# Patient Record
Sex: MALE | Race: BLACK OR AFRICAN AMERICAN | NOT HISPANIC OR LATINO | Employment: OTHER | URBAN - METROPOLITAN AREA
[De-identification: names, ages, dates, MRNs, and addresses within clinical notes are randomized per-mention and may not be internally consistent; named-entity substitution may affect disease eponyms.]

---

## 2017-07-27 ENCOUNTER — HOSPITAL ENCOUNTER (INPATIENT)
Facility: HOSPITAL | Age: 62
LOS: 1 days | Discharge: HOME | DRG: 292 | End: 2017-07-29
Attending: EMERGENCY MEDICINE | Admitting: HOSPITALIST
Payer: MEDICARE

## 2017-07-27 DIAGNOSIS — I50.43 ACUTE ON CHRONIC COMBINED SYSTOLIC AND DIASTOLIC CONGESTIVE HEART FAILURE: ICD-10-CM

## 2017-07-27 DIAGNOSIS — I50.42 CHRONIC COMBINED SYSTOLIC AND DIASTOLIC HEART FAILURE: ICD-10-CM

## 2017-07-27 DIAGNOSIS — J44.9 CHRONIC OBSTRUCTIVE PULMONARY DISEASE, UNSPECIFIED COPD TYPE: Primary | ICD-10-CM

## 2017-07-27 DIAGNOSIS — I50.9 CONGESTIVE HEART FAILURE, UNSPECIFIED CONGESTIVE HEART FAILURE CHRONICITY, UNSPECIFIED CONGESTIVE HEART FAILURE TYPE: ICD-10-CM

## 2017-07-27 DIAGNOSIS — I25.10 CAD (CORONARY ARTERY DISEASE): Chronic | ICD-10-CM

## 2017-07-27 DIAGNOSIS — R73.9 HYPERGLYCEMIA: ICD-10-CM

## 2017-07-27 DIAGNOSIS — I10 ESSENTIAL HYPERTENSION: ICD-10-CM

## 2017-07-27 DIAGNOSIS — I50.9 CONGESTIVE HEART FAILURE (CHF): ICD-10-CM

## 2017-07-27 LAB
BASOPHILS # BLD AUTO: 0.05 K/UL
BASOPHILS NFR BLD: 0.4 %
BNP SERPL-MCNC: 322 PG/ML
DIFFERENTIAL METHOD: ABNORMAL
EOSINOPHIL # BLD AUTO: 0.2 K/UL
EOSINOPHIL NFR BLD: 1.6 %
ERYTHROCYTE [DISTWIDTH] IN BLOOD BY AUTOMATED COUNT: 13.8 %
HCT VFR BLD AUTO: 41.3 %
HGB BLD-MCNC: 13.3 G/DL
INR PPP: 1
LYMPHOCYTES # BLD AUTO: 3.5 K/UL
LYMPHOCYTES NFR BLD: 31.2 %
MCH RBC QN AUTO: 28.4 PG
MCHC RBC AUTO-ENTMCNC: 32.2 G/DL
MCV RBC AUTO: 88 FL
MONOCYTES # BLD AUTO: 0.8 K/UL
MONOCYTES NFR BLD: 6.9 %
NEUTROPHILS # BLD AUTO: 6.7 K/UL
NEUTROPHILS NFR BLD: 59.9 %
PLATELET # BLD AUTO: 235 K/UL
PMV BLD AUTO: 10.8 FL
PROTHROMBIN TIME: 10.6 SEC
RBC # BLD AUTO: 4.68 M/UL
TROPONIN I SERPL DL<=0.01 NG/ML-MCNC: 0.02 NG/ML
WBC # BLD AUTO: 11.16 K/UL

## 2017-07-27 PROCEDURE — 84484 ASSAY OF TROPONIN QUANT: CPT

## 2017-07-27 PROCEDURE — 85610 PROTHROMBIN TIME: CPT

## 2017-07-27 PROCEDURE — 83880 ASSAY OF NATRIURETIC PEPTIDE: CPT

## 2017-07-27 PROCEDURE — 99285 EMERGENCY DEPT VISIT HI MDM: CPT | Mod: 25

## 2017-07-27 PROCEDURE — 85025 COMPLETE CBC W/AUTO DIFF WBC: CPT

## 2017-07-27 PROCEDURE — 80053 COMPREHEN METABOLIC PANEL: CPT

## 2017-07-28 PROBLEM — E78.5 DYSLIPIDEMIA: Status: ACTIVE | Noted: 2017-07-28

## 2017-07-28 PROBLEM — I50.9 CONGESTIVE HEART FAILURE (CHF): Status: ACTIVE | Noted: 2017-07-28

## 2017-07-28 PROBLEM — I50.43 ACUTE ON CHRONIC COMBINED SYSTOLIC AND DIASTOLIC CONGESTIVE HEART FAILURE: Status: ACTIVE | Noted: 2017-07-28

## 2017-07-28 PROBLEM — J44.9 CHRONIC OBSTRUCTIVE PULMONARY DISEASE: Status: ACTIVE | Noted: 2017-07-28

## 2017-07-28 PROBLEM — I50.42 CHRONIC COMBINED SYSTOLIC AND DIASTOLIC HEART FAILURE: Chronic | Status: ACTIVE | Noted: 2017-07-28

## 2017-07-28 PROBLEM — J96.11 CHRONIC RESPIRATORY FAILURE WITH HYPOXIA: Chronic | Status: ACTIVE | Noted: 2017-07-28

## 2017-07-28 LAB
ALBUMIN SERPL BCP-MCNC: 3.5 G/DL
ALBUMIN SERPL BCP-MCNC: 3.8 G/DL
ALP SERPL-CCNC: 100 U/L
ALP SERPL-CCNC: 91 U/L
ALT SERPL W/O P-5'-P-CCNC: 13 U/L
ALT SERPL W/O P-5'-P-CCNC: 14 U/L
ANION GAP SERPL CALC-SCNC: 10 MMOL/L
ANION GAP SERPL CALC-SCNC: 9 MMOL/L
AST SERPL-CCNC: 14 U/L
AST SERPL-CCNC: 15 U/L
BASOPHILS # BLD AUTO: 0.02 K/UL
BASOPHILS NFR BLD: 0.2 %
BILIRUB SERPL-MCNC: 0.3 MG/DL
BILIRUB SERPL-MCNC: 0.4 MG/DL
BUN SERPL-MCNC: 5 MG/DL
BUN SERPL-MCNC: 5 MG/DL
CALCIUM SERPL-MCNC: 9.1 MG/DL
CALCIUM SERPL-MCNC: 9.4 MG/DL
CHLORIDE SERPL-SCNC: 98 MMOL/L
CHLORIDE SERPL-SCNC: 98 MMOL/L
CO2 SERPL-SCNC: 32 MMOL/L
CO2 SERPL-SCNC: 36 MMOL/L
CREAT SERPL-MCNC: 0.9 MG/DL
CREAT SERPL-MCNC: 0.9 MG/DL
DIASTOLIC DYSFUNCTION: NO
DIASTOLIC DYSFUNCTION: YES
DIFFERENTIAL METHOD: ABNORMAL
EOSINOPHIL # BLD AUTO: 0 K/UL
EOSINOPHIL NFR BLD: 0.1 %
ERYTHROCYTE [DISTWIDTH] IN BLOOD BY AUTOMATED COUNT: 13.6 %
EST. GFR  (AFRICAN AMERICAN): >60 ML/MIN/1.73 M^2
EST. GFR  (AFRICAN AMERICAN): >60 ML/MIN/1.73 M^2
EST. GFR  (NON AFRICAN AMERICAN): >60 ML/MIN/1.73 M^2
EST. GFR  (NON AFRICAN AMERICAN): >60 ML/MIN/1.73 M^2
ESTIMATED AVG GLUCOSE: 186 MG/DL
ESTIMATED PA SYSTOLIC PRESSURE: 16.25
GLOBAL PERICARDIAL EFFUSION: ABNORMAL
GLUCOSE SERPL-MCNC: 229 MG/DL
GLUCOSE SERPL-MCNC: 229 MG/DL
HBA1C MFR BLD HPLC: 8.1 %
HCT VFR BLD AUTO: 44.7 %
HGB BLD-MCNC: 14.6 G/DL
LYMPHOCYTES # BLD AUTO: 1.1 K/UL
LYMPHOCYTES NFR BLD: 8.7 %
MAGNESIUM SERPL-MCNC: 1.9 MG/DL
MCH RBC QN AUTO: 28.6 PG
MCHC RBC AUTO-ENTMCNC: 32.7 G/DL
MCV RBC AUTO: 88 FL
MITRAL VALVE REGURGITATION: ABNORMAL
MONOCYTES # BLD AUTO: 0.1 K/UL
MONOCYTES NFR BLD: 1.2 %
NEUTROPHILS # BLD AUTO: 10.8 K/UL
NEUTROPHILS NFR BLD: 89.8 %
PHOSPHATE SERPL-MCNC: 3.5 MG/DL
PLATELET # BLD AUTO: 248 K/UL
PMV BLD AUTO: 10.5 FL
POCT GLUCOSE: 241 MG/DL (ref 70–110)
POCT GLUCOSE: 336 MG/DL (ref 70–110)
POTASSIUM SERPL-SCNC: 3.6 MMOL/L
POTASSIUM SERPL-SCNC: 4.1 MMOL/L
PROT SERPL-MCNC: 7.5 G/DL
PROT SERPL-MCNC: 8.1 G/DL
RBC # BLD AUTO: 5.1 M/UL
RETIRED EF AND QEF - SEE NOTES: 30 (ref 55–65)
SODIUM SERPL-SCNC: 140 MMOL/L
SODIUM SERPL-SCNC: 143 MMOL/L
TRICUSPID VALVE REGURGITATION: ABNORMAL
WBC # BLD AUTO: 12.01 K/UL

## 2017-07-28 PROCEDURE — 94640 AIRWAY INHALATION TREATMENT: CPT

## 2017-07-28 PROCEDURE — 25000003 PHARM REV CODE 250: Performed by: EMERGENCY MEDICINE

## 2017-07-28 PROCEDURE — 25000242 PHARM REV CODE 250 ALT 637 W/ HCPCS: Performed by: EMERGENCY MEDICINE

## 2017-07-28 PROCEDURE — 96361 HYDRATE IV INFUSION ADD-ON: CPT

## 2017-07-28 PROCEDURE — 25000003 PHARM REV CODE 250: Performed by: INTERNAL MEDICINE

## 2017-07-28 PROCEDURE — 36415 COLL VENOUS BLD VENIPUNCTURE: CPT

## 2017-07-28 PROCEDURE — 94761 N-INVAS EAR/PLS OXIMETRY MLT: CPT

## 2017-07-28 PROCEDURE — 99223 1ST HOSP IP/OBS HIGH 75: CPT | Mod: 25,,, | Performed by: INTERNAL MEDICINE

## 2017-07-28 PROCEDURE — 21400001 HC TELEMETRY ROOM

## 2017-07-28 PROCEDURE — 63600175 PHARM REV CODE 636 W HCPCS: Performed by: INTERNAL MEDICINE

## 2017-07-28 PROCEDURE — 78452 HT MUSCLE IMAGE SPECT MULT: CPT | Mod: 26,,, | Performed by: INTERNAL MEDICINE

## 2017-07-28 PROCEDURE — 63600175 PHARM REV CODE 636 W HCPCS: Performed by: EMERGENCY MEDICINE

## 2017-07-28 PROCEDURE — 84100 ASSAY OF PHOSPHORUS: CPT

## 2017-07-28 PROCEDURE — 80053 COMPREHEN METABOLIC PANEL: CPT

## 2017-07-28 PROCEDURE — 93018 CV STRESS TEST I&R ONLY: CPT | Mod: ,,, | Performed by: INTERNAL MEDICINE

## 2017-07-28 PROCEDURE — 27000221 HC OXYGEN, UP TO 24 HOURS

## 2017-07-28 PROCEDURE — 93017 CV STRESS TEST TRACING ONLY: CPT

## 2017-07-28 PROCEDURE — 96374 THER/PROPH/DIAG INJ IV PUSH: CPT

## 2017-07-28 PROCEDURE — 96375 TX/PRO/DX INJ NEW DRUG ADDON: CPT

## 2017-07-28 PROCEDURE — 93306 TTE W/DOPPLER COMPLETE: CPT | Mod: 26,,, | Performed by: INTERNAL MEDICINE

## 2017-07-28 PROCEDURE — 99900035 HC TECH TIME PER 15 MIN (STAT)

## 2017-07-28 PROCEDURE — 93306 TTE W/DOPPLER COMPLETE: CPT

## 2017-07-28 PROCEDURE — 93016 CV STRESS TEST SUPVJ ONLY: CPT | Mod: ,,, | Performed by: INTERNAL MEDICINE

## 2017-07-28 PROCEDURE — 63600175 PHARM REV CODE 636 W HCPCS: Performed by: NURSE PRACTITIONER

## 2017-07-28 PROCEDURE — 83036 HEMOGLOBIN GLYCOSYLATED A1C: CPT

## 2017-07-28 PROCEDURE — 85025 COMPLETE CBC W/AUTO DIFF WBC: CPT

## 2017-07-28 PROCEDURE — S4991 NICOTINE PATCH NONLEGEND: HCPCS | Performed by: INTERNAL MEDICINE

## 2017-07-28 PROCEDURE — 83735 ASSAY OF MAGNESIUM: CPT

## 2017-07-28 RX ORDER — FUROSEMIDE 10 MG/ML
20 INJECTION INTRAMUSCULAR; INTRAVENOUS DAILY
Status: DISCONTINUED | OUTPATIENT
Start: 2017-07-28 | End: 2017-07-28

## 2017-07-28 RX ORDER — PREDNISONE 20 MG/1
60 TABLET ORAL
Status: COMPLETED | OUTPATIENT
Start: 2017-07-28 | End: 2017-07-28

## 2017-07-28 RX ORDER — IBUPROFEN 200 MG
24 TABLET ORAL
Status: DISCONTINUED | OUTPATIENT
Start: 2017-07-28 | End: 2017-07-30 | Stop reason: HOSPADM

## 2017-07-28 RX ORDER — METHYLPREDNISOLONE SOD SUCC 125 MG
80 VIAL (EA) INJECTION EVERY 8 HOURS
Status: DISCONTINUED | OUTPATIENT
Start: 2017-07-28 | End: 2017-07-28

## 2017-07-28 RX ORDER — LISINOPRIL 20 MG/1
20 TABLET ORAL DAILY
Status: DISCONTINUED | OUTPATIENT
Start: 2017-07-28 | End: 2017-07-30 | Stop reason: HOSPADM

## 2017-07-28 RX ORDER — CLONIDINE HYDROCHLORIDE 0.1 MG/1
0.1 TABLET ORAL 3 TIMES DAILY PRN
Status: DISCONTINUED | OUTPATIENT
Start: 2017-07-28 | End: 2017-07-30 | Stop reason: HOSPADM

## 2017-07-28 RX ORDER — IBUPROFEN 200 MG
16 TABLET ORAL
Status: DISCONTINUED | OUTPATIENT
Start: 2017-07-28 | End: 2017-07-28

## 2017-07-28 RX ORDER — ACETAMINOPHEN 500 MG
500 TABLET ORAL EVERY 6 HOURS PRN
Status: DISCONTINUED | OUTPATIENT
Start: 2017-07-28 | End: 2017-07-30 | Stop reason: HOSPADM

## 2017-07-28 RX ORDER — FLUTICASONE FUROATE AND VILANTEROL 100; 25 UG/1; UG/1
1 POWDER RESPIRATORY (INHALATION) DAILY
Status: DISCONTINUED | OUTPATIENT
Start: 2017-07-28 | End: 2017-07-30 | Stop reason: HOSPADM

## 2017-07-28 RX ORDER — ACETAMINOPHEN 325 MG/1
650 TABLET ORAL EVERY 8 HOURS PRN
Status: DISCONTINUED | OUTPATIENT
Start: 2017-07-28 | End: 2017-07-28

## 2017-07-28 RX ORDER — AMLODIPINE BESYLATE 5 MG/1
5 TABLET ORAL DAILY
Qty: 30 TABLET | Refills: 3 | Status: ON HOLD | OUTPATIENT
Start: 2017-07-28 | End: 2021-02-14 | Stop reason: SDUPTHER

## 2017-07-28 RX ORDER — METOPROLOL SUCCINATE 25 MG/1
25 TABLET, EXTENDED RELEASE ORAL DAILY
Status: DISCONTINUED | OUTPATIENT
Start: 2017-07-28 | End: 2017-07-28

## 2017-07-28 RX ORDER — DOXYCYCLINE HYCLATE 100 MG
100 TABLET ORAL EVERY 12 HOURS
Status: DISCONTINUED | OUTPATIENT
Start: 2017-07-28 | End: 2017-07-30 | Stop reason: HOSPADM

## 2017-07-28 RX ORDER — ONDANSETRON 2 MG/ML
8 INJECTION INTRAMUSCULAR; INTRAVENOUS EVERY 8 HOURS PRN
Status: DISCONTINUED | OUTPATIENT
Start: 2017-07-28 | End: 2017-07-30 | Stop reason: HOSPADM

## 2017-07-28 RX ORDER — ASPIRIN 81 MG/1
81 TABLET ORAL DAILY
Status: DISCONTINUED | OUTPATIENT
Start: 2017-07-28 | End: 2017-07-30 | Stop reason: HOSPADM

## 2017-07-28 RX ORDER — GLUCAGON 1 MG
1 KIT INJECTION
Status: DISCONTINUED | OUTPATIENT
Start: 2017-07-28 | End: 2017-07-28

## 2017-07-28 RX ORDER — IPRATROPIUM BROMIDE AND ALBUTEROL SULFATE 2.5; .5 MG/3ML; MG/3ML
3 SOLUTION RESPIRATORY (INHALATION) EVERY 4 HOURS PRN
Status: DISCONTINUED | OUTPATIENT
Start: 2017-07-28 | End: 2017-07-30 | Stop reason: HOSPADM

## 2017-07-28 RX ORDER — FLUTICASONE PROPIONATE AND SALMETEROL 250; 50 UG/1; UG/1
1 POWDER RESPIRATORY (INHALATION) 2 TIMES DAILY
Qty: 60 EACH | Refills: 2 | Status: SHIPPED | OUTPATIENT
Start: 2017-07-28 | End: 2017-12-20 | Stop reason: SDUPTHER

## 2017-07-28 RX ORDER — AMLODIPINE BESYLATE 5 MG/1
5 TABLET ORAL DAILY
Status: DISCONTINUED | OUTPATIENT
Start: 2017-07-28 | End: 2017-07-28

## 2017-07-28 RX ORDER — ONDANSETRON 2 MG/ML
4 INJECTION INTRAMUSCULAR; INTRAVENOUS EVERY 12 HOURS PRN
Status: DISCONTINUED | OUTPATIENT
Start: 2017-07-28 | End: 2017-07-28

## 2017-07-28 RX ORDER — LISINOPRIL 20 MG/1
20 TABLET ORAL DAILY
Qty: 30 TABLET | Refills: 3 | Status: ON HOLD | OUTPATIENT
Start: 2017-07-28 | End: 2021-02-14

## 2017-07-28 RX ORDER — ALBUTEROL SULFATE 90 UG/1
1-2 AEROSOL, METERED RESPIRATORY (INHALATION) EVERY 6 HOURS PRN
Qty: 1 INHALER | Refills: 0 | Status: ON HOLD | OUTPATIENT
Start: 2017-07-28 | End: 2021-02-14

## 2017-07-28 RX ORDER — FUROSEMIDE 10 MG/ML
40 INJECTION INTRAMUSCULAR; INTRAVENOUS 2 TIMES DAILY
Status: DISCONTINUED | OUTPATIENT
Start: 2017-07-28 | End: 2017-07-30 | Stop reason: HOSPADM

## 2017-07-28 RX ORDER — IBUPROFEN 200 MG
16 TABLET ORAL
Status: DISCONTINUED | OUTPATIENT
Start: 2017-07-28 | End: 2017-07-30 | Stop reason: HOSPADM

## 2017-07-28 RX ORDER — ALBUTEROL SULFATE 2.5 MG/.5ML
2.5 SOLUTION RESPIRATORY (INHALATION)
Status: DISCONTINUED | OUTPATIENT
Start: 2017-07-28 | End: 2017-07-28

## 2017-07-28 RX ORDER — RAMELTEON 8 MG/1
8 TABLET ORAL NIGHTLY PRN
Status: DISCONTINUED | OUTPATIENT
Start: 2017-07-28 | End: 2017-07-30 | Stop reason: HOSPADM

## 2017-07-28 RX ORDER — FUROSEMIDE 10 MG/ML
40 INJECTION INTRAMUSCULAR; INTRAVENOUS
Status: COMPLETED | OUTPATIENT
Start: 2017-07-28 | End: 2017-07-28

## 2017-07-28 RX ORDER — GLUCAGON 1 MG
1 KIT INJECTION
Status: DISCONTINUED | OUTPATIENT
Start: 2017-07-28 | End: 2017-07-30 | Stop reason: HOSPADM

## 2017-07-28 RX ORDER — INSULIN ASPART 100 [IU]/ML
0-5 INJECTION, SOLUTION INTRAVENOUS; SUBCUTANEOUS
Status: DISCONTINUED | OUTPATIENT
Start: 2017-07-28 | End: 2017-07-28

## 2017-07-28 RX ORDER — ENOXAPARIN SODIUM 100 MG/ML
40 INJECTION SUBCUTANEOUS EVERY 24 HOURS
Status: DISCONTINUED | OUTPATIENT
Start: 2017-07-28 | End: 2017-07-30 | Stop reason: HOSPADM

## 2017-07-28 RX ORDER — IBUPROFEN 200 MG
1 TABLET ORAL DAILY
Status: DISCONTINUED | OUTPATIENT
Start: 2017-07-28 | End: 2017-07-30 | Stop reason: HOSPADM

## 2017-07-28 RX ORDER — IBUPROFEN 200 MG
24 TABLET ORAL
Status: DISCONTINUED | OUTPATIENT
Start: 2017-07-28 | End: 2017-07-28

## 2017-07-28 RX ORDER — METOPROLOL SUCCINATE 25 MG/1
25 TABLET, EXTENDED RELEASE ORAL DAILY
Qty: 30 TABLET | Refills: 3 | Status: SHIPPED | OUTPATIENT
Start: 2017-07-28 | End: 2021-02-13 | Stop reason: ALTCHOICE

## 2017-07-28 RX ORDER — INSULIN ASPART 100 [IU]/ML
0-5 INJECTION, SOLUTION INTRAVENOUS; SUBCUTANEOUS
Status: DISCONTINUED | OUTPATIENT
Start: 2017-07-28 | End: 2017-07-30 | Stop reason: HOSPADM

## 2017-07-28 RX ORDER — ENALAPRILAT 1.25 MG/ML
1.25 INJECTION INTRAVENOUS
Status: COMPLETED | OUTPATIENT
Start: 2017-07-28 | End: 2017-07-28

## 2017-07-28 RX ORDER — IPRATROPIUM BROMIDE AND ALBUTEROL SULFATE 2.5; .5 MG/3ML; MG/3ML
3 SOLUTION RESPIRATORY (INHALATION)
Status: COMPLETED | OUTPATIENT
Start: 2017-07-28 | End: 2017-07-28

## 2017-07-28 RX ADMIN — DOXYCYCLINE HYCLATE 100 MG: 100 TABLET, COATED ORAL at 08:07

## 2017-07-28 RX ADMIN — ENOXAPARIN SODIUM 40 MG: 100 INJECTION SUBCUTANEOUS at 05:07

## 2017-07-28 RX ADMIN — INSULIN ASPART 1 UNITS: 100 INJECTION, SOLUTION INTRAVENOUS; SUBCUTANEOUS at 08:07

## 2017-07-28 RX ADMIN — NICOTINE 1 PATCH: 21 PATCH, EXTENDED RELEASE TRANSDERMAL at 09:07

## 2017-07-28 RX ADMIN — ASPIRIN 81 MG: 81 TABLET, COATED ORAL at 09:07

## 2017-07-28 RX ADMIN — SODIUM CHLORIDE 500 ML: 0.9 INJECTION, SOLUTION INTRAVENOUS at 12:07

## 2017-07-28 RX ADMIN — DOXYCYCLINE HYCLATE 100 MG: 100 TABLET, COATED ORAL at 09:07

## 2017-07-28 RX ADMIN — LISINOPRIL 20 MG: 20 TABLET ORAL at 09:07

## 2017-07-28 RX ADMIN — FLUTICASONE FUROATE AND VILANTEROL TRIFENATATE 1 PUFF: 100; 25 POWDER RESPIRATORY (INHALATION) at 07:07

## 2017-07-28 RX ADMIN — ACETAMINOPHEN 500 MG: 500 TABLET ORAL at 07:07

## 2017-07-28 RX ADMIN — CLONIDINE HYDROCHLORIDE 0.1 MG: 0.1 TABLET ORAL at 08:07

## 2017-07-28 RX ADMIN — ENALAPRILAT 1.25 MG: 1.25 INJECTION, SOLUTION INTRAVENOUS at 12:07

## 2017-07-28 RX ADMIN — IPRATROPIUM BROMIDE AND ALBUTEROL SULFATE 3 ML: .5; 3 SOLUTION RESPIRATORY (INHALATION) at 12:07

## 2017-07-28 RX ADMIN — FUROSEMIDE 40 MG: 10 INJECTION, SOLUTION INTRAVENOUS at 05:07

## 2017-07-28 RX ADMIN — METHYLPREDNISOLONE SODIUM SUCCINATE 80 MG: 125 INJECTION, POWDER, FOR SOLUTION INTRAMUSCULAR; INTRAVENOUS at 06:07

## 2017-07-28 RX ADMIN — INSULIN ASPART 4 UNITS: 100 INJECTION, SOLUTION INTRAVENOUS; SUBCUTANEOUS at 05:07

## 2017-07-28 RX ADMIN — FUROSEMIDE 40 MG: 10 INJECTION, SOLUTION INTRAVENOUS at 09:07

## 2017-07-28 RX ADMIN — PREDNISONE 60 MG: 20 TABLET ORAL at 12:07

## 2017-07-28 RX ADMIN — FUROSEMIDE 40 MG: 10 INJECTION, SOLUTION INTRAMUSCULAR; INTRAVENOUS at 12:07

## 2017-07-28 NOTE — ASSESSMENT & PLAN NOTE
Patient's presentation has features of both COPD and CHF exacerbation.  I have reviewed the chest X-ray and it reveals no focal infiltrates but there is some pulmonary edema noted.  I have reviewed the EKG and it reveals sinus tachycardia without evidence of acute ischemia.  I will treat him for both COPD and CHF exacerbation with intravenous diuresis and close urine output monitoring; repeating the 2D-echo in the morning; providing frequent nebulized STEFANIE/LAMA and intravenous corticosteroids; and empiric antibiotics.

## 2017-07-28 NOTE — H&P
[facility] Medicine  History & Physical    Patient Name: Abelardo Irene Jr.  MRN: [id number]  Admission Date: 7/27/2017  Attending Physician: Ayana Strauss MD   Primary Care Provider: Adelso Reyna MD         Patient information was obtained from patient.     Subjective:     Principal Problem:Acute on chronic combined systolic and diastolic congestive heart failure    Chief Complaint: Shortness of breath 45.    HPI: Mr. Abelardo Irene Jr. is a 61 y.o. male with essential hypertension, CAD s/ PCI, chronic combined systolic and diastolic heart failure (LVEF 35-40% Apr 2016), COPD, chronic respiratory failure with hypoxia, and tobacco abuse who presents to [facility] ED with complaints of dyspnea for the past two days.  He reports that the dyspnea is mainly on exertion [place] and [place].  He also reports some lower extremity edema, coughing that is occasionally productive of white sputum and blood-tinging, and some wheezing.  He has not had any fevers, chills, nausea, vomiting, sick contacts, nor any recent travel.  He does complain of 2-pillow orthopnea that has worsened in the last week as well as some PND.  He does not complain of chest pain per se but has had some chest tightness.  He admits to being out of his medications for the past two months.    Chart Review:  Previous Hospitalizations  Date Hospital Diagnosis   Apr 2016 [facility] Chest pain - negative work-up   Mar 2013 [facility] COPD exacerbation      Past Medical History:   Diagnosis Date    CHF (congestive heart failure)     COPD (chronic obstructive pulmonary disease)     Coronary artery disease     Hypertension        Past Surgical History:   Procedure Laterality Date    CARDIAC SURGERY      CORONARY STENT PLACEMENT         Review of patient's allergies indicates:   Allergen Reactions    Contrast media Shortness Of Breath, Itching and Anxiety     Patient states that he had a bad  reaction, anxiety , shortness of breath, itching .        No current facility-administered medications on file prior to encounter.      Current Outpatient Prescriptions on File Prior to Encounter   Medication Sig    albuterol (PROVENTIL/VENTOLIN) 90 mcg/actuation inhaler Inhale 2 puffs into the lungs every 6 (six) hours as needed for Wheezing.    aspirin (ECOTRIN) 81 MG EC tablet Take 81 mg by mouth once daily.    [DISCONTINUED] amlodipine (NORVASC) 5 MG tablet Take 1 tablet (5 mg total) by mouth once daily.    [DISCONTINUED] amlodipine (NORVASC) 5 MG tablet Take 1 tablet (5 mg total) by mouth once daily.    [DISCONTINUED] fluticasone-salmeterol 250-50 mcg/dose (ADVAIR) 250-50 mcg/dose diskus inhaler Inhale 1 puff into the lungs 2 (two) times daily.    [DISCONTINUED] lisinopril (PRINIVIL,ZESTRIL) 20 MG tablet Take 1 tablet (20 mg total) by mouth once daily.    [DISCONTINUED] metoprolol succinate (TOPROL-XL) 25 MG 24 hr tablet Take 1 tablet (25 mg total) by mouth once daily.     Family History     Problem Relation (Age of Onset)    Cancer Mother    No Known Problems Father        Social History Main Topics    Smoking status: Current Every Day Smoker     Packs/day: 2.00     Years: 45.00     Types: Cigarettes    Smokeless tobacco: Never Used    Alcohol use No      Comment: socially    Drug use: No    Sexual activity: Yes     Partners: Female     Birth control/ protection: None     Review of Systems   Constitutional: Positive for activity change and fatigue. Negative for appetite change, chills, diaphoresis, fever and unexpected weight change.   HENT: Negative.    Eyes: Negative.    Respiratory: Positive for cough, chest tightness, shortness of breath and wheezing.    Cardiovascular: Positive for leg swelling. Negative for chest pain and palpitations.   Gastrointestinal: Negative for abdominal distention, abdominal pain, blood in stool, constipation, diarrhea, nausea and vomiting.   Genitourinary: Negative  for dysuria and hematuria.   Musculoskeletal: Negative.    Skin: Negative.    Neurological: Negative for dizziness, seizures, syncope, weakness and light-headedness.   Psychiatric/Behavioral: Negative.      Objective:     Vital Signs (Most Recent):  Temp: 98.7 °F (37.1 °C) (07/28/17 0415)  Pulse: 105 (07/28/17 0415)  Resp: 20 (07/28/17 0415)  BP: (!) 140/99 (07/28/17 0415)  SpO2: 95 % (07/28/17 0415) Vital Signs (24h Range):  Temp:  [98.4 °F (36.9 °C)-98.7 °F (37.1 °C)] 98.7 °F (37.1 °C)  Pulse:  [] 105  Resp:  [16-20] 20  SpO2:  [92 %-98 %] 95 %  BP: (130-184)/() 140/99     Weight: 91.2 kg (201 lb)  Body mass index is 32.44 kg/m².    Physical Exam   Constitutional: He is oriented to person, place, and time. He appears well-developed and well-nourished. No distress.   HENT:   Head: Normocephalic and atraumatic.   Right Ear: External ear normal.   Left Ear: External ear normal.   Nose: Nose normal.   Eyes: Right eye exhibits no discharge. Left eye exhibits no discharge.   Neck: Normal range of motion.   Cardiovascular: Normal rate, regular rhythm, normal heart sounds and intact distal pulses.  Exam reveals no gallop and no friction rub.    No murmur heard.  Trace pitting edema bilaterally   Pulmonary/Chest:   Bilateral end-expiratory wheezing and bibasilar crackles; mildly increased work of breathing.   Abdominal: Soft. Bowel sounds are normal. He exhibits no distension. There is no tenderness. There is no rebound and no guarding.   Musculoskeletal: Normal range of motion. He exhibits no edema.   Neurological: He is alert and oriented to person, place, and time.   Skin: Skin is warm and dry. He is not diaphoretic. No erythema.   Psychiatric: He has a normal mood and affect. His behavior is normal. Judgment and thought content normal.   Nursing note and vitals reviewed.       Significant Labs: All pertinent labs within the past 24 hours have been reviewed.    Significant Imaging: I have reviewed and  interpreted all pertinent imaging results/findings within the past 24 hours.    Assessment/Plan:     * Acute on chronic combined systolic and diastolic congestive heart failure    Patient's presentation has features of both COPD and CHF exacerbation.  I have reviewed the chest X-ray and it reveals no focal infiltrates but there is some pulmonary edema noted.  I have reviewed the EKG and it reveals sinus tachycardia without evidence of acute ischemia.  I will treat him for both COPD and CHF exacerbation with intravenous diuresis and close urine output monitoring; repeating the 2D-echo in the morning; providing frequent nebulized STEFANIE/LAMA and intravenous corticosteroids; and empiric antibiotics.          COPD with acute exacerbation    As addressed above.        Benign essential hypertension    Patient's blood pressure is better-controlled; will continue home regimen of lisinopril, and provide as-needed clonidine.  Will hold his home regimen of amlodipine and metoprolol due to acute heart failure.        CAD s/p PCI    Stable; will continue his home regimen of aspirin and lisinopril.        Chronic combined systolic and diastolic heart failure    As addressed above.        COPD (chronic obstructive pulmonary disease)    As addressed above.        Chronic respiratory failure with hypoxia    As addressed above.        Tobacco abuse    Patient was counseled on smoking cessation and he will be provided a nicotine transdermal patch applied while inpatient.  Will provide additional smoking cessation counseling prior to discharge.          VTE Risk Mitigation         Ordered     enoxaparin injection 40 mg  Daily     Route:  Subcutaneous        07/28/17 0428     Medium Risk of VTE  Once      07/28/17 0428            Total time spent on case: 45 minutes.        Oscar Emerson M.D.  Staff Nocturnist  Department of Hospital Medicine  [facility]  Pager: [phone]

## 2017-07-28 NOTE — HOSPITAL COURSE
Patient admitted for increasing dyspnea over 2 days 2/2 A/C combined heart failure +/- COPD exacerbations. Started IV diuretics + STEFANIE/LABA, IV steroids and IV abx with symptoms improved. Cardiology consulted and evaluated with recommendations for repeat cardiac work and agrees with diuresing.  Patient underwent stress test.  Mild ischemia noted on stress test.  Cardiology discussed options with patient.  Agreed on medical management and outpatient follow up for possible LHC.  Patient is currently doing much better.  Afebrile and hemodynamically stable.  He will be discharged home.

## 2017-07-28 NOTE — SUBJECTIVE & OBJECTIVE
Past Medical History:   Diagnosis Date    CHF (congestive heart failure)     COPD (chronic obstructive pulmonary disease)     Coronary artery disease     Hypertension        Past Surgical History:   Procedure Laterality Date    CARDIAC SURGERY      CORONARY STENT PLACEMENT         Review of patient's allergies indicates:   Allergen Reactions    Contrast media Shortness Of Breath, Itching and Anxiety     Patient states that he had a bad reaction, anxiety , shortness of breath, itching .        No current facility-administered medications on file prior to encounter.      Current Outpatient Prescriptions on File Prior to Encounter   Medication Sig    albuterol (PROVENTIL/VENTOLIN) 90 mcg/actuation inhaler Inhale 2 puffs into the lungs every 6 (six) hours as needed for Wheezing.    aspirin (ECOTRIN) 81 MG EC tablet Take 81 mg by mouth once daily.    [DISCONTINUED] amlodipine (NORVASC) 5 MG tablet Take 1 tablet (5 mg total) by mouth once daily.    [DISCONTINUED] amlodipine (NORVASC) 5 MG tablet Take 1 tablet (5 mg total) by mouth once daily.    [DISCONTINUED] fluticasone-salmeterol 250-50 mcg/dose (ADVAIR) 250-50 mcg/dose diskus inhaler Inhale 1 puff into the lungs 2 (two) times daily.    [DISCONTINUED] lisinopril (PRINIVIL,ZESTRIL) 20 MG tablet Take 1 tablet (20 mg total) by mouth once daily.    [DISCONTINUED] metoprolol succinate (TOPROL-XL) 25 MG 24 hr tablet Take 1 tablet (25 mg total) by mouth once daily.     Family History     Problem Relation (Age of Onset)    Cancer Mother    No Known Problems Father        Social History Main Topics    Smoking status: Current Every Day Smoker     Packs/day: 2.00     Years: 45.00     Types: Cigarettes    Smokeless tobacco: Never Used    Alcohol use No      Comment: socially    Drug use: No    Sexual activity: Yes     Partners: Female     Birth control/ protection: None     Review of Systems   Constitutional: Positive for activity change and fatigue. Negative  for appetite change, chills, diaphoresis, fever and unexpected weight change.   HENT: Negative.    Eyes: Negative.    Respiratory: Positive for cough, chest tightness, shortness of breath and wheezing.    Cardiovascular: Positive for leg swelling. Negative for chest pain and palpitations.   Gastrointestinal: Negative for abdominal distention, abdominal pain, blood in stool, constipation, diarrhea, nausea and vomiting.   Genitourinary: Negative for dysuria and hematuria.   Musculoskeletal: Negative.    Skin: Negative.    Neurological: Negative for dizziness, seizures, syncope, weakness and light-headedness.   Psychiatric/Behavioral: Negative.      Objective:     Vital Signs (Most Recent):  Temp: 98.7 °F (37.1 °C) (07/28/17 0415)  Pulse: 105 (07/28/17 0415)  Resp: 20 (07/28/17 0415)  BP: (!) 140/99 (07/28/17 0415)  SpO2: 95 % (07/28/17 0415) Vital Signs (24h Range):  Temp:  [98.4 °F (36.9 °C)-98.7 °F (37.1 °C)] 98.7 °F (37.1 °C)  Pulse:  [] 105  Resp:  [16-20] 20  SpO2:  [92 %-98 %] 95 %  BP: (130-184)/() 140/99     Weight: 91.2 kg (201 lb)  Body mass index is 32.44 kg/m².    Physical Exam   Constitutional: He is oriented to person, place, and time. He appears well-developed and well-nourished. No distress.   HENT:   Head: Normocephalic and atraumatic.   Right Ear: External ear normal.   Left Ear: External ear normal.   Nose: Nose normal.   Eyes: Right eye exhibits no discharge. Left eye exhibits no discharge.   Neck: Normal range of motion.   Cardiovascular: Normal rate, regular rhythm, normal heart sounds and intact distal pulses.  Exam reveals no gallop and no friction rub.    No murmur heard.  Trace pitting edema bilaterally   Pulmonary/Chest:   Bilateral end-expiratory wheezing and bibasilar crackles, mildly increased work of breathing.   Abdominal: Soft. Bowel sounds are normal. He exhibits no distension. There is no tenderness. There is no rebound and no guarding.   Musculoskeletal: Normal range of  motion. He exhibits edema.   Neurological: He is alert and oriented to person, place, and time.   Skin: Skin is warm and dry. He is not diaphoretic. No erythema.   Psychiatric: He has a normal mood and affect. His behavior is normal. Judgment and thought content normal.   Nursing note and vitals reviewed.       Significant Labs: All pertinent labs within the past 24 hours have been reviewed.    Significant Imaging: I have reviewed and interpreted all pertinent imaging results/findings within the past 24 hours.

## 2017-07-28 NOTE — PROGRESS NOTES
Report given to CHARLI Pascual.  Pt to be transferred to  308 post testing.  Pt was notified prior to leaving for testing of inpatient status and room transfer.

## 2017-07-28 NOTE — SUBJECTIVE & OBJECTIVE
Past Medical History:   Diagnosis Date    CHF (congestive heart failure)     COPD (chronic obstructive pulmonary disease)     Coronary artery disease     Hypertension        Past Surgical History:   Procedure Laterality Date    CARDIAC SURGERY      CORONARY STENT PLACEMENT         Review of patient's allergies indicates:   Allergen Reactions    Contrast media Shortness Of Breath, Itching and Anxiety     Patient states that he had a bad reaction, anxiety , shortness of breath, itching .        No current facility-administered medications on file prior to encounter.      Current Outpatient Prescriptions on File Prior to Encounter   Medication Sig    albuterol (PROVENTIL/VENTOLIN) 90 mcg/actuation inhaler Inhale 2 puffs into the lungs every 6 (six) hours as needed for Wheezing.    aspirin (ECOTRIN) 81 MG EC tablet Take 81 mg by mouth once daily.    [DISCONTINUED] amlodipine (NORVASC) 5 MG tablet Take 1 tablet (5 mg total) by mouth once daily.    [DISCONTINUED] amlodipine (NORVASC) 5 MG tablet Take 1 tablet (5 mg total) by mouth once daily.    [DISCONTINUED] fluticasone-salmeterol 250-50 mcg/dose (ADVAIR) 250-50 mcg/dose diskus inhaler Inhale 1 puff into the lungs 2 (two) times daily.    [DISCONTINUED] lisinopril (PRINIVIL,ZESTRIL) 20 MG tablet Take 1 tablet (20 mg total) by mouth once daily.    [DISCONTINUED] metoprolol succinate (TOPROL-XL) 25 MG 24 hr tablet Take 1 tablet (25 mg total) by mouth once daily.     Family History     Problem Relation (Age of Onset)    Cancer Mother    No Known Problems Father        Social History Main Topics    Smoking status: Current Every Day Smoker     Packs/day: 2.00     Years: 45.00     Types: Cigarettes    Smokeless tobacco: Never Used    Alcohol use No      Comment: socially    Drug use: No    Sexual activity: Yes     Partners: Female     Birth control/ protection: None     Review of Systems   Constitution: Negative.   HENT: Negative.    Eyes: Negative.     Cardiovascular: Positive for chest pain and dyspnea on exertion. Negative for irregular heartbeat, leg swelling, near-syncope, orthopnea, palpitations, paroxysmal nocturnal dyspnea and syncope.   Respiratory: Positive for shortness of breath.    Skin: Negative.    Musculoskeletal: Negative.    Gastrointestinal: Negative for abdominal pain, constipation and diarrhea.   Genitourinary: Negative for dysuria.   Neurological: Negative for dizziness.   Psychiatric/Behavioral: Negative.      Objective:     Vital Signs (Most Recent):  Temp: 97.6 °F (36.4 °C) (07/28/17 0808)  Pulse: 87 (07/28/17 0808)  Resp: 18 (07/28/17 0808)  BP: 135/82 (07/28/17 0808)  SpO2: 96 % (07/28/17 0808) Vital Signs (24h Range):  Temp:  [97.6 °F (36.4 °C)-98.7 °F (37.1 °C)] 97.6 °F (36.4 °C)  Pulse:  [] 87  Resp:  [16-20] 18  SpO2:  [92 %-98 %] 96 %  BP: (130-184)/() 135/82     Weight: 91.2 kg (201 lb)  Body mass index is 32.44 kg/m².    SpO2: 96 %  O2 Device (Oxygen Therapy): room air      Intake/Output Summary (Last 24 hours) at 07/28/17 1017  Last data filed at 07/28/17 0056   Gross per 24 hour   Intake                0 ml   Output              625 ml   Net             -625 ml       Lines/Drains/Airways     Peripheral Intravenous Line                 Peripheral IV - Single Lumen 07/27/17 2320 Right Antecubital less than 1 day                Physical Exam   Constitutional: He is oriented to person, place, and time. He appears well-developed and well-nourished. No distress.   HENT:   Head: Normocephalic and atraumatic.   Eyes: Conjunctivae and EOM are normal. Pupils are equal, round, and reactive to light.   Neck: Normal range of motion. Neck supple. No thyromegaly present.   Cardiovascular: Normal rate, regular rhythm and normal heart sounds.    No murmur heard.  Pulmonary/Chest: Effort normal and breath sounds normal. No respiratory distress. He has no wheezes. He has no rales. He exhibits no tenderness.   Abdominal: Soft. Bowel  sounds are normal.   Musculoskeletal: He exhibits no edema.   Neurological: He is alert and oriented to person, place, and time.   Skin: Skin is warm and dry.   Psychiatric: He has a normal mood and affect. His behavior is normal.       Significant Labs:   CMP   Recent Labs  Lab 07/27/17 2320 07/28/17  0546    143   K 3.6 4.1   CL 98 98   CO2 32* 36*   * 229*   BUN 5* 5*   CREATININE 0.9 0.9   CALCIUM 9.1 9.4   PROT 7.5 8.1   ALBUMIN 3.5 3.8   BILITOT 0.3 0.4   ALKPHOS 91 100   AST 14 15   ALT 14 13   ANIONGAP 10 9   ESTGFRAFRICA >60 >60   EGFRNONAA >60 >60   , CBC   Recent Labs  Lab 07/27/17 2320 07/28/17  0546   WBC 11.16 12.01   HGB 13.3* 14.6   HCT 41.3 44.7    248   , INR   Recent Labs  Lab 07/27/17 2320   INR 1.0   , Lipid Panel No results for input(s): CHOL, HDL, LDLCALC, TRIG, CHOLHDL in the last 48 hours. and Troponin   Recent Labs  Lab 07/27/17 2320   TROPONINI 0.021       Significant Imaging: Echocardiogram:   2D echo with color flow doppler:   Results for orders placed or performed during the hospital encounter of 04/28/16   2D echo with color flow doppler   Result Value Ref Range    EF 35 (A) 55 - 65    Mitral Valve Regurgitation MILD     Diastolic Dysfunction Yes (A)     Est. PA Systolic Pressure 20.98     Pericardial Effusion NONE     Tricuspid Valve Regurgitation MILD

## 2017-07-28 NOTE — NURSING
Patient arrived to floor from cardiology procedure in stable condition.  Visualized patient and assessed patient's overall condition and appearance. Patient sitting in chair eating meal. No complaints. NAD noted. Will continue to monitor.

## 2017-07-28 NOTE — HPI
"61 y.o. male smoker with HTN, CHF, COPD, CAD s/p CABG, and hx of MI presents to the ED c/o acute-onset SOB with exertion with associated productive cough (white sputum), urinary frequency, and increased swelling to his bilateral feet for the past few days. Pt states he is currently able to walk 1.5 blocks before getting "short winded," which is unusual for him. SOB is exacerbated with activity and alleviated with rest. Pt states he intermittently gets short of breath when sleeping on 2 pillows, which requires him to sit up. When lying supine, he feels uncomfortable. Of note, pt states he ran out of his HTN medication 1 month ago. Pt is compliant with his daily aspirin. He denies a hx of DVT and PE. Pt otherwise denies fever, chest pain, abdominal pain, N/V/D, and dysuria.     Patient does not follow-up with cardiology in his history of noncompliance with medicine therapy.  He's continued tobacco user.  Apparently he was recently at [facility] was told to follow-up with cardiologist who he is unable to name currently.  He says that it locally it is easier for him to follow and wishes to establish care with us.  He denies any current chest pain.  He is agreeable for stress test today.  He somewhat of a poor historian and unable to elaborate much on his cardiac history.  Most of this was obtained as well per the medical record.  He currently is chest pain-free.  "

## 2017-07-28 NOTE — ASSESSMENT & PLAN NOTE
Patient's blood pressure is better-controlled; will continue home regimen of lisinopril, and provide as-needed clonidine.  Will hold his home regimen of amlodipine and metoprolol due to acute heart failure.

## 2017-07-28 NOTE — CONSULTS
"  [facility]  Cardiology  Consult Note    Patient Name: Abelardo Irene Jr.  MRN: [id number]  Admission Date: 7/27/2017  Hospital Length of Stay: 0 days  Code Status: Full Code   Attending Provider: Ayana Strauss MD   Consulting Provider: Epi Cuellar MD  Primary Care Physician: Adelso Reyna MD  Principal Problem:Acute on chronic combined systolic and diastolic congestive heart failure    Patient information was obtained from patient, past medical records and ER records.     Inpatient consult to Cardiology  Consult performed by: EPI CUELLAR  Consult ordered by: BERNADETTE CASH  Reason for consult: CHF        Subjective:     Chief Complaint:  Shortness of breath     HPI:   61 y.o. male smoker with HTN, CHF, COPD, CAD s/p CABG, and hx of MI presents to the ED c/o acute-onset SOB with exertion with associated productive cough (white sputum), urinary frequency, and increased swelling to his bilateral feet for the past few days. Pt states he is currently able to walk 1.5 blocks before getting "short winded," which is unusual for him. SOB is exacerbated with activity and alleviated with rest. Pt states he intermittently gets short of breath when sleeping on 2 pillows, which requires him to sit up. When lying supine, he feels uncomfortable. Of note, pt states he ran out of his HTN medication 1 month ago. Pt is compliant with his daily aspirin. He denies a hx of DVT and PE. Pt otherwise denies fever, chest pain, abdominal pain, N/V/D, and dysuria.     Patient does not follow-up with cardiology in his history of noncompliance with medicine therapy.  He's continued tobacco user.  Apparently he was recently at [facility] was told to follow-up with cardiologist who he is unable to name currently.  He says that it locally it is easier for him to follow and wishes to establish care with us.  He denies any current chest pain.  He is agreeable for stress test today.  He somewhat of a poor historian and " unable to elaborate much on his cardiac history.  Most of this was obtained as well per the medical record.  He currently is chest pain-free.    Past Medical History:   Diagnosis Date    CHF (congestive heart failure)     COPD (chronic obstructive pulmonary disease)     Coronary artery disease     Hypertension        Past Surgical History:   Procedure Laterality Date    CARDIAC SURGERY      CORONARY STENT PLACEMENT         Review of patient's allergies indicates:   Allergen Reactions    Contrast media Shortness Of Breath, Itching and Anxiety     Patient states that he had a bad reaction, anxiety , shortness of breath, itching .        No current facility-administered medications on file prior to encounter.      Current Outpatient Prescriptions on File Prior to Encounter   Medication Sig    albuterol (PROVENTIL/VENTOLIN) 90 mcg/actuation inhaler Inhale 2 puffs into the lungs every 6 (six) hours as needed for Wheezing.    aspirin (ECOTRIN) 81 MG EC tablet Take 81 mg by mouth once daily.    [DISCONTINUED] amlodipine (NORVASC) 5 MG tablet Take 1 tablet (5 mg total) by mouth once daily.    [DISCONTINUED] amlodipine (NORVASC) 5 MG tablet Take 1 tablet (5 mg total) by mouth once daily.    [DISCONTINUED] fluticasone-salmeterol 250-50 mcg/dose (ADVAIR) 250-50 mcg/dose diskus inhaler Inhale 1 puff into the lungs 2 (two) times daily.    [DISCONTINUED] lisinopril (PRINIVIL,ZESTRIL) 20 MG tablet Take 1 tablet (20 mg total) by mouth once daily.    [DISCONTINUED] metoprolol succinate (TOPROL-XL) 25 MG 24 hr tablet Take 1 tablet (25 mg total) by mouth once daily.     Family History     Problem Relation (Age of Onset)    Cancer Mother    No Known Problems Father        Social History Main Topics    Smoking status: Current Every Day Smoker     Packs/day: 2.00     Years: 45.00     Types: Cigarettes    Smokeless tobacco: Never Used    Alcohol use No      Comment: socially    Drug use: No    Sexual activity: Yes      Partners: Female     Birth control/ protection: None     Review of Systems   Constitution: Negative.   HENT: Negative.    Eyes: Negative.    Cardiovascular: Positive for chest pain and dyspnea on exertion. Negative for irregular heartbeat, leg swelling, near-syncope, orthopnea, palpitations, paroxysmal nocturnal dyspnea and syncope.   Respiratory: Positive for shortness of breath.    Skin: Negative.    Musculoskeletal: Negative.    Gastrointestinal: Negative for abdominal pain, constipation and diarrhea.   Genitourinary: Negative for dysuria.   Neurological: Negative for dizziness.   Psychiatric/Behavioral: Negative.      Objective:     Vital Signs (Most Recent):  Temp: 97.6 °F (36.4 °C) (07/28/17 0808)  Pulse: 87 (07/28/17 0808)  Resp: 18 (07/28/17 0808)  BP: 135/82 (07/28/17 0808)  SpO2: 96 % (07/28/17 0808) Vital Signs (24h Range):  Temp:  [97.6 °F (36.4 °C)-98.7 °F (37.1 °C)] 97.6 °F (36.4 °C)  Pulse:  [] 87  Resp:  [16-20] 18  SpO2:  [92 %-98 %] 96 %  BP: (130-184)/() 135/82     Weight: 91.2 kg (201 lb)  Body mass index is 32.44 kg/m².    SpO2: 96 %  O2 Device (Oxygen Therapy): room air      Intake/Output Summary (Last 24 hours) at 07/28/17 1017  Last data filed at 07/28/17 0056   Gross per 24 hour   Intake                0 ml   Output              625 ml   Net             -625 ml       Lines/Drains/Airways     Peripheral Intravenous Line                 Peripheral IV - Single Lumen 07/27/17 2320 Right Antecubital less than 1 day                Physical Exam   Constitutional: He is oriented to person, place, and time. He appears well-developed and well-nourished. No distress.   HENT:   Head: Normocephalic and atraumatic.   Eyes: Conjunctivae and EOM are normal. Pupils are equal, round, and reactive to light.   Neck: Normal range of motion. Neck supple. No thyromegaly present.   Cardiovascular: Normal rate, regular rhythm and normal heart sounds.    No murmur heard.  Pulmonary/Chest: Effort normal  and breath sounds normal. No respiratory distress. He has no wheezes. He has no rales. He exhibits no tenderness.   Abdominal: Soft. Bowel sounds are normal.   Musculoskeletal: He exhibits no edema.   Neurological: He is alert and oriented to person, place, and time.   Skin: Skin is warm and dry.   Psychiatric: He has a normal mood and affect. His behavior is normal.       Significant Labs:   CMP   Recent Labs  Lab 07/27/17 2320 07/28/17  0546    143   K 3.6 4.1   CL 98 98   CO2 32* 36*   * 229*   BUN 5* 5*   CREATININE 0.9 0.9   CALCIUM 9.1 9.4   PROT 7.5 8.1   ALBUMIN 3.5 3.8   BILITOT 0.3 0.4   ALKPHOS 91 100   AST 14 15   ALT 14 13   ANIONGAP 10 9   ESTGFRAFRICA >60 >60   EGFRNONAA >60 >60   , CBC   Recent Labs  Lab 07/27/17 2320 07/28/17  0546   WBC 11.16 12.01   HGB 13.3* 14.6   HCT 41.3 44.7    248   , INR   Recent Labs  Lab 07/27/17 2320   INR 1.0   , Lipid Panel No results for input(s): CHOL, HDL, LDLCALC, TRIG, CHOLHDL in the last 48 hours. and Troponin   Recent Labs  Lab 07/27/17 2320   TROPONINI 0.021       Significant Imaging: Echocardiogram:   2D echo with color flow doppler:   Results for orders placed or performed during the hospital encounter of 04/28/16   2D echo with color flow doppler   Result Value Ref Range    EF 35 (A) 55 - 65    Mitral Valve Regurgitation MILD     Diastolic Dysfunction Yes (A)     Est. PA Systolic Pressure 20.98     Pericardial Effusion NONE     Tricuspid Valve Regurgitation MILD      Assessment and Plan:     * Acute on chronic combined systolic and diastolic congestive heart failure    Improved post diuresis  Optimize medicines as tolerated  Recheck echocardiogram        COPD (chronic obstructive pulmonary disease)    Again multifactorial presentation  Treat underlying pulmonary condition per primary        CAD s/p PCI    No new signs of ACS  Continue secondary prevention as tolerated  Plan for ischemic workup today        Dyslipidemia    Add statin         Tobacco abuse    Counseled on cessation        Benign essential hypertension    Stable            VTE Risk Mitigation         Ordered     enoxaparin injection 40 mg  Daily     Route:  Subcutaneous        07/28/17 0428     Medium Risk of VTE  Once      07/28/17 0428          Thank you for your consult. I will follow-up with patient. Please contact us if you have any additional questions.    Epi Umana MD  Cardiology   [facility]

## 2017-07-28 NOTE — SUBJECTIVE & OBJECTIVE
Past Medical History:   Diagnosis Date    CHF (congestive heart failure)     COPD (chronic obstructive pulmonary disease)     Coronary artery disease     Hypertension        Past Surgical History:   Procedure Laterality Date    CARDIAC SURGERY      CORONARY STENT PLACEMENT         Review of patient's allergies indicates:   Allergen Reactions    Contrast media Shortness Of Breath, Itching and Anxiety     Patient states that he had a bad reaction, anxiety , shortness of breath, itching .        No current facility-administered medications on file prior to encounter.      Current Outpatient Prescriptions on File Prior to Encounter   Medication Sig    albuterol (PROVENTIL/VENTOLIN) 90 mcg/actuation inhaler Inhale 2 puffs into the lungs every 6 (six) hours as needed for Wheezing.    aspirin (ECOTRIN) 81 MG EC tablet Take 81 mg by mouth once daily.    [DISCONTINUED] amlodipine (NORVASC) 5 MG tablet Take 1 tablet (5 mg total) by mouth once daily.    [DISCONTINUED] amlodipine (NORVASC) 5 MG tablet Take 1 tablet (5 mg total) by mouth once daily.    [DISCONTINUED] fluticasone-salmeterol 250-50 mcg/dose (ADVAIR) 250-50 mcg/dose diskus inhaler Inhale 1 puff into the lungs 2 (two) times daily.    [DISCONTINUED] lisinopril (PRINIVIL,ZESTRIL) 20 MG tablet Take 1 tablet (20 mg total) by mouth once daily.    [DISCONTINUED] metoprolol succinate (TOPROL-XL) 25 MG 24 hr tablet Take 1 tablet (25 mg total) by mouth once daily.     Family History     Problem Relation (Age of Onset)    Cancer Mother    No Known Problems Father        Social History Main Topics    Smoking status: Current Every Day Smoker     Packs/day: 2.00     Years: 45.00     Types: Cigarettes    Smokeless tobacco: Never Used    Alcohol use No      Comment: socially    Drug use: No    Sexual activity: Yes     Partners: Female     Birth control/ protection: None     Review of Systems   Constitutional: Positive for activity change and fatigue. Negative  for appetite change, chills, diaphoresis, fever and unexpected weight change.   HENT: Negative.    Eyes: Negative.    Respiratory: Positive for cough, chest tightness, shortness of breath and wheezing.    Cardiovascular: Positive for leg swelling. Negative for chest pain and palpitations.   Gastrointestinal: Negative for abdominal distention, abdominal pain, blood in stool, constipation, diarrhea, nausea and vomiting.   Genitourinary: Negative for dysuria and hematuria.   Musculoskeletal: Negative.    Skin: Negative.    Neurological: Negative for dizziness, seizures, syncope, weakness and light-headedness.   Psychiatric/Behavioral: Negative.      Objective:     Vital Signs (Most Recent):  Temp: 98.7 °F (37.1 °C) (07/28/17 0415)  Pulse: 105 (07/28/17 0415)  Resp: 20 (07/28/17 0415)  BP: (!) 140/99 (07/28/17 0415)  SpO2: 95 % (07/28/17 0415) Vital Signs (24h Range):  Temp:  [98.4 °F (36.9 °C)-98.7 °F (37.1 °C)] 98.7 °F (37.1 °C)  Pulse:  [] 105  Resp:  [16-20] 20  SpO2:  [92 %-98 %] 95 %  BP: (130-184)/() 140/99     Weight: 91.2 kg (201 lb)  Body mass index is 32.44 kg/m².    Physical Exam   Constitutional: He is oriented to person, place, and time. He appears well-developed and well-nourished. No distress.   HENT:   Head: Normocephalic and atraumatic.   Right Ear: External ear normal.   Left Ear: External ear normal.   Nose: Nose normal.   Eyes: Right eye exhibits no discharge. Left eye exhibits no discharge.   Neck: Normal range of motion.   Cardiovascular: Normal rate, regular rhythm, normal heart sounds and intact distal pulses.  Exam reveals no gallop and no friction rub.    No murmur heard.  Trace pitting edema bilaterally   Pulmonary/Chest:   Bilateral end-expiratory wheezing and bibasilar crackles; mildly increased work of breathing.   Abdominal: Soft. Bowel sounds are normal. He exhibits no distension. There is no tenderness. There is no rebound and no guarding.   Musculoskeletal: Normal range of  motion. He exhibits no edema.   Neurological: He is alert and oriented to person, place, and time.   Skin: Skin is warm and dry. He is not diaphoretic. No erythema.   Psychiatric: He has a normal mood and affect. His behavior is normal. Judgment and thought content normal.   Nursing note and vitals reviewed.       Significant Labs: All pertinent labs within the past 24 hours have been reviewed.    Significant Imaging: I have reviewed and interpreted all pertinent imaging results/findings within the past 24 hours.

## 2017-07-28 NOTE — PROGRESS NOTES
Patient to scheduled procedure as ordered. Tele monitoring in progress. Patient awake, alert, oriented. No apparent distress noted.

## 2017-07-28 NOTE — PLAN OF CARE
07/28/17 1547   Discharge Assessment   Assessment Type Discharge Planning Assessment   Confirmed/corrected address and phone number on facesheet? Yes   Assessment information obtained from? Patient   Prior to hospitilization cognitive status: Alert/Oriented   Prior to hospitalization functional status: Independent;Assistive Equipment   Current cognitive status: Alert/Oriented   Current Functional Status: Independent;Assistive Equipment   Arrived From home or self-care   Lives With spouse;child(patricia), adult   Able to Return to Prior Arrangements yes   Is patient able to care for self after discharge? Yes   How many people do you have in your home that can help with your care after discharge? 1   Who are your caregiver(s) and their phone number(s)? spouse- Nadia- [phone]   Patient's perception of discharge disposition home or selfcare   Readmission Within The Last 30 Days no previous admission in last 30 days   Patient currently being followed by outpatient case management? No   Patient currently receives home health services? No   Does the patient currently use HME? Yes   Patient currently receives private duty nursing? N/A   Patient currently receives any other outside agency services? No   Do you have any problems affording any of your prescribed medications? No   Is the patient taking medications as prescribed? yes   Do you have any financial concerns preventing you from receiving the healthcare you need? No   Does the patient have transportation to healthcare appointments? Yes   Transportation Available family or friend will provide   On Dialysis? No   Does the patient receive services at the Coumadin Clinic? No   Are there any open cases? No   Discharge Plan A Home with family   Discharge Plan B Home with family   Patient/Family In Agreement With Plan yes     [id number]  [place] [address]  [address]  [place] [address]  Phone: [phone] Fax: [phone]

## 2017-07-28 NOTE — PROGRESS NOTES
Patient returned to unit from scheduled testing. Patient awake, alert, oriented. Patient without c/o discomfort.  No apparent distress noted.

## 2017-07-28 NOTE — NURSING TRANSFER
Nursing Transfer Note      7/28/2017     Transfer From: Observation unit      Transfer via wheelchair    Transfer with cardiac monitoring    Transported by transport    Medicines sent: none    Chart send with patient: Yes    Notified: daughter    Patient reassessed at: 7/28/17 at 1300 (date, time)    Upon arrival to floor: cardiac monitor applied, patient oriented to room, call bell in reach and bed in lowest position

## 2017-07-28 NOTE — NURSING
Report received from [facility] Observation. Patient is in scheduled cardiology procedure. Awaiting patients return to unit.

## 2017-07-28 NOTE — ED PROVIDER NOTES
"Encounter Date: 7/27/2017    SCRIBE #1 NOTE: I, Jan Farmer , am scribing for, and in the presence of,  Susie Ngo MD . I have scribed the following portions of the note - the EKG reading. Other sections scribed: HPI/ROS .       History     Chief Complaint   Patient presents with    Shortness of Breath     on exertion, denies current CP states there was some earlier,, also c/o swelling in his feet with a hx of heart problems     CC: Shortness of Breath     HPI: This 61 y.o. male smoker with HTN, CHF, COPD, CAD s/p CABG, and hx of MI presents to the ED c/o acute-onset SOB with exertion with associated productive cough (white sputum), urinary frequency, and increased swelling to his bilateral feet for the past few days. Pt states he is currently able to walk 1.5 blocks before getting "short winded," which is unusual for him. SOB is exacerbated with activity and alleviated with rest. Pt states he intermittently gets short of breath when sleeping on 2 pillows, which requires him to sit up. When lying supine, he feels uncomfortable. Of note, pt states he ran out of his HTN medication 1 month ago. Pt is compliant with his daily aspirin. He denies a hx of DVT and PE. Pt otherwise denies fever, chest pain, abdominal pain, N/V/D, and dysuria.         The history is provided by the patient. No  was used.     Review of patient's allergies indicates:   Allergen Reactions    Contrast media Shortness Of Breath, Itching and Anxiety     Patient states that he had a bad reaction, anxiety , shortness of breath, itching .      Past Medical History:   Diagnosis Date    CHF (congestive heart failure)     COPD (chronic obstructive pulmonary disease)     Coronary artery disease     Hypertension      Past Surgical History:   Procedure Laterality Date    CARDIAC SURGERY      CORONARY STENT PLACEMENT       Family History   Problem Relation Age of Onset    Cancer Mother     No Known Problems Father  "     Social History   Substance Use Topics    Smoking status: Current Every Day Smoker     Packs/day: 2.00     Years: 45.00     Types: Cigarettes    Smokeless tobacco: Never Used    Alcohol use No      Comment: socially     Review of Systems   Constitutional: Negative for chills and fever.   HENT: Negative for ear pain and sore throat.    Eyes: Negative for pain and visual disturbance.   Respiratory: Positive for cough (productive with white sputum ) and shortness of breath (with exertion ).    Cardiovascular: Positive for leg swelling (bilateral feet swelling ). Negative for chest pain.   Gastrointestinal: Negative for abdominal pain, diarrhea, nausea and vomiting.   Genitourinary: Positive for frequency. Negative for difficulty urinating, dysuria, flank pain, hematuria, penile pain and testicular pain.   Musculoskeletal: Negative for arthralgias, back pain, myalgias and neck pain.   Skin: Negative for rash.   Neurological: Negative for weakness, numbness and headaches.       Physical Exam     Initial Vitals [07/27/17 2213]   BP Pulse Resp Temp SpO2   (!) 179/107 (!) 113 20 98.4 °F (36.9 °C) (!) 92 %      MAP       131         Physical Exam    Nursing note and vitals reviewed.  Constitutional: He appears well-developed and well-nourished. No distress.   HENT:   Head: Normocephalic and atraumatic.   Nasal flaring is present.    Eyes: Conjunctivae and EOM are normal. Pupils are equal, round, and reactive to light.   Neck: Normal range of motion. Neck supple.   Cardiovascular: Normal rate and normal heart sounds.   Pulmonary/Chest: Breath sounds normal. Accessory muscle usage present.   Pt has prolonged expirations.    Abdominal: Soft. Normal appearance and bowel sounds are normal. There is no tenderness.   Musculoskeletal: Normal range of motion. He exhibits edema (1+ pitting edema to the bilateral knees).   Neurological: He is alert and oriented to person, place, and time. He has normal strength. No cranial nerve  "deficit or sensory deficit.   Skin: Skin is warm and dry.   Psychiatric: He has a normal mood and affect. His behavior is normal. Thought content normal.         ED Course   Procedures  Labs Reviewed   B-TYPE NATRIURETIC PEPTIDE - Abnormal; Notable for the following:        Result Value     (*)     All other components within normal limits   CBC W/ AUTO DIFFERENTIAL - Abnormal; Notable for the following:     Hemoglobin 13.3 (*)     All other components within normal limits   COMPREHENSIVE METABOLIC PANEL - Abnormal; Notable for the following:     CO2 32 (*)     Glucose 229 (*)     BUN, Bld 5 (*)     All other components within normal limits   TROPONIN I   PROTIME-INR     EKG Readings: (Independently Interpreted)   Initial Reading: No STEMI. Previous EKG: Compared with most recent EKG Previous EKG Date: April 2016 . Rhythm: Sinus Tachycardia. Heart Rate: 100. Axis: Left Axis Deviation.   EKG compared with the most recent EKG reading in April 2016 and remains unchanged.        X-Rays:   Independently Interpreted Readings:   Chest X-Ray: Cardiomegaly present.  Increased vascular markings consistent with CHF are present.     Medical Decision Making:   Initial Assessment:   Urgent evaluation of 61-year-old gentleman with history of CAD and poorly controlled hypertension presenting with shortness of breath. EF 35% in 2016 w diastolic dysfxn noted at that time. Pt has been off his home meds x "awhile" and endorses worsening exercise tolerance with ability to only ambulate one block with dyspnea.  Patient denies chest pain, white sputum production only and currently smoker.  On exam patient has mild increased work of breathing, prolonged expirations present.  Differential includes CHF versus COPD exacerbation, poorly controlled htn w need for afterload reduction and diuresis. Will admin lasix, obtain cxr, ro acute mi and reasses.   Clinical Tests:   Lab Tests: Ordered and Reviewed  Radiological Study: Ordered and " Reviewed  Medical Tests: Ordered and Reviewed  ED Management:  Pt now feeling improved. /98 and no longer tachypneic. I suspect pt's condition is secondary to medication non compliance w both copd/chf components. However, on attempts to ambulate, pt became hypoxic 88% on RA and tachypneic. Will opt to place pt on OBS for further diuresis and medical management of comorbid conditions.             Scribe Attestation:   Scribe #1: I performed the above scribed service and the documentation accurately describes the services I performed. I attest to the accuracy of the note.    Attending Attestation:           Physician Attestation for Scribe:  Physician Attestation Statement for Scribe #1: I, Susie Ngo MD , reviewed documentation, as scribed by Jan Farmer  in my presence, and it is both accurate and complete.                 ED Course     Clinical Impression:     1. Chronic obstructive pulmonary disease, unspecified COPD type    2. Essential hypertension    3. Congestive heart failure, unspecified congestive heart failure chronicity, unspecified congestive heart failure type        Disposition:   Disposition: Placed in Observation  Condition: Fair                        Susie Ngo MD  07/28/17 0219       Susie Ngo MD  07/28/17 0232

## 2017-07-28 NOTE — HPI
Mr. Abelardo Irene Jr. is a 61 y.o. male with essential hypertension, CAD s/ PCI, chronic combined systolic and diastolic heart failure (LVEF 35-40% Apr 2016), COPD, chronic respiratory failure with hypoxia, and tobacco abuse who presents to [facility] ED with complaints of dyspnea for the past two days.  He reports that the dyspnea is mainly on exertion [place] and while [place] stairs.  He also reports some lower extremity edema, coughing that is occasionally productive of white sputum and blood-tinging, and some wheezing.  He has not had any fevers, chills, nausea, vomiting, sick contacts, nor any recent travel.  He does complain of 2-pillow orthopnea that has worsened in the last week as well as some PND.  He does not complain of chest pain per se but has had some chest tightness.  He admits to being out of his medications for the past two months.

## 2017-07-28 NOTE — PLAN OF CARE
Problem: Fall Risk (Adult)  Intervention: Monitor/Assist with Self Care   07/28/17 0420 07/28/17 1700   Functional Level Current   Ambulation 0 - independent --    Communication 0 - understands/communicates without difficulty --    Activity   Activity Assistance Provided --  independent     Intervention: Reduce Risk/Promote Restraint Free Environment   07/28/17 1839   Safety Interventions   Environmental Safety Modification assistive device/personal items within reach;clutter free environment maintained;lighting adjusted;room organization consistent     Intervention: Review Medications/Identify Contributors to Fall Risk   07/28/17 1839   Safety Interventions   Medication Review/Management medications reviewed     Intervention: Patient Rounds   07/28/17 1700   Safety Interventions   Patient Rounds bed in low position;bed wheels locked;clutter free environment maintained;call light in reach;ID band on;placement of personal items at bedside;toileting offered;visualized patient     Intervention: Safety Promotion/Fall Prevention   07/28/17 1700   Safety Interventions   Safety Promotion/Fall Prevention commode/urinal/bedpan at bedside;lighting adjusted;medications reviewed;nonskid shoes/socks when out of bed;room near unit station;side rails raised x 2       Goal: Identify Related Risk Factors and Signs and Symptoms  Related risk factors and signs and symptoms are identified upon initiation of Human Response Clinical Practice Guideline (CPG)   Outcome: Ongoing (interventions implemented as appropriate)   07/28/17 1839   Fall Risk   Related Risk Factors (Fall Risk) environment unfamiliar   Signs and Symptoms (Fall Risk) presence of risk factors     Goal: Absence of Falls  Patient will demonstrate the desired outcomes by discharge/transition of care.   Outcome: Ongoing (interventions implemented as appropriate)   07/28/17 1839   Fall Risk (Adult)   Absence of Falls making progress toward outcome

## 2017-07-29 PROBLEM — I50.43 ACUTE ON CHRONIC COMBINED SYSTOLIC AND DIASTOLIC CONGESTIVE HEART FAILURE: Status: RESOLVED | Noted: 2017-07-28 | Resolved: 2017-07-29

## 2017-07-29 LAB
POCT GLUCOSE: 110 MG/DL (ref 70–110)
POCT GLUCOSE: 162 MG/DL (ref 70–110)
POCT GLUCOSE: 177 MG/DL (ref 70–110)

## 2017-07-29 PROCEDURE — 21400001 HC TELEMETRY ROOM

## 2017-07-29 PROCEDURE — 99900035 HC TECH TIME PER 15 MIN (STAT)

## 2017-07-29 PROCEDURE — 96374 THER/PROPH/DIAG INJ IV PUSH: CPT

## 2017-07-29 PROCEDURE — 25000003 PHARM REV CODE 250: Performed by: EMERGENCY MEDICINE

## 2017-07-29 PROCEDURE — S4991 NICOTINE PATCH NONLEGEND: HCPCS | Performed by: INTERNAL MEDICINE

## 2017-07-29 PROCEDURE — 27000221 HC OXYGEN, UP TO 24 HOURS

## 2017-07-29 PROCEDURE — 94761 N-INVAS EAR/PLS OXIMETRY MLT: CPT

## 2017-07-29 PROCEDURE — 63600175 PHARM REV CODE 636 W HCPCS: Performed by: INTERNAL MEDICINE

## 2017-07-29 PROCEDURE — 94640 AIRWAY INHALATION TREATMENT: CPT

## 2017-07-29 PROCEDURE — 99232 SBSQ HOSP IP/OBS MODERATE 35: CPT | Mod: ,,, | Performed by: INTERNAL MEDICINE

## 2017-07-29 PROCEDURE — 96375 TX/PRO/DX INJ NEW DRUG ADDON: CPT

## 2017-07-29 PROCEDURE — 25000003 PHARM REV CODE 250: Performed by: INTERNAL MEDICINE

## 2017-07-29 RX ORDER — PREDNISONE 20 MG/1
TABLET ORAL
Qty: 12 TABLET | Refills: 0 | Status: SHIPPED | OUTPATIENT
Start: 2017-07-29 | End: 2017-08-08 | Stop reason: ALTCHOICE

## 2017-07-29 RX ADMIN — DOXYCYCLINE HYCLATE 100 MG: 100 TABLET, COATED ORAL at 08:07

## 2017-07-29 RX ADMIN — FLUTICASONE FUROATE AND VILANTEROL TRIFENATATE 1 PUFF: 100; 25 POWDER RESPIRATORY (INHALATION) at 07:07

## 2017-07-29 RX ADMIN — NICOTINE 1 PATCH: 21 PATCH, EXTENDED RELEASE TRANSDERMAL at 08:07

## 2017-07-29 RX ADMIN — LISINOPRIL 20 MG: 20 TABLET ORAL at 08:07

## 2017-07-29 RX ADMIN — FUROSEMIDE 40 MG: 10 INJECTION, SOLUTION INTRAVENOUS at 08:07

## 2017-07-29 RX ADMIN — ASPIRIN 81 MG: 81 TABLET, COATED ORAL at 08:07

## 2017-07-29 RX ADMIN — ENOXAPARIN SODIUM 40 MG: 100 INJECTION SUBCUTANEOUS at 05:07

## 2017-07-29 NOTE — PLAN OF CARE
Problem: Fall Risk (Adult)  Goal: Absence of Falls  Patient will demonstrate the desired outcomes by discharge/transition of care.   Outcome: Ongoing (interventions implemented as appropriate)   07/29/17 0539   Fall Risk (Adult)   Absence of Falls achieves outcome       Problem: Patient Care Overview  Goal: Plan of Care Review  Outcome: Ongoing (interventions implemented as appropriate)   07/29/17 0539   Coping/Psychosocial   Plan Of Care Reviewed With patient       Problem: Breathing Pattern Ineffective (Adult)  Goal: Effective Oxygenation/Ventilation  Patient will demonstrate the desired outcomes by discharge/transition of care.  Outcome: Ongoing (interventions implemented as appropriate)   07/29/17 0539   Breathing Pattern Ineffective (Adult)   Effective Oxygenation/Ventilation making progress toward outcome     Goal: Anxiety/Fear Reduction  Patient will demonstrate the desired outcomes by discharge/transition of care.  Outcome: Ongoing (interventions implemented as appropriate)   07/29/17 0539   Breathing Pattern Ineffective (Adult)   Anxiety/Fear Reduction making progress toward outcome

## 2017-07-29 NOTE — NURSING
Report given to CHARLI Flores. Patient is resting comfortably, no complaints, no acute distress noted. 12 hour chart check completed

## 2017-07-29 NOTE — PROGRESS NOTES
[facility]  Cardiology  Progress Note    Patient Name: Abelardo Irene Jr.  MRN: [id number]  Admission Date: 7/27/2017  Hospital Length of Stay: 1 days  Code Status: Full Code   Attending Physician: Iron Bledsoe MD   Primary Care Physician: Adelso Reyna MD  Expected Discharge Date:   Principal Problem:Acute on chronic combined systolic and diastolic congestive heart failure    Subjective:     Hospital Course:   7/28: NST mild ischemia, sx stable post diuresis    Interval History: No cp or sob    Review of Systems   All other systems reviewed and are negative.    Objective:     Vital Signs (Most Recent):  Temp: 97.7 °F (36.5 °C) (07/29/17 0741)  Pulse: 90 (07/29/17 0741)  Resp: 16 (07/29/17 0741)  BP: 123/86 (07/29/17 0741)  SpO2: 95 % (07/29/17 1050) Vital Signs (24h Range):  Temp:  [97.5 °F (36.4 °C)-98.3 °F (36.8 °C)] 97.7 °F (36.5 °C)  Pulse:  [] 90  Resp:  [16-19] 16  SpO2:  [86 %-99 %] 95 %  BP: (117-170)/(72-98) 123/86     Weight: 91.3 kg (201 lb 4.5 oz)  Body mass index is 32.49 kg/m².     SpO2: 95 %  O2 Device (Oxygen Therapy): nasal cannula      Intake/Output Summary (Last 24 hours) at 07/29/17 1131  Last data filed at 07/29/17 0601   Gross per 24 hour   Intake             1380 ml   Output             2500 ml   Net            -1120 ml       Lines/Drains/Airways     Peripheral Intravenous Line                 Peripheral IV - Single Lumen 07/27/17 2320 Right Antecubital 1 day                Physical Exam   Constitutional: He is oriented to person, place, and time. He appears well-developed and well-nourished.   Cardiovascular: Normal rate and regular rhythm.    Pulmonary/Chest: Effort normal and breath sounds normal.   Musculoskeletal: He exhibits no edema.   Neurological: He is alert and oriented to person, place, and time.       Significant Labs:   BMP:   Recent Labs  Lab 07/27/17 2320 07/28/17  0546   * 229*    143   K 3.6 4.1   CL 98 98   CO2 32* 36*   BUN 5* 5*    CREATININE 0.9 0.9   CALCIUM 9.1 9.4   MG  --  1.9    and CBC   Recent Labs  Lab 07/27/17  2320 07/28/17  0546   WBC 11.16 12.01   HGB 13.3* 14.6   HCT 41.3 44.7    248       Significant Imaging: Echocardiogram:   2D echo with color flow doppler:   Results for orders placed or performed during the hospital encounter of 07/27/17   2D echo with color flow doppler   Result Value Ref Range    EF 30 (A) 55 - 65    Mitral Valve Regurgitation TRIVIAL TO MILD     Diastolic Dysfunction Yes (A)     Est. PA Systolic Pressure 16.25     Pericardial Effusion NONE     Tricuspid Valve Regurgitation MILD      NST:  Impression: ABNORMAL MYOCARDIAL PERFUSION  1. There is evidence for mild myocardial ischemia in the anterior and lateral walls of the left ventricle.   2. The perfusion scan is free of evidence for myocardial injury.   3. Resting wall motion is physiologic.   4. There is resting LV dysfunction with a reduced ejection fraction of 33 %.   5. The ventricular volumes are normal at rest and stress.   6. The extracardiac distribution of radioactivity is normal.     Assessment and Plan:     Brief HPI:     * Acute on chronic combined systolic and diastolic congestive heart failure    Improved post diuresis          COPD (chronic obstructive pulmonary disease)    Again multifactorial presentation  Treat underlying pulmonary condition per primary        CAD s/p PCI    No new signs of ACS  Continue secondary prevention as tolerated  Mild ischemia noted --discussed options and will treat medically for now  Outpt [facility] if continued sx          Dyslipidemia    Add statin        Tobacco abuse    Counseled on cessation        Benign essential hypertension    Stable            VTE Risk Mitigation         Ordered     enoxaparin injection 40 mg  Daily     Route:  Subcutaneous        07/28/17 0428     Medium Risk of VTE  Once      07/28/17 0428        Ok for dc from cv standpoint.  Return Ed for worsening sx but wants to f/u as outpt.   Aware of risks but will treat conservatively now.  Outpt f/u me this week on dc.      Epi Umana MD  Cardiology  [facility]

## 2017-07-29 NOTE — PROGRESS NOTES
Consult entered for broken home O2; met with patient who indicated that [employer] was supplier for Home O2.    [facility] reported that patient purchased concentrator 2011 and is no longer under warranty. Service technicians did go out perform annual service and all was working properly in 2016.     [facility] indicated that they would not be able service him currently--unable to provided O2 in this current area under new Medicare regulations.    Contacted [facility]; [facility] that he will review paperwork and process order; faxed orders to [facility] [phone]

## 2017-07-29 NOTE — PLAN OF CARE
Problem: Patient Care Overview  Goal: Plan of Care Review  Aerosol Q 4 prn no tx needed at this time MDI Q day NC 2 lpm

## 2017-07-29 NOTE — ASSESSMENT & PLAN NOTE
No new signs of ACS  Continue secondary prevention as tolerated  Mild ischemia noted --discussed options and will treat medically for now  Outpt [facility] if continued sx

## 2017-07-29 NOTE — PROGRESS NOTES
Contacted [employer] [facility] to determine status of Home O2 delivery; reported that delivery is in process; she will contact Roberto to determine ETA

## 2017-07-29 NOTE — CONSULTS
Patient reported that he owns concentrator purchased from [employer] 2011, which is broken now and out of warranty. [employer] is unable to upgrade and renew with patient due to Medicare changes for coverage; contacted [employer] for patient to set up new Home O2 equipment; Ray will process order for patient.

## 2017-07-29 NOTE — DISCHARGE SUMMARY
[facility] Hospital Medicine  Discharge Summary      Patient Name: Abelardo Irene Jr.  MRN: [id number]  Admission Date: 7/27/2017  Hospital Length of Stay: 1 days  Discharge Date and Time:  07/29/2017 11:59 AM  Attending Physician: Iron Bledsoe MD   Discharging Provider: Iron Bledsoe MD  Primary Care Provider: Adelso Reyna MD      HPI:   Mr. Abelardo Irene Jr. is a 61 y.o. male with essential hypertension, CAD s/ PCI, chronic combined systolic and diastolic heart failure (LVEF 35-40% Apr 2016), COPD, chronic respiratory failure with hypoxia, and tobacco abuse who presents to [facility] ED with complaints of dyspnea for the past two days.  He reports that the dyspnea is mainly on exert[place] and [place].  He also reports some lower extremity edema, coughing that is occasionally productive of white sputum and blood-tinging, and some wheezing.  He has not had any fevers, chills, nausea, vomiting, sick contacts, nor any recent travel.  He does complain of 2-pillow orthopnea that has worsened in the last week as well as some PND.  He does not complain of chest pain per se but has had some chest tightness.  He admits to being out of his medications for the past two months.    * No surgery found *      Indwelling Lines/Drains at time of discharge:   Lines/Drains/Airways          No matching active lines, drains, or airways        Hospital Course:   Patient admitted for increasing dyspnea over 2 days 2/2 A/C combined heart failure +/- COPD exacerbations. Started IV diuretics + STEFANIE/LABA, IV steroids and IV abx with symptoms improved. Cardiology consulted and evaluated with recommendations for repeat cardiac work and agrees with diuresing.  Patient underwent stress test.  Mild ischemia noted on stress test.  Cardiology discussed options with patient.  Agreed on medical management and outpatient follow up for possible LHC.  Patient is currently doing much  better.  Afebrile and hemodynamically stable.  He will be discharged home.     Consults:   Consults         Status Ordering Provider     Inpatient consult to Cardiology  Once     Provider:  Jerardo Quarles MD    Completed WESLEY VIRAMONTES     Inpatient consult to Social Work  Once     Provider:  (Not yet assigned)    Ordered ROSA HOLDER            Pending Diagnostic Studies:     Procedure Component Value Units Date/Time    NM Myocardial Perfusion Spect Multi Exer [[id number]] Resulted:  07/28/17 0952    Order Status:  Sent Lab Status:  In process Updated:  07/28/17 1226        Final Active Diagnoses:    Diagnosis Date Noted POA    Chronic combined systolic and diastolic heart failure [I50.42] 07/28/2017 Yes     Chronic    Chronic respiratory failure with hypoxia [J96.11] 07/28/2017 Yes     Chronic    Chronic obstructive pulmonary disease [J44.9] 07/28/2017 Yes    Dyslipidemia [E78.5] 07/28/2017 Yes    COPD (chronic obstructive pulmonary disease) [J44.9] 04/28/2016 Yes     Chronic    Tobacco abuse [Z72.0] 04/28/2016 Yes     Chronic    CAD s/p PCI [I25.10] 03/03/2013 Yes     Chronic    Benign essential hypertension [I10] 03/02/2013 Yes     Chronic      Problems Resolved During this Admission:    Diagnosis Date Noted Date Resolved POA    PRINCIPAL PROBLEM:  Acute on chronic combined systolic and diastolic congestive heart failure [I50.43] 07/28/2017 07/29/2017 Yes    COPD with acute exacerbation [J44.1] 03/02/2013 07/29/2017 Yes      * Acute on chronic combined systolic and diastolic congestive heart failure-resolved as of 7/29/2017    Patient's presentation has features of both COPD and CHF exacerbation.  I have reviewed the chest X-ray and it reveals no focal infiltrates but there is some pulmonary edema noted.  I have reviewed the EKG and it reveals sinus tachycardia without evidence of acute ischemia.  I will treat him for both COPD and CHF exacerbation with intravenous diuresis and close urine  output monitoring; repeating the 2D-echo in the morning; providing frequent nebulized STEFANIE/LAMA and intravenous corticosteroids; and empiric antibiotics.              Discharged Condition: stable    Disposition: Home or Self Care    Follow Up:  Follow-up Information     Adelso Reyna MD. Schedule an appointment as soon as possible for a visit today.    Specialty:  Internal Medicine  Contact information:  [address]  [place] [address]  [phone]             Epi Umana MD On 8/4/2017.    Specialties:  INTERVENTIONAL CARDIOLOGY, Cardiology  Why:  Appointment scheduled for Friday August 4th at 1:20pm  Contact information:  [address]  [address]  [place] [address]  [phone]                 Patient Instructions:     Diet Cardiac     Activity as tolerated     Call MD for:  temperature >100.4     Call MD for:  persistent nausea and vomiting or diarrhea     Call MD for:  difficulty breathing or increased cough     Call MD for:  persistent dizziness, light-headedness, or visual disturbances     Call MD for:  increased confusion or weakness       Medications:  Reconciled Home Medications:   Current Discharge Medication List      START taking these medications    Details   albuterol 90 mcg/actuation inhaler Inhale 1-2 puffs into the lungs every 6 (six) hours as needed for Wheezing. Rescue  Qty: 1 Inhaler, Refills: 0      predniSONE (DELTASONE) 20 MG tablet Take 3 tabs for 2 days, then 2 tabs for 2 days, then 1 tab for 2 days, then stop.  Qty: 12 tablet, Refills: 0         CONTINUE these medications which have CHANGED    Details   amlodipine (NORVASC) 5 MG tablet Take 1 tablet (5 mg total) by mouth once daily.  Qty: 30 tablet, Refills: 3      fluticasone-salmeterol 250-50 mcg/dose (ADVAIR) 250-50 mcg/dose diskus inhaler Inhale 1 puff into the lungs 2 (two) times daily.  Qty: 60 each, Refills: 2      lisinopril (PRINIVIL,ZESTRIL) 20 MG tablet Take 1 tablet (20 mg total) by mouth  once daily.  Qty: 30 tablet, Refills: 3      metoprolol succinate (TOPROL-XL) 25 MG 24 hr tablet Take 1 tablet (25 mg total) by mouth once daily.  Qty: 30 tablet, Refills: 3         CONTINUE these medications which have NOT CHANGED    Details   albuterol (PROVENTIL/VENTOLIN) 90 mcg/actuation inhaler Inhale 2 puffs into the lungs every 6 (six) hours as needed for Wheezing.  Qty: 1 each, Refills: 3      aspirin (ECOTRIN) 81 MG EC tablet Take 81 mg by mouth once daily.           Time spent on the discharge of patient: >30 minutes    Iron Bledsoe MD  Department of Hospital Medicine  [facility]

## 2017-07-29 NOTE — SUBJECTIVE & OBJECTIVE
Interval History: No cp or sob    Review of Systems   All other systems reviewed and are negative.    Objective:     Vital Signs (Most Recent):  Temp: 97.7 °F (36.5 °C) (07/29/17 0741)  Pulse: 90 (07/29/17 0741)  Resp: 16 (07/29/17 0741)  BP: 123/86 (07/29/17 0741)  SpO2: 95 % (07/29/17 1050) Vital Signs (24h Range):  Temp:  [97.5 °F (36.4 °C)-98.3 °F (36.8 °C)] 97.7 °F (36.5 °C)  Pulse:  [] 90  Resp:  [16-19] 16  SpO2:  [86 %-99 %] 95 %  BP: (117-170)/(72-98) 123/86     Weight: 91.3 kg (201 lb 4.5 oz)  Body mass index is 32.49 kg/m².     SpO2: 95 %  O2 Device (Oxygen Therapy): nasal cannula      Intake/Output Summary (Last 24 hours) at 07/29/17 1131  Last data filed at 07/29/17 0601   Gross per 24 hour   Intake             1380 ml   Output             2500 ml   Net            -1120 ml       Lines/Drains/Airways     Peripheral Intravenous Line                 Peripheral IV - Single Lumen 07/27/17 2320 Right Antecubital 1 day                Physical Exam   Constitutional: He is oriented to person, place, and time. He appears well-developed and well-nourished.   Cardiovascular: Normal rate and regular rhythm.    Pulmonary/Chest: Effort normal and breath sounds normal.   Musculoskeletal: He exhibits no edema.   Neurological: He is alert and oriented to person, place, and time.       Significant Labs:   BMP:   Recent Labs  Lab 07/27/17 2320 07/28/17  0546   * 229*    143   K 3.6 4.1   CL 98 98   CO2 32* 36*   BUN 5* 5*   CREATININE 0.9 0.9   CALCIUM 9.1 9.4   MG  --  1.9    and CBC   Recent Labs  Lab 07/27/17 2320 07/28/17  0546   WBC 11.16 12.01   HGB 13.3* 14.6   HCT 41.3 44.7    248       Significant Imaging: Echocardiogram:   2D echo with color flow doppler:   Results for orders placed or performed during the hospital encounter of 07/27/17   2D echo with color flow doppler   Result Value Ref Range    EF 30 (A) 55 - 65    Mitral Valve Regurgitation TRIVIAL TO MILD     Diastolic Dysfunction  Yes (A)     Est. PA Systolic Pressure 16.25     Pericardial Effusion NONE     Tricuspid Valve Regurgitation MILD      NST:  Impression: ABNORMAL MYOCARDIAL PERFUSION  1. There is evidence for mild myocardial ischemia in the anterior and lateral walls of the left ventricle.   2. The perfusion scan is free of evidence for myocardial injury.   3. Resting wall motion is physiologic.   4. There is resting LV dysfunction with a reduced ejection fraction of 33 %.   5. The ventricular volumes are normal at rest and stress.   6. The extracardiac distribution of radioactivity is normal.

## 2017-07-29 NOTE — CARE UPDATE
Patient doing much better.  Responded to treatment faster than expected and will be discharged home.

## 2017-07-29 NOTE — PLAN OF CARE
07/29/17 1353   Final Note   Assessment Type Final Discharge Note   Discharge Disposition Home   Discharge planning education complete? Yes   What phone number can be called within the next 1-3 days to see how you are doing after discharge? ([phone] )   Hospital Follow Up  Appt(s) scheduled? Yes   Discharge plans and expectations educations in teach back method with documentation complete? Yes   [facility]fered [facility] -- Bedside Delivery? n/a   Discharge/Hospital Encounter Summary to ([facility]) PCP n/a   Referral to Outpatient Case Management complete? n/a   Referral to / orders for Home Health Complete? n/a   Any social issues identified prior to discharge? No   Did you assess the readiness or willingness of the family or caregiver to support self management of care? Yes   Right Care Referral Info   Post Acute Recommendation Other   Referral Type DME: Home O2   Facility Name [place], [place]

## 2017-07-30 VITALS
OXYGEN SATURATION: 92 % | DIASTOLIC BLOOD PRESSURE: 81 MMHG | HEIGHT: 66 IN | SYSTOLIC BLOOD PRESSURE: 116 MMHG | BODY MASS INDEX: 32.34 KG/M2 | HEART RATE: 94 BPM | RESPIRATION RATE: 18 BRPM | WEIGHT: 201.25 LBS | TEMPERATURE: 98 F

## 2017-07-30 NOTE — NURSING
Spoke with Jazlyn with [employer].  Informed that pt is still waitng for O2 delivery.  Stated she will have them to give me a call.  Awaiting return call.  Pt notified.

## 2017-07-30 NOTE — NURSING
Testing for Home O2    O2 Sats on RA at rest =91    O2 sats on RA with exercise  =87    O2 sats on _2_L O2  =98

## 2017-07-30 NOTE — NURSING
Pt left floor via wheel chair with all belongings, accompanied by staff nurses and Ray with [employer].  CECILIA

## 2017-07-30 NOTE — NURSING
Spoke with Roberto from [facility].  Stated he was waiting for [employer], , to send over testing in a chart note, and because he never heard from [employer], he assumed they used another company.  Stated if the paper work is not correct he is unable to set pt up with O2.

## 2017-08-01 ENCOUNTER — PATIENT OUTREACH (OUTPATIENT)
Dept: ADMINISTRATIVE | Facility: CLINIC | Age: 62
End: 2017-08-01

## 2017-08-01 NOTE — PROGRESS NOTES
C3 nurse attempted to contact patient. No answer. The following message was left for the patient to return the call:  Good morning I am a nurse calling on behalf of [facility] from the [facility].  This is a Transitional Care Call for Abelardo Irene Jr. . When you have a moment please contact us at [phone] or [phone] Monday through Friday, between the hours of 8 am to 4 pm. We look forward to speaking with you. On behalf of [facility] have a nice day.    The patient does not have a scheduled HOSFU appointment within 7-14 days post hospital discharge date 7/29. Message sent to Physician staff to assist with HOSFU appointment scheduling. Non [facility].

## 2017-08-01 NOTE — Clinical Note
Per patient's wife, they were not discharged with any printed prescriptions, therefore I called in advair, lisinopril, metoprolol and amlodipine as prescribed to the neighborhood [facility] @ [phone].  Prednisone had been e-scripted and pt had picked up the day of d/c. Pt called floor nurse after being discharged to report that ventolin had not been called in and that was taken care of. I clarified with both [facility] and the [facility] to be certain nothing was pending. Mrs Irene may be reached at [phone], if necessary.  Thanking you in advance.  Respectfully, Mckenzie Sommer RN  [facility]

## 2017-08-01 NOTE — PROGRESS NOTES
C3 nurse contacted Dr. Reyna's ofc to sched pcp appt, which took a while; Earliest avail was 8/16.    C3 nurse contacted pt's wife to inform of above and instructions per ofc, as well. Verbalized understanding.  C3 nurse asked Mrs Irene (at dialysis) if prescriptions were in folder. Stated, I will call my  and check with him. Mr Irene reported to Mrs Irene that the printed prescriptions are there. Mrs Irene will get filled today. See following note.

## 2017-08-01 NOTE — PROGRESS NOTES
C3 nurse received call from Nadia Irene(wife of pt) and reporting she just arrived home and there are no printed prescriptions nor do they have a d/c folder. Earlier  had said there were prescriptions.   C3 nurse called [facility] @ [phone] to see if any meds were pending; Ventolin had been called in and ready for pick-up.  C3 nurse contacted [facility] @ [phone] and there were no prescriptions pending nor ready for pick-up.  C3 nurse called prescriptions in for Advair, Amlodipine, Lisinopril and Metoprolol prescribed by Dr. Ngo. I will route message to Dr Ngo that I called in above medication. I was on the phone waiting for quite some time.    C3 nurse called Mrs Irene [phone]) back to inform that Alka is ready for p/up at [facility] as stated above and remainder of above meds would be ready for pick-up in 40 minutes at the [facility]. Told Mrs Irene to review medication at [facility] this Friday (8/4)  with Dr Umana. Verbalized understanding.

## 2017-08-01 NOTE — PATIENT INSTRUCTIONS
"Discharge Instructions for Heart Failure  You have been diagnosed with heart failure. The term "heart failure" sounds scary as it suggests the heart has stopped working. But it actually means the heart is not doing its job as well as it should. Heart failure happens when your heart muscle cannot keep up with your body's need for blood flow. Symptoms of heart failure can be controlled by changes in your lifestyle and by following your doctor's advice.   Home Care  Activity   Ask your doctor about an exercise program. You can benefit from simple activities such as walking or gardening. Exercising most days of the week can make you feel better. Don't be discouraged if your progress is slow at first. Rest as needed and stop activity if you develop symptoms such as chest pain, lightheadedness, or significant shortness of breath. Your doctor may prescribe a cardiac rehabilitation program. This is a program to help recover from heart disease through professional lifestyle counseling and education and medically supervised physical activity.   Diet   Follow a heart healthy diet and work hard to remove salt from your diet. Try to limit total salt intake to 2000 mg a day or less. Salt causes your body to retain water, which can make it harder for your heart to pump. You can limit salt by doing the following:  Limit canned, dried, packaged, and fast foods.  Don't add salt to your food at the table.  Season foods with herbs instead of salt when you cook.  Monitor your fluid intake. Drinking too much fluid can make heart failure worse. It is commonly advised to limit total fluid intake to less than 66 ounces (2 liters) a day.  Limit alcohol. Too much alcohol can be harmful to the heart. Alcohol should be limited to no more than one serving a day for women and two servings a day for men.  Tobacco   Break the smoking habit. Smoking increases your chance of having a heart attack, which will worsen heart failure. Quitting smoking is " the number one thing you can do to improve your health. Enroll in a stop smoking program and ask your doctor about medications or nicotine replacement therapy. These methods improve your chances of success.  Medications   Take your medications exactly as prescribed. Learn the names and purpose of each of your medications. Keep an accurate medication list with you at all times including current dosages. Don't skip doses. If you miss a dose of your medication, take it as soon as you remember, unless it's almost time for your next dose. In that case, just wait and take your next dose at the normal time. Don't take a double dose. If you are unsure, contact your doctor or pharmacist.  Weight monitoring   Weigh yourself every day. Do this at the same time of day and in the same kind of clothes. Ideally, weigh yourself first thing every morning after you empty your bladder, but before you eat breakfast. Record your weight and take a record of it to each of your doctor's visit. If your weight increases by 3 pounds in one day or 5 pounds in one week, you should contact your doctor. This is a sign that you are retaining more fluid than you should be, which can worsen heart failure.  Symptoms   Heart failure can cause a variety of symptoms including the following:  Shortness of breath  Difficulty breathing at night  Swelling in the legs and feet or in the abdomen  Becoming easily fatigued  Irregular or rapid heartbeat  Weakness or lightheadedness  It is important to know what to do if you develop signs of worsening heart failure.  Follow-Up  Make a follow-up appointment as directed by our staff. They will provide specific instruction for timing of appointments. Depending on the type and severity of heart failure you have, you may require follow up as early as 7 days from hospital discharge. Keep appointments for checkups and lab tests that are needed to check your medications and condition.  .    When to Call Your Doctor  Call  your doctor right away if you have any of the following signs of worsening heart failure:  Sudden weight gain (3 or more pounds in one day or 5 or more pounds in one week)  Trouble breathing not related to being active  New or increased swelling of your legs or ankles  Swelling or pain in your abdomen  Breathing trouble at night (waking up short of breath, needing more pillows to breathe)  Frequent coughing that doesnt go away  Feeling much more tired than usual  When to Seek Emergency Medical Attention   Call 911 right away if you develop:  Severe shortness of breath, such that you cannot catch your breath, even resting  Severe chest pain that does not resolve with rest or nitroglycerin  Pink, foamy mucus with cough and shortness of breath  A continuous rapid or irregular heartbeat  Passing out or fainting  Acute stroke symptoms such as sudden numbness or weakness on one side of your face, arm, or leg, or sudden confusion, trouble speaking or vision changes.   © 2133-6083 [employer], [address] [place] [address], [place] [address]. All rights reserved. This information is not intended as a substitute for professional medical care. Always follow your healthcare professional's instructions.

## 2017-08-04 ENCOUNTER — OFFICE VISIT (OUTPATIENT)
Dept: CARDIOLOGY | Facility: CLINIC | Age: 62
End: 2017-08-04
Payer: MEDICARE

## 2017-08-04 VITALS
HEIGHT: 66 IN | OXYGEN SATURATION: 96 % | DIASTOLIC BLOOD PRESSURE: 74 MMHG | SYSTOLIC BLOOD PRESSURE: 134 MMHG | WEIGHT: 214.31 LBS | HEART RATE: 101 BPM | BODY MASS INDEX: 34.44 KG/M2

## 2017-08-04 DIAGNOSIS — I25.10 CORONARY ARTERY DISEASE INVOLVING NATIVE CORONARY ARTERY OF NATIVE HEART WITHOUT ANGINA PECTORIS: ICD-10-CM

## 2017-08-04 DIAGNOSIS — Z72.0 TOBACCO ABUSE: ICD-10-CM

## 2017-08-04 DIAGNOSIS — E78.5 DYSLIPIDEMIA: ICD-10-CM

## 2017-08-04 DIAGNOSIS — J44.9 CHRONIC OBSTRUCTIVE PULMONARY DISEASE, UNSPECIFIED COPD TYPE: ICD-10-CM

## 2017-08-04 DIAGNOSIS — I10 ESSENTIAL HYPERTENSION: ICD-10-CM

## 2017-08-04 DIAGNOSIS — I50.22 CHRONIC SYSTOLIC CONGESTIVE HEART FAILURE: Primary | ICD-10-CM

## 2017-08-04 PROCEDURE — 3008F BODY MASS INDEX DOCD: CPT | Mod: ,,, | Performed by: INTERNAL MEDICINE

## 2017-08-04 PROCEDURE — 99213 OFFICE O/P EST LOW 20 MIN: CPT | Mod: PBBFAC | Performed by: INTERNAL MEDICINE

## 2017-08-04 PROCEDURE — 99214 OFFICE O/P EST MOD 30 MIN: CPT | Mod: S$PBB,,, | Performed by: INTERNAL MEDICINE

## 2017-08-04 PROCEDURE — 99999 PR PBB SHADOW E&M-EST. PATIENT-LVL III: CPT | Mod: PBBFAC,,, | Performed by: INTERNAL MEDICINE

## 2017-08-04 NOTE — PROGRESS NOTES
Subjective:    Patient ID:  Abelardo Irene Jr. is a 61 y.o. male who presents for follow-up of Hospital Follow Up      HPI  Patient is here for follow-up of newly diagnosed systolic heart failure.  He had recent admission for volume overload was found to have reduced EF by echo.  He underwent nuclear stress test showing mild defects in the LAD and RCA territory.  He still is having some worsening lower extremity edema and shortness of breath.  He's on oxygen currently.  He denies any chest pain or palpitations.  He's express no PND, orthopnea or lower edema.  He denies any dizziness, presyncope or syncope.  He had some difficulty obtaining his medicines post discharge.  They currently are working on out.  We discussed his abnormal stress test and need for possible catheterization.  He's agreeable to this today.     Review of Systems   Constitution: Negative.   HENT: Negative.    Eyes: Negative.    Cardiovascular: Positive for dyspnea on exertion and leg swelling. Negative for chest pain, irregular heartbeat, near-syncope, orthopnea, palpitations, paroxysmal nocturnal dyspnea and syncope.   Respiratory: Positive for shortness of breath.    Skin: Negative.    Musculoskeletal: Negative.    Gastrointestinal: Negative for abdominal pain, constipation and diarrhea.   Genitourinary: Negative for dysuria.   Neurological: Negative.    Psychiatric/Behavioral: Negative.         Objective:    Physical Exam   Constitutional: He is oriented to person, place, and time. He appears well-developed and well-nourished. No distress.   HENT:   Head: Normocephalic and atraumatic.   Eyes: Conjunctivae and EOM are normal. Pupils are equal, round, and reactive to light.   Neck: Normal range of motion. Neck supple. No thyromegaly present.   Cardiovascular: Normal rate, regular rhythm and normal heart sounds.    No murmur heard.  Pulmonary/Chest: No respiratory distress. He has no wheezes. He has no rales. He exhibits no tenderness.   Decreased  breath sounds bilaterally   Abdominal: Soft. Bowel sounds are normal.   Musculoskeletal: He exhibits edema.   Neurological: He is alert and oriented to person, place, and time.   Skin: Skin is warm and dry.   Psychiatric: He has a normal mood and affect. His behavior is normal.         Echo:  CONCLUSIONS     1 - Moderately depressed left ventricular systolic function (EF 30-35%).     2 - Eccentric hypertrophy.     3 - Impaired LV relaxation, normal LAP (grade 1 diastolic dysfunction).     4 - Mildly depressed right ventricular systolic function .     NST:  Impression: ABNORMAL MYOCARDIAL PERFUSION  1. There is evidence for mild myocardial ischemia in the anterior and lateral walls of the left ventricle.   2. The perfusion scan is free of evidence for myocardial injury.   3. Resting wall motion is physiologic.   4. There is resting LV dysfunction with a reduced ejection fraction of 33 %.   5. The ventricular volumes are normal at rest and stress.   6. The extracardiac distribution of radioactivity is normal.   Assessment:       1. Chronic systolic congestive heart failure    2. Essential hypertension    3. Coronary artery disease involving native coronary artery of native heart without angina pectoris    4. Dyslipidemia    5. Chronic obstructive pulmonary disease, unspecified COPD type    6. Tobacco abuse         Plan:       -Continued symptoms with abnormal stress test and known cardiomyopathy with prior PCI and continued tobacco use disorder  -Plan for right and left heart catheterization next week   -Encouraged tobacco cessation    Return to clinic after the procedure     **Risks/benefits of the procedure were d/w the patient including bleeding, infection, death, mi, arrhythmia, kidney failure, stroke, etc.  Patient understands and consent was placed on the chart.

## 2017-08-08 ENCOUNTER — HOSPITAL ENCOUNTER (OUTPATIENT)
Dept: PREADMISSION TESTING | Facility: HOSPITAL | Age: 62
Discharge: HOME | End: 2017-08-08
Attending: INTERNAL MEDICINE
Payer: MEDICARE

## 2017-08-08 VITALS
TEMPERATURE: 100 F | DIASTOLIC BLOOD PRESSURE: 88 MMHG | HEIGHT: 66 IN | RESPIRATION RATE: 20 BRPM | OXYGEN SATURATION: 90 % | SYSTOLIC BLOOD PRESSURE: 147 MMHG | BODY MASS INDEX: 33.52 KG/M2 | HEART RATE: 100 BPM | WEIGHT: 208.56 LBS

## 2017-08-08 LAB
ANION GAP SERPL CALC-SCNC: 5 MMOL/L
BASOPHILS # BLD AUTO: 0.03 K/UL
BASOPHILS NFR BLD: 0.2 %
BUN SERPL-MCNC: 4 MG/DL
CALCIUM SERPL-MCNC: 9.3 MG/DL
CHLORIDE SERPL-SCNC: 96 MMOL/L
CO2 SERPL-SCNC: 38 MMOL/L
CREAT SERPL-MCNC: 0.8 MG/DL
DIFFERENTIAL METHOD: ABNORMAL
EOSINOPHIL # BLD AUTO: 0.1 K/UL
EOSINOPHIL NFR BLD: 1.1 %
ERYTHROCYTE [DISTWIDTH] IN BLOOD BY AUTOMATED COUNT: 13.8 %
EST. GFR  (AFRICAN AMERICAN): >60 ML/MIN/1.73 M^2
EST. GFR  (NON AFRICAN AMERICAN): >60 ML/MIN/1.73 M^2
GLUCOSE SERPL-MCNC: 255 MG/DL
HCT VFR BLD AUTO: 40.7 %
HGB BLD-MCNC: 13 G/DL
LYMPHOCYTES # BLD AUTO: 3.1 K/UL
LYMPHOCYTES NFR BLD: 23.7 %
MCH RBC QN AUTO: 28.7 PG
MCHC RBC AUTO-ENTMCNC: 31.9 G/DL
MCV RBC AUTO: 90 FL
MONOCYTES # BLD AUTO: 0.9 K/UL
MONOCYTES NFR BLD: 6.6 %
NEUTROPHILS # BLD AUTO: 8.9 K/UL
NEUTROPHILS NFR BLD: 68.2 %
PLATELET # BLD AUTO: 258 K/UL
PMV BLD AUTO: 10.4 FL
POTASSIUM SERPL-SCNC: 3.6 MMOL/L
RBC # BLD AUTO: 4.53 M/UL
SODIUM SERPL-SCNC: 139 MMOL/L
WBC # BLD AUTO: 13.06 K/UL

## 2017-08-08 PROCEDURE — 80048 BASIC METABOLIC PNL TOTAL CA: CPT

## 2017-08-08 PROCEDURE — 36415 COLL VENOUS BLD VENIPUNCTURE: CPT

## 2017-08-08 PROCEDURE — 85025 COMPLETE CBC W/AUTO DIFF WBC: CPT

## 2017-08-08 NOTE — PLAN OF CARE
Pre-operative instructions, medication directives and pain scales reviewed with Mr Irene and his wife. All questions the patient had  were answered. Re-assurance about surgical procedure and day of surgery routine given as needed. Mr Irene and his wife verbalized understanding of the pre-op instructions.

## 2017-08-08 NOTE — DISCHARGE INSTRUCTIONS
Your angiogram is scheduled for_WED 8/9______________      You will go directly to Same Day Surgery on the 2nd floor of the hospital on the day of your procedure at __6 -630 AM________    If you need wheelchair assistance, call [phone] from the lobby using your cell phone.  Or you may use the courtesy phone in the lobby.  The  will direct your call to the Same Day Surgery unit.    IMPORTANT INSTRUCTIONS:    Do not eat or drink anything after midnight.  This includes water and coffee.  It is okay to brush your teeth.  Do not chew gum or have hard candy or mints.    Take your morning medications with small sips of water--ALBUTEROL, ADVAIR, ASPIRIN, LISINOPRIL, METOPROLOL, AMLODIPINE.        Wash both groins with Hibiclens on the night before your angiogram, and on the morning of your angiogram.  If your doctor has told you that the wrist area will be used for the procedure, wash both wrists.  Be sure to rinse it off.  Do not put Hibiclens directly on your genitals or face.          You may wear deodorant, but do not wear body lotion, powder or cologne       Do not wear jewelry.     You do not need money or valuables.  You may bring your cell phone.     If you are going home the same day, you must arrange for someone to drive you home.     Wear comfortable, loose fitting clothes.     .     Call your doctor if you have sickness or fever before your angiogram.     Our number in [facility] is [phone] if you need to contact us.

## 2017-08-09 ENCOUNTER — SURGERY (OUTPATIENT)
Age: 62
End: 2017-08-09

## 2017-08-09 ENCOUNTER — HOSPITAL ENCOUNTER (OUTPATIENT)
Facility: HOSPITAL | Age: 62
Discharge: HOME | End: 2017-08-09
Attending: INTERNAL MEDICINE | Admitting: INTERNAL MEDICINE
Payer: MEDICARE

## 2017-08-09 VITALS
BODY MASS INDEX: 33.52 KG/M2 | RESPIRATION RATE: 18 BRPM | DIASTOLIC BLOOD PRESSURE: 91 MMHG | TEMPERATURE: 98 F | HEART RATE: 91 BPM | OXYGEN SATURATION: 95 % | WEIGHT: 208.56 LBS | SYSTOLIC BLOOD PRESSURE: 135 MMHG | HEIGHT: 66 IN

## 2017-08-09 DIAGNOSIS — I50.22 CHRONIC SYSTOLIC CONGESTIVE HEART FAILURE: ICD-10-CM

## 2017-08-09 DIAGNOSIS — I10 ESSENTIAL (PRIMARY) HYPERTENSION: ICD-10-CM

## 2017-08-09 PROCEDURE — 93460 R&L HRT ART/VENTRICLE ANGIO: CPT | Mod: 26,,, | Performed by: INTERNAL MEDICINE

## 2017-08-09 PROCEDURE — 27000221 HC OXYGEN, UP TO 24 HOURS

## 2017-08-09 PROCEDURE — 25000242 PHARM REV CODE 250 ALT 637 W/ HCPCS: Performed by: INTERNAL MEDICINE

## 2017-08-09 PROCEDURE — 25000003 PHARM REV CODE 250: Performed by: INTERNAL MEDICINE

## 2017-08-09 PROCEDURE — 99152 MOD SED SAME PHYS/QHP 5/>YRS: CPT

## 2017-08-09 PROCEDURE — S0028 INJECTION, FAMOTIDINE, 20 MG: HCPCS | Performed by: INTERNAL MEDICINE

## 2017-08-09 PROCEDURE — 93460 R&L HRT ART/VENTRICLE ANGIO: CPT

## 2017-08-09 PROCEDURE — 63600175 PHARM REV CODE 636 W HCPCS: Performed by: INTERNAL MEDICINE

## 2017-08-09 PROCEDURE — 94640 AIRWAY INHALATION TREATMENT: CPT

## 2017-08-09 PROCEDURE — 99900035 HC TECH TIME PER 15 MIN (STAT)

## 2017-08-09 PROCEDURE — 99152 MOD SED SAME PHYS/QHP 5/>YRS: CPT | Mod: ,,, | Performed by: INTERNAL MEDICINE

## 2017-08-09 PROCEDURE — 63600175 PHARM REV CODE 636 W HCPCS

## 2017-08-09 PROCEDURE — 25000003 PHARM REV CODE 250

## 2017-08-09 PROCEDURE — A4216 STERILE WATER/SALINE, 10 ML: HCPCS

## 2017-08-09 RX ORDER — ALBUTEROL SULFATE 2.5 MG/.5ML
2.5 SOLUTION RESPIRATORY (INHALATION)
Status: DISCONTINUED | OUTPATIENT
Start: 2017-08-09 | End: 2017-08-09

## 2017-08-09 RX ORDER — SODIUM CHLORIDE 9 MG/ML
INJECTION, SOLUTION INTRAVENOUS CONTINUOUS
Status: DISCONTINUED | OUTPATIENT
Start: 2017-08-09 | End: 2017-08-09 | Stop reason: HOSPADM

## 2017-08-09 RX ORDER — FAMOTIDINE 10 MG/ML
20 INJECTION INTRAVENOUS ONCE
Status: COMPLETED | OUTPATIENT
Start: 2017-08-09 | End: 2017-08-09

## 2017-08-09 RX ORDER — HYDROCODONE BITARTRATE AND ACETAMINOPHEN 5; 325 MG/1; MG/1
1 TABLET ORAL EVERY 4 HOURS PRN
Status: DISCONTINUED | OUTPATIENT
Start: 2017-08-09 | End: 2017-08-09 | Stop reason: HOSPADM

## 2017-08-09 RX ORDER — METHYLPREDNISOLONE SOD SUCC 125 MG
125 VIAL (EA) INJECTION ONCE
Status: COMPLETED | OUTPATIENT
Start: 2017-08-09 | End: 2017-08-09

## 2017-08-09 RX ORDER — ALBUTEROL SULFATE 2.5 MG/.5ML
2.5 SOLUTION RESPIRATORY (INHALATION)
Status: COMPLETED | OUTPATIENT
Start: 2017-08-09 | End: 2017-08-09

## 2017-08-09 RX ORDER — DIPHENHYDRAMINE HYDROCHLORIDE 50 MG/ML
50 INJECTION INTRAMUSCULAR; INTRAVENOUS ONCE
Status: COMPLETED | OUTPATIENT
Start: 2017-08-09 | End: 2017-08-09

## 2017-08-09 RX ADMIN — FAMOTIDINE 20 MG: 10 INJECTION, SOLUTION INTRAVENOUS at 07:08

## 2017-08-09 RX ADMIN — ALBUTEROL SULFATE 2.5 MG: 2.5 SOLUTION RESPIRATORY (INHALATION) at 07:08

## 2017-08-09 RX ADMIN — METHYLPREDNISOLONE SODIUM SUCCINATE 125 MG: 125 INJECTION, POWDER, FOR SOLUTION INTRAMUSCULAR; INTRAVENOUS at 07:08

## 2017-08-09 RX ADMIN — DIPHENHYDRAMINE HYDROCHLORIDE 50 MG: 50 INJECTION INTRAMUSCULAR; INTRAVENOUS at 07:08

## 2017-08-09 NOTE — H&P
HPI  Patient is here for follow-up of newly diagnosed systolic heart failure.  He had recent admission for volume overload was found to have reduced EF by echo.  He underwent nuclear stress test showing mild defects in the LAD and RCA territory.  He still is having some worsening lower extremity edema and shortness of breath.  He's on oxygen currently.  He denies any chest pain or palpitations.  He's express no PND, orthopnea or lower edema.  He denies any dizziness, presyncope or syncope.  He had some difficulty obtaining his medicines post discharge.  They currently are working on out.  We discussed his abnormal stress test and need for possible catheterization.  He's agreeable to this today.      Review of Systems   Constitution: Negative.   HENT: Negative.    Eyes: Negative.    Cardiovascular: Positive for dyspnea on exertion and leg swelling. Negative for chest pain, irregular heartbeat, near-syncope, orthopnea, palpitations, paroxysmal nocturnal dyspnea and syncope.   Respiratory: Positive for shortness of breath.    Skin: Negative.    Musculoskeletal: Negative.    Gastrointestinal: Negative for abdominal pain, constipation and diarrhea.   Genitourinary: Negative for dysuria.   Neurological: Negative.    Psychiatric/Behavioral: Negative.          Objective:    Physical Exam   Constitutional: He is oriented to person, place, and time. He appears well-developed and well-nourished. No distress.   HENT:   Head: Normocephalic and atraumatic.   Eyes: Conjunctivae and EOM are normal. Pupils are equal, round, and reactive to light.   Neck: Normal range of motion. Neck supple. No thyromegaly present.   Cardiovascular: Normal rate, regular rhythm and normal heart sounds.    No murmur heard.  Pulmonary/Chest: No respiratory distress. He has no wheezes. He has no rales. He exhibits no tenderness.   Decreased breath sounds bilaterally   Abdominal: Soft. Bowel sounds are normal.   Musculoskeletal: He exhibits edema.    Neurological: He is alert and oriented to person, place, and time.   Skin: Skin is warm and dry.   Psychiatric: He has a normal mood and affect. His behavior is normal.          Echo:  CONCLUSIONS     1 - Moderately depressed left ventricular systolic function (EF 30-35%).     2 - Eccentric hypertrophy.     3 - Impaired LV relaxation, normal LAP (grade 1 diastolic dysfunction).     4 - Mildly depressed right ventricular systolic function .      NST:  Impression: ABNORMAL MYOCARDIAL PERFUSION  1. There is evidence for mild myocardial ischemia in the anterior and lateral walls of the left ventricle.   2. The perfusion scan is free of evidence for myocardial injury.   3. Resting wall motion is physiologic.   4. There is resting LV dysfunction with a reduced ejection fraction of 33 %.   5. The ventricular volumes are normal at rest and stress.   6. The extracardiac distribution of radioactivity is normal.     Assessment:       1. Chronic systolic congestive heart failure    2. Essential hypertension    3. Coronary artery disease involving native coronary artery of native heart without angina pectoris    4. Dyslipidemia    5. Chronic obstructive pulmonary disease, unspecified COPD type    6. Tobacco abuse          Plan:       -Continued symptoms with abnormal stress test and known cardiomyopathy with prior PCI and continued tobacco use disorder  -Plan for right and left heart catheterization next week   -Encouraged tobacco cessation     Return to clinic after the procedure      **Risks/benefits of the procedure were d/w the patient including bleeding, infection, death, mi, arrhythmia, kidney failure, stroke, etc.  Patient understands and consent was placed on the chart.

## 2017-08-09 NOTE — DISCHARGE INSTRUCTIONS
[employer] Vascular Closure Device     -Re apply a clean, dry band aid and every day for five days or until a scab has formed at the site.  Change the band aid as needed  -Keep the site dry and clean.  -You may shower 24 hours after the procedure, but do not bathe or use a pool until the wound had completely closed.  -Gently clean your puncture site with soap and warm water.  -After showering , gently pat-dry the site with a clean towel; then let the site air dry before covering with a band aid  -Limit tight fitting clothes or underwear that my irritate the puncture site until the site has healed.    Daily Activities    Do not drive, drink alcohol, or sign legal documents for 24 hours, or if taking narcotic pain medication.    Day of discharge  -No driving  Modify activity for 3-5 days  -No heavy lifting of anything over 5 pounds  (equivalent to a 1/2 gallon of milk)  -No pushing or pulling.  -No vigorous activity or straining  -Avoid stairs unless necessary : if necessary, take them slowly.  -Coughing, sneezing, or straining for a bowel movement:  Support your groin by pressing with your palm on top of the dressing/bandage.  -Sexual activity : check with your doctor  -No strenuous exercise.  -Avoid driving unless necessary.  Talk to your doctor about returning back to work, which depends on your type of work., your procedure and any medication you might be taking.    Fall Prevention  Millions of people fall every year and injure themselves. You may have had anesthesia or sedation which may increase your risk of falling. You may have health issues that put you at an increased risk of falling.     Here are ways to reduce your risk of falling.  ·   · Make your home safe by keeping walkways clear of objects you may trip over.  · Use non-slip pads under rugs. Do not use area rugs or small throw rugs.  · Use non-slip mats in bathtubs and showers.  · Install handrails and lights on staircases.  · Do not walk in poorly lit  areas.  · Do not stand on chairs or wobbly ladders.  · Use caution when reaching overhead or looking upward. This position can cause a loss of balance.  · Be sure your shoes fit properly, have non-slip bottoms and are in good condition.   · Wear shoes both inside and out. Avoid going barefoot or wearing slippers.  · Be cautious when going up and down stairs, curbs, and when walking on uneven sidewalks.  · If your balance is poor, consider using a cane or walker.  · If your fall was related to alcohol use, stop or limit alcohol intake.   · If your fall was related to use of sleeping medicines, talk to your doctor about this. You may need to reduce your dosage at bedtime if you awaken during the night to go to the bathroom.    · To reduce the need for nighttime bathroom trips:  ¨ Avoid drinking fluids for several hours before going to bed  ¨ Empty your bladder before going to bed  ¨ Men can keep a urinal at the bedside  · Stay as active as you can. Balance, flexibility, strength, and endurance all come from exercise. They all play a role in preventing falls. Ask your healthcare provider which types of activity are right for you.  · Get your vision checked on a regular basis.  · If you have pets, know where they are before you stand up or walk so you don't trip over them.  · Use night lights.

## 2017-08-09 NOTE — BRIEF OP NOTE
[facility]  Brief Operative Note     SUMMARY     Surgery Date: 8/9/2017     Surgeon(s) and Role:     * Epi Umana MD - Primary    Assisting Surgeon: None    Pre-op Diagnosis:  Chronic systolic congestive heart failure [I50.22]    Post-op Diagnosis:  Post-Op Diagnosis Codes:     * Chronic systolic congestive heart failure [I50.22], coronary artery disease    Procedure(s) (LRB):  HEART CATH-BILATERAL (Bilateral)    Anesthesia: Choice    Description of the findings of the procedure: Right and left heart catheterization    Findings/Key Components: Found to have proximal LAD stent with significant restenosis, as OM disease and diffuse mid RCA disease.  Patient's EF is depressed approximately 30% with severe inferior wall hypokinesis.  Recommendation with multivessel disease and depressed EF is initially CT surgery consultation.    Estimated Blood Loss: Less than 50 cc         Specimens:   Specimen (12h ago through future)    None          Discharge Note    SUMMARY     Admit Date: 8/9/2017    Discharge Date and Time:  08/09/2017 8:26 AM    Hospital Course (synopsis of major diagnoses, care, treatment, and services provided during the course of the hospital stay): Elective admission for right R catheterization with depressed EF and abnormal stress test with CCS 2 on max medical therapy.  Found to have multivessel coronary artery disease involving in-stent restenosis but LAD stent as well as diffuse disease through OM branch and RCA as well.  He does have depressed LV systolic function with wall motion abnormalities as above.  Recommendation is to optimize medical therapy and refer to CT surgery for evaluation.  If poor candidate consists consider multivessel PCI.     Final Diagnosis: Post-Op Diagnosis Codes:     * Chronic systolic congestive heart failure [I50.22]    Disposition: Home or Self Care    Follow Up/Patient Instructions:     Medications:  Reconciled Home Medications:   Current Discharge  Medication List      CONTINUE these medications which have NOT CHANGED    Details   amlodipine (NORVASC) 5 MG tablet Take 1 tablet (5 mg total) by mouth once daily.  Qty: 30 tablet, Refills: 3      aspirin (ECOTRIN) 81 MG EC tablet Take 81 mg by mouth once daily.      lisinopril (PRINIVIL,ZESTRIL) 20 MG tablet Take 1 tablet (20 mg total) by mouth once daily.  Qty: 30 tablet, Refills: 3      metoprolol succinate (TOPROL-XL) 25 MG 24 hr tablet Take 1 tablet (25 mg total) by mouth once daily.  Qty: 30 tablet, Refills: 3      albuterol (PROVENTIL/VENTOLIN) 90 mcg/actuation inhaler Inhale 2 puffs into the lungs every 6 (six) hours as needed for Wheezing.  Qty: 1 each, Refills: 3      albuterol 90 mcg/actuation inhaler Inhale 1-2 puffs into the lungs every 6 (six) hours as needed for Wheezing. Rescue  Qty: 1 Inhaler, Refills: 0      fluticasone-salmeterol 250-50 mcg/dose (ADVAIR) 250-50 mcg/dose diskus inhaler Inhale 1 puff into the lungs 2 (two) times daily.  Qty: 60 each, Refills: 2           No discharge procedures on file.  Follow-up Information     Epi Umana MD In 1 week.    Specialties:  INTERVENTIONAL CARDIOLOGY, Cardiology  Contact information:  [address] [place] [id number] [phone]

## 2017-08-10 LAB — CORONARY STENOSIS: ABNORMAL

## 2017-08-17 ENCOUNTER — OFFICE VISIT (OUTPATIENT)
Dept: CARDIOLOGY | Facility: CLINIC | Age: 62
End: 2017-08-17
Payer: MEDICARE

## 2017-08-17 VITALS
OXYGEN SATURATION: 96 % | WEIGHT: 207.25 LBS | BODY MASS INDEX: 33.45 KG/M2 | DIASTOLIC BLOOD PRESSURE: 83 MMHG | SYSTOLIC BLOOD PRESSURE: 138 MMHG | HEART RATE: 93 BPM

## 2017-08-17 DIAGNOSIS — I25.10 CORONARY ARTERY DISEASE INVOLVING NATIVE CORONARY ARTERY OF NATIVE HEART WITHOUT ANGINA PECTORIS: ICD-10-CM

## 2017-08-17 DIAGNOSIS — J44.9 CHRONIC OBSTRUCTIVE PULMONARY DISEASE, UNSPECIFIED COPD TYPE: ICD-10-CM

## 2017-08-17 DIAGNOSIS — I10 ESSENTIAL HYPERTENSION: ICD-10-CM

## 2017-08-17 DIAGNOSIS — Z72.0 TOBACCO ABUSE: ICD-10-CM

## 2017-08-17 DIAGNOSIS — I50.22 CHRONIC SYSTOLIC CONGESTIVE HEART FAILURE: Primary | ICD-10-CM

## 2017-08-17 DIAGNOSIS — E78.5 DYSLIPIDEMIA: ICD-10-CM

## 2017-08-17 PROCEDURE — 3079F DIAST BP 80-89 MM HG: CPT | Mod: ,,, | Performed by: INTERNAL MEDICINE

## 2017-08-17 PROCEDURE — 99999 PR PBB SHADOW E&M-EST. PATIENT-LVL III: CPT | Mod: PBBFAC,,, | Performed by: INTERNAL MEDICINE

## 2017-08-17 PROCEDURE — 99213 OFFICE O/P EST LOW 20 MIN: CPT | Mod: PBBFAC | Performed by: INTERNAL MEDICINE

## 2017-08-17 PROCEDURE — 3075F SYST BP GE 130 - 139MM HG: CPT | Mod: ,,, | Performed by: INTERNAL MEDICINE

## 2017-08-17 PROCEDURE — 99214 OFFICE O/P EST MOD 30 MIN: CPT | Mod: S$PBB,,, | Performed by: INTERNAL MEDICINE

## 2017-08-17 RX ORDER — ATORVASTATIN CALCIUM 40 MG/1
40 TABLET, FILM COATED ORAL DAILY
Qty: 90 TABLET | Refills: 3 | Status: ON HOLD | OUTPATIENT
Start: 2017-08-17 | End: 2021-02-14

## 2017-08-17 NOTE — PROGRESS NOTES
Subjective:    Patient ID:  Abelardo Irene Jr. is a 61 y.o. male who presents for follow-up of Post-op Evaluation      HPI   previous history:  Patient is here for follow-up of newly diagnosed systolic heart failure.  He had recent admission for volume overload was found to have reduced EF by echo.  He underwent nuclear stress test showing mild defects in the LAD and RCA territory.  He still is having some worsening lower extremity edema and shortness of breath.  He's on oxygen currently.  He denies any chest pain or palpitations.  He's express no PND, orthopnea or lower edema.  He denies any dizziness, presyncope or syncope.  He had some difficulty obtaining his medicines post discharge.  They currently are working on out.  We discussed his abnormal stress test and need for possible catheterization.  He's agreeable to this today.     Today:  Here for follow-up of systolic heart failure and coronary artery disease.  He denies any problems post catheterization.  Again he was found to have multivessel disease and recommendation was for CT surgery consult.  He's in agreement with this and understands.  We again emphasized tobacco cessation she's agreeable to.  He's not expressing current chest pain or palpitations.  Shortness of breath is stable.  He denies any PND, orthopnea or lower edema.  He's not expressing dizziness, presyncope or syncope.      Review of Systems   Constitution: Negative.   HENT: Negative.    Eyes: Negative.    Cardiovascular: Positive for dyspnea on exertion and leg swelling. Negative for chest pain, irregular heartbeat, near-syncope, orthopnea, palpitations, paroxysmal nocturnal dyspnea and syncope.   Respiratory: Positive for shortness of breath.    Skin: Negative.    Musculoskeletal: Negative.    Gastrointestinal: Negative for abdominal pain, constipation and diarrhea.   Genitourinary: Negative for dysuria.   Neurological: Negative.    Psychiatric/Behavioral: Negative.         Objective:     Physical Exam   Constitutional: He is oriented to person, place, and time. He appears well-developed and well-nourished. No distress.   HENT:   Head: Normocephalic and atraumatic.   Eyes: Conjunctivae and EOM are normal. Pupils are equal, round, and reactive to light.   Neck: Normal range of motion. Neck supple. No thyromegaly present.   Cardiovascular: Normal rate, regular rhythm and normal heart sounds.    No murmur heard.  Pulmonary/Chest: No respiratory distress. He has no wheezes. He has no rales. He exhibits no tenderness.   Decreased breath sounds bilaterally   Abdominal: Soft. Bowel sounds are normal.   Musculoskeletal: He exhibits edema.   Neurological: He is alert and oriented to person, place, and time.   Skin: Skin is warm and dry.   Psychiatric: He has a normal mood and affect. His behavior is normal.         Echo:  CONCLUSIONS     1 - Moderately depressed left ventricular systolic function (EF 30-35%).     2 - Eccentric hypertrophy.     3 - Impaired LV relaxation, normal LAP (grade 1 diastolic dysfunction).     4 - Mildly depressed right ventricular systolic function .     NST:  Impression: ABNORMAL MYOCARDIAL PERFUSION  1. There is evidence for mild myocardial ischemia in the anterior and lateral walls of the left ventricle.   2. The perfusion scan is free of evidence for myocardial injury.   3. Resting wall motion is physiologic.   4. There is resting LV dysfunction with a reduced ejection fraction of 33 %.   5. The ventricular volumes are normal at rest and stress.   6. The extracardiac distribution of radioactivity is normal.     LHC:  B. Summary/Post-Operative Diagnosis       Three vessel coronary artery disease.    Systolic dysfunction.    Mildly elevated left Filling Pressures.    Mild Pulmonary Hypertension.    Critical in-stent restenosis of proximal LAD.    D. Hemodynamic Results     Ejection Fraction: 30%  Wall Motion: hypokinetic in the inferior wall    Air rest:  PW:  (14)  PA:  48  CO_THERM: 7.31  RV: 44  RA:  (13)  LVEDP: 21       E. Angiographic Results     Diagnostic:          Patient has a right dominant coronary artery.        - Left Main Coronary Artery:             The LM is normal. There is BRENDA 3 flow.     - Left Anterior Descending Artery:             The proximal LAD has a 80% stenosis within the stent. There is BRENDA 3 flow.     - Left Circumflex Artery:             The LCX has luminal irregularities. There is BRENDA 3 flow.     - OM2:             The OM2 is diffusely diseased. There is BRENDA 3 flow.     - Right Coronary Artery:             The mid RCA has a 80% stenosis. There is BRENDA 3 flow. Serial lesions through the mid section with diffuse disease of small vessel through the posterior lateral and PDA branches     - Common Femoral Artery:             The right CFA is normal.    G. Recommendations     1. Routine post-cath care.  2. Maximize medical management.  3. Follow up with Dr. Epi Umana.  4. Consult CV Surgery.    Assessment:       1. Chronic systolic congestive heart failure    2. Coronary artery disease involving native coronary artery of native heart without angina pectoris    3. Essential hypertension    4. Dyslipidemia    5. Chronic obstructive pulmonary disease, unspecified COPD type    6. Tobacco abuse         Plan:       -Optimize medical therapy for secondary prevention  -Refer to CT surgery  -Encouraged tobacco cessation    Return to clinic in 3 month

## 2017-09-18 ENCOUNTER — HOSPITAL ENCOUNTER (OUTPATIENT)
Dept: PULMONOLOGY | Facility: CLINIC | Age: 62
Discharge: HOME | End: 2017-09-18
Payer: MEDICARE

## 2017-09-18 ENCOUNTER — OFFICE VISIT (OUTPATIENT)
Dept: CARDIOTHORACIC SURGERY | Facility: CLINIC | Age: 62
End: 2017-09-18
Payer: MEDICARE

## 2017-09-18 VITALS
HEIGHT: 66 IN | BODY MASS INDEX: 33.41 KG/M2 | HEART RATE: 87 BPM | OXYGEN SATURATION: 93 % | WEIGHT: 207.88 LBS | TEMPERATURE: 98 F | DIASTOLIC BLOOD PRESSURE: 92 MMHG | SYSTOLIC BLOOD PRESSURE: 145 MMHG

## 2017-09-18 DIAGNOSIS — J44.9 CHRONIC OBSTRUCTIVE PULMONARY DISEASE, UNSPECIFIED COPD TYPE: ICD-10-CM

## 2017-09-18 DIAGNOSIS — I25.10 CORONARY ARTERY DISEASE, ANGINA PRESENCE UNSPECIFIED, UNSPECIFIED VESSEL OR LESION TYPE, UNSPECIFIED WHETHER NATIVE OR TRANSPLANTED HEART: Primary | ICD-10-CM

## 2017-09-18 DIAGNOSIS — I25.10 CORONARY ARTERY DISEASE INVOLVING NATIVE CORONARY ARTERY OF NATIVE HEART WITHOUT ANGINA PECTORIS: Primary | ICD-10-CM

## 2017-09-18 LAB
PRE FEV1 FVC: 67
PRE FEV1: 1.19
PRE FVC: 1.78
PREDICTED FEV1 FVC: 81
PREDICTED FEV1: 2.89
PREDICTED FVC: 3.57

## 2017-09-18 PROCEDURE — 94200 LUNG FUNCTION TEST (MBC/MVV): CPT | Mod: PBBFAC,59 | Performed by: INTERNAL MEDICINE

## 2017-09-18 PROCEDURE — 3077F SYST BP >= 140 MM HG: CPT | Mod: ,,, | Performed by: THORACIC SURGERY (CARDIOTHORACIC VASCULAR SURGERY)

## 2017-09-18 PROCEDURE — 99999 PR PBB SHADOW E&M-EST. PATIENT-LVL III: CPT | Mod: PBBFAC,,, | Performed by: THORACIC SURGERY (CARDIOTHORACIC VASCULAR SURGERY)

## 2017-09-18 PROCEDURE — 99205 OFFICE O/P NEW HI 60 MIN: CPT | Mod: S$PBB,,, | Performed by: THORACIC SURGERY (CARDIOTHORACIC VASCULAR SURGERY)

## 2017-09-18 PROCEDURE — 94010 BREATHING CAPACITY TEST: CPT | Mod: PBBFAC | Performed by: INTERNAL MEDICINE

## 2017-09-18 PROCEDURE — 99213 OFFICE O/P EST LOW 20 MIN: CPT | Mod: PBBFAC | Performed by: THORACIC SURGERY (CARDIOTHORACIC VASCULAR SURGERY)

## 2017-09-18 PROCEDURE — 94010 BREATHING CAPACITY TEST: CPT | Mod: 26,S$PBB,, | Performed by: INTERNAL MEDICINE

## 2017-09-18 PROCEDURE — 3080F DIAST BP >= 90 MM HG: CPT | Mod: ,,, | Performed by: THORACIC SURGERY (CARDIOTHORACIC VASCULAR SURGERY)

## 2017-09-18 PROCEDURE — 94760 N-INVAS EAR/PLS OXIMETRY 1: CPT | Mod: PBBFAC | Performed by: INTERNAL MEDICINE

## 2017-09-18 NOTE — LETTER
September 18, 2017      Epi Umana MD  [address]  [address]  [place] [address]           [facility] - Cardiovascular [facility]  [address]  [place] [address]  Phone: [phone]          Patient: Abelardo Irene Jr.   MR Number: [id number]   YOB: 1955   Date of Visit: 9/18/2017       Dear Dr. Epi Umana:    Thank you for referring Abelardo Irene to me for evaluation. Attached you will find relevant portions of my assessment and plan of care.    If you have questions, please do not hesitate to call me. I look forward to following Abelardo Irene along with you.    Sincerely,    Sumit Rivera MD    Enclosure  CC:  No Recipients    If you would like to receive this communication electronically, please contact [email or web address] or [phone] to request more information on [employer] Link access.    For providers and/or their staff who would like to refer a patient to [facility], please contact us through our one-stop-shop provider referral line, [facility] [place], at [phone].    If you feel you have received this communication in error or would no longer like to receive these types of communications, please e-mail [email or web address]

## 2017-09-18 NOTE — PROGRESS NOTES
Subjective:      Patient ID: Abelardo Irene Jr. is a 61 y.o. male.    Chief Complaint: Consult    HPI:  Abelardo Irene Jr. is a 61 y.o. male who present with CAD and newly diagnosed systolic heart failure.  He had recent admission for volume overload was found to have reduced EF of 30% by echo.  He underwent nuclear stress test showing mild defects in the LAD and RCA territory. He is a current smoker of 5 cigarettes/ day  He still is having some worsening lower extremity edema and shortness of breath.  He's on oxygen currently.  He denies any chest pain or palpitations.  He's express no PND, orthopnea or lower edema.  He denies any dizziness, presyncope or syncope.  He had some difficulty obtaining his medicines post discharge. He present to clinic for evaluation for surgical revascularization.     Review of patient's allergies indicates:   Allergen Reactions    Contrast media Shortness Of Breath, Itching and Anxiety     Patient states that he had a bad reaction, anxiety , shortness of breath, itching .      Past Medical History:   Diagnosis Date    CHF (congestive heart failure)     COPD (chronic obstructive pulmonary disease)     Coronary artery disease     Hypertension      Past Surgical History:   Procedure Laterality Date    CARDIAC SURGERY      CORONARY STENT PLACEMENT       Family History     Problem Relation (Age of Onset)    Cancer Mother    No Known Problems Father        Social History     Social History    Marital status: Legally      Spouse name: N/A    Number of children: N/A    Years of education: N/A     Occupational History    Not on file.     Social History Main Topics    Smoking status: Current Every Day Smoker     Packs/day: 0.25     Years: 45.00     Types: Cigarettes    Smokeless tobacco: Never Used    Alcohol use No      Comment: socially    Drug use: No    Sexual activity: Yes     Partners: Female     Birth control/ protection: None     Other Topics Concern    Not on file      Social History Narrative    No narrative on file       Current medications Reviewed    Review of Systems   Constitutional: Positive for activity change. Negative for appetite change, fatigue and fever.   HENT: Negative for congestion, dental problem, sneezing and sore throat.    Eyes: Negative for pain, discharge and visual disturbance.   Respiratory: Positive for shortness of breath. Negative for cough and wheezing.    Cardiovascular: Positive for chest pain. Negative for palpitations and leg swelling.   Gastrointestinal: Negative for abdominal distention, abdominal pain, constipation, diarrhea, nausea and vomiting.   Endocrine: Negative for polydipsia, polyphagia and polyuria.   Genitourinary: Negative for dysuria, frequency and urgency.   Musculoskeletal: Negative for back pain and gait problem.   Skin: Negative for rash and wound.   Neurological: Positive for dizziness. Negative for seizures, syncope and headaches.   Hematological: Does not bruise/bleed easily.   Psychiatric/Behavioral: Negative for agitation and confusion. The patient is not nervous/anxious.      Objective:   Physical Exam   Constitutional: He is oriented to person, place, and time. He appears well-developed and well-nourished.   HENT:   Head: Normocephalic and atraumatic.   Eyes: Pupils are equal, round, and reactive to light.   Neck: Normal range of motion. Neck supple.   Cardiovascular: Normal rate, regular rhythm and normal heart sounds.    Pulmonary/Chest: Effort normal.   Decreased breath sounds bilaterally    Abdominal: Soft. Bowel sounds are normal.   Musculoskeletal: Normal range of motion.   Neurological: He is alert and oriented to person, place, and time.   Skin: Skin is warm and dry.   Psychiatric: He has a normal mood and affect. His behavior is normal.     Diagnostics:     [facility] 8/09  - Left Main Coronary Artery:             The LM is normal. There is BRENDA 3 flow.     - Left Anterior Descending Artery:             The proximal  LAD has a 80% stenosis within the stent. There is BRENDA 3 flow.     - Left Circumflex Artery:             The LCX has luminal irregularities. There is BRENDA 3 flow.     - OM2:             The OM2 is diffusely diseased. There is BRENDA 3 flow.     - Right Coronary Artery:             The mid RCA has a 80% stenosis. There is BRENDA 3 flow. Serial lesions through the mid section with diffuse disease of small vessel through the posterior lateral and PDA branches     - Common Femoral Artery:             The right CFA is normal.    2D ECHO 7/28  CONCLUSIONS     1 - Moderately depressed left ventricular systolic function (EF 30-35%).     2 - Eccentric hypertrophy.     3 - Impaired LV relaxation, normal LAP (grade 1 diastolic dysfunction).     4 - Mildly depressed right ventricular systolic function .      NST:  Impression: ABNORMAL MYOCARDIAL PERFUSION  1. There is evidence for mild myocardial ischemia in the anterior and lateral walls of the left ventricle.   2. The perfusion scan is free of evidence for myocardial injury.   3. Resting wall motion is physiologic.   4. There is resting LV dysfunction with a reduced ejection fraction of 33 %.   5. The ventricular volumes are normal at rest and stress.   6. The extracardiac distribution of radioactivity is normal.     STS 1.3%  Assessment:     1. Chronic systolic congestive heart failure    2. Essential hypertension    3. Coronary artery disease involving native coronary artery of native heart without angina pectoris    4. Dyslipidemia    5. Chronic obstructive pulmonary disease, unspecified COPD type    6. Tobacco abuse      Plan:   Discussed plan with patient and Dr. Umana. Will obtain PFTs and ABGs to assess pulmonary function for concerns of ventilatory dependency due to severe COPD with home O2 use. Discussed smoking cessation with patent. If LFT's show poor lung function would recommend high risk  PCI and medical therapy. Pt still smoking with home oxygen.

## 2017-10-27 ENCOUNTER — OFFICE VISIT (OUTPATIENT)
Dept: CARDIOLOGY | Facility: CLINIC | Age: 62
End: 2017-10-27
Payer: MEDICARE

## 2017-10-27 VITALS
HEART RATE: 69 BPM | BODY MASS INDEX: 33.09 KG/M2 | DIASTOLIC BLOOD PRESSURE: 92 MMHG | WEIGHT: 205 LBS | OXYGEN SATURATION: 95 % | SYSTOLIC BLOOD PRESSURE: 142 MMHG

## 2017-10-27 DIAGNOSIS — J44.9 CHRONIC OBSTRUCTIVE PULMONARY DISEASE, UNSPECIFIED COPD TYPE: ICD-10-CM

## 2017-10-27 DIAGNOSIS — Z72.0 TOBACCO ABUSE: ICD-10-CM

## 2017-10-27 DIAGNOSIS — I10 ESSENTIAL HYPERTENSION: ICD-10-CM

## 2017-10-27 DIAGNOSIS — I25.10 CORONARY ARTERY DISEASE INVOLVING NATIVE CORONARY ARTERY OF NATIVE HEART WITHOUT ANGINA PECTORIS: ICD-10-CM

## 2017-10-27 DIAGNOSIS — E78.5 DYSLIPIDEMIA: ICD-10-CM

## 2017-10-27 DIAGNOSIS — I50.22 CHRONIC SYSTOLIC CONGESTIVE HEART FAILURE: Primary | ICD-10-CM

## 2017-10-27 PROCEDURE — 99214 OFFICE O/P EST MOD 30 MIN: CPT | Mod: S$GLB,,, | Performed by: INTERNAL MEDICINE

## 2017-10-27 PROCEDURE — 99999 PR PBB SHADOW E&M-EST. PATIENT-LVL III: CPT | Mod: PBBFAC,,, | Performed by: INTERNAL MEDICINE

## 2017-10-27 RX ORDER — CLOPIDOGREL BISULFATE 75 MG/1
75 TABLET ORAL DAILY
Qty: 30 TABLET | Refills: 11 | Status: ON HOLD | OUTPATIENT
Start: 2017-10-27 | End: 2021-02-14 | Stop reason: SDUPTHER

## 2017-10-27 NOTE — PROGRESS NOTES
Subjective:    Patient ID:  Abelardo Irene Jr. is a 61 y.o. male who presents for follow-up of No chief complaint on file.      HPI   previous history:  Here for follow-up of systolic heart failure and coronary artery disease.  He denies any problems post catheterization.  Again he was found to have multivessel disease and recommendation was for CT surgery consult.  He's in agreement with this and understands.  We again emphasized tobacco cessation she's agreeable to.  He's not expressing current chest pain or palpitations.  Shortness of breath is stable.  He denies any PND, orthopnea or lower edema.  He's not expressing dizziness, presyncope or syncope.    Today:  For follow-up of systolic heart failure and coronary artery disease.  He's found to have multivessel coronary artery disease on previous angiography.  This included a critical in-stent restenosis of the proximal LAD stent previously placed.  We recommended consideration for bypass with the multivessel disease.  Spoke with CT surgery Dr. Rivera and the plan is for reverse type procedure.  He's agreeable to do PCI.  He says weaned his smoking considerably.  He's mainly just a little scared of the open-heart procedure part.  He mainly has shortness of breath but no chest pain currently denies any PND, orthopnea or lower edema.  He's not expressing dizziness, presyncope or syncope.      Review of Systems   Constitution: Negative.   HENT: Negative.    Eyes: Negative.    Cardiovascular: Positive for dyspnea on exertion and leg swelling. Negative for chest pain, irregular heartbeat, near-syncope, orthopnea, palpitations, paroxysmal nocturnal dyspnea and syncope.   Respiratory: Positive for shortness of breath.    Skin: Negative.    Musculoskeletal: Negative.    Gastrointestinal: Negative for abdominal pain, constipation and diarrhea.   Genitourinary: Negative for dysuria.   Neurological: Negative.    Psychiatric/Behavioral: Negative.         Objective:    Physical  Exam   Constitutional: He is oriented to person, place, and time. He appears well-developed and well-nourished. No distress.   HENT:   Head: Normocephalic and atraumatic.   Eyes: Conjunctivae and EOM are normal. Pupils are equal, round, and reactive to light.   Neck: Normal range of motion. Neck supple. No thyromegaly present.   Cardiovascular: Normal rate, regular rhythm and normal heart sounds.    No murmur heard.  Pulmonary/Chest: No respiratory distress. He has no wheezes. He has no rales. He exhibits no tenderness.   Decreased breath sounds bilaterally   Abdominal: Soft. Bowel sounds are normal.   Musculoskeletal: He exhibits edema.   Neurological: He is alert and oriented to person, place, and time.   Skin: Skin is warm and dry.   Psychiatric: He has a normal mood and affect. His behavior is normal.         Echo:  CONCLUSIONS     1 - Moderately depressed left ventricular systolic function (EF 30-35%).     2 - Eccentric hypertrophy.     3 - Impaired LV relaxation, normal LAP (grade 1 diastolic dysfunction).     4 - Mildly depressed right ventricular systolic function .     NST:  Impression: ABNORMAL MYOCARDIAL PERFUSION  1. There is evidence for mild myocardial ischemia in the anterior and lateral walls of the left ventricle.   2. The perfusion scan is free of evidence for myocardial injury.   3. Resting wall motion is physiologic.   4. There is resting LV dysfunction with a reduced ejection fraction of 33 %.   5. The ventricular volumes are normal at rest and stress.   6. The extracardiac distribution of radioactivity is normal.     C:  B. Summary/Post-Operative Diagnosis       Three vessel coronary artery disease.    Systolic dysfunction.    Mildly elevated left Filling Pressures.    Mild Pulmonary Hypertension.    Critical in-stent restenosis of proximal LAD.    D. Hemodynamic Results     Ejection Fraction: 30%  Wall Motion: hypokinetic in the inferior wall    Air rest:  PW:  (14)  PA: 48  CO_THERM:  7.31  RV: 44  RA:  (13)  LVEDP: 21       E. Angiographic Results     Diagnostic:          Patient has a right dominant coronary artery.        - Left Main Coronary Artery:             The LM is normal. There is BRENDA 3 flow.     - Left Anterior Descending Artery:             The proximal LAD has a 80% stenosis within the stent. There is BRENDA 3 flow.     - Left Circumflex Artery:             The LCX has luminal irregularities. There is BRENDA 3 flow.     - OM2:             The OM2 is diffusely diseased. There is BRENDA 3 flow.     - Right Coronary Artery:             The mid RCA has a 80% stenosis. There is BRENDA 3 flow. Serial lesions through the mid section with diffuse disease of small vessel through the posterior lateral and PDA branches     - Common Femoral Artery:             The right CFA is normal.    G. Recommendations     1. Routine post-cath care.  2. Maximize medical management.  3. Follow up with Dr. Epi Umana.  4. Consult CV Surgery.    Assessment:       1. Chronic systolic congestive heart failure    2. Coronary artery disease involving native coronary artery of native heart without angina pectoris    3. Essential hypertension    4. Dyslipidemia    5. Chronic obstructive pulmonary disease, unspecified COPD type    6. Tobacco abuse         Plan:       -Optimize medical therapy for secondary prevention  -Discussed with CT surgery Dr. Rivera, plan for reverse hybrid procedure with PCI of RCA and then subsequently mid LAD  -No indication is abnormal stress test with known stage revascularization on max med therapy with 2 antianginals  -Encouraged tobacco cessation  -Add Plavix    Return to clinic after the procedure    **Risks/benefits of the procedure were d/w the patient including bleeding, infection, death, mi, arrhythmia, kidney failure, stroke, etc.  Patient understands and consent was placed on the chart.

## 2017-11-09 DIAGNOSIS — I25.10 CORONARY ARTERY DISEASE INVOLVING NATIVE CORONARY ARTERY OF NATIVE HEART WITHOUT ANGINA PECTORIS: Primary | ICD-10-CM

## 2017-11-13 ENCOUNTER — HOSPITAL ENCOUNTER (OUTPATIENT)
Dept: PREADMISSION TESTING | Facility: HOSPITAL | Age: 62
Discharge: HOME | End: 2017-11-13
Attending: INTERNAL MEDICINE
Payer: MEDICARE

## 2017-11-13 VITALS
BODY MASS INDEX: 32.95 KG/M2 | OXYGEN SATURATION: 97 % | RESPIRATION RATE: 18 BRPM | HEART RATE: 63 BPM | DIASTOLIC BLOOD PRESSURE: 89 MMHG | HEIGHT: 66 IN | WEIGHT: 205 LBS | SYSTOLIC BLOOD PRESSURE: 142 MMHG | TEMPERATURE: 97 F

## 2017-11-13 DIAGNOSIS — I25.10 CORONARY ARTERY DISEASE INVOLVING NATIVE CORONARY ARTERY OF NATIVE HEART WITHOUT ANGINA PECTORIS: ICD-10-CM

## 2017-11-13 LAB
ANION GAP SERPL CALC-SCNC: 7 MMOL/L
BASOPHILS # BLD AUTO: 0.06 K/UL
BASOPHILS NFR BLD: 0.6 %
BUN SERPL-MCNC: 4 MG/DL
CALCIUM SERPL-MCNC: 9.5 MG/DL
CHLORIDE SERPL-SCNC: 99 MMOL/L
CO2 SERPL-SCNC: 29 MMOL/L
CREAT SERPL-MCNC: 0.9 MG/DL
DIFFERENTIAL METHOD: NORMAL
EOSINOPHIL # BLD AUTO: 0.4 K/UL
EOSINOPHIL NFR BLD: 3.5 %
ERYTHROCYTE [DISTWIDTH] IN BLOOD BY AUTOMATED COUNT: 13.6 %
EST. GFR  (AFRICAN AMERICAN): >60 ML/MIN/1.73 M^2
EST. GFR  (NON AFRICAN AMERICAN): >60 ML/MIN/1.73 M^2
GLUCOSE SERPL-MCNC: 281 MG/DL
HCT VFR BLD AUTO: 43.9 %
HGB BLD-MCNC: 14.9 G/DL
INR PPP: 0.9
LYMPHOCYTES # BLD AUTO: 3.3 K/UL
LYMPHOCYTES NFR BLD: 33.5 %
MCH RBC QN AUTO: 28.8 PG
MCHC RBC AUTO-ENTMCNC: 33.9 G/DL
MCV RBC AUTO: 85 FL
MONOCYTES # BLD AUTO: 0.7 K/UL
MONOCYTES NFR BLD: 7.4 %
NEUTROPHILS # BLD AUTO: 5.5 K/UL
NEUTROPHILS NFR BLD: 55 %
PLATELET # BLD AUTO: 297 K/UL
PMV BLD AUTO: 9.8 FL
POTASSIUM SERPL-SCNC: 4.5 MMOL/L
PROTHROMBIN TIME: 10 SEC
RBC # BLD AUTO: 5.17 M/UL
SODIUM SERPL-SCNC: 135 MMOL/L
WBC # BLD AUTO: 9.96 K/UL

## 2017-11-13 PROCEDURE — 36415 COLL VENOUS BLD VENIPUNCTURE: CPT

## 2017-11-13 PROCEDURE — 80048 BASIC METABOLIC PNL TOTAL CA: CPT

## 2017-11-13 PROCEDURE — 85025 COMPLETE CBC W/AUTO DIFF WBC: CPT

## 2017-11-13 PROCEDURE — 85610 PROTHROMBIN TIME: CPT

## 2017-11-13 NOTE — DISCHARGE INSTRUCTIONS
Your surgery is scheduled for __Wednesday Nov, 15, 2017__________________.          Please report to SAME DAY SURGERY UNIT on the 2nd FLOOR at _7:30 AM__.  Use front door entrance. The doors open at 0530 am.          INSTRUCTIONS IMPORTANT!!!  ¨ Do not eat or drink after 12 midnight-including water. OK to brush teeth, no   gum, candy or mints!    ¨ Take only these medicines with a small swallow of water-morning of surgery.  Take Metoprolol, Norvasc, Aspirin, and Plavix morning of procedure.      _x___  Prep instructions:   SHOWER     _x___  Please shower using Hibiclens soap the night before AND  the morning of  your surgery/procedure. Do not use Hibiclens on your face or genitals              . Rinse hibiclens off completely.  _x___  No shaving of procedural area at least 4-5 days before surgery due to  increased risk of skin irritation and/or possible infection.    _x___  No powder, lotions or creams to your body.  _x___  You may wear only deodorant on the day of surgery.  _x___  Please remove all jewelry, including piercings and leave at home.  _x___  No money or valuables needed. Please leave at home.  You may bring your  cell phone.      _x___  Wear loose fitting clothing. Allow for dressings, bandages.  _x___  Stop Aspirin, Ibuprofen, Motrin and Aleve at least 3-5 days before  surgery, unless otherwise instructed by your doctor, or the nurse.              You MAY use Tylenol/acetaminophen until day of surgery.  _x___  Call MD for temperature above 101 degrees.        ____ Stop taking any Fish Oil supplement or any Vitamins that contain Vitamin  E at least 5 days prior to surgery.          I have read or had read and explained to me, and understand the above information.  Additional comments or instructions:Please call   [phone] if you have any questions regarding the instructions above.

## 2017-11-13 NOTE — PRE-PROCEDURE INSTRUCTIONS
Pre-operative instructions, medication directives and pain scales reviewed with patient. Instructed to wash with Hibiclens prior to procedure as directed. All questions the patient had  were answered. Re-assurance about surgical procedure and day of surgery routine given as needed. The patient verbalized understanding of the pre-op instructions.

## 2017-11-15 ENCOUNTER — SURGERY (OUTPATIENT)
Age: 62
End: 2017-11-15

## 2017-11-15 ENCOUNTER — HOSPITAL ENCOUNTER (OUTPATIENT)
Facility: HOSPITAL | Age: 62
Discharge: HOME | End: 2017-11-16
Attending: INTERNAL MEDICINE | Admitting: INTERNAL MEDICINE
Payer: MEDICARE

## 2017-11-15 DIAGNOSIS — I25.10 CORONARY ARTERY DISEASE INVOLVING NATIVE CORONARY ARTERY OF NATIVE HEART WITHOUT ANGINA PECTORIS: ICD-10-CM

## 2017-11-15 LAB — POCT GLUCOSE: 219 MG/DL (ref 70–110)

## 2017-11-15 PROCEDURE — 25000003 PHARM REV CODE 250

## 2017-11-15 PROCEDURE — 27000221 HC OXYGEN, UP TO 24 HOURS

## 2017-11-15 PROCEDURE — 92928 PRQ TCAT PLMT NTRAC ST 1 LES: CPT | Mod: RC,,, | Performed by: INTERNAL MEDICINE

## 2017-11-15 PROCEDURE — S0028 INJECTION, FAMOTIDINE, 20 MG: HCPCS | Performed by: INTERNAL MEDICINE

## 2017-11-15 PROCEDURE — 63600175 PHARM REV CODE 636 W HCPCS

## 2017-11-15 PROCEDURE — 63600175 PHARM REV CODE 636 W HCPCS: Performed by: INTERNAL MEDICINE

## 2017-11-15 PROCEDURE — 99152 MOD SED SAME PHYS/QHP 5/>YRS: CPT

## 2017-11-15 PROCEDURE — A4216 STERILE WATER/SALINE, 10 ML: HCPCS

## 2017-11-15 PROCEDURE — C1894 INTRO/SHEATH, NON-LASER: HCPCS

## 2017-11-15 PROCEDURE — 82962 GLUCOSE BLOOD TEST: CPT | Performed by: INTERNAL MEDICINE

## 2017-11-15 PROCEDURE — 25000003 PHARM REV CODE 250: Performed by: INTERNAL MEDICINE

## 2017-11-15 PROCEDURE — C1769 GUIDE WIRE: HCPCS

## 2017-11-15 PROCEDURE — 99152 MOD SED SAME PHYS/QHP 5/>YRS: CPT | Mod: ,,, | Performed by: INTERNAL MEDICINE

## 2017-11-15 RX ORDER — LISINOPRIL 20 MG/1
20 TABLET ORAL DAILY
Status: DISCONTINUED | OUTPATIENT
Start: 2017-11-15 | End: 2017-11-16 | Stop reason: HOSPADM

## 2017-11-15 RX ORDER — FLUTICASONE FUROATE AND VILANTEROL 100; 25 UG/1; UG/1
1 POWDER RESPIRATORY (INHALATION) DAILY
Status: DISCONTINUED | OUTPATIENT
Start: 2017-11-15 | End: 2017-11-16 | Stop reason: HOSPADM

## 2017-11-15 RX ORDER — CLOPIDOGREL BISULFATE 75 MG/1
75 TABLET ORAL DAILY
Status: DISCONTINUED | OUTPATIENT
Start: 2017-11-15 | End: 2017-11-16 | Stop reason: HOSPADM

## 2017-11-15 RX ORDER — METOPROLOL SUCCINATE 25 MG/1
25 TABLET, EXTENDED RELEASE ORAL DAILY
Status: DISCONTINUED | OUTPATIENT
Start: 2017-11-15 | End: 2017-11-16 | Stop reason: HOSPADM

## 2017-11-15 RX ORDER — AMLODIPINE BESYLATE 5 MG/1
5 TABLET ORAL DAILY
Status: DISCONTINUED | OUTPATIENT
Start: 2017-11-15 | End: 2017-11-16 | Stop reason: HOSPADM

## 2017-11-15 RX ORDER — SODIUM CHLORIDE 9 MG/ML
INJECTION, SOLUTION INTRAVENOUS CONTINUOUS
Status: DISCONTINUED | OUTPATIENT
Start: 2017-11-15 | End: 2017-11-15

## 2017-11-15 RX ORDER — ASPIRIN 81 MG/1
81 TABLET ORAL DAILY
Status: DISCONTINUED | OUTPATIENT
Start: 2017-11-15 | End: 2017-11-16 | Stop reason: HOSPADM

## 2017-11-15 RX ORDER — DIPHENHYDRAMINE HYDROCHLORIDE 50 MG/ML
50 INJECTION INTRAMUSCULAR; INTRAVENOUS ONCE
Status: COMPLETED | OUTPATIENT
Start: 2017-11-15 | End: 2017-11-15

## 2017-11-15 RX ORDER — ATORVASTATIN CALCIUM 40 MG/1
40 TABLET, FILM COATED ORAL DAILY
Status: DISCONTINUED | OUTPATIENT
Start: 2017-11-15 | End: 2017-11-16 | Stop reason: HOSPADM

## 2017-11-15 RX ORDER — FLUTICASONE PROPIONATE AND SALMETEROL 250; 50 UG/1; UG/1
1 POWDER RESPIRATORY (INHALATION) 2 TIMES DAILY
Status: DISCONTINUED | OUTPATIENT
Start: 2017-11-15 | End: 2017-11-15 | Stop reason: CLARIF

## 2017-11-15 RX ORDER — METHYLPREDNISOLONE SOD SUCC 125 MG
125 VIAL (EA) INJECTION ONCE
Status: COMPLETED | OUTPATIENT
Start: 2017-11-15 | End: 2017-11-15

## 2017-11-15 RX ORDER — FAMOTIDINE 10 MG/ML
20 INJECTION INTRAVENOUS ONCE
Status: COMPLETED | OUTPATIENT
Start: 2017-11-15 | End: 2017-11-15

## 2017-11-15 RX ADMIN — SODIUM CHLORIDE: 0.9 INJECTION, SOLUTION INTRAVENOUS at 07:11

## 2017-11-15 RX ADMIN — FAMOTIDINE 20 MG: 10 INJECTION, SOLUTION INTRAVENOUS at 09:11

## 2017-11-15 RX ADMIN — DIPHENHYDRAMINE HYDROCHLORIDE 50 MG: 50 INJECTION, SOLUTION INTRAMUSCULAR; INTRAVENOUS at 09:11

## 2017-11-15 RX ADMIN — METHYLPREDNISOLONE SODIUM SUCCINATE 125 MG: 125 INJECTION, POWDER, FOR SOLUTION INTRAMUSCULAR; INTRAVENOUS at 09:11

## 2017-11-15 NOTE — NURSING
Vasc band (air) releases initiated. 2cc air released. No hematoma noted. No signs of bleeding. No numbness or tingling noted. No CP.  Pt with lunch, using left hand. Family at bedside.

## 2017-11-15 NOTE — BRIEF OP NOTE
[facility]  Brief Operative Note    SUMMARY     Surgery Date: 11/15/2017     Surgeon(s) and Role:     * Epi Umana MD - Primary    Assisting Surgeon: None    Pre-op Diagnosis:  Coronary artery disease involving native coronary artery of native heart without angina pectoris [I25.10]    Post-op Diagnosis:  Post-Op Diagnosis Codes:     * Coronary artery disease involving native coronary artery of native heart without angina pectoris [I25.10]    Procedure(s) (LRB):  Stent ROSITA coronary (N/A)    Anesthesia: RN IV Sedation    Description of Procedure: PCI of the RCA with drug-eluting stent via the right radial artery    Description of the findings of the procedure: Diffuse disease throughout the vessel but there are serial 80-90% lesions in the mid which was reduced to 0% by placement of a 3.0 x 16 mm resolute drug-eluting stent.      Recommendation:  -Continue medical management  -Observed overnight for any complications  -We'll refer back to CT surgery for LIMA to LAD as an outpatient    Estimated Blood Loss: Less than 50 cc         Specimens:   Specimen (12h ago through future)    None

## 2017-11-15 NOTE — H&P
HPI:  For follow-up of systolic heart failure and coronary artery disease.  He's found to have multivessel coronary artery disease on previous angiography.  This included a critical in-stent restenosis of the proximal LAD stent previously placed.  We recommended consideration for bypass with the multivessel disease.  Spoke with CT surgery Dr. Rivera and the plan is for reverse type procedure.  He's agreeable to do PCI.  He says weaned his smoking considerably.  He's mainly just a little scared of the open-heart procedure part.  He mainly has shortness of breath but no chest pain currently denies any PND, orthopnea or lower edema.  He's not expressing dizziness, presyncope or syncope.        Review of Systems   Constitution: Negative.   HENT: Negative.    Eyes: Negative.    Cardiovascular: Positive for dyspnea on exertion and leg swelling. Negative for chest pain, irregular heartbeat, near-syncope, orthopnea, palpitations, paroxysmal nocturnal dyspnea and syncope.   Respiratory: Positive for shortness of breath.    Skin: Negative.    Musculoskeletal: Negative.    Gastrointestinal: Negative for abdominal pain, constipation and diarrhea.   Genitourinary: Negative for dysuria.   Neurological: Negative.    Psychiatric/Behavioral: Negative.          Objective:    Physical Exam   Constitutional: He is oriented to person, place, and time. He appears well-developed and well-nourished. No distress.   HENT:   Head: Normocephalic and atraumatic.   Eyes: Conjunctivae and EOM are normal. Pupils are equal, round, and reactive to light.   Neck: Normal range of motion. Neck supple. No thyromegaly present.   Cardiovascular: Normal rate, regular rhythm and normal heart sounds.    No murmur heard.  Pulmonary/Chest: No respiratory distress. He has no wheezes. He has no rales. He exhibits no tenderness.   Decreased breath sounds bilaterally   Abdominal: Soft. Bowel sounds are normal.   Musculoskeletal: He exhibits edema.   Neurological:  He is alert and oriented to person, place, and time.   Skin: Skin is warm and dry.   Psychiatric: He has a normal mood and affect. His behavior is normal.          Echo:  CONCLUSIONS     1 - Moderately depressed left ventricular systolic function (EF 30-35%).     2 - Eccentric hypertrophy.     3 - Impaired LV relaxation, normal LAP (grade 1 diastolic dysfunction).     4 - Mildly depressed right ventricular systolic function .      NST:  Impression: ABNORMAL MYOCARDIAL PERFUSION  1. There is evidence for mild myocardial ischemia in the anterior and lateral walls of the left ventricle.   2. The perfusion scan is free of evidence for myocardial injury.   3. Resting wall motion is physiologic.   4. There is resting LV dysfunction with a reduced ejection fraction of 33 %.   5. The ventricular volumes are normal at rest and stress.   6. The extracardiac distribution of radioactivity is normal.      C:  B. Summary/Post-Operative Diagnosis       Three vessel coronary artery disease.    Systolic dysfunction.    Mildly elevated left Filling Pressures.    Mild Pulmonary Hypertension.    Critical in-stent restenosis of proximal LAD.     D. Hemodynamic Results     Ejection Fraction: 30%  Wall Motion: hypokinetic in the inferior wall    Air rest:  PW:  (14)  PA: 48  CO_THERM: 7.31  RV: 44  RA:  (13)  LVEDP: 21       E. Angiographic Results     Diagnostic:          Patient has a right dominant coronary artery.        - Left Main Coronary Artery:             The LM is normal. There is BRENDA 3 flow.     - Left Anterior Descending Artery:             The proximal LAD has a 80% stenosis within the stent. There is BRENDA 3 flow.     - Left Circumflex Artery:             The LCX has luminal irregularities. There is BRENDA 3 flow.     - OM2:             The OM2 is diffusely diseased. There is BRENDA 3 flow.     - Right Coronary Artery:             The mid RCA has a 80% stenosis. There is BRENDA 3 flow. Serial lesions through the mid section  with diffuse disease of small vessel through the posterior lateral and PDA branches     - Common Femoral Artery:             The right CFA is normal.     G. Recommendations     1. Routine post-cath care.  2. Maximize medical management.  3. Follow up with Dr. Epi Umana.  4. Consult CV Surgery.     Assessment:       1. Chronic systolic congestive heart failure    2. Coronary artery disease involving native coronary artery of native heart without angina pectoris    3. Essential hypertension    4. Dyslipidemia    5. Chronic obstructive pulmonary disease, unspecified COPD type    6. Tobacco abuse          Plan:       -Optimize medical therapy for secondary prevention  -Discussed with CT surgery Dr. Rivera, plan for reverse hybrid procedure with PCI of RCA and then subsequently mid LAD  -The indication is abnormal stress test with known stage revascularization on max med therapy with 2 antianginals  -Encouraged tobacco cessation  -Add Plavix     Return to clinic after the procedure     **Risks/benefits of the procedure were d/w the patient including bleeding, infection, death, mi, arrhythmia, kidney failure, stroke, etc.  Patient understands and consent was placed on the chart.

## 2017-11-15 NOTE — NURSING
Vasc band release completed, removed. Site covered with 2x2 gauze and tegaderm. No bleeding noted. No hematoma. No redness, no swelling.

## 2017-11-15 NOTE — NURSING
Patient arrived to unit via hospital bed accompanied by cath lab RNLos.  Rt. Radial site clean, dry, intact without s/s infection or hematoma. Site covered with vasc band. Telemetry monitoring in progress. ivf continued at 50 mL/hr. Bilateral pedal and radial pulses present and assessed via palpation. Patient easily aroused via verbal stimuli. Family at bedside. Patient and family instructed on post procedure mobility limitations as stated per protocol as well as signs/symptoms of hematoma formation and/or bleeding. Patient and family verbalized understanding of instructions and states willingness to comply.

## 2017-11-16 VITALS
BODY MASS INDEX: 33.31 KG/M2 | TEMPERATURE: 98 F | RESPIRATION RATE: 19 BRPM | HEIGHT: 66 IN | DIASTOLIC BLOOD PRESSURE: 98 MMHG | SYSTOLIC BLOOD PRESSURE: 175 MMHG | WEIGHT: 207.25 LBS | HEART RATE: 71 BPM | OXYGEN SATURATION: 98 %

## 2017-11-16 LAB
ANION GAP SERPL CALC-SCNC: 9 MMOL/L
BASOPHILS # BLD AUTO: 0.01 K/UL
BASOPHILS NFR BLD: 0.1 %
BUN SERPL-MCNC: 12 MG/DL
CALCIUM SERPL-MCNC: 9.5 MG/DL
CHLORIDE SERPL-SCNC: 98 MMOL/L
CO2 SERPL-SCNC: 28 MMOL/L
CREAT SERPL-MCNC: 0.9 MG/DL
DIFFERENTIAL METHOD: ABNORMAL
EOSINOPHIL # BLD AUTO: 0 K/UL
EOSINOPHIL NFR BLD: 0 %
ERYTHROCYTE [DISTWIDTH] IN BLOOD BY AUTOMATED COUNT: 13.3 %
EST. GFR  (AFRICAN AMERICAN): >60 ML/MIN/1.73 M^2
EST. GFR  (NON AFRICAN AMERICAN): >60 ML/MIN/1.73 M^2
GLUCOSE SERPL-MCNC: 318 MG/DL
HCT VFR BLD AUTO: 40.3 %
HGB BLD-MCNC: 13.8 G/DL
LYMPHOCYTES # BLD AUTO: 1.7 K/UL
LYMPHOCYTES NFR BLD: 11.3 %
MCH RBC QN AUTO: 29.2 PG
MCHC RBC AUTO-ENTMCNC: 34.2 G/DL
MCV RBC AUTO: 85 FL
MONOCYTES # BLD AUTO: 0.8 K/UL
MONOCYTES NFR BLD: 5.7 %
NEUTROPHILS # BLD AUTO: 12.1 K/UL
NEUTROPHILS NFR BLD: 82.9 %
PLATELET # BLD AUTO: 271 K/UL
PMV BLD AUTO: 10 FL
POTASSIUM SERPL-SCNC: 4.2 MMOL/L
RBC # BLD AUTO: 4.72 M/UL
SODIUM SERPL-SCNC: 135 MMOL/L
WBC # BLD AUTO: 14.65 K/UL

## 2017-11-16 PROCEDURE — 36415 COLL VENOUS BLD VENIPUNCTURE: CPT

## 2017-11-16 PROCEDURE — 80048 BASIC METABOLIC PNL TOTAL CA: CPT

## 2017-11-16 PROCEDURE — 85025 COMPLETE CBC W/AUTO DIFF WBC: CPT

## 2017-11-16 PROCEDURE — 93010 ELECTROCARDIOGRAM REPORT: CPT | Mod: ,,, | Performed by: INTERNAL MEDICINE

## 2017-11-16 PROCEDURE — 93005 ELECTROCARDIOGRAM TRACING: CPT

## 2017-11-16 PROCEDURE — 25000003 PHARM REV CODE 250: Performed by: INTERNAL MEDICINE

## 2017-11-16 RX ORDER — ACETAMINOPHEN 325 MG/1
325 TABLET ORAL ONCE
Status: COMPLETED | OUTPATIENT
Start: 2017-11-16 | End: 2017-11-16

## 2017-11-16 RX ADMIN — METOPROLOL SUCCINATE 25 MG: 25 TABLET, EXTENDED RELEASE ORAL at 09:11

## 2017-11-16 RX ADMIN — ACETAMINOPHEN 325 MG: 325 TABLET ORAL at 11:11

## 2017-11-16 RX ADMIN — AMLODIPINE BESYLATE 5 MG: 5 TABLET ORAL at 09:11

## 2017-11-16 RX ADMIN — ATORVASTATIN CALCIUM 40 MG: 40 TABLET, FILM COATED ORAL at 09:11

## 2017-11-16 RX ADMIN — LISINOPRIL 20 MG: 20 TABLET ORAL at 09:11

## 2017-11-16 RX ADMIN — ASPIRIN 81 MG: 81 TABLET, COATED ORAL at 09:11

## 2017-11-16 RX ADMIN — CLOPIDOGREL BISULFATE 75 MG: 75 TABLET ORAL at 09:11

## 2017-11-16 NOTE — NURSING
Report received from CHARLI Childers. Patient resting comfortably, no complaints, no acute distress noted. Plan of care reviewed with patient. Instructed patient to call for assistance before ambulating, side rails up x3, bed alarm set, call light in reach, non skid socks in use. Patient verbalized understanding of instructions. Will continue to monitor.

## 2017-11-16 NOTE — DISCHARGE SUMMARY
[facility] Hospital Medicine  Discharge Summary      Patient Name: Abelardo Irene Jr.  MRN: [id number]  Admission Date: 11/15/2017  Hospital Length of Stay: 0 days  Discharge Date and Time:  11/16/2017 7:58 AM  Attending Physician: Epi Umana MD   Discharging Provider: Epi Umana MD  Primary Care Provider: Adelso Reyna MD        HPI: Patient was electively admitted for staged PCI of the RCA.  Patient has known multivessel coronary artery disease and was previously referred to CT surgery.  In discussion with them reverse hybrid procedure was recommended.  Initially PCI of the RCA was to be performed with subsequent LIMA to LAD.  He presented for staged intervention.  See admission H&P for full details.    Procedure(s) (LRB):  Stent ROSITA coronary (N/A)      Hospital Course: Patient was electively minute underwent staged PCI of RCA.  He tolerated the procedure well.  Please see procedure report for full details.  He was observed overnight without any complications.  He was discharged home in stable condition to continue all current meds in particular dual antiplatelet therapy.  He'll be referred back to CT surgery for scheduling of his off-pump LIMA to LAD.  Routine post-TCI instructions were given.  He will follow-up with us in one week.    Consults:  none    Final Active Diagnoses:    Diagnosis Date Noted POA    PRINCIPAL PROBLEM:  Coronary artery disease involving native coronary artery of native heart without angina pectoris [I25.10] 11/15/2017 Yes      Problems Resolved During this Admission:    Diagnosis Date Noted Date Resolved POA      Discharged Condition: good    Disposition:  home    Follow Up:  Follow-up Information     Epi Umana MD In 1 week.    Specialties:  INTERVENTIONAL CARDIOLOGY, Cardiology  Contact information:  [address] [place], [place] [id number] [phone]                 Patient Instructions:   Diet cardiac  Activity as tolerated  Routine  post-PCI instructions given    Medications:  Reconciled Home Medications:   Current Discharge Medication List      CONTINUE these medications which have NOT CHANGED    Details   albuterol (PROVENTIL/VENTOLIN) 90 mcg/actuation inhaler Inhale 2 puffs into the lungs every 6 (six) hours as needed for Wheezing.  Qty: 1 each, Refills: 3      amlodipine (NORVASC) 5 MG tablet Take 1 tablet (5 mg total) by mouth once daily.  Qty: 30 tablet, Refills: 3      aspirin (ECOTRIN) 81 MG EC tablet Take 81 mg by mouth once daily.      atorvastatin (LIPITOR) 40 MG tablet Take 1 tablet (40 mg total) by mouth once daily.  Qty: 90 tablet, Refills: 3      clopidogrel (PLAVIX) 75 mg tablet Take 1 tablet (75 mg total) by mouth once daily.  Qty: 30 tablet, Refills: 11      fluticasone-salmeterol 250-50 mcg/dose (ADVAIR) 250-50 mcg/dose diskus inhaler Inhale 1 puff into the lungs 2 (two) times daily.  Qty: 60 each, Refills: 2      lisinopril (PRINIVIL,ZESTRIL) 20 MG tablet Take 1 tablet (20 mg total) by mouth once daily.  Qty: 30 tablet, Refills: 3      metoprolol succinate (TOPROL-XL) 25 MG 24 hr tablet Take 1 tablet (25 mg total) by mouth once daily.  Qty: 30 tablet, Refills: 3      albuterol 90 mcg/actuation inhaler Inhale 1-2 puffs into the lungs every 6 (six) hours as needed for Wheezing. Rescue  Qty: 1 Inhaler, Refills: 0             Significant Diagnostic Studies: Labs:   BMP:   Recent Labs  Lab 11/16/17  0529   *   *   K 4.2   CL 98   CO2 28   BUN 12   CREATININE 0.9   CALCIUM 9.5       Pending Diagnostic Studies:     None        Indwelling Lines/Drains at time of discharge:   Lines/Drains/Airways          No matching active lines, drains, or airways          Time spent on the discharge of patient: 30 minutes  Patient was seen and examined on the date of discharge and determined to be suitable for discharge.         Epi Umana MD  Department of Hospital Medicine  [facility]

## 2017-11-16 NOTE — PLAN OF CARE
Problem: Cardiac: ACS (Acute Coronary Syndrome) (Adult)  Intervention: Monitor ECG for Changes/Signs of Ischemia   11/15/17 1945 11/16/17 0429   ECG   Rhythm --  normal sinus rhythm   DC Interval (Sec) 0.14 --    QRS Interval (Sec) 0.11 --    T-Wave --  normal   Vitals   Pulse --  70     Intervention: Prevent/Manage Thrombi/Emboli   11/16/17 0429   Minimize Embolism Risk   VTE Prevention/Management bleeding risk assessed;remove, assess skin and reapply sequential compression device   Safety Interventions   Bleeding Precautions blood pressure closely monitored;coagulation study results reviewed         Comments: Pt is s/p LHC. RT RADIAL wrist  dsg is intact and dry.no s/s of impaired circulation.pt has been free of chest pains this shift.

## 2017-11-16 NOTE — PROGRESS NOTES
Pt care care assumed, pt sitting at bedside talking with sister at bedside. Pt  Has no c/o of pain or any discomfort . Telemetry monitor in use with alarm. dsg to rt radial wrist area is intact and dry with strong radial pulse. No s/s of impaired circulation.pt personal items at bedside  And safety maintained with bed low and side rails up x2. Nurse call bell is within reach.pt and sister informed of md orders.

## 2017-11-16 NOTE — PLAN OF CARE
[facility] - [place] - [address]  [address]  [place] [address]  Phone: [phone] Fax: [phone]    [facility] - [place] - [address] [facility] [address] [facility] [address]  [address]  [place] [address]  Phone: [phone] Fax: [phone]       11/16/17 1006   Discharge Assessment   Assessment Type Discharge Planning Assessment   Confirmed/corrected address and phone number on facesheet? Yes   Assessment information obtained from? Patient   Prior to hospitilization cognitive status: Alert/Oriented   Prior to hospitalization functional status: Independent   Current cognitive status: Alert/Oriented   Current Functional Status: Independent   Lives With spouse;child(patricia), adult   Able to Return to Prior Arrangements yes   Is patient able to care for self after discharge? Yes   Who are your caregiver(s) and their phone number(s)? Spouse   Patient's perception of discharge disposition home or selfcare   Readmission Within The Last 30 Days no previous admission in last 30 days   Equipment Currently Used at Home none   Do you have any problems affording any of your prescribed medications? No   Is the patient taking medications as prescribed? yes   Does the patient have transportation home? Yes   Transportation Available family or friend will provide   Discharge Plan A Home   Discharge Plan B Home   Patient/Family In Agreement With Plan yes   Does the patient have transportation to healthcare appointments? Yes

## 2017-11-16 NOTE — NURSING
Patient complains of headache 7/10, BP elevated 175/98. MD Umana notified, new orders given and acknowledged.

## 2017-11-16 NOTE — PLAN OF CARE
Problem: Fall Risk (Adult)  Intervention: Monitor/Assist with Self Care   17   Daily Care Interventions   Self-Care Promotion independence encouraged --    Activity   Activity Assistance Provided --  assistance, stand-by     Intervention: Reduce Risk/Promote Restraint Free Environment   17   Safety Interventions   Safety Precautions emergency equipment at bedside   Safety Interventions   Environmental Safety Modification assistive device/personal items within reach;clutter free environment maintained;lighting adjusted;room near unit station;room organization consistent   Prevent Paxton Drop/Fall   Safety/Security Measures bed alarm set     Intervention: Review Medications/Identify Contributors to Fall Risk   17   Safety Interventions   Medication Review/Management medications reviewed     Intervention: Patient Rounds   17   Safety Interventions   Patient Rounds bed in low position;bed wheels locked;call light in reach;clutter free environment maintained;ID band on;placement of personal items at bedside;toileting offered;visualized patient     Intervention: Safety Promotion/Fall Prevention   17   Safety Interventions   Safety Promotion/Fall Prevention assistive device/personal item within reach;bed alarm set;commode/urinal/bedpan at bedside;Fall Risk reviewed with patient/family;lighting adjusted;medications reviewed;nonskid shoes/socks when out of bed;upper side rails raised x 2, lower siderails raised x 1 (Peds only);instructed to call staff for mobility       Goal: Identify Related Risk Factors and Signs and Symptoms  Related risk factors and signs and symptoms are identified upon initiation of Human Response Clinical Practice Guideline (CPG)   Outcome: Ongoing (interventions implemented as appropriate)   17   Fall Risk   Related Risk Factors (Fall Risk) polypharmacy;environment unfamiliar   Signs and Symptoms (Fall Risk) presence of  risk factors     Goal: Absence of Falls  Patient will demonstrate the desired outcomes by discharge/transition of care.   Outcome: Ongoing (interventions implemented as appropriate)   11/16/17 0911   Fall Risk (Adult)   Absence of Falls making progress toward outcome       Problem: Cardiac Catheterization (Diagnostic/Interventional) (Adult)  Intervention: Monitor ECG and Peripheral Pulses   11/16/17 0900   ECG   Lead Monitored Lead II   Rhythm sinus bradycardia     Intervention: Prevent/Manage Pain   11/16/17 0911   Pain/Comfort/Sleep Interventions   Pain Management Interventions medication offered but refused;pain management plan reviewed with patient/caregiver     Intervention: Prevent/Manage Vascular Access Site Complications   11/16/17 0429 11/16/17 0900   Safety Interventions   Bleeding Precautions blood pressure closely monitored;coagulation study results reviewed --    Activity   Activity Type --  activity adjusted per tolerance;activity clustered for rest period   Positioning   Head of Bed (HOB) --  HOB at 30-45 degrees     Intervention: Maintain Extremity in Straight Position Post Procedure   11/16/17 0900   Positioning   Body Position positioned/repositioned independently       Goal: Signs and Symptoms of Listed Potential Problems Will be Absent, Minimized or Managed (Cardiac Catheterization)  Signs and symptoms of listed potential problems will be absent, minimized or managed by discharge/transition of care (reference Cardiac Catheterization (Diagnostic/Interventional) (Adult) CPG).   Outcome: Ongoing (interventions implemented as appropriate)   11/16/17 0911   Cardiac Catheterization (Diagnostic/Interventional)   Problems Assessed (Cardiac Catheterization) all   Problems Present (Cardiac Catheterization) none     Goal: Anesthesia/Sedation Recovery  Outcome: Outcome(s) achieved Date Met: 11/16/17 11/16/17 0911   Goal/Outcome Evaluation   Anesthesia/Sedation Recovery criteria met for discharge        Comments: Patient no no complaints, no acute distress. Right radial dressing to angiogram site remains clean, dry, intact. Patient denies pain. Patients blood glucose level elevated, DR notified. Patient educated on importance of tobacco cessation, provided with written materials on tobacco cessation. Patient verbalized understanding.

## 2017-11-16 NOTE — PROGRESS NOTES
WRITTEN HEALTHCARE DISCHARGE INFORMATION     Things that YOU are responsible for to Manage Your Care At Home:  1. Getting your prescriptions filled.  2. Taking you medications as directed. DO NOT MISS ANY DOSES!  3. Going to your follow-up doctor appointments. This is important because it allows the doctor to monitor your progress and to determine if any changes need to be made to your treatment plan.    If you are unable to make your follow up appointments, please call the number listed and reschedule this appointment.     After discharge, if you need assistance, you can call [facility] for 24/7 assistance at [phone]    Thank you for choosing [facility] and allowing us to care for you.   From your care management team: Sil SANCHES,RSW & Malu MAGUIRE RN,TN [phone] or [phone]     You should receive a call from [facility] within 48-72 hours to help manage your care after discharge. Please try to make sure that you answer your phone for this important phone call.     Follow-up Information     Epi Umana MD. Go on 11/21/2017.    Specialties:  INTERVENTIONAL CARDIOLOGY, Cardiology  Why:  For Appointment on Tuesday 11/21/2017 @ 3:40PM  Contact information:  [address] [place] [address] [id number] [phone]

## 2017-11-16 NOTE — NURSING
Discharge instructions given to patient and spouse at bedside. Patient verbalized understanding of instructions. Patient states willingness to comply. Saline lock removed. Tele monitoring removed.

## 2017-11-16 NOTE — PLAN OF CARE
"SW met with pt at bedside. SW reviewed discharge education sheet "Stenting" with pt. Pt voiced understanding. Teachback method applied with pt. SW also reviewed with pt what she is responsible for in order to manage her health at home.     1) Getting medications taking from pharmacy.  2) Taking medications as prescribed.  3) Making Appointments that are scheduled.    Pt verbalized understanding. Pt cleared to d/c from CM standpoint. CHARLI Langston notified.       11/16/17 1007   Final Note   Assessment Type Final Discharge Note   Discharge Disposition Home   What phone number can be called within the next 1-3 days to see how you are doing after discharge? [phone]   Hospital Follow Up  Appt(s) scheduled? Yes   Discharge plans and expectations educations in teach back method with documentation complete? Yes   Right Care Referral Info   Post Acute Recommendation No Care       "

## 2017-11-16 NOTE — PLAN OF CARE
Problem: Cardiac Catheterization (Diagnostic/Interventional) (Adult)  Goal: Signs and Symptoms of Listed Potential Problems Will be Absent, Minimized or Managed (Cardiac Catheterization)  Signs and symptoms of listed potential problems will be absent, minimized or managed by discharge/transition of care (reference Cardiac Catheterization (Diagnostic/Interventional) (Adult) CPG).   Outcome: Ongoing (interventions implemented as appropriate)   11/15/17 1942   Cardiac Catheterization (Diagnostic/Interventional)   Problems Assessed (Cardiac Catheterization) all       Problem: Cardiac: ACS (Acute Coronary Syndrome) (Adult)  Goal: Signs and Symptoms of Listed Potential Problems Will be Absent, Minimized or Managed (Cardiac: ACS)  Signs and symptoms of listed potential problems will be absent, minimized or managed by discharge/transition of care (reference Cardiac: ACS (Acute Coronary Syndrome) (Adult) CPG).  Outcome: Ongoing (interventions implemented as appropriate)   11/15/17 1942   Cardiac: ACS (Acute Coronary Syndrome)   Problems Assessed (Acute Coronary Syndrome) all       Comments: Pt post cath with right radial site, vasc band air releases completed. Pt w/o c/o pain. No hematoma, no bleeding noted. Pt up and ambulating without difficulty. PRN oxygen (oxygen home use). BM today.

## 2017-11-21 ENCOUNTER — OFFICE VISIT (OUTPATIENT)
Dept: CARDIOLOGY | Facility: CLINIC | Age: 62
End: 2017-11-21
Payer: MEDICARE

## 2017-11-21 VITALS
BODY MASS INDEX: 33.45 KG/M2 | SYSTOLIC BLOOD PRESSURE: 145 MMHG | HEART RATE: 76 BPM | DIASTOLIC BLOOD PRESSURE: 93 MMHG | WEIGHT: 207.25 LBS | OXYGEN SATURATION: 99 %

## 2017-11-21 DIAGNOSIS — Z72.0 TOBACCO ABUSE: ICD-10-CM

## 2017-11-21 DIAGNOSIS — I25.10 CORONARY ARTERY DISEASE INVOLVING NATIVE CORONARY ARTERY OF NATIVE HEART WITHOUT ANGINA PECTORIS: Primary | ICD-10-CM

## 2017-11-21 DIAGNOSIS — J44.9 CHRONIC OBSTRUCTIVE PULMONARY DISEASE, UNSPECIFIED COPD TYPE: ICD-10-CM

## 2017-11-21 DIAGNOSIS — E78.5 DYSLIPIDEMIA: ICD-10-CM

## 2017-11-21 DIAGNOSIS — I50.22 CHRONIC SYSTOLIC CONGESTIVE HEART FAILURE: ICD-10-CM

## 2017-11-21 DIAGNOSIS — I10 ESSENTIAL HYPERTENSION: ICD-10-CM

## 2017-11-21 PROCEDURE — 99999 PR PBB SHADOW E&M-EST. PATIENT-LVL III: CPT | Mod: PBBFAC,,, | Performed by: INTERNAL MEDICINE

## 2017-11-21 PROCEDURE — 99214 OFFICE O/P EST MOD 30 MIN: CPT | Mod: S$GLB,,, | Performed by: INTERNAL MEDICINE

## 2017-11-21 NOTE — PROGRESS NOTES
Subjective:    Patient ID:  Abelardo Irene Jr. is a 61 y.o. male who presents for follow-up of Post-op Evaluation      HPI   previous history:  For follow-up of systolic heart failure and coronary artery disease.  He's found to have multivessel coronary artery disease on previous angiography.  This included a critical in-stent restenosis of the proximal LAD stent previously placed.  We recommended consideration for bypass with the multivessel disease.  Spoke with CT surgery Dr. Rivera and the plan is for reverse type procedure.  He's agreeable to do PCI.  He says weaned his smoking considerably.  He's mainly just a little scared of the open-heart procedure part.  He mainly has shortness of breath but no chest pain currently denies any PND, orthopnea or lower edema.  He's not expressing dizziness, presyncope or syncope.    Today:  Here for follow-up of systolic heart failure and coronary artery disease.  He underwent staged PCI of the RCA prior to reverse hybrid LIMA to LAD.  He denies any problems post-stenting.  He is agreeable to go back and see CT surgery for the remainder of his procedure.  He denies any current chest pain, shortness of breath or palpitations.  He's had no issues at the right radial access site.  He's not expressing PND, orthopnea or lower edema.  He denies any dizziness, presyncope or syncope.  He's not been smoking.  Otherwise he's been doing okay.      Review of Systems   Constitution: Negative.   HENT: Negative.    Eyes: Negative.    Cardiovascular: Positive for dyspnea on exertion. Negative for chest pain, irregular heartbeat, leg swelling, near-syncope, orthopnea, palpitations, paroxysmal nocturnal dyspnea and syncope.   Respiratory: Positive for shortness of breath.    Skin: Negative.    Musculoskeletal: Negative.    Gastrointestinal: Negative for abdominal pain, constipation and diarrhea.   Genitourinary: Negative for dysuria.   Neurological: Negative.    Psychiatric/Behavioral: Negative.          Objective:    Physical Exam   Constitutional: He is oriented to person, place, and time. He appears well-developed and well-nourished. No distress.   HENT:   Head: Normocephalic and atraumatic.   Eyes: Conjunctivae and EOM are normal. Pupils are equal, round, and reactive to light.   Neck: Normal range of motion. Neck supple. No thyromegaly present.   Cardiovascular: Normal rate, regular rhythm and normal heart sounds.    No murmur heard.  Pulmonary/Chest: No respiratory distress. He has no wheezes. He has no rales. He exhibits no tenderness.   Decreased breath sounds bilaterally   Abdominal: Soft. Bowel sounds are normal.   Musculoskeletal: He exhibits no edema.   Neurological: He is alert and oriented to person, place, and time.   Skin: Skin is warm and dry.   Psychiatric: He has a normal mood and affect. His behavior is normal.         Echo:  CONCLUSIONS     1 - Moderately depressed left ventricular systolic function (EF 30-35%).     2 - Eccentric hypertrophy.     3 - Impaired LV relaxation, normal LAP (grade 1 diastolic dysfunction).     4 - Mildly depressed right ventricular systolic function .     NST:  Impression: ABNORMAL MYOCARDIAL PERFUSION  1. There is evidence for mild myocardial ischemia in the anterior and lateral walls of the left ventricle.   2. The perfusion scan is free of evidence for myocardial injury.   3. Resting wall motion is physiologic.   4. There is resting LV dysfunction with a reduced ejection fraction of 33 %.   5. The ventricular volumes are normal at rest and stress.   6. The extracardiac distribution of radioactivity is normal.     LHC:  B. Summary/Post-Operative Diagnosis       Three vessel coronary artery disease.    Systolic dysfunction.    Mildly elevated left Filling Pressures.    Mild Pulmonary Hypertension.    Critical in-stent restenosis of proximal LAD.    D. Hemodynamic Results     Ejection Fraction: 30%  Wall Motion: hypokinetic in the inferior wall    Air  rest:  PW:  (14)  PA: 48  CO_THERM: 7.31  RV: 44  RA:  (13)  LVEDP: 21       E. Angiographic Results     Diagnostic:          Patient has a right dominant coronary artery.        - Left Main Coronary Artery:             The LM is normal. There is BRENDA 3 flow.     - Left Anterior Descending Artery:             The proximal LAD has a 80% stenosis within the stent. There is BRENDA 3 flow.     - Left Circumflex Artery:             The LCX has luminal irregularities. There is BRENDA 3 flow.     - OM2:             The OM2 is diffusely diseased. There is BRENDA 3 flow.     - Right Coronary Artery:             The mid RCA has a 80% stenosis. There is BRENDA 3 flow. Serial lesions through the mid section with diffuse disease of small vessel through the posterior lateral and PDA branches     - Common Femoral Artery:             The right CFA is normal.    G. Recommendations     1. Routine post-cath care.  2. Maximize medical management.  3. Follow up with Dr. Epi Umana.  4. Consult CV Surgery.    [facility]: 11-17  Diffuse disease throughout the vessel but there are serial 80-90% lesions in the mid which was reduced to 0% by placement of a 3.0 x 16 mm resolute drug-eluting stent.       Recommendation:  -Continue medical management  -Observed overnight for any complications  -We'll refer back to CT surgery for LIMA to LAD as an outpatient  (*Dictated report on CVIS not crossed over to Epic chart)    Assessment:       1. Coronary artery disease involving native coronary artery of native heart without angina pectoris    2. Chronic systolic congestive heart failure    3. Essential hypertension    4. Dyslipidemia    5. Chronic obstructive pulmonary disease, unspecified COPD type    6. Tobacco abuse         Plan:       -Optimize medical therapy for secondary prevention  -Discussed with CT surgery Dr. Rivera, plan for reverse hybrid procedure with PCI of RCA and then subsequently LIMA to LAD  -Encouraged tobacco cessation  -Add  Plavix    Return to clinic after surgery

## 2017-11-27 ENCOUNTER — OFFICE VISIT (OUTPATIENT)
Dept: CARDIOTHORACIC SURGERY | Facility: CLINIC | Age: 62
End: 2017-11-27
Payer: MEDICARE

## 2017-11-27 VITALS
TEMPERATURE: 98 F | BODY MASS INDEX: 33.17 KG/M2 | HEART RATE: 71 BPM | DIASTOLIC BLOOD PRESSURE: 99 MMHG | SYSTOLIC BLOOD PRESSURE: 157 MMHG | HEIGHT: 66 IN | OXYGEN SATURATION: 97 % | WEIGHT: 206.38 LBS

## 2017-11-27 DIAGNOSIS — I25.10 CORONARY ARTERY DISEASE, ANGINA PRESENCE UNSPECIFIED, UNSPECIFIED VESSEL OR LESION TYPE, UNSPECIFIED WHETHER NATIVE OR TRANSPLANTED HEART: Primary | ICD-10-CM

## 2017-11-27 DIAGNOSIS — I25.10 CORONARY ARTERY DISEASE INVOLVING NATIVE CORONARY ARTERY OF NATIVE HEART WITHOUT ANGINA PECTORIS: Primary | ICD-10-CM

## 2017-11-27 PROCEDURE — 99999 PR PBB SHADOW E&M-EST. PATIENT-LVL III: CPT | Mod: PBBFAC,,, | Performed by: THORACIC SURGERY (CARDIOTHORACIC VASCULAR SURGERY)

## 2017-11-27 PROCEDURE — 99214 OFFICE O/P EST MOD 30 MIN: CPT | Mod: S$GLB,,, | Performed by: THORACIC SURGERY (CARDIOTHORACIC VASCULAR SURGERY)

## 2017-11-27 NOTE — PROGRESS NOTES
61 year old male with multivessel coronary artery disease on previous angiography.  This included a critical in-stent restenosis of the proximal LAD stent previously placed.      Here for follow-up of systolic heart failure and coronary artery disease.  He underwent staged PCI of the RCA prior to reverse hybrid LIMA to LAD.  He denies any problems post-stenting. He denies any current chest pain, shortness of breath or palpitations. He denies PND, orthopnea or lower extremity edema.  He denies any dizziness, presyncope or syncope.  He says he quit smoking 2 weeks ago.    He to discuss PETTIT to LAD.    ROS:  Review of Systems   Constitutional: Negative for fever and malaise/fatigue.   HENT: Negative for congestion and sore throat.    Eyes: Negative for blurred vision, double vision and discharge.   Respiratory: Negative for cough, shortness of breath and wheezing.    Cardiovascular: Negative for chest pain, palpitations, claudication, leg swelling and PND.   Gastrointestinal: Negative for abdominal pain, constipation, diarrhea, nausea and vomiting.   Genitourinary: Negative for dysuria, frequency and urgency.   Musculoskeletal: Negative for back pain and myalgias.   Skin: Negative for itching and rash.   Neurological: Negative for dizziness, speech change, seizures, weakness and headaches.   Endo/Heme/Allergies: Does not bruise/bleed easily.   Psychiatric/Behavioral: Negative for depression. The patient is not nervous/anxious.        PE:  Physical Exam   Constitutional: Patient appears well-developed and well-nourished.   HENT:   Head: Normocephalic and atraumatic.   Eyes: Pupils are equal, round, and reactive to light.   Neck: Normal range of motion. Neck supple.   Cardiovascular: Normal rate, regular rhythm and normal heart sounds.    Pulmonary/Chest: Effort normal and breath sounds normal.   Abdominal: Soft. Bowel sounds are normal.   Musculoskeletal: Normal range of motion.   Neurological: Alert and oriented to  person, place, and time.   Skin: Skin is warm and dry.   Psychiatric: Normal mood and affect. Behavior is normal.       Data:  Last echo was 7/2017 and his EF was 30% with an LVEDD of 6.4    Underwent  PCI to RCA    PFt'S  fev1 1.19/41%    Assessment:  CAD  COPD    Plan:  CT of the chest, noncontrast  Needs smoking cessation  F/u once completed

## 2017-11-28 ENCOUNTER — OFFICE VISIT (OUTPATIENT)
Dept: PULMONOLOGY | Facility: CLINIC | Age: 62
End: 2017-11-28
Payer: MEDICARE

## 2017-11-28 VITALS
WEIGHT: 208.75 LBS | HEIGHT: 66 IN | BODY MASS INDEX: 33.55 KG/M2 | SYSTOLIC BLOOD PRESSURE: 128 MMHG | OXYGEN SATURATION: 94 % | DIASTOLIC BLOOD PRESSURE: 76 MMHG | HEART RATE: 71 BPM

## 2017-11-28 DIAGNOSIS — J44.9 CHRONIC OBSTRUCTIVE PULMONARY DISEASE, UNSPECIFIED COPD TYPE: Primary | ICD-10-CM

## 2017-11-28 DIAGNOSIS — I25.10 CORONARY ARTERY DISEASE, ANGINA PRESENCE UNSPECIFIED, UNSPECIFIED VESSEL OR LESION TYPE, UNSPECIFIED WHETHER NATIVE OR TRANSPLANTED HEART: Primary | ICD-10-CM

## 2017-11-28 PROCEDURE — 99204 OFFICE O/P NEW MOD 45 MIN: CPT | Mod: S$GLB,,, | Performed by: INTERNAL MEDICINE

## 2017-11-28 PROCEDURE — 99999 PR PBB SHADOW E&M-EST. PATIENT-LVL III: CPT | Mod: PBBFAC,,, | Performed by: INTERNAL MEDICINE

## 2017-11-28 NOTE — PROGRESS NOTES
Subjective:       Patient ID: Abelardo Irene Jr. is a 61 y.o. male.    Chief Complaint: No chief complaint on file.    HPI   Abelardo Irene Jr. 61 y.o. male    has a past medical history of CHF (congestive heart failure); COPD (chronic obstructive pulmonary disease); Coronary artery disease; Elevated cholesterol; and Hypertension.    has a past surgical history that includes Cardiac surgery and Coronary stent placement.   reports that he quit smoking about 3 weeks ago. His smoking use included Cigarettes. He has a 45.00 pack-year smoking history. He has never used smokeless tobacco. He reports that he drinks alcohol. He reports that he does not use drugs.  Referred by: Dr. Sumit Rivera  Who had no chief complaint listed for this encounter.  The patient's last visit with me was on Visit date not found.    Used to do tire work, now cannot  the rims  Can walk about a mile and a half prior to sob  Can do it in about 20 minutes  Cannot walk up a flight of stairs  Quit smoking recently   No fever chills, +ns, wt changes, nausea, vomiting, diarrhea, constipation, chest pain, tightness, pressure   No prior lung problems  Has dx of emphysema prior  Using advair once daily  Using proair prn  Notes wheezing occ  No LUIZ  Not walking much right now  Plan for surgery around January 18/2018  Review of Systems   All other systems reviewed and are negative.      Objective:      Physical Exam   Constitutional: He is oriented to person, place, and time. He appears well-developed and well-nourished. He appears not cachectic. No distress.   HENT:   Head: Normocephalic.   Nose: Nose normal. No mucosal edema.   Mouth/Throat: Oropharynx is clear and moist. Normal dentition. No oropharyngeal exudate.   Neck: Normal range of motion. Neck supple.   Cardiovascular: Normal rate, regular rhythm, normal heart sounds and intact distal pulses.  Exam reveals no gallop and no friction rub.    No murmur heard.  Pulmonary/Chest: Effort normal and  breath sounds normal. No stridor.   Abdominal: Soft. Bowel sounds are normal. He exhibits no distension.   Musculoskeletal: Normal range of motion. He exhibits no edema or tenderness.   Lymphadenopathy: No supraclavicular adenopathy is present.     He has no cervical adenopathy.   Neurological: He is alert and oriented to person, place, and time. Gait normal.   Skin: Skin is warm and dry. No rash noted. He is not diaphoretic. No cyanosis or erythema. No pallor. Nails show no clubbing.   Psychiatric: He has a normal mood and affect. His behavior is normal. Judgment and thought content normal.   Nursing note and vitals reviewed.    Personal Diagnostic Review    No flowsheet data found.      Assessment:       No diagnosis found.    Outpatient Encounter Prescriptions as of 11/28/2017   Medication Sig Dispense Refill    albuterol 90 mcg/actuation inhaler Inhale 1-2 puffs into the lungs every 6 (six) hours as needed for Wheezing. Rescue 1 Inhaler 0    amlodipine (NORVASC) 5 MG tablet Take 1 tablet (5 mg total) by mouth once daily. 30 tablet 3    aspirin (ECOTRIN) 81 MG EC tablet Take 81 mg by mouth once daily.      atorvastatin (LIPITOR) 40 MG tablet Take 1 tablet (40 mg total) by mouth once daily. 90 tablet 3    clopidogrel (PLAVIX) 75 mg tablet Take 1 tablet (75 mg total) by mouth once daily. 30 tablet 11    fluticasone-salmeterol 250-50 mcg/dose (ADVAIR) 250-50 mcg/dose diskus inhaler Inhale 1 puff into the lungs 2 (two) times daily. 60 each 2    lisinopril (PRINIVIL,ZESTRIL) 20 MG tablet Take 1 tablet (20 mg total) by mouth once daily. 30 tablet 3    metoprolol succinate (TOPROL-XL) 25 MG 24 hr tablet Take 1 tablet (25 mg total) by mouth once daily. 30 tablet 3    albuterol (PROVENTIL/VENTOLIN) 90 mcg/actuation inhaler Inhale 2 puffs into the lungs every 6 (six) hours as needed for Wheezing. 1 each 3     No facility-administered encounter medications on file as of 11/28/2017.      No orders of the defined  types were placed in this encounter.    Plan:           I personally reviewed the      1. Echo report   2. PFT moderate obstruction  3. CXR   4. CXR report   5. CT chest   6. CT chest report     Assessment:  Diagnoses and all orders for this visit:    Chronic obstructive pulmonary disease, unspecified COPD type  -     LUNG VOLUMES; Future  -     DLCO-Carbon Monoxide Diffusing Capacity; Future  -     Stress test, pulmonary; Future  -     Spirometry without Bronchodilator; Future    Other orders  -     umeclidinium (INCRUSE ELLIPTA) 62.5 mcg/actuation DsDv; Inhale 62.5 mcg into the lungs once daily. Controller        Plan:  As below- increase advair  Start incruse  Prn albuterol  Daily exercise  Repeat studies in 2 weeks    Return in about 2 weeks (around 12/12/2017).    Patient Instructions   Take advair 1 puff twice a day- rinse mouth after use    Start incruse- 1 puff daily    Use albuterol as needed    Walk 30minutes every day    Come back for other breathing studies and walk test in about 2 weeks      There is no immunization history for the selected administration types on file for this patient.

## 2017-11-28 NOTE — LETTER
November 28, 2017      Sumit Rivera MD  [address] [facility] [address]           [facility]  [address]  [place] [address]  Phone: [phone]          Patient: Abelardo Irene Jr.   MR Number: [id number]   YOB: 1955   Date of Visit: 11/28/2017       Dear Dr. Sumit Rivera:    Thank you for referring Abelardo Irene to me for evaluation. Attached you will find relevant portions of my assessment and plan of care.    If you have questions, please do not hesitate to call me. I look forward to following Abelardo Irene along with you.    Sincerely,    Garrett Boyer MD    Enclosure  CC:  No Recipients    If you would like to receive this communication electronically, please contact [email or web address] or [phone] to request more information on [employer] Link access.    For providers and/or their staff who would like to refer a patient to [facility], please contact us through our one-stop-shop provider referral line, [facility], at [phone].    If you feel you have received this communication in error or would no longer like to receive these types of communications, please e-mail [email or web address]

## 2017-11-28 NOTE — PATIENT INSTRUCTIONS
Take advair 1 puff twice a day- rinse mouth after use    Start incruse- 1 puff daily    Use albuterol as needed    Walk 30minutes every day    Come back for other breathing studies and walk test in about 2 weeks

## 2017-12-20 ENCOUNTER — HOSPITAL ENCOUNTER (OUTPATIENT)
Dept: PULMONOLOGY | Facility: CLINIC | Age: 62
Discharge: HOME | End: 2017-12-20
Payer: MEDICARE

## 2017-12-20 ENCOUNTER — OFFICE VISIT (OUTPATIENT)
Dept: PULMONOLOGY | Facility: CLINIC | Age: 62
End: 2017-12-20
Payer: MEDICARE

## 2017-12-20 VITALS
DIASTOLIC BLOOD PRESSURE: 88 MMHG | BODY MASS INDEX: 33.11 KG/M2 | WEIGHT: 206 LBS | SYSTOLIC BLOOD PRESSURE: 144 MMHG | HEIGHT: 66 IN | OXYGEN SATURATION: 95 % | HEART RATE: 78 BPM

## 2017-12-20 VITALS — HEIGHT: 66 IN | BODY MASS INDEX: 33.11 KG/M2 | WEIGHT: 206 LBS

## 2017-12-20 DIAGNOSIS — J44.9 CHRONIC OBSTRUCTIVE PULMONARY DISEASE, UNSPECIFIED COPD TYPE: ICD-10-CM

## 2017-12-20 DIAGNOSIS — Z72.0 TOBACCO ABUSE: Primary | ICD-10-CM

## 2017-12-20 LAB
PRE FEV1 FVC: 72
PRE FEV1: 1.57
PRE FVC: 2.19
PREDICTED FEV1 FVC: 81
PREDICTED FEV1: 2.7
PREDICTED FVC: 3.32

## 2017-12-20 PROCEDURE — 94010 BREATHING CAPACITY TEST: CPT | Mod: S$GLB,,, | Performed by: INTERNAL MEDICINE

## 2017-12-20 PROCEDURE — 99999 PR PBB SHADOW E&M-EST. PATIENT-LVL III: CPT | Mod: PBBFAC,,, | Performed by: INTERNAL MEDICINE

## 2017-12-20 PROCEDURE — 94729 DIFFUSING CAPACITY: CPT | Mod: S$GLB,,, | Performed by: INTERNAL MEDICINE

## 2017-12-20 PROCEDURE — 94620 PR PULMONARY STRESS TESTING,SIMPLE: CPT | Mod: S$GLB,,, | Performed by: INTERNAL MEDICINE

## 2017-12-20 PROCEDURE — 94727 GAS DIL/WSHOT DETER LNG VOL: CPT | Mod: S$GLB,,, | Performed by: INTERNAL MEDICINE

## 2017-12-20 PROCEDURE — 99214 OFFICE O/P EST MOD 30 MIN: CPT | Mod: 25,S$GLB,, | Performed by: INTERNAL MEDICINE

## 2017-12-20 RX ORDER — FLUTICASONE PROPIONATE AND SALMETEROL 250; 50 UG/1; UG/1
1 POWDER RESPIRATORY (INHALATION) 2 TIMES DAILY
Qty: 60 EACH | Refills: 2 | Status: SHIPPED | OUTPATIENT
Start: 2017-12-20 | End: 2017-12-20 | Stop reason: SDUPTHER

## 2017-12-20 RX ORDER — FLUTICASONE PROPIONATE AND SALMETEROL 250; 50 UG/1; UG/1
1 POWDER RESPIRATORY (INHALATION) 2 TIMES DAILY
Qty: 60 EACH | Refills: 2 | Status: ON HOLD | OUTPATIENT
Start: 2017-12-20 | End: 2021-02-14

## 2017-12-20 NOTE — PROGRESS NOTES
Subjective:       Patient ID: Abelardo Irene Jr. is a 62 y.o. male.    Chief Complaint: COPD and Pre-op Exam    HPI   Abelardo Irene Jr. 62 y.o. male    has a past medical history of CHF (congestive heart failure); COPD (chronic obstructive pulmonary disease); Coronary artery disease; Elevated cholesterol; and Hypertension.    has a past surgical history that includes Cardiac surgery and Coronary stent placement.   reports that he quit smoking about 6 weeks ago. His smoking use included Cigarettes. He has a 90.00 pack-year smoking history. He has never used smokeless tobacco. He reports that he drinks alcohol. He reports that he does not use drugs.  Referred by: No ref. provider found  Who had concerns including COPD and Pre-op Exam.  The patient's last visit with me was on 11/28/2017.    Doing well, has quit smoking, but not exercising much  Notes some chest pain occ, but improved sob  No fever chills, ns, wt changes, nausea, vomiting, diarrhea, constipation, chest pain, tightness, pressure    Review of Systems    Objective:      Physical Exam  Personal Diagnostic Review    No flowsheet data found.      Assessment:       1. Tobacco abuse    2. Chronic obstructive pulmonary disease, unspecified COPD type        Outpatient Encounter Prescriptions as of 12/20/2017   Medication Sig Dispense Refill    albuterol 90 mcg/actuation inhaler Inhale 1-2 puffs into the lungs every 6 (six) hours as needed for Wheezing. Rescue 1 Inhaler 0    amlodipine (NORVASC) 5 MG tablet Take 1 tablet (5 mg total) by mouth once daily. 30 tablet 3    aspirin (ECOTRIN) 81 MG EC tablet Take 81 mg by mouth once daily.      atorvastatin (LIPITOR) 40 MG tablet Take 1 tablet (40 mg total) by mouth once daily. 90 tablet 3    clopidogrel (PLAVIX) 75 mg tablet Take 1 tablet (75 mg total) by mouth once daily. 30 tablet 11    fluticasone-salmeterol 250-50 mcg/dose (ADVAIR) 250-50 mcg/dose diskus inhaler Inhale 1 puff into the lungs 2 (two) times daily.  60 each 2    lisinopril (PRINIVIL,ZESTRIL) 20 MG tablet Take 1 tablet (20 mg total) by mouth once daily. 30 tablet 3    metoprolol succinate (TOPROL-XL) 25 MG 24 hr tablet Take 1 tablet (25 mg total) by mouth once daily. 30 tablet 3    umeclidinium (INCRUSE ELLIPTA) 62.5 mcg/actuation DsDv Inhale 62.5 mcg into the lungs once daily. Controller 1 each 5    [DISCONTINUED] fluticasone-salmeterol 250-50 mcg/dose (ADVAIR) 250-50 mcg/dose diskus inhaler Inhale 1 puff into the lungs 2 (two) times daily. 60 each 2    [DISCONTINUED] fluticasone-salmeterol 250-50 mcg/dose (ADVAIR) 250-50 mcg/dose diskus inhaler Inhale 1 puff into the lungs 2 (two) times daily. 60 each 2    albuterol (PROVENTIL/VENTOLIN) 90 mcg/actuation inhaler Inhale 2 puffs into the lungs every 6 (six) hours as needed for Wheezing. 1 each 3     No facility-administered encounter medications on file as of 12/20/2017.      No orders of the defined types were placed in this encounter.    Plan:          I personally reviewed the      1. PFT signfiicant improvement in pfts from last set- 20% increase in fev1  2. 6MWD 400+ m, no desaturation  3. CXR   4. CXR report     Assessment:  Abelardo was seen today for copd and pre-op exam.    Diagnoses and all orders for this visit:    Tobacco abuse    Chronic obstructive pulmonary disease, unspecified COPD type    Other orders  -     Discontinue: fluticasone-salmeterol 250-50 mcg/dose (ADVAIR) 250-50 mcg/dose diskus inhaler; Inhale 1 puff into the lungs 2 (two) times daily.  -     fluticasone-salmeterol 250-50 mcg/dose (ADVAIR) 250-50 mcg/dose diskus inhaler; Inhale 1 puff into the lungs 2 (two) times daily.        Plan:  As below  advair and incruse  Prn albuterol  Continue smoking cessation    Return in about 3 months (around 3/20/2018).    Patient Instructions   Start advair 1 puff twice a day, rinse mouth after use    Continue incruse- 1 puff daily    Use albuterol as needed     Walk 30minutes every  day      There is no immunization history for the selected administration types on file for this patient.

## 2017-12-20 NOTE — PATIENT INSTRUCTIONS
Start advair 1 puff twice a day, rinse mouth after use    Continue incruse- 1 puff daily    Use albuterol as needed     Walk 30minutes every day

## 2017-12-20 NOTE — PROCEDURES
Abelardo Irene Jr. is a 62 y.o.  male patient, who presents for a 6 minute walk test ordered by Radha Boyer MD.  The diagnosis is COPD/Emphysema.  The patient's BMI is 33.3 kg/m2.  Predicted distance (lower limit of normal) is 376.85 meters.      Test Results:    The test was completed without stopping.  The total time walked was 360 seconds.  During walking, the patient reported:  Calf pain, Dyspnea, Leg pain.  The patient used no assistive devices during testing.     12/20/2017---------Distance: 426.72 meters (1400 feet)     O2 Sat % Supplemental Oxygen Heart Rate Blood Pressure Unruly Scale   Pre-exercise  (Resting) 95 % Room Air 78 bpm 144/88 mmHg 0   During Exercise 92 % Room Air 91 bpm 157/95 mmHg 1   Post-exercise  (Recovery) 97 % Room Air  79 bpm       Recovery Time:  129 seconds    Performing nurse/tech:  JOSH Rome.      PREVIOUS STUDY:   The patient has not had a previous study.      CLINICAL INTERPRETATION:  Six minute walk distance is 426.72 meters (1400 feet) with very light dyspnea.  During exercise, there was significant desaturation while breathing room air.  Blood pressure increased significantly and Heart rate remained stable with walking.  The patient reported non-pulmonary symptoms during exercise.  No previous study performed.  Based upon age and body mass index, exercise capacity is normal.

## 2017-12-22 ENCOUNTER — TELEPHONE (OUTPATIENT)
Dept: CARDIOTHORACIC SURGERY | Facility: CLINIC | Age: 62
End: 2017-12-22

## 2017-12-22 DIAGNOSIS — I25.10 CORONARY ARTERY DISEASE, ANGINA PRESENCE UNSPECIFIED, UNSPECIFIED VESSEL OR LESION TYPE, UNSPECIFIED WHETHER NATIVE OR TRANSPLANTED HEART: Primary | ICD-10-CM

## 2017-12-22 NOTE — TELEPHONE ENCOUNTER
Attempted to contact patient x2 concerning missed pre-op appointments on 12/18. Left voicemail message. Appointments have been rescheduled for January 8th, appointment slip mailed to patient.  Will attempt to contact patient again later today.

## 2018-01-08 ENCOUNTER — TELEPHONE (OUTPATIENT)
Dept: CARDIOTHORACIC SURGERY | Facility: CLINIC | Age: 63
End: 2018-01-08

## 2018-01-08 NOTE — TELEPHONE ENCOUNTER
----- Message from Bahman Sandhu sent at 1/8/2018  8:29 AM CST -----  Patient states that (s)he needs to speak with nurse in ref to re-scheduling an appt//please call back at [phone]//thank you

## 2018-01-22 ENCOUNTER — OFFICE VISIT (OUTPATIENT)
Dept: CARDIOTHORACIC SURGERY | Facility: CLINIC | Age: 63
End: 2018-01-22
Attending: THORACIC SURGERY (CARDIOTHORACIC VASCULAR SURGERY)
Payer: MEDICARE

## 2018-01-22 ENCOUNTER — HOSPITAL ENCOUNTER (OUTPATIENT)
Dept: RADIOLOGY | Facility: HOSPITAL | Age: 63
Discharge: HOME | End: 2018-01-22
Attending: THORACIC SURGERY (CARDIOTHORACIC VASCULAR SURGERY)
Payer: MEDICARE

## 2018-01-22 ENCOUNTER — HOSPITAL ENCOUNTER (OUTPATIENT)
Dept: CARDIOLOGY | Facility: CLINIC | Age: 63
Discharge: HOME | End: 2018-01-22
Attending: THORACIC SURGERY (CARDIOTHORACIC VASCULAR SURGERY)
Payer: MEDICARE

## 2018-01-22 ENCOUNTER — HOSPITAL ENCOUNTER (OUTPATIENT)
Dept: VASCULAR SURGERY | Facility: CLINIC | Age: 63
Discharge: HOME | End: 2018-01-22
Attending: THORACIC SURGERY (CARDIOTHORACIC VASCULAR SURGERY)
Payer: MEDICARE

## 2018-01-22 VITALS
SYSTOLIC BLOOD PRESSURE: 134 MMHG | HEART RATE: 87 BPM | OXYGEN SATURATION: 95 % | WEIGHT: 206.44 LBS | DIASTOLIC BLOOD PRESSURE: 87 MMHG | BODY MASS INDEX: 33.18 KG/M2 | HEIGHT: 66 IN | TEMPERATURE: 98 F

## 2018-01-22 DIAGNOSIS — Z01.818 PRE-OP EXAM: ICD-10-CM

## 2018-01-22 DIAGNOSIS — I25.10 CORONARY ARTERY DISEASE, ANGINA PRESENCE UNSPECIFIED, UNSPECIFIED VESSEL OR LESION TYPE, UNSPECIFIED WHETHER NATIVE OR TRANSPLANTED HEART: ICD-10-CM

## 2018-01-22 DIAGNOSIS — I25.10 CORONARY ARTERY DISEASE INVOLVING NATIVE CORONARY ARTERY OF NATIVE HEART WITHOUT ANGINA PECTORIS: Primary | Chronic | ICD-10-CM

## 2018-01-22 LAB
DIASTOLIC DYSFUNCTION: YES
MITRAL VALVE MOBILITY: NORMAL
MITRAL VALVE REGURGITATION: ABNORMAL
RETIRED EF AND QEF - SEE NOTES: 35 (ref 55–65)

## 2018-01-22 PROCEDURE — 71046 X-RAY EXAM CHEST 2 VIEWS: CPT | Mod: 26,,, | Performed by: RADIOLOGY

## 2018-01-22 PROCEDURE — 99215 OFFICE O/P EST HI 40 MIN: CPT | Mod: S$GLB,,, | Performed by: THORACIC SURGERY (CARDIOTHORACIC VASCULAR SURGERY)

## 2018-01-22 PROCEDURE — 93880 EXTRACRANIAL BILAT STUDY: CPT | Mod: S$GLB,,, | Performed by: SURGERY

## 2018-01-22 PROCEDURE — 93306 TTE W/DOPPLER COMPLETE: CPT | Mod: S$GLB,,, | Performed by: INTERNAL MEDICINE

## 2018-01-22 PROCEDURE — 93000 ELECTROCARDIOGRAM COMPLETE: CPT | Mod: S$GLB,,, | Performed by: INTERNAL MEDICINE

## 2018-01-22 PROCEDURE — 71046 X-RAY EXAM CHEST 2 VIEWS: CPT | Mod: TC,FY

## 2018-01-22 PROCEDURE — 71250 CT THORAX DX C-: CPT | Mod: 26,,, | Performed by: RADIOLOGY

## 2018-01-22 PROCEDURE — 99999 PR PBB SHADOW E&M-EST. PATIENT-LVL III: CPT | Mod: PBBFAC,,, | Performed by: THORACIC SURGERY (CARDIOTHORACIC VASCULAR SURGERY)

## 2018-01-22 PROCEDURE — 71250 CT THORAX DX C-: CPT | Mod: TC

## 2018-01-22 RX ORDER — FUROSEMIDE 20 MG/1
20 TABLET ORAL 2 TIMES DAILY
Status: ON HOLD | COMMUNITY
End: 2021-02-14 | Stop reason: HOSPADM

## 2018-01-22 NOTE — PROGRESS NOTES
Subjective:       Patient ID: Abelardo Irene Jr. is a 62 y.o. male.    Chief Complaint: Follow-up    61 year old male with multivessel coronary artery disease on previous angiography.  This included a critical in-stent restenosis of the proximal LAD stent previously placed.       Here for follow-up of systolic heart failure and coronary artery disease.  He underwent staged PCI of the RCA prior to reverse hybrid LIMA to LAD.  He denies any problems post-stenting. He denies any current chest pain, shortness of breath or palpitations. He denies PND, orthopnea or lower extremity edema.  He denies any dizziness, presyncope or syncope.  He says he quit smoking. Here to discuss LIMA to LAD.      Review of Systems   Constitutional: Negative for activity change.   Respiratory: Negative for cough and shortness of breath.    Cardiovascular: Negative for chest pain, palpitations and leg swelling.   Gastrointestinal: Negative for abdominal pain.   Musculoskeletal: Negative for gait problem.   Neurological: Negative for dizziness, syncope and weakness.       Objective:      Physical Exam   Constitutional: He is oriented to person, place, and time. He appears well-developed and well-nourished.   Cardiovascular: Normal rate, regular rhythm and normal heart sounds.    Pulmonary/Chest: Effort normal and breath sounds normal.   Abdominal: Soft.   Neurological: He is alert and oriented to person, place, and time.   Skin: Skin is warm and dry.   Psychiatric: He has a normal mood and affect.       Assessment:   1. CAD  2. COPD  Plan:   Pt is still smoking but has cut back significantly, has poor lung function with FEV 1 of 30.  Recommend smoking cessation program. Talked to the patient about importance of stopping smoking. His lung function already puts him at extremely high risk for ventilator dependency and with continued smoking we would not be able to offer him surgery. Pt understands his decision and plan notified to Dr Umana who  also agree with the plan and offer the patient nothing more than medical therapy.

## 2021-02-13 ENCOUNTER — HOSPITAL ENCOUNTER (OUTPATIENT)
Facility: HOSPITAL | Age: 66
Discharge: HOME | End: 2021-02-14
Attending: EMERGENCY MEDICINE | Admitting: STUDENT IN AN ORGANIZED HEALTH CARE EDUCATION/TRAINING PROGRAM
Payer: MEDICARE

## 2021-02-13 DIAGNOSIS — I50.42 CHRONIC COMBINED SYSTOLIC AND DIASTOLIC HEART FAILURE: ICD-10-CM

## 2021-02-13 DIAGNOSIS — J44.1 COPD EXACERBATION: ICD-10-CM

## 2021-02-13 DIAGNOSIS — E78.5 DYSLIPIDEMIA: ICD-10-CM

## 2021-02-13 DIAGNOSIS — R07.89 CHEST TIGHTNESS: ICD-10-CM

## 2021-02-13 DIAGNOSIS — I10 BENIGN ESSENTIAL HYPERTENSION: ICD-10-CM

## 2021-02-13 DIAGNOSIS — J81.0 ACUTE PULMONARY EDEMA: ICD-10-CM

## 2021-02-13 DIAGNOSIS — R06.02 SOB (SHORTNESS OF BREATH): ICD-10-CM

## 2021-02-13 DIAGNOSIS — R06.02 SHORTNESS OF BREATH: ICD-10-CM

## 2021-02-13 DIAGNOSIS — E66.09 CLASS 1 OBESITY DUE TO EXCESS CALORIES WITH SERIOUS COMORBIDITY AND BODY MASS INDEX (BMI) OF 33.0 TO 33.9 IN ADULT: ICD-10-CM

## 2021-02-13 DIAGNOSIS — I25.10 CORONARY ARTERY DISEASE INVOLVING NATIVE CORONARY ARTERY OF NATIVE HEART WITHOUT ANGINA PECTORIS: ICD-10-CM

## 2021-02-13 DIAGNOSIS — Z72.0 TOBACCO ABUSE: ICD-10-CM

## 2021-02-13 DIAGNOSIS — I50.43 ACUTE ON CHRONIC COMBINED SYSTOLIC AND DIASTOLIC CONGESTIVE HEART FAILURE: Primary | ICD-10-CM

## 2021-02-13 PROBLEM — E66.811 CLASS 1 OBESITY DUE TO EXCESS CALORIES WITH SERIOUS COMORBIDITY IN ADULT: Chronic | Status: ACTIVE | Noted: 2021-02-13

## 2021-02-13 PROBLEM — J96.11 CHRONIC RESPIRATORY FAILURE WITH HYPOXIA: Chronic | Status: RESOLVED | Noted: 2017-07-28 | Resolved: 2021-02-13

## 2021-02-13 LAB
ALBUMIN SERPL BCP-MCNC: 4 G/DL (ref 3.5–5.2)
ALP SERPL-CCNC: 91 U/L (ref 55–135)
ALT SERPL W/O P-5'-P-CCNC: 14 U/L (ref 10–44)
ANION GAP SERPL CALC-SCNC: 10 MMOL/L (ref 8–16)
AST SERPL-CCNC: 13 U/L (ref 10–40)
BASOPHILS # BLD AUTO: 0.08 K/UL (ref 0–0.2)
BASOPHILS NFR BLD: 0.7 % (ref 0–1.9)
BILIRUB SERPL-MCNC: 0.7 MG/DL (ref 0.1–1)
BNP SERPL-MCNC: 964 PG/ML (ref 0–99)
BUN SERPL-MCNC: 7 MG/DL (ref 8–23)
CALCIUM SERPL-MCNC: 9.3 MG/DL (ref 8.7–10.5)
CHLORIDE SERPL-SCNC: 101 MMOL/L (ref 95–110)
CO2 SERPL-SCNC: 32 MMOL/L (ref 23–29)
CREAT SERPL-MCNC: 0.8 MG/DL (ref 0.5–1.4)
CTP QC/QA: YES
DIFFERENTIAL METHOD: ABNORMAL
EOSINOPHIL # BLD AUTO: 0.2 K/UL (ref 0–0.5)
EOSINOPHIL NFR BLD: 1.7 % (ref 0–8)
ERYTHROCYTE [DISTWIDTH] IN BLOOD BY AUTOMATED COUNT: 13.3 % (ref 11.5–14.5)
EST. GFR  (AFRICAN AMERICAN): >60 ML/MIN/1.73 M^2
EST. GFR  (NON AFRICAN AMERICAN): >60 ML/MIN/1.73 M^2
GLUCOSE SERPL-MCNC: 133 MG/DL (ref 70–110)
HCT VFR BLD AUTO: 43.8 % (ref 40–54)
HGB BLD-MCNC: 14 G/DL (ref 14–18)
IMM GRANULOCYTES # BLD AUTO: 0.03 K/UL (ref 0–0.04)
IMM GRANULOCYTES NFR BLD AUTO: 0.3 % (ref 0–0.5)
LYMPHOCYTES # BLD AUTO: 3.1 K/UL (ref 1–4.8)
LYMPHOCYTES NFR BLD: 25.7 % (ref 18–48)
MCH RBC QN AUTO: 28.5 PG (ref 27–31)
MCHC RBC AUTO-ENTMCNC: 32 G/DL (ref 32–36)
MCV RBC AUTO: 89 FL (ref 82–98)
MONOCYTES # BLD AUTO: 0.7 K/UL (ref 0.3–1)
MONOCYTES NFR BLD: 5.5 % (ref 4–15)
NEUTROPHILS # BLD AUTO: 7.9 K/UL (ref 1.8–7.7)
NEUTROPHILS NFR BLD: 66.1 % (ref 38–73)
NRBC BLD-RTO: 0 /100 WBC
PLATELET # BLD AUTO: 239 K/UL (ref 150–350)
PMV BLD AUTO: 10.3 FL (ref 9.2–12.9)
POTASSIUM SERPL-SCNC: 4.5 MMOL/L (ref 3.5–5.1)
PROT SERPL-MCNC: 7.6 G/DL (ref 6–8.4)
RBC # BLD AUTO: 4.91 M/UL (ref 4.6–6.2)
SARS-COV-2 RDRP RESP QL NAA+PROBE: NEGATIVE
SODIUM SERPL-SCNC: 143 MMOL/L (ref 136–145)
TROPONIN I SERPL DL<=0.01 NG/ML-MCNC: 0.01 NG/ML (ref 0–0.03)
TROPONIN I SERPL DL<=0.01 NG/ML-MCNC: 0.02 NG/ML (ref 0–0.03)
TROPONIN I SERPL DL<=0.01 NG/ML-MCNC: 0.03 NG/ML (ref 0–0.03)
TSH SERPL DL<=0.005 MIU/L-ACNC: 2.08 UIU/ML (ref 0.4–4)
WBC # BLD AUTO: 11.96 K/UL (ref 3.9–12.7)

## 2021-02-13 PROCEDURE — 93010 ELECTROCARDIOGRAM REPORT: CPT | Mod: ,,, | Performed by: INTERNAL MEDICINE

## 2021-02-13 PROCEDURE — 63600175 PHARM REV CODE 636 W HCPCS: Performed by: EMERGENCY MEDICINE

## 2021-02-13 PROCEDURE — 63700000 PHARM REV CODE 250 ALT 637 W/O HCPCS: Performed by: HOSPITALIST

## 2021-02-13 PROCEDURE — 94640 AIRWAY INHALATION TREATMENT: CPT

## 2021-02-13 PROCEDURE — 94761 N-INVAS EAR/PLS OXIMETRY MLT: CPT

## 2021-02-13 PROCEDURE — 27000221 HC OXYGEN, UP TO 24 HOURS

## 2021-02-13 PROCEDURE — 83880 ASSAY OF NATRIURETIC PEPTIDE: CPT

## 2021-02-13 PROCEDURE — 25000003 PHARM REV CODE 250: Performed by: EMERGENCY MEDICINE

## 2021-02-13 PROCEDURE — 36415 COLL VENOUS BLD VENIPUNCTURE: CPT

## 2021-02-13 PROCEDURE — 93005 ELECTROCARDIOGRAM TRACING: CPT

## 2021-02-13 PROCEDURE — 96372 THER/PROPH/DIAG INJ SC/IM: CPT | Mod: 59

## 2021-02-13 PROCEDURE — G0378 HOSPITAL OBSERVATION PER HR: HCPCS

## 2021-02-13 PROCEDURE — 25000242 PHARM REV CODE 250 ALT 637 W/ HCPCS: Performed by: HOSPITALIST

## 2021-02-13 PROCEDURE — 99291 CRITICAL CARE FIRST HOUR: CPT | Mod: 25

## 2021-02-13 PROCEDURE — 84484 ASSAY OF TROPONIN QUANT: CPT | Mod: 91

## 2021-02-13 PROCEDURE — 84443 ASSAY THYROID STIM HORMONE: CPT

## 2021-02-13 PROCEDURE — U0002 COVID-19 LAB TEST NON-CDC: HCPCS | Performed by: EMERGENCY MEDICINE

## 2021-02-13 PROCEDURE — 94760 N-INVAS EAR/PLS OXIMETRY 1: CPT

## 2021-02-13 PROCEDURE — 85025 COMPLETE CBC W/AUTO DIFF WBC: CPT

## 2021-02-13 PROCEDURE — 25000003 PHARM REV CODE 250: Performed by: HOSPITALIST

## 2021-02-13 PROCEDURE — 96375 TX/PRO/DX INJ NEW DRUG ADDON: CPT

## 2021-02-13 PROCEDURE — 96376 TX/PRO/DX INJ SAME DRUG ADON: CPT

## 2021-02-13 PROCEDURE — 80053 COMPREHEN METABOLIC PANEL: CPT

## 2021-02-13 PROCEDURE — 93010 EKG 12-LEAD: ICD-10-PCS | Mod: ,,, | Performed by: INTERNAL MEDICINE

## 2021-02-13 PROCEDURE — 25000242 PHARM REV CODE 250 ALT 637 W/ HCPCS: Performed by: EMERGENCY MEDICINE

## 2021-02-13 PROCEDURE — 63600175 PHARM REV CODE 636 W HCPCS: Performed by: HOSPITALIST

## 2021-02-13 PROCEDURE — 96374 THER/PROPH/DIAG INJ IV PUSH: CPT

## 2021-02-13 RX ORDER — IPRATROPIUM BROMIDE AND ALBUTEROL SULFATE 2.5; .5 MG/3ML; MG/3ML
3 SOLUTION RESPIRATORY (INHALATION) EVERY 4 HOURS PRN
Status: DISCONTINUED | OUTPATIENT
Start: 2021-02-13 | End: 2021-02-14 | Stop reason: HOSPADM

## 2021-02-13 RX ORDER — TALC
6 POWDER (GRAM) TOPICAL NIGHTLY PRN
Status: DISCONTINUED | OUTPATIENT
Start: 2021-02-13 | End: 2021-02-14 | Stop reason: HOSPADM

## 2021-02-13 RX ORDER — FUROSEMIDE 10 MG/ML
40 INJECTION INTRAMUSCULAR; INTRAVENOUS 2 TIMES DAILY WITH MEALS
Status: DISCONTINUED | OUTPATIENT
Start: 2021-02-13 | End: 2021-02-14

## 2021-02-13 RX ORDER — METHYLPREDNISOLONE SOD SUCC 125 MG
125 VIAL (EA) INJECTION
Status: COMPLETED | OUTPATIENT
Start: 2021-02-13 | End: 2021-02-13

## 2021-02-13 RX ORDER — ACETAMINOPHEN 325 MG/1
650 TABLET ORAL EVERY 6 HOURS PRN
Status: DISCONTINUED | OUTPATIENT
Start: 2021-02-13 | End: 2021-02-14 | Stop reason: HOSPADM

## 2021-02-13 RX ORDER — ASPIRIN 325 MG
325 TABLET ORAL DAILY
Status: DISCONTINUED | OUTPATIENT
Start: 2021-02-13 | End: 2021-02-14 | Stop reason: HOSPADM

## 2021-02-13 RX ORDER — CLOPIDOGREL BISULFATE 75 MG/1
75 TABLET ORAL
Status: COMPLETED | OUTPATIENT
Start: 2021-02-13 | End: 2021-02-13

## 2021-02-13 RX ORDER — ATORVASTATIN CALCIUM 40 MG/1
40 TABLET, FILM COATED ORAL DAILY
Status: DISCONTINUED | OUTPATIENT
Start: 2021-02-13 | End: 2021-02-14 | Stop reason: HOSPADM

## 2021-02-13 RX ORDER — IPRATROPIUM BROMIDE AND ALBUTEROL SULFATE 2.5; .5 MG/3ML; MG/3ML
3 SOLUTION RESPIRATORY (INHALATION)
Status: COMPLETED | OUTPATIENT
Start: 2021-02-13 | End: 2021-02-13

## 2021-02-13 RX ORDER — IPRATROPIUM BROMIDE AND ALBUTEROL SULFATE 2.5; .5 MG/3ML; MG/3ML
3 SOLUTION RESPIRATORY (INHALATION)
Status: DISCONTINUED | OUTPATIENT
Start: 2021-02-13 | End: 2021-02-14 | Stop reason: HOSPADM

## 2021-02-13 RX ORDER — SODIUM CHLORIDE 0.9 % (FLUSH) 0.9 %
10 SYRINGE (ML) INJECTION
Status: DISCONTINUED | OUTPATIENT
Start: 2021-02-13 | End: 2021-02-14 | Stop reason: HOSPADM

## 2021-02-13 RX ORDER — AZITHROMYCIN 250 MG/1
250 TABLET, FILM COATED ORAL DAILY
Status: DISCONTINUED | OUTPATIENT
Start: 2021-02-13 | End: 2021-02-14 | Stop reason: HOSPADM

## 2021-02-13 RX ORDER — PREDNISONE 20 MG/1
60 TABLET ORAL DAILY
Status: DISCONTINUED | OUTPATIENT
Start: 2021-02-13 | End: 2021-02-14 | Stop reason: HOSPADM

## 2021-02-13 RX ORDER — AMLODIPINE BESYLATE 5 MG/1
10 TABLET ORAL
Status: COMPLETED | OUTPATIENT
Start: 2021-02-13 | End: 2021-02-13

## 2021-02-13 RX ORDER — FUROSEMIDE 10 MG/ML
40 INJECTION INTRAMUSCULAR; INTRAVENOUS
Status: COMPLETED | OUTPATIENT
Start: 2021-02-13 | End: 2021-02-13

## 2021-02-13 RX ORDER — CLONIDINE HYDROCHLORIDE 0.1 MG/1
0.1 TABLET ORAL
Status: COMPLETED | OUTPATIENT
Start: 2021-02-13 | End: 2021-02-13

## 2021-02-13 RX ORDER — ENOXAPARIN SODIUM 100 MG/ML
40 INJECTION SUBCUTANEOUS EVERY 24 HOURS
Status: DISCONTINUED | OUTPATIENT
Start: 2021-02-13 | End: 2021-02-14 | Stop reason: HOSPADM

## 2021-02-13 RX ORDER — CLOPIDOGREL BISULFATE 75 MG/1
75 TABLET ORAL DAILY
Status: DISCONTINUED | OUTPATIENT
Start: 2021-02-13 | End: 2021-02-14 | Stop reason: HOSPADM

## 2021-02-13 RX ORDER — HYDRALAZINE HYDROCHLORIDE 20 MG/ML
10 INJECTION INTRAMUSCULAR; INTRAVENOUS
Status: COMPLETED | OUTPATIENT
Start: 2021-02-13 | End: 2021-02-13

## 2021-02-13 RX ORDER — LISINOPRIL 20 MG/1
20 TABLET ORAL DAILY
Status: DISCONTINUED | OUTPATIENT
Start: 2021-02-13 | End: 2021-02-14 | Stop reason: HOSPADM

## 2021-02-13 RX ORDER — AMLODIPINE BESYLATE 5 MG/1
5 TABLET ORAL DAILY
Status: DISCONTINUED | OUTPATIENT
Start: 2021-02-13 | End: 2021-02-14 | Stop reason: HOSPADM

## 2021-02-13 RX ORDER — ALBUTEROL SULFATE 2.5 MG/.5ML
5 SOLUTION RESPIRATORY (INHALATION)
Status: COMPLETED | OUTPATIENT
Start: 2021-02-13 | End: 2021-02-13

## 2021-02-13 RX ORDER — MAG HYDROX/ALUMINUM HYD/SIMETH 200-200-20
30 SUSPENSION, ORAL (FINAL DOSE FORM) ORAL
Status: DISCONTINUED | OUTPATIENT
Start: 2021-02-13 | End: 2021-02-14 | Stop reason: HOSPADM

## 2021-02-13 RX ADMIN — FUROSEMIDE 40 MG: 10 INJECTION, SOLUTION INTRAVENOUS at 12:02

## 2021-02-13 RX ADMIN — AZITHROMYCIN MONOHYDRATE 250 MG: 250 TABLET ORAL at 02:02

## 2021-02-13 RX ADMIN — AMLODIPINE BESYLATE 10 MG: 5 TABLET ORAL at 11:02

## 2021-02-13 RX ADMIN — CLOPIDOGREL 75 MG: 75 TABLET, FILM COATED ORAL at 02:02

## 2021-02-13 RX ADMIN — ALUMINUM HYDROXIDE, MAGNESIUM HYDROXIDE, AND SIMETHICONE 30 ML: 200; 200; 20 SUSPENSION ORAL at 11:02

## 2021-02-13 RX ADMIN — AMLODIPINE BESYLATE 5 MG: 5 TABLET ORAL at 02:02

## 2021-02-13 RX ADMIN — ENOXAPARIN SODIUM 40 MG: 40 INJECTION SUBCUTANEOUS at 05:02

## 2021-02-13 RX ADMIN — ALBUTEROL SULFATE 5 MG: 2.5 SOLUTION RESPIRATORY (INHALATION) at 11:02

## 2021-02-13 RX ADMIN — FUROSEMIDE 40 MG: 10 INJECTION, SOLUTION INTRAVENOUS at 05:02

## 2021-02-13 RX ADMIN — IPRATROPIUM BROMIDE AND ALBUTEROL SULFATE 3 ML: .5; 3 SOLUTION RESPIRATORY (INHALATION) at 11:02

## 2021-02-13 RX ADMIN — ATORVASTATIN CALCIUM 40 MG: 40 TABLET, FILM COATED ORAL at 02:02

## 2021-02-13 RX ADMIN — LISINOPRIL 20 MG: 20 TABLET ORAL at 02:02

## 2021-02-13 RX ADMIN — METHYLPREDNISOLONE SODIUM SUCCINATE 125 MG: 125 INJECTION, POWDER, FOR SOLUTION INTRAMUSCULAR; INTRAVENOUS at 11:02

## 2021-02-13 RX ADMIN — IPRATROPIUM BROMIDE AND ALBUTEROL SULFATE 3 ML: .5; 3 SOLUTION RESPIRATORY (INHALATION) at 07:02

## 2021-02-13 RX ADMIN — FUROSEMIDE 40 MG: 10 INJECTION, SOLUTION INTRAVENOUS at 11:02

## 2021-02-13 RX ADMIN — CLOPIDOGREL 75 MG: 75 TABLET, FILM COATED ORAL at 11:02

## 2021-02-13 RX ADMIN — ACETAMINOPHEN 650 MG: 325 TABLET ORAL at 06:02

## 2021-02-13 RX ADMIN — HYDRALAZINE HYDROCHLORIDE 10 MG: 20 INJECTION, SOLUTION INTRAMUSCULAR; INTRAVENOUS at 11:02

## 2021-02-13 RX ADMIN — ASPIRIN 325 MG ORAL TABLET 325 MG: 325 PILL ORAL at 11:02

## 2021-02-13 RX ADMIN — PREDNISONE 60 MG: 20 TABLET ORAL at 02:02

## 2021-02-13 RX ADMIN — CLONIDINE HYDROCHLORIDE 0.1 MG: 0.1 TABLET ORAL at 11:02

## 2021-02-14 VITALS
DIASTOLIC BLOOD PRESSURE: 73 MMHG | WEIGHT: 202.19 LBS | HEIGHT: 65 IN | BODY MASS INDEX: 33.69 KG/M2 | TEMPERATURE: 98 F | OXYGEN SATURATION: 98 % | RESPIRATION RATE: 18 BRPM | HEART RATE: 82 BPM | SYSTOLIC BLOOD PRESSURE: 127 MMHG

## 2021-02-14 LAB
AORTIC ROOT ANNULUS: 3.6 CM
AORTIC VALVE CUSP SEPERATION: 1.82 CM
ASCENDING AORTA: 2.87 CM
AV INDEX (PROSTH): 0.58
AV MEAN GRADIENT: 4 MMHG
AV PEAK GRADIENT: 8 MMHG
AV VALVE AREA: 3.54 CM2
AV VELOCITY RATIO: 0.53
BSA FOR ECHO PROCEDURE: 1.99 M2
CHOLEST SERPL-MCNC: 208 MG/DL (ref 120–199)
CHOLEST/HDLC SERPL: 5.9 {RATIO} (ref 2–5)
CV ECHO LV RWT: 0.37 CM
DOP CALC AO PEAK VEL: 1.38 M/S
DOP CALC AO VTI: 20.85 CM
DOP CALC LVOT AREA: 6.2 CM2
DOP CALC LVOT DIAMETER: 2.8 CM
DOP CALC LVOT PEAK VEL: 0.73 M/S
DOP CALC LVOT STROKE VOLUME: 73.85 CM3
DOP CALCLVOT PEAK VEL VTI: 12 CM
E WAVE DECELERATION TIME: 151.6 MSEC
E/A RATIO: 1
E/E' RATIO: 20 M/S
ECHO LV POSTERIOR WALL: 1.17 CM (ref 0.6–1.1)
FRACTIONAL SHORTENING: 16 % (ref 28–44)
HDLC SERPL-MCNC: 35 MG/DL (ref 40–75)
HDLC SERPL: 16.8 % (ref 20–50)
INTERVENTRICULAR SEPTUM: 1.03 CM (ref 0.6–1.1)
IVRT: 106.57 MSEC
LA MAJOR: 5.68 CM
LA MINOR: 5.78 CM
LA WIDTH: 4.6 CM
LDLC SERPL CALC-MCNC: 152.6 MG/DL (ref 63–159)
LEFT ATRIUM SIZE: 3.96 CM
LEFT ATRIUM VOLUME INDEX: 44.6 ML/M2
LEFT ATRIUM VOLUME: 88.71 CM3
LEFT INTERNAL DIMENSION IN SYSTOLE: 5.29 CM (ref 2.1–4)
LEFT VENTRICLE DIASTOLIC VOLUME INDEX: 101.64 ML/M2
LEFT VENTRICLE DIASTOLIC VOLUME: 202.27 ML
LEFT VENTRICLE MASS INDEX: 153 G/M2
LEFT VENTRICLE SYSTOLIC VOLUME INDEX: 67.7 ML/M2
LEFT VENTRICLE SYSTOLIC VOLUME: 134.8 ML
LEFT VENTRICULAR INTERNAL DIMENSION IN DIASTOLE: 6.31 CM (ref 3.5–6)
LEFT VENTRICULAR MASS: 304.33 G
LV LATERAL E/E' RATIO: 22.5 M/S
LV SEPTAL E/E' RATIO: 18 M/S
MV PEAK A VEL: 0.9 M/S
MV PEAK E VEL: 0.9 M/S
NONHDLC SERPL-MCNC: 173 MG/DL
PISA TR MAX VEL: 1.02 M/S
PULM VEIN S/D RATIO: 1.32
PV PEAK D VEL: 0.31 M/S
PV PEAK S VEL: 0.41 M/S
PV PEAK VELOCITY: 0.78 CM/S
RA MAJOR: 5.33 CM
RA WIDTH: 4.29 CM
RIGHT VENTRICULAR END-DIASTOLIC DIMENSION: 4.3 CM
RV TISSUE DOPPLER FREE WALL SYSTOLIC VELOCITY 1 (APICAL 4 CHAMBER VIEW): 16.17 CM/S
SINUS: 2.8 CM
STJ: 2.87 CM
TDI LATERAL: 0.04 M/S
TDI SEPTAL: 0.05 M/S
TDI: 0.05 M/S
TR MAX PG: 4 MMHG
TRICUSPID ANNULAR PLANE SYSTOLIC EXCURSION: 1.7 CM
TRIGL SERPL-MCNC: 102 MG/DL (ref 30–150)
TV PEAK E VEL: 0.35 M/S

## 2021-02-14 PROCEDURE — 99219 PR INITIAL OBSERVATION CARE,LEVL II: CPT | Mod: 25,,, | Performed by: INTERNAL MEDICINE

## 2021-02-14 PROCEDURE — 63700000 PHARM REV CODE 250 ALT 637 W/O HCPCS: Performed by: HOSPITALIST

## 2021-02-14 PROCEDURE — 25000003 PHARM REV CODE 250: Performed by: EMERGENCY MEDICINE

## 2021-02-14 PROCEDURE — 63600175 PHARM REV CODE 636 W HCPCS: Performed by: HOSPITALIST

## 2021-02-14 PROCEDURE — 25000003 PHARM REV CODE 250: Performed by: HOSPITALIST

## 2021-02-14 PROCEDURE — 94761 N-INVAS EAR/PLS OXIMETRY MLT: CPT

## 2021-02-14 PROCEDURE — 36415 COLL VENOUS BLD VENIPUNCTURE: CPT

## 2021-02-14 PROCEDURE — 93321 DOPPLER ECHO F-UP/LMTD STD: CPT

## 2021-02-14 PROCEDURE — 63600175 PHARM REV CODE 636 W HCPCS: Performed by: NURSE PRACTITIONER

## 2021-02-14 PROCEDURE — 99219 PR INITIAL OBSERVATION CARE,LEVL II: ICD-10-PCS | Mod: 25,,, | Performed by: INTERNAL MEDICINE

## 2021-02-14 PROCEDURE — 27000221 HC OXYGEN, UP TO 24 HOURS

## 2021-02-14 PROCEDURE — 96376 TX/PRO/DX INJ SAME DRUG ADON: CPT | Mod: 59

## 2021-02-14 PROCEDURE — 80061 LIPID PANEL: CPT

## 2021-02-14 PROCEDURE — G0378 HOSPITAL OBSERVATION PER HR: HCPCS

## 2021-02-14 PROCEDURE — 93325 DOPPLER ECHO COLOR FLOW MAPG: CPT

## 2021-02-14 PROCEDURE — 25000242 PHARM REV CODE 250 ALT 637 W/ HCPCS: Performed by: HOSPITALIST

## 2021-02-14 PROCEDURE — 94640 AIRWAY INHALATION TREATMENT: CPT | Mod: 76

## 2021-02-14 RX ORDER — PREDNISONE 20 MG/1
60 TABLET ORAL DAILY
Qty: 15 TABLET | Refills: 0 | Status: SHIPPED | OUTPATIENT
Start: 2021-02-15 | End: 2021-02-20

## 2021-02-14 RX ORDER — FLUTICASONE PROPIONATE AND SALMETEROL 250; 50 UG/1; UG/1
1 POWDER RESPIRATORY (INHALATION) 2 TIMES DAILY
Qty: 60 EACH | Refills: 6 | Status: SHIPPED | OUTPATIENT
Start: 2021-02-14 | End: 2023-10-19

## 2021-02-14 RX ORDER — FUROSEMIDE 10 MG/ML
40 INJECTION INTRAMUSCULAR; INTRAVENOUS ONCE
Status: COMPLETED | OUTPATIENT
Start: 2021-02-14 | End: 2021-02-14

## 2021-02-14 RX ORDER — AMLODIPINE BESYLATE 5 MG/1
5 TABLET ORAL DAILY
Qty: 30 TABLET | Refills: 6 | Status: ON HOLD | OUTPATIENT
Start: 2021-02-14 | End: 2023-07-07 | Stop reason: HOSPADM

## 2021-02-14 RX ORDER — AMLODIPINE BESYLATE 5 MG/1
10 TABLET ORAL DAILY
Qty: 30 TABLET | Refills: 3 | Status: SHIPPED | OUTPATIENT
Start: 2021-02-14 | End: 2021-02-14 | Stop reason: SDUPTHER

## 2021-02-14 RX ORDER — ALBUTEROL SULFATE 90 UG/1
1-2 AEROSOL, METERED RESPIRATORY (INHALATION) EVERY 6 HOURS PRN
Qty: 6.7 G | Refills: 6 | Status: SHIPPED | OUTPATIENT
Start: 2021-02-14 | End: 2023-10-19

## 2021-02-14 RX ORDER — ATORVASTATIN CALCIUM 40 MG/1
40 TABLET, FILM COATED ORAL DAILY
Qty: 90 TABLET | Refills: 6 | Status: ON HOLD | OUTPATIENT
Start: 2021-02-14 | End: 2024-01-27

## 2021-02-14 RX ORDER — ASPIRIN 81 MG/1
81 TABLET ORAL DAILY
Qty: 30 TABLET | Refills: 0 | Status: ON HOLD | OUTPATIENT
Start: 2021-02-14 | End: 2024-01-27

## 2021-02-14 RX ORDER — AZITHROMYCIN 250 MG/1
250 TABLET, FILM COATED ORAL DAILY
Qty: 2 TABLET | Refills: 0 | Status: SHIPPED | OUTPATIENT
Start: 2021-02-15 | End: 2021-03-01

## 2021-02-14 RX ORDER — LISINOPRIL 20 MG/1
20 TABLET ORAL DAILY
Qty: 30 TABLET | Refills: 6 | Status: ON HOLD | OUTPATIENT
Start: 2021-02-14 | End: 2023-07-07 | Stop reason: HOSPADM

## 2021-02-14 RX ORDER — CLOPIDOGREL BISULFATE 75 MG/1
75 TABLET ORAL DAILY
Qty: 30 TABLET | Refills: 6 | Status: ON HOLD | OUTPATIENT
Start: 2021-02-14 | End: 2024-01-27

## 2021-02-14 RX ADMIN — FUROSEMIDE 40 MG: 10 INJECTION, SOLUTION INTRAVENOUS at 01:02

## 2021-02-14 RX ADMIN — PREDNISONE 60 MG: 20 TABLET ORAL at 09:02

## 2021-02-14 RX ADMIN — IPRATROPIUM BROMIDE AND ALBUTEROL SULFATE 3 ML: .5; 3 SOLUTION RESPIRATORY (INHALATION) at 07:02

## 2021-02-14 RX ADMIN — IPRATROPIUM BROMIDE AND ALBUTEROL SULFATE 3 ML: .5; 3 SOLUTION RESPIRATORY (INHALATION) at 12:02

## 2021-02-14 RX ADMIN — ALUMINUM HYDROXIDE, MAGNESIUM HYDROXIDE, AND SIMETHICONE 30 ML: 200; 200; 20 SUSPENSION ORAL at 05:02

## 2021-02-14 RX ADMIN — AMLODIPINE BESYLATE 5 MG: 5 TABLET ORAL at 09:02

## 2021-02-14 RX ADMIN — FUROSEMIDE 40 MG: 10 INJECTION, SOLUTION INTRAVENOUS at 09:02

## 2021-02-14 RX ADMIN — CLOPIDOGREL 75 MG: 75 TABLET, FILM COATED ORAL at 09:02

## 2021-02-14 RX ADMIN — ASPIRIN 325 MG ORAL TABLET 325 MG: 325 PILL ORAL at 09:02

## 2021-02-14 RX ADMIN — ATORVASTATIN CALCIUM 40 MG: 40 TABLET, FILM COATED ORAL at 09:02

## 2021-02-14 RX ADMIN — AZITHROMYCIN MONOHYDRATE 250 MG: 250 TABLET ORAL at 09:02

## 2021-02-14 RX ADMIN — LISINOPRIL 20 MG: 20 TABLET ORAL at 09:02

## 2021-02-14 RX ADMIN — ALUMINUM HYDROXIDE, MAGNESIUM HYDROXIDE, AND SIMETHICONE 30 ML: 200; 200; 20 SUSPENSION ORAL at 11:02

## 2021-03-01 ENCOUNTER — OFFICE VISIT (OUTPATIENT)
Dept: CARDIOLOGY | Facility: CLINIC | Age: 66
End: 2021-03-01
Payer: MEDICARE

## 2021-03-01 VITALS
HEART RATE: 88 BPM | WEIGHT: 203.94 LBS | DIASTOLIC BLOOD PRESSURE: 88 MMHG | HEIGHT: 65 IN | SYSTOLIC BLOOD PRESSURE: 140 MMHG | BODY MASS INDEX: 33.98 KG/M2

## 2021-03-01 DIAGNOSIS — I25.118 CORONARY ARTERY DISEASE OF NATIVE ARTERY OF NATIVE HEART WITH STABLE ANGINA PECTORIS: Primary | ICD-10-CM

## 2021-03-01 DIAGNOSIS — E78.5 DYSLIPIDEMIA: ICD-10-CM

## 2021-03-01 DIAGNOSIS — Z72.0 TOBACCO USE: ICD-10-CM

## 2021-03-01 DIAGNOSIS — I50.42 NYHA CLASS 2 AND ACC/AHA STAGE C CHRONIC COMBINED SYSTOLIC AND DIASTOLIC CONGESTIVE HEART FAILURE: ICD-10-CM

## 2021-03-01 DIAGNOSIS — I73.9 PERIPHERAL ARTERIAL DISEASE: ICD-10-CM

## 2021-03-01 DIAGNOSIS — I10 BENIGN ESSENTIAL HYPERTENSION: ICD-10-CM

## 2021-03-01 DIAGNOSIS — E66.09 CLASS 1 OBESITY DUE TO EXCESS CALORIES WITH SERIOUS COMORBIDITY AND BODY MASS INDEX (BMI) OF 33.0 TO 33.9 IN ADULT: ICD-10-CM

## 2021-03-01 DIAGNOSIS — Z72.0 TOBACCO ABUSE: ICD-10-CM

## 2021-03-01 DIAGNOSIS — G62.9 NEUROPATHY: ICD-10-CM

## 2021-03-01 DIAGNOSIS — I25.10 CORONARY ARTERY CALCIFICATION SEEN ON CAT SCAN: ICD-10-CM

## 2021-03-01 DIAGNOSIS — J44.9 CHRONIC OBSTRUCTIVE PULMONARY DISEASE, UNSPECIFIED COPD TYPE: ICD-10-CM

## 2021-03-01 DIAGNOSIS — I70.0 AORTIC ATHEROSCLEROSIS: ICD-10-CM

## 2021-03-01 DIAGNOSIS — I73.9 CLAUDICATION: ICD-10-CM

## 2021-03-01 PROCEDURE — 3077F SYST BP >= 140 MM HG: CPT | Mod: CPTII,S$GLB,, | Performed by: INTERNAL MEDICINE

## 2021-03-01 PROCEDURE — 99214 OFFICE O/P EST MOD 30 MIN: CPT | Mod: S$GLB,,, | Performed by: INTERNAL MEDICINE

## 2021-03-01 PROCEDURE — 1125F PR PAIN SEVERITY QUANTIFIED, PAIN PRESENT: ICD-10-PCS | Mod: S$GLB,,, | Performed by: INTERNAL MEDICINE

## 2021-03-01 PROCEDURE — 99214 PR OFFICE/OUTPT VISIT, EST, LEVL IV, 30-39 MIN: ICD-10-PCS | Mod: S$GLB,,, | Performed by: INTERNAL MEDICINE

## 2021-03-01 PROCEDURE — 3008F PR BODY MASS INDEX (BMI) DOCUMENTED: ICD-10-PCS | Mod: CPTII,S$GLB,, | Performed by: INTERNAL MEDICINE

## 2021-03-01 PROCEDURE — 1125F AMNT PAIN NOTED PAIN PRSNT: CPT | Mod: S$GLB,,, | Performed by: INTERNAL MEDICINE

## 2021-03-01 PROCEDURE — 3079F DIAST BP 80-89 MM HG: CPT | Mod: CPTII,S$GLB,, | Performed by: INTERNAL MEDICINE

## 2021-03-01 PROCEDURE — 3079F PR MOST RECENT DIASTOLIC BLOOD PRESSURE 80-89 MM HG: ICD-10-PCS | Mod: CPTII,S$GLB,, | Performed by: INTERNAL MEDICINE

## 2021-03-01 PROCEDURE — 3008F BODY MASS INDEX DOCD: CPT | Mod: CPTII,S$GLB,, | Performed by: INTERNAL MEDICINE

## 2021-03-01 PROCEDURE — 99999 PR PBB SHADOW E&M-EST. PATIENT-LVL IV: CPT | Mod: PBBFAC,,, | Performed by: INTERNAL MEDICINE

## 2021-03-01 PROCEDURE — 99999 PR PBB SHADOW E&M-EST. PATIENT-LVL IV: ICD-10-PCS | Mod: PBBFAC,,, | Performed by: INTERNAL MEDICINE

## 2021-03-01 PROCEDURE — 3077F PR MOST RECENT SYSTOLIC BLOOD PRESSURE >= 140 MM HG: ICD-10-PCS | Mod: CPTII,S$GLB,, | Performed by: INTERNAL MEDICINE

## 2021-03-01 RX ORDER — FUROSEMIDE 20 MG/1
20 TABLET ORAL DAILY
Qty: 30 TABLET | Refills: 11 | Status: ON HOLD | OUTPATIENT
Start: 2021-03-01 | End: 2022-04-15 | Stop reason: HOSPADM

## 2021-03-01 RX ORDER — METOPROLOL SUCCINATE 25 MG/1
25 TABLET, EXTENDED RELEASE ORAL DAILY
Qty: 30 TABLET | Refills: 11 | Status: SHIPPED | OUTPATIENT
Start: 2021-03-01 | End: 2023-10-19

## 2021-03-12 ENCOUNTER — HOSPITAL ENCOUNTER (OUTPATIENT)
Dept: RADIOLOGY | Facility: HOSPITAL | Age: 66
Discharge: HOME | End: 2021-03-12
Attending: INTERNAL MEDICINE
Payer: MEDICARE

## 2021-03-12 ENCOUNTER — HOSPITAL ENCOUNTER (OUTPATIENT)
Dept: CARDIOLOGY | Facility: HOSPITAL | Age: 66
Discharge: HOME | End: 2021-03-12
Attending: INTERNAL MEDICINE
Payer: MEDICARE

## 2021-03-12 DIAGNOSIS — I25.118 CORONARY ARTERY DISEASE OF NATIVE ARTERY OF NATIVE HEART WITH STABLE ANGINA PECTORIS: ICD-10-CM

## 2021-03-12 LAB
CV STRESS BASE HR: 89 BPM
DIASTOLIC BLOOD PRESSURE: 81 MMHG
NUC STRESS DIASTOLIC VOLUME INDEX: 202
NUC STRESS EJECTION FRACTION: 24 %
NUC STRESS SYSTOLIC VOLUME INDEX: 154
OHS CV CPX 85 PERCENT MAX PREDICTED HEART RATE MALE: 133
OHS CV CPX MAX PREDICTED HEART RATE: 156
OHS CV CPX PATIENT IS FEMALE: 0
OHS CV CPX PATIENT IS MALE: 1
OHS CV CPX PEAK DIASTOLIC BLOOD PRESSURE: 76 MMHG
OHS CV CPX PEAK HEAR RATE: 104 BPM
OHS CV CPX PEAK RATE PRESSURE PRODUCT: NORMAL
OHS CV CPX PEAK SYSTOLIC BLOOD PRESSURE: 126 MMHG
OHS CV CPX PERCENT MAX PREDICTED HEART RATE ACHIEVED: 67
OHS CV CPX RATE PRESSURE PRODUCT PRESENTING: NORMAL
SYSTOLIC BLOOD PRESSURE: 127 MMHG

## 2021-03-12 PROCEDURE — 93016 CV STRESS TEST SUPVJ ONLY: CPT | Mod: ,,, | Performed by: INTERNAL MEDICINE

## 2021-03-12 PROCEDURE — 93018 STRESS TEST WITH MYOCARDIAL PERFUSION (CUPID ONLY): ICD-10-PCS | Mod: ,,, | Performed by: INTERNAL MEDICINE

## 2021-03-12 PROCEDURE — 93018 CV STRESS TEST I&R ONLY: CPT | Mod: ,,, | Performed by: INTERNAL MEDICINE

## 2021-03-12 PROCEDURE — 78452 STRESS TEST WITH MYOCARDIAL PERFUSION (CUPID ONLY): ICD-10-PCS | Mod: 26,,, | Performed by: INTERNAL MEDICINE

## 2021-03-12 PROCEDURE — 93017 CV STRESS TEST TRACING ONLY: CPT

## 2021-03-12 PROCEDURE — 78452 HT MUSCLE IMAGE SPECT MULT: CPT

## 2021-03-12 PROCEDURE — 93016 STRESS TEST WITH MYOCARDIAL PERFUSION (CUPID ONLY): ICD-10-PCS | Mod: ,,, | Performed by: INTERNAL MEDICINE

## 2021-03-12 PROCEDURE — 78452 HT MUSCLE IMAGE SPECT MULT: CPT | Mod: 26,,, | Performed by: INTERNAL MEDICINE

## 2021-03-12 PROCEDURE — A9502 TC99M TETROFOSMIN: HCPCS

## 2021-03-12 PROCEDURE — 63600175 PHARM REV CODE 636 W HCPCS: Performed by: INTERNAL MEDICINE

## 2021-03-12 RX ORDER — REGADENOSON 0.08 MG/ML
0.4 INJECTION, SOLUTION INTRAVENOUS ONCE
Status: COMPLETED | OUTPATIENT
Start: 2021-03-12 | End: 2021-03-12

## 2021-03-12 RX ADMIN — REGADENOSON 0.4 MG: 0.08 INJECTION, SOLUTION INTRAVENOUS at 09:03

## 2021-07-01 ENCOUNTER — PATIENT MESSAGE (OUTPATIENT)
Dept: ADMINISTRATIVE | Facility: OTHER | Age: 66
End: 2021-07-01

## 2022-04-12 ENCOUNTER — HOSPITAL ENCOUNTER (INPATIENT)
Facility: HOSPITAL | Age: 67
LOS: 3 days | Discharge: HOME | DRG: 190 | End: 2022-04-15
Attending: EMERGENCY MEDICINE | Admitting: STUDENT IN AN ORGANIZED HEALTH CARE EDUCATION/TRAINING PROGRAM
Payer: MEDICARE

## 2022-04-12 DIAGNOSIS — J96.01 ACUTE HYPOXEMIC RESPIRATORY FAILURE: ICD-10-CM

## 2022-04-12 DIAGNOSIS — I47.20 V-TACH: ICD-10-CM

## 2022-04-12 DIAGNOSIS — R06.02 SOB (SHORTNESS OF BREATH): ICD-10-CM

## 2022-04-12 DIAGNOSIS — J96.91 RESPIRATORY FAILURE WITH HYPOXIA AND HYPERCAPNIA, UNSPECIFIED CHRONICITY: Primary | ICD-10-CM

## 2022-04-12 DIAGNOSIS — I50.43 ACUTE ON CHRONIC COMBINED SYSTOLIC AND DIASTOLIC CONGESTIVE HEART FAILURE: ICD-10-CM

## 2022-04-12 DIAGNOSIS — J96.92 RESPIRATORY FAILURE WITH HYPOXIA AND HYPERCAPNIA, UNSPECIFIED CHRONICITY: Primary | ICD-10-CM

## 2022-04-12 DIAGNOSIS — J44.1 COPD EXACERBATION: ICD-10-CM

## 2022-04-12 PROBLEM — Z71.89 ADVANCE CARE PLANNING: Status: ACTIVE | Noted: 2022-04-12

## 2022-04-12 PROBLEM — J96.21 ACUTE ON CHRONIC RESPIRATORY FAILURE WITH HYPOXIA AND HYPERCAPNIA: Status: ACTIVE | Noted: 2022-04-12

## 2022-04-12 PROBLEM — J96.20 ACUTE ON CHRONIC RESPIRATORY FAILURE: Status: ACTIVE | Noted: 2022-04-12

## 2022-04-12 PROBLEM — E11.65 UNCONTROLLED TYPE 2 DIABETES MELLITUS WITH HYPERGLYCEMIA: Status: ACTIVE | Noted: 2022-04-12

## 2022-04-12 PROBLEM — J96.22 ACUTE ON CHRONIC RESPIRATORY FAILURE WITH HYPOXIA AND HYPERCAPNIA: Status: ACTIVE | Noted: 2022-04-12

## 2022-04-12 LAB
ALBUMIN SERPL BCP-MCNC: 3.9 G/DL (ref 3.5–5.2)
ALLENS TEST: ABNORMAL
ALP SERPL-CCNC: 121 U/L (ref 55–135)
ALT SERPL W/O P-5'-P-CCNC: 16 U/L (ref 10–44)
ANION GAP SERPL CALC-SCNC: 10 MMOL/L (ref 8–16)
AORTIC ROOT ANNULUS: 3.46 CM
AORTIC VALVE CUSP SEPERATION: 2.34 CM
ASCENDING AORTA: 3.17 CM
AST SERPL-CCNC: 17 U/L (ref 10–40)
AV INDEX (PROSTH): 0.75
AV MEAN GRADIENT: 3 MMHG
AV PEAK GRADIENT: 5 MMHG
AV VALVE AREA: 3.49 CM2
AV VELOCITY RATIO: 0.72
BASOPHILS # BLD AUTO: 0.08 K/UL (ref 0–0.2)
BASOPHILS NFR BLD: 0.5 % (ref 0–1.9)
BILIRUB SERPL-MCNC: 0.2 MG/DL (ref 0.1–1)
BNP SERPL-MCNC: 893 PG/ML (ref 0–99)
BSA FOR ECHO PROCEDURE: 2.11 M2
BUN SERPL-MCNC: 4 MG/DL (ref 8–23)
CALCIUM SERPL-MCNC: 9.2 MG/DL (ref 8.7–10.5)
CHLORIDE SERPL-SCNC: 97 MMOL/L (ref 95–110)
CO2 SERPL-SCNC: 35 MMOL/L (ref 23–29)
CREAT SERPL-MCNC: 0.9 MG/DL (ref 0.5–1.4)
CTP QC/QA: YES
CV ECHO LV RWT: 0.35 CM
DELSYS: ABNORMAL
DIFFERENTIAL METHOD: ABNORMAL
DOP CALC AO PEAK VEL: 1.08 M/S
DOP CALC AO VTI: 17.73 CM
DOP CALC LVOT AREA: 4.6 CM2
DOP CALC LVOT DIAMETER: 2.43 CM
DOP CALC LVOT PEAK VEL: 0.78 M/S
DOP CALC LVOT STROKE VOLUME: 61.88 CM3
DOP CALCLVOT PEAK VEL VTI: 13.35 CM
E/E' RATIO: 13.6 M/S
ECHO LV POSTERIOR WALL: 1.03 CM (ref 0.6–1.1)
EJECTION FRACTION: 40 %
EOSINOPHIL # BLD AUTO: 0.4 K/UL (ref 0–0.5)
EOSINOPHIL NFR BLD: 2.4 % (ref 0–8)
EP: 6
EP: 6
EP: 8
ERYTHROCYTE [DISTWIDTH] IN BLOOD BY AUTOMATED COUNT: 13.2 % (ref 11.5–14.5)
ERYTHROCYTE [SEDIMENTATION RATE] IN BLOOD BY WESTERGREN METHOD: 12 MM/H
ERYTHROCYTE [SEDIMENTATION RATE] IN BLOOD BY WESTERGREN METHOD: 25 MM/H
EST. GFR  (AFRICAN AMERICAN): >60 ML/MIN/1.73 M^2
EST. GFR  (NON AFRICAN AMERICAN): >60 ML/MIN/1.73 M^2
FIO2: 10
FIO2: 100
FRACTIONAL SHORTENING: 14 % (ref 28–44)
GLUCOSE SERPL-MCNC: 246 MG/DL (ref 70–110)
HCO3 UR-SCNC: 40.4 MMOL/L (ref 24–28)
HCO3 UR-SCNC: 41.4 MMOL/L (ref 24–28)
HCO3 UR-SCNC: 43.7 MMOL/L (ref 24–28)
HCO3 UR-SCNC: 46.5 MMOL/L (ref 24–28)
HCO3 UR-SCNC: ABNORMAL MMOL/L
HCT VFR BLD AUTO: 43.4 % (ref 40–54)
HGB BLD-MCNC: 13.1 G/DL (ref 14–18)
IMM GRANULOCYTES # BLD AUTO: 0.06 K/UL (ref 0–0.04)
IMM GRANULOCYTES NFR BLD AUTO: 0.4 % (ref 0–0.5)
INTERVENTRICULAR SEPTUM: 0.94 CM (ref 0.6–1.1)
IP: 12
IP: 12
IP: 16
IVRT: 203 MSEC
LA MAJOR: 6.99 CM
LA MINOR: 6.87 CM
LA WIDTH: 5.64 CM
LACTATE SERPL-SCNC: 1.2 MMOL/L (ref 0.5–2.2)
LEFT ATRIUM SIZE: 4.94 CM
LEFT ATRIUM VOLUME INDEX: 78.5 ML/M2
LEFT ATRIUM VOLUME: 164.11 CM3
LEFT INTERNAL DIMENSION IN SYSTOLE: 5.1 CM (ref 2.1–4)
LEFT VENTRICLE DIASTOLIC VOLUME INDEX: 84.22 ML/M2
LEFT VENTRICLE DIASTOLIC VOLUME: 176.03 ML
LEFT VENTRICLE MASS INDEX: 114 G/M2
LEFT VENTRICLE SYSTOLIC VOLUME INDEX: 59.2 ML/M2
LEFT VENTRICLE SYSTOLIC VOLUME: 123.82 ML
LEFT VENTRICULAR INTERNAL DIMENSION IN DIASTOLE: 5.94 CM (ref 3.5–6)
LEFT VENTRICULAR MASS: 237.99 G
LV LATERAL E/E' RATIO: 17 M/S
LV SEPTAL E/E' RATIO: 11.33 M/S
LYMPHOCYTES # BLD AUTO: 5.4 K/UL (ref 1–4.8)
LYMPHOCYTES NFR BLD: 36.5 % (ref 18–48)
MAGNESIUM SERPL-MCNC: 1.8 MG/DL (ref 1.6–2.6)
MCH RBC QN AUTO: 28.7 PG (ref 27–31)
MCHC RBC AUTO-ENTMCNC: 30.2 G/DL (ref 32–36)
MCV RBC AUTO: 95 FL (ref 82–98)
MODE: ABNORMAL
MONOCYTES # BLD AUTO: 0.9 K/UL (ref 0.3–1)
MONOCYTES NFR BLD: 5.9 % (ref 4–15)
MV PEAK E VEL: 0.34 M/S
NEUTROPHILS # BLD AUTO: 8 K/UL (ref 1.8–7.7)
NEUTROPHILS NFR BLD: 54.3 % (ref 38–73)
NRBC BLD-RTO: 0 /100 WBC
PCO2 BLDA: 115.3 MMHG (ref 35–45)
PCO2 BLDA: 126.1 MMHG (ref 35–45)
PCO2 BLDA: 128.6 MMHG (ref 35–45)
PCO2 BLDA: 76.8 MMHG (ref 35–45)
PCO2 BLDA: >130 MMHG (ref 35–45)
PH SMN: 7.12 [PH] (ref 7.35–7.45)
PH SMN: 7.13 [PH] (ref 7.35–7.45)
PH SMN: 7.17 [PH] (ref 7.35–7.45)
PH SMN: 7.19 [PH] (ref 7.35–7.45)
PH SMN: 7.33 [PH] (ref 7.35–7.45)
PHOSPHATE SERPL-MCNC: 4.5 MG/DL (ref 2.7–4.5)
PISA TR MAX VEL: 1.02 M/S
PLATELET # BLD AUTO: 247 K/UL (ref 150–450)
PMV BLD AUTO: 11.5 FL (ref 9.2–12.9)
PO2 BLDA: 105 MMHG (ref 40–60)
PO2 BLDA: 146 MMHG (ref 40–60)
PO2 BLDA: 492 MMHG (ref 80–100)
PO2 BLDA: 63 MMHG (ref 80–100)
PO2 BLDA: 87 MMHG (ref 40–60)
POC BE: 10 MMOL/L
POC BE: 11 MMOL/L
POC BE: 13 MMOL/L
POC BE: 7 MMOL/L
POC BE: ABNORMAL MMOL/L
POC SATURATED O2: 100 % (ref 95–100)
POC SATURATED O2: 88 % (ref 95–100)
POC SATURATED O2: 92 % (ref 95–100)
POC SATURATED O2: 98 % (ref 95–100)
POC SATURATED O2: ABNORMAL %
POC TCO2: 43 MMOL/L (ref 23–27)
POC TCO2: 45 MMOL/L (ref 24–29)
POC TCO2: 47 MMOL/L (ref 24–29)
POC TCO2: >50 MMOL/L (ref 23–27)
POC TCO2: ABNORMAL MMOL/L
POTASSIUM SERPL-SCNC: 4.6 MMOL/L (ref 3.5–5.1)
PROCALCITONIN SERPL IA-MCNC: 0.02 NG/ML
PROT SERPL-MCNC: 7.9 G/DL (ref 6–8.4)
PV PEAK VELOCITY: 1.09 CM/S
RA MAJOR: 5.99 CM
RA PRESSURE: 3 MMHG
RA WIDTH: 4.07 CM
RBC # BLD AUTO: 4.57 M/UL (ref 4.6–6.2)
RIGHT VENTRICULAR END-DIASTOLIC DIMENSION: 4.64 CM
RV TISSUE DOPPLER FREE WALL SYSTOLIC VELOCITY 1 (APICAL 4 CHAMBER VIEW): 15.05 CM/S
SAMPLE: ABNORMAL
SARS-COV-2 RDRP RESP QL NAA+PROBE: NEGATIVE
SINUS: 3.38 CM
SITE: ABNORMAL
SODIUM SERPL-SCNC: 142 MMOL/L (ref 136–145)
SP02: 100
SPONT RATE: 18
STJ: 2.89 CM
TDI LATERAL: 0.02 M/S
TDI SEPTAL: 0.03 M/S
TDI: 0.03 M/S
TR MAX PG: 4 MMHG
TRICUSPID ANNULAR PLANE SYSTOLIC EXCURSION: 2.62 CM
TROPONIN I SERPL DL<=0.01 NG/ML-MCNC: 0.01 NG/ML (ref 0–0.03)
TV REST PULMONARY ARTERY PRESSURE: 7 MMHG
WBC # BLD AUTO: 14.72 K/UL (ref 3.9–12.7)

## 2022-04-12 PROCEDURE — 99900035 HC TECH TIME PER 15 MIN (STAT)

## 2022-04-12 PROCEDURE — 83880 ASSAY OF NATRIURETIC PEPTIDE: CPT | Performed by: EMERGENCY MEDICINE

## 2022-04-12 PROCEDURE — 93010 EKG 12-LEAD: ICD-10-PCS | Mod: ,,, | Performed by: INTERNAL MEDICINE

## 2022-04-12 PROCEDURE — 83605 ASSAY OF LACTIC ACID: CPT | Performed by: EMERGENCY MEDICINE

## 2022-04-12 PROCEDURE — 84100 ASSAY OF PHOSPHORUS: CPT | Performed by: EMERGENCY MEDICINE

## 2022-04-12 PROCEDURE — 25000242 PHARM REV CODE 250 ALT 637 W/ HCPCS: Performed by: EMERGENCY MEDICINE

## 2022-04-12 PROCEDURE — 84484 ASSAY OF TROPONIN QUANT: CPT | Performed by: EMERGENCY MEDICINE

## 2022-04-12 PROCEDURE — 99223 1ST HOSP IP/OBS HIGH 75: CPT | Mod: ,,, | Performed by: INTERNAL MEDICINE

## 2022-04-12 PROCEDURE — 99291 PR CRITICAL CARE, E/M 30-74 MINUTES: ICD-10-PCS | Mod: ,,, | Performed by: STUDENT IN AN ORGANIZED HEALTH CARE EDUCATION/TRAINING PROGRAM

## 2022-04-12 PROCEDURE — 27000249 HC VAPOTHERM CIRCUIT

## 2022-04-12 PROCEDURE — 99291 CRITICAL CARE FIRST HOUR: CPT | Mod: 25

## 2022-04-12 PROCEDURE — 27000190 HC CPAP FULL FACE MASK W/VALVE

## 2022-04-12 PROCEDURE — 94640 AIRWAY INHALATION TREATMENT: CPT

## 2022-04-12 PROCEDURE — U0002 COVID-19 LAB TEST NON-CDC: HCPCS | Performed by: EMERGENCY MEDICINE

## 2022-04-12 PROCEDURE — 82803 BLOOD GASES ANY COMBINATION: CPT

## 2022-04-12 PROCEDURE — 96365 THER/PROPH/DIAG IV INF INIT: CPT

## 2022-04-12 PROCEDURE — 25000003 PHARM REV CODE 250: Performed by: EMERGENCY MEDICINE

## 2022-04-12 PROCEDURE — 99291 CRITICAL CARE FIRST HOUR: CPT | Mod: ,,, | Performed by: STUDENT IN AN ORGANIZED HEALTH CARE EDUCATION/TRAINING PROGRAM

## 2022-04-12 PROCEDURE — 80053 COMPREHEN METABOLIC PANEL: CPT | Performed by: EMERGENCY MEDICINE

## 2022-04-12 PROCEDURE — S4991 NICOTINE PATCH NONLEGEND: HCPCS | Performed by: STUDENT IN AN ORGANIZED HEALTH CARE EDUCATION/TRAINING PROGRAM

## 2022-04-12 PROCEDURE — 63600175 PHARM REV CODE 636 W HCPCS: Performed by: STUDENT IN AN ORGANIZED HEALTH CARE EDUCATION/TRAINING PROGRAM

## 2022-04-12 PROCEDURE — 93005 ELECTROCARDIOGRAM TRACING: CPT

## 2022-04-12 PROCEDURE — 85025 COMPLETE CBC W/AUTO DIFF WBC: CPT | Performed by: EMERGENCY MEDICINE

## 2022-04-12 PROCEDURE — 96375 TX/PRO/DX INJ NEW DRUG ADDON: CPT

## 2022-04-12 PROCEDURE — 20000000 HC ICU ROOM

## 2022-04-12 PROCEDURE — 83735 ASSAY OF MAGNESIUM: CPT | Performed by: EMERGENCY MEDICINE

## 2022-04-12 PROCEDURE — 94660 CPAP INITIATION&MGMT: CPT

## 2022-04-12 PROCEDURE — 87040 BLOOD CULTURE FOR BACTERIA: CPT | Performed by: EMERGENCY MEDICINE

## 2022-04-12 PROCEDURE — 94799 UNLISTED PULMONARY SVC/PX: CPT

## 2022-04-12 PROCEDURE — 63600175 PHARM REV CODE 636 W HCPCS: Performed by: EMERGENCY MEDICINE

## 2022-04-12 PROCEDURE — 36600 WITHDRAWAL OF ARTERIAL BLOOD: CPT

## 2022-04-12 PROCEDURE — 94761 N-INVAS EAR/PLS OXIMETRY MLT: CPT

## 2022-04-12 PROCEDURE — 93010 ELECTROCARDIOGRAM REPORT: CPT | Mod: ,,, | Performed by: INTERNAL MEDICINE

## 2022-04-12 PROCEDURE — 27000221 HC OXYGEN, UP TO 24 HOURS

## 2022-04-12 PROCEDURE — 25000003 PHARM REV CODE 250: Performed by: STUDENT IN AN ORGANIZED HEALTH CARE EDUCATION/TRAINING PROGRAM

## 2022-04-12 PROCEDURE — 84145 PROCALCITONIN (PCT): CPT | Performed by: EMERGENCY MEDICINE

## 2022-04-12 PROCEDURE — 25000242 PHARM REV CODE 250 ALT 637 W/ HCPCS: Performed by: STUDENT IN AN ORGANIZED HEALTH CARE EDUCATION/TRAINING PROGRAM

## 2022-04-12 PROCEDURE — 99223 PR INITIAL HOSPITAL CARE,LEVL III: ICD-10-PCS | Mod: ,,, | Performed by: INTERNAL MEDICINE

## 2022-04-12 PROCEDURE — 27100171 HC OXYGEN HIGH FLOW UP TO 24 HOURS

## 2022-04-12 RX ORDER — SODIUM CHLORIDE 0.9 % (FLUSH) 0.9 %
3 SYRINGE (ML) INJECTION
Status: DISCONTINUED | OUTPATIENT
Start: 2022-04-12 | End: 2022-04-15 | Stop reason: HOSPADM

## 2022-04-12 RX ORDER — IPRATROPIUM BROMIDE AND ALBUTEROL SULFATE 2.5; .5 MG/3ML; MG/3ML
3 SOLUTION RESPIRATORY (INHALATION)
Status: DISCONTINUED | OUTPATIENT
Start: 2022-04-12 | End: 2022-04-13

## 2022-04-12 RX ORDER — ASPIRIN 81 MG/1
81 TABLET ORAL DAILY
Status: DISCONTINUED | OUTPATIENT
Start: 2022-04-12 | End: 2022-04-15 | Stop reason: HOSPADM

## 2022-04-12 RX ORDER — ONDANSETRON 8 MG/1
8 TABLET, ORALLY DISINTEGRATING ORAL EVERY 8 HOURS PRN
Status: DISCONTINUED | OUTPATIENT
Start: 2022-04-12 | End: 2022-04-15 | Stop reason: HOSPADM

## 2022-04-12 RX ORDER — FENTANYL CITRATE 50 UG/ML
50 INJECTION, SOLUTION INTRAMUSCULAR; INTRAVENOUS
Status: COMPLETED | OUTPATIENT
Start: 2022-04-12 | End: 2022-04-12

## 2022-04-12 RX ORDER — ALBUTEROL SULFATE 2.5 MG/.5ML
5 SOLUTION RESPIRATORY (INHALATION)
Status: COMPLETED | OUTPATIENT
Start: 2022-04-12 | End: 2022-04-12

## 2022-04-12 RX ORDER — MUPIROCIN 20 MG/G
OINTMENT TOPICAL 2 TIMES DAILY
Status: DISCONTINUED | OUTPATIENT
Start: 2022-04-12 | End: 2022-04-15 | Stop reason: HOSPADM

## 2022-04-12 RX ORDER — ONDANSETRON 2 MG/ML
4 INJECTION INTRAMUSCULAR; INTRAVENOUS
Status: COMPLETED | OUTPATIENT
Start: 2022-04-12 | End: 2022-04-12

## 2022-04-12 RX ORDER — IPRATROPIUM BROMIDE 0.5 MG/2.5ML
1 SOLUTION RESPIRATORY (INHALATION) ONCE
Status: COMPLETED | OUTPATIENT
Start: 2022-04-12 | End: 2022-04-12

## 2022-04-12 RX ORDER — ENOXAPARIN SODIUM 100 MG/ML
40 INJECTION SUBCUTANEOUS EVERY 24 HOURS
Status: DISCONTINUED | OUTPATIENT
Start: 2022-04-12 | End: 2022-04-15 | Stop reason: HOSPADM

## 2022-04-12 RX ORDER — FUROSEMIDE 10 MG/ML
40 INJECTION INTRAMUSCULAR; INTRAVENOUS EVERY 8 HOURS
Status: DISCONTINUED | OUTPATIENT
Start: 2022-04-12 | End: 2022-04-15 | Stop reason: HOSPADM

## 2022-04-12 RX ORDER — CLOPIDOGREL BISULFATE 75 MG/1
75 TABLET ORAL DAILY
Status: DISCONTINUED | OUTPATIENT
Start: 2022-04-12 | End: 2022-04-15 | Stop reason: HOSPADM

## 2022-04-12 RX ORDER — IBUPROFEN 200 MG
1 TABLET ORAL DAILY
Status: DISCONTINUED | OUTPATIENT
Start: 2022-04-12 | End: 2022-04-15 | Stop reason: HOSPADM

## 2022-04-12 RX ORDER — ACETAMINOPHEN 325 MG/1
650 TABLET ORAL EVERY 8 HOURS PRN
Status: DISCONTINUED | OUTPATIENT
Start: 2022-04-12 | End: 2022-04-15 | Stop reason: HOSPADM

## 2022-04-12 RX ORDER — FUROSEMIDE 10 MG/ML
60 INJECTION INTRAMUSCULAR; INTRAVENOUS
Status: COMPLETED | OUTPATIENT
Start: 2022-04-12 | End: 2022-04-12

## 2022-04-12 RX ORDER — ATORVASTATIN CALCIUM 40 MG/1
40 TABLET, FILM COATED ORAL DAILY
Status: DISCONTINUED | OUTPATIENT
Start: 2022-04-12 | End: 2022-04-15 | Stop reason: HOSPADM

## 2022-04-12 RX ADMIN — MUPIROCIN: 20 OINTMENT TOPICAL at 09:04

## 2022-04-12 RX ADMIN — IPRATROPIUM BROMIDE 1 MG: 0.5 SOLUTION RESPIRATORY (INHALATION) at 04:04

## 2022-04-12 RX ADMIN — MUPIROCIN: 20 OINTMENT TOPICAL at 10:04

## 2022-04-12 RX ADMIN — IPRATROPIUM BROMIDE AND ALBUTEROL SULFATE 3 ML: 2.5; .5 SOLUTION RESPIRATORY (INHALATION) at 12:04

## 2022-04-12 RX ADMIN — ALBUTEROL SULFATE 5 MG: 2.5 SOLUTION RESPIRATORY (INHALATION) at 04:04

## 2022-04-12 RX ADMIN — ATORVASTATIN CALCIUM 40 MG: 40 TABLET, FILM COATED ORAL at 09:04

## 2022-04-12 RX ADMIN — AZITHROMYCIN MONOHYDRATE 500 MG: 500 INJECTION, POWDER, LYOPHILIZED, FOR SOLUTION INTRAVENOUS at 05:04

## 2022-04-12 RX ADMIN — IPRATROPIUM BROMIDE AND ALBUTEROL SULFATE 3 ML: 2.5; .5 SOLUTION RESPIRATORY (INHALATION) at 08:04

## 2022-04-12 RX ADMIN — FUROSEMIDE 40 MG: 10 INJECTION, SOLUTION INTRAMUSCULAR; INTRAVENOUS at 12:04

## 2022-04-12 RX ADMIN — FUROSEMIDE 40 MG: 10 INJECTION, SOLUTION INTRAMUSCULAR; INTRAVENOUS at 10:04

## 2022-04-12 RX ADMIN — CLOPIDOGREL 75 MG: 75 TABLET, FILM COATED ORAL at 09:04

## 2022-04-12 RX ADMIN — ONDANSETRON 4 MG: 2 INJECTION INTRAMUSCULAR; INTRAVENOUS at 04:04

## 2022-04-12 RX ADMIN — ENOXAPARIN SODIUM 40 MG: 40 INJECTION SUBCUTANEOUS at 05:04

## 2022-04-12 RX ADMIN — Medication 1 PATCH: at 12:04

## 2022-04-12 RX ADMIN — FUROSEMIDE 60 MG: 10 INJECTION, SOLUTION INTRAMUSCULAR; INTRAVENOUS at 05:04

## 2022-04-12 RX ADMIN — METHYLPREDNISOLONE SODIUM SUCCINATE 80 MG: 40 INJECTION, POWDER, FOR SOLUTION INTRAMUSCULAR; INTRAVENOUS at 03:04

## 2022-04-12 RX ADMIN — IPRATROPIUM BROMIDE AND ALBUTEROL SULFATE 3 ML: 2.5; .5 SOLUTION RESPIRATORY (INHALATION) at 05:04

## 2022-04-12 RX ADMIN — ASPIRIN 81 MG: 81 TABLET, COATED ORAL at 09:04

## 2022-04-12 RX ADMIN — CEFTRIAXONE 1 G: 1 INJECTION, SOLUTION INTRAVENOUS at 05:04

## 2022-04-12 RX ADMIN — NITROGLYCERIN 1 INCH: 20 OINTMENT TOPICAL at 04:04

## 2022-04-12 RX ADMIN — METHYLPREDNISOLONE SODIUM SUCCINATE 80 MG: 40 INJECTION, POWDER, FOR SOLUTION INTRAMUSCULAR; INTRAVENOUS at 10:04

## 2022-04-12 RX ADMIN — FENTANYL CITRATE 50 MCG: 50 INJECTION INTRAMUSCULAR; INTRAVENOUS at 04:04

## 2022-04-12 NOTE — CONSULTS
[facility] - Intensive Care  Palliative Medicine  Consult Note    Patient Name: Abelardo Irene Jr.  MRN: [id number]  Admission Date: 4/12/2022  Hospital Length of Stay: 0 days  Code Status: Full Code   Attending Provider: Ravindra Watters MD  Consulting Provider: Shanell Watters MD  Primary Care Physician: Rolando Funes MD  Principal Problem:Acute on chronic respiratory failure    Patient information was obtained from patient, past medical records, ER records and primary team.      Inpatient consult to Palliative Care  Consult performed by: Shanell Watters MD  Consult ordered by: Ravindra Watters MD  Reason for consult: Advance Care Planning         Assessment/Plan:     Advance care planning  - Consult for introduction to palliative medicine and advance care planning in patient with chronic respiratory failure (on home 4L NC), COPD, CHF admitted to the ICU with acute on chronic respiratory failure, likely in the setting of COPD exacerbation and volume overload after running out of his home medications. He was admitted to the ICU for continuous BIPAP, as repeat PCO2 was > 125 (pH 7.1) despite BIPAP in ER. Chart reviewed in depth, and discussed pt with primary and PCCM teams.   - Met with pt at bedside. Despite wearing BIPAP, he was awake and readily conversational. Introduced role of palliative medicine, and learned more about pt outside of hospital. He is legally  from his wife, Nadia, though they remain very close. He notes that he is going to be living with her while trying to get his own place. Should he lose the ability to make his own medical decisions, he would want Lillian to make them on his behalf. He has a total of 6 children (4 sons, and 2 daughters). While ambulatory, he is on 4L NC oxygen at baseline.   - Discussed options in care should patient worsen (either during current hospital stay, or in future), including full code vs time-limited trial of life support vs DNR/DNI. Patient stated he wanted to  "further discuss this with his family prior to deciding; encouraged this discussion. Did share transparent information that due to severity of his underlying lung disease, he would be unlikely to have a good outcome if he ends up on life support. For now, would maintain full code status.   - Will follow up with pt and family; updated primary team     Acute on chronic respiratory failure  - Despite BIPAP in ER, continued respiratory acidosis; PCO2 > 130 at highest. Suspected COPD exacerbation, though may also have volume overload contributing (BNP approximately 900, CXR with vascular congestion). Patient stated he was out of all of his home medications, which likely contributed to current presentation.   - Management per primary team/PCCM     COPD exacerbation  - Management per primary team/PCCM     Acute on chronic combined systolic and diastolic congestive heart failure  - Management per primary team; receiving diuresis. Per patient, he was out of "all of his medications."     Coronary artery disease involving native coronary artery of native heart without angina pectoris  - Underlying disease     COPD (chronic obstructive pulmonary disease)  - At baseline is on home oxygen 4L     Thank you for your consult. I will follow-up with patient. Please contact us if you have any additional questions.     ZAID Quevedo  Palliative Medicine Staff   [phone]    > 50% of 70 min visit spent in chart review, face to face discussion of symptom assessment, coordination of care with other specialists, and discharge planning.    Subjective:     HPI:   (From ER notes) "66 y.o.male with PMHx as noted above, presents with SOB. Physical exam as noted above.  ED workup remarkable for EKG - sinus tachycardia rate 130 bpm, noted lateral T-wave inversion, nonspecific ST into a changes noted otherwise, no STEMI, trop - 0.012, BNP - 893, pH/pCO2 - 7.186/115, CBC/CMP: glucose 246, Procal - 0.02, CXR - cardiomegaly and e/o pulmonary " "edema.  Pt presentation consistent with suspected COPD exacerbation, given his respiratory acidosis and diffuse wheezing, also suggests volume overload and hypertensive emergency.  Patient given nitro paste, blood pressure improved, Lasix given as well with this BNP elevated above normal.  DuoNeb given, Solu-Medrol given en route, initial pH showing significant deviation, shortly thereafter and wall maintaining on BiPAP additional venous gas drawn and patient appears to be trending in to worse respiratory acidosis.  Settings increased on the BiPAP.  Repeat pH improving, patient's mental status improving, will continue on current Bipap settings with repeat ABG pending for 0645.  Discussed further with hospitalist, will admit to the ICU. Patient is currently in stable and improved condition, maintained on BiPAP."    Palliative medicine is consulted for advance care planning, given pt's underlying advanced COPD, and current ICU stay. Met with pt at bedside; for details of discussion, see advance care planning section of plan.       Past Medical History:   Diagnosis Date    CHF (congestive heart failure)     COPD (chronic obstructive pulmonary disease)     Coronary artery disease     Elevated cholesterol     on medication    Hypertension        Past Surgical History:   Procedure Laterality Date    CARDIAC SURGERY      coronary stent placed    CORONARY STENT PLACEMENT         Review of patient's allergies indicates:   Allergen Reactions    Contrast media Shortness Of Breath, Itching and Anxiety     Patient states that he had a bad reaction, anxiety , shortness of breath, itching .        Medications:  Continuous Infusions:  Scheduled Meds:   albuterol-ipratropium  3 mL Nebulization Q4H WAKE    aspirin  81 mg Oral Daily    atorvastatin  40 mg Oral Daily    clopidogreL  75 mg Oral Daily    enoxaparin  40 mg Subcutaneous Daily    furosemide (LASIX) injection  40 mg Intravenous Q8H    methylPREDNISolone sodium " succinate injection  80 mg Intravenous Q8H    mupirocin   Nasal BID    nicotine  1 patch Transdermal Daily     PRN Meds:acetaminophen, ondansetron, sodium chloride 0.9%    Family History       Problem Relation (Age of Onset)    Cancer Mother    No Known Problems Father          Tobacco Use    Smoking status: Former Smoker     Packs/day: 2.00     Years: 45.00     Pack years: 90.00     Types: Cigarettes     Quit date: 2017     Years since quittin.4    Smokeless tobacco: Never Used   Substance and Sexual Activity    Alcohol use: Yes     Comment: socially    Drug use: No    Sexual activity: Yes     Partners: Female     Birth control/protection: None       Review of Systems   Constitutional:  Negative for fever.   HENT:  Negative for congestion.    Respiratory:  Positive for shortness of breath.         (Improving)    Cardiovascular:  Positive for leg swelling.   Gastrointestinal:  Negative for abdominal pain.   Genitourinary:  Negative for difficulty urinating.   Musculoskeletal:  Negative for arthralgias.   Skin:  Negative for wound.   Neurological:  Negative for dizziness.   Psychiatric/Behavioral:  Negative for confusion.    Objective:     Vital Signs (Most Recent):  Temp: 97.6 °F (36.4 °C) (22 1101)  Pulse: 80 (22 1233)  Resp: (!) 22 (22 1233)  BP: 110/68 (22 1101)  SpO2: 95 % (22 1233)   Vital Signs (24h Range):  Temp:  [96.3 °F (35.7 °C)-98.7 °F (37.1 °C)] 97.6 °F (36.4 °C)  Pulse:  [] 80  Resp:  [15-40] 22  SpO2:  [95 %-100 %] 95 %  BP: (100-200)/() 110/68     Weight: 90.5 kg (199 lb 8.3 oz)  Body mass index is 28.63 kg/m².    Physical Exam  Constitutional:       Comments: Sitting up in bed, wearing BIPAP, appears older than stated age and chronically ill, in no distress   HENT:      Head: Normocephalic and atraumatic.      Nose: Nose normal.   Eyes:      Extraocular Movements: Extraocular movements intact.   Cardiovascular:      Rate and Rhythm: Normal  rate.   Pulmonary:      Effort: Pulmonary effort is normal.      Comments: Breathing appears comfortable, on BIPAP   Skin:     General: Skin is warm.   Neurological:      General: No focal deficit present.      Mental Status: He is oriented to person, place, and time.      Comments: Readily able to have a conversation despite BIPAP    Psychiatric:         Mood and Affect: Mood normal.         Behavior: Behavior normal.     Significant Labs: All pertinent labs within the past 24 hours have been reviewed.  CBC:   Recent Labs   Lab 04/12/22 0442   WBC 14.72*   HGB 13.1*   HCT 43.4   MCV 95        BMP:  Recent Labs   Lab 04/12/22 0442 04/12/22  0552   *  --      --    K 4.6  --    CL 97  --    CO2 35*  --    BUN 4*  --    CREATININE 0.9  --    CALCIUM 9.2  --    MG  --  1.8     LFT:  Lab Results   Component Value Date    AST 17 04/12/2022    ALKPHOS 121 04/12/2022    BILITOT 0.2 04/12/2022     Albumin:   Albumin   Date Value Ref Range Status   04/12/2022 3.9 3.5 - 5.2 g/dL Final     Protein:   Total Protein   Date Value Ref Range Status   04/12/2022 7.9 6.0 - 8.4 g/dL Final     Lactic acid:   Lab Results   Component Value Date    LACTATE 1.2 04/12/2022       Significant Imaging: I have reviewed all pertinent imaging results/findings within the past 24 hours.

## 2022-04-12 NOTE — ASSESSMENT & PLAN NOTE
- spent a total of 4 minutes face to face discussing smoking cessation. He tells me he is ready to quit and would like a nicotine patch. Order placed

## 2022-04-12 NOTE — SUBJECTIVE & OBJECTIVE
Past Medical History:   Diagnosis Date    CHF (congestive heart failure)     COPD (chronic obstructive pulmonary disease)     Coronary artery disease     Elevated cholesterol     on medication    Hypertension        Past Surgical History:   Procedure Laterality Date    CARDIAC SURGERY      coronary stent placed    CORONARY STENT PLACEMENT         Review of patient's allergies indicates:   Allergen Reactions    Contrast media Shortness Of Breath, Itching and Anxiety     Patient states that he had a bad reaction, anxiety , shortness of breath, itching .        Medications:  Continuous Infusions:  Scheduled Meds:   albuterol-ipratropium  3 mL Nebulization Q4H WAKE    aspirin  81 mg Oral Daily    atorvastatin  40 mg Oral Daily    clopidogreL  75 mg Oral Daily    enoxaparin  40 mg Subcutaneous Daily    furosemide (LASIX) injection  40 mg Intravenous Q8H    methylPREDNISolone sodium succinate injection  80 mg Intravenous Q8H    mupirocin   Nasal BID    nicotine  1 patch Transdermal Daily     PRN Meds:acetaminophen, ondansetron, sodium chloride 0.9%    Family History       Problem Relation (Age of Onset)    Cancer Mother    No Known Problems Father          Tobacco Use    Smoking status: Former Smoker     Packs/day: 2.00     Years: 45.00     Pack years: 90.00     Types: Cigarettes     Quit date: 2017     Years since quittin.4    Smokeless tobacco: Never Used   Substance and Sexual Activity    Alcohol use: Yes     Comment: socially    Drug use: No    Sexual activity: Yes     Partners: Female     Birth control/protection: None       Review of Systems   Constitutional:  Negative for fever.   HENT:  Negative for congestion.    Respiratory:  Positive for shortness of breath.         (Improving)    Cardiovascular:  Positive for leg swelling.   Gastrointestinal:  Negative for abdominal pain.   Genitourinary:  Negative for difficulty urinating.   Musculoskeletal:  Negative for arthralgias.   Skin:  Negative for wound.    Neurological:  Negative for dizziness.   Psychiatric/Behavioral:  Negative for confusion.    Objective:     Vital Signs (Most Recent):  Temp: 97.6 °F (36.4 °C) (04/12/22 1101)  Pulse: 80 (04/12/22 1233)  Resp: (!) 22 (04/12/22 1233)  BP: 110/68 (04/12/22 1101)  SpO2: 95 % (04/12/22 1233)   Vital Signs (24h Range):  Temp:  [96.3 °F (35.7 °C)-98.7 °F (37.1 °C)] 97.6 °F (36.4 °C)  Pulse:  [] 80  Resp:  [15-40] 22  SpO2:  [95 %-100 %] 95 %  BP: (100-200)/() 110/68     Weight: 90.5 kg (199 lb 8.3 oz)  Body mass index is 28.63 kg/m².    Physical Exam  Constitutional:       Comments: Sitting up in bed, wearing BIPAP, appears older than stated age and chronically ill, in no distress   HENT:      Head: Normocephalic and atraumatic.      Nose: Nose normal.   Eyes:      Extraocular Movements: Extraocular movements intact.   Cardiovascular:      Rate and Rhythm: Normal rate.   Pulmonary:      Effort: Pulmonary effort is normal.      Comments: Breathing appears comfortable, on BIPAP   Skin:     General: Skin is warm.   Neurological:      General: No focal deficit present.      Mental Status: He is oriented to person, place, and time.      Comments: Readily able to have a conversation despite BIPAP    Psychiatric:         Mood and Affect: Mood normal.         Behavior: Behavior normal.     Significant Labs: All pertinent labs within the past 24 hours have been reviewed.  CBC:   Recent Labs   Lab 04/12/22 0442   WBC 14.72*   HGB 13.1*   HCT 43.4   MCV 95        BMP:  Recent Labs   Lab 04/12/22 0442 04/12/22  0552   *  --      --    K 4.6  --    CL 97  --    CO2 35*  --    BUN 4*  --    CREATININE 0.9  --    CALCIUM 9.2  --    MG  --  1.8     LFT:  Lab Results   Component Value Date    AST 17 04/12/2022    ALKPHOS 121 04/12/2022    BILITOT 0.2 04/12/2022     Albumin:   Albumin   Date Value Ref Range Status   04/12/2022 3.9 3.5 - 5.2 g/dL Final     Protein:   Total Protein   Date Value Ref Range  Status   04/12/2022 7.9 6.0 - 8.4 g/dL Final     Lactic acid:   Lab Results   Component Value Date    LACTATE 1.2 04/12/2022       Significant Imaging: I have reviewed all pertinent imaging results/findings within the past 24 hours.

## 2022-04-12 NOTE — ASSESSMENT & PLAN NOTE
Patient with Hypercapnic Respiratory failure which is Acute on chronic.  he is on home oxygen at 5 LPM. Supplemental oxygen was provided and noted- Oxygen Concentration (%):  [] 30.   Signs/symptoms of respiratory failure include- tachypnea, increased work of breathing and wheezing. Contributing diagnoses includes - CHF and COPD Labs and images were reviewed. Patient Has recent ABG, which has been reviewed. Will treat underlying causes and adjust management of respiratory failure as follows-   - severe respiratory acidosis with CO2 >130 --> on bipap with improvement of distress  - Steroids, lasix and nebulizers  - Pulmonology consulted

## 2022-04-12 NOTE — H&P
"[facility] Medicine  History & Physical    Patient Name: Abelardo Irene Jr.  MRN: [id number]  Patient Class: IP- Inpatient  Admission Date: 4/12/2022  Attending Physician: Ravindra Watters MD   Primary Care Provider: Adelso Reyna MD         Patient information was obtained from patient, EMS personnel, past medical records and ER records.     Subjective:     Principal Problem:Acute on chronic respiratory failure    Chief Complaint:   Chief Complaint   Patient presents with    Shortness of Breath     Pt here via EMS with reports of SOB x 2 days. Pt has hx of COPD. Pt sating 95% on 5L NC at home. EMS gave duoneb and 125mg of solumedrol. Pt diaphoretic and tachypneic upon arrival.         HPI: Mr. Irene is a 65 yo M with history of CHF, COPD on home oxygen and hypertension who presents with chief complaint of shortness of breath. He tells me it started 2 days ago and he "couldn't shake it." He also endorses swelling in his legs. He has run out of his medications recently and does admit to smoking cigarettes still. He denies any recent cough, fever or chills.  In the ED, he was found to have severe respiratory acidosis and respiratory distress, he was given lasix, nebulizers and steroids and placed on bipap. He was admitted to ICU for further evaluation.      Past Medical History:   Diagnosis Date    CHF (congestive heart failure)     COPD (chronic obstructive pulmonary disease)     Coronary artery disease     Elevated cholesterol     on medication    Hypertension        Past Surgical History:   Procedure Laterality Date    CARDIAC SURGERY      coronary stent placed    CORONARY STENT PLACEMENT         Review of patient's allergies indicates:   Allergen Reactions    Contrast media Shortness Of Breath, Itching and Anxiety     Patient states that he had a bad reaction, anxiety , shortness of breath, itching .        No current facility-administered medications on file prior to encounter. "     Current Outpatient Medications on File Prior to Encounter   Medication Sig    albuterol (PROVENTIL/VENTOLIN HFA) 90 mcg/actuation inhaler Inhale 1-2 puffs into the lungs every 6 (six) hours as needed for Wheezing. Rescue    amLODIPine (NORVASC) 5 MG tablet Take 1 tablet (5 mg total) by mouth once daily.    aspirin (ECOTRIN) 81 MG EC tablet Take 1 tablet (81 mg total) by mouth once daily.    atorvastatin (LIPITOR) 40 MG tablet Take 1 tablet (40 mg total) by mouth once daily.    clopidogreL (PLAVIX) 75 mg tablet Take 1 tablet (75 mg total) by mouth once daily.    fluticasone-salmeterol diskus inhaler 250-50 mcg Inhale 1 puff into the lungs 2 (two) times daily.    furosemide (LASIX) 20 MG tablet Take 1 tablet (20 mg total) by mouth once daily.    lisinopriL (PRINIVIL,ZESTRIL) 20 MG tablet Take 1 tablet (20 mg total) by mouth once daily.    metoprolol succinate (TOPROL-XL) 25 MG 24 hr tablet Take 1 tablet (25 mg total) by mouth once daily.     Family History       Problem Relation (Age of Onset)    Cancer Mother    No Known Problems Father          Tobacco Use    Smoking status: Former Smoker     Packs/day: 2.00     Years: 45.00     Pack years: 90.00     Types: Cigarettes     Quit date: 2017     Years since quittin.4    Smokeless tobacco: Never Used   Substance and Sexual Activity    Alcohol use: Yes     Comment: socially    Drug use: No    Sexual activity: Yes     Partners: Female     Birth control/protection: None     Review of Systems: all other review of symptoms negative  Constitutional:  Negative for chills and fever.   Respiratory:  Positive for shortness of breath and wheezing. Negative for cough.    Cardiovascular:  Positive for leg swelling. Negative for chest pain and palpitations.   Gastrointestinal:  Negative for abdominal pain, constipation, diarrhea, nausea and vomiting.   Genitourinary:  Negative for dysuria.   Musculoskeletal:  Negative for arthralgias and back pain.   Skin:   Negative for rash.   Neurological:  Negative for dizziness and numbness.   Psychiatric/Behavioral:  Negative for agitation and confusion.    Objective:     Vital Signs (Most Recent):  Temp: 96.3 °F (35.7 °C) (04/12/22 0741)  Pulse: 67 (04/12/22 0900)  Resp: 15 (04/12/22 0900)  BP: (!) 107/59 (04/12/22 0900)  SpO2: 100 % (04/12/22 0900)   Vital Signs (24h Range):  Temp:  [96.3 °F (35.7 °C)-98.7 °F (37.1 °C)] 96.3 °F (35.7 °C)  Pulse:  [] 67  Resp:  [15-40] 15  SpO2:  [98 %-100 %] 100 %  BP: (100-200)/() 107/59     Weight: 90.5 kg (199 lb 8.3 oz)  Body mass index is 28.63 kg/m².    Physical Exam  Vitals and nursing note reviewed.   Constitutional:       General: He is not in acute distress.     Appearance: He is ill-appearing.      Comments: Wearing bipap, appears comfortable    HENT:      Head: Normocephalic.   Eyes:      General: No scleral icterus.     Conjunctiva/sclera: Conjunctivae normal.   Cardiovascular:      Rate and Rhythm: Normal rate and regular rhythm.      Pulses: Normal pulses.   Pulmonary:      Effort: Pulmonary effort is normal.      Breath sounds: No wheezing.      Comments: On bipap, able to speak in complete sentences. No distress. No wheezing appreciated on my exam  Abdominal:      General: Bowel sounds are normal. There is no distension.      Tenderness: There is no abdominal tenderness.   Musculoskeletal:         General: No swelling. Normal range of motion.   Skin:     General: Skin is warm and dry.   Neurological:      General: No focal deficit present.      Mental Status: He is alert and oriented to person, place, and time.   Psychiatric:         Mood and Affect: Mood normal.         Thought Content: Thought content normal.         Judgment: Judgment normal.           Significant Labs: All pertinent labs within the past 24 hours have been reviewed.    Significant Imaging: I have reviewed all pertinent imaging results/findings within the past 24 hours.    Assessment/Plan:     *  Acute on chronic respiratory failure  Patient with Hypercapnic Respiratory failure which is Acute on chronic.  he is on home oxygen at 5 LPM. Supplemental oxygen was provided and noted- Oxygen Concentration (%):  [] 30.   Signs/symptoms of respiratory failure include- tachypnea, increased work of breathing and wheezing. Contributing diagnoses includes - CHF and COPD Labs and images were reviewed. Patient Has recent ABG, which has been reviewed. Will treat underlying causes and adjust management of respiratory failure as follows-   - severe respiratory acidosis with CO2 >130 --> on bipap with improvement of distress  - Steroids, lasix and nebulizers  - Pulmonology consulted    COPD exacerbation  - with severe respiratory acidosis  - on nebs, bipap and steroids  - still smoking       Acute on chronic combined systolic and diastolic congestive heart failure  - has known hx with CAD  - continue on lasix, feel that most of this exacerbation is likely COPD      Coronary artery disease involving native coronary artery of native heart without angina pectoris  - continue ASA/Statin  - ecg and troponin reviewed and no evidence of ischemia  - chest pain free      COPD (chronic obstructive pulmonary disease)  - still smokes - see exacerbation      Tobacco abuse  - spent a total of 4 minutes face to face discussing smoking cessation. He tells me he is ready to quit and would like a nicotine patch. Order placed        VTE Risk Mitigation (From admission, onward)         Ordered     enoxaparin injection 40 mg  Daily         04/12/22 0736     IP VTE HIGH RISK PATIENT  Once         04/12/22 0736     Place sequential compression device  Until discontinued         04/12/22 0736              Critical care time spent on the evaluation and treatment of severe organ dysfunction, review of pertinent labs and imaging studies, discussions with consulting providers and discussions with patient/family: 60 minutes.     Ravindra Watters,  MD  Department of Hospital Medicine   [facility] [place] [facility]

## 2022-04-12 NOTE — ASSESSMENT & PLAN NOTE
Given substantial diastolic heart failure, blood pressure and volume management be the mainstays of treatment.  And titrate antihypertensives as needed.

## 2022-04-12 NOTE — ASSESSMENT & PLAN NOTE
- has known hx with CAD  - continue on lasix, feel that most of this exacerbation is likely COPD

## 2022-04-12 NOTE — ASSESSMENT & PLAN NOTE
Counseled extensively on the need for weight loss.  Being overweight places patient at risk for exacerbation of chronic illnesses in worse outcomes while being hospitalized.

## 2022-04-12 NOTE — CARE UPDATE
[facility] - [place], [place] Intensive Care  ICU Shift Summary  Date: 4/12/2022      Prehospitalization: Home  Admit Date / LOS : 4/12/2022/ 0 days    Diagnosis: Acute on chronic respiratory failure with hypoxia and hypercapnia    Consults:        Active: Palliative and Pulm CC       Needed: N/A     Code Status: Full Code   Advanced Directive: <no information>    LDA: PIV       Central Lines/Site/Justification:Patient Does Not Have Central Line       Urinary Cath/Order/Justification:Patient Does Not Have Urinary Catheter    Vasopressors/Infusions:        GOALS: Volume/ Hemodynamic: N/A                     RASS: 0  alert and calm    Pain Management: none       Pain Controlled: not applicable     Rhythm: NSR    Respiratory Device: Vapotherm    Oxygen Concentration (%):  [] 30             Most Recent SBT/ SAT: N/A       MOVE Screen: PASS  ICU Liberation: yes    VTE Prophylaxis: Pharm and Ambulation  Mobility: Ambulatory  Stress Ulcer Prophylaxis: No    Isolation: No active isolations  Dietary: PO  Tolerance: yes  Advancement: @ goal    I & O (24h):    Intake/Output Summary (Last 24 hours) at 4/12/2022 1805  Last data filed at 4/12/2022 1501  Gross per 24 hour   Intake 300 ml   Output 2250 ml   Net -1950 ml        Restraints: No    Significant Dates:  Post Op Date: N/A  Rescue Date: N/A  Imaging/ Diagnostics: N/A    Noteworthy Labs:  See Chart    COVID Test: (+)  CBC/Anemia Labs: Coags:    Recent Labs   Lab 04/12/22 0442   WBC 14.72*   HGB 13.1*   HCT 43.4      MCV 95   RDW 13.2    No results for input(s): PT, INR, APTT in the last 168 hours.     Chemistries:   Recent Labs   Lab 04/12/22 0442 04/12/22  0552     --    K 4.6  --    CL 97  --    CO2 35*  --    BUN 4*  --    CREATININE 0.9  --    CALCIUM 9.2  --    PROT 7.9  --    BILITOT 0.2  --    ALKPHOS 121  --    ALT 16  --    AST 17  --    MG  --  1.8   PHOS  --  4.5        Cardiac Enzymes: Ejection Fractions:    Recent Labs     04/12/22 0442   TROPONINI 0.012     EF   Date Value Ref Range Status   04/12/2022 40 % Final        POCT Glucose: HbA1c:    No results for input(s): POCTGLUCOSE in the last 168 hours. Hemoglobin A1C   Date Value Ref Range Status   01/22/2018 10.0 (H) 4.0 - 5.6 % Final     Comment:     According to ADA guidelines, hemoglobin A1c <7.0% represents  optimal control in non-pregnant diabetic patients. Different  metrics may apply to specific patient populations.   Standards of Medical Care in Diabetes-2016.  For the purpose of screening for the presence of diabetes:  <5.7%     Consistent with the absence of diabetes  5.7-6.4%  Consistent with increasing risk for diabetes   (prediabetes)  >or=6.5%  Consistent with diabetes  Currently, no consensus exists for use of hemoglobin A1c  for diagnosis of diabetes for children.  This Hemoglobin A1c assay has significant interference with fetal   hemoglobin   (HbF). The results are invalid for patients with abnormal amounts of   HbF,   including those with known Hereditary Persistence   of Fetal Hemoglobin. Heterozygous hemoglobin variants (HbAS, HbAC,   HbAD, HbAE, HbA2) do not significantly interfere with this assay;   however, presence of multiple variants in a sample may impact the %   interference.             ICU LOS 10h  Level of Care: Critical Care    Chart Check: 12 HR Done  Shift Summary/Plan for the shift: See Care Plan note.

## 2022-04-12 NOTE — ASSESSMENT & PLAN NOTE
2017 PFTs do not show obstruction but do note moderate restriction with reduced DLCO.  CT chest around that time does show pan lobular emphysematous changes.  May now have some degree of obstruction but currently no indication for LABA/LAMA/ICS.  Currently dose prednisone without wheezing on exam.  Given severity of illness, recommend decreasing to prednisone 40 daily for a total of 5 days since I cannot prove that he does not obstruction.  Can use duo nebs PRN.    14-Feb-2019 03:12

## 2022-04-12 NOTE — HPI
66-year-old male smoker with reported history of COPD and HTN presenting with 2 days of worsening shortness of breath.  Denies recent sick contacts, productive cough, fever or chills.  Patient admits to running out of his routine medications recently and endorses worsening LE edema along with PND.  On arrival, patient tachypneic and diaphoretic.  Initial ABG with marked acidemia and hypercapnic respiratory failure.  Patient was placed on NIV and transferred to ICU.

## 2022-04-12 NOTE — ASSESSMENT & PLAN NOTE
"- Management per primary team; receiving diuresis. Per patient, he was out of "all of his medications."   "

## 2022-04-12 NOTE — ASSESSMENT & PLAN NOTE
- Consult for introduction to palliative medicine and advance care planning in patient with chronic respiratory failure (on home 4L NC), COPD, CHF admitted to the ICU with acute on chronic respiratory failure, likely in the setting of COPD exacerbation and volume overload after running out of his home medications. He was admitted to the ICU for continuous BIPAP, as repeat PCO2 was > 125 (pH 7.1) despite BIPAP in ER. Chart reviewed in depth, and discussed pt during MDT rounds.   - Met with pt at bedside. Despite wearing BIPAP, he was awake and readily conversational. Introduced role of palliative medicine, and learned more about pt outside of hospital. He is legally  from his wife, Nadia, though they remain very close. He notes that he is going to be living with her while trying to get his own place. Should he lose the ability to make his own medical decisions, he would want Lillian to make them on his behalf. He has a total of 6 children (4 sons, and 2 daughters). While ambulatory, he is on 4L NC oxygen at baseline.   - Discussed options in care should patient worsen (either during current hospital stay, or in future), including full code vs time-limited trial of life support vs DNR/DNI. Patient stated he wanted to further discuss this with his family prior to deciding; encouraged this discussion. Did share transparent information that due to severity of his underlying lung disease, he would be unlikely to have a good outcome if he ends up on life support. For now, would maintain full code status.   - Will follow up with pt and family; updated primary team

## 2022-04-12 NOTE — SUBJECTIVE & OBJECTIVE
Past Medical History:   Diagnosis Date    CHF (congestive heart failure)     COPD (chronic obstructive pulmonary disease)     Coronary artery disease     Elevated cholesterol     on medication    Hypertension        Past Surgical History:   Procedure Laterality Date    CARDIAC SURGERY      coronary stent placed    CORONARY STENT PLACEMENT         Review of patient's allergies indicates:   Allergen Reactions    Contrast media Shortness Of Breath, Itching and Anxiety     Patient states that he had a bad reaction, anxiety , shortness of breath, itching .        Family History       Problem Relation (Age of Onset)    Cancer Mother    No Known Problems Father          Tobacco Use    Smoking status: Former Smoker     Packs/day: 2.00     Years: 45.00     Pack years: 90.00     Types: Cigarettes     Quit date: 2017     Years since quittin.4    Smokeless tobacco: Never Used   Substance and Sexual Activity    Alcohol use: Yes     Comment: socially    Drug use: No    Sexual activity: Yes     Partners: Female     Birth control/protection: None         Review of Systems   Unable to perform ROS: Acuity of condition   Objective:     Vital Signs (Most Recent):  Temp: 97.6 °F (36.4 °C) (22 1101)  Pulse: 80 (22 1233)  Resp: (!) 22 (22 1233)  BP: 110/68 (22 1101)  SpO2: 95 % (22 1233)   Vital Signs (24h Range):  Temp:  [96.3 °F (35.7 °C)-98.7 °F (37.1 °C)] 97.6 °F (36.4 °C)  Pulse:  [] 80  Resp:  [15-40] 22  SpO2:  [95 %-100 %] 95 %  BP: (100-200)/() 110/68     Weight: 90.5 kg (199 lb 8.3 oz)  Body mass index is 28.63 kg/m².      Intake/Output Summary (Last 24 hours) at 2022 1415  Last data filed at 2022 1101  Gross per 24 hour   Intake 300 ml   Output 1950 ml   Net -1650 ml       Physical Exam  Vitals and nursing note reviewed.   Constitutional:       General: He is not in acute distress.     Appearance: He is overweight. He is ill-appearing. He is not toxic-appearing or  diaphoretic.      Interventions: Face mask in place.      Comments: NIV in place   HENT:      Head: Normocephalic and atraumatic.      Nose: No rhinorrhea.   Eyes:      General: No scleral icterus.     Extraocular Movements: Extraocular movements intact.      Conjunctiva/sclera: Conjunctivae normal.   Cardiovascular:      Rate and Rhythm: Normal rate and regular rhythm.      Heart sounds: No murmur heard.  Pulmonary:      Effort: No tachypnea, accessory muscle usage, prolonged expiration, respiratory distress or retractions.      Breath sounds: No stridor. No decreased breath sounds, wheezing, rhonchi or rales.   Abdominal:      General: There is no distension.      Palpations: Abdomen is soft.      Tenderness: There is no abdominal tenderness. There is no guarding.   Musculoskeletal:         General: No swelling.      Right lower leg: No edema.      Left lower leg: No edema.   Skin:     General: Skin is warm and dry.      Coloration: Skin is not jaundiced.      Findings: No rash.   Neurological:      General: No focal deficit present.      Cranial Nerves: No cranial nerve deficit.       Vents:  Oxygen Concentration (%): 30 (04/12/22 1233)    Lines/Drains/Airways       Peripheral Intravenous Line  Duration                  Peripheral IV - Single Lumen 04/12/22 0441 20 G Left Antecubital <1 day         Peripheral IV - Single Lumen 04/12/22 0454 20 G Right;Anterior Antecubital <1 day                    Significant Labs:    CBC/Anemia Profile:  Recent Labs   Lab 04/12/22 0442   WBC 14.72*   HGB 13.1*   HCT 43.4      MCV 95   RDW 13.2        Chemistries:  Recent Labs   Lab 04/12/22 0442 04/12/22  0552     --    K 4.6  --    CL 97  --    CO2 35*  --    BUN 4*  --    CREATININE 0.9  --    CALCIUM 9.2  --    ALBUMIN 3.9  --    PROT 7.9  --    BILITOT 0.2  --    ALKPHOS 121  --    ALT 16  --    AST 17  --    MG  --  1.8   PHOS  --  4.5       All pertinent labs within the past 24 hours have been  reviewed.    Significant Imaging:   I have reviewed all pertinent imaging results/findings within the past 24 hours.

## 2022-04-12 NOTE — ASSESSMENT & PLAN NOTE
Most recent echo with LVEF 35% and grade 2 diastolic dysfunction.   on admit and CXR consistent with CHF.  Repeat formal echo pending.  Recommend aggressive diuresis and maintain strict I/O.

## 2022-04-12 NOTE — ASSESSMENT & PLAN NOTE
Markedly acidemic with elevated pCO2 on admit.  Baseline pCO2 appears to be around 70. Improved on Bipap and now transition to Vapotherm.  Has history of sleep disordered breathing and currently using nightly CPAP.  Previous PFTs with restrictive physiology.  PCT negative but BNP is elevated.    - see the section on heart failure for recs regarding diuresis  - can complete 5 day total course of steroids as noted.  See the section on COPD  - needs outpatient PFTs and to be plugged back in with sleep medicine

## 2022-04-12 NOTE — PLAN OF CARE
[facility] - Intensive Care  Initial Discharge Assessment       Primary Care Provider: Rolando Funes MD    Admission Diagnosis: SOB (shortness of breath) [R06.02]  COPD exacerbation [J44.1]  Acute on chronic combined systolic and diastolic congestive heart failure [I50.43]  Respiratory failure with hypoxia and hypercapnia, unspecified chronicity [J96.91, J96.92]    Admission Date: 4/12/2022  Expected Discharge Date:     Discharge Barriers Identified: None    Payor: [employer] MEDICARE / Plan: [employer] / Product Type: Medicare Advantage /     Extended Emergency Contact Information  Primary Emergency Contact: deejay garcia  Mobile Phone: [phone]  Relation: Sister  Preferred language: English   needed? No    Discharge Plan A: Home with family  Discharge Plan B: [facility]      [facility] [address] [place] [address]  [address] [place] [address]  Phone: [phone] Fax: [phone]      Initial Assessment (most recent)       Adult Discharge Assessment - 04/12/22 1233          Discharge Assessment    Confirmed/corrected address, phone number and insurance No   Patient does not know his new address.  Will obtain prior to discharge.    Source of Information patient;family   Sister,Bahman Irene, at the bedside.    When was your last doctors appointment? --   Doesn't know.  Couple of months ago.  Was living between [facility].    Does patient/caregiver understand observation status --   n/a    Communicated VERA with patient/caregiver Date not available/Unable to determine     Reason For Admission Shortness of Breath     Lives With child(patricia), adult     Facility Arrived From: home     Do you expect to return to your current living situation? Yes     Do you have help at home or someone to help you manage your care at home? Yes     Who are your caregiver(s) and their phone number(s)? Emergency Contact:  Deejay jacobsen;  [phone]      Prior to hospitilization cognitive status: Alert/Oriented     Current cognitive status: Alert/Oriented     Walking or Climbing Stairs Difficulty none     Dressing/Bathing Difficulty none     Equipment Currently Used at Home oxygen     Readmission within 30 days? No     Patient currently being followed by outpatient case management? No     Do you currently have service(s) that help you manage your care at home? No     Do you take prescription medications? Yes     Do you have prescription coverage? Yes     Coverage [facility]     Do you have any problems affording any of your prescribed medications? No     Who is going to help you get home at discharge? family     How do you get to doctors appointments? car, drives self;family or friend will provide     Are you on dialysis? No     Do you take coumadin? No     Discharge Plan A Home with family     Discharge Plan B Home Health     DME Needed Upon Discharge  none     Discharge Plan discussed with: Sibling;Patient     Name(s) and Number(s) Bahman Irene     Discharge Barriers Identified None        Relationship/Environment    Name(s) of Who Lives With Patient Daughter                 Discharge planning assessment completed with assistance from patient and sister, Bahman Irene.  Patient from home with daughter and independent.  Patient uses home O2.  Patient could not recall his new address but will provide prior to discharge.   will assist with discharge planning.

## 2022-04-12 NOTE — HPI
"(From ER notes) "66 y.o.male with PMHx as noted above, presents with SOB. Physical exam as noted above.  ED workup remarkable for EKG - sinus tachycardia rate 130 bpm, noted lateral T-wave inversion, nonspecific ST into a changes noted otherwise, no STEMI, trop - 0.012, BNP - 893, pH/pCO2 - 7.186/115, CBC/CMP: glucose 246, Procal - 0.02, CXR - cardiomegaly and e/o pulmonary edema.  Pt presentation consistent with suspected COPD exacerbation, given his respiratory acidosis and diffuse wheezing, also suggests volume overload and hypertensive emergency.  Patient given nitro paste, blood pressure improved, Lasix given as well with this BNP elevated above normal.  DuoNeb given, Solu-Medrol given en route, initial pH showing significant deviation, shortly thereafter and wall maintaining on BiPAP additional venous gas drawn and patient appears to be trending in to worse respiratory acidosis.  Settings increased on the BiPAP.  Repeat pH improving, patient's mental status improving, will continue on current Bipap settings with repeat ABG pending for 0645.  Discussed further with hospitalist, will admit to the ICU. Patient is currently in stable and improved condition, maintained on BiPAP."    Palliative medicine is consulted for advance care planning, given pt's underlying advanced COPD, and current ICU stay. Met with pt at bedside; for details of discussion, see advance care planning section of plan.   "

## 2022-04-12 NOTE — ASSESSMENT & PLAN NOTE
Previous A1cs very elevated.  Blood glucose is currently not controlled.  Added titrate insulin as needed.

## 2022-04-12 NOTE — CONSULTS
[facility] - Intensive Care  Critical Care Medicine  Consult Note    Patient Name: Abelardo Irene Jr.  MRN: [id number]  Admission Date: 2022  Hospital Length of Stay: 0 days  Code Status: Full Code  Attending Physician: Ravindra Watters MD   Primary Care Provider: Rolando Funes MD   Principal Problem: Acute on chronic respiratory failure with hypoxia and hypercapnia    [unfilled]  Subjective:     HPI:  66-year-old male smoker with reported history of COPD and HTN presenting with 2 days of worsening shortness of breath.  Denies recent sick contacts, productive cough, fever or chills.  Patient admits to running out of his routine medications recently and endorses worsening LE edema along with PND.  On arrival, patient tachypneic and diaphoretic.  Initial ABG with marked acidemia and hypercapnic respiratory failure.  Patient was placed on NIV and transferred to ICU.      Hospital/ICU Course:  No notes on file    Past Medical History:   Diagnosis Date    CHF (congestive heart failure)     COPD (chronic obstructive pulmonary disease)     Coronary artery disease     Elevated cholesterol     on medication    Hypertension        Past Surgical History:   Procedure Laterality Date    CARDIAC SURGERY      coronary stent placed    CORONARY STENT PLACEMENT         Review of patient's allergies indicates:   Allergen Reactions    Contrast media Shortness Of Breath, Itching and Anxiety     Patient states that he had a bad reaction, anxiety , shortness of breath, itching .        Family History       Problem Relation (Age of Onset)    Cancer Mother    No Known Problems Father          Tobacco Use    Smoking status: Former Smoker     Packs/day: 2.00     Years: 45.00     Pack years: 90.00     Types: Cigarettes     Quit date: 2017     Years since quittin.4    Smokeless tobacco: Never Used   Substance and Sexual Activity    Alcohol use: Yes     Comment: socially    Drug use: No    Sexual activity: Yes      Partners: Female     Birth control/protection: None         Review of Systems   Unable to perform ROS: Acuity of condition   Objective:     Vital Signs (Most Recent):  Temp: 97.6 °F (36.4 °C) (04/12/22 1101)  Pulse: 80 (04/12/22 1233)  Resp: (!) 22 (04/12/22 1233)  BP: 110/68 (04/12/22 1101)  SpO2: 95 % (04/12/22 1233)   Vital Signs (24h Range):  Temp:  [96.3 °F (35.7 °C)-98.7 °F (37.1 °C)] 97.6 °F (36.4 °C)  Pulse:  [] 80  Resp:  [15-40] 22  SpO2:  [95 %-100 %] 95 %  BP: (100-200)/() 110/68     Weight: 90.5 kg (199 lb 8.3 oz)  Body mass index is 28.63 kg/m².      Intake/Output Summary (Last 24 hours) at 4/12/2022 1415  Last data filed at 4/12/2022 1101  Gross per 24 hour   Intake 300 ml   Output 1950 ml   Net -1650 ml       Physical Exam  Vitals and nursing note reviewed.   Constitutional:       General: He is not in acute distress.     Appearance: He is overweight. He is ill-appearing. He is not toxic-appearing or diaphoretic.      Interventions: Face mask in place.      Comments: NIV in place   HENT:      Head: Normocephalic and atraumatic.      Nose: No rhinorrhea.   Eyes:      General: No scleral icterus.     Extraocular Movements: Extraocular movements intact.      Conjunctiva/sclera: Conjunctivae normal.   Cardiovascular:      Rate and Rhythm: Normal rate and regular rhythm.      Heart sounds: No murmur heard.  Pulmonary:      Effort: No tachypnea, accessory muscle usage, prolonged expiration, respiratory distress or retractions.      Breath sounds: No stridor. No decreased breath sounds, wheezing, rhonchi or rales.   Abdominal:      General: There is no distension.      Palpations: Abdomen is soft.      Tenderness: There is no abdominal tenderness. There is no guarding.   Musculoskeletal:         General: No swelling.      Right lower leg: No edema.      Left lower leg: No edema.   Skin:     General: Skin is warm and dry.      Coloration: Skin is not jaundiced.      Findings: No rash.    Neurological:      General: No focal deficit present.      Cranial Nerves: No cranial nerve deficit.       Vents:  Oxygen Concentration (%): 30 (04/12/22 1233)    Lines/Drains/Airways       Peripheral Intravenous Line  Duration                  Peripheral IV - Single Lumen 04/12/22 0441 20 G Left Antecubital <1 day         Peripheral IV - Single Lumen 04/12/22 0454 20 G Right;Anterior Antecubital <1 day                    Significant Labs:    CBC/Anemia Profile:  Recent Labs   Lab 04/12/22 0442   WBC 14.72*   HGB 13.1*   HCT 43.4      MCV 95   RDW 13.2        Chemistries:  Recent Labs   Lab 04/12/22 0442 04/12/22  0552     --    K 4.6  --    CL 97  --    CO2 35*  --    BUN 4*  --    CREATININE 0.9  --    CALCIUM 9.2  --    ALBUMIN 3.9  --    PROT 7.9  --    BILITOT 0.2  --    ALKPHOS 121  --    ALT 16  --    AST 17  --    MG  --  1.8   PHOS  --  4.5       All pertinent labs within the past 24 hours have been reviewed.    Significant Imaging:   I have reviewed all pertinent imaging results/findings within the past 24 hours.      ABG  Recent Labs   Lab 04/12/22  1324   PH 7.329*   PO2 63*   PCO2 76.8*   HCO3 40.4*   BE 11     Assessment/Plan:     Pulmonary  * Acute on chronic respiratory failure with hypoxia and hypercapnia  Markedly acidemic with elevated pCO2 on admit.  Baseline pCO2 appears to be around 70. Improved on Bipap and now transition to Vapotherm.  Has history of sleep disordered breathing and currently using nightly CPAP.  Previous PFTs with restrictive physiology.  PCT negative but BNP is elevated.    - see the section on heart failure for recs regarding diuresis  - can complete 5 day total course of steroids as noted.  See the section on COPD  - needs outpatient PFTs and to be plugged back in with sleep medicine    COPD (chronic obstructive pulmonary disease)  2017 PFTs do not show obstruction but do note moderate restriction with reduced DLCO.  CT chest around that time does show pan  lobular emphysematous changes.  May now have some degree of obstruction but currently no indication for LABA/LAMA/ICS.  Currently dose prednisone without wheezing on exam.  Given severity of illness, recommend decreasing to prednisone 40 daily for a total of 5 days since I cannot prove that he does not obstruction.  Can use duo nebs PRN.     Cardiac/Vascular  Acute on chronic combined systolic and diastolic congestive heart failure  Most recent echo with LVEF 35% and grade 2 diastolic dysfunction.   on admit and CXR consistent with CHF.  Repeat formal echo pending.  Recommend aggressive diuresis and maintain strict I/O.    Coronary artery disease involving native coronary artery of native heart without angina pectoris  Initial Trop negative.  Continue home ASA, statin and Plavix.    Benign essential hypertension  Given substantial diastolic heart failure, blood pressure and volume management be the mainstays of treatment.  And titrate antihypertensives as needed.    Endocrine  Uncontrolled type 2 diabetes mellitus with hyperglycemia  Previous A1cs very elevated.  Blood glucose is currently not controlled.  Added titrate insulin as needed.    Class 1 obesity due to excess calories with serious comorbidity in adult  Counseled extensively on the need for weight loss.  Being overweight places patient at risk for exacerbation of chronic illnesses in worse outcomes while being hospitalized.    Other  Tobacco abuse  Current everyday smoker.  Nicotine replacement therapy while inpatient.  Counseling on smoking cessation and resources prior discharge         Critical Care Time: 50 minutes  Critical secondary to Patient has a condition that poses threat to life and bodily function: Severe Respiratory Distress     Critical care was time spent personally by me on the following activities: development of treatment plan with patient or surrogate and bedside caregivers, discussions with consultants, evaluation of patient's  response to treatment, examination of patient, ordering and performing treatments and interventions, ordering and review of laboratory studies, ordering and review of radiographic studies, pulse oximetry, re-evaluation of patient's condition. This critical care time did not overlap with that of any other provider or involve time for any procedures.    Thank you for your consult. I will follow-up with patient. Please contact us if you have any additional questions.     Jordon Leroy MD  Critical Care Medicine  [facility] - Intensive Care

## 2022-04-12 NOTE — ASSESSMENT & PLAN NOTE
- Despite BIPAP in ER, worsening respiratory acidosis; most recent PCO2 > 125. Suspected COPD exacerbation, though may also have volume overload contributing (BNP approximately 900, CXR with vascular congestion). Patient stated he was out of all of his home medications, which likely contributed to current presentation.   - Management per primary team/PCCM

## 2022-04-12 NOTE — SUBJECTIVE & OBJECTIVE
Past Medical History:   Diagnosis Date    CHF (congestive heart failure)     COPD (chronic obstructive pulmonary disease)     Coronary artery disease     Elevated cholesterol     on medication    Hypertension        Past Surgical History:   Procedure Laterality Date    CARDIAC SURGERY      coronary stent placed    CORONARY STENT PLACEMENT         Review of patient's allergies indicates:   Allergen Reactions    Contrast media Shortness Of Breath, Itching and Anxiety     Patient states that he had a bad reaction, anxiety , shortness of breath, itching .        No current facility-administered medications on file prior to encounter.     Current Outpatient Medications on File Prior to Encounter   Medication Sig    albuterol (PROVENTIL/VENTOLIN HFA) 90 mcg/actuation inhaler Inhale 1-2 puffs into the lungs every 6 (six) hours as needed for Wheezing. Rescue    amLODIPine (NORVASC) 5 MG tablet Take 1 tablet (5 mg total) by mouth once daily.    aspirin (ECOTRIN) 81 MG EC tablet Take 1 tablet (81 mg total) by mouth once daily.    atorvastatin (LIPITOR) 40 MG tablet Take 1 tablet (40 mg total) by mouth once daily.    clopidogreL (PLAVIX) 75 mg tablet Take 1 tablet (75 mg total) by mouth once daily.    fluticasone-salmeterol diskus inhaler 250-50 mcg Inhale 1 puff into the lungs 2 (two) times daily.    furosemide (LASIX) 20 MG tablet Take 1 tablet (20 mg total) by mouth once daily.    lisinopriL (PRINIVIL,ZESTRIL) 20 MG tablet Take 1 tablet (20 mg total) by mouth once daily.    metoprolol succinate (TOPROL-XL) 25 MG 24 hr tablet Take 1 tablet (25 mg total) by mouth once daily.     Family History       Problem Relation (Age of Onset)    Cancer Mother    No Known Problems Father          Tobacco Use    Smoking status: Former Smoker     Packs/day: 2.00     Years: 45.00     Pack years: 90.00     Types: Cigarettes     Quit date: 2017     Years since quittin.4    Smokeless tobacco: Never Used   Substance and Sexual Activity     Alcohol use: Yes     Comment: socially    Drug use: No    Sexual activity: Yes     Partners: Female     Birth control/protection: None     Review of Systems   Constitutional:  Negative for chills and fever.   Respiratory:  Positive for shortness of breath and wheezing. Negative for cough.    Cardiovascular:  Positive for leg swelling. Negative for chest pain and palpitations.   Gastrointestinal:  Negative for abdominal pain, constipation, diarrhea, nausea and vomiting.   Genitourinary:  Negative for dysuria.   Musculoskeletal:  Negative for arthralgias and back pain.   Skin:  Negative for rash.   Neurological:  Negative for dizziness and numbness.   Psychiatric/Behavioral:  Negative for agitation and confusion.    Objective:     Vital Signs (Most Recent):  Temp: 96.3 °F (35.7 °C) (04/12/22 0741)  Pulse: 67 (04/12/22 0900)  Resp: 15 (04/12/22 0900)  BP: (!) 107/59 (04/12/22 0900)  SpO2: 100 % (04/12/22 0900)   Vital Signs (24h Range):  Temp:  [96.3 °F (35.7 °C)-98.7 °F (37.1 °C)] 96.3 °F (35.7 °C)  Pulse:  [] 67  Resp:  [15-40] 15  SpO2:  [98 %-100 %] 100 %  BP: (100-200)/() 107/59     Weight: 90.5 kg (199 lb 8.3 oz)  Body mass index is 28.63 kg/m².    Physical Exam  Vitals and nursing note reviewed.   Constitutional:       General: He is not in acute distress.     Appearance: He is ill-appearing.      Comments: Wearing bipap, appears comfortable    HENT:      Head: Normocephalic.   Eyes:      General: No scleral icterus.     Conjunctiva/sclera: Conjunctivae normal.   Cardiovascular:      Rate and Rhythm: Normal rate and regular rhythm.      Pulses: Normal pulses.   Pulmonary:      Effort: Pulmonary effort is normal.      Breath sounds: No wheezing.      Comments: On bipap, able to speak in complete sentences. No distress. No wheezing appreciated on my exam  Abdominal:      General: Bowel sounds are normal. There is no distension.      Tenderness: There is no abdominal tenderness.   Musculoskeletal:          General: No swelling. Normal range of motion.   Skin:     General: Skin is warm and dry.   Neurological:      General: No focal deficit present.      Mental Status: He is alert and oriented to person, place, and time.   Psychiatric:         Mood and Affect: Mood normal.         Thought Content: Thought content normal.         Judgment: Judgment normal.           Significant Labs: All pertinent labs within the past 24 hours have been reviewed.    Significant Imaging: I have reviewed all pertinent imaging results/findings within the past 24 hours.

## 2022-04-12 NOTE — HPI
"Mr. Irene is a 67 yo M with history of CHF, COPD on home oxygen and hypertension who presents with chief complaint of shortness of breath. He tells me it started 2 days ago and he "couldn't shake it." He also endorses swelling in his legs. He has run out of his medications recently and does admit to smoking cigarettes still. He denies any recent cough, fever or chills.  In the ED, he was found to have severe respiratory acidosis and respiratory distress, he was given lasix, nebulizers and steroids and placed on bipap. He was admitted to ICU for further evaluation.  "

## 2022-04-12 NOTE — PLAN OF CARE
Pt remains in ICU on 30L 30% on Vapotherm, sats >92%. AAO x4. VVS and afebrile. NSR on monitor. Adequate urine output per urinal. Pt reports no chest pain and no SOB. Free of injury, fall, and skin breakdown on this shift. Pt and Pt sister updated of plan of care by MD. Will continue to monitor.

## 2022-04-12 NOTE — ASSESSMENT & PLAN NOTE
Current everyday smoker.  Nicotine replacement therapy while inpatient.  Counseling on smoking cessation and resources prior discharge

## 2022-04-12 NOTE — ED NOTES
Patient came via Ems cc sob x2 days, sating at 95 on 5 liter N/c at home. Patient was had labored breathing with accessory muscles and RT at beside and patient placed on bipap.

## 2022-04-12 NOTE — ASSESSMENT & PLAN NOTE
- continue ASA/Statin  - ecg and troponin reviewed and no evidence of ischemia  - chest pain free

## 2022-04-12 NOTE — ED PROVIDER NOTES
Encounter Date: 2022       History     Chief Complaint   Patient presents with    Shortness of Breath     Pt here via EMS with reports of SOB x 2 days. Pt has hx of COPD. Pt sating 95% on 5L NC at home. EMS gave duoneb and 125mg of solumedrol. Pt diaphoretic and tachypneic upon arrival.      HPI     66-year-old male with past medical history is CHF, COPD, hypertension, hyperlipidemia presenting with shortness of breath last 2 days.  Patient reportedly is on 4 L via nasal cannula of continuous oxygen.  He reports feeling significantly worse earlier this morning, was unable to catch his breath despite increasing his home continuous oxygen.  He reports being on CPAP at night usually, reports feeling worse with lying flat.  Denies any significant weight gain, denies any leg swelling.  he reports feeling some chest tightness, denies any chest pain, denies any fevers/chills, denies any sputum production.  Denies any further complaints.    Review of patient's allergies indicates:   Allergen Reactions    Contrast media Shortness Of Breath, Itching and Anxiety     Patient states that he had a bad reaction, anxiety , shortness of breath, itching .      Past Medical History:   Diagnosis Date    CHF (congestive heart failure)     COPD (chronic obstructive pulmonary disease)     Coronary artery disease     Elevated cholesterol     on medication    Hypertension      Past Surgical History:   Procedure Laterality Date    CARDIAC SURGERY      coronary stent placed    CORONARY STENT PLACEMENT       Family History   Problem Relation Age of Onset    Cancer Mother     No Known Problems Father      Social History     Tobacco Use    Smoking status: Former Smoker     Packs/day: 2.00     Years: 45.00     Pack years: 90.00     Types: Cigarettes     Quit date: 2017     Years since quittin.4    Smokeless tobacco: Never Used   Substance Use Topics    Alcohol use: Yes     Comment: socially    Drug use: No     Review  of Systems   Constitutional: Negative.    HENT: Negative.    Eyes: Negative.    Respiratory: Positive for cough and shortness of breath.    Cardiovascular: Negative.    Gastrointestinal: Negative.    Genitourinary: Negative.    Musculoskeletal: Negative.    Skin: Negative.    Neurological: Negative.        Physical Exam     Initial Vitals [04/12/22 0438]   BP Pulse Resp Temp SpO2   (!) 200/110 (!) 136 (!) 40 98.5 °F (36.9 °C) 100 %      MAP       --         Physical Exam    Nursing note and vitals reviewed.  Constitutional: He is diaphoretic. He appears distressed (moderaetly).   HENT:   Head: Normocephalic and atraumatic.   Nose: Nose normal.   Mouth/Throat: No oropharyngeal exudate.   Eyes: EOM are normal. Pupils are equal, round, and reactive to light.   Neck: Neck supple. No tracheal deviation present. No JVD present.   Normal range of motion.  Cardiovascular: Regular rhythm, normal heart sounds and intact distal pulses.   Tachycardic   Pulmonary/Chest: He is in respiratory distress (tachypneic, with diffuse wheezing noted). He has wheezes.   Abdominal: Abdomen is soft. Bowel sounds are normal. He exhibits no distension. There is no abdominal tenderness. There is no rebound and no guarding.   Musculoskeletal:         General: Edema (Trace bilateral lower extremities) present. No tenderness.      Cervical back: Normal range of motion and neck supple.     Neurological: He is alert and oriented to person, place, and time. He has normal strength.   Skin: Skin is warm. Capillary refill takes less than 2 seconds. No rash noted. No erythema.         ED Course   Critical Care    Date/Time: 4/12/2022 4:54 AM  Performed by: Messi Edward MD  Authorized by: Messi Edward MD   Direct patient critical care time: 15 minutes  Additional history critical care time: 5 minutes  Ordering / reviewing critical care time: 5 minutes  Documentation critical care time: 5 minutes  Consulting other physicians critical care  time: 5 minutes  Total critical care time (exclusive of procedural time) : 35 minutes  Critical care time was exclusive of separately billable procedures and treating other patients and teaching time.  Critical care was necessary to treat or prevent imminent or life-threatening deterioration of the following conditions: respiratory failure.  Critical care was time spent personally by me on the following activities: development of treatment plan with patient or surrogate, discussions with consultants, evaluation of patient's response to treatment, examination of patient, obtaining history from patient or surrogate, ordering and performing treatments and interventions, ordering and review of laboratory studies, ordering and review of radiographic studies, re-evaluation of patient's condition and review of old charts.        Labs Reviewed   CBC W/ AUTO DIFFERENTIAL - Abnormal; Notable for the following components:       Result Value    WBC 14.72 (*)     RBC 4.57 (*)     Hemoglobin 13.1 (*)     MCHC 30.2 (*)     Gran # (ANC) 8.0 (*)     Immature Grans (Abs) 0.06 (*)     Lymph # 5.4 (*)     All other components within normal limits   COMPREHENSIVE METABOLIC PANEL - Abnormal; Notable for the following components:    CO2 35 (*)     Glucose 246 (*)     BUN 4 (*)     All other components within normal limits   B-TYPE NATRIURETIC PEPTIDE - Abnormal; Notable for the following components:     (*)     All other components within normal limits   ISTAT PROCEDURE - Abnormal; Notable for the following components:    POC PH 7.186 (*)     POC PCO2 115.3 (*)     POC PO2 87 (*)     POC HCO3 43.7 (*)     POC SATURATED O2 92 (*)     POC TCO2 47 (*)     All other components within normal limits   ISTAT PROCEDURE - Abnormal; Notable for the following components:    POC PH 7.115 (*)     POC PCO2 128.6 (*)     POC PO2 146 (*)     POC HCO3 41.4 (*)     POC TCO2 45 (*)     All other components within normal limits   ISTAT PROCEDURE -  Abnormal; Notable for the following components:    POC PH 7.135 (*)     POC PCO2 >130.0 (*)     POC PO2 105 (*)     All other components within normal limits   CULTURE, BLOOD   CULTURE, BLOOD   TROPONIN I   PROCALCITONIN   MAGNESIUM   PHOSPHORUS   LACTIC ACID, PLASMA   SARS-COV-2 RDRP GENE          Imaging Results          X-Ray Chest 1 View (Final result)  Result time 04/12/22 06:06:48    Final result by Nick Us MD (04/12/22 06:06:48)                 Impression:      Prominence of the pulmonary vascular with bilateral pattern of interstitial and alveolar infiltrates may relate to a pattern of edema for which clinical and historical correlation is needed.  There is also suspected small pleural effusion at the left lung base.      Electronically signed by: Nick Us  Date:    04/12/2022  Time:    06:06             Narrative:    EXAMINATION:  XR CHEST 1 VIEW    CLINICAL HISTORY:  Shortness of breath    TECHNIQUE:  Single frontal view of the chest was performed.    COMPARISON:  Chest radiograph February 13, 2021    FINDINGS:  Single portable chest view is submitted.  The heart size appears enlarged, the cardiomediastinal silhouette appearing stable.  Prominence of the central pulmonary vasculature is noted, there is also appearance consistent with interstitial and patchy alveolar infiltrates/airspace disease particularly involving the mid to lower alisia thoraces.  There may be a small amount of pleural fluid at the left costophrenic angle, there is no large pleural effusion, and there is no evidence for pneumothorax.  The osseous structures demonstrate chronic change.                                 Medications   azithromycin 500 mg in dextrose 5 % 250 mL IVPB (ready to mix system) (500 mg Intravenous New Bag 4/12/22 7196)   nitroGLYCERIN 2% TD oint ointment 1 inch (1 inch Topical (Top) Given 4/12/22 3438)   albuterol sulfate nebulizer solution 5 mg (5 mg Nebulization Given 4/12/22 6655)   ipratropium  0.02 % nebulizer solution 1 mg (1 mg Nebulization Given 4/12/22 0458)   fentaNYL 50 mcg/mL injection 50 mcg (50 mcg Intravenous Given 4/12/22 0452)   ondansetron injection 4 mg (4 mg Intravenous Given 4/12/22 0453)   furosemide injection 60 mg (60 mg Intravenous Given 4/12/22 0509)   cefTRIAXone (ROCEPHIN) 1 g/50 mL D5W IVPB (0 g Intravenous Stopped 4/12/22 0622)              MDM:    66 y.o.male with PMHx as noted above, presents with SOB. Physical exam as noted above.  ED workup remarkable for EKG - sinus tachycardia rate 130 bpm, noted lateral T-wave inversion, nonspecific ST into a changes noted otherwise, no STEMI, trop - 0.012, BNP - 893, pH/pCO2 - 7.186/115, CBC/CMP: glucose 246, Procal - 0.02, CXR - cardiomegaly and e/o pulmonary edema.  Pt presentation consistent with suspected COPD exacerbation, given his respiratory acidosis and diffuse wheezing, also suggests volume overload and hypertensive emergency.  Patient given nitro paste, blood pressure improved, Lasix given as well with this BNP elevated above normal.  DuoNeb given, Solu-Medrol given en route, initial pH showing significant deviation, shortly thereafter and wall maintaining on BiPAP additional venous gas drawn and patient appears to be trending in to worse respiratory acidosis.  Settings increased on the BiPAP.  Repeat pH improving, patient's mental status improving, will continue on current Bipap settings with repeat ABG pending for 0645.  Discussed further with hospitalist, will admit to the  U. Patient is currently in stable and improved condition, maintained on BiPAP.                     Clinical Impression:   Final diagnoses:  [R06.02] SOB (shortness of breath)  [J44.1] COPD exacerbation  [I50.43] Acute on chronic combined systolic and diastolic congestive heart failure  [J96.91, J96.92] Respiratory failure with hypoxia and hypercapnia, unspecified chronicity (Primary)          ED Disposition Condition    Admit               Messi RUEDA  MD Wagner  04/12/22 0609

## 2022-04-12 NOTE — CARE UPDATE
[facility] [place]  Nurses Note -- 4 Eyes      4/12/2022       Skin assessed on: Admit      [x] No Pressure Injuries Present    [x]Prevention Measures Documented    [] New Pressure Injury Discovered      Attending RN:  Lluvia Candelario RN     Second RN:  Renny Coates RN

## 2022-04-13 LAB
ANION GAP SERPL CALC-SCNC: 10 MMOL/L (ref 8–16)
BASOPHILS # BLD AUTO: 0.01 K/UL (ref 0–0.2)
BASOPHILS NFR BLD: 0.1 % (ref 0–1.9)
BUN SERPL-MCNC: 18 MG/DL (ref 8–23)
CALCIUM SERPL-MCNC: 8.8 MG/DL (ref 8.7–10.5)
CHLORIDE SERPL-SCNC: 90 MMOL/L (ref 95–110)
CO2 SERPL-SCNC: 38 MMOL/L (ref 23–29)
CREAT SERPL-MCNC: 0.9 MG/DL (ref 0.5–1.4)
DIFFERENTIAL METHOD: ABNORMAL
EOSINOPHIL # BLD AUTO: 0 K/UL (ref 0–0.5)
EOSINOPHIL NFR BLD: 0 % (ref 0–8)
ERYTHROCYTE [DISTWIDTH] IN BLOOD BY AUTOMATED COUNT: 13.2 % (ref 11.5–14.5)
EST. GFR  (AFRICAN AMERICAN): >60 ML/MIN/1.73 M^2
EST. GFR  (NON AFRICAN AMERICAN): >60 ML/MIN/1.73 M^2
ESTIMATED AVG GLUCOSE: 243 MG/DL (ref 68–131)
GLUCOSE SERPL-MCNC: 362 MG/DL (ref 70–110)
HBA1C MFR BLD: 10.1 % (ref 4–5.6)
HCT VFR BLD AUTO: 37.3 % (ref 40–54)
HGB BLD-MCNC: 11.5 G/DL (ref 14–18)
IMM GRANULOCYTES # BLD AUTO: 0.07 K/UL (ref 0–0.04)
IMM GRANULOCYTES NFR BLD AUTO: 0.6 % (ref 0–0.5)
LYMPHOCYTES # BLD AUTO: 1 K/UL (ref 1–4.8)
LYMPHOCYTES NFR BLD: 7.6 % (ref 18–48)
MAGNESIUM SERPL-MCNC: 1.9 MG/DL (ref 1.6–2.6)
MCH RBC QN AUTO: 28.3 PG (ref 27–31)
MCHC RBC AUTO-ENTMCNC: 30.8 G/DL (ref 32–36)
MCV RBC AUTO: 92 FL (ref 82–98)
MONOCYTES # BLD AUTO: 0.2 K/UL (ref 0.3–1)
MONOCYTES NFR BLD: 1.9 % (ref 4–15)
NEUTROPHILS # BLD AUTO: 11.3 K/UL (ref 1.8–7.7)
NEUTROPHILS NFR BLD: 89.8 % (ref 38–73)
NRBC BLD-RTO: 0 /100 WBC
PHOSPHATE SERPL-MCNC: 2.4 MG/DL (ref 2.7–4.5)
PLATELET # BLD AUTO: 211 K/UL (ref 150–450)
PMV BLD AUTO: 11.4 FL (ref 9.2–12.9)
POCT GLUCOSE: 382 MG/DL (ref 70–110)
POCT GLUCOSE: 388 MG/DL (ref 70–110)
POCT GLUCOSE: 419 MG/DL (ref 70–110)
POCT GLUCOSE: >500 MG/DL (ref 70–110)
POTASSIUM SERPL-SCNC: 4.2 MMOL/L (ref 3.5–5.1)
RBC # BLD AUTO: 4.07 M/UL (ref 4.6–6.2)
SODIUM SERPL-SCNC: 138 MMOL/L (ref 136–145)
WBC # BLD AUTO: 12.55 K/UL (ref 3.9–12.7)

## 2022-04-13 PROCEDURE — 36415 COLL VENOUS BLD VENIPUNCTURE: CPT | Performed by: STUDENT IN AN ORGANIZED HEALTH CARE EDUCATION/TRAINING PROGRAM

## 2022-04-13 PROCEDURE — 25000003 PHARM REV CODE 250: Performed by: STUDENT IN AN ORGANIZED HEALTH CARE EDUCATION/TRAINING PROGRAM

## 2022-04-13 PROCEDURE — 83735 ASSAY OF MAGNESIUM: CPT | Performed by: STUDENT IN AN ORGANIZED HEALTH CARE EDUCATION/TRAINING PROGRAM

## 2022-04-13 PROCEDURE — 99291 PR CRITICAL CARE, E/M 30-74 MINUTES: ICD-10-PCS | Mod: ,,, | Performed by: STUDENT IN AN ORGANIZED HEALTH CARE EDUCATION/TRAINING PROGRAM

## 2022-04-13 PROCEDURE — 99900035 HC TECH TIME PER 15 MIN (STAT)

## 2022-04-13 PROCEDURE — 63600175 PHARM REV CODE 636 W HCPCS: Performed by: STUDENT IN AN ORGANIZED HEALTH CARE EDUCATION/TRAINING PROGRAM

## 2022-04-13 PROCEDURE — 94660 CPAP INITIATION&MGMT: CPT

## 2022-04-13 PROCEDURE — 11000001 HC ACUTE MED/SURG PRIVATE ROOM

## 2022-04-13 PROCEDURE — C9399 UNCLASSIFIED DRUGS OR BIOLOG: HCPCS | Performed by: STUDENT IN AN ORGANIZED HEALTH CARE EDUCATION/TRAINING PROGRAM

## 2022-04-13 PROCEDURE — 85025 COMPLETE CBC W/AUTO DIFF WBC: CPT | Performed by: STUDENT IN AN ORGANIZED HEALTH CARE EDUCATION/TRAINING PROGRAM

## 2022-04-13 PROCEDURE — 84100 ASSAY OF PHOSPHORUS: CPT | Performed by: STUDENT IN AN ORGANIZED HEALTH CARE EDUCATION/TRAINING PROGRAM

## 2022-04-13 PROCEDURE — 94640 AIRWAY INHALATION TREATMENT: CPT

## 2022-04-13 PROCEDURE — 99291 CRITICAL CARE FIRST HOUR: CPT | Mod: ,,, | Performed by: STUDENT IN AN ORGANIZED HEALTH CARE EDUCATION/TRAINING PROGRAM

## 2022-04-13 PROCEDURE — 83036 HEMOGLOBIN GLYCOSYLATED A1C: CPT | Performed by: STUDENT IN AN ORGANIZED HEALTH CARE EDUCATION/TRAINING PROGRAM

## 2022-04-13 PROCEDURE — 94761 N-INVAS EAR/PLS OXIMETRY MLT: CPT

## 2022-04-13 PROCEDURE — 94799 UNLISTED PULMONARY SVC/PX: CPT

## 2022-04-13 PROCEDURE — 80048 BASIC METABOLIC PNL TOTAL CA: CPT | Performed by: STUDENT IN AN ORGANIZED HEALTH CARE EDUCATION/TRAINING PROGRAM

## 2022-04-13 PROCEDURE — 27100171 HC OXYGEN HIGH FLOW UP TO 24 HOURS

## 2022-04-13 PROCEDURE — S4991 NICOTINE PATCH NONLEGEND: HCPCS | Performed by: STUDENT IN AN ORGANIZED HEALTH CARE EDUCATION/TRAINING PROGRAM

## 2022-04-13 RX ORDER — INSULIN ASPART 100 [IU]/ML
0-5 INJECTION, SOLUTION INTRAVENOUS; SUBCUTANEOUS
Status: DISCONTINUED | OUTPATIENT
Start: 2022-04-13 | End: 2022-04-15 | Stop reason: HOSPADM

## 2022-04-13 RX ORDER — GLUCAGON 1 MG
1 KIT INJECTION
Status: DISCONTINUED | OUTPATIENT
Start: 2022-04-13 | End: 2022-04-15 | Stop reason: HOSPADM

## 2022-04-13 RX ORDER — CALCIUM CARBONATE 200(500)MG
1000 TABLET,CHEWABLE ORAL 3 TIMES DAILY PRN
Status: DISCONTINUED | OUTPATIENT
Start: 2022-04-13 | End: 2022-04-15 | Stop reason: HOSPADM

## 2022-04-13 RX ORDER — IBUPROFEN 200 MG
24 TABLET ORAL
Status: DISCONTINUED | OUTPATIENT
Start: 2022-04-13 | End: 2022-04-15 | Stop reason: HOSPADM

## 2022-04-13 RX ORDER — INSULIN ASPART 100 [IU]/ML
5 INJECTION, SOLUTION INTRAVENOUS; SUBCUTANEOUS
Status: DISCONTINUED | OUTPATIENT
Start: 2022-04-13 | End: 2022-04-15 | Stop reason: HOSPADM

## 2022-04-13 RX ORDER — IBUPROFEN 200 MG
16 TABLET ORAL
Status: DISCONTINUED | OUTPATIENT
Start: 2022-04-13 | End: 2022-04-15 | Stop reason: HOSPADM

## 2022-04-13 RX ORDER — IPRATROPIUM BROMIDE AND ALBUTEROL SULFATE 2.5; .5 MG/3ML; MG/3ML
3 SOLUTION RESPIRATORY (INHALATION) EVERY 6 HOURS PRN
Status: DISCONTINUED | OUTPATIENT
Start: 2022-04-13 | End: 2022-04-15 | Stop reason: HOSPADM

## 2022-04-13 RX ORDER — PREDNISONE 20 MG/1
40 TABLET ORAL DAILY
Status: DISCONTINUED | OUTPATIENT
Start: 2022-04-14 | End: 2022-04-15 | Stop reason: HOSPADM

## 2022-04-13 RX ADMIN — INSULIN ASPART 3 UNITS: 100 INJECTION, SOLUTION INTRAVENOUS; SUBCUTANEOUS at 09:04

## 2022-04-13 RX ADMIN — CALCIUM CARBONATE (ANTACID) CHEW TAB 500 MG 1000 MG: 500 CHEW TAB at 02:04

## 2022-04-13 RX ADMIN — Medication 1 PATCH: at 09:04

## 2022-04-13 RX ADMIN — MUPIROCIN: 20 OINTMENT TOPICAL at 09:04

## 2022-04-13 RX ADMIN — ENOXAPARIN SODIUM 40 MG: 40 INJECTION SUBCUTANEOUS at 04:04

## 2022-04-13 RX ADMIN — ASPIRIN 81 MG: 81 TABLET, COATED ORAL at 09:04

## 2022-04-13 RX ADMIN — FUROSEMIDE 40 MG: 10 INJECTION, SOLUTION INTRAMUSCULAR; INTRAVENOUS at 03:04

## 2022-04-13 RX ADMIN — INSULIN ASPART 5 UNITS: 100 INJECTION, SOLUTION INTRAVENOUS; SUBCUTANEOUS at 11:04

## 2022-04-13 RX ADMIN — METHYLPREDNISOLONE SODIUM SUCCINATE 80 MG: 40 INJECTION, POWDER, FOR SOLUTION INTRAMUSCULAR; INTRAVENOUS at 05:04

## 2022-04-13 RX ADMIN — FUROSEMIDE 40 MG: 10 INJECTION, SOLUTION INTRAMUSCULAR; INTRAVENOUS at 05:04

## 2022-04-13 RX ADMIN — FUROSEMIDE 40 MG: 10 INJECTION, SOLUTION INTRAMUSCULAR; INTRAVENOUS at 09:04

## 2022-04-13 RX ADMIN — ATORVASTATIN CALCIUM 40 MG: 40 TABLET, FILM COATED ORAL at 09:04

## 2022-04-13 RX ADMIN — INSULIN ASPART 5 UNITS: 100 INJECTION, SOLUTION INTRAVENOUS; SUBCUTANEOUS at 05:04

## 2022-04-13 RX ADMIN — CLOPIDOGREL 75 MG: 75 TABLET, FILM COATED ORAL at 09:04

## 2022-04-13 RX ADMIN — INSULIN DETEMIR 15 UNITS: 100 INJECTION, SOLUTION SUBCUTANEOUS at 05:04

## 2022-04-13 NOTE — NURSING TRANSFER
Nursing Transfer Note      4/13/2022     Reason patient is being transferred: Pt  Requires less oxygenation and less crictical care.    Transfer To: 403    Transfer via wheelchair    Transfer with to O2, cardiac monitoring    Transported by Transportation and RN    Medicines sent: Yes    Any special needs or follow-up needed: None    Chart send with patient: Yes    Notified: Sibling    Patient reassessed at: 04/13/2022, 1730     Upon arrival to floor: cardiac monitor applied, patient oriented to room, call bell in reach and bed in lowest position, and personal belongings.

## 2022-04-13 NOTE — NURSING
[facility] Intensive Care  ICU Shift Summary  Date: 4/13/2022      Prehospitalization: Home  Admit Date / LOS : 4/12/2022/ 1 days    Diagnosis: Acute on chronic respiratory failure with hypoxia and hypercapnia    Consults:        Active: Palliative and Pulm CC       Needed: N/A     Code Status: Full Code   Advanced Directive: <no information>    LDA: BIPAP and PIV       Central Lines/Site/Justification:Patient Does Not Have Central Line       Urinary Cath/Order/Justification:Patient Does Not Have Urinary Catheter    Vasopressors/Infusions:        GOALS: Volume/ Hemodynamic: HR <                     RASS: -1  awakes to voice (eye opening/contact) > 10 seconds    Pain Management: none       Pain Controlled: not applicable     Rhythm: NSR    Respiratory Device: Vapotherm and Bipap    Oxygen Concentration (%):  [] 30             Most Recent SBT/ SAT: N/A       ICU Liberation: no    VTE Prophylaxis: Pharm and Mechanical  Mobility: Bedrest  Stress Ulcer Prophylaxis: No    Isolation: No active isolations  Dietary: PO  Tolerance: yes  Advancement: no    I & O (24h):    Intake/Output Summary (Last 24 hours) at 4/13/2022 0542  Last data filed at 4/13/2022 0500  Gross per 24 hour   Intake 300 ml   Output 3300 ml   Net -3000 ml        Restraints: Yes    Significant Dates:  Post Op Date:     Rescue Date: N/A  Imaging/ Diagnostics: N/A    Noteworthy Labs:  none    COVID Test: (--)  CBC/Anemia Labs: Coags:    Recent Labs   Lab 04/12/22 0442 04/13/22  0336   WBC 14.72* 12.55   HGB 13.1* 11.5*   HCT 43.4 37.3*    211   MCV 95 92   RDW 13.2 13.2    No results for input(s): PT, INR, APTT in the last 168 hours.     Chemistries:   Recent Labs   Lab 04/12/22 0442 04/12/22  0552 04/13/22  0336     --  138   K 4.6  --  4.2   CL 97  --  90*   CO2 35*  --  38*   BUN 4*  --  18   CREATININE 0.9  --  0.9   CALCIUM 9.2  --  8.8   PROT 7.9  --   --    BILITOT 0.2  --   --    ALKPHOS 121  --   --    ALT 16  --   --    AST 17   --   --    MG  --  1.8 1.9   PHOS  --  4.5 2.4*        Cardiac Enzymes: Ejection Fractions:    Recent Labs     04/12/22  0442   TROPONINI 0.012    EF   Date Value Ref Range Status   04/12/2022 40 % Final        POCT Glucose: HbA1c:    No results for input(s): POCTGLUCOSE in the last 168 hours. Hemoglobin A1C   Date Value Ref Range Status   01/22/2018 10.0 (H) 4.0 - 5.6 % Final     Comment:     According to ADA guidelines, hemoglobin A1c <7.0% represents  optimal control in non-pregnant diabetic patients. Different  metrics may apply to specific patient populations.   Standards of Medical Care in Diabetes-2016.  For the purpose of screening for the presence of diabetes:  <5.7%     Consistent with the absence of diabetes  5.7-6.4%  Consistent with increasing risk for diabetes   (prediabetes)  >or=6.5%  Consistent with diabetes  Currently, no consensus exists for use of hemoglobin A1c  for diagnosis of diabetes for children.  This Hemoglobin A1c assay has significant interference with fetal   hemoglobin   (HbF). The results are invalid for patients with abnormal amounts of   HbF,   including those with known Hereditary Persistence   of Fetal Hemoglobin. Heterozygous hemoglobin variants (HbAS, HbAC,   HbAD, HbAE, HbA2) do not significantly interfere with this assay;   however, presence of multiple variants in a sample may impact the %   interference.             ICU LOS 22h  Level of Care: Critical Care    Chart Check: 12 HR Done  Shift Summary/Plan for the shift: none

## 2022-04-13 NOTE — PROGRESS NOTES
[facility] Intensive Care  Critical Care  Progress Note    Patient Name: Abelardo Irene Jr.  MRN: [id number]  Admission Date: 4/12/2022  Hospital Length of Stay: 1 days  Code Status: Full Code  Attending Provider: Ravindra Watters MD  Primary Care Provider: Rolando Funes MD   Principal Problem: Acute on chronic respiratory failure with hypoxia and hypercapnia    Subjective:     Interval History: feeling much better today. Now off HFNC    Objective:     Vital Signs (Most Recent):  Temp: 98.1 °F (36.7 °C) (04/13/22 0701)  Pulse: 101 (04/13/22 1000)  Resp: (!) 34 (04/13/22 1000)  BP: (!) 150/83 (04/13/22 1000)  SpO2: 95 % (04/13/22 1000)   Vital Signs (24h Range):  Temp:  [97.5 °F (36.4 °C)-98.1 °F (36.7 °C)] 98.1 °F (36.7 °C)  Pulse:  [] 101  Resp:  [15-36] 34  SpO2:  [87 %-96 %] 95 %  BP: (103-152)/(55-88) 150/83     Weight: 91.2 kg (201 lb 1 oz)  Body mass index is 28.85 kg/m².      Intake/Output Summary (Last 24 hours) at 4/13/2022 1035  Last data filed at 4/13/2022 0701  Gross per 24 hour   Intake --   Output 2350 ml   Net -2350 ml       Physical Exam  Vitals and nursing note reviewed.   Constitutional:       General: He is not in acute distress.     Appearance: He is overweight. He is ill-appearing. He is not toxic-appearing or diaphoretic.      Interventions: Face mask in place.      Comments: NIV in place   HENT:      Head: Normocephalic and atraumatic.      Nose: No rhinorrhea.   Eyes:      General: No scleral icterus.     Extraocular Movements: Extraocular movements intact.      Conjunctiva/sclera: Conjunctivae normal.   Cardiovascular:      Rate and Rhythm: Normal rate and regular rhythm.      Heart sounds: No murmur heard.  Pulmonary:      Effort: No tachypnea, accessory muscle usage, prolonged expiration, respiratory distress or retractions.      Breath sounds: No stridor. No decreased breath sounds, wheezing, rhonchi or rales.   Abdominal:      General: There is no distension.      Palpations: Abdomen  is soft.      Tenderness: There is no abdominal tenderness. There is no guarding.   Musculoskeletal:         General: No swelling.      Right lower leg: No edema.      Left lower leg: No edema.   Skin:     General: Skin is warm and dry.      Coloration: Skin is not jaundiced.      Findings: No rash.   Neurological:      General: No focal deficit present.      Cranial Nerves: No cranial nerve deficit.       Vents:  Oxygen Concentration (%): 30 (04/13/22 0838)    Lines/Drains/Airways       Peripheral Intravenous Line  Duration                  Peripheral IV - Single Lumen 04/12/22 0441 20 G Left Antecubital 1 day                    Significant Labs:    CBC/Anemia Profile:  Recent Labs   Lab 04/12/22  0442 04/13/22  0336   WBC 14.72* 12.55   HGB 13.1* 11.5*   HCT 43.4 37.3*    211   MCV 95 92   RDW 13.2 13.2        Chemistries:  Recent Labs   Lab 04/12/22 0442 04/12/22  0552 04/13/22  0336     --  138   K 4.6  --  4.2   CL 97  --  90*   CO2 35*  --  38*   BUN 4*  --  18   CREATININE 0.9  --  0.9   CALCIUM 9.2  --  8.8   ALBUMIN 3.9  --   --    PROT 7.9  --   --    BILITOT 0.2  --   --    ALKPHOS 121  --   --    ALT 16  --   --    AST 17  --   --    MG  --  1.8 1.9   PHOS  --  4.5 2.4*       All pertinent labs within the past 24 hours have been reviewed.    Significant Imaging:  I have reviewed all pertinent imaging results/findings within the past 24 hours.      ABG  Recent Labs   Lab 04/12/22  1324   PH 7.329*   PO2 63*   PCO2 76.8*   HCO3 40.4*   BE 11     Assessment/Plan:     * Acute on chronic respiratory failure with hypoxia and hypercapnia  Markedly acidemic with elevated pCO2 on admit.  Baseline pCO2 appears to be around 70. Improved on Bipap and then transitioned to Vapotherm.  Has history of sleep disordered breathing and currently using nightly CPAP.  Previous PFTs with restrictive physiology.  PCT negative but BNP is elevated.  On 3 L home O2.  Now on routine nasal cannula at home dose    - see  the section on heart failure for recs regarding diuresis  - can complete 5 day total course of steroids as noted.  See the section on COPD  - needs outpatient PFTs and to be plugged back in with sleep medicine    Uncontrolled type 2 diabetes mellitus with hyperglycemia  Previous A1cs very elevated.  Blood glucose is currently not controlled.  Added titrate insulin as needed.    Class 1 obesity due to excess calories with serious comorbidity in adult  Counseled extensively on the need for weight loss.  Being overweight places patient at risk for exacerbation of chronic illnesses in worse outcomes while being hospitalized.    Acute on chronic combined systolic and diastolic congestive heart failure  Previous echo with LVEF 35% and grade 2 diastolic dysfunction, now LVEF 40%..   on admit and CXR consistent with CHF.  Continue aggressive diuresis and maintain strict I/O.    Coronary artery disease involving native coronary artery of native heart without angina pectoris  Initial Trop negative.  Continue home ASA, statin and Plavix.    COPD (chronic obstructive pulmonary disease)  2017 PFTs do not show obstruction but do note moderate restriction with reduced DLCO.  CT chest around that time does show pan lobular emphysematous changes.  May now have some degree of obstruction but currently no indication for LABA/LAMA/ICS.  Currently dose prednisone without wheezing on exam.  Given severity of illness, recommend decreasing to prednisone 40 daily for a total of 5 days since I cannot prove that he does not obstruction.  Can use duo nebs PRN.     Tobacco abuse  Current everyday smoker.  Nicotine replacement therapy while inpatient.  Counseling on smoking cessation and resources prior discharge    Benign essential hypertension  Given substantial diastolic heart failure, blood pressure and volume management be the mainstays of treatment.  Add and titrate antihypertensives as needed.      Thank you for the consult. No  further recommendations. Signing off. Please feel free to contact us with any question or concerns regarding the care of this patient.    Critical Care Time: 35 minutes  Critical secondary to Patient has a condition that poses threat to life and bodily function: severe resp distress    Critical care was time spent personally by me on the following activities: evaluating this patient's organ dysfunction, development of treatment plan, discussing treatment plan with patient or surrogate and bedside caregivers, discussions with consultants, evaluation of patient's response to treatment, examination of patient, ordering and performing treatments and interventions, ordering and review of laboratory studies, ordering and review of radiographic studies, re-evaluation of patient's condition. This critical care time did not overlap with that of any other provider or involve time for any procedures.       Jordon Leroy MD  Pulmonology  [facility] - Intensive Care

## 2022-04-13 NOTE — ASSESSMENT & PLAN NOTE
Patient with Hypercapnic Respiratory failure which is Acute on chronic.  he is on home oxygen at 5 LPM. Supplemental oxygen was provided and noted- Oxygen Concentration (%):  [30] 30.   Signs/symptoms of respiratory failure include- tachypnea, increased work of breathing and wheezing. Contributing diagnoses includes - CHF and COPD Labs and images were reviewed. Patient Has recent ABG, which has been reviewed. Will treat underlying causes and adjust management of respiratory failure as follows-   - severe respiratory acidosis with CO2 >130 --> on bipap with improvement of distress  - Steroids, lasix and nebulizers  - Pulmonology consulted  - slowly weaning and back to home oxygen requirements

## 2022-04-13 NOTE — ASSESSMENT & PLAN NOTE
A1c:   Lab Results   Component Value Date    HGBA1C 10.1 (H) 04/13/2022     Meds: basal bolus insulin + SSI PRN to maintain goal 140-180  ADA diet, accuchecks ACHS, hypoglycemic protocol    - started basal dose tonight

## 2022-04-13 NOTE — HOSPITAL COURSE
Admitted with CHF exacerbation, acute on chronic hypercapnic/hypoxic respiratory failure. Started on bipap and lasix along with nebulizers and steroids. Slowly improving, able to transition off bipap. Pulmonology consulted. Ambulating okay. Still feeling short winded. Stable for transfer to floor on 4/13/22. Patient clinically improved. On 4/15- he was on his baseline 4L 02 and requesting discharge to home. Activity as tolerated. Diet- low NA, ADA 2000 laura diet. Will have follow up with pulmonary and cardiology in 1-2 weeks.

## 2022-04-13 NOTE — PLAN OF CARE
Problem: Adult Inpatient Plan of Care  Goal: Patient-Specific Goal (Individualized)  Outcome: Ongoing, Progressing     Problem: Adult Inpatient Plan of Care  Goal: Readiness for Transition of Care  Outcome: Ongoing, Progressing     Problem: Adjustment to Illness COPD (Chronic Obstructive Pulmonary Disease)  Goal: Optimal Chronic Illness Coping  Outcome: Ongoing, Not Progressing     Problem: Infection COPD (Chronic Obstructive Pulmonary Disease)  Goal: Absence of Infection Signs and Symptoms  Outcome: Ongoing, Progressing     Problem: Respiratory Compromise COPD (Chronic Obstructive Pulmonary Disease)  Goal: Effective Oxygenation and Ventilation  Outcome: Ongoing, Not Progressing

## 2022-04-13 NOTE — ASSESSMENT & PLAN NOTE
Markedly acidemic with elevated pCO2 on admit.  Baseline pCO2 appears to be around 70. Improved on Bipap and then transitioned to Vapotherm.  Has history of sleep disordered breathing and currently using nightly CPAP.  Previous PFTs with restrictive physiology.  PCT negative but BNP is elevated.  On 3 L home O2.  Now on routine nasal cannula at home dose    - see the section on heart failure for recs regarding diuresis  - can complete 5 day total course of steroids as noted.  See the section on COPD  - needs outpatient PFTs and to be plugged back in with sleep medicine

## 2022-04-13 NOTE — PROVIDER TRANSFER
Admitted with CHF exacerbation, acute on chronic hypercapnic/hypoxic respiratory failure and possible COPD exacerbation. Started on bipap and lasix along with nebulizers and steroids. Slowly improving, able to transition off bipap. Pulmonology consulted. Ambulating okay. Still feeling short winded. Stable for transfer to floor.    - diurese as tolerated, transition to PO when appropriate  - now back to home oxygen at 4 liters  - adjust insulin for better glucose control, a1c is 10.5  - ambulate as tolerated      Ravindra Watters MD  Department of Hospital Medicine  [facility]

## 2022-04-13 NOTE — SUBJECTIVE & OBJECTIVE
Interval History: feeling much better today. Now off HFNC    Objective:     Vital Signs (Most Recent):  Temp: 98.1 °F (36.7 °C) (04/13/22 0701)  Pulse: 101 (04/13/22 1000)  Resp: (!) 34 (04/13/22 1000)  BP: (!) 150/83 (04/13/22 1000)  SpO2: 95 % (04/13/22 1000)   Vital Signs (24h Range):  Temp:  [97.5 °F (36.4 °C)-98.1 °F (36.7 °C)] 98.1 °F (36.7 °C)  Pulse:  [] 101  Resp:  [15-36] 34  SpO2:  [87 %-96 %] 95 %  BP: (103-152)/(55-88) 150/83     Weight: 91.2 kg (201 lb 1 oz)  Body mass index is 28.85 kg/m².      Intake/Output Summary (Last 24 hours) at 4/13/2022 1035  Last data filed at 4/13/2022 0701  Gross per 24 hour   Intake --   Output 2350 ml   Net -2350 ml       Physical Exam  Vitals and nursing note reviewed.   Constitutional:       General: He is not in acute distress.     Appearance: He is overweight. He is ill-appearing. He is not toxic-appearing or diaphoretic.      Interventions: Face mask in place.      Comments: NIV in place   HENT:      Head: Normocephalic and atraumatic.      Nose: No rhinorrhea.   Eyes:      General: No scleral icterus.     Extraocular Movements: Extraocular movements intact.      Conjunctiva/sclera: Conjunctivae normal.   Cardiovascular:      Rate and Rhythm: Normal rate and regular rhythm.      Heart sounds: No murmur heard.  Pulmonary:      Effort: No tachypnea, accessory muscle usage, prolonged expiration, respiratory distress or retractions.      Breath sounds: No stridor. No decreased breath sounds, wheezing, rhonchi or rales.   Abdominal:      General: There is no distension.      Palpations: Abdomen is soft.      Tenderness: There is no abdominal tenderness. There is no guarding.   Musculoskeletal:         General: No swelling.      Right lower leg: No edema.      Left lower leg: No edema.   Skin:     General: Skin is warm and dry.      Coloration: Skin is not jaundiced.      Findings: No rash.   Neurological:      General: No focal deficit present.      Cranial Nerves:  No cranial nerve deficit.       Vents:  Oxygen Concentration (%): 30 (04/13/22 0838)    Lines/Drains/Airways       Peripheral Intravenous Line  Duration                  Peripheral IV - Single Lumen 04/12/22 0441 20 G Left Antecubital 1 day                    Significant Labs:    CBC/Anemia Profile:  Recent Labs   Lab 04/12/22 0442 04/13/22 0336   WBC 14.72* 12.55   HGB 13.1* 11.5*   HCT 43.4 37.3*    211   MCV 95 92   RDW 13.2 13.2        Chemistries:  Recent Labs   Lab 04/12/22 0442 04/12/22  0552 04/13/22 0336     --  138   K 4.6  --  4.2   CL 97  --  90*   CO2 35*  --  38*   BUN 4*  --  18   CREATININE 0.9  --  0.9   CALCIUM 9.2  --  8.8   ALBUMIN 3.9  --   --    PROT 7.9  --   --    BILITOT 0.2  --   --    ALKPHOS 121  --   --    ALT 16  --   --    AST 17  --   --    MG  --  1.8 1.9   PHOS  --  4.5 2.4*       All pertinent labs within the past 24 hours have been reviewed.    Significant Imaging:  I have reviewed all pertinent imaging results/findings within the past 24 hours.

## 2022-04-13 NOTE — PROGRESS NOTES
"[facility]  Progress Note    Patient Name: Abelardo Irene Jr.  MRN: [id number]  Patient Class: IP- Inpatient   Admission Date: 4/12/2022  Length of Stay: 1 days  Attending Physician: Ravindra Watters MD  Primary Care Provider: Rolando Funes MD        Subjective:     Principal Problem:Acute on chronic respiratory failure with hypoxia and hypercapnia        HPI:  Mr. Irene is a 65 yo M with history of CHF, COPD on home oxygen and hypertension who presents with chief complaint of shortness of breath. He tells me it started 2 days ago and he "couldn't shake it." He also endorses swelling in his legs. He has run out of his medications recently and does admit to smoking cigarettes still. He denies any recent cough, fever or chills.  In the ED, he was found to have severe respiratory acidosis and respiratory distress, he was given lasix, nebulizers and steroids and placed on bipap. He was admitted to ICU for further evaluation.      Overview/Hospital Course:  Admitted with CHF exacerbation, acute on chronic hypercapnic/hypoxic respiratory failure. Started on bipap and lasix along with nebulizers and steroids. Slowly improving, able to transition off bipap. Pulmonology consulted. Ambulating okay. Still feeling short winded. Stable for transfer to floor.      Interval History: feeling much better this morning, now on nasal cannula. Still got short winded getting up to go to the restroom.    Review of Systems   Constitutional:  Negative for chills and fever.   Respiratory:  Positive for shortness of breath. Negative for cough and wheezing.    Cardiovascular:  Positive for leg swelling. Negative for chest pain and palpitations.   Gastrointestinal:  Negative for abdominal pain, constipation, diarrhea, nausea and vomiting.   Genitourinary:  Negative for dysuria.   Musculoskeletal:  Negative for arthralgias and back pain.   Skin:  Negative for rash.   Neurological:  Negative for dizziness and " numbness.   Psychiatric/Behavioral:  Negative for agitation and confusion.    Objective:     Vital Signs (Most Recent):  Temp: 98.4 °F (36.9 °C) (04/13/22 1501)  Pulse: 98 (04/13/22 1614)  Resp: (!) 29 (04/13/22 1614)  BP: (!) 162/84 (04/13/22 1600)  SpO2: 98 % (04/13/22 1614)   Vital Signs (24h Range):  Temp:  [97.6 °F (36.4 °C)-98.4 °F (36.9 °C)] 98.4 °F (36.9 °C)  Pulse:  [] 98  Resp:  [15-43] 29  SpO2:  [88 %-98 %] 98 %  BP: (103-162)/(55-97) 162/84     Weight: 91.2 kg (201 lb 1 oz)  Body mass index is 28.85 kg/m².    Intake/Output Summary (Last 24 hours) at 4/13/2022 1630  Last data filed at 4/13/2022 1600  Gross per 24 hour   Intake --   Output 3150 ml   Net -3150 ml      Physical Exam  Vitals and nursing note reviewed.   Constitutional:       General: He is not in acute distress.     Appearance: Normal appearance.   HENT:      Head: Normocephalic.   Eyes:      General: No scleral icterus.     Conjunctiva/sclera: Conjunctivae normal.   Cardiovascular:      Rate and Rhythm: Normal rate and regular rhythm.      Pulses: Normal pulses.   Pulmonary:      Effort: Pulmonary effort is normal.      Breath sounds: No wheezing.      Comments: On high flow, appears comfortable  Abdominal:      General: Bowel sounds are normal. There is no distension.      Tenderness: There is no abdominal tenderness.   Musculoskeletal:         General: No swelling. Normal range of motion.   Skin:     General: Skin is warm and dry.   Neurological:      General: No focal deficit present.      Mental Status: He is alert and oriented to person, place, and time.   Psychiatric:         Mood and Affect: Mood normal.         Thought Content: Thought content normal.         Judgment: Judgment normal.       Significant Labs: All pertinent labs within the past 24 hours have been reviewed.    Significant Imaging: I have reviewed all pertinent imaging results/findings within the past 24 hours.      Assessment/Plan:      * Acute on chronic  respiratory failure with hypoxia and hypercapnia  Patient with Hypercapnic Respiratory failure which is Acute on chronic.  he is on home oxygen at 5 LPM. Supplemental oxygen was provided and noted- Oxygen Concentration (%):  [30] 30.   Signs/symptoms of respiratory failure include- tachypnea, increased work of breathing and wheezing. Contributing diagnoses includes - CHF and COPD Labs and images were reviewed. Patient Has recent ABG, which has been reviewed. Will treat underlying causes and adjust management of respiratory failure as follows-   - severe respiratory acidosis with CO2 >130 --> on bipap with improvement of distress  - Steroids, lasix and nebulizers  - Pulmonology consulted  - slowly weaning and back to home oxygen requirements    Uncontrolled type 2 diabetes mellitus with hyperglycemia  A1c:   Lab Results   Component Value Date    HGBA1C 10.1 (H) 04/13/2022     Meds: basal bolus insulin + SSI PRN to maintain goal 140-180  ADA diet, accuchecks ACHS, hypoglycemic protocol    - started basal dose tonight      COPD exacerbation  - with severe respiratory acidosis  - on nebs, bipap and steroids - decrease to prednisone 40 mg for 5 days total  - nebulizers PRN  - Bipap at night and PRN  - still smoking but tells me he's ready to quit      Acute on chronic combined systolic and diastolic congestive heart failure  - has known hx with CAD  - continue on lasix for diuresis        Coronary artery disease involving native coronary artery of native heart without angina pectoris  - continue ASA/Statin  - ecg and troponin reviewed and no evidence of ischemia  - chest pain free      COPD (chronic obstructive pulmonary disease)  - still smokes - see exacerbation      Tobacco abuse  - spent a total of 4 minutes face to face discussing smoking cessation. He tells me he is ready to quit and would like a nicotine patch. Order placed      Benign essential hypertension  - resume home medications        VTE Risk Mitigation  (From admission, onward)         Ordered     enoxaparin injection 40 mg  Daily         04/12/22 0736     IP VTE HIGH RISK PATIENT  Once         04/12/22 0736     Place sequential compression device  Until discontinued         04/12/22 0736                Discharge Planning   VERA:      Code Status: Full Code   Is the patient medically ready for discharge?:     Reason for patient still in hospital (select all that apply): Treatment  Discharge Plan A: Home with family            Critical care time spent on the evaluation and treatment of severe organ dysfunction, review of pertinent labs and imaging studies, discussions with consulting providers and discussions with patient/family: 32 minutes.      Ravindra Watters MD  Department of Hospital Medicine   [facility] - Intensive [facility]

## 2022-04-13 NOTE — ASSESSMENT & PLAN NOTE
- with severe respiratory acidosis  - on nebs, bipap and steroids - decrease to prednisone 40 mg for 5 days total  - nebulizers PRN  - Bipap at night and PRN  - still smoking but tells me he's ready to quit

## 2022-04-13 NOTE — ASSESSMENT & PLAN NOTE
Previous echo with LVEF 35% and grade 2 diastolic dysfunction, now LVEF 40%..   on admit and CXR consistent with CHF.  Continue aggressive diuresis and maintain strict I/O.

## 2022-04-13 NOTE — ASSESSMENT & PLAN NOTE
2017 PFTs do not show obstruction but do note moderate restriction with reduced DLCO.  CT chest around that time does show pan lobular emphysematous changes.  May now have some degree of obstruction but currently no indication for LABA/LAMA/ICS.  Currently dose prednisone without wheezing on exam.  Given severity of illness, recommend decreasing to prednisone 40 daily for a total of 5 days since I cannot prove that he does not obstruction.  Can use duo nebs PRN.

## 2022-04-13 NOTE — ASSESSMENT & PLAN NOTE
Given substantial diastolic heart failure, blood pressure and volume management be the mainstays of treatment.  Add and titrate antihypertensives as needed.

## 2022-04-13 NOTE — SUBJECTIVE & OBJECTIVE
Interval History: feeling much better this morning, now on nasal cannula. Still got short winded getting up to go to the restroom.    Review of Systems   Constitutional:  Negative for chills and fever.   Respiratory:  Positive for shortness of breath. Negative for cough and wheezing.    Cardiovascular:  Positive for leg swelling. Negative for chest pain and palpitations.   Gastrointestinal:  Negative for abdominal pain, constipation, diarrhea, nausea and vomiting.   Genitourinary:  Negative for dysuria.   Musculoskeletal:  Negative for arthralgias and back pain.   Skin:  Negative for rash.   Neurological:  Negative for dizziness and numbness.   Psychiatric/Behavioral:  Negative for agitation and confusion.    Objective:     Vital Signs (Most Recent):  Temp: 98.4 °F (36.9 °C) (04/13/22 1501)  Pulse: 98 (04/13/22 1614)  Resp: (!) 29 (04/13/22 1614)  BP: (!) 162/84 (04/13/22 1600)  SpO2: 98 % (04/13/22 1614)   Vital Signs (24h Range):  Temp:  [97.6 °F (36.4 °C)-98.4 °F (36.9 °C)] 98.4 °F (36.9 °C)  Pulse:  [] 98  Resp:  [15-43] 29  SpO2:  [88 %-98 %] 98 %  BP: (103-162)/(55-97) 162/84     Weight: 91.2 kg (201 lb 1 oz)  Body mass index is 28.85 kg/m².    Intake/Output Summary (Last 24 hours) at 4/13/2022 1630  Last data filed at 4/13/2022 1600  Gross per 24 hour   Intake --   Output 3150 ml   Net -3150 ml      Physical Exam  Vitals and nursing note reviewed.   Constitutional:       General: He is not in acute distress.     Appearance: Normal appearance.   HENT:      Head: Normocephalic.   Eyes:      General: No scleral icterus.     Conjunctiva/sclera: Conjunctivae normal.   Cardiovascular:      Rate and Rhythm: Normal rate and regular rhythm.      Pulses: Normal pulses.   Pulmonary:      Effort: Pulmonary effort is normal.      Breath sounds: No wheezing.      Comments: On high flow, appears comfortable  Abdominal:      General: Bowel sounds are normal. There is no distension.      Tenderness: There is no  abdominal tenderness.   Musculoskeletal:         General: No swelling. Normal range of motion.   Skin:     General: Skin is warm and dry.   Neurological:      General: No focal deficit present.      Mental Status: He is alert and oriented to person, place, and time.   Psychiatric:         Mood and Affect: Mood normal.         Thought Content: Thought content normal.         Judgment: Judgment normal.       Significant Labs: All pertinent labs within the past 24 hours have been reviewed.    Significant Imaging: I have reviewed all pertinent imaging results/findings within the past 24 hours.

## 2022-04-14 LAB
ANION GAP SERPL CALC-SCNC: 10 MMOL/L (ref 8–16)
BASOPHILS # BLD AUTO: 0.02 K/UL (ref 0–0.2)
BASOPHILS NFR BLD: 0.1 % (ref 0–1.9)
BUN SERPL-MCNC: 16 MG/DL (ref 8–23)
CALCIUM SERPL-MCNC: 8.9 MG/DL (ref 8.7–10.5)
CHLORIDE SERPL-SCNC: 90 MMOL/L (ref 95–110)
CO2 SERPL-SCNC: 39 MMOL/L (ref 23–29)
CREAT SERPL-MCNC: 0.8 MG/DL (ref 0.5–1.4)
DIFFERENTIAL METHOD: ABNORMAL
EOSINOPHIL # BLD AUTO: 0 K/UL (ref 0–0.5)
EOSINOPHIL NFR BLD: 0.1 % (ref 0–8)
ERYTHROCYTE [DISTWIDTH] IN BLOOD BY AUTOMATED COUNT: 13.1 % (ref 11.5–14.5)
EST. GFR  (AFRICAN AMERICAN): >60 ML/MIN/1.73 M^2
EST. GFR  (NON AFRICAN AMERICAN): >60 ML/MIN/1.73 M^2
GLUCOSE SERPL-MCNC: 184 MG/DL (ref 70–110)
HCT VFR BLD AUTO: 39.6 % (ref 40–54)
HGB BLD-MCNC: 12.2 G/DL (ref 14–18)
IMM GRANULOCYTES # BLD AUTO: 0.11 K/UL (ref 0–0.04)
IMM GRANULOCYTES NFR BLD AUTO: 0.6 % (ref 0–0.5)
LYMPHOCYTES # BLD AUTO: 2.7 K/UL (ref 1–4.8)
LYMPHOCYTES NFR BLD: 15.9 % (ref 18–48)
MAGNESIUM SERPL-MCNC: 2 MG/DL (ref 1.6–2.6)
MCH RBC QN AUTO: 27.9 PG (ref 27–31)
MCHC RBC AUTO-ENTMCNC: 30.8 G/DL (ref 32–36)
MCV RBC AUTO: 91 FL (ref 82–98)
MONOCYTES # BLD AUTO: 1.3 K/UL (ref 0.3–1)
MONOCYTES NFR BLD: 7.4 % (ref 4–15)
NEUTROPHILS # BLD AUTO: 12.9 K/UL (ref 1.8–7.7)
NEUTROPHILS NFR BLD: 75.9 % (ref 38–73)
NRBC BLD-RTO: 0 /100 WBC
PHOSPHATE SERPL-MCNC: 2.2 MG/DL (ref 2.7–4.5)
PLATELET # BLD AUTO: 211 K/UL (ref 150–450)
PMV BLD AUTO: 11.6 FL (ref 9.2–12.9)
POCT GLUCOSE: 172 MG/DL (ref 70–110)
POCT GLUCOSE: 239 MG/DL (ref 70–110)
POCT GLUCOSE: 310 MG/DL (ref 70–110)
POTASSIUM SERPL-SCNC: 3.7 MMOL/L (ref 3.5–5.1)
RBC # BLD AUTO: 4.37 M/UL (ref 4.6–6.2)
SODIUM SERPL-SCNC: 139 MMOL/L (ref 136–145)
WBC # BLD AUTO: 16.95 K/UL (ref 3.9–12.7)

## 2022-04-14 PROCEDURE — 93010 EKG 12-LEAD: ICD-10-PCS | Mod: ,,, | Performed by: INTERNAL MEDICINE

## 2022-04-14 PROCEDURE — 63600175 PHARM REV CODE 636 W HCPCS: Performed by: STUDENT IN AN ORGANIZED HEALTH CARE EDUCATION/TRAINING PROGRAM

## 2022-04-14 PROCEDURE — 36415 COLL VENOUS BLD VENIPUNCTURE: CPT | Performed by: STUDENT IN AN ORGANIZED HEALTH CARE EDUCATION/TRAINING PROGRAM

## 2022-04-14 PROCEDURE — 11000001 HC ACUTE MED/SURG PRIVATE ROOM

## 2022-04-14 PROCEDURE — 93005 ELECTROCARDIOGRAM TRACING: CPT

## 2022-04-14 PROCEDURE — 83735 ASSAY OF MAGNESIUM: CPT | Performed by: STUDENT IN AN ORGANIZED HEALTH CARE EDUCATION/TRAINING PROGRAM

## 2022-04-14 PROCEDURE — 84100 ASSAY OF PHOSPHORUS: CPT | Performed by: STUDENT IN AN ORGANIZED HEALTH CARE EDUCATION/TRAINING PROGRAM

## 2022-04-14 PROCEDURE — 25000003 PHARM REV CODE 250: Performed by: STUDENT IN AN ORGANIZED HEALTH CARE EDUCATION/TRAINING PROGRAM

## 2022-04-14 PROCEDURE — 85025 COMPLETE CBC W/AUTO DIFF WBC: CPT | Performed by: STUDENT IN AN ORGANIZED HEALTH CARE EDUCATION/TRAINING PROGRAM

## 2022-04-14 PROCEDURE — 93010 ELECTROCARDIOGRAM REPORT: CPT | Mod: ,,, | Performed by: INTERNAL MEDICINE

## 2022-04-14 PROCEDURE — 80048 BASIC METABOLIC PNL TOTAL CA: CPT | Performed by: STUDENT IN AN ORGANIZED HEALTH CARE EDUCATION/TRAINING PROGRAM

## 2022-04-14 PROCEDURE — C9399 UNCLASSIFIED DRUGS OR BIOLOG: HCPCS | Performed by: STUDENT IN AN ORGANIZED HEALTH CARE EDUCATION/TRAINING PROGRAM

## 2022-04-14 PROCEDURE — 99900035 HC TECH TIME PER 15 MIN (STAT)

## 2022-04-14 PROCEDURE — S4991 NICOTINE PATCH NONLEGEND: HCPCS | Performed by: STUDENT IN AN ORGANIZED HEALTH CARE EDUCATION/TRAINING PROGRAM

## 2022-04-14 PROCEDURE — 94660 CPAP INITIATION&MGMT: CPT

## 2022-04-14 PROCEDURE — 25000003 PHARM REV CODE 250: Performed by: INTERNAL MEDICINE

## 2022-04-14 RX ORDER — SODIUM,POTASSIUM PHOSPHATES 280-250MG
1 POWDER IN PACKET (EA) ORAL ONCE
Status: COMPLETED | OUTPATIENT
Start: 2022-04-14 | End: 2022-04-14

## 2022-04-14 RX ADMIN — ASPIRIN 81 MG: 81 TABLET, COATED ORAL at 09:04

## 2022-04-14 RX ADMIN — INSULIN ASPART 4 UNITS: 100 INJECTION, SOLUTION INTRAVENOUS; SUBCUTANEOUS at 05:04

## 2022-04-14 RX ADMIN — Medication 1 PATCH: at 10:04

## 2022-04-14 RX ADMIN — FUROSEMIDE 40 MG: 10 INJECTION, SOLUTION INTRAMUSCULAR; INTRAVENOUS at 05:04

## 2022-04-14 RX ADMIN — INSULIN ASPART 2 UNITS: 100 INJECTION, SOLUTION INTRAVENOUS; SUBCUTANEOUS at 08:04

## 2022-04-14 RX ADMIN — INSULIN ASPART 5 UNITS: 100 INJECTION, SOLUTION INTRAVENOUS; SUBCUTANEOUS at 08:04

## 2022-04-14 RX ADMIN — Medication 1 PACKET: at 09:04

## 2022-04-14 RX ADMIN — MUPIROCIN: 20 OINTMENT TOPICAL at 09:04

## 2022-04-14 RX ADMIN — ENOXAPARIN SODIUM 40 MG: 40 INJECTION SUBCUTANEOUS at 05:04

## 2022-04-14 RX ADMIN — INSULIN ASPART 5 UNITS: 100 INJECTION, SOLUTION INTRAVENOUS; SUBCUTANEOUS at 01:04

## 2022-04-14 RX ADMIN — ATORVASTATIN CALCIUM 40 MG: 40 TABLET, FILM COATED ORAL at 09:04

## 2022-04-14 RX ADMIN — PREDNISONE 40 MG: 20 TABLET ORAL at 09:04

## 2022-04-14 RX ADMIN — MUPIROCIN: 20 OINTMENT TOPICAL at 08:04

## 2022-04-14 RX ADMIN — INSULIN DETEMIR 15 UNITS: 100 INJECTION, SOLUTION SUBCUTANEOUS at 08:04

## 2022-04-14 RX ADMIN — INSULIN ASPART 5 UNITS: 100 INJECTION, SOLUTION INTRAVENOUS; SUBCUTANEOUS at 05:04

## 2022-04-14 RX ADMIN — INSULIN ASPART 2 UNITS: 100 INJECTION, SOLUTION INTRAVENOUS; SUBCUTANEOUS at 01:04

## 2022-04-14 RX ADMIN — CLOPIDOGREL 75 MG: 75 TABLET, FILM COATED ORAL at 09:04

## 2022-04-14 NOTE — PLAN OF CARE
Problem: Adult Inpatient Plan of Care  Goal: Plan of Care Review  Outcome: Ongoing, Progressing  Goal: Patient-Specific Goal (Individualized)  Outcome: Ongoing, Progressing  Goal: Absence of Hospital-Acquired Illness or Injury  Outcome: Ongoing, Progressing  Goal: Optimal Comfort and Wellbeing  Outcome: Ongoing, Progressing  Goal: Readiness for Transition of Care  Outcome: Ongoing, Progressing     Problem: Adjustment to Illness COPD (Chronic Obstructive Pulmonary Disease)  Goal: Optimal Chronic Illness Coping  Outcome: Ongoing, Progressing     Problem: Functional Ability Impaired COPD (Chronic Obstructive Pulmonary Disease)  Goal: Optimal Level of Functional [place]  Outcome: Ongoing, Progressing     Problem: Infection COPD (Chronic Obstructive Pulmonary Disease)  Goal: Absence of Infection Signs and Symptoms  Outcome: Ongoing, Progressing     Problem: Oral Intake Inadequate COPD (Chronic Obstructive Pulmonary Disease)  Goal: Improved Nutrition Intake  Outcome: Ongoing, Progressing     Problem: Respiratory Compromise COPD (Chronic Obstructive Pulmonary Disease)  Goal: Effective Oxygenation and Ventilation  Outcome: Ongoing, Progressing     Problem: Diabetes Comorbidity  Goal: Blood Glucose Level Within Targeted Range  Outcome: Ongoing, Progressing     Problem: Coping Ineffective  Goal: Effective Coping  Outcome: Ongoing, Progressing     Problem: Skin Injury Risk Increased  Goal: Skin Health and Integrity  Outcome: Ongoing, Progressing

## 2022-04-14 NOTE — PROGRESS NOTES
"[facility] Medicine  Progress Note    Patient Name: Abelardo Irene Jr.  MRN: [id number]  Patient Class: IP- Inpatient   Admission Date: 4/12/2022  Length of Stay: 2 days  Attending Physician: Padilla Rogers MD  Primary Care Provider: Rolando Funes MD        Subjective:     Principal Problem:Acute on chronic respiratory failure with hypoxia and hypercapnia        HPI:  Mr. Irene is a 65 yo M with history of CHF, COPD on home oxygen and hypertension who presents with chief complaint of shortness of breath. He tells me it started 2 days ago and he "couldn't shake it." He also endorses swelling in his legs. He has run out of his medications recently and does admit to smoking cigarettes still. He denies any recent cough, fever or chills.  In the ED, he was found to have severe respiratory acidosis and respiratory distress, he was given lasix, nebulizers and steroids and placed on bipap. He was admitted to ICU for further evaluation.      Overview/Hospital Course:  Admitted with CHF exacerbation, acute on chronic hypercapnic/hypoxic respiratory failure. Started on bipap and lasix along with nebulizers and steroids. Slowly improving, able to transition off bipap. Pulmonology consulted. Ambulating okay. Still feeling short winded. Stable for transfer to floor on 4/13/22       Interval History:  No new issues     Review of Systems   Constitutional:  Negative for activity change, appetite change and chills.   HENT:  Negative for congestion.    Eyes:  Negative for discharge.   Respiratory:  Negative for apnea, chest tightness and shortness of breath.    Cardiovascular:  Negative for chest pain.   Gastrointestinal:  Negative for abdominal distention.   Endocrine: Negative for cold intolerance.   Genitourinary:  Negative for difficulty urinating.   Musculoskeletal:  Negative for arthralgias.   Skin:  Negative for color change and pallor.   Neurological:  Negative for dizziness.   Psychiatric/Behavioral:  " Negative for agitation and behavioral problems.    Objective:     Vital Signs (Most Recent):  Temp: 98.3 °F (36.8 °C) (04/14/22 0323)  Pulse: 93 (04/14/22 0323)  Resp: 19 (04/14/22 0323)  BP: 123/78 (04/14/22 0323)  SpO2: 98 % (04/14/22 0323) Vital Signs (24h Range):  Temp:  [98.1 °F (36.7 °C)-98.6 °F (37 °C)] 98.3 °F (36.8 °C)  Pulse:  [] 93  Resp:  [17-43] 19  SpO2:  [91 %-100 %] 98 %  BP: (119-186)/() 123/78     Weight: 91.2 kg (201 lb 1 oz)  Body mass index is 28.85 kg/m².    Intake/Output Summary (Last 24 hours) at 4/14/2022 0640  Last data filed at 4/14/2022 0500  Gross per 24 hour   Intake --   Output 4775 ml   Net -4775 ml      Physical Exam  Vitals and nursing note reviewed.   Constitutional:       General: He is not in acute distress.     Appearance: Normal appearance. He is obese. He is not ill-appearing, toxic-appearing or diaphoretic.   HENT:      Head: Normocephalic and atraumatic.   Eyes:      Conjunctiva/sclera: Conjunctivae normal.   Cardiovascular:      Rate and Rhythm: Normal rate and regular rhythm.   Pulmonary:      Effort: Pulmonary effort is normal. No respiratory distress.   Skin:     General: Skin is warm and dry.   Neurological:      Mental Status: He is alert and oriented to person, place, and time.   Psychiatric:         Mood and Affect: Mood normal.         Behavior: Behavior normal.         Thought Content: Thought content normal.       Significant Labs: All pertinent labs within the past 24 hours have been reviewed.  BMP:   Recent Labs   Lab 04/14/22  0353   *      K 3.7   CL 90*   CO2 39*   BUN 16   CREATININE 0.8   CALCIUM 8.9   MG 2.0     CBC:   Recent Labs   Lab 04/13/22  0336 04/14/22  0353   WBC 12.55 16.95*   HGB 11.5* 12.2*   HCT 37.3* 39.6*    211           Assessment/Plan:      * Acute on chronic respiratory failure with hypoxia and hypercapnia  Patient with Hypercapnic Respiratory failure which is Acute on chronic.  he is on home oxygen at 5  LPM. Supplemental oxygen was provided and noted- Oxygen Concentration (%):  [30] 30.   Signs/symptoms of respiratory failure include- tachypnea, increased work of breathing and wheezing. Contributing diagnoses includes - CHF and COPD Labs and images were reviewed. Patient Has recent ABG, which has been reviewed. Will treat underlying causes and adjust management of respiratory failure as follows-   - severe respiratory acidosis with CO2 >130 --> on bipap with improvement of distress  - Steroids, lasix and nebulizers  - Pulmonology consulted  - slowly weaning and back to home oxygen requirements    Uncontrolled type 2 diabetes mellitus with hyperglycemia  A1c:   Lab Results   Component Value Date    HGBA1C 10.1 (H) 04/13/2022     Meds: basal bolus insulin + SSI PRN to maintain goal 140-180  ADA diet, accuchecks ACHS, hypoglycemic protocol    - started basal dose tonight      COPD exacerbation  - with severe respiratory acidosis  - on nebs, bipap and steroids - decrease to prednisone 40 mg for 5 days total  - nebulizers PRN  - Bipap at night and PRN  - still smoking but tells me he's ready to quit      Acute on chronic combined systolic and diastolic congestive heart failure  - has known hx with CAD  - continue on lasix for diuresis    EF of 40%        Coronary artery disease involving native coronary artery of native heart without angina pectoris  - continue ASA/Statin  - ecg and troponin reviewed and no evidence of ischemia  - chest pain free      COPD (chronic obstructive pulmonary disease)  - still smokes - see exacerbation      Tobacco abuse  - spent a total of 4 minutes face to face discussing smoking cessation. He tells me he is ready to quit and would like a nicotine patch. Order placed      Benign essential hypertension  - resume home medications      Hypophosphatemia- will replace       VTE Risk Mitigation (From admission, onward)         Ordered     enoxaparin injection 40 mg  Daily         04/12/22 0778      IP VTE HIGH RISK PATIENT  Once         04/12/22 0736     Place sequential compression device  Until discontinued         04/12/22 0736                Discharge Planning   VERA:      Code Status: Full Code   Is the patient medically ready for discharge?:     Reason for patient still in hospital (select all that apply): Patient unstable  Discharge Plan A: Home with family        To home likely on 4/15.         Padilla Arcos MD  Department of Hospital Medicine   [facility]

## 2022-04-14 NOTE — SUBJECTIVE & OBJECTIVE
Interval History:  No new issues     Review of Systems   Constitutional:  Negative for activity change, appetite change and chills.   HENT:  Negative for congestion.    Eyes:  Negative for discharge.   Respiratory:  Negative for apnea, chest tightness and shortness of breath.    Cardiovascular:  Negative for chest pain.   Gastrointestinal:  Negative for abdominal distention.   Endocrine: Negative for cold intolerance.   Genitourinary:  Negative for difficulty urinating.   Musculoskeletal:  Negative for arthralgias.   Skin:  Negative for color change and pallor.   Neurological:  Negative for dizziness.   Psychiatric/Behavioral:  Negative for agitation and behavioral problems.    Objective:     Vital Signs (Most Recent):  Temp: 98.3 °F (36.8 °C) (04/14/22 0323)  Pulse: 93 (04/14/22 0323)  Resp: 19 (04/14/22 0323)  BP: 123/78 (04/14/22 0323)  SpO2: 98 % (04/14/22 0323) Vital Signs (24h Range):  Temp:  [98.1 °F (36.7 °C)-98.6 °F (37 °C)] 98.3 °F (36.8 °C)  Pulse:  [] 93  Resp:  [17-43] 19  SpO2:  [91 %-100 %] 98 %  BP: (119-186)/() 123/78     Weight: 91.2 kg (201 lb 1 oz)  Body mass index is 28.85 kg/m².    Intake/Output Summary (Last 24 hours) at 4/14/2022 0640  Last data filed at 4/14/2022 0500  Gross per 24 hour   Intake --   Output 4775 ml   Net -4775 ml      Physical Exam  Vitals and nursing note reviewed.   Constitutional:       General: He is not in acute distress.     Appearance: Normal appearance. He is obese. He is not ill-appearing, toxic-appearing or diaphoretic.   HENT:      Head: Normocephalic and atraumatic.   Eyes:      Conjunctiva/sclera: Conjunctivae normal.   Cardiovascular:      Rate and Rhythm: Normal rate and regular rhythm.   Pulmonary:      Effort: Pulmonary effort is normal. No respiratory distress.   Skin:     General: Skin is warm and dry.   Neurological:      Mental Status: He is alert and oriented to person, place, and time.   Psychiatric:         Mood and Affect: Mood normal.          Behavior: Behavior normal.         Thought Content: Thought content normal.       Significant Labs: All pertinent labs within the past 24 hours have been reviewed.  BMP:   Recent Labs   Lab 04/14/22  0353   *      K 3.7   CL 90*   CO2 39*   BUN 16   CREATININE 0.8   CALCIUM 8.9   MG 2.0     CBC:   Recent Labs   Lab 04/13/22  0336 04/14/22  0353   WBC 12.55 16.95*   HGB 11.5* 12.2*   HCT 37.3* 39.6*    211

## 2022-04-14 NOTE — NURSING
Monitor showing patient Vtach with some artifact. Went to assess patient. Patient was resting in bed, reaching for something on bedside table. V lead was off and placed back on; monitor still showed vtach and monitor tech called to state such. Pt denied any chest pain, SOB, dizziness or blurry vision.   Pt did state that he had some burning pain earlier but not at the moment. All relayed to Dr. Rogers.   EKG performed. Results relayed to Dr. Gisela denis.Pt resting peacefully in bed, no signs or symptoms of distress noted.

## 2022-04-15 VITALS
HEART RATE: 82 BPM | HEIGHT: 70 IN | DIASTOLIC BLOOD PRESSURE: 85 MMHG | WEIGHT: 201.06 LBS | OXYGEN SATURATION: 100 % | SYSTOLIC BLOOD PRESSURE: 117 MMHG | RESPIRATION RATE: 19 BRPM | BODY MASS INDEX: 28.78 KG/M2 | TEMPERATURE: 98 F

## 2022-04-15 PROBLEM — J96.21 ACUTE ON CHRONIC RESPIRATORY FAILURE WITH HYPOXIA AND HYPERCAPNIA: Status: RESOLVED | Noted: 2022-04-12 | Resolved: 2022-04-15

## 2022-04-15 PROBLEM — Z71.89 ADVANCE CARE PLANNING: Status: RESOLVED | Noted: 2022-04-12 | Resolved: 2022-04-15

## 2022-04-15 PROBLEM — J44.1 COPD EXACERBATION: Status: RESOLVED | Noted: 2021-02-13 | Resolved: 2022-04-15

## 2022-04-15 PROBLEM — I50.43 ACUTE ON CHRONIC COMBINED SYSTOLIC AND DIASTOLIC CONGESTIVE HEART FAILURE: Status: RESOLVED | Noted: 2021-02-13 | Resolved: 2022-04-15

## 2022-04-15 PROBLEM — J96.22 ACUTE ON CHRONIC RESPIRATORY FAILURE WITH HYPOXIA AND HYPERCAPNIA: Status: RESOLVED | Noted: 2022-04-12 | Resolved: 2022-04-15

## 2022-04-15 LAB
ANION GAP SERPL CALC-SCNC: 11 MMOL/L (ref 8–16)
BASOPHILS # BLD AUTO: 0.02 K/UL (ref 0–0.2)
BASOPHILS NFR BLD: 0.1 % (ref 0–1.9)
BUN SERPL-MCNC: 15 MG/DL (ref 8–23)
CALCIUM SERPL-MCNC: 8.7 MG/DL (ref 8.7–10.5)
CHLORIDE SERPL-SCNC: 89 MMOL/L (ref 95–110)
CO2 SERPL-SCNC: 38 MMOL/L (ref 23–29)
CREAT SERPL-MCNC: 0.8 MG/DL (ref 0.5–1.4)
DIFFERENTIAL METHOD: ABNORMAL
EOSINOPHIL # BLD AUTO: 0 K/UL (ref 0–0.5)
EOSINOPHIL NFR BLD: 0.2 % (ref 0–8)
ERYTHROCYTE [DISTWIDTH] IN BLOOD BY AUTOMATED COUNT: 13.2 % (ref 11.5–14.5)
EST. GFR  (AFRICAN AMERICAN): >60 ML/MIN/1.73 M^2
EST. GFR  (NON AFRICAN AMERICAN): >60 ML/MIN/1.73 M^2
GLUCOSE SERPL-MCNC: 237 MG/DL (ref 70–110)
HCT VFR BLD AUTO: 43.5 % (ref 40–54)
HGB BLD-MCNC: 13.3 G/DL (ref 14–18)
IMM GRANULOCYTES # BLD AUTO: 0.05 K/UL (ref 0–0.04)
IMM GRANULOCYTES NFR BLD AUTO: 0.4 % (ref 0–0.5)
LYMPHOCYTES # BLD AUTO: 4.1 K/UL (ref 1–4.8)
LYMPHOCYTES NFR BLD: 29 % (ref 18–48)
MAGNESIUM SERPL-MCNC: 2.2 MG/DL (ref 1.6–2.6)
MCH RBC QN AUTO: 28.7 PG (ref 27–31)
MCHC RBC AUTO-ENTMCNC: 30.6 G/DL (ref 32–36)
MCV RBC AUTO: 94 FL (ref 82–98)
MONOCYTES # BLD AUTO: 1.2 K/UL (ref 0.3–1)
MONOCYTES NFR BLD: 8.4 % (ref 4–15)
NEUTROPHILS # BLD AUTO: 8.8 K/UL (ref 1.8–7.7)
NEUTROPHILS NFR BLD: 61.9 % (ref 38–73)
NRBC BLD-RTO: 0 /100 WBC
PHOSPHATE SERPL-MCNC: 2.8 MG/DL (ref 2.7–4.5)
PLATELET # BLD AUTO: 201 K/UL (ref 150–450)
PMV BLD AUTO: 11.4 FL (ref 9.2–12.9)
POCT GLUCOSE: 221 MG/DL (ref 70–110)
POCT GLUCOSE: 321 MG/DL (ref 70–110)
POCT GLUCOSE: 365 MG/DL (ref 70–110)
POTASSIUM SERPL-SCNC: 3.8 MMOL/L (ref 3.5–5.1)
RBC # BLD AUTO: 4.64 M/UL (ref 4.6–6.2)
SODIUM SERPL-SCNC: 138 MMOL/L (ref 136–145)
WBC # BLD AUTO: 14.24 K/UL (ref 3.9–12.7)

## 2022-04-15 PROCEDURE — 99900035 HC TECH TIME PER 15 MIN (STAT)

## 2022-04-15 PROCEDURE — 80048 BASIC METABOLIC PNL TOTAL CA: CPT | Performed by: STUDENT IN AN ORGANIZED HEALTH CARE EDUCATION/TRAINING PROGRAM

## 2022-04-15 PROCEDURE — S4991 NICOTINE PATCH NONLEGEND: HCPCS | Performed by: STUDENT IN AN ORGANIZED HEALTH CARE EDUCATION/TRAINING PROGRAM

## 2022-04-15 PROCEDURE — 25000003 PHARM REV CODE 250: Performed by: STUDENT IN AN ORGANIZED HEALTH CARE EDUCATION/TRAINING PROGRAM

## 2022-04-15 PROCEDURE — 83735 ASSAY OF MAGNESIUM: CPT | Performed by: STUDENT IN AN ORGANIZED HEALTH CARE EDUCATION/TRAINING PROGRAM

## 2022-04-15 PROCEDURE — 63600175 PHARM REV CODE 636 W HCPCS: Performed by: STUDENT IN AN ORGANIZED HEALTH CARE EDUCATION/TRAINING PROGRAM

## 2022-04-15 PROCEDURE — 36415 COLL VENOUS BLD VENIPUNCTURE: CPT | Performed by: STUDENT IN AN ORGANIZED HEALTH CARE EDUCATION/TRAINING PROGRAM

## 2022-04-15 PROCEDURE — 84100 ASSAY OF PHOSPHORUS: CPT | Performed by: STUDENT IN AN ORGANIZED HEALTH CARE EDUCATION/TRAINING PROGRAM

## 2022-04-15 PROCEDURE — 85025 COMPLETE CBC W/AUTO DIFF WBC: CPT | Performed by: STUDENT IN AN ORGANIZED HEALTH CARE EDUCATION/TRAINING PROGRAM

## 2022-04-15 RX ORDER — METFORMIN HYDROCHLORIDE 500 MG/1
500 TABLET ORAL 2 TIMES DAILY WITH MEALS
Qty: 60 TABLET | Refills: 5 | Status: ON HOLD | OUTPATIENT
Start: 2022-04-15 | End: 2023-07-07 | Stop reason: HOSPADM

## 2022-04-15 RX ORDER — GLIPIZIDE 10 MG/1
10 TABLET, FILM COATED, EXTENDED RELEASE ORAL
Qty: 30 TABLET | Refills: 5 | Status: SHIPPED | OUTPATIENT
Start: 2022-04-15 | End: 2023-10-19

## 2022-04-15 RX ORDER — PREDNISONE 20 MG/1
40 TABLET ORAL DAILY
Qty: 5 TABLET | Refills: 0 | Status: SHIPPED | OUTPATIENT
Start: 2022-04-15 | End: 2022-04-20

## 2022-04-15 RX ORDER — FUROSEMIDE 40 MG/1
40 TABLET ORAL 2 TIMES DAILY
Qty: 60 TABLET | Refills: 5 | Status: ON HOLD | OUTPATIENT
Start: 2022-04-15 | End: 2023-07-07

## 2022-04-15 RX ADMIN — INSULIN ASPART 5 UNITS: 100 INJECTION, SOLUTION INTRAVENOUS; SUBCUTANEOUS at 08:04

## 2022-04-15 RX ADMIN — ASPIRIN 81 MG: 81 TABLET, COATED ORAL at 09:04

## 2022-04-15 RX ADMIN — Medication 1 PATCH: at 09:04

## 2022-04-15 RX ADMIN — CLOPIDOGREL 75 MG: 75 TABLET, FILM COATED ORAL at 09:04

## 2022-04-15 RX ADMIN — MUPIROCIN: 20 OINTMENT TOPICAL at 09:04

## 2022-04-15 RX ADMIN — PREDNISONE 40 MG: 20 TABLET ORAL at 09:04

## 2022-04-15 RX ADMIN — ATORVASTATIN CALCIUM 40 MG: 40 TABLET, FILM COATED ORAL at 09:04

## 2022-04-15 RX ADMIN — INSULIN ASPART 2 UNITS: 100 INJECTION, SOLUTION INTRAVENOUS; SUBCUTANEOUS at 08:04

## 2022-04-15 RX ADMIN — FUROSEMIDE 40 MG: 10 INJECTION, SOLUTION INTRAMUSCULAR; INTRAVENOUS at 05:04

## 2022-04-15 NOTE — ASSESSMENT & PLAN NOTE
Patient with Hypercapnic Respiratory failure which is Acute on chronic.  he is on home oxygen at 5 LPM. Supplemental oxygen was provided and noted- Oxygen Concentration (%):  [30] 30.   Signs/symptoms of respiratory failure include- tachypnea, increased work of breathing and wheezing. Contributing diagnoses includes - CHF and COPD Labs and images were reviewed. Patient Has recent ABG, which has been reviewed. Will treat underlying causes and adjust management of respiratory failure as follows-   - severe respiratory acidosis with CO2 >130 --> on bipap with improvement of distress  - Steroids, lasix and nebulizers  - Pulmonology consulted  - slowly weaning and back to home oxygen requirements    Resolved. Back at his 4L baseline

## 2022-04-15 NOTE — PROGRESS NOTES
"[facility] Medicine  Progress Note    Patient Name: Abelardo Irene Jr.  MRN: [id number]  Patient Class: IP- Inpatient   Admission Date: 4/12/2022  Length of Stay: 3 days  Attending Physician: Padilla Rogers MD  Primary Care Provider: Rolando Funes MD        Subjective:     Principal Problem:Acute on chronic respiratory failure with hypoxia and hypercapnia        HPI:  Mr. Irene is a 67 yo M with history of CHF, COPD on home oxygen and hypertension who presents with chief complaint of shortness of breath. He tells me it started 2 days ago and he "couldn't shake it." He also endorses swelling in his legs. He has run out of his medications recently and does admit to smoking cigarettes still. He denies any recent cough, fever or chills.  In the ED, he was found to have severe respiratory acidosis and respiratory distress, he was given lasix, nebulizers and steroids and placed on bipap. He was admitted to ICU for further evaluation.      Overview/Hospital Course:  Admitted with CHF exacerbation, acute on chronic hypercapnic/hypoxic respiratory failure. Started on bipap and lasix along with nebulizers and steroids. Slowly improving, able to transition off bipap. Pulmonology consulted. Ambulating okay. Still feeling short winded. Stable for transfer to floor on 4/13/22. Patient clinically improved. On 4/15- he was on his baseline 4L 02 and requesting discharge to home. Activity as tolerated. Diet- low NA, ADA 2000 laura diet. Will have follow up with endocrine, pulmonary and cardiology in 1-2 weeks.      Interval History:  No new issues. Would like to go home         Objective:     Vital Signs (Most Recent):  Temp: 97.7 °F (36.5 °C) (04/15/22 0709)  Pulse: 82 (04/15/22 0709)  Resp: 19 (04/15/22 0709)  BP: 117/85 (04/15/22 0709)  SpO2: 100 % (04/15/22 0709) Vital Signs (24h Range):  Temp:  [97.3 °F (36.3 °C)-98.7 °F (37.1 °C)] 97.7 °F (36.5 °C)  Pulse:  [] 82  Resp:  [18-27] 19  SpO2:  [95 %-100 %] " 100 %  BP: (117-164)/(75-92) 117/85     Weight: 91.2 kg (201 lb 1 oz)  Body mass index is 28.85 kg/m².    Intake/Output Summary (Last 24 hours) at 4/15/2022 0934  Last data filed at 4/15/2022 0700  Gross per 24 hour   Intake 240 ml   Output 2300 ml   Net -2060 ml      Physical Exam  Vitals and nursing note reviewed.   Constitutional:       General: He is not in acute distress.     Appearance: Normal appearance. He is obese. He is not ill-appearing, toxic-appearing or diaphoretic.   HENT:      Head: Normocephalic and atraumatic.   Eyes:      Conjunctiva/sclera: Conjunctivae normal.   Cardiovascular:      Rate and Rhythm: Normal rate and regular rhythm.   Pulmonary:      Effort: Pulmonary effort is normal. No respiratory distress.   Skin:     General: Skin is warm and dry.   Neurological:      Mental Status: He is alert and oriented to person, place, and time.   Psychiatric:         Mood and Affect: Mood normal.         Behavior: Behavior normal.         Thought Content: Thought content normal.       Significant Labs: All pertinent labs within the past 24 hours have been reviewed.  BMP:   Recent Labs   Lab 04/15/22  0335   *      K 3.8   CL 89*   CO2 38*   BUN 15   CREATININE 0.8   CALCIUM 8.7   MG 2.2     CBC:   Recent Labs   Lab 04/14/22  0353 04/15/22  0335   WBC 16.95* 14.24*   HGB 12.2* 13.3*   HCT 39.6* 43.5    201       Significant Imaging:       Assessment/Plan:      * Acute on chronic respiratory failure with hypoxia and hypercapnia  Patient with Hypercapnic Respiratory failure which is Acute on chronic.  he is on home oxygen at 5 LPM. Supplemental oxygen was provided and noted- Oxygen Concentration (%):  [30] 30.   Signs/symptoms of respiratory failure include- tachypnea, increased work of breathing and wheezing. Contributing diagnoses includes - CHF and COPD Labs and images were reviewed. Patient Has recent ABG, which has been reviewed. Will treat underlying causes and adjust management  of respiratory failure as follows-   - severe respiratory acidosis with CO2 >130 --> on bipap with improvement of distress  - Steroids, lasix and nebulizers  - Pulmonology consulted  - slowly weaning and back to home oxygen requirements    Resolved. Back at his 4L baseline     Uncontrolled type 2 diabetes mellitus with hyperglycemia  A1c:   Lab Results   Component Value Date    HGBA1C 10.1 (H) 04/13/2022     Meds: basal bolus insulin + SSI PRN to maintain goal 140-180  ADA diet, accuchecks ACHS, hypoglycemic protocol    - started basal dose tonight      COPD exacerbation  - with severe respiratory acidosis  - on nebs, bipap and steroids - decrease to prednisone 40 mg for 5 days total  - nebulizers PRN  - Bipap at night and PRN  - still smoking but tells me he's ready to quit      Acute on chronic combined systolic and diastolic congestive heart failure  - has known hx with CAD  - continue on lasix for diuresis    EF of 40%        Coronary artery disease involving native coronary artery of native heart without angina pectoris  - continue ASA/Statin  - ecg and troponin reviewed and no evidence of ischemia  - chest pain free      COPD (chronic obstructive pulmonary disease)  - still smokes - see exacerbation      Tobacco abuse  - spent a total of 4 minutes face to face discussing smoking cessation. He tells me he is ready to quit and would like a nicotine patch. Order placed      Benign essential hypertension  - resume home medications        VTE Risk Mitigation (From admission, onward)         Ordered     enoxaparin injection 40 mg  Daily         04/12/22 0736     IP VTE HIGH RISK PATIENT  Once         04/12/22 0736     Place sequential compression device  Until discontinued         04/12/22 0736                Discharge Planning   VERA: 4/15/2022     Code Status: Full Code   Is the patient medically ready for discharge?:     Reason for patient still in hospital (select all that apply): Patient unstable  Discharge Plan  A: Home with family                  Padilla Arcos MD  Department of Hospital Medicine   [facility] Surg

## 2022-04-15 NOTE — SUBJECTIVE & OBJECTIVE
Interval History:  No new issues. Would like to go home         Objective:     Vital Signs (Most Recent):  Temp: 97.7 °F (36.5 °C) (04/15/22 0709)  Pulse: 82 (04/15/22 0709)  Resp: 19 (04/15/22 0709)  BP: 117/85 (04/15/22 0709)  SpO2: 100 % (04/15/22 0709) Vital Signs (24h Range):  Temp:  [97.3 °F (36.3 °C)-98.7 °F (37.1 °C)] 97.7 °F (36.5 °C)  Pulse:  [] 82  Resp:  [18-27] 19  SpO2:  [95 %-100 %] 100 %  BP: (117-164)/(75-92) 117/85     Weight: 91.2 kg (201 lb 1 oz)  Body mass index is 28.85 kg/m².    Intake/Output Summary (Last 24 hours) at 4/15/2022 0934  Last data filed at 4/15/2022 0700  Gross per 24 hour   Intake 240 ml   Output 2300 ml   Net -2060 ml      Physical Exam  Vitals and nursing note reviewed.   Constitutional:       General: He is not in acute distress.     Appearance: Normal appearance. He is obese. He is not ill-appearing, toxic-appearing or diaphoretic.   HENT:      Head: Normocephalic and atraumatic.   Eyes:      Conjunctiva/sclera: Conjunctivae normal.   Cardiovascular:      Rate and Rhythm: Normal rate and regular rhythm.   Pulmonary:      Effort: Pulmonary effort is normal. No respiratory distress.   Skin:     General: Skin is warm and dry.   Neurological:      Mental Status: He is alert and oriented to person, place, and time.   Psychiatric:         Mood and Affect: Mood normal.         Behavior: Behavior normal.         Thought Content: Thought content normal.       Significant Labs: All pertinent labs within the past 24 hours have been reviewed.  BMP:   Recent Labs   Lab 04/15/22  0335   *      K 3.8   CL 89*   CO2 38*   BUN 15   CREATININE 0.8   CALCIUM 8.7   MG 2.2     CBC:   Recent Labs   Lab 04/14/22  0353 04/15/22  0335   WBC 16.95* 14.24*   HGB 12.2* 13.3*   HCT 39.6* 43.5    201       Significant Imaging:

## 2022-04-15 NOTE — PLAN OF CARE
[facility] - Med Surg  Discharge Final Note    Patient clear to discharge from case management stand point. Reviewed follow up appointment with patient, verbalized understanding. Pt stated his daughter will be helping him home at discharge.     Primary Care Provider: Rolando Funes MD    Expected Discharge Date: 4/15/2022    Final Discharge Note (most recent)       Final Note - 04/15/22 1102          Final Note    Assessment Type Final Discharge Note (P)      Anticipated Discharge Disposition Home or Self Care (P)      What phone number can be called within the next 1-3 days to see how you are doing after discharge? [phone] (P)      Hospital Resources/Appts/Education Provided Provided patient/caregiver with written discharge plan information (P)         Post-Acute Status    Coverage PHN (P)      Discharge Delays None known at this time (P)                      Important Message from Medicare             Contact Info       Rolando Funes MD   Specialty: Internal Medicine   Relationship: PCP - [address]  [address]  [facility] [address]   Phone: [phone]       Next Steps: Schedule an appointment as soon as possible for a visit in 1 week(s)    Instructions: Please call to make follow up appointment asap.    Shahid Farmer MD   Specialty: Pulmonary Disease, Sleep Medicine    [address]  [address]  [facility] [address]   Phone: [phone]       Next Steps: Call    Instructions: Clinic to contact patient to schedule appointment.

## 2022-04-15 NOTE — DISCHARGE SUMMARY
"[facility] Medicine  Discharge Summary      Patient Name: Abelardo Irene Jr.  MRN: [id number]  Patient Class: IP- Inpatient  Admission Date: 4/12/2022  Hospital Length of Stay: 3 days  Discharge Date and Time:  04/15/2022 9:40 AM  Attending Physician: Padilla Rogers MD   Discharging Provider: Padilla Rogers MD  Primary Care Provider: Rolando Funes MD      HPI:   Mr. Irene is a 67 yo M with history of CHF, COPD on home oxygen and hypertension who presents with chief complaint of shortness of breath. He tells me it started 2 days ago and he "couldn't shake it." He also endorses swelling in his legs. He has run out of his medications recently and does admit to smoking cigarettes still. He denies any recent cough, fever or chills.  In the ED, he was found to have severe respiratory acidosis and respiratory distress, he was given lasix, nebulizers and steroids and placed on bipap. He was admitted to ICU for further evaluation.      * No surgery found *      Hospital Course:   Admitted with CHF exacerbation, acute on chronic hypercapnic/hypoxic respiratory failure. Started on bipap and lasix along with nebulizers and steroids. Slowly improving, able to transition off bipap. Pulmonology consulted. Ambulating okay. Still feeling short winded. Stable for transfer to floor on 4/13/22. Patient clinically improved. On 4/15- he was on his baseline 4L 02 and requesting discharge to home. Activity as tolerated. Diet- low NA, ADA 2000 laura diet. Will have follow up with pulmonary and cardiology in 1-2 weeks.       Goals of Care Treatment Preferences:  Code Status: Full Code      Consults:   Consults (From admission, onward)        Status Ordering Provider     Inpatient consult to Palliative Care  Once        Provider:  Shanell Pop MD    Completed AVELINA POP     Inpatient consult to Pulmonology  Once        Provider:  Jordon Leroy MD    Completed AVELINA POP          No new Assessment & " Plan notes have been filed under this hospital service since the last note was generated.  Service: Hospital Medicine    Final Active Diagnoses:    Diagnosis Date Noted POA    Uncontrolled type 2 diabetes mellitus with hyperglycemia [E11.65] 04/12/2022 Yes    Class 1 obesity due to excess calories with serious comorbidity in adult [E66.09] 02/13/2021 Yes     Chronic    Coronary artery disease involving native coronary artery of native heart without angina pectoris [I25.10] 11/15/2017 Yes     Chronic    COPD (chronic obstructive pulmonary disease) [J44.9] 04/28/2016 Yes     Chronic    Tobacco abuse [Z72.0] 04/28/2016 Yes     Chronic    Benign essential hypertension [I10] 03/02/2013 Yes     Chronic      Problems Resolved During this Admission:    Diagnosis Date Noted Date Resolved POA    PRINCIPAL PROBLEM:  Acute on chronic respiratory failure with hypoxia and hypercapnia [J96.21, J96.22] 04/12/2022 04/15/2022 Yes    Advance care planning [Z71.89] 04/12/2022 04/15/2022 Not Applicable    COPD exacerbation [J44.1] 02/13/2021 04/15/2022 Yes    Acute on chronic combined systolic and diastolic congestive heart failure [I50.43] 02/13/2021 04/15/2022 Yes       Discharged Condition: good    Disposition: Home or Self Care    Follow Up:   Follow-up Information     Rolando Funes MD Follow up today.    Specialty: Internal Medicine  Contact information:  [address] [place] [address]  [address]  [facility] [address]  [phone]             Jerardo Quarles MD Follow up in 1 week(s).    Specialty: Cardiology  Contact information:  [address]  [address]  [facility] [id number] [phone]             Shahid Farmer MD Follow up.    Specialties: Pulmonary Disease, Sleep Medicine  Contact information:  [address]  [address]  [facility] [id number] [phone]                       Patient Instructions:   No discharge procedures on file.    Significant Diagnostic Studies:     Pending Diagnostic Studies:     Procedure Component  Value Units Date/Time    EKG 12-lead [[id number]]     Order Status: Sent Lab Status: No result          Medications:  Reconciled Home Medications:      Medication List      START taking these medications    glipiZIDE 10 MG Tr24  Commonly known as: GLUCOTROL  Take 1 tablet (10 mg total) by mouth daily with breakfast.     metFORMIN 500 MG tablet  Commonly known as: GLUCOPHAGE  Take 1 tablet (500 mg total) by mouth 2 (two) times daily with meals.     predniSONE 20 MG tablet  Commonly known as: DELTASONE  Take 2 tablets (40 mg total) by mouth once daily. for 5 days        CHANGE how you take these medications    furosemide 40 MG tablet  Commonly known as: LASIX  Take 1 tablet (40 mg total) by mouth 2 (two) times daily.  What changed:   · medication strength  · how much to take  · when to take this        CONTINUE taking these medications    albuterol 90 mcg/actuation inhaler  Commonly known as: PROVENTIL/VENTOLIN HFA  Inhale 1-2 puffs into the lungs every 6 (six) hours as needed for Wheezing. Rescue     amLODIPine 5 MG tablet  Commonly known as: NORVASC  Take 1 tablet (5 mg total) by mouth once daily.     aspirin 81 MG EC tablet  Commonly known as: ECOTRIN  Take 1 tablet (81 mg total) by mouth once daily.     atorvastatin 40 MG tablet  Commonly known as: LIPITOR  Take 1 tablet (40 mg total) by mouth once daily.     clopidogreL 75 mg tablet  Commonly known as: PLAVIX  Take 1 tablet (75 mg total) by mouth once daily.     fluticasone-salmeterol 250-50 mcg/dose 250-50 mcg/dose diskus inhaler  Commonly known as: ADVAIR  Inhale 1 puff into the lungs 2 (two) times daily.     lisinopriL 20 MG tablet  Commonly known as: PRINIVIL,ZESTRIL  Take 1 tablet (20 mg total) by mouth once daily.     metoprolol succinate 25 MG 24 hr tablet  Commonly known as: TOPROL-XL  Take 1 tablet (25 mg total) by mouth once daily.            Indwelling Lines/Drains at time of discharge:   Lines/Drains/Airways     None                 Time spent on the  discharge of patient:  > 35  minutes         Padilla Arcos MD  Department of Hospital Medicine  [facility] Surg

## 2022-04-16 LAB
BACTERIA BLD CULT: NORMAL
BACTERIA BLD CULT: NORMAL

## 2022-04-19 ENCOUNTER — TELEPHONE (OUTPATIENT)
Dept: PULMONOLOGY | Facility: CLINIC | Age: 67
End: 2022-04-19
Payer: MEDICARE

## 2022-04-19 NOTE — TELEPHONE ENCOUNTER
Scheduled an appointment to see provider.    SACHI Aranda            ----- Message from Barb Quinonez MA sent at 4/18/2022  8:41 AM CDT -----  CHARLI Monahan Staff  Caller: Unspecified (3 days ago, 10:13 AM)  Patient will need follow up appointment. Patient discharge today. Please contact patient to schedule.     Thank you   Thao Samuels RN [place]   [phone]

## 2023-03-06 ENCOUNTER — HOSPITAL ENCOUNTER (INPATIENT)
Facility: HOSPITAL | Age: 68
LOS: 5 days | Discharge: HOME HEALTH | DRG: 208 | End: 2023-03-11
Attending: STUDENT IN AN ORGANIZED HEALTH CARE EDUCATION/TRAINING PROGRAM | Admitting: EMERGENCY MEDICINE
Payer: MEDICARE

## 2023-03-06 DIAGNOSIS — J44.1 COPD EXACERBATION: ICD-10-CM

## 2023-03-06 DIAGNOSIS — J96.02 ACUTE RESPIRATORY FAILURE WITH HYPOXIA AND HYPERCARBIA: ICD-10-CM

## 2023-03-06 DIAGNOSIS — J96.22 ACUTE ON CHRONIC RESPIRATORY FAILURE WITH HYPOXIA AND HYPERCAPNIA: ICD-10-CM

## 2023-03-06 DIAGNOSIS — J96.21 ACUTE ON CHRONIC RESPIRATORY FAILURE WITH HYPOXIA AND HYPERCAPNIA: ICD-10-CM

## 2023-03-06 DIAGNOSIS — R06.02 SHORTNESS OF BREATH: ICD-10-CM

## 2023-03-06 DIAGNOSIS — I50.9 ACUTE ON CHRONIC CONGESTIVE HEART FAILURE, UNSPECIFIED HEART FAILURE TYPE: ICD-10-CM

## 2023-03-06 DIAGNOSIS — J96.00 ACUTE RESPIRATORY FAILURE: ICD-10-CM

## 2023-03-06 DIAGNOSIS — J96.90 RESPIRATORY FAILURE, UNSPECIFIED CHRONICITY, UNSPECIFIED WHETHER WITH HYPOXIA OR HYPERCAPNIA: Primary | ICD-10-CM

## 2023-03-06 DIAGNOSIS — Z51.5 PALLIATIVE CARE ENCOUNTER: ICD-10-CM

## 2023-03-06 DIAGNOSIS — J96.01 ACUTE RESPIRATORY FAILURE WITH HYPOXIA AND HYPERCARBIA: ICD-10-CM

## 2023-03-06 PROBLEM — I50.43 ACUTE ON CHRONIC COMBINED SYSTOLIC AND DIASTOLIC HEART FAILURE: Status: ACTIVE | Noted: 2017-07-28

## 2023-03-06 LAB
ALBUMIN SERPL BCP-MCNC: 3.7 G/DL (ref 3.5–5.2)
ALLENS TEST: ABNORMAL
ALLENS TEST: ABNORMAL
ALP SERPL-CCNC: 92 U/L (ref 55–135)
ALT SERPL W/O P-5'-P-CCNC: 17 U/L (ref 10–44)
ANION GAP SERPL CALC-SCNC: 12 MMOL/L (ref 8–16)
AST SERPL-CCNC: 16 U/L (ref 10–40)
BASOPHILS # BLD AUTO: 0.07 K/UL (ref 0–0.2)
BASOPHILS NFR BLD: 0.4 % (ref 0–1.9)
BILIRUB SERPL-MCNC: 0.4 MG/DL (ref 0.1–1)
BNP SERPL-MCNC: 1017 PG/ML (ref 0–99)
BUN SERPL-MCNC: 6 MG/DL (ref 8–23)
CALCIUM SERPL-MCNC: 9 MG/DL (ref 8.7–10.5)
CHLORIDE SERPL-SCNC: 94 MMOL/L (ref 95–110)
CO2 SERPL-SCNC: 35 MMOL/L (ref 23–29)
CREAT SERPL-MCNC: 0.8 MG/DL (ref 0.5–1.4)
CTP QC/QA: YES
DELSYS: ABNORMAL
DELSYS: ABNORMAL
DIFFERENTIAL METHOD: ABNORMAL
EOSINOPHIL # BLD AUTO: 0.2 K/UL (ref 0–0.5)
EOSINOPHIL NFR BLD: 1 % (ref 0–8)
EP: 8
ERYTHROCYTE [DISTWIDTH] IN BLOOD BY AUTOMATED COUNT: 13.1 % (ref 11.5–14.5)
ERYTHROCYTE [SEDIMENTATION RATE] IN BLOOD BY WESTERGREN METHOD: 12 MM/H
ERYTHROCYTE [SEDIMENTATION RATE] IN BLOOD BY WESTERGREN METHOD: 26 MM/H
EST. GFR  (NO RACE VARIABLE): >60 ML/MIN/1.73 M^2
ESTIMATED AVG GLUCOSE: 174 MG/DL (ref 68–131)
FIO2: 100
FIO2: 40
GLUCOSE SERPL-MCNC: 284 MG/DL (ref 70–110)
HBA1C MFR BLD: 7.7 % (ref 4–5.6)
HCO3 UR-SCNC: 35.4 MMOL/L (ref 24–28)
HCO3 UR-SCNC: 43.6 MMOL/L (ref 24–28)
HCT VFR BLD AUTO: 39.6 % (ref 40–54)
HGB BLD-MCNC: 12.2 G/DL (ref 14–18)
IMM GRANULOCYTES # BLD AUTO: 0.2 K/UL (ref 0–0.04)
IMM GRANULOCYTES NFR BLD AUTO: 1.3 % (ref 0–0.5)
IP: 14
LYMPHOCYTES # BLD AUTO: 3.9 K/UL (ref 1–4.8)
LYMPHOCYTES NFR BLD: 24.8 % (ref 18–48)
MAGNESIUM SERPL-MCNC: 2.1 MG/DL (ref 1.6–2.6)
MCH RBC QN AUTO: 28.4 PG (ref 27–31)
MCHC RBC AUTO-ENTMCNC: 30.8 G/DL (ref 32–36)
MCV RBC AUTO: 92 FL (ref 82–98)
MODE: ABNORMAL
MODE: ABNORMAL
MONOCYTES # BLD AUTO: 0.6 K/UL (ref 0.3–1)
MONOCYTES NFR BLD: 3.8 % (ref 4–15)
NEUTROPHILS # BLD AUTO: 10.8 K/UL (ref 1.8–7.7)
NEUTROPHILS NFR BLD: 68.7 % (ref 38–73)
NRBC BLD-RTO: 0 /100 WBC
PCO2 BLDA: 108.6 MMHG (ref 35–45)
PCO2 BLDA: 58.6 MMHG (ref 35–45)
PEEP: 5
PH SMN: 7.21 [PH] (ref 7.35–7.45)
PH SMN: 7.39 [PH] (ref 7.35–7.45)
PLATELET # BLD AUTO: 253 K/UL (ref 150–450)
PMV BLD AUTO: 10.9 FL (ref 9.2–12.9)
PO2 BLDA: 272 MMHG (ref 80–100)
PO2 BLDA: 86 MMHG (ref 80–100)
POC BE: 11 MMOL/L
POC BE: 9 MMOL/L
POC SATURATED O2: 100 % (ref 95–100)
POC SATURATED O2: 96 % (ref 95–100)
POC TCO2: 37 MMOL/L (ref 23–27)
POC TCO2: 47 MMOL/L (ref 23–27)
POCT GLUCOSE: 250 MG/DL (ref 70–110)
POTASSIUM SERPL-SCNC: 3.7 MMOL/L (ref 3.5–5.1)
PROT SERPL-MCNC: 7.8 G/DL (ref 6–8.4)
RBC # BLD AUTO: 4.29 M/UL (ref 4.6–6.2)
SAMPLE: ABNORMAL
SAMPLE: ABNORMAL
SARS-COV-2 RDRP RESP QL NAA+PROBE: NEGATIVE
SITE: ABNORMAL
SITE: ABNORMAL
SODIUM SERPL-SCNC: 141 MMOL/L (ref 136–145)
TROPONIN I SERPL DL<=0.01 NG/ML-MCNC: 0.03 NG/ML (ref 0–0.03)
TROPONIN I SERPL DL<=0.01 NG/ML-MCNC: 0.04 NG/ML (ref 0–0.03)
TROPONIN I SERPL DL<=0.01 NG/ML-MCNC: <0.006 NG/ML (ref 0–0.03)
VT: 450
WBC # BLD AUTO: 15.75 K/UL (ref 3.9–12.7)

## 2023-03-06 PROCEDURE — 99291 PR CRITICAL CARE, E/M 30-74 MINUTES: ICD-10-PCS | Mod: ,,, | Performed by: NURSE PRACTITIONER

## 2023-03-06 PROCEDURE — 63600175 PHARM REV CODE 636 W HCPCS: Performed by: STUDENT IN AN ORGANIZED HEALTH CARE EDUCATION/TRAINING PROGRAM

## 2023-03-06 PROCEDURE — 99291 CRITICAL CARE FIRST HOUR: CPT | Mod: 25

## 2023-03-06 PROCEDURE — 25000242 PHARM REV CODE 250 ALT 637 W/ HCPCS: Performed by: HOSPITALIST

## 2023-03-06 PROCEDURE — 99900026 HC AIRWAY MAINTENANCE (STAT)

## 2023-03-06 PROCEDURE — 93010 EKG 12-LEAD: ICD-10-PCS | Mod: 76,,, | Performed by: INTERNAL MEDICINE

## 2023-03-06 PROCEDURE — 36600 WITHDRAWAL OF ARTERIAL BLOOD: CPT

## 2023-03-06 PROCEDURE — 94640 AIRWAY INHALATION TREATMENT: CPT

## 2023-03-06 PROCEDURE — 94644 CONT INHLJ TX 1ST HOUR: CPT

## 2023-03-06 PROCEDURE — 83735 ASSAY OF MAGNESIUM: CPT | Performed by: STUDENT IN AN ORGANIZED HEALTH CARE EDUCATION/TRAINING PROGRAM

## 2023-03-06 PROCEDURE — 96367 TX/PROPH/DG ADDL SEQ IV INF: CPT

## 2023-03-06 PROCEDURE — 25000003 PHARM REV CODE 250: Performed by: EMERGENCY MEDICINE

## 2023-03-06 PROCEDURE — 82803 BLOOD GASES ANY COMBINATION: CPT

## 2023-03-06 PROCEDURE — 25000003 PHARM REV CODE 250: Performed by: HOSPITALIST

## 2023-03-06 PROCEDURE — 84484 ASSAY OF TROPONIN QUANT: CPT | Mod: 91 | Performed by: STUDENT IN AN ORGANIZED HEALTH CARE EDUCATION/TRAINING PROGRAM

## 2023-03-06 PROCEDURE — 87205 SMEAR GRAM STAIN: CPT | Performed by: NURSE PRACTITIONER

## 2023-03-06 PROCEDURE — 25000242 PHARM REV CODE 250 ALT 637 W/ HCPCS: Performed by: EMERGENCY MEDICINE

## 2023-03-06 PROCEDURE — 25000242 PHARM REV CODE 250 ALT 637 W/ HCPCS: Performed by: STUDENT IN AN ORGANIZED HEALTH CARE EDUCATION/TRAINING PROGRAM

## 2023-03-06 PROCEDURE — 96365 THER/PROPH/DIAG IV INF INIT: CPT

## 2023-03-06 PROCEDURE — 27100171 HC OXYGEN HIGH FLOW UP TO 24 HOURS

## 2023-03-06 PROCEDURE — 36415 COLL VENOUS BLD VENIPUNCTURE: CPT | Performed by: HOSPITALIST

## 2023-03-06 PROCEDURE — 94761 N-INVAS EAR/PLS OXIMETRY MLT: CPT

## 2023-03-06 PROCEDURE — 25000003 PHARM REV CODE 250: Performed by: STUDENT IN AN ORGANIZED HEALTH CARE EDUCATION/TRAINING PROGRAM

## 2023-03-06 PROCEDURE — 83880 ASSAY OF NATRIURETIC PEPTIDE: CPT | Performed by: STUDENT IN AN ORGANIZED HEALTH CARE EDUCATION/TRAINING PROGRAM

## 2023-03-06 PROCEDURE — 94660 CPAP INITIATION&MGMT: CPT | Mod: XB

## 2023-03-06 PROCEDURE — 83036 HEMOGLOBIN GLYCOSYLATED A1C: CPT | Performed by: HOSPITALIST

## 2023-03-06 PROCEDURE — 99900035 HC TECH TIME PER 15 MIN (STAT)

## 2023-03-06 PROCEDURE — 93010 ELECTROCARDIOGRAM REPORT: CPT | Mod: ,,, | Performed by: INTERNAL MEDICINE

## 2023-03-06 PROCEDURE — 93005 ELECTROCARDIOGRAM TRACING: CPT

## 2023-03-06 PROCEDURE — 99291 CRITICAL CARE FIRST HOUR: CPT | Mod: ,,, | Performed by: NURSE PRACTITIONER

## 2023-03-06 PROCEDURE — 85025 COMPLETE CBC W/AUTO DIFF WBC: CPT | Performed by: STUDENT IN AN ORGANIZED HEALTH CARE EDUCATION/TRAINING PROGRAM

## 2023-03-06 PROCEDURE — 25000242 PHARM REV CODE 250 ALT 637 W/ HCPCS

## 2023-03-06 PROCEDURE — 84484 ASSAY OF TROPONIN QUANT: CPT | Mod: 91 | Performed by: HOSPITALIST

## 2023-03-06 PROCEDURE — 87040 BLOOD CULTURE FOR BACTERIA: CPT | Mod: 59 | Performed by: STUDENT IN AN ORGANIZED HEALTH CARE EDUCATION/TRAINING PROGRAM

## 2023-03-06 PROCEDURE — 96375 TX/PRO/DX INJ NEW DRUG ADDON: CPT

## 2023-03-06 PROCEDURE — 87070 CULTURE OTHR SPECIMN AEROBIC: CPT | Performed by: NURSE PRACTITIONER

## 2023-03-06 PROCEDURE — 96366 THER/PROPH/DIAG IV INF ADDON: CPT

## 2023-03-06 PROCEDURE — 93010 ELECTROCARDIOGRAM REPORT: CPT | Mod: 76,,, | Performed by: INTERNAL MEDICINE

## 2023-03-06 PROCEDURE — 27000190 HC CPAP FULL FACE MASK W/VALVE

## 2023-03-06 PROCEDURE — 31500 INSERT EMERGENCY AIRWAY: CPT

## 2023-03-06 PROCEDURE — 63600175 PHARM REV CODE 636 W HCPCS: Performed by: NURSE PRACTITIONER

## 2023-03-06 PROCEDURE — 63600175 PHARM REV CODE 636 W HCPCS: Performed by: HOSPITALIST

## 2023-03-06 PROCEDURE — 63600175 PHARM REV CODE 636 W HCPCS

## 2023-03-06 PROCEDURE — 20000000 HC ICU ROOM

## 2023-03-06 PROCEDURE — 94002 VENT MGMT INPAT INIT DAY: CPT

## 2023-03-06 PROCEDURE — 94645 CONT INHLJ TX EACH ADDL HOUR: CPT

## 2023-03-06 PROCEDURE — 80053 COMPREHEN METABOLIC PANEL: CPT | Performed by: STUDENT IN AN ORGANIZED HEALTH CARE EDUCATION/TRAINING PROGRAM

## 2023-03-06 PROCEDURE — 25000003 PHARM REV CODE 250

## 2023-03-06 RX ORDER — ATORVASTATIN CALCIUM 40 MG/1
40 TABLET, FILM COATED ORAL DAILY
Status: DISCONTINUED | OUTPATIENT
Start: 2023-03-06 | End: 2023-03-11 | Stop reason: HOSPADM

## 2023-03-06 RX ORDER — ALBUTEROL SULFATE 2.5 MG/.5ML
15 SOLUTION RESPIRATORY (INHALATION)
Status: COMPLETED | OUTPATIENT
Start: 2023-03-06 | End: 2023-03-06

## 2023-03-06 RX ORDER — ACETAMINOPHEN 325 MG/1
650 TABLET ORAL EVERY 4 HOURS PRN
Status: DISCONTINUED | OUTPATIENT
Start: 2023-03-06 | End: 2023-03-11 | Stop reason: HOSPADM

## 2023-03-06 RX ORDER — CHLORHEXIDINE GLUCONATE ORAL RINSE 1.2 MG/ML
15 SOLUTION DENTAL 2 TIMES DAILY
Status: DISCONTINUED | OUTPATIENT
Start: 2023-03-06 | End: 2023-03-08

## 2023-03-06 RX ORDER — PROPOFOL 10 MG/ML
INJECTION, EMULSION INTRAVENOUS
Status: COMPLETED
Start: 2023-03-06 | End: 2023-03-06

## 2023-03-06 RX ORDER — CLOPIDOGREL BISULFATE 75 MG/1
75 TABLET ORAL DAILY
Status: DISCONTINUED | OUTPATIENT
Start: 2023-03-06 | End: 2023-03-11 | Stop reason: HOSPADM

## 2023-03-06 RX ORDER — ALBUTEROL SULFATE 2.5 MG/.5ML
SOLUTION RESPIRATORY (INHALATION)
Status: COMPLETED
Start: 2023-03-06 | End: 2023-03-06

## 2023-03-06 RX ORDER — SUCCINYLCHOLINE CHLORIDE 20 MG/ML
INJECTION INTRAMUSCULAR; INTRAVENOUS
Status: COMPLETED
Start: 2023-03-06 | End: 2023-03-06

## 2023-03-06 RX ORDER — MAGNESIUM SULFATE HEPTAHYDRATE 40 MG/ML
2 INJECTION, SOLUTION INTRAVENOUS ONCE
Status: COMPLETED | OUTPATIENT
Start: 2023-03-06 | End: 2023-03-06

## 2023-03-06 RX ORDER — IPRATROPIUM BROMIDE 0.5 MG/2.5ML
0.5 SOLUTION RESPIRATORY (INHALATION)
Status: COMPLETED | OUTPATIENT
Start: 2023-03-06 | End: 2023-03-06

## 2023-03-06 RX ORDER — PROPOFOL 10 MG/ML
0-50 INJECTION, EMULSION INTRAVENOUS CONTINUOUS
Status: DISCONTINUED | OUTPATIENT
Start: 2023-03-06 | End: 2023-03-07

## 2023-03-06 RX ORDER — LISINOPRIL 20 MG/1
20 TABLET ORAL DAILY
Status: DISCONTINUED | OUTPATIENT
Start: 2023-03-06 | End: 2023-03-07

## 2023-03-06 RX ORDER — FUROSEMIDE 10 MG/ML
80 INJECTION INTRAMUSCULAR; INTRAVENOUS EVERY 8 HOURS
Status: DISCONTINUED | OUTPATIENT
Start: 2023-03-06 | End: 2023-03-07

## 2023-03-06 RX ORDER — ONDANSETRON 2 MG/ML
8 INJECTION INTRAMUSCULAR; INTRAVENOUS EVERY 6 HOURS PRN
Status: DISCONTINUED | OUTPATIENT
Start: 2023-03-06 | End: 2023-03-11 | Stop reason: HOSPADM

## 2023-03-06 RX ORDER — KETAMINE HYDROCHLORIDE 100 MG/ML
100 INJECTION, SOLUTION INTRAMUSCULAR; INTRAVENOUS
Status: COMPLETED | OUTPATIENT
Start: 2023-03-06 | End: 2023-03-06

## 2023-03-06 RX ORDER — KETAMINE HYDROCHLORIDE 100 MG/ML
INJECTION, SOLUTION INTRAMUSCULAR; INTRAVENOUS
Status: COMPLETED
Start: 2023-03-06 | End: 2023-03-06

## 2023-03-06 RX ORDER — SODIUM CHLORIDE 0.9 % (FLUSH) 0.9 %
3 SYRINGE (ML) INJECTION
Status: DISCONTINUED | OUTPATIENT
Start: 2023-03-06 | End: 2023-03-11 | Stop reason: HOSPADM

## 2023-03-06 RX ORDER — SUCCINYLCHOLINE CHLORIDE 20 MG/ML
100 INJECTION INTRAMUSCULAR; INTRAVENOUS
Status: COMPLETED | OUTPATIENT
Start: 2023-03-06 | End: 2023-03-06

## 2023-03-06 RX ORDER — ASPIRIN 81 MG/1
81 TABLET ORAL DAILY
Status: DISCONTINUED | OUTPATIENT
Start: 2023-03-06 | End: 2023-03-11 | Stop reason: HOSPADM

## 2023-03-06 RX ORDER — MUPIROCIN 20 MG/G
OINTMENT TOPICAL 2 TIMES DAILY
Status: DISPENSED | OUTPATIENT
Start: 2023-03-06 | End: 2023-03-11

## 2023-03-06 RX ORDER — FAMOTIDINE 10 MG/ML
20 INJECTION INTRAVENOUS 2 TIMES DAILY
Status: DISCONTINUED | OUTPATIENT
Start: 2023-03-06 | End: 2023-03-11 | Stop reason: HOSPADM

## 2023-03-06 RX ORDER — FENTANYL CITRATE-0.9 % NACL/PF 10 MCG/ML
0-250 PLASTIC BAG, INJECTION (ML) INTRAVENOUS CONTINUOUS
Status: DISCONTINUED | OUTPATIENT
Start: 2023-03-06 | End: 2023-03-08

## 2023-03-06 RX ORDER — METOPROLOL SUCCINATE 25 MG/1
25 TABLET, EXTENDED RELEASE ORAL DAILY
Status: DISCONTINUED | OUTPATIENT
Start: 2023-03-06 | End: 2023-03-07

## 2023-03-06 RX ORDER — METHYLPREDNISOLONE SOD SUCC 125 MG
80 VIAL (EA) INJECTION EVERY 8 HOURS
Status: DISCONTINUED | OUTPATIENT
Start: 2023-03-06 | End: 2023-03-08

## 2023-03-06 RX ORDER — POLYETHYLENE GLYCOL 3350 17 G/17G
17 POWDER, FOR SOLUTION ORAL 2 TIMES DAILY PRN
Status: DISCONTINUED | OUTPATIENT
Start: 2023-03-06 | End: 2023-03-11 | Stop reason: HOSPADM

## 2023-03-06 RX ORDER — ALBUTEROL SULFATE 2.5 MG/.5ML
10 SOLUTION RESPIRATORY (INHALATION)
Status: COMPLETED | OUTPATIENT
Start: 2023-03-06 | End: 2023-03-06

## 2023-03-06 RX ORDER — FUROSEMIDE 10 MG/ML
80 INJECTION INTRAMUSCULAR; INTRAVENOUS
Status: COMPLETED | OUTPATIENT
Start: 2023-03-06 | End: 2023-03-06

## 2023-03-06 RX ORDER — CEFTRIAXONE 2 G/50ML
2 INJECTION, SOLUTION INTRAVENOUS
Status: DISCONTINUED | OUTPATIENT
Start: 2023-03-07 | End: 2023-03-08

## 2023-03-06 RX ORDER — INSULIN ASPART 100 [IU]/ML
1-10 INJECTION, SOLUTION INTRAVENOUS; SUBCUTANEOUS EVERY 6 HOURS PRN
Status: DISCONTINUED | OUTPATIENT
Start: 2023-03-06 | End: 2023-03-08

## 2023-03-06 RX ORDER — ENOXAPARIN SODIUM 100 MG/ML
40 INJECTION SUBCUTANEOUS EVERY 24 HOURS
Status: DISCONTINUED | OUTPATIENT
Start: 2023-03-06 | End: 2023-03-11 | Stop reason: HOSPADM

## 2023-03-06 RX ORDER — IPRATROPIUM BROMIDE AND ALBUTEROL SULFATE 2.5; .5 MG/3ML; MG/3ML
3 SOLUTION RESPIRATORY (INHALATION) EVERY 4 HOURS
Status: DISCONTINUED | OUTPATIENT
Start: 2023-03-06 | End: 2023-03-08

## 2023-03-06 RX ORDER — GLUCAGON 1 MG
1 KIT INJECTION
Status: DISCONTINUED | OUTPATIENT
Start: 2023-03-06 | End: 2023-03-11 | Stop reason: HOSPADM

## 2023-03-06 RX ADMIN — AZITHROMYCIN MONOHYDRATE 500 MG: 500 INJECTION, POWDER, LYOPHILIZED, FOR SOLUTION INTRAVENOUS at 07:03

## 2023-03-06 RX ADMIN — Medication 200 MCG/HR: at 06:03

## 2023-03-06 RX ADMIN — PROPOFOL 10 MCG/KG/MIN: 10 INJECTION, EMULSION INTRAVENOUS at 05:03

## 2023-03-06 RX ADMIN — MUPIROCIN: 20 OINTMENT TOPICAL at 10:03

## 2023-03-06 RX ADMIN — FUROSEMIDE 80 MG: 10 INJECTION, SOLUTION INTRAMUSCULAR; INTRAVENOUS at 09:03

## 2023-03-06 RX ADMIN — ASPIRIN 81 MG: 81 TABLET, COATED ORAL at 01:03

## 2023-03-06 RX ADMIN — Medication 50 MCG/HR: at 06:03

## 2023-03-06 RX ADMIN — SUCCINYLCHOLINE CHLORIDE 100 MG: 20 INJECTION INTRAMUSCULAR; INTRAVENOUS at 05:03

## 2023-03-06 RX ADMIN — CHLORHEXIDINE GLUCONATE 0.12% ORAL RINSE 15 ML: 1.2 LIQUID ORAL at 09:03

## 2023-03-06 RX ADMIN — FAMOTIDINE 20 MG: 10 INJECTION INTRAVENOUS at 09:03

## 2023-03-06 RX ADMIN — ALBUTEROL SULFATE 15 MG: 2.5 SOLUTION RESPIRATORY (INHALATION) at 07:03

## 2023-03-06 RX ADMIN — METHYLPREDNISOLONE SODIUM SUCCINATE 80 MG: 125 INJECTION, POWDER, FOR SOLUTION INTRAMUSCULAR; INTRAVENOUS at 09:03

## 2023-03-06 RX ADMIN — KETAMINE HYDROCHLORIDE 100 MG: 100 INJECTION INTRAMUSCULAR; INTRAVENOUS at 05:03

## 2023-03-06 RX ADMIN — METHYLPREDNISOLONE SODIUM SUCCINATE 80 MG: 125 INJECTION, POWDER, FOR SOLUTION INTRAMUSCULAR; INTRAVENOUS at 01:03

## 2023-03-06 RX ADMIN — KETAMINE HYDROCHLORIDE 100 MG: 100 INJECTION, SOLUTION INTRAMUSCULAR; INTRAVENOUS at 05:03

## 2023-03-06 RX ADMIN — FAMOTIDINE 20 MG: 10 INJECTION INTRAVENOUS at 10:03

## 2023-03-06 RX ADMIN — PROPOFOL 25 MCG/KG/MIN: 10 INJECTION, EMULSION INTRAVENOUS at 09:03

## 2023-03-06 RX ADMIN — IPRATROPIUM BROMIDE AND ALBUTEROL SULFATE 3 ML: 2.5; .5 SOLUTION RESPIRATORY (INHALATION) at 04:03

## 2023-03-06 RX ADMIN — IPRATROPIUM BROMIDE AND ALBUTEROL SULFATE 3 ML: 2.5; .5 SOLUTION RESPIRATORY (INHALATION) at 11:03

## 2023-03-06 RX ADMIN — CEFTRIAXONE 1 G: 1 INJECTION, SOLUTION INTRAVENOUS at 07:03

## 2023-03-06 RX ADMIN — ENOXAPARIN SODIUM 40 MG: 40 INJECTION SUBCUTANEOUS at 05:03

## 2023-03-06 RX ADMIN — CLOPIDOGREL BISULFATE 75 MG: 75 TABLET, FILM COATED ORAL at 01:03

## 2023-03-06 RX ADMIN — MAGNESIUM SULFATE HEPTAHYDRATE 2 G: 40 INJECTION, SOLUTION INTRAVENOUS at 05:03

## 2023-03-06 RX ADMIN — PROPOFOL 15 MCG/KG/MIN: 10 INJECTION, EMULSION INTRAVENOUS at 01:03

## 2023-03-06 RX ADMIN — ALBUTEROL SULFATE 10 MG: 2.5 SOLUTION RESPIRATORY (INHALATION) at 06:03

## 2023-03-06 RX ADMIN — IPRATROPIUM BROMIDE 0.5 MG: 0.5 SOLUTION RESPIRATORY (INHALATION) at 07:03

## 2023-03-06 RX ADMIN — METOPROLOL SUCCINATE 25 MG: 25 TABLET, EXTENDED RELEASE ORAL at 01:03

## 2023-03-06 RX ADMIN — FUROSEMIDE 80 MG: 10 INJECTION, SOLUTION INTRAMUSCULAR; INTRAVENOUS at 01:03

## 2023-03-06 RX ADMIN — PROPOFOL 15 MCG/KG/MIN: 10 INJECTION, EMULSION INTRAVENOUS at 07:03

## 2023-03-06 RX ADMIN — CHLORHEXIDINE GLUCONATE 0.12% ORAL RINSE 15 ML: 1.2 LIQUID ORAL at 10:03

## 2023-03-06 RX ADMIN — LISINOPRIL 20 MG: 20 TABLET ORAL at 01:03

## 2023-03-06 RX ADMIN — IPRATROPIUM BROMIDE 0.5 MG: 0.5 SOLUTION RESPIRATORY (INHALATION) at 06:03

## 2023-03-06 RX ADMIN — FUROSEMIDE 80 MG: 10 INJECTION, SOLUTION INTRAMUSCULAR; INTRAVENOUS at 06:03

## 2023-03-06 RX ADMIN — SUCCINYLCHOLINE CHLORIDE 100 MG: 20 INJECTION, SOLUTION INTRAMUSCULAR; INTRAVENOUS at 05:03

## 2023-03-06 RX ADMIN — IPRATROPIUM BROMIDE AND ALBUTEROL SULFATE 3 ML: 2.5; .5 SOLUTION RESPIRATORY (INHALATION) at 07:03

## 2023-03-06 RX ADMIN — MUPIROCIN: 20 OINTMENT TOPICAL at 09:03

## 2023-03-06 RX ADMIN — ATORVASTATIN CALCIUM 40 MG: 40 TABLET, FILM COATED ORAL at 01:03

## 2023-03-06 NOTE — ED TRIAGE NOTES
Arrives to ER due to increased SOB, per EMS using home tx w no relief. Oxygen saturation in 40s on RA. Increased WOB on arrival, given duoneb en route. MD at bedside, placed on BIPAP, pt. Remains tachypnic, abdominal muscles in use.

## 2023-03-06 NOTE — HPI
66 y/o male with a PMHx of HTN, COPD, CHF, CAD presents to the ER via EMS for evaluation of respiratory distress beginning last night.  Patient is currently intubated and unable to give history.  Patient complained of shortness of breath throughout the night that progressively worsened.  No improvement of symptoms with use of his inhaler.  Patient noted to have SpO2 of 49% RA on EMS evaluation.  EMS administered 1.5 solumedrol IN en route with increased on his stats to 93%.  No alleviating or exacerbating factors noted.  Patient noted to be in respiratory distress and placed on Bipap.  No improvement with Bipap and patient eventually intubated.  Unable to obtain any further history.

## 2023-03-06 NOTE — ED PROVIDER NOTES
Dent of Care Note:    I assumed care of this patient at shift change from Dr. Carney pending labs and admission. Briefly, this is a 67 year old male with CHF, COPD who presented today with respiratory failure.  Patient was apparently found hypoxic at his residence, was given Solu-Medrol in the field, he did not tolerate BiPAP and was intubated prior to my arrival.  Work up was initiated with labs, ABG, chest x-ray. Labs and imaging significant for respiratory acidosis, leukocytosis.  The patient was covered with broad-spectrum antibiotics.  He was given 1 L of fluid, however, further fluids were not given due to history of CHF and findings of pulmonary edema and fluid overload noted on chest x-ray.  Patient is started on a continuous neb, covered with antibiotics, will admit to ICU for further care.    iLsa Sloan MD   7:10 AM      Patient care discussed with Dr. Bledsoe for hospital admission to ICU.    Lisa Sloan MD   7:29 AM       Lisa Sloan MD  03/06/23 0729

## 2023-03-06 NOTE — ASSESSMENT & PLAN NOTE
HX of COPD. PFT in 2017 with moderate restriction with reduced DLCO.  Previous CT chest imaging with emphysema.    - treatment discussed in respiratory failure section

## 2023-03-06 NOTE — ASSESSMENT & PLAN NOTE
Patient with Hypercapnic and Hypoxic Respiratory failure which is Acute.  he is not on home oxygen. Supplemental oxygen was provided and noted- Vent Mode: PRVC  Oxygen Concentration (%):  [] 40  Resp Rate Total:  [25 br/min-26 br/min] 26 br/min  Vt Set:  [450 mL-520 mL] 450 mL  PEEP/CPAP:  [5 cmH20] 5 cmH20  Mean Airway Pressure:  [12 uuQ94-09.2 cmH20] 12.7 cmH20.   Signs/symptoms of respiratory failure include- respiratory distress. Contributing diagnoses includes - CHF and COPD Labs and images were reviewed. Patient Has recent ABG, which has been reviewed. Will treat underlying causes and adjust management of respiratory failure.  Patient presents with respiratory failure and intubated.  Probably combination of COPD and CHF.  Started on nebs and IV steroids.  Also started on IV diuresis.  Empirically started on ABX's.  Pulmonary consulted for vent management.

## 2023-03-06 NOTE — SUBJECTIVE & OBJECTIVE
Past Medical History:   Diagnosis Date    CHF (congestive heart failure)     COPD (chronic obstructive pulmonary disease)     Coronary artery disease     Elevated cholesterol     on medication    Hypertension        Past Surgical History:   Procedure Laterality Date    CARDIAC SURGERY      coronary stent placed    CORONARY STENT PLACEMENT         Review of patient's allergies indicates:   Allergen Reactions    Contrast media Shortness Of Breath, Itching and Anxiety     Patient states that he had a bad reaction, anxiety , shortness of breath, itching .        No current facility-administered medications on file prior to encounter.     Current Outpatient Medications on File Prior to Encounter   Medication Sig    albuterol (PROVENTIL/VENTOLIN HFA) 90 mcg/actuation inhaler Inhale 1-2 puffs into the lungs every 6 (six) hours as needed for Wheezing. Rescue    amLODIPine (NORVASC) 5 MG tablet Take 1 tablet (5 mg total) by mouth once daily.    aspirin (ECOTRIN) 81 MG EC tablet Take 1 tablet (81 mg total) by mouth once daily.    atorvastatin (LIPITOR) 40 MG tablet Take 1 tablet (40 mg total) by mouth once daily.    clopidogreL (PLAVIX) 75 mg tablet Take 1 tablet (75 mg total) by mouth once daily.    fluticasone-salmeterol diskus inhaler 250-50 mcg Inhale 1 puff into the lungs 2 (two) times daily.    furosemide (LASIX) 40 MG tablet Take 1 tablet (40 mg total) by mouth 2 (two) times daily.    glipiZIDE (GLUCOTROL) 10 MG TR24 Take 1 tablet (10 mg total) by mouth daily with breakfast.    lisinopriL (PRINIVIL,ZESTRIL) 20 MG tablet Take 1 tablet (20 mg total) by mouth once daily.    metFORMIN (GLUCOPHAGE) 500 MG tablet Take 1 tablet (500 mg total) by mouth 2 (two) times daily with meals.    metoprolol succinate (TOPROL-XL) 25 MG 24 hr tablet Take 1 tablet (25 mg total) by mouth once daily.     Family History       Problem Relation (Age of Onset)    Cancer Mother    No Known Problems Father          Tobacco Use    Smoking status:  Former     Packs/day: 2.00     Years: 45.00     Pack years: 90.00     Types: Cigarettes     Quit date: 2017     Years since quittin.3    Smokeless tobacco: Never   Substance and Sexual Activity    Alcohol use: Yes     Comment: socially    Drug use: No    Sexual activity: Yes     Partners: Female     Birth control/protection: None     Review of Systems   Unable to perform ROS: Intubated   Objective:     Vital Signs (Most Recent):  Temp: 99 °F (37.2 °C) (23 1127)  Pulse: 67 (23 1127)  Resp: (!) 26 (23 1127)  BP: 121/69 (23 1100)  SpO2: 99 % (23 1127)   Vital Signs (24h Range):  Temp:  [97.1 °F (36.2 °C)-99 °F (37.2 °C)] 99 °F (37.2 °C)  Pulse:  [] 67  Resp:  [12-49] 26  SpO2:  [93 %-100 %] 99 %  BP: (100-159)/(54-89) 121/69     Weight: 93 kg (205 lb)  Body mass index is 29.41 kg/m².    Physical Exam  Constitutional:       Appearance: He is ill-appearing. He is not diaphoretic.   HENT:      Head: Normocephalic and atraumatic.   Eyes:      General:         Right eye: No discharge.         Left eye: No discharge.      Conjunctiva/sclera: Conjunctivae normal.   Cardiovascular:      Rate and Rhythm: Normal rate and regular rhythm.   Pulmonary:      Breath sounds: Rhonchi present.      Comments: Mechanically ventilated  Abdominal:      General: Bowel sounds are normal.      Palpations: Abdomen is soft.   Musculoskeletal:         General: No deformity or signs of injury.   Skin:     General: Skin is warm and dry.   Neurological:      Comments: Sedated           Significant Labs: All pertinent labs within the past 24 hours have been reviewed.  BMP:   Recent Labs   Lab 23  0525   *      K 3.7   CL 94*   CO2 35*   BUN 6*   CREATININE 0.8   CALCIUM 9.0   MG 2.1     CBC:   Recent Labs   Lab 23  0525   WBC 15.75*   HGB 12.2*   HCT 39.6*          Significant Imaging: I have reviewed all pertinent imaging results/findings within the past 24 hours.

## 2023-03-06 NOTE — CLINICAL REVIEW
IP Sepsis Screen (most recent)       Sepsis Screen (IP) - 03/06/23 0957       Is the patient's history or complaint suggestive of a possible infection? Yes  -LW    Are there at least two of the following signs and symptoms present? Yes  -LW    Sepsis signs/symptoms - Tachypnea Tachypnea     >20  -LW    Sepsis signs/symptoms - WBC WBC < 4,000 or WBC > 12,000  -LW    Are any of the following organ dysfunction criteria present and not considered to be due to a chronic condition? Yes  -LW    Organ Dysfunction Criteria - Resp Comp Respiratory Compromise: Requiring > 5L NC  -LW    Initiate Sepsis Protocol No  -LW    Reason sepsis not considered Pt. receiving appropriate management  -LW              User Key  (r) = Recorded By, (t) = Taken By, (c) = Cosigned By      Initials Name    JESSICA Kearns RN

## 2023-03-06 NOTE — H&P
[facility] Medicine  History & Physical    Patient Name: Abelardo Irene Jr.  MRN: [id number]  Patient Class: IP- Inpatient  Admission Date: 3/6/2023  Attending Physician: Iron Bledsoe MD   Primary Care Provider: Rolando Funes MD         Patient information was obtained from ER records.     Subjective:     Principal Problem:Acute respiratory failure with hypoxia and hypercarbia    Chief Complaint:   Chief Complaint   Patient presents with    Respiratory Distress     Patient presents to ED via [facility] secondary to respiratory distress. EMS reports hx of COPD and no relief from rescue inhaler while at home. Reports had a room air sat of 49% on their arrival. Patient is unable to answer questions at this time. Arrived receiving duoneb with 93% O2 sat.        HPI: 68 y/o male with a PMHx of HTN, COPD, CHF, CAD presents to the ER via EMS for evaluation of respiratory distress beginning last night.  Patient is currently intubated and unable to give history.  Patient complained of shortness of breath throughout the night that progressively worsened.  No improvement of symptoms with use of his inhaler.  Patient noted to have SpO2 of 49% RA on EMS evaluation.  EMS administered 1.5 solumedrol IN en route with increased on his stats to 93%.  No alleviating or exacerbating factors noted.  Patient noted to be in respiratory distress and placed on Bipap.  No improvement with Bipap and patient eventually intubated.  Unable to obtain any further history.      Past Medical History:   Diagnosis Date    CHF (congestive heart failure)     COPD (chronic obstructive pulmonary disease)     Coronary artery disease     Elevated cholesterol     on medication    Hypertension        Past Surgical History:   Procedure Laterality Date    CARDIAC SURGERY      coronary stent placed    CORONARY STENT PLACEMENT         Review of patient's allergies indicates:   Allergen Reactions    Contrast media  Shortness Of Breath, Itching and Anxiety     Patient states that he had a bad reaction, anxiety , shortness of breath, itching .        No current facility-administered medications on file prior to encounter.     Current Outpatient Medications on File Prior to Encounter   Medication Sig    albuterol (PROVENTIL/VENTOLIN HFA) 90 mcg/actuation inhaler Inhale 1-2 puffs into the lungs every 6 (six) hours as needed for Wheezing. Rescue    amLODIPine (NORVASC) 5 MG tablet Take 1 tablet (5 mg total) by mouth once daily.    aspirin (ECOTRIN) 81 MG EC tablet Take 1 tablet (81 mg total) by mouth once daily.    atorvastatin (LIPITOR) 40 MG tablet Take 1 tablet (40 mg total) by mouth once daily.    clopidogreL (PLAVIX) 75 mg tablet Take 1 tablet (75 mg total) by mouth once daily.    fluticasone-salmeterol diskus inhaler 250-50 mcg Inhale 1 puff into the lungs 2 (two) times daily.    furosemide (LASIX) 40 MG tablet Take 1 tablet (40 mg total) by mouth 2 (two) times daily.    glipiZIDE (GLUCOTROL) 10 MG TR24 Take 1 tablet (10 mg total) by mouth daily with breakfast.    lisinopriL (PRINIVIL,ZESTRIL) 20 MG tablet Take 1 tablet (20 mg total) by mouth once daily.    metFORMIN (GLUCOPHAGE) 500 MG tablet Take 1 tablet (500 mg total) by mouth 2 (two) times daily with meals.    metoprolol succinate (TOPROL-XL) 25 MG 24 hr tablet Take 1 tablet (25 mg total) by mouth once daily.     Family History       Problem Relation (Age of Onset)    Cancer Mother    No Known Problems Father          Tobacco Use    Smoking status: Former     Packs/day: 2.00     Years: 45.00     Pack years: 90.00     Types: Cigarettes     Quit date: 2017     Years since quittin.3    Smokeless tobacco: Never   Substance and Sexual Activity    Alcohol use: Yes     Comment: socially    Drug use: No    Sexual activity: Yes     Partners: Female     Birth control/protection: None     Review of Systems   Unable to perform ROS: Intubated   Objective:      Vital Signs (Most Recent):  Temp: 99 °F (37.2 °C) (03/06/23 1127)  Pulse: 67 (03/06/23 1127)  Resp: (!) 26 (03/06/23 1127)  BP: 121/69 (03/06/23 1100)  SpO2: 99 % (03/06/23 1127)   Vital Signs (24h Range):  Temp:  [97.1 °F (36.2 °C)-99 °F (37.2 °C)] 99 °F (37.2 °C)  Pulse:  [] 67  Resp:  [12-49] 26  SpO2:  [93 %-100 %] 99 %  BP: (100-159)/(54-89) 121/69     Weight: 93 kg (205 lb)  Body mass index is 29.41 kg/m².    Physical Exam  Constitutional:       Appearance: He is ill-appearing. He is not diaphoretic.   HENT:      Head: Normocephalic and atraumatic.   Eyes:      General:         Right eye: No discharge.         Left eye: No discharge.      Conjunctiva/sclera: Conjunctivae normal.   Cardiovascular:      Rate and Rhythm: Normal rate and regular rhythm.   Pulmonary:      Breath sounds: Rhonchi present.      Comments: Mechanically ventilated  Abdominal:      General: Bowel sounds are normal.      Palpations: Abdomen is soft.   Musculoskeletal:         General: No deformity or signs of injury.   Skin:     General: Skin is warm and dry.   Neurological:      Comments: Sedated           Significant Labs: All pertinent labs within the past 24 hours have been reviewed.  BMP:   Recent Labs   Lab 03/06/23  0525   *      K 3.7   CL 94*   CO2 35*   BUN 6*   CREATININE 0.8   CALCIUM 9.0   MG 2.1     CBC:   Recent Labs   Lab 03/06/23  0525   WBC 15.75*   HGB 12.2*   HCT 39.6*          Significant Imaging: I have reviewed all pertinent imaging results/findings within the past 24 hours.    Assessment/Plan:     * Acute respiratory failure with hypoxia and hypercarbia  Patient with Hypercapnic and Hypoxic Respiratory failure which is Acute.  he is not on home oxygen. Supplemental oxygen was provided and noted- Vent Mode: PRVC  Oxygen Concentration (%):  [] 40  Resp Rate Total:  [25 br/min-26 br/min] 26 br/min  Vt Set:  [450 mL-520 mL] 450 mL  PEEP/CPAP:  [5 cmH20] 5 cmH20  Mean Airway Pressure:   [12 ubI77-05.2 cmH20] 12.7 cmH20.   Signs/symptoms of respiratory failure include- respiratory distress. Contributing diagnoses includes - CHF and COPD Labs and images were reviewed. Patient Has recent ABG, which has been reviewed. Will treat underlying causes and adjust management of respiratory failure.  Patient presents with respiratory failure and intubated.  Probably combination of COPD and CHF.  Started on nebs and IV steroids.  Also started on IV diuresis.  Empirically started on ABX's.  Pulmonary consulted for vent management.    Uncontrolled type 2 diabetes mellitus with hyperglycemia  Patient's FSGs are uncontrolled due to hyperglycemia on current medication regimen.  Last A1c reviewed-   Lab Results   Component Value Date    HGBA1C 10.1 (H) 04/13/2022     Most recent fingerstick glucose reviewed- No results for input(s): POCTGLUCOSE in the last 24 hours.  Current correctional scale  Medium  Maintain anti-hyperglycemic dose as follows-   Antihyperglycemics (From admission, onward)    Start     Stop Route Frequency Ordered    03/06/23 1323  insulin aspart U-100 pen 1-10 Units         -- SubQ Every 6 hours PRN 03/06/23 1223      Hold Oral hypoglycemics while patient is in the hospital.  Hyperglycemia may worsen with steroids.  Continue to monitor.    Class 1 obesity due to excess calories with serious comorbidity in adult  Body mass index is 29.41 kg/m². Morbid obesity complicates all aspects of disease management from diagnostic modalities to treatment. Weight loss encouraged and health benefits explained to patient.         Coronary artery disease involving native coronary artery of native heart without angina pectoris  Continue ASA, Plavix and Statin.  No report of chest pain.  Initial Troponin negative.  Trend Troponin.      Dyslipidemia  Continue Statin.      Acute on chronic combined systolic and diastolic heart failure  See above.      COPD (chronic obstructive pulmonary disease)  See  above.      Tobacco abuse  Smoking cessation counseling when appropriate.      Benign essential hypertension  Resume home medications with parameters.        VTE Risk Mitigation (From admission, onward)         Ordered     enoxaparin injection 40 mg  Daily         03/06/23 0823     IP VTE HIGH RISK PATIENT  Once         03/06/23 0823     Place sequential compression device  Until discontinued         03/06/23 0823                Iron Bledsoe MD  Department of Hospital Medicine   [facility] - Intensive Care

## 2023-03-06 NOTE — ED PROVIDER NOTES
Encounter Date: 3/6/2023    SCRIBE #1 NOTE: I, Beny Garrett, am scribing for, and in the presence of,  Manohar Carney MD. I have scribed the following portions of the note - Other sections scribed: HPI, ROS, PE.     History     Chief Complaint   Patient presents with    Respiratory Distress     Patient presents to ED via [facility] secondary to respiratory distress. EMS reports hx of COPD and no relief from rescue inhaler while at home. Reports had a room air sat of 49% on their arrival. Patient is unable to answer questions at this time. Arrived receiving duoneb with 93% O2 sat.     Abelardo Irene Jr. is a 67 y.o male with a PMHx of HTN, COPD, CHF, CAD, and pre-DM, that presents to the ED via EMS for evaluation of respiratory distress beginning last night. History provided by independent historian, EMS, who reports patient had complaints of shortness of breath throughout the night that progressively exacerbated. EMS endorses patient used his albuterol rescue inhaler with no immediate relief noted. On arrival, EMS reports patient had an SpO2 of 49% RA. EMS administered 1.5 solumedrol IN en route with increased on his stats to 93%. No other medications taken PTA. No alleviating or exacerbating factors noted. Allergic to contrast media.     The history is provided by the EMS personnel. The history is limited by the condition of the patient. No  was used.   Review of patient's allergies indicates:   Allergen Reactions    Contrast media Shortness Of Breath, Itching and Anxiety     Patient states that he had a bad reaction, anxiety , shortness of breath, itching .      Past Medical History:   Diagnosis Date    CHF (congestive heart failure)     COPD (chronic obstructive pulmonary disease)     Coronary artery disease     Elevated cholesterol     on medication    Hypertension      Past Surgical History:   Procedure Laterality Date    CARDIAC SURGERY      coronary stent placed    CORONARY  STENT PLACEMENT       Family History   Problem Relation Age of Onset    Cancer Mother     No Known Problems Father      Social History     Tobacco Use    Smoking status: Former     Packs/day: 2.00     Years: 45.00     Pack years: 90.00     Types: Cigarettes     Quit date: 2017     Years since quittin.3    Smokeless tobacco: Never   Substance Use Topics    Alcohol use: Yes     Comment: socially    Drug use: No     Review of Systems   Unable to perform ROS: Severe respiratory distress     Physical Exam     Initial Vitals   BP Pulse Resp Temp SpO2   23 0512 23 0503 23 0503 23 0619 23 0503   109/70 (!) 114 (!) 34 97.1 °F (36.2 °C) (!) 93 %      MAP       --                Physical Exam    Nursing note and vitals reviewed.  Constitutional: He appears well-developed and well-nourished. He is not diaphoretic. He appears distressed (acute).   HENT:   Head: Normocephalic and atraumatic.   Right Ear: External ear normal.   Left Ear: External ear normal.   Nose: Nose normal.   Eyes: Conjunctivae are normal. No scleral icterus.   Neck: Neck supple. No tracheal deviation present.   Cardiovascular:  Regular rhythm and normal heart sounds.   Tachycardia present.   Exam reveals no gallop and no friction rub.       No murmur heard.  Pulmonary/Chest: Accessory muscle usage present. He is in respiratory distress.   Increased work of breathing.   Unable to speak due to increased work of breathing.  Diffuse end-expiratory wheezes.  Diffused diminished lung sounds.    Abdominal: Abdomen is soft. Bowel sounds are normal. There is no abdominal tenderness.   Musculoskeletal:      Cervical back: Neck supple.     Neurological: He is alert and oriented to person, place, and time. GCS score is 15. GCS eye subscore is 4. GCS verbal subscore is 5. GCS motor subscore is 6.   Skin: Skin is warm and dry.   Psychiatric: He has a normal mood and affect. Thought content normal.       ED Course   Critical  Care    Date/Time: 3/6/2023 6:00 AM  Performed by: Manohar Carney MD  Authorized by: Manohar Carney MD   Direct patient critical care time: 35 minutes  Additional history critical care time: 5 minutes  Ordering / reviewing critical care time: 5 minutes  Documentation critical care time: 5 minutes  Consulting other physicians critical care time: 5 minutes  Total critical care time (exclusive of procedural time) : 55 minutes  Critical care time was exclusive of teaching time and separately billable procedures and treating other patients.  Critical care was necessary to treat or prevent imminent or life-threatening deterioration of the following conditions: respiratory failure.  Critical care was time spent personally by me on the following activities: review of old charts, re-evaluation of patient's condition, pulse oximetry, ordering and review of radiographic studies, ordering and review of laboratory studies, ordering and performing treatments and interventions, obtaining history from patient or surrogate, examination of patient, evaluation of patient's response to treatment, discussions with primary provider, discussions with consultants, development of treatment plan with patient or surrogate and ventilator management.      Intubation    Date/Time: 3/6/2023 6:00 AM  Location procedure was performed: [facility] EMERGENCY DEPARTMENT  Performed by: Manohar Carney MD  Authorized by: Manohar Carney MD   Indications: respiratory failure and hypercapnia  Patient status: paralyzed (RSI)  Preoxygenation: BIPAP.  Sedatives: ketmine  Paralytic: succinylcholine  Laryngoscope size: Mac 4  Tube size: 7.5 mm  Tube type: cuffed  Number of attempts: 1  Cricoid pressure: yes  Cords visualized: yes  ETT to lip: 26 cm  Tube secured with: ETT buchanan  Chest x-ray interpreted by me.  Chest x-ray findings: endotracheal tube too low  Tube repositioned: tube repositioned successfully  Patient tolerance: Patient tolerated the  procedure well with no immediate complications      Labs Reviewed   CBC W/ AUTO DIFFERENTIAL - Abnormal; Notable for the following components:       Result Value    WBC 15.75 (*)     RBC 4.29 (*)     Hemoglobin 12.2 (*)     Hematocrit 39.6 (*)     MCHC 30.8 (*)     Immature Granulocytes 1.3 (*)     Gran # (ANC) 10.8 (*)     Immature Grans (Abs) 0.20 (*)     Mono % 3.8 (*)     All other components within normal limits   COMPREHENSIVE METABOLIC PANEL - Abnormal; Notable for the following components:    Chloride 94 (*)     CO2 35 (*)     Glucose 284 (*)     BUN 6 (*)     All other components within normal limits   B-TYPE NATRIURETIC PEPTIDE - Abnormal; Notable for the following components:    BNP 1,017 (*)     All other components within normal limits   ISTAT PROCEDURE - Abnormal; Notable for the following components:    POC PH 7.211 (*)     POC PCO2 108.6 (*)     POC PO2 272 (*)     POC HCO3 43.6 (*)     POC TCO2 47 (*)     All other components within normal limits   MAGNESIUM   MAGNESIUM   TROPONIN I   SARS-COV-2 RDRP GENE          Imaging Results              X-Ray Chest 1 View (In process)                      Medications   magnesium sulfate 2g in water 50mL IVPB (premix) (2 g Intravenous New Bag 3/6/23 0532)   propofol (DIPRIVAN) 10 mg/mL infusion (10 mcg/kg/min × 93 kg Intravenous New Bag 3/6/23 0545)   fentaNYL 2500 mcg in 0.9% sodium chloride 250 mL infusion premix (titrating) (has no administration in time range)   furosemide injection 80 mg (has no administration in time range)   albuterol sulfate nebulizer solution 10 mg (10 mg Nebulization Given 3/6/23 0614)   ipratropium 0.02 % nebulizer solution 0.5 mg (0.5 mg Nebulization Given 3/6/23 0614)   ketamine injection 100 mg (100 mg Intravenous Given 3/6/23 0544)   succinylcholine injection 100 mg (100 mg Intravenous Given 3/6/23 0545)     Medical Decision Making:   History:   Old Medical Records: I decided to obtain old medical records.        Terell  Attestation:   Scribe #1: I performed the above scribed service and the documentation accurately describes the services I performed. I attest to the accuracy of the note.      ED Course as of 03/06/23 0622   Mon Mar 06, 2023   0559 Magnesium [CC]   0611 Hand off to Dr. Sloan.  [CC]   0613 EKG: Interpreted by me.  Rate 114, regular rhythm, sinus rhythm, intervals within normal limits, biatrial enlargement, PVCs noted, infrequent, left axis deviation, nonspecific intraventricular block, similar to previous. [CC]   0618 B-lines noted on ultrasound.  Andrea faith. [CC]   0621 67-year-old male presenting to the emergency department for evaluation of respiratory distress.  Patient found to be in severe respiratory distress.  He was unable to provide history.  Per EMS patient had respiratory distress beginning last night that progressively worsened to this morning.  Significant increased work of breathing noted on exam.  Patient trialed on BiPAP with nebulizing treatments.  He did not tolerate.  Found to be significantly hypercapnic with respiratory acidosis.  Patient not following commands, mental status worsening, emergent intubation performed.  Concern for COPD exacerbation.  BNP is significantly elevated.  I will add a troponin.  Bedside ultrasound with B-lines concerning for pulmonary edema.  I have added diuretics.  Hand off to Dr. Sloan at this time. [CC]      ED Course User Index  [CC] Manohar Carney MD                   Clinical Impression:   Final diagnoses:  [R06.02] Shortness of breath              I, Manohar Carney MD, personally performed the services described in this documentation. All medical record entries made by the scribe were at my direction and in my presence. I have reviewed the chart and agree that the record reflects my personal performance and is accurate and complete.      Manohar Carney MD  03/06/23 0622

## 2023-03-06 NOTE — ASSESSMENT & PLAN NOTE
Continue ASA, Plavix and Statin.  No report of chest pain.  Initial Troponin negative.  Trend Troponin.

## 2023-03-06 NOTE — ASSESSMENT & PLAN NOTE
Patient's FSGs are uncontrolled due to hyperglycemia on current medication regimen.  Last A1c reviewed-   Lab Results   Component Value Date    HGBA1C 10.1 (H) 04/13/2022     Most recent fingerstick glucose reviewed- No results for input(s): POCTGLUCOSE in the last 24 hours.  Current correctional scale  Medium  Maintain anti-hyperglycemic dose as follows-   Antihyperglycemics (From admission, onward)    Start     Stop Route Frequency Ordered    03/06/23 1323  insulin aspart U-100 pen 1-10 Units         -- SubQ Every 6 hours PRN 03/06/23 1223      Hold Oral hypoglycemics while patient is in the hospital.  Hyperglycemia may worsen with steroids.  Continue to monitor.

## 2023-03-06 NOTE — ASSESSMENT & PLAN NOTE
Body mass index is 29.41 kg/m². Morbid obesity complicates all aspects of disease management from diagnostic modalities to treatment. Weight loss encouraged and health benefits explained to patient.

## 2023-03-06 NOTE — ED NOTES
X ray completed at bedside for tub verification. Per MD Carney, ETT to be pulled back 2 cm per RTT.

## 2023-03-07 LAB
ANION GAP SERPL CALC-SCNC: 11 MMOL/L (ref 8–16)
ANION GAP SERPL CALC-SCNC: 17 MMOL/L (ref 8–16)
BASOPHILS # BLD AUTO: 0 K/UL (ref 0–0.2)
BASOPHILS NFR BLD: 0 % (ref 0–1.9)
BUN SERPL-MCNC: 18 MG/DL (ref 8–23)
BUN SERPL-MCNC: 22 MG/DL (ref 8–23)
CALCIUM SERPL-MCNC: 8.9 MG/DL (ref 8.7–10.5)
CALCIUM SERPL-MCNC: 9 MG/DL (ref 8.7–10.5)
CHLORIDE SERPL-SCNC: 92 MMOL/L (ref 95–110)
CHLORIDE SERPL-SCNC: 94 MMOL/L (ref 95–110)
CO2 SERPL-SCNC: 31 MMOL/L (ref 23–29)
CO2 SERPL-SCNC: 39 MMOL/L (ref 23–29)
CREAT SERPL-MCNC: 1 MG/DL (ref 0.5–1.4)
CREAT SERPL-MCNC: 1.1 MG/DL (ref 0.5–1.4)
DIFFERENTIAL METHOD: ABNORMAL
EOSINOPHIL # BLD AUTO: 0 K/UL (ref 0–0.5)
EOSINOPHIL NFR BLD: 0 % (ref 0–8)
ERYTHROCYTE [DISTWIDTH] IN BLOOD BY AUTOMATED COUNT: 13.3 % (ref 11.5–14.5)
EST. GFR  (NO RACE VARIABLE): >60 ML/MIN/1.73 M^2
EST. GFR  (NO RACE VARIABLE): >60 ML/MIN/1.73 M^2
GLUCOSE SERPL-MCNC: 236 MG/DL (ref 70–110)
GLUCOSE SERPL-MCNC: 274 MG/DL (ref 70–110)
HCT VFR BLD AUTO: 32.7 % (ref 40–54)
HGB BLD-MCNC: 10.7 G/DL (ref 14–18)
IMM GRANULOCYTES # BLD AUTO: 0.04 K/UL (ref 0–0.04)
IMM GRANULOCYTES NFR BLD AUTO: 0.3 % (ref 0–0.5)
LYMPHOCYTES # BLD AUTO: 1.1 K/UL (ref 1–4.8)
LYMPHOCYTES NFR BLD: 7.7 % (ref 18–48)
MAGNESIUM SERPL-MCNC: 2.1 MG/DL (ref 1.6–2.6)
MCH RBC QN AUTO: 28.8 PG (ref 27–31)
MCHC RBC AUTO-ENTMCNC: 32.7 G/DL (ref 32–36)
MCV RBC AUTO: 88 FL (ref 82–98)
MONOCYTES # BLD AUTO: 0.3 K/UL (ref 0.3–1)
MONOCYTES NFR BLD: 2.1 % (ref 4–15)
NEUTROPHILS # BLD AUTO: 12.7 K/UL (ref 1.8–7.7)
NEUTROPHILS NFR BLD: 89.9 % (ref 38–73)
NRBC BLD-RTO: 0 /100 WBC
PHOSPHATE SERPL-MCNC: 2.3 MG/DL (ref 2.7–4.5)
PLATELET # BLD AUTO: 215 K/UL (ref 150–450)
PMV BLD AUTO: 10.6 FL (ref 9.2–12.9)
POCT GLUCOSE: 222 MG/DL (ref 70–110)
POCT GLUCOSE: 236 MG/DL (ref 70–110)
POCT GLUCOSE: 246 MG/DL (ref 70–110)
POCT GLUCOSE: 261 MG/DL (ref 70–110)
POTASSIUM SERPL-SCNC: 2.8 MMOL/L (ref 3.5–5.1)
POTASSIUM SERPL-SCNC: 3.1 MMOL/L (ref 3.5–5.1)
PROCALCITONIN SERPL IA-MCNC: 0.28 NG/ML
RBC # BLD AUTO: 3.72 M/UL (ref 4.6–6.2)
SODIUM SERPL-SCNC: 142 MMOL/L (ref 136–145)
SODIUM SERPL-SCNC: 142 MMOL/L (ref 136–145)
TROPONIN I SERPL DL<=0.01 NG/ML-MCNC: 0.04 NG/ML (ref 0–0.03)
WBC # BLD AUTO: 14.12 K/UL (ref 3.9–12.7)

## 2023-03-07 PROCEDURE — 83735 ASSAY OF MAGNESIUM: CPT | Performed by: HOSPITALIST

## 2023-03-07 PROCEDURE — 27000221 HC OXYGEN, UP TO 24 HOURS

## 2023-03-07 PROCEDURE — 80048 BASIC METABOLIC PNL TOTAL CA: CPT | Performed by: HOSPITALIST

## 2023-03-07 PROCEDURE — 63600175 PHARM REV CODE 636 W HCPCS: Performed by: STUDENT IN AN ORGANIZED HEALTH CARE EDUCATION/TRAINING PROGRAM

## 2023-03-07 PROCEDURE — 25000003 PHARM REV CODE 250: Performed by: INTERNAL MEDICINE

## 2023-03-07 PROCEDURE — 94003 VENT MGMT INPAT SUBQ DAY: CPT

## 2023-03-07 PROCEDURE — 99900026 HC AIRWAY MAINTENANCE (STAT)

## 2023-03-07 PROCEDURE — 84484 ASSAY OF TROPONIN QUANT: CPT | Performed by: INTERNAL MEDICINE

## 2023-03-07 PROCEDURE — 63600175 PHARM REV CODE 636 W HCPCS: Performed by: HOSPITALIST

## 2023-03-07 PROCEDURE — 85025 COMPLETE CBC W/AUTO DIFF WBC: CPT | Performed by: HOSPITALIST

## 2023-03-07 PROCEDURE — 25000003 PHARM REV CODE 250: Performed by: HOSPITALIST

## 2023-03-07 PROCEDURE — 20000000 HC ICU ROOM

## 2023-03-07 PROCEDURE — 25000003 PHARM REV CODE 250: Performed by: NURSE PRACTITIONER

## 2023-03-07 PROCEDURE — 25000242 PHARM REV CODE 250 ALT 637 W/ HCPCS: Performed by: HOSPITALIST

## 2023-03-07 PROCEDURE — 84100 ASSAY OF PHOSPHORUS: CPT | Performed by: HOSPITALIST

## 2023-03-07 PROCEDURE — 36415 COLL VENOUS BLD VENIPUNCTURE: CPT | Performed by: HOSPITALIST

## 2023-03-07 PROCEDURE — 99291 CRITICAL CARE FIRST HOUR: CPT | Mod: ,,, | Performed by: NURSE PRACTITIONER

## 2023-03-07 PROCEDURE — 84145 PROCALCITONIN (PCT): CPT | Performed by: NURSE PRACTITIONER

## 2023-03-07 PROCEDURE — 94640 AIRWAY INHALATION TREATMENT: CPT

## 2023-03-07 PROCEDURE — 63600175 PHARM REV CODE 636 W HCPCS: Performed by: INTERNAL MEDICINE

## 2023-03-07 PROCEDURE — 80048 BASIC METABOLIC PNL TOTAL CA: CPT | Mod: 91 | Performed by: HOSPITALIST

## 2023-03-07 PROCEDURE — 99900035 HC TECH TIME PER 15 MIN (STAT)

## 2023-03-07 PROCEDURE — 94761 N-INVAS EAR/PLS OXIMETRY MLT: CPT

## 2023-03-07 PROCEDURE — 25000003 PHARM REV CODE 250: Performed by: EMERGENCY MEDICINE

## 2023-03-07 PROCEDURE — 99291 PR CRITICAL CARE, E/M 30-74 MINUTES: ICD-10-PCS | Mod: ,,, | Performed by: NURSE PRACTITIONER

## 2023-03-07 PROCEDURE — 63600175 PHARM REV CODE 636 W HCPCS: Performed by: NURSE PRACTITIONER

## 2023-03-07 RX ORDER — CALCIUM GLUCONATE 20 MG/ML
2 INJECTION, SOLUTION INTRAVENOUS
Status: DISCONTINUED | OUTPATIENT
Start: 2023-03-07 | End: 2023-03-11 | Stop reason: HOSPADM

## 2023-03-07 RX ORDER — FENTANYL CITRATE 50 UG/ML
50 INJECTION, SOLUTION INTRAMUSCULAR; INTRAVENOUS
Status: DISCONTINUED | OUTPATIENT
Start: 2023-03-07 | End: 2023-03-07

## 2023-03-07 RX ORDER — CALCIUM GLUCONATE 20 MG/ML
1 INJECTION, SOLUTION INTRAVENOUS
Status: DISCONTINUED | OUTPATIENT
Start: 2023-03-07 | End: 2023-03-11 | Stop reason: HOSPADM

## 2023-03-07 RX ORDER — DEXMEDETOMIDINE HYDROCHLORIDE 4 UG/ML
0-1.4 INJECTION, SOLUTION INTRAVENOUS CONTINUOUS
Status: DISCONTINUED | OUTPATIENT
Start: 2023-03-07 | End: 2023-03-08

## 2023-03-07 RX ORDER — MAGNESIUM SULFATE HEPTAHYDRATE 40 MG/ML
4 INJECTION, SOLUTION INTRAVENOUS
Status: DISCONTINUED | OUTPATIENT
Start: 2023-03-07 | End: 2023-03-11 | Stop reason: HOSPADM

## 2023-03-07 RX ORDER — SODIUM,POTASSIUM PHOSPHATES 280-250MG
2 POWDER IN PACKET (EA) ORAL 2 TIMES DAILY
Status: COMPLETED | OUTPATIENT
Start: 2023-03-07 | End: 2023-03-07

## 2023-03-07 RX ORDER — MAGNESIUM SULFATE HEPTAHYDRATE 40 MG/ML
2 INJECTION, SOLUTION INTRAVENOUS
Status: DISCONTINUED | OUTPATIENT
Start: 2023-03-07 | End: 2023-03-11 | Stop reason: HOSPADM

## 2023-03-07 RX ORDER — FUROSEMIDE 10 MG/ML
20 INJECTION INTRAMUSCULAR; INTRAVENOUS
Status: DISCONTINUED | OUTPATIENT
Start: 2023-03-07 | End: 2023-03-11 | Stop reason: HOSPADM

## 2023-03-07 RX ORDER — POTASSIUM CHLORIDE 7.45 MG/ML
10 INJECTION INTRAVENOUS ONCE
Status: COMPLETED | OUTPATIENT
Start: 2023-03-07 | End: 2023-03-07

## 2023-03-07 RX ORDER — POTASSIUM CHLORIDE 7.45 MG/ML
80 INJECTION INTRAVENOUS
Status: DISCONTINUED | OUTPATIENT
Start: 2023-03-07 | End: 2023-03-11 | Stop reason: HOSPADM

## 2023-03-07 RX ORDER — POTASSIUM CHLORIDE 7.45 MG/ML
60 INJECTION INTRAVENOUS
Status: DISCONTINUED | OUTPATIENT
Start: 2023-03-07 | End: 2023-03-11 | Stop reason: HOSPADM

## 2023-03-07 RX ORDER — FENTANYL CITRATE 50 UG/ML
50 INJECTION, SOLUTION INTRAMUSCULAR; INTRAVENOUS
Status: DISCONTINUED | OUTPATIENT
Start: 2023-03-07 | End: 2023-03-08

## 2023-03-07 RX ORDER — POTASSIUM CHLORIDE 7.45 MG/ML
40 INJECTION INTRAVENOUS
Status: DISCONTINUED | OUTPATIENT
Start: 2023-03-07 | End: 2023-03-11 | Stop reason: HOSPADM

## 2023-03-07 RX ORDER — CALCIUM GLUCONATE 20 MG/ML
3 INJECTION, SOLUTION INTRAVENOUS
Status: DISCONTINUED | OUTPATIENT
Start: 2023-03-07 | End: 2023-03-11 | Stop reason: HOSPADM

## 2023-03-07 RX ADMIN — DEXMEDETOMIDINE HYDROCHLORIDE 0.9 MCG/KG/HR: 4 INJECTION, SOLUTION INTRAVENOUS at 10:03

## 2023-03-07 RX ADMIN — INSULIN ASPART 4 UNITS: 100 INJECTION, SOLUTION INTRAVENOUS; SUBCUTANEOUS at 05:03

## 2023-03-07 RX ADMIN — ASPIRIN 81 MG: 81 TABLET, COATED ORAL at 08:03

## 2023-03-07 RX ADMIN — IPRATROPIUM BROMIDE AND ALBUTEROL SULFATE 3 ML: 2.5; .5 SOLUTION RESPIRATORY (INHALATION) at 07:03

## 2023-03-07 RX ADMIN — PROPOFOL 25 MCG/KG/MIN: 10 INJECTION, EMULSION INTRAVENOUS at 05:03

## 2023-03-07 RX ADMIN — POTASSIUM CHLORIDE 10 MEQ: 7.46 INJECTION, SOLUTION INTRAVENOUS at 06:03

## 2023-03-07 RX ADMIN — ENOXAPARIN SODIUM 40 MG: 40 INJECTION SUBCUTANEOUS at 04:03

## 2023-03-07 RX ADMIN — METHYLPREDNISOLONE SODIUM SUCCINATE 80 MG: 125 INJECTION, POWDER, FOR SOLUTION INTRAMUSCULAR; INTRAVENOUS at 10:03

## 2023-03-07 RX ADMIN — ATORVASTATIN CALCIUM 40 MG: 40 TABLET, FILM COATED ORAL at 08:03

## 2023-03-07 RX ADMIN — POTASSIUM CHLORIDE 60 MEQ: 7.46 INJECTION, SOLUTION INTRAVENOUS at 02:03

## 2023-03-07 RX ADMIN — MUPIROCIN: 20 OINTMENT TOPICAL at 08:03

## 2023-03-07 RX ADMIN — CEFTRIAXONE 2 G: 2 INJECTION, SOLUTION INTRAVENOUS at 06:03

## 2023-03-07 RX ADMIN — IPRATROPIUM BROMIDE AND ALBUTEROL SULFATE 3 ML: 2.5; .5 SOLUTION RESPIRATORY (INHALATION) at 03:03

## 2023-03-07 RX ADMIN — FUROSEMIDE 20 MG: 10 INJECTION, SOLUTION INTRAMUSCULAR; INTRAVENOUS at 04:03

## 2023-03-07 RX ADMIN — Medication 2 PACKET: at 06:03

## 2023-03-07 RX ADMIN — POTASSIUM BICARBONATE 20 MEQ: 391 TABLET, EFFERVESCENT ORAL at 06:03

## 2023-03-07 RX ADMIN — CHLORHEXIDINE GLUCONATE 0.12% ORAL RINSE 15 ML: 1.2 LIQUID ORAL at 08:03

## 2023-03-07 RX ADMIN — FAMOTIDINE 20 MG: 10 INJECTION INTRAVENOUS at 08:03

## 2023-03-07 RX ADMIN — IPRATROPIUM BROMIDE AND ALBUTEROL SULFATE 3 ML: 2.5; .5 SOLUTION RESPIRATORY (INHALATION) at 04:03

## 2023-03-07 RX ADMIN — INSULIN ASPART 2 UNITS: 100 INJECTION, SOLUTION INTRAVENOUS; SUBCUTANEOUS at 01:03

## 2023-03-07 RX ADMIN — INSULIN ASPART 4 UNITS: 100 INJECTION, SOLUTION INTRAVENOUS; SUBCUTANEOUS at 01:03

## 2023-03-07 RX ADMIN — Medication 225 MCG/HR: at 05:03

## 2023-03-07 RX ADMIN — DEXMEDETOMIDINE HYDROCHLORIDE 1 MCG/KG/HR: 4 INJECTION, SOLUTION INTRAVENOUS at 01:03

## 2023-03-07 RX ADMIN — INSULIN ASPART 6 UNITS: 100 INJECTION, SOLUTION INTRAVENOUS; SUBCUTANEOUS at 05:03

## 2023-03-07 RX ADMIN — Medication 2 PACKET: at 08:03

## 2023-03-07 RX ADMIN — IPRATROPIUM BROMIDE AND ALBUTEROL SULFATE 3 ML: 2.5; .5 SOLUTION RESPIRATORY (INHALATION) at 11:03

## 2023-03-07 RX ADMIN — FUROSEMIDE 80 MG: 10 INJECTION, SOLUTION INTRAMUSCULAR; INTRAVENOUS at 05:03

## 2023-03-07 RX ADMIN — DEXMEDETOMIDINE HYDROCHLORIDE 0.6 MCG/KG/HR: 4 INJECTION, SOLUTION INTRAVENOUS at 05:03

## 2023-03-07 RX ADMIN — METHYLPREDNISOLONE SODIUM SUCCINATE 80 MG: 125 INJECTION, POWDER, FOR SOLUTION INTRAMUSCULAR; INTRAVENOUS at 01:03

## 2023-03-07 RX ADMIN — METHYLPREDNISOLONE SODIUM SUCCINATE 80 MG: 125 INJECTION, POWDER, FOR SOLUTION INTRAMUSCULAR; INTRAVENOUS at 05:03

## 2023-03-07 RX ADMIN — CLOPIDOGREL BISULFATE 75 MG: 75 TABLET, FILM COATED ORAL at 08:03

## 2023-03-07 RX ADMIN — AZITHROMYCIN MONOHYDRATE 500 MG: 500 INJECTION, POWDER, LYOPHILIZED, FOR SOLUTION INTRAVENOUS at 08:03

## 2023-03-07 RX ADMIN — DEXMEDETOMIDINE HYDROCHLORIDE 0.6 MCG/KG/HR: 4 INJECTION, SOLUTION INTRAVENOUS at 10:03

## 2023-03-07 NOTE — SUBJECTIVE & OBJECTIVE
Past Medical History:   Diagnosis Date    CHF (congestive heart failure)     COPD (chronic obstructive pulmonary disease)     Coronary artery disease     Elevated cholesterol     on medication    Hypertension        Past Surgical History:   Procedure Laterality Date    CARDIAC SURGERY      coronary stent placed    CORONARY STENT PLACEMENT         Review of patient's allergies indicates:   Allergen Reactions    Contrast media Shortness Of Breath, Itching and Anxiety     Patient states that he had a bad reaction, anxiety , shortness of breath, itching .        Family History       Problem Relation (Age of Onset)    Cancer Mother    No Known Problems Father          Tobacco Use    Smoking status: Former     Packs/day: 2.00     Years: 45.00     Pack years: 90.00     Types: Cigarettes     Quit date: 2017     Years since quittin.3    Smokeless tobacco: Never   Substance and Sexual Activity    Alcohol use: Yes     Comment: socially    Drug use: No    Sexual activity: Yes     Partners: Female     Birth control/protection: None         Review of Systems   Unable to perform ROS: Intubated   Objective:     Vital Signs (Most Recent):  Temp: 98.4 °F (36.9 °C) (23)  Pulse: 68 (23)  Resp: (!) 26 (23)  BP: (!) 115/54 (23)  SpO2: 96 % (23)   Vital Signs (24h Range):  Temp:  [97.1 °F (36.2 °C)-99.5 °F (37.5 °C)] 98.4 °F (36.9 °C)  Pulse:  [] 68  Resp:  [12-49] 26  SpO2:  [93 %-100 %] 96 %  BP: (100-159)/(54-89) 115/54     Weight: 86.3 kg (190 lb 4.1 oz)  Body mass index is 27.3 kg/m².      Intake/Output Summary (Last 24 hours) at 3/6/2023 2119  Last data filed at 3/6/2023 1921  Gross per 24 hour   Intake 843.78 ml   Output 1930 ml   Net -1086.22 ml       Physical Exam  Constitutional:       Appearance: He is ill-appearing. He is not diaphoretic.      Interventions: He is sedated, intubated and restrained.   HENT:      Head: Normocephalic and atraumatic.   Eyes:       General:         Right eye: No discharge.         Left eye: No discharge.      Conjunctiva/sclera: Conjunctivae normal.   Cardiovascular:      Rate and Rhythm: Normal rate and regular rhythm.      Pulses: Normal pulses.   Pulmonary:      Effort: He is intubated.      Breath sounds: Wheezing present. No rhonchi.   Abdominal:      General: Bowel sounds are normal.      Palpations: Abdomen is soft.   Genitourinary:     Comments: Brown in place.   Musculoskeletal:         General: No swelling, deformity or signs of injury.   Skin:     General: Skin is warm and dry.      Capillary Refill: Capillary refill takes less than 2 seconds.   Neurological:      Comments: Pinpoint pupils. + cough, gag, and corneal reflexes. No response to pain X 4 extremities.        Vents:  Vent Mode: PRVC (03/06/23 1903)  Ventilator Initiated: Yes (03/06/23 0622)  Set Rate: 26 BPM (03/06/23 1907)  Vt Set: 450 mL (03/06/23 1907)  PEEP/CPAP: 5 cmH20 (03/06/23 1907)  Oxygen Concentration (%): 35 (03/06/23 2105)  Peak Airway Pressure: 27.1 cmH20 (03/06/23 1907)  Total Ve: 11.8 L/m (03/06/23 1907)  F/VT Ratio<105 (RSBI): (!) 57.02 (03/06/23 1907)    Lines/Drains/Airways       Drain  Duration                  NG/OG Tube 03/06/23 0555 Ullin sump 18 Fr. Center mouth <1 day         Urethral Catheter 03/06/23 0555 Latex 16 Fr. <1 day              Airway  Duration                  Airway - Non-Surgical 03/06/23 0546 <1 day              Peripheral Intravenous Line  Duration                  Peripheral IV - Single Lumen 03/06/23 0509 20 G Distal;Posterior;Right Wrist <1 day         Peripheral IV - Single Lumen 03/06/23 0510 18 G Left Antecubital <1 day         Peripheral IV - Single Lumen 03/06/23 0740 20 G Left;Posterior Hand <1 day                    Significant Labs:    CBC/Anemia Profile:  Recent Labs   Lab 03/06/23  0525   WBC 15.75*   HGB 12.2*   HCT 39.6*      MCV 92   RDW 13.1        Chemistries:  Recent Labs   Lab 03/06/23  0525      K  3.7   CL 94*   CO2 35*   BUN 6*   CREATININE 0.8   CALCIUM 9.0   ALBUMIN 3.7   PROT 7.8   BILITOT 0.4   ALKPHOS 92   ALT 17   AST 16   MG 2.1       All pertinent labs within the past 24 hours have been reviewed.    Significant Imaging:   I have reviewed all pertinent imaging results/findings within the past 24 hours.

## 2023-03-07 NOTE — ASSESSMENT & PLAN NOTE
Patient with Hypercapnic and Hypoxic Respiratory failure which is Acute.  he is not on home oxygen. Supplemental oxygen was provided and noted- Vent Mode: PRVC  Oxygen Concentration (%):  [35-40] 35  Resp Rate Total:  [26 br/min-29 br/min] 27 br/min  Vt Set:  [450 mL] 450 mL  PEEP/CPAP:  [5 cmH20] 5 cmH20  Mean Airway Pressure:  [11 plM20-69.4 cmH20] 13.4 cmH20.   Signs/symptoms of respiratory failure include- respiratory distress. Contributing diagnoses includes - CHF and COPD Labs and images were reviewed. Patient Has recent ABG, which has been reviewed. Will treat underlying causes and adjust management of respiratory failure.  Patient presents with respiratory failure and intubated.  Probably combination of COPD and CHF.  Started on nebs and IV steroids.  Also started on IV diuresis.  Empirically started on ABX's.  Pulmonary consulted for vent management.  patient is in IV lasix for CHF exacerbation and  nebulizer for COPD exacerbation.pulmonology doing vent. Management.

## 2023-03-07 NOTE — ASSESSMENT & PLAN NOTE
Likely combination of CHF and COPD exacerbations. CXR with no focal consolidation, vascular congestion, and pulmonary edema. BNP >1,000. WBC mildly elevated. Wheezing on exam. Known emphysema and PREETI with home CPAP. COVID negative.   - maintain LPV. Wean for SpO2 >88%  - continue scheduled duonebs q4h  - continue steroids for 5 days; transition to prednisone 40mg daily when able to tolerated PO  - CAP coverage with rocephin X 5 days and azithro X 3 days  - elevated procal; f/u sputum culture  - continue aggressive diuresis  - daily evaluation for SAT/SBT

## 2023-03-07 NOTE — PLAN OF CARE
Initial assessment completed on the phone with patient's daughter, Ana Irene to discuss how patient will manage his care at the next level.  Call was actually placed to patient's ex-wife but daughter informed CM that she too is hospitalized at this time.      Roll of CM discussed.  Patient identified by using 2 identifiers:  Name and date of birth.    Patient arrived from:  Home where he is now living with his ex wife, daughter and granddaughter.  Address updated in Epic.  Help at home:  Ex wife, Lillian (whom he stated on last hospitalization that he would want to speak for him if he was unable to speak for himself) and daughter, Ana  Barriers to DC:  None noted   Discharge plan at this time:  Patient remains in ICU on a vent at this time.  Main goal is to get patient back home with his family.  1- TBD  2-Home with family, resume home O2 and add services needed    CM will continue to follow while in the ICU and finalize DC plans.        [facility] - Intensive Care  Initial Discharge Assessment       Primary Care Provider: Rolando Funes MD    Admission Diagnosis: Shortness of breath [R06.02]  COPD exacerbation [J44.1]  Respiratory failure, unspecified chronicity, unspecified whether with hypoxia or hypercapnia [J96.90]  Acute on chronic congestive heart failure, unspecified heart failure type [I50.9]    Admission Date: 3/6/2023  Expected Discharge Date:     Discharge Barriers Identified: None    Payor: [employer] / Plan: [employer] / Product Type: Medicare Advantage /     Extended Emergency Contact Information  Primary Emergency Contact: deejay garcia  Mobile Phone: [phone]  Relation: Sister  Preferred language: English   needed? No  Secondary Emergency Contact: Lillian Irene  Mobile Phone: [phone]  Relation: Spouse    Discharge Plan A:  (tbd)  Discharge Plan B: Home with family      [address]  [address]  "[place] [address]  Phone: [phone] Fax: [phone]      Initial Assessment (most recent)       Adult Discharge Assessment - 03/07/23 1611          Discharge Assessment    Assessment Type Discharge Planning Assessment     Confirmed/corrected address, phone number and insurance Yes     Confirmed Demographics Correct on Facesheet     Source of Information family     If unable to respond/provide information was family/caregiver contacted? Yes     Contact Name/Number Ana Pathak [phone]     When was your last doctors appointment? --   unknown    Communicated VERA with patient/caregiver Date not available/Unable to determine     People in Home child(patricia), adult   ex wife and grandchildren    Do you expect to return to your current living situation? Yes     Do you have help at home or someone to help you manage your care at home? Yes     Who are your caregiver(s) and their phone number(s)? Ex wife and daughter     Prior to hospitilization cognitive status: Alert/Oriented     Current cognitive status: Coma/Sedated/Intubated     Home Layout Able to live on 1st floor     Equipment Currently Used at Home oxygen     Readmission within 30 days? No     Patient currently being followed by outpatient case management? No     Do you currently have service(s) that help you manage your care at home? No     Do you take prescription medications? Yes     Do you have prescription coverage? Yes     Coverage PHN     Do you have any problems affording any of your prescribed medications? No     Is the patient taking medications as prescribed? yes     Who is going to help you get home at discharge? Ex wife and daughter     How do you get to doctors appointments? family or friend will provide   "the Lift"    Are you on dialysis? No     Do you take coumadin? No     Discharge Plan A --   tbd    Discharge Plan B Home with family     DME Needed Upon Discharge  --   tbd    Discharge Plan discussed with: Adult " children     Discharge Barriers Identified None

## 2023-03-07 NOTE — PROGRESS NOTES
[facility] Medicine  Progress Note    Patient Name: Abelardo Irene Jr.  MRN: [id number]  Patient Class: IP- Inpatient   Admission Date: 3/6/2023  Length of Stay: 1 days  Attending Physician: Romeo Lopez MD  Primary Care Provider: Rolando Funes MD        Subjective:     Principal Problem:Acute respiratory failure with hypoxia and hypercarbia        HPI:  66 y/o male with a PMHx of HTN, COPD, CHF, CAD presents to the ER via EMS for evaluation of respiratory distress beginning last night.  Patient is currently intubated and unable to give history.  Patient complained of shortness of breath throughout the night that progressively worsened.  No improvement of symptoms with use of his inhaler.  Patient noted to have SpO2 of 49% RA on EMS evaluation.  EMS administered 1.5 solumedrol IN en route with increased on his stats to 93%.  No alleviating or exacerbating factors noted.  Patient noted to be in respiratory distress and placed on Bipap.  No improvement with Bipap and patient eventually intubated.  Unable to obtain any further history.      Overview/Hospital Course:  66 y/o male with a PMHx of HTN, COPD, CHF wit Ef of 40%,, CAD presents to the ER via EMS for evaluation of respiratory distress,patient was intubated and was unable to give history.  Patient complained of shortness of breath throughout the night that progressively worsened.  No improvement of symptoms with use of his inhaler.  Patient noted to have SpO2 of 49% RA on EMS evaluation.  EMS administered 1.5 solumedrol IN en route with increased on his stats to 93%.  No alleviating or exacerbating factors noted.  Patient noted to be in respiratory distress and placed on Bipap.  No improvement with Bipap and patient eventually intubated.patient is in IV lasix for CHF exacerbation and  nebulizer for COPD exacerbation.pulmonology doing vent. Management.      Past Medical History:   Diagnosis Date    CHF (congestive heart failure)      COPD (chronic obstructive pulmonary disease)     Coronary artery disease     Elevated cholesterol     on medication    Hypertension        Past Surgical History:   Procedure Laterality Date    CARDIAC SURGERY      coronary stent placed    CORONARY STENT PLACEMENT         Review of patient's allergies indicates:   Allergen Reactions    Contrast media Shortness Of Breath, Itching and Anxiety     Patient states that he had a bad reaction, anxiety , shortness of breath, itching .        No current facility-administered medications on file prior to encounter.     Current Outpatient Medications on File Prior to Encounter   Medication Sig    albuterol (PROVENTIL/VENTOLIN HFA) 90 mcg/actuation inhaler Inhale 1-2 puffs into the lungs every 6 (six) hours as needed for Wheezing. Rescue    amLODIPine (NORVASC) 5 MG tablet Take 1 tablet (5 mg total) by mouth once daily.    aspirin (ECOTRIN) 81 MG EC tablet Take 1 tablet (81 mg total) by mouth once daily.    atorvastatin (LIPITOR) 40 MG tablet Take 1 tablet (40 mg total) by mouth once daily.    clopidogreL (PLAVIX) 75 mg tablet Take 1 tablet (75 mg total) by mouth once daily.    fluticasone-salmeterol diskus inhaler 250-50 mcg Inhale 1 puff into the lungs 2 (two) times daily.    furosemide (LASIX) 40 MG tablet Take 1 tablet (40 mg total) by mouth 2 (two) times daily.    glipiZIDE (GLUCOTROL) 10 MG TR24 Take 1 tablet (10 mg total) by mouth daily with breakfast.    lisinopriL (PRINIVIL,ZESTRIL) 20 MG tablet Take 1 tablet (20 mg total) by mouth once daily.    metFORMIN (GLUCOPHAGE) 500 MG tablet Take 1 tablet (500 mg total) by mouth 2 (two) times daily with meals.    metoprolol succinate (TOPROL-XL) 25 MG 24 hr tablet Take 1 tablet (25 mg total) by mouth once daily.     Family History       Problem Relation (Age of Onset)    Cancer Mother    No Known Problems Father          Tobacco Use    Smoking status: Former     Packs/day: 2.00     Years: 45.00     Pack  years: 90.00     Types: Cigarettes     Quit date: 2017     Years since quittin.3    Smokeless tobacco: Never   Substance and Sexual Activity    Alcohol use: Yes     Comment: socially    Drug use: No    Sexual activity: Yes     Partners: Female     Birth control/protection: None     Review of Systems   Unable to perform ROS: Intubated   Objective:     Vital Signs (Most Recent):  Temp: 98.2 °F (36.8 °C) (23 1100)  Pulse: 89 (23 1123)  Resp: (!) 22 (23 1123)  BP: (!) 147/85 (23 1100)  SpO2: 97 % (23 1123)   Vital Signs (24h Range):  Temp:  [96.4 °F (35.8 °C)-99.5 °F (37.5 °C)] 98.2 °F (36.8 °C)  Pulse:  [] 89  Resp:  [22-27] 22  SpO2:  [94 %-99 %] 97 %  BP: (104-147)/(51-85) 147/85     Weight: 86.3 kg (190 lb 4.1 oz)  Body mass index is 27.3 kg/m².    Physical Exam  Constitutional:       Appearance: He is ill-appearing. He is not diaphoretic.   HENT:      Head: Normocephalic and atraumatic.   Eyes:      General:         Right eye: No discharge.         Left eye: No discharge.      Conjunctiva/sclera: Conjunctivae normal.   Cardiovascular:      Rate and Rhythm: Normal rate and regular rhythm.   Pulmonary:      Breath sounds: Rhonchi present.      Comments: Mechanically ventilated  Abdominal:      General: Bowel sounds are normal.      Palpations: Abdomen is soft.   Musculoskeletal:         General: No deformity or signs of injury.   Skin:     General: Skin is warm and dry.   Neurological:      Comments: Sedated           Significant Labs: All pertinent labs within the past 24 hours have been reviewed.  BMP:   Recent Labs   Lab 23  0451   *      K 2.8*   CL 94*   CO2 31*   BUN 18   CREATININE 1.1   CALCIUM 9.0   MG 2.1       CBC:   Recent Labs   Lab 23  0525 23  0451   WBC 15.75* 14.12*   HGB 12.2* 10.7*   HCT 39.6* 32.7*    215         Significant Imaging: I have reviewed all pertinent imaging results/findings within the past 24  hours.      Assessment/Plan:      * Acute respiratory failure with hypoxia and hypercarbia  Patient with Hypercapnic and Hypoxic Respiratory failure which is Acute.  he is not on home oxygen. Supplemental oxygen was provided and noted- Vent Mode: PRVC  Oxygen Concentration (%):  [35-40] 35  Resp Rate Total:  [26 br/min-29 br/min] 27 br/min  Vt Set:  [450 mL] 450 mL  PEEP/CPAP:  [5 cmH20] 5 cmH20  Mean Airway Pressure:  [11 onA54-93.4 cmH20] 13.4 cmH20.   Signs/symptoms of respiratory failure include- respiratory distress. Contributing diagnoses includes - CHF and COPD Labs and images were reviewed. Patient Has recent ABG, which has been reviewed. Will treat underlying causes and adjust management of respiratory failure.  Patient presents with respiratory failure and intubated.  Probably combination of COPD and CHF.  Started on nebs and IV steroids.  Also started on IV diuresis.  Empirically started on ABX's.  Pulmonary consulted for vent management.  patient is in IV lasix for CHF exacerbation and  nebulizer for COPD exacerbation.pulmonology doing vent. Management.    Uncontrolled type 2 diabetes mellitus with hyperglycemia  Patient's FSGs are uncontrolled due to hyperglycemia on current medication regimen.  Last A1c reviewed-   Lab Results   Component Value Date    HGBA1C 10.1 (H) 04/13/2022     Most recent fingerstick glucose reviewed- No results for input(s): POCTGLUCOSE in the last 24 hours.  Current correctional scale  Medium  Maintain anti-hyperglycemic dose as follows-   Antihyperglycemics (From admission, onward)    Start     Stop Route Frequency Ordered    03/06/23 1323  insulin aspart U-100 pen 1-10 Units         -- SubQ Every 6 hours PRN 03/06/23 1223      Hold Oral hypoglycemics while patient is in the hospital.  Hyperglycemia may worsen with steroids.  Continue to monitor.    Class 1 obesity due to excess calories with serious comorbidity in adult  Body mass index is 29.41 kg/m². Morbid obesity  complicates all aspects of disease management from diagnostic modalities to treatment. Weight loss encouraged and health benefits explained to patient.         Coronary artery disease involving native coronary artery of native heart without angina pectoris  Continue ASA, Plavix and Statin.  No report of chest pain.  Initial Troponin negative.  Trend Troponin.      Dyslipidemia  Continue Statin.      Acute on chronic combined systolic and diastolic heart failure  See above.      COPD (chronic obstructive pulmonary disease)  See above.      Tobacco abuse  Smoking cessation counseling when appropriate.      Benign essential hypertension  Resume home medications with parameters.        VTE Risk Mitigation (From admission, onward)         Ordered     enoxaparin injection 40 mg  Daily         03/06/23 0823     IP VTE HIGH RISK PATIENT  Once         03/06/23 0823     Place sequential compression device  Until discontinued         03/06/23 0823                Discharge Planning   VERA:      Code Status: Full Code   Is the patient medically ready for discharge?:     Reason for patient still in hospital (select all that apply): Patient trending condition               Critical care time spent on the evaluation and treatment of severe organ dysfunction, review of pertinent labs and imaging studies, discussions with consulting providers and discussions with patient/family: over 45  minutes.      Romeo Lopez MD  Department of Hospital Medicine   [facility] [place] - Intensive Care

## 2023-03-07 NOTE — ASSESSMENT & PLAN NOTE
Likely combination of CHF and COPD exacerbations. CXR with no focal consolidation, vascular congestion, and pulmonary edema. BNP >1,000. WBC mildly elevated. Wheezing on exam. Known emphysema and PREETI with home CPAP. COVID negative.   - maintain LPV. Wean for SpO2 >88%  - continue scheduled duonebs q4h  - continue steroids for 5 days; transition to prednisone 40mg daily when able to tolerated PO  - CAP coverage with rocephin X 5 days and azithro X 3 days  - check procal; f/u sputum culture  - continue aggressive diuresis  - daily evaluation for SAT/SBT

## 2023-03-07 NOTE — SUBJECTIVE & OBJECTIVE
Past Medical History:   Diagnosis Date    CHF (congestive heart failure)     COPD (chronic obstructive pulmonary disease)     Coronary artery disease     Elevated cholesterol     on medication    Hypertension        Past Surgical History:   Procedure Laterality Date    CARDIAC SURGERY      coronary stent placed    CORONARY STENT PLACEMENT         Review of patient's allergies indicates:   Allergen Reactions    Contrast media Shortness Of Breath, Itching and Anxiety     Patient states that he had a bad reaction, anxiety , shortness of breath, itching .        No current facility-administered medications on file prior to encounter.     Current Outpatient Medications on File Prior to Encounter   Medication Sig    albuterol (PROVENTIL/VENTOLIN HFA) 90 mcg/actuation inhaler Inhale 1-2 puffs into the lungs every 6 (six) hours as needed for Wheezing. Rescue    amLODIPine (NORVASC) 5 MG tablet Take 1 tablet (5 mg total) by mouth once daily.    aspirin (ECOTRIN) 81 MG EC tablet Take 1 tablet (81 mg total) by mouth once daily.    atorvastatin (LIPITOR) 40 MG tablet Take 1 tablet (40 mg total) by mouth once daily.    clopidogreL (PLAVIX) 75 mg tablet Take 1 tablet (75 mg total) by mouth once daily.    fluticasone-salmeterol diskus inhaler 250-50 mcg Inhale 1 puff into the lungs 2 (two) times daily.    furosemide (LASIX) 40 MG tablet Take 1 tablet (40 mg total) by mouth 2 (two) times daily.    glipiZIDE (GLUCOTROL) 10 MG TR24 Take 1 tablet (10 mg total) by mouth daily with breakfast.    lisinopriL (PRINIVIL,ZESTRIL) 20 MG tablet Take 1 tablet (20 mg total) by mouth once daily.    metFORMIN (GLUCOPHAGE) 500 MG tablet Take 1 tablet (500 mg total) by mouth 2 (two) times daily with meals.    metoprolol succinate (TOPROL-XL) 25 MG 24 hr tablet Take 1 tablet (25 mg total) by mouth once daily.     Family History       Problem Relation (Age of Onset)    Cancer Mother    No Known Problems Father          Tobacco Use    Smoking status:  Former     Packs/day: 2.00     Years: 45.00     Pack years: 90.00     Types: Cigarettes     Quit date: 2017     Years since quittin.3    Smokeless tobacco: Never   Substance and Sexual Activity    Alcohol use: Yes     Comment: socially    Drug use: No    Sexual activity: Yes     Partners: Female     Birth control/protection: None     Review of Systems   Unable to perform ROS: Intubated   Objective:     Vital Signs (Most Recent):  Temp: 98.2 °F (36.8 °C) (23 1100)  Pulse: 89 (23 1123)  Resp: (!) 22 (23 1123)  BP: (!) 147/85 (23 1100)  SpO2: 97 % (23 1123)   Vital Signs (24h Range):  Temp:  [96.4 °F (35.8 °C)-99.5 °F (37.5 °C)] 98.2 °F (36.8 °C)  Pulse:  [] 89  Resp:  [22-27] 22  SpO2:  [94 %-99 %] 97 %  BP: (104-147)/(51-85) 147/85     Weight: 86.3 kg (190 lb 4.1 oz)  Body mass index is 27.3 kg/m².    Physical Exam  Constitutional:       Appearance: He is ill-appearing. He is not diaphoretic.   HENT:      Head: Normocephalic and atraumatic.   Eyes:      General:         Right eye: No discharge.         Left eye: No discharge.      Conjunctiva/sclera: Conjunctivae normal.   Cardiovascular:      Rate and Rhythm: Normal rate and regular rhythm.   Pulmonary:      Breath sounds: Rhonchi present.      Comments: Mechanically ventilated  Abdominal:      General: Bowel sounds are normal.      Palpations: Abdomen is soft.   Musculoskeletal:         General: No deformity or signs of injury.   Skin:     General: Skin is warm and dry.   Neurological:      Comments: Sedated           Significant Labs: All pertinent labs within the past 24 hours have been reviewed.  BMP:   Recent Labs   Lab 23  0451   *      K 2.8*   CL 94*   CO2 31*   BUN 18   CREATININE 1.1   CALCIUM 9.0   MG 2.1       CBC:   Recent Labs   Lab 23  0525 23  0451   WBC 15.75* 14.12*   HGB 12.2* 10.7*   HCT 39.6* 32.7*    215         Significant Imaging: I have reviewed all pertinent  imaging results/findings within the past 24 hours.

## 2023-03-07 NOTE — HPI
Mr. Abelardo Irene Jr. is a 67 y.o male with a PMHx of HTN, COPD, CHF, CAD, and pre-DM, that presents to the ED via EMS for evaluation of respiratory distress beginning last night. Per EMS, patient had complaints of shortness of breath throughout the night that progressively exacerbated. EMS endorses patient used his albuterol rescue inhaler with no immediate relief noted. On arrival, EMS reports patient had an SpO2 of 49% RA which improved with 1.5 solumedrol IN en route with increased stats to 93%. No other medications taken PTA. No alleviating or exacerbating factors noted.     In the ED, he was trialed on BiPAP with nebulizing treatments. His ABG showed a significant respiratory acidosis 7.21/108. CXR with vascular congestion and pulmonary edema. His mental status worsened and he was ultimately intubated and admitted to the ICU. Pulmonary was consulted for ventilator management.

## 2023-03-07 NOTE — CARE UPDATE
03/07/23 1000   Patient Assessment/Suction   Level of Consciousness (AVPU) alert   Ready to Wean Parameters   SBT Safety Screen Fail   Spon. Breathing Trial Initiated? Not initiated   SBT Results Fail   SBT Failure Reason Acute O2 sat < 88%  (apena)   Extubated? No     Patient failed SBT due to apena and desat O2 less than 88%

## 2023-03-07 NOTE — RESPIRATORY THERAPY
Patient remains on SERVOI ventilator with 7.5 ETT secured at 22cm with ETT moved to center lip. Alarms on, set and functioning. AMBU BAG and mask at bedside. Oral care and suctioning done. Current ventilator settings PRVC RATE 26, TIDAL VOLUME 450 ML, PEEP +5, FIU02 35%. Aerosol treatment given as ordered. Will continue to monitor.

## 2023-03-07 NOTE — NURSING
Sepsis Screen (most recent)       Sepsis Screen (IP) - 03/07/23 1034       Is the patient's history or complaint suggestive of a possible infection? Yes  -    Are there at least two of the following signs and symptoms present? Yes  -    Sepsis signs/symptoms - Tachypnea Tachypnea     >20  -    Sepsis signs/symptoms - WBC WBC < 4,000 or WBC > 12,000  -    Are any of the following organ dysfunction criteria present and not considered to be due to a chronic condition? Yes  -    Organ Dysfunction Criteria - Resp Comp Respiratory Compromise: Requiring > 5L NC  -    Initiate Sepsis Protocol No  -    Reason sepsis not considered Pt. receiving appropriate management  -              User Key  (r) = Recorded By, (t) = Taken By, (c) = Cosigned By      Initials Name     Mary Ann Kirk RN

## 2023-03-07 NOTE — NURSING
[facility]  Intensive Care Nursing  Progress Note      Abelardo Irene 67 y.o male  Full code  Allergies: contrast media    Admit: 3/6 @ 0504, ICU @ 0915  Acute respiratory failure with hypoxia and hypercarbia    PMHx of HTN, COPD, CHF, CAD, and pre-DM, presented to the ED via EMS fo respiratory distress. Sats 49% at home on room air. Unable to tolerate BIPAP in ED, intubated and transferred to ICU at 0930.    CXR indicative of pulmonary edema and fluid overload    BNP 1,017    No acute events today. VSS. Plan for SAT/SBT in AM. Troponin I q6.    Neuro:  Sedated, withdraws  Restraints  TMAX 99.5  esophageal probe  PERRLA  Corneal, cough, gag intact    [place] Coma Scale  Best Eye Response: 2-->(E2) to pain  Best Motor Response: 4-->(M4) withdraws from pain  Best Verbal Response: 1-->(V1) none  Tres Coma Scale Score: 7  Assessment Qualifiers: patient chemically sedated or paralyzed, patient intubated      24 hr Temp:  [97.1 °F (36.2 °C)-99.5 °F (37.5 °C)]     CV:   Sinus rhythm, rate 60's  PVCs/PACs, occasional  -120's  EF = 40% (4/12/22)  SBP <180, MAP >65  Pulses WDL  1+ edema BUE  Enoxaparin/clopidogrel/famotidine/SCDs    Resp:   7.5 ETT (3/6)  Vent Mode: PRVC  Set Rate: 26 BPM  Oxygen Concentration (%): 35  Vt Set: 450 mL  PEEP/CPAP: 5 cmH20    Nebs q4  CHG/Mupirocin    Plan: wean to extubate    GI/  OGT to low intermittent suction   Diet/Nutrition Received: NPO  Last Bowel Movement: 03/06/23  Voiding Characteristics: ureteral catheter    Intake/Output Summary (Last 24 hours) at 3/6/2023 1852  Last data filed at 3/6/2023 1800  Gross per 24 hour   Intake 805.44 ml   Output 1930 ml   Net -1124.56 ml        Recent Labs   Lab 03/06/23  0525   WBC 15.75*   RBC 4.29*   HGB 12.2*   HCT 39.6*         Recent Labs   Lab 03/06/23  0525      K 3.7   CO2 35*   CL 94*   BUN 6*   CREATININE 0.8   ALKPHOS 92   ALT 17   AST 16   BILITOT 0.4    No results for input(s): PROTIME, INR,  APTT, HEPANTIXA in the last 168 hours.   Recent Labs   Lab 03/06/23  1255   TROPONINI 0.026     Electrolytes: No replacement orders  Accuchecks: Q6H    GTT   fentanyl 200 mcg/hr (03/06/23 1830)    propofoL 15 mcg/kg/min (03/06/23 1333)       Lines/Drains/Airways       Drain  Duration                  NG/OG Tube 03/06/23 0555 [place] sump 18 Fr. Center mouth <1 day         Urethral Catheter 03/06/23 0555 Latex 16 Fr. <1 day              Airway  Duration                  Airway - Non-Surgical 03/06/23 0546 <1 day              Peripheral Intravenous Line  Duration                  Peripheral IV - Single Lumen 03/06/23 0509 20 G Distal;Posterior;Right Wrist <1 day         Peripheral IV - Single Lumen 03/06/23 0510 18 G Left Antecubital <1 day         Peripheral IV - Single Lumen 03/06/23 0740 20 G Left;Posterior Hand <1 day                  Wounds: No  Wound care consulted: No      Rivka Borden, BSN, CCRN  Intensive Care Nursing  [facility]

## 2023-03-07 NOTE — SUBJECTIVE & OBJECTIVE
Past Medical History:   Diagnosis Date    CHF (congestive heart failure)     COPD (chronic obstructive pulmonary disease)     Coronary artery disease     Elevated cholesterol     on medication    Hypertension        Past Surgical History:   Procedure Laterality Date    CARDIAC SURGERY      coronary stent placed    CORONARY STENT PLACEMENT         Review of patient's allergies indicates:   Allergen Reactions    Contrast media Shortness Of Breath, Itching and Anxiety     Patient states that he had a bad reaction, anxiety , shortness of breath, itching .        Medications:  Continuous Infusions:   fentanyl 225 mcg/hr (23 0547)    propofoL 25 mcg/kg/min (23 05)     Scheduled Meds:   albuterol-ipratropium  3 mL Nebulization Q4H    aspirin  81 mg Oral Daily    atorvastatin  40 mg Oral Daily    azithromycin  500 mg Intravenous Q24H    cefTRIAXone (ROCEPHIN) IVPB  2 g Intravenous Q24H    chlorhexidine  15 mL Mouth/Throat BID    clopidogreL  75 mg Oral Daily    enoxaparin  40 mg Subcutaneous Daily    famotidine (PF)  20 mg Intravenous BID    furosemide (LASIX) injection  20 mg Intravenous Q12H    methylPREDNISolone sodium succinate injection  80 mg Intravenous Q8H    mupirocin   Nasal BID    potassium, sodium phosphates  2 packet Per OG tube BID     PRN Meds:acetaminophen, dextrose 10%, glucagon (human recombinant), insulin aspart U-100, ondansetron, polyethylene glycol, sodium chloride 0.9%    Family History       Problem Relation (Age of Onset)    Cancer Mother    No Known Problems Father          Tobacco Use    Smoking status: Former     Packs/day: 2.00     Years: 45.00     Pack years: 90.00     Types: Cigarettes     Quit date: 2017     Years since quittin.3    Smokeless tobacco: Never   Substance and Sexual Activity    Alcohol use: Yes     Comment: socially    Drug use: No    Sexual activity: Yes     Partners: Female     Birth control/protection: None       Review of Systems   Unable to  perform ROS: Acuity of condition   Objective:     Vital Signs (Most Recent):  Temp: 97.3 °F (36.3 °C) (03/07/23 0726)  Pulse: 77 (03/07/23 0726)  Resp: (!) 26 (03/07/23 0726)  BP: (!) 104/52 (03/07/23 0605)  SpO2: 99 % (03/07/23 0726)   Vital Signs (24h Range):  Temp:  [96.4 °F (35.8 °C)-99.5 °F (37.5 °C)] 97.3 °F (36.3 °C)  Pulse:  [56-82] 77  Resp:  [26] 26  SpO2:  [95 %-100 %] 99 %  BP: (104-131)/(51-73) 104/52     Weight: 86.3 kg (190 lb 4.1 oz)  Body mass index is 27.3 kg/m².    Physical Exam  Vitals and nursing note reviewed.   Constitutional:       General: He is not in acute distress.     Appearance: He is ill-appearing. He is not toxic-appearing or diaphoretic.   HENT:      Head: Normocephalic and atraumatic.      Right Ear: External ear normal.      Left Ear: External ear normal.      Nose: Nose normal.      Mouth/Throat:      Comments: ETT and OGT  Eyes:      General:         Right eye: No discharge.         Left eye: No discharge.   Cardiovascular:      Rate and Rhythm: Normal rate and regular rhythm.   Pulmonary:      Comments: Intubated but awake  Skin:     General: Skin is warm and dry.   Neurological:      Comments:  Intubated but awake, follows commands, nods yes and no           Advance Care Planning   Advance Care Planning       Significant Labs: All pertinent labs within the past 24 hours have been reviewed.  CBC:   Recent Labs   Lab 03/07/23 0451   WBC 14.12*   HGB 10.7*   HCT 32.7*   MCV 88        BMP:  Recent Labs   Lab 03/07/23 0451   *      K 2.8*   CL 94*   CO2 31*   BUN 18   CREATININE 1.1   CALCIUM 9.0   MG 2.1     LFT:  Lab Results   Component Value Date    AST 16 03/06/2023    ALKPHOS 92 03/06/2023    BILITOT 0.4 03/06/2023     Albumin:   Albumin   Date Value Ref Range Status   03/06/2023 3.7 3.5 - 5.2 g/dL Final     Protein:   Total Protein   Date Value Ref Range Status   03/06/2023 7.8 6.0 - 8.4 g/dL Final     Lactic acid:   Lab Results   Component Value Date     LACTATE 1.2 04/12/2022       Significant Imaging: I have reviewed all pertinent imaging results/findings within the past 24 hours. CXR

## 2023-03-07 NOTE — SUBJECTIVE & OBJECTIVE
Interval History: Failed SBT today with periods of apnea. Still with diffuse wheezing. Net negative 1.5L.     Objective:     Vital Signs (Most Recent):  Temp: 98.1 °F (36.7 °C) (03/07/23 1340)  Pulse: 85 (03/07/23 1340)  Resp: 11 (03/07/23 1340)  BP: (!) 147/85 (03/07/23 1100)  SpO2: (!) 92 % (03/07/23 1340)   Vital Signs (24h Range):  Temp:  [96.4 °F (35.8 °C)-99.5 °F (37.5 °C)] 98.1 °F (36.7 °C)  Pulse:  [] 85  Resp:  [10-27] 11  SpO2:  [92 %-99 %] 92 %  BP: (104-147)/(51-85) 147/85     Weight: 86.3 kg (190 lb 4.1 oz)  Body mass index is 27.3 kg/m².      Intake/Output Summary (Last 24 hours) at 3/7/2023 1510  Last data filed at 3/7/2023 0305  Gross per 24 hour   Intake 407.97 ml   Output 1060 ml   Net -652.03 ml       Physical Exam  Constitutional:       Appearance: He is ill-appearing. He is not diaphoretic.      Interventions: He is sedated, intubated and restrained.   HENT:      Head: Normocephalic and atraumatic.   Eyes:      General:         Right eye: No discharge.         Left eye: No discharge.      Conjunctiva/sclera: Conjunctivae normal.   Cardiovascular:      Rate and Rhythm: Normal rate and regular rhythm.      Pulses: Normal pulses.   Pulmonary:      Effort: He is intubated.      Breath sounds: Wheezing present. No rhonchi.   Abdominal:      General: Bowel sounds are normal.      Palpations: Abdomen is soft.   Genitourinary:     Comments: Brown in place.   Musculoskeletal:         General: No swelling, deformity or signs of injury.   Skin:     General: Skin is warm and dry.      Capillary Refill: Capillary refill takes less than 2 seconds.   Neurological:      Comments: Follows commands off sedation, but quickly becomes agitated.      Review of Systems   Unable to perform ROS: Intubated     Vents:  Vent Mode: CPAP / PSV (03/07/23 1340)  Ventilator Initiated: Yes (03/06/23 0622)  Set Rate: 26 BPM (03/07/23 0726)  Vt Set: 450 mL (03/07/23 0726)  Pressure Support: 12 cmH20 (03/07/23  1340)  PEEP/CPAP: 5 cmH20 (03/07/23 1340)  Oxygen Concentration (%): 35 (03/07/23 1340)  Peak Airway Pressure: 17.8 cmH20 (03/07/23 1340)  Total Ve: 6.2 L/m (03/07/23 1340)  F/VT Ratio<105 (RSBI): (!) 18 (03/07/23 1340)    Lines/Drains/Airways       Drain  Duration                  NG/OG Tube 03/06/23 0555 [place] sump 18 Fr. Center mouth 1 day         Urethral Catheter 03/06/23 0555 Latex 16 Fr. 1 day              Airway  Duration                  Airway - Non-Surgical 03/06/23 0546 1 day              Peripheral Intravenous Line  Duration                  Peripheral IV - Single Lumen 03/06/23 0509 20 G Distal;Posterior;Right Wrist 1 day         Peripheral IV - Single Lumen 03/06/23 0510 18 G Left Antecubital 1 day         Peripheral IV - Single Lumen 03/06/23 0740 20 G Left;Posterior Hand 1 day                    Significant Labs:    CBC/Anemia Profile:  Recent Labs   Lab 03/06/23  0525 03/07/23  0451   WBC 15.75* 14.12*   HGB 12.2* 10.7*   HCT 39.6* 32.7*    215   MCV 92 88   RDW 13.1 13.3        Chemistries:  Recent Labs   Lab 03/06/23  0525 03/07/23  0451 03/07/23  1317    142 142   K 3.7 2.8* 3.1*   CL 94* 94* 92*   CO2 35* 31* 39*   BUN 6* 18 22   CREATININE 0.8 1.1 1.0   CALCIUM 9.0 9.0 8.9   ALBUMIN 3.7  --   --    PROT 7.8  --   --    BILITOT 0.4  --   --    ALKPHOS 92  --   --    ALT 17  --   --    AST 16  --   --    MG 2.1 2.1  --    PHOS  --  2.3*  --        All pertinent labs within the past 24 hours have been reviewed.    Significant Imaging:  I have reviewed all pertinent imaging results/findings within the past 24 hours.

## 2023-03-07 NOTE — CARE UPDATE
03/07/23 1210   Patient Assessment/Suction   Level of Consciousness (AVPU) alert   PRE-TX-O2   Device (Oxygen Therapy) ventilator   Oxygen Concentration (%) 35   SpO2 97 %   Pulse Oximetry Type Continuous   Pulse 88   Resp 10   Temp 98.2 °F (36.8 °C)   Vent Select   Conventional Vent Y   Charged w/in last 24h YES   Preset Conventional Ventilator Settings   Vent ID [id number]   Vent Type Servo i   Ventilation Type PRVC   Vent Mode PRVC   Humidity HME   PEEP/CPAP 5 cmH20   Pressure Support 15 cmH20   Insp Rise Time  0.15 %   Patient Ventilator Parameters   Resp Rate Total 9 br/min   Peak Airway Pressure 20.9 cmH20   Mean Airway Pressure 8.5 cmH20   Exhaled Vt 919 mL   Total Ve 7.4 L/m   Conventional Ventilator Alarms   Alarms On Y   Ve High Alarm 30 L/min   Ve Low Alarm 5 L/min   Resp Rate High Alarm 40 br/min   Resp Rate Low Alarm 5   Press High Alarm 50 cmH2O   T Apnea 20 sec(s)   Ready to Wean/Extubation Screen   FIO2<=50 (chart decimal) 0.35   MV<16L (chart vol.) 7.4   PEEP <=8 (chart #) 5   Ready to Wean Parameters   F/VT Ratio<105 (RSBI) (!) 10.88

## 2023-03-07 NOTE — HOSPITAL COURSE
68 y/o male with a PMHx of HTN, COPD, CHF wit EF of about 30 %,, CAD presents to the ER via EMS for evaluation of respiratory distress,patient was intubated and was unable to give history.  Patient complained of shortness of breath throughout the night that progressively worsened.  No improvement of symptoms with use of his inhaler.  Patient noted to have SpO2 of 49% RA on EMS evaluation.  EMS administered 1.5 solumedrol IN en route with increased on his stats to 93%.  No alleviating or exacerbating factors noted.  Patient noted to be in respiratory distress and placed on Bipap.  No improvement with Bipap and patient eventually intubated.patient is in IV lasix for CHF exacerbation and  nebulizer and steroids for COPD exacerbation.pulmonology doing vent. Management.on empiric IV Abx,follow up with sputum culture.  S/P extubation on 3.8.23,on daily NC o 2 and nightly Bipap,consulted PT,OT,ST.    Patient transferred out of the ICU on 3/9/23.  PT/OT consulted and rec: H/H with rolling walker. Patient clinically improved and was discharged to home. Activity as tolerated. Diet- low NA. Follow up with Pulmonary in one week

## 2023-03-07 NOTE — CONSULTS
[facility] - Intensive Care  Pulmonology  Consult Note    Patient Name: Abelardo Irene Jr.  MRN: [id number]  Admission Date: 3/6/2023  Hospital Length of Stay: 0 days  Code Status: Full Code  Attending Physician: Iron Bledsoe MD  Primary Care Provider: Rolando Funes MD   Principal Problem: Acute respiratory failure with hypoxia and hypercarbia  Subjective:     HPI:  Mr. Abelardo Irene Jr. is a 67 y.o male with a PMHx of HTN, COPD, CHF, CAD, and pre-DM, that presents to the ED via EMS for evaluation of respiratory distress beginning last night. Per EMS, patient had complaints of shortness of breath throughout the night that progressively exacerbated. EMS endorses patient used his albuterol rescue inhaler with no immediate relief noted. On arrival, EMS reports patient had an SpO2 of 49% RA which improved with 1.5 solumedrol IN en route with increased stats to 93%. No other medications taken PTA. No alleviating or exacerbating factors noted.     In the ED, he was trialed on BiPAP with nebulizing treatments. His ABG showed a significant respiratory acidosis 7.21/108. CXR with vascular congestion and pulmonary edema. His mental status worsened and he was ultimately intubated and admitted to the ICU. Pulmonary was consulted for ventilator management.         Past Medical History:   Diagnosis Date    CHF (congestive heart failure)     COPD (chronic obstructive pulmonary disease)     Coronary artery disease     Elevated cholesterol     on medication    Hypertension        Past Surgical History:   Procedure Laterality Date    CARDIAC SURGERY      coronary stent placed    CORONARY STENT PLACEMENT         Review of patient's allergies indicates:   Allergen Reactions    Contrast media Shortness Of Breath, Itching and Anxiety     Patient states that he had a bad reaction, anxiety , shortness of breath, itching .        Family History       Problem Relation (Age of Onset)    Cancer Mother    No Known Problems Father           Tobacco Use    Smoking status: Former     Packs/day: 2.00     Years: 45.00     Pack years: 90.00     Types: Cigarettes     Quit date: 2017     Years since quittin.3    Smokeless tobacco: Never   Substance and Sexual Activity    Alcohol use: Yes     Comment: socially    Drug use: No    Sexual activity: Yes     Partners: Female     Birth control/protection: None         Review of Systems   Unable to perform ROS: Intubated   Objective:     Vital Signs (Most Recent):  Temp: 98.4 °F (36.9 °C) (23)  Pulse: 68 (23)  Resp: (!) 26 (23)  BP: (!) 115/54 (23)  SpO2: 96 % (23)   Vital Signs (24h Range):  Temp:  [97.1 °F (36.2 °C)-99.5 °F (37.5 °C)] 98.4 °F (36.9 °C)  Pulse:  [] 68  Resp:  [12-49] 26  SpO2:  [93 %-100 %] 96 %  BP: (100-159)/(54-89) 115/54     Weight: 86.3 kg (190 lb 4.1 oz)  Body mass index is 27.3 kg/m².      Intake/Output Summary (Last 24 hours) at 3/6/2023 2119  Last data filed at 3/6/2023 1921  Gross per 24 hour   Intake 843.78 ml   Output 1930 ml   Net -1086.22 ml       Physical Exam  Constitutional:       Appearance: He is ill-appearing. He is not diaphoretic.      Interventions: He is sedated, intubated and restrained.   HENT:      Head: Normocephalic and atraumatic.   Eyes:      General:         Right eye: No discharge.         Left eye: No discharge.      Conjunctiva/sclera: Conjunctivae normal.   Cardiovascular:      Rate and Rhythm: Normal rate and regular rhythm.      Pulses: Normal pulses.   Pulmonary:      Effort: He is intubated.      Breath sounds: Wheezing present. No rhonchi.   Abdominal:      General: Bowel sounds are normal.      Palpations: Abdomen is soft.   Genitourinary:     Comments: Brown in place.   Musculoskeletal:         General: No swelling, deformity or signs of injury.   Skin:     General: Skin is warm and dry.      Capillary Refill: Capillary refill takes less than 2 seconds.   Neurological:       Comments: Pinpoint pupils. + cough, gag, and corneal reflexes. No response to pain X 4 extremities.        Vents:  Vent Mode: PRVC (03/06/23 1903)  Ventilator Initiated: Yes (03/06/23 0622)  Set Rate: 26 BPM (03/06/23 1907)  Vt Set: 450 mL (03/06/23 1907)  PEEP/CPAP: 5 cmH20 (03/06/23 1907)  Oxygen Concentration (%): 35 (03/06/23 2105)  Peak Airway Pressure: 27.1 cmH20 (03/06/23 1907)  Total Ve: 11.8 L/m (03/06/23 1907)  F/VT Ratio<105 (RSBI): (!) 57.02 (03/06/23 1907)    Lines/Drains/Airways       Drain  Duration                  NG/OG Tube 03/06/23 0555 [place] sump 18 Fr. Center mouth <1 day         Urethral Catheter 03/06/23 0555 [place] 16 Fr. <1 day              Airway  Duration                  Airway - Non-Surgical 03/06/23 0546 <1 day              Peripheral Intravenous Line  Duration                  Peripheral IV - Single Lumen 03/06/23 0509 20 G Distal;Posterior;Right Wrist <1 day         Peripheral IV - Single Lumen 03/06/23 0510 18 G Left Antecubital <1 day         Peripheral IV - Single Lumen 03/06/23 0740 20 G Left;Posterior Hand <1 day                    Significant Labs:    CBC/Anemia Profile:  Recent Labs   Lab 03/06/23  0525   WBC 15.75*   HGB 12.2*   HCT 39.6*      MCV 92   RDW 13.1        Chemistries:  Recent Labs   Lab 03/06/23  0525      K 3.7   CL 94*   CO2 35*   BUN 6*   CREATININE 0.8   CALCIUM 9.0   ALBUMIN 3.7   PROT 7.8   BILITOT 0.4   ALKPHOS 92   ALT 17   AST 16   MG 2.1       All pertinent labs within the past 24 hours have been reviewed.    Significant Imaging:   I have reviewed all pertinent imaging results/findings within the past 24 hours.      ABG  Recent Labs   Lab 03/06/23  1247   PH 7.389   PO2 86   PCO2 58.6*   HCO3 35.4*   BE 9     Assessment/Plan:     Pulmonary  * Acute respiratory failure with hypoxia and hypercarbia  Likely combination of CHF and COPD exacerbations. CXR with no focal consolidation, vascular congestion, and pulmonary edema. BNP >1,000. WBC mildly  elevated. Wheezing on exam. Known emphysema and PREETI with home CPAP. COVID negative.   - maintain LPV. Wean for SpO2 >88%  - continue scheduled duonebs q4h  - continue steroids for 5 days; transition to prednisone 40mg daily when able to tolerated PO  - CAP coverage with rocephin X 5 days and azithro X 3 days  - check procal; f/u sputum culture  - continue aggressive diuresis  - daily evaluation for SAT/SBT    COPD (chronic obstructive pulmonary disease)  HX of COPD. PFT in 2017 with moderate restriction with reduced DLCO.  Previous CT chest imaging with emphysema.    - treatment discussed in respiratory failure section    Cardiac/Vascular  Coronary artery disease involving native coronary artery of native heart without angina pectoris  COntinue ASA, plavix    Dyslipidemia  Continue statin    Benign essential hypertension  Continue home antihypertensives as tolerated    Endocrine  Uncontrolled type 2 diabetes mellitus with hyperglycemia  Hold oral antidiabetics while inpatient.   - SSI  - BG goal 140-180    Other  Tobacco abuse   Discuss need for smoking cessation when able.    Plan discussed with Dr. Leroy.    Critical Care Time: 65 minutes  Critical care was time spent personally by me on the following activities: development of treatment plan with patient or surrogate and bedside caregivers, discussions with consultants, evaluation of patient's response to treatment, examination of patient, ordering and performing treatments and interventions, ordering and review of laboratory studies, ordering and review of radiographic studies, pulse oximetry, re-evaluation of patient's condition. This critical care time did not overlap with that of any other provider or involve time for any procedures.      Thank you for your consult. I will follow-up with patient. Please contact us if you have any additional questions.     Lydia Falcon NP  Pulmonology  [facility] - Intensive Care

## 2023-03-07 NOTE — HPI
HPI: 68 y/o male with a PMHx of HTN, COPD, CHF, CAD presents to the ER via EMS for evaluation of respiratory distress beginning last night.  Patient is currently intubated and unable to give history.  Patient complained of shortness of breath throughout the night that progressively worsened.  No improvement of symptoms with use of his inhaler.  Patient noted to have SpO2 of 49% RA on EMS evaluation.  EMS administered 1.5 solumedrol IN en route with increased on his stats to 93%.  No alleviating or exacerbating factors noted. Patient noted to be in respiratory distress and placed on Bipap. No improvement with Bipap and patient eventually intubated. Unable to obtain any further history.

## 2023-03-07 NOTE — CONSULTS
Palliative consult for [facility]. Known to Palliative. Patient currently intubated. Failed SBT but overall improving. Spouse Lillian also in hospital here in room 422.   Last seen by Palliative last year and at the time wanted his spouse Lillian even though , to make decisions if he could not. Will f/u with tomorrow to see if patient able to participate.

## 2023-03-08 PROBLEM — Z51.5 PALLIATIVE CARE ENCOUNTER: Status: ACTIVE | Noted: 2022-04-12

## 2023-03-08 LAB
ANION GAP SERPL CALC-SCNC: 13 MMOL/L (ref 8–16)
ASCENDING AORTA: 3.28 CM
AV INDEX (PROSTH): 0.69
AV MEAN GRADIENT: 3 MMHG
AV PEAK GRADIENT: 6 MMHG
AV VALVE AREA: 3.25 CM2
AV VELOCITY RATIO: 0.64
BASOPHILS # BLD AUTO: 0.02 K/UL (ref 0–0.2)
BASOPHILS NFR BLD: 0.1 % (ref 0–1.9)
BSA FOR ECHO PROCEDURE: 2.06 M2
BUN SERPL-MCNC: 27 MG/DL (ref 8–23)
CALCIUM SERPL-MCNC: 9.1 MG/DL (ref 8.7–10.5)
CHLORIDE SERPL-SCNC: 96 MMOL/L (ref 95–110)
CO2 SERPL-SCNC: 33 MMOL/L (ref 23–29)
CREAT SERPL-MCNC: 0.9 MG/DL (ref 0.5–1.4)
CV ECHO LV RWT: 0.42 CM
DIFFERENTIAL METHOD: ABNORMAL
DOP CALC AO PEAK VEL: 1.25 M/S
DOP CALC AO VTI: 21.3 CM
DOP CALC LVOT AREA: 4.7 CM2
DOP CALC LVOT DIAMETER: 2.44 CM
DOP CALC LVOT PEAK VEL: 0.8 M/S
DOP CALC LVOT STROKE VOLUME: 69.17 CM3
DOP CALCLVOT PEAK VEL VTI: 14.8 CM
E WAVE DECELERATION TIME: 93.75 MSEC
E/A RATIO: 0.7
E/E' RATIO: 17.75 M/S
ECHO LV POSTERIOR WALL: 1.21 CM (ref 0.6–1.1)
EJECTION FRACTION: 30 %
EOSINOPHIL # BLD AUTO: 0 K/UL (ref 0–0.5)
EOSINOPHIL NFR BLD: 0 % (ref 0–8)
ERYTHROCYTE [DISTWIDTH] IN BLOOD BY AUTOMATED COUNT: 13.7 % (ref 11.5–14.5)
EST. GFR  (NO RACE VARIABLE): >60 ML/MIN/1.73 M^2
FRACTIONAL SHORTENING: 7 % (ref 28–44)
GLUCOSE SERPL-MCNC: 215 MG/DL (ref 70–110)
HCT VFR BLD AUTO: 37 % (ref 40–54)
HGB BLD-MCNC: 11.9 G/DL (ref 14–18)
IMM GRANULOCYTES # BLD AUTO: 0.16 K/UL (ref 0–0.04)
IMM GRANULOCYTES NFR BLD AUTO: 0.7 % (ref 0–0.5)
INTERVENTRICULAR SEPTUM: 1.15 CM (ref 0.6–1.1)
IVC DIAMETER: 22 CM
IVRT: 123.69 MSEC
LA MAJOR: 6.8 CM
LA MINOR: 5.31 CM
LA WIDTH: 5.3 CM
LEFT ATRIUM SIZE: 5.83 CM
LEFT ATRIUM VOLUME INDEX: 76.8 ML/M2
LEFT ATRIUM VOLUME: 156.62 CM3
LEFT INTERNAL DIMENSION IN SYSTOLE: 5.42 CM (ref 2.1–4)
LEFT VENTRICLE DIASTOLIC VOLUME INDEX: 82.73 ML/M2
LEFT VENTRICLE DIASTOLIC VOLUME: 168.77 ML
LEFT VENTRICLE MASS INDEX: 144 G/M2
LEFT VENTRICLE SYSTOLIC VOLUME INDEX: 69.9 ML/M2
LEFT VENTRICLE SYSTOLIC VOLUME: 142.58 ML
LEFT VENTRICULAR INTERNAL DIMENSION IN DIASTOLE: 5.83 CM (ref 3.5–6)
LEFT VENTRICULAR MASS: 292.8 G
LV LATERAL E/E' RATIO: 17.75 M/S
LV SEPTAL E/E' RATIO: 17.75 M/S
LVOT MG: 1.33 MMHG
LVOT MV: 0.54 CM/S
LYMPHOCYTES # BLD AUTO: 0.6 K/UL (ref 1–4.8)
LYMPHOCYTES NFR BLD: 2.7 % (ref 18–48)
MAGNESIUM SERPL-MCNC: 2.4 MG/DL (ref 1.6–2.6)
MCH RBC QN AUTO: 28.3 PG (ref 27–31)
MCHC RBC AUTO-ENTMCNC: 32.2 G/DL (ref 32–36)
MCV RBC AUTO: 88 FL (ref 82–98)
MONOCYTES # BLD AUTO: 0.4 K/UL (ref 0.3–1)
MONOCYTES NFR BLD: 1.9 % (ref 4–15)
MV PEAK A VEL: 1.01 M/S
MV PEAK E VEL: 0.71 M/S
MV STENOSIS PRESSURE HALF TIME: 27.19 MS
MV VALVE AREA P 1/2 METHOD: 8.09 CM2
NEUTROPHILS # BLD AUTO: 20.7 K/UL (ref 1.8–7.7)
NEUTROPHILS NFR BLD: 94.6 % (ref 38–73)
NRBC BLD-RTO: 0 /100 WBC
PHOSPHATE SERPL-MCNC: 3.8 MG/DL (ref 2.7–4.5)
PISA TR MAX VEL: 1.53 M/S
PLATELET # BLD AUTO: 217 K/UL (ref 150–450)
PMV BLD AUTO: 10.7 FL (ref 9.2–12.9)
POCT GLUCOSE: 148 MG/DL (ref 70–110)
POCT GLUCOSE: 184 MG/DL (ref 70–110)
POCT GLUCOSE: 201 MG/DL (ref 70–110)
POCT GLUCOSE: 235 MG/DL (ref 70–110)
POCT GLUCOSE: 246 MG/DL (ref 70–110)
POTASSIUM SERPL-SCNC: 4 MMOL/L (ref 3.5–5.1)
PV PEAK VELOCITY: 0.75 CM/S
RA MAJOR: 6.17 CM
RA PRESSURE: 15 MMHG
RA WIDTH: 4.14 CM
RBC # BLD AUTO: 4.2 M/UL (ref 4.6–6.2)
RIGHT VENTRICULAR END-DIASTOLIC DIMENSION: 3.1 CM
SINUS: 3.32 CM
SODIUM SERPL-SCNC: 142 MMOL/L (ref 136–145)
STJ: 2.77 CM
TDI LATERAL: 0.04 M/S
TDI SEPTAL: 0.04 M/S
TDI: 0.04 M/S
TR MAX PG: 9 MMHG
TRICUSPID ANNULAR PLANE SYSTOLIC EXCURSION: 3.03 CM
TV REST PULMONARY ARTERY PRESSURE: 24 MMHG
WBC # BLD AUTO: 21.86 K/UL (ref 3.9–12.7)

## 2023-03-08 PROCEDURE — 36415 COLL VENOUS BLD VENIPUNCTURE: CPT | Performed by: HOSPITALIST

## 2023-03-08 PROCEDURE — 25000242 PHARM REV CODE 250 ALT 637 W/ HCPCS: Performed by: NURSE PRACTITIONER

## 2023-03-08 PROCEDURE — 99223 1ST HOSP IP/OBS HIGH 75: CPT | Mod: ,,, | Performed by: NURSE PRACTITIONER

## 2023-03-08 PROCEDURE — 99900035 HC TECH TIME PER 15 MIN (STAT)

## 2023-03-08 PROCEDURE — 94640 AIRWAY INHALATION TREATMENT: CPT

## 2023-03-08 PROCEDURE — 25000003 PHARM REV CODE 250: Performed by: NURSE PRACTITIONER

## 2023-03-08 PROCEDURE — 85025 COMPLETE CBC W/AUTO DIFF WBC: CPT | Performed by: HOSPITALIST

## 2023-03-08 PROCEDURE — 63600175 PHARM REV CODE 636 W HCPCS: Performed by: HOSPITALIST

## 2023-03-08 PROCEDURE — 27000190 HC CPAP FULL FACE MASK W/VALVE

## 2023-03-08 PROCEDURE — 84100 ASSAY OF PHOSPHORUS: CPT | Performed by: HOSPITALIST

## 2023-03-08 PROCEDURE — 99223 PR INITIAL HOSPITAL CARE,LEVL III: ICD-10-PCS | Mod: ,,, | Performed by: NURSE PRACTITIONER

## 2023-03-08 PROCEDURE — 94660 CPAP INITIATION&MGMT: CPT | Mod: XB

## 2023-03-08 PROCEDURE — 83735 ASSAY OF MAGNESIUM: CPT | Performed by: HOSPITALIST

## 2023-03-08 PROCEDURE — 94003 VENT MGMT INPAT SUBQ DAY: CPT

## 2023-03-08 PROCEDURE — 99291 PR CRITICAL CARE, E/M 30-74 MINUTES: ICD-10-PCS | Mod: ,,, | Performed by: NURSE PRACTITIONER

## 2023-03-08 PROCEDURE — 25000003 PHARM REV CODE 250: Performed by: EMERGENCY MEDICINE

## 2023-03-08 PROCEDURE — 99291 CRITICAL CARE FIRST HOUR: CPT | Mod: ,,, | Performed by: NURSE PRACTITIONER

## 2023-03-08 PROCEDURE — 99900026 HC AIRWAY MAINTENANCE (STAT)

## 2023-03-08 PROCEDURE — 25000003 PHARM REV CODE 250: Performed by: HOSPITALIST

## 2023-03-08 PROCEDURE — 94761 N-INVAS EAR/PLS OXIMETRY MLT: CPT

## 2023-03-08 PROCEDURE — 27000221 HC OXYGEN, UP TO 24 HOURS

## 2023-03-08 PROCEDURE — 80048 BASIC METABOLIC PNL TOTAL CA: CPT | Performed by: HOSPITALIST

## 2023-03-08 PROCEDURE — 20000000 HC ICU ROOM

## 2023-03-08 PROCEDURE — 25000242 PHARM REV CODE 250 ALT 637 W/ HCPCS: Performed by: HOSPITALIST

## 2023-03-08 PROCEDURE — 63600175 PHARM REV CODE 636 W HCPCS: Mod: TB,JG | Performed by: NURSE PRACTITIONER

## 2023-03-08 RX ORDER — INSULIN ASPART 100 [IU]/ML
1-10 INJECTION, SOLUTION INTRAVENOUS; SUBCUTANEOUS
Status: DISCONTINUED | OUTPATIENT
Start: 2023-03-08 | End: 2023-03-11 | Stop reason: HOSPADM

## 2023-03-08 RX ORDER — CEFEPIME HYDROCHLORIDE 1 G/50ML
2 INJECTION, SOLUTION INTRAVENOUS
Status: DISCONTINUED | OUTPATIENT
Start: 2023-03-08 | End: 2023-03-11 | Stop reason: HOSPADM

## 2023-03-08 RX ORDER — IPRATROPIUM BROMIDE AND ALBUTEROL SULFATE 2.5; .5 MG/3ML; MG/3ML
3 SOLUTION RESPIRATORY (INHALATION) EVERY 6 HOURS
Status: DISCONTINUED | OUTPATIENT
Start: 2023-03-08 | End: 2023-03-09

## 2023-03-08 RX ORDER — PREDNISONE 20 MG/1
40 TABLET ORAL DAILY
Status: COMPLETED | OUTPATIENT
Start: 2023-03-09 | End: 2023-03-10

## 2023-03-08 RX ADMIN — AZITHROMYCIN MONOHYDRATE 500 MG: 500 INJECTION, POWDER, LYOPHILIZED, FOR SOLUTION INTRAVENOUS at 07:03

## 2023-03-08 RX ADMIN — IPRATROPIUM BROMIDE AND ALBUTEROL SULFATE 3 ML: 2.5; .5 SOLUTION RESPIRATORY (INHALATION) at 02:03

## 2023-03-08 RX ADMIN — DEXMEDETOMIDINE HYDROCHLORIDE 1 MCG/KG/HR: 4 INJECTION, SOLUTION INTRAVENOUS at 03:03

## 2023-03-08 RX ADMIN — ATORVASTATIN CALCIUM 40 MG: 40 TABLET, FILM COATED ORAL at 08:03

## 2023-03-08 RX ADMIN — MUPIROCIN: 20 OINTMENT TOPICAL at 09:03

## 2023-03-08 RX ADMIN — FAMOTIDINE 20 MG: 10 INJECTION INTRAVENOUS at 08:03

## 2023-03-08 RX ADMIN — DEXMEDETOMIDINE HYDROCHLORIDE 0.8 MCG/KG/HR: 4 INJECTION, SOLUTION INTRAVENOUS at 11:03

## 2023-03-08 RX ADMIN — CLOPIDOGREL BISULFATE 75 MG: 75 TABLET, FILM COATED ORAL at 08:03

## 2023-03-08 RX ADMIN — FUROSEMIDE 20 MG: 10 INJECTION, SOLUTION INTRAMUSCULAR; INTRAVENOUS at 05:03

## 2023-03-08 RX ADMIN — CEFEPIME HYDROCHLORIDE 2 G: 2 INJECTION, SOLUTION INTRAVENOUS at 11:03

## 2023-03-08 RX ADMIN — INSULIN ASPART 4 UNITS: 100 INJECTION, SOLUTION INTRAVENOUS; SUBCUTANEOUS at 04:03

## 2023-03-08 RX ADMIN — IPRATROPIUM BROMIDE AND ALBUTEROL SULFATE 3 ML: 2.5; .5 SOLUTION RESPIRATORY (INHALATION) at 07:03

## 2023-03-08 RX ADMIN — MUPIROCIN: 20 OINTMENT TOPICAL at 08:03

## 2023-03-08 RX ADMIN — CEFEPIME HYDROCHLORIDE 2 G: 2 INJECTION, SOLUTION INTRAVENOUS at 06:03

## 2023-03-08 RX ADMIN — CEFTRIAXONE 2 G: 2 INJECTION, SOLUTION INTRAVENOUS at 07:03

## 2023-03-08 RX ADMIN — Medication 100 MCG/HR: at 03:03

## 2023-03-08 RX ADMIN — METHYLPREDNISOLONE SODIUM SUCCINATE 80 MG: 125 INJECTION, POWDER, FOR SOLUTION INTRAMUSCULAR; INTRAVENOUS at 05:03

## 2023-03-08 RX ADMIN — CHLORHEXIDINE GLUCONATE 0.12% ORAL RINSE 15 ML: 1.2 LIQUID ORAL at 08:03

## 2023-03-08 RX ADMIN — INSULIN ASPART 4 UNITS: 100 INJECTION, SOLUTION INTRAVENOUS; SUBCUTANEOUS at 11:03

## 2023-03-08 RX ADMIN — IPRATROPIUM BROMIDE AND ALBUTEROL SULFATE 3 ML: 2.5; .5 SOLUTION RESPIRATORY (INHALATION) at 12:03

## 2023-03-08 RX ADMIN — DEXMEDETOMIDINE HYDROCHLORIDE 1 MCG/KG/HR: 4 INJECTION, SOLUTION INTRAVENOUS at 06:03

## 2023-03-08 RX ADMIN — IPRATROPIUM BROMIDE AND ALBUTEROL SULFATE 3 ML: 2.5; .5 SOLUTION RESPIRATORY (INHALATION) at 04:03

## 2023-03-08 RX ADMIN — FAMOTIDINE 20 MG: 10 INJECTION INTRAVENOUS at 09:03

## 2023-03-08 RX ADMIN — ENOXAPARIN SODIUM 40 MG: 40 INJECTION SUBCUTANEOUS at 04:03

## 2023-03-08 RX ADMIN — FUROSEMIDE 20 MG: 10 INJECTION, SOLUTION INTRAMUSCULAR; INTRAVENOUS at 04:03

## 2023-03-08 RX ADMIN — ASPIRIN 81 MG: 81 TABLET, COATED ORAL at 08:03

## 2023-03-08 NOTE — ASSESSMENT & PLAN NOTE
Likely combination of CHF and COPD exacerbations. CXR with no focal consolidation, vascular congestion, and pulmonary edema. BNP >1,000. WBC mildly elevated. Wheezing on exam. Known emphysema and PREETI with home CPAP. COVID negative.   - Extubate to BiPAP X4 hours, then continue BiPAP qhs   - Wean for SpO2 >88%  - continue scheduled duonebs   - continue steroids for 5 days; transition to prednisone 40mg daily when able to tolerated PO  - CAP coverage with rocephin X 5 days and azithro X 3 days. Broadened  to cefepime 3/8  - elevated procal; f/u sputum culture  - continue aggressive diuresis

## 2023-03-08 NOTE — NURSING
[facility] Intensive Care  ICU Shift Summary  Date: 3/8/2023      Prehospitalization: Home  Admit Date / LOS : 3/6/2023/ 2 days    Diagnosis: Acute respiratory failure with hypoxia and hypercarbia    Consults:        Active: Palliative, Pulm CC, and Spiritual Care       Needed: N/A     Code Status: Full Code   Advanced Directive: <no information>    LDA:  Lines/Drains/Airways       Drain  Duration                  NG/OG Tube 03/06/23 0555 [place] sump 18 Fr. Center mouth 1 day         Urethral Catheter 03/06/23 0555 Latex 16 Fr. 1 day              Airway  Duration                  Airway - Non-Surgical 03/06/23 0546 1 day              Peripheral Intravenous Line  Duration                  Peripheral IV - Single Lumen 03/06/23 0509 20 G Distal;Posterior;Right Wrist 1 day         Peripheral IV - Single Lumen 03/06/23 0510 18 G Left Antecubital 1 day         Peripheral IV - Single Lumen 03/06/23 0740 20 G Left;Posterior Hand 1 day                  Central Lines/Site/Justification:Patient Does Not Have Central Line  Urinary Cath/Order/Justification:Critically Ill in the ICU and requiring intensive monitoring    Vasopressors/Infusions:    dexmedeTOMIDine (Precedex) infusion (titrating) 1 mcg/kg/hr (03/08/23 0305)    fentanyl 100 mcg/hr (03/08/23 0303)          GOALS: Volume/ Hemodynamic: MAP >65                     RASS: 0  alert and calm    Pain Management: IV       Pain Controlled: yes     Rhythm: NSR    Respiratory Device: Vent    Vent Mode: PCV  Oxygen Concentration (%):  [25-35] 35  Resp Rate Total:  [6 br/min-29 br/min] 19 br/min  Vt Set:  [450 mL] 450 mL  PEEP/CPAP:  [5 cmH20] 5 cmH20  Pressure Support:  [12 cmH20-15 cmH20] 12 cmH20  Mean Airway Pressure:  [7.7 hbS32-31.2 cmH20] 8.2 cmH20             Most Recent SBT/ SAT: Fail       MOVE Screen: FAIL  ICU Liberation: no    VTE Prophylaxis: Pharm  Mobility: Bedrest  Stress Ulcer Prophylaxis: Yes    Isolation: No active isolations    Dietary:   Current Diet Order    Procedures    Diet NPO      Tolerance: not applicable  Advancement: no    I & O (24h):    Intake/Output Summary (Last 24 hours) at 3/8/2023 0456  Last data filed at 3/8/2023 0453  Gross per 24 hour   Intake 1782 ml   Output 1050 ml   Net 732 ml        Restraints: Yes    Significant Dates:  Post Op Date: N/A  Rescue Date: N/A  Imaging/ Diagnostics:  See chart    Noteworthy Labs:  See chart    COVID Test: (--)  CBC/Anemia Labs: Coags:    Recent Labs   Lab 03/07/23  0451 03/08/23  0357   WBC 14.12* 21.86*   HGB 10.7* 11.9*   HCT 32.7* 37.0*    217   MCV 88 88   RDW 13.3 13.7    No results for input(s): PT, INR, APTT in the last 168 hours.     Chemistries:   Recent Labs   Lab 03/06/23  0525 03/07/23  0451 03/07/23  1317    142 142   K 3.7 2.8* 3.1*   CL 94* 94* 92*   CO2 35* 31* 39*   BUN 6* 18 22   CREATININE 0.8 1.1 1.0   CALCIUM 9.0 9.0 8.9   PROT 7.8  --   --    BILITOT 0.4  --   --    ALKPHOS 92  --   --    ALT 17  --   --    AST 16  --   --    MG 2.1 2.1  --    PHOS  --  2.3*  --         Cardiac Enzymes: Ejection Fractions:    Recent Labs     03/06/23  1832 03/07/23  0227   TROPONINI 0.044* 0.040*    EF   Date Value Ref Range Status   04/12/2022 40 % Final        POCT Glucose: HbA1c:    Recent Labs   Lab 03/07/23  1340 03/07/23  1752 03/08/23  0011   POCTGLUCOSE 236* 222* 184*    Hemoglobin A1C   Date Value Ref Range Status   03/06/2023 7.7 (H) 4.0 - 5.6 % Final     Comment:     ADA Screening Guidelines:  5.7-6.4%  Consistent with prediabetes  >or=6.5%  Consistent with diabetes    High levels of fetal hemoglobin interfere with the HbA1C  assay. Heterozygous hemoglobin variants (HbS, HgC, etc)do  not significantly interfere with this assay.   However, presence of multiple variants may affect accuracy.             ICU LOS 1d 19h  Level of Care: Critical Care    Chart Check: 24 HR Done  Shift Summary/Plan for the shift: Pt remains in ICU intubated and sedated.  Increased agitation overnight and  precedex was increased to 1.  VVS. Adequate UOP. Able to OE spontaneously and [place] to command.

## 2023-03-08 NOTE — SUBJECTIVE & OBJECTIVE
Interval History: Passed SBT this morning; extubate to BiPAP. WBC rising with left shift; ceftriaxone changed to cefepime.     Objective:     Vital Signs (Most Recent):  Temp: 98.8 °F (37.1 °C) (03/08/23 1000)  Pulse: 74 (03/08/23 1000)  Resp: 13 (03/08/23 1000)  BP: 129/71 (03/08/23 1000)  SpO2: 95 % (03/08/23 1000) Vital Signs (24h Range):  Temp:  [97.2 °F (36.2 °C)-98.8 °F (37.1 °C)] 98.8 °F (37.1 °C)  Pulse:  [] 74  Resp:  [10-39] 13  SpO2:  [90 %-99 %] 95 %  BP: (111-155)/(61-92) 129/71     Weight: 86.3 kg (190 lb 4.1 oz)  Body mass index is 27.3 kg/m².      Intake/Output Summary (Last 24 hours) at 3/8/2023 1024  Last data filed at 3/8/2023 1000  Gross per 24 hour   Intake 2123.67 ml   Output 3200 ml   Net -1076.33 ml       Physical Exam  Constitutional:       Appearance: He is ill-appearing. He is not diaphoretic.      Interventions: He is sedated, intubated and restrained.   HENT:      Head: Normocephalic and atraumatic.   Eyes:      General:         Right eye: No discharge.         Left eye: No discharge.      Conjunctiva/sclera: Conjunctivae normal.   Cardiovascular:      Rate and Rhythm: Normal rate and regular rhythm.      Pulses: Normal pulses.   Pulmonary:      Effort: He is intubated.      Breath sounds: Wheezing (much improved) present. No rhonchi.   Abdominal:      General: Bowel sounds are normal.      Palpations: Abdomen is soft.   Genitourinary:     Comments: Brown in place.   Musculoskeletal:         General: No swelling, deformity or signs of injury.   Skin:     General: Skin is warm and dry.      Capillary Refill: Capillary refill takes less than 2 seconds.   Neurological:      Comments: Follows commands off sedation.      Review of Systems   Unable to perform ROS: Intubated     Vents:  Vent Mode: PS/CPAP (03/08/23 0920)  Ventilator Initiated: Yes (03/06/23 0622)  Set Rate: 16 BPM (03/08/23 0757)  Vt Set: 450 mL (03/07/23 0726)  Pressure Support: 5 cmH20 (03/08/23 0920)  PEEP/CPAP: 5  cmH20 (03/08/23 0920)  Oxygen Concentration (%): 35 (03/08/23 1000)  Peak Airway Pressure: 10.3 cmH20 (03/08/23 0920)  Total Ve: 6.2 L/m (03/08/23 0920)  F/VT Ratio<105 (RSBI): (!) 28.72 (03/08/23 0920)    Lines/Drains/Airways       Drain  Duration                  NG/OG Tube 03/06/23 0555 [place] sump 18 Fr. Center mouth 2 days         Urethral Catheter 03/06/23 0555 Latex 16 Fr. 2 days              Airway  Duration                  Airway - Non-Surgical 03/06/23 0546 2 days              Peripheral Intravenous Line  Duration                  Peripheral IV - Single Lumen 03/06/23 0509 20 G Distal;Posterior;Right Wrist 2 days         Peripheral IV - Single Lumen 03/06/23 0510 18 G Left Antecubital 2 days         Peripheral IV - Single Lumen 03/06/23 0740 20 G Left;Posterior Hand 2 days                    Significant Labs:    CBC/Anemia Profile:  Recent Labs   Lab 03/07/23  0451 03/08/23  0357   WBC 14.12* 21.86*   HGB 10.7* 11.9*   HCT 32.7* 37.0*    217   MCV 88 88   RDW 13.3 13.7        Chemistries:  Recent Labs   Lab 03/07/23  0451 03/07/23  1317 03/08/23  0357    142 142   K 2.8* 3.1* 4.0   CL 94* 92* 96   CO2 31* 39* 33*   BUN 18 22 27*   CREATININE 1.1 1.0 0.9   CALCIUM 9.0 8.9 9.1   MG 2.1  --  2.4   PHOS 2.3*  --  3.8       All pertinent labs within the past 24 hours have been reviewed.    Significant Imaging:  I have reviewed all pertinent imaging results/findings within the past 24 hours.

## 2023-03-08 NOTE — CONSULTS
2036:    Consult was conducted with the patient's medical provider who reported that the patient received a visit from   his sister today. He also disclosed that the patient's wife is a patient in room 422- MRN#[id number].  Medical Provider indicated that any information  regarding the patient's medical decisions were given by his wif, or his daughters.  Prayers were offered for the patient and his family.  The Spiritual Care Team will continue to provide spiritual support to the patient and his family.        TIARRA Wood   [phone]

## 2023-03-08 NOTE — CARE UPDATE
Pt extubated to Bipap 12/5 30% per order. RN at bedside for extubation. Pt tolerated well, does not appear in any respiratory distress nor report SOB. SpO2 >88%. Will continue to monitor and wean from Bipap post 4hrs from extubation.

## 2023-03-08 NOTE — NURSING
[facility]  Nurses Note -- 4 Eyes      3/8/2023       Skin assessed on: Q Shift      [x] No Pressure Injuries Present    [x]Prevention Measures Documented    [] Yes LDA  for Pressure Injury Previously documented     [] Yes New Pressure Injury Discovered   [] LDA for New Pressure Injury Added      Attending RN:  JUDSON CAMERON RN     Second RN:  CHARLI Ledesma

## 2023-03-08 NOTE — PROGRESS NOTES
[facility] Medicine  Progress Note    Patient Name: Abelardo Irene Jr.  MRN: [id number]  Patient Class: IP- Inpatient   Admission Date: 3/6/2023  Length of Stay: 2 days  Attending Physician: Romeo oLpez MD  Primary Care Provider: Rolando Funes MD        Subjective:     Principal Problem:Acute respiratory failure with hypoxia and hypercarbia        HPI:  68 y/o male with a PMHx of HTN, COPD, CHF, CAD presents to the ER via EMS for evaluation of respiratory distress beginning last night.  Patient is currently intubated and unable to give history.  Patient complained of shortness of breath throughout the night that progressively worsened.  No improvement of symptoms with use of his inhaler.  Patient noted to have SpO2 of 49% RA on EMS evaluation.  EMS administered 1.5 solumedrol IN en route with increased on his stats to 93%.  No alleviating or exacerbating factors noted.  Patient noted to be in respiratory distress and placed on Bipap.  No improvement with Bipap and patient eventually intubated.  Unable to obtain any further history.      Overview/Hospital Course:  68 y/o male with a PMHx of HTN, COPD, CHF wit Ef of 40%,, CAD presents to the ER via EMS for evaluation of respiratory distress,patient was intubated and was unable to give history.  Patient complained of shortness of breath throughout the night that progressively worsened.  No improvement of symptoms with use of his inhaler.  Patient noted to have SpO2 of 49% RA on EMS evaluation.  EMS administered 1.5 solumedrol IN en route with increased on his stats to 93%.  No alleviating or exacerbating factors noted.  Patient noted to be in respiratory distress and placed on Bipap.  No improvement with Bipap and patient eventually intubated.patient is in IV lasix for CHF exacerbation and  nebulizer for COPD exacerbation.pulmonology doing vent. Management.  Pulmonology consider extubate patient today.      Past Medical History:    Diagnosis Date    CHF (congestive heart failure)     COPD (chronic obstructive pulmonary disease)     Coronary artery disease     Elevated cholesterol     on medication    Hypertension        Past Surgical History:   Procedure Laterality Date    CARDIAC SURGERY      coronary stent placed    CORONARY STENT PLACEMENT         Review of patient's allergies indicates:   Allergen Reactions    Contrast media Shortness Of Breath, Itching and Anxiety     Patient states that he had a bad reaction, anxiety , shortness of breath, itching .        No current facility-administered medications on file prior to encounter.     Current Outpatient Medications on File Prior to Encounter   Medication Sig    albuterol (PROVENTIL/VENTOLIN HFA) 90 mcg/actuation inhaler Inhale 1-2 puffs into the lungs every 6 (six) hours as needed for Wheezing. Rescue    amLODIPine (NORVASC) 5 MG tablet Take 1 tablet (5 mg total) by mouth once daily.    aspirin (ECOTRIN) 81 MG EC tablet Take 1 tablet (81 mg total) by mouth once daily.    atorvastatin (LIPITOR) 40 MG tablet Take 1 tablet (40 mg total) by mouth once daily.    clopidogreL (PLAVIX) 75 mg tablet Take 1 tablet (75 mg total) by mouth once daily.    fluticasone-salmeterol diskus inhaler 250-50 mcg Inhale 1 puff into the lungs 2 (two) times daily.    furosemide (LASIX) 40 MG tablet Take 1 tablet (40 mg total) by mouth 2 (two) times daily.    glipiZIDE (GLUCOTROL) 10 MG TR24 Take 1 tablet (10 mg total) by mouth daily with breakfast.    lisinopriL (PRINIVIL,ZESTRIL) 20 MG tablet Take 1 tablet (20 mg total) by mouth once daily.    metFORMIN (GLUCOPHAGE) 500 MG tablet Take 1 tablet (500 mg total) by mouth 2 (two) times daily with meals.    metoprolol succinate (TOPROL-XL) 25 MG 24 hr tablet Take 1 tablet (25 mg total) by mouth once daily.     Family History       Problem Relation (Age of Onset)    Cancer Mother    No Known Problems Father          Tobacco Use    Smoking status:  Former     Packs/day: 2.00     Years: 45.00     Pack years: 90.00     Types: Cigarettes     Quit date: 2017     Years since quittin.3    Smokeless tobacco: Never   Substance and Sexual Activity    Alcohol use: Yes     Comment: socially    Drug use: No    Sexual activity: Yes     Partners: Female     Birth control/protection: None     Review of Systems   Unable to perform ROS: Intubated   Objective:     Vital Signs (Most Recent):  Temp: 98.6 °F (37 °C) (23)  Pulse: 80 (23 09)  Resp: 11 (23)  BP: 134/78 (23 0757)  SpO2: 96 % (23)   Vital Signs (24h Range):  Temp:  [97.2 °F (36.2 °C)-98.6 °F (37 °C)] 98.6 °F (37 °C)  Pulse:  [] 80  Resp:  [10-39] 11  SpO2:  [90 %-99 %] 96 %  BP: (111-155)/(60-92) 134/78     Weight: 86.3 kg (190 lb 4.1 oz)  Body mass index is 27.3 kg/m².    Physical Exam  Constitutional:       Appearance: He is ill-appearing. He is not diaphoretic.   HENT:      Head: Normocephalic and atraumatic.   Eyes:      General:         Right eye: No discharge.         Left eye: No discharge.      Conjunctiva/sclera: Conjunctivae normal.   Cardiovascular:      Rate and Rhythm: Normal rate and regular rhythm.   Pulmonary:      Breath sounds: Rhonchi present.      Comments: Mechanically ventilated  Abdominal:      General: Bowel sounds are normal.      Palpations: Abdomen is soft.   Musculoskeletal:         General: No deformity or signs of injury.   Skin:     General: Skin is warm and dry.   Neurological:      Comments: Sedated           Significant Labs: All pertinent labs within the past 24 hours have been reviewed.  BMP:   Recent Labs   Lab 23  035   *      K 4.0   CL 96   CO2 33*   BUN 27*   CREATININE 0.9   CALCIUM 9.1   MG 2.4       CBC:   Recent Labs   Lab 23  0451 23  0357   WBC 14.12* 21.86*   HGB 10.7* 11.9*   HCT 32.7* 37.0*    217         Significant Imaging: I have reviewed all pertinent imaging  results/findings within the past 24 hours.      Assessment/Plan:      * Acute respiratory failure with hypoxia and hypercarbia  Patient with Hypercapnic and Hypoxic Respiratory failure which is Acute.  he is not on home oxygen. Supplemental oxygen was provided and noted- Vent Mode: PS/CPAP  Oxygen Concentration (%):  [25-35] 35  Resp Rate Total:  [6 br/min-41 br/min] 14 br/min  PEEP/CPAP:  [5 cmH20] 5 cmH20  Pressure Support:  [5 cmH20-15 cmH20] 5 cmH20  Mean Airway Pressure:  [6.1 bgQ59-80.2 cmH20] 6.1 cmH20.   Signs/symptoms of respiratory failure include- respiratory distress. Contributing diagnoses includes - CHF and COPD Labs and images were reviewed. Patient Has recent ABG, which has been reviewed. Will treat underlying causes and adjust management of respiratory failure.  Patient presents with respiratory failure and intubated.  Probably combination of COPD and CHF.  Started on nebs and IV steroids.  Also started on IV diuresis.  Empirically started on ABX's.  Pulmonary consulted for vent management.  patient is in IV lasix for CHF exacerbation and  nebulizer for COPD exacerbation.pulmonology doing vent. Management.  Pulmonology consider extubate patient today.    Uncontrolled type 2 diabetes mellitus with hyperglycemia  Patient's FSGs are uncontrolled due to hyperglycemia on current medication regimen.  Last A1c reviewed-   Lab Results   Component Value Date    HGBA1C 10.1 (H) 04/13/2022     Most recent fingerstick glucose reviewed- No results for input(s): POCTGLUCOSE in the last 24 hours.  Current correctional scale  Medium  Maintain anti-hyperglycemic dose as follows-   Antihyperglycemics (From admission, onward)    Start     Stop Route Frequency Ordered    03/06/23 1323  insulin aspart U-100 pen 1-10 Units         -- SubQ Every 6 hours PRN 03/06/23 1223      Hold Oral hypoglycemics while patient is in the hospital.  Hyperglycemia may worsen with steroids.  Continue to monitor.    Class 1 obesity due to  excess calories with serious comorbidity in adult  Body mass index is 29.41 kg/m². Morbid obesity complicates all aspects of disease management from diagnostic modalities to treatment. Weight loss encouraged and health benefits explained to patient.         Coronary artery disease involving native coronary artery of native heart without angina pectoris  Continue ASA, Plavix and Statin.  No report of chest pain.  Initial Troponin negative.  Trend Troponin.      Dyslipidemia  Continue Statin.      Acute on chronic combined systolic and diastolic heart failure  See above.      COPD (chronic obstructive pulmonary disease)  See above.      Tobacco abuse  Smoking cessation counseling when appropriate.      Benign essential hypertension  Resume home medications with parameters.        VTE Risk Mitigation (From admission, onward)         Ordered     enoxaparin injection 40 mg  Daily         03/06/23 0823     IP VTE HIGH RISK PATIENT  Once         03/06/23 0823     Place sequential compression device  Until discontinued         03/06/23 0823                Discharge Planning   VERA:      Code Status: Full Code   Is the patient medically ready for discharge?:     Reason for patient still in hospital (select all that apply): Patient trending condition  Discharge Plan A:  (tbd)            Critical care time spent on the evaluation and treatment of severe organ dysfunction, review of pertinent labs and imaging studies, discussions with consulting providers and discussions with patient/family: over 45  minutes.      Romeo Lopez MD  Department of Hospital Medicine   [facility] - Intensive Care

## 2023-03-08 NOTE — ASSESSMENT & PLAN NOTE
Diurese as tolerated.    Keystone Flap Text: The defect edges were debeveled with a #15 scalpel blade.  Given the location of the defect, shape of the defect a keystone flap was deemed most appropriate.  Using a sterile surgical marker, an appropriate keystone flap was drawn incorporating the defect, outlining the appropriate donor tissue and placing the expected incisions within the relaxed skin tension lines where possible. The area thus outlined was incised deep to adipose tissue with a #15 scalpel blade.  The skin margins were undermined to an appropriate distance in all directions around the primary defect and laterally outward around the flap utilizing iris scissors.

## 2023-03-08 NOTE — PROGRESS NOTES
[facility] Intensive Care  Pulmonology  Progress Note    Patient Name: Abelardo Irene Jr.  MRN: [id number]  Admission Date: 3/6/2023  Hospital Length of Stay: 2 days  Code Status: Full Code  Attending Provider: Romeo Lopez MD  Primary Care Provider: Rolando Funes MD   Principal Problem: Acute respiratory failure with hypoxia and hypercarbia    Subjective:     Interval History: Passed SBT this morning; extubate to BiPAP. WBC rising with left shift; ceftriaxone changed to cefepime.     Objective:     Vital Signs (Most Recent):  Temp: 98.8 °F (37.1 °C) (03/08/23 1000)  Pulse: 74 (03/08/23 1000)  Resp: 13 (03/08/23 1000)  BP: 129/71 (03/08/23 1000)  SpO2: 95 % (03/08/23 1000) Vital Signs (24h Range):  Temp:  [97.2 °F (36.2 °C)-98.8 °F (37.1 °C)] 98.8 °F (37.1 °C)  Pulse:  [] 74  Resp:  [10-39] 13  SpO2:  [90 %-99 %] 95 %  BP: (111-155)/(61-92) 129/71     Weight: 86.3 kg (190 lb 4.1 oz)  Body mass index is 27.3 kg/m².      Intake/Output Summary (Last 24 hours) at 3/8/2023 1024  Last data filed at 3/8/2023 1000  Gross per 24 hour   Intake 2123.67 ml   Output 3200 ml   Net -1076.33 ml       Physical Exam  Constitutional:       Appearance: He is ill-appearing. He is not diaphoretic.      Interventions: He is sedated, intubated and restrained.   HENT:      Head: Normocephalic and atraumatic.   Eyes:      General:         Right eye: No discharge.         Left eye: No discharge.      Conjunctiva/sclera: Conjunctivae normal.   Cardiovascular:      Rate and Rhythm: Normal rate and regular rhythm.      Pulses: Normal pulses.   Pulmonary:      Effort: He is intubated.      Breath sounds: Wheezing (much improved) present. No rhonchi.   Abdominal:      General: Bowel sounds are normal.      Palpations: Abdomen is soft.   Genitourinary:     Comments: Brown in place.   Musculoskeletal:         General: No swelling, deformity or signs of injury.   Skin:     General: Skin is warm and dry.      Capillary Refill: Capillary  refill takes less than 2 seconds.   Neurological:      Comments: Follows commands off sedation.      Review of Systems   Unable to perform ROS: Intubated     Vents:  Vent Mode: PS/CPAP (03/08/23 0920)  Ventilator Initiated: Yes (03/06/23 0622)  Set Rate: 16 BPM (03/08/23 0757)  Vt Set: 450 mL (03/07/23 0726)  Pressure Support: 5 cmH20 (03/08/23 0920)  PEEP/CPAP: 5 cmH20 (03/08/23 0920)  Oxygen Concentration (%): 35 (03/08/23 1000)  Peak Airway Pressure: 10.3 cmH20 (03/08/23 0920)  Total Ve: 6.2 L/m (03/08/23 0920)  F/VT Ratio<105 (RSBI): (!) 28.72 (03/08/23 0920)    Lines/Drains/Airways       Drain  Duration                  NG/OG Tube 03/06/23 0555 [place] sump 18 Fr. Center mouth 2 days         Urethral Catheter 03/06/23 0555 Latex 16 Fr. 2 days              Airway  Duration                  Airway - Non-Surgical 03/06/23 0546 2 days              Peripheral Intravenous Line  Duration                  Peripheral IV - Single Lumen 03/06/23 0509 20 G Distal;Posterior;Right Wrist 2 days         Peripheral IV - Single Lumen 03/06/23 0510 18 G Left Antecubital 2 days         Peripheral IV - Single Lumen 03/06/23 0740 20 G Left;Posterior Hand 2 days                    Significant Labs:    CBC/Anemia Profile:  Recent Labs   Lab 03/07/23  0451 03/08/23  0357   WBC 14.12* 21.86*   HGB 10.7* 11.9*   HCT 32.7* 37.0*    217   MCV 88 88   RDW 13.3 13.7        Chemistries:  Recent Labs   Lab 03/07/23  0451 03/07/23  1317 03/08/23  0357    142 142   K 2.8* 3.1* 4.0   CL 94* 92* 96   CO2 31* 39* 33*   BUN 18 22 27*   CREATININE 1.1 1.0 0.9   CALCIUM 9.0 8.9 9.1   MG 2.1  --  2.4   PHOS 2.3*  --  3.8       All pertinent labs within the past 24 hours have been reviewed.    Significant Imaging:  I have reviewed all pertinent imaging results/findings within the past 24 hours.      ABG  Recent Labs   Lab 03/06/23  1247   PH 7.389   PO2 86   PCO2 58.6*   HCO3 35.4*   BE 9     Assessment/Plan:     Pulmonary  * Acute  respiratory failure with hypoxia and hypercarbia  Likely combination of CHF and COPD exacerbations. CXR with no focal consolidation, vascular congestion, and pulmonary edema. BNP >1,000. WBC mildly elevated. Wheezing on exam. Known emphysema and PREETI with home CPAP. COVID negative.   - Extubate to BiPAP X4 hours, then continue BiPAP qhs   - Wean for SpO2 >88%  - continue scheduled duonebs   - continue steroids for 5 days; transition to prednisone 40mg daily when able to tolerated PO  - CAP coverage with rocephin X 5 days and azithro X 3 days. Broadened  to cefepime 3/8  - elevated procal; f/u sputum culture  - continue aggressive diuresis      COPD (chronic obstructive pulmonary disease)  HX of COPD. PFT in 2017 with moderate restriction with reduced DLCO.  Previous CT chest imaging with emphysema.    - treatment discussed in respiratory failure section    Cardiac/Vascular  Coronary artery disease involving native coronary artery of native heart without angina pectoris  Continue ASA, plavix    Dyslipidemia  Continue statin    Acute on chronic combined systolic and diastolic heart failure  Diurese as tolerated.     Benign essential hypertension  Continue home antihypertensives as tolerated    Endocrine  Uncontrolled type 2 diabetes mellitus with hyperglycemia  Hold oral antidiabetics while inpatient.   - SSI  - BG goal 140-180    Other  Tobacco abuse   Discuss need for smoking cessation when able.    Plan discussed with Dr. Leroy.    Critical Care Time: 45 minutes  Critical care was time spent personally by me on the following activities: development of treatment plan with patient or surrogate and bedside caregivers, discussions with consultants, evaluation of patient's response to treatment, examination of patient, ordering and performing treatments and interventions, ordering and review of laboratory studies, ordering and review of radiographic studies, pulse oximetry, re-evaluation of patient's condition. This  critical care time did not overlap with that of any other provider or involve time for any procedures.    Lydia Falcon NP  Pulmonology  [facility] - Intensive Care

## 2023-03-08 NOTE — CONSULTS
"[facility] [place] - Intensive Care  Palliative Medicine  Consult Note    Patient Name: Abelardo Irene Jr.  MRN: [id number]  Admission Date: 3/6/2023  Hospital Length of Stay: 2 days  Code Status: Full Code   Attending Provider: Romeo Lopez MD  Consulting Provider: Madyson Trinidad NP  Primary Care Physician: Rolando Funes MD  Principal Problem:Acute respiratory failure with hypoxia and hypercarbia    Patient information was obtained from patient, past medical records, ER records and primary team.      Consults  Assessment/Plan:   Palliative encounter:    -Discussed in ICU IDT rounds this am  -Chart reviewed in detail  -patient passed SBT and just extubated now to Bipap  -we did discuss his condition but briefly due to on bipap.  -wants to remain full code   -confirmed he does want his spouse Maria Victoria to be his decision maker as there was some confusion as to it being his sister.  This was corrected under contacts    Acute respiratory failure with hypoxia and hypercarbia  "Patient with Hypercapnic and Hypoxic Respiratory failure which is Acute.  he is not on home oxygen. Supplemental oxygen was provided and noted- Vent Mode: PS/CPAP  Oxygen Concentration (%):  [25-35] 35  Resp Rate Total:  [6 br/min-41 br/min] 14 br/min  PEEP/CPAP:  [5 cmH20] 5 cmH20  Pressure Support:  [5 cmH20-15 cmH20] 5 cmH20  Mean Airway Pressure:  [6.1 hiR59-96.2 cmH20] 6.1 cmH20.   Signs/symptoms of respiratory failure include- respiratory distress. Contributing diagnoses includes - CHF and COPD Labs and images were reviewed. Patient Has recent ABG, which has been reviewed. Will treat underlying causes and adjust management of respiratory failure.  Patient presents with respiratory failure and intubated.  Probably combination of COPD and CHF.   -mgmt per primary  -Pulmonary consulted for vent management.  -patient just extubated today and now on Bipap     Uncontrolled type 2 diabetes mellitus with hyperglycemia  Mgmt per primary      Coronary " artery disease involving native coronary artery of native heart without angina pectoris     Acute on chronic combined systolic and diastolic heart failure  See above.        COPD (chronic obstructive pulmonary disease)  See above.      Class 1 obesity due to excess calories with serious comorbidity in adult  Body mass index is 29.41 kg/m². Morbid obesity complicates all aspects of disease management from diagnostic modalities to treatment. Weight loss encouraged and health benefits explained to patient.               Benign essential hypertension  Resume home medications with parameters.        Dyslipidemia      Thank you for your consult.   Subjective:       HPI: 68 y/o male with a PMHx of HTN, COPD, CHF, CAD presents to the ER via EMS for evaluation of respiratory distress beginning last night.  Patient is currently intubated and unable to give history.  Patient complained of shortness of breath throughout the night that progressively worsened.  No improvement of symptoms with use of his inhaler.  Patient noted to have SpO2 of 49% RA on EMS evaluation.  EMS administered 1.5 solumedrol IN en route with increased on his stats to 93%.  No alleviating or exacerbating factors noted. Patient noted to be in respiratory distress and placed on Bipap. No improvement with Bipap and patient eventually intubated. Unable to obtain any further history.         Hospital Course:  No notes on file        Past Medical History:   Diagnosis Date    CHF (congestive heart failure)     COPD (chronic obstructive pulmonary disease)     Coronary artery disease     Elevated cholesterol     on medication    Hypertension        Past Surgical History:   Procedure Laterality Date    CARDIAC SURGERY      coronary stent placed    CORONARY STENT PLACEMENT         Review of patient's allergies indicates:   Allergen Reactions    Contrast media Shortness Of Breath, Itching and Anxiety     Patient states that he had a bad reaction, anxiety , shortness  of breath, itching .        Medications:  Continuous Infusions:   fentanyl 225 mcg/hr (23)    propofoL 25 mcg/kg/min (23)     Scheduled Meds:   albuterol-ipratropium  3 mL Nebulization Q4H    aspirin  81 mg Oral Daily    atorvastatin  40 mg Oral Daily    azithromycin  500 mg Intravenous Q24H    cefTRIAXone (ROCEPHIN) IVPB  2 g Intravenous Q24H    chlorhexidine  15 mL Mouth/Throat BID    clopidogreL  75 mg Oral Daily    enoxaparin  40 mg Subcutaneous Daily    famotidine (PF)  20 mg Intravenous BID    furosemide (LASIX) injection  20 mg Intravenous Q12H    methylPREDNISolone sodium succinate injection  80 mg Intravenous Q8H    mupirocin   Nasal BID    potassium, sodium phosphates  2 packet Per OG tube BID     PRN Meds:acetaminophen, dextrose 10%, glucagon (human recombinant), insulin aspart U-100, ondansetron, polyethylene glycol, sodium chloride 0.9%    Family History       Problem Relation (Age of Onset)    Cancer Mother    No Known Problems Father          Tobacco Use    Smoking status: Former     Packs/day: 2.00     Years: 45.00     Pack years: 90.00     Types: Cigarettes     Quit date: 2017     Years since quittin.3    Smokeless tobacco: Never   Substance and Sexual Activity    Alcohol use: Yes     Comment: socially    Drug use: No    Sexual activity: Yes     Partners: Female     Birth control/protection: None       Review of Systems   Unable to perform ROS: Acuity of condition   Objective:     Vital Signs (Most Recent):  Temp: 97.3 °F (36.3 °C) (23)  Pulse: 77 (23)  Resp: (!) 26 (23)  BP: (!) 104/52 (23)  SpO2: 99 % (23)   Vital Signs (24h Range):  Temp:  [96.4 °F (35.8 °C)-99.5 °F (37.5 °C)] 97.3 °F (36.3 °C)  Pulse:  [56-82] 77  Resp:  [26] 26  SpO2:  [95 %-100 %] 99 %  BP: (104-131)/(51-73) 104/52     Weight: 86.3 kg (190 lb 4.1 oz)  Body mass index is 27.3 kg/m².    Physical Exam  Vitals and nursing note reviewed.    Constitutional:       General: He is not in acute distress.     Appearance: He is ill-appearing. He is not toxic-appearing or diaphoretic.   HENT:      Head: Normocephalic and atraumatic.      Right Ear: External ear normal.      Left Ear: External ear normal.      Nose: Nose normal.      Mouth/Throat:      Comments: ETT and OGT  Eyes:      General:         Right eye: No discharge.         Left eye: No discharge.   Cardiovascular:      Rate and Rhythm: Normal rate and regular rhythm.   Pulmonary:      Comments: Intubated but awake  Skin:     General: Skin is warm and dry.   Neurological:      Comments:  Intubated but awake, follows commands, nods yes and no           Advance Care Planning   Advance Care Planning       Significant Labs: All pertinent labs within the past 24 hours have been reviewed.  CBC:   Recent Labs   Lab 03/07/23 0451   WBC 14.12*   HGB 10.7*   HCT 32.7*   MCV 88        BMP:  Recent Labs   Lab 03/07/23 0451   *      K 2.8*   CL 94*   CO2 31*   BUN 18   CREATININE 1.1   CALCIUM 9.0   MG 2.1     LFT:  Lab Results   Component Value Date    AST 16 03/06/2023    ALKPHOS 92 03/06/2023    BILITOT 0.4 03/06/2023     Albumin:   Albumin   Date Value Ref Range Status   03/06/2023 3.7 3.5 - 5.2 g/dL Final     Protein:   Total Protein   Date Value Ref Range Status   03/06/2023 7.8 6.0 - 8.4 g/dL Final     Lactic acid:   Lab Results   Component Value Date    LACTATE 1.2 04/12/2022       Significant Imaging: I have reviewed all pertinent imaging results/findings within the past 24 hours. CXR        > 50% of 70 min visit spent in chart review, face to face discussion of goals of care,  symptom assessment, coordination of care and emotional support.    Madyson Trinidad NP  Palliative Medicine  [facility] - Intensive Care

## 2023-03-08 NOTE — ASSESSMENT & PLAN NOTE
Patient with Hypercapnic and Hypoxic Respiratory failure which is Acute.  he is not on home oxygen. Supplemental oxygen was provided and noted- Vent Mode: PS/CPAP  Oxygen Concentration (%):  [25-35] 35  Resp Rate Total:  [6 br/min-41 br/min] 14 br/min  PEEP/CPAP:  [5 cmH20] 5 cmH20  Pressure Support:  [5 cmH20-15 cmH20] 5 cmH20  Mean Airway Pressure:  [6.1 juN53-98.2 cmH20] 6.1 cmH20.   Signs/symptoms of respiratory failure include- respiratory distress. Contributing diagnoses includes - CHF and COPD Labs and images were reviewed. Patient Has recent ABG, which has been reviewed. Will treat underlying causes and adjust management of respiratory failure.  Patient presents with respiratory failure and intubated.  Probably combination of COPD and CHF.  Started on nebs and IV steroids.  Also started on IV diuresis.  Empirically started on ABX's.  Pulmonary consulted for vent management.  patient is in IV lasix for CHF exacerbation and  nebulizer for COPD exacerbation.pulmonology doing vent. Management.  Pulmonology consider extubate patient today.

## 2023-03-08 NOTE — PLAN OF CARE
Problem: Infection  Goal: Absence of Infection Signs and Symptoms  Outcome: Ongoing, Progressing     Problem: Fall Injury Risk  Goal: Absence of Fall and Fall-Related Injury  Outcome: Ongoing, Progressing     Problem: Restraint, Nonbehavioral (Nonviolent)  Goal: Absence of Harm or Injury  Outcome: Ongoing, Progressing     Problem: Adult Inpatient Plan of Care  Goal: Plan of Care Review  Outcome: Ongoing, Progressing  Goal: Patient-Specific Goal (Individualized)  Outcome: Ongoing, Progressing  Goal: Absence of Hospital-Acquired Illness or Injury  Outcome: Ongoing, Progressing  Goal: Optimal Comfort and Wellbeing  Outcome: Ongoing, Progressing  Goal: Readiness for Transition of Care  Outcome: Ongoing, Progressing     Problem: Adjustment to Illness COPD (Chronic Obstructive Pulmonary Disease)  Goal: Optimal Chronic Illness Coping  Outcome: Ongoing, Progressing     Problem: Functional Ability Impaired COPD (Chronic Obstructive Pulmonary Disease)  Goal: Optimal Level of Functional [place]  Outcome: Ongoing, Progressing     Problem: Infection COPD (Chronic Obstructive Pulmonary Disease)  Goal: Absence of Infection Signs and Symptoms  Outcome: Ongoing, Progressing     Problem: Oral Intake Inadequate COPD (Chronic Obstructive Pulmonary Disease)  Goal: Improved Nutrition Intake  Outcome: Ongoing, Progressing     Problem: Respiratory Compromise COPD (Chronic Obstructive Pulmonary Disease)  Goal: Effective Oxygenation and Ventilation  Outcome: Ongoing, Progressing     Problem: Diabetes Comorbidity  Goal: Blood Glucose Level Within Targeted Range  Outcome: Ongoing, Progressing     Problem: Communication Impairment (Mechanical Ventilation, Invasive)  Goal: Effective Communication  Outcome: Ongoing, Progressing     Problem: Device-Related Complication Risk (Mechanical Ventilation, Invasive)  Goal: Optimal Device Function  Outcome: Ongoing, Progressing     Problem: Inability to Wean (Mechanical Ventilation, Invasive)  Goal:  Mechanical Ventilation Liberation  Outcome: Ongoing, Progressing     Problem: Nutrition Impairment (Mechanical Ventilation, Invasive)  Goal: Optimal Nutrition Delivery  Outcome: Ongoing, Progressing     Problem: Skin and Tissue Injury (Mechanical Ventilation, Invasive)  Goal: Absence of Device-Related Skin and Tissue Injury  Outcome: Ongoing, Progressing     Problem: Ventilator-Induced Lung Injury (Mechanical Ventilation, Invasive)  Goal: Absence of Ventilator-Induced Lung Injury  Outcome: Ongoing, Progressing     Problem: Communication Impairment (Artificial Airway)  Goal: Effective Communication  Outcome: Ongoing, Progressing     Problem: Device-Related Complication Risk (Artificial Airway)  Goal: Optimal Device Function  Outcome: Ongoing, Progressing     Problem: Skin and Tissue Injury (Artificial Airway)  Goal: Absence of Device-Related Skin or Tissue Injury  Outcome: Ongoing, Progressing     Problem: Skin Injury Risk Increased  Goal: Skin Health and Integrity  Outcome: Ongoing, Progressing     Problem: Coping Ineffective  Goal: Effective Coping  Outcome: Ongoing, Progressing

## 2023-03-08 NOTE — NURSING
Nurses Note -- 4 Eyes      3/8/2023   5:33 PM      Skin assessed during: Daily Assessment      [x] No Pressure Injuries Present    []Prevention Measures Documented      [] Yes- Altered Skin Integrity Present or Discovered   [] LDA Added if Not in Epic (Describe Wound)   [] New Altered Skin Integrity was Present on Admit and Documented in LDA   [] Wound Image Taken    Wound Care Consulted? No    Attending Nurse:  JIM YEBOAH RN     Second RN/Staff Member: ivory

## 2023-03-08 NOTE — SUBJECTIVE & OBJECTIVE
Past Medical History:   Diagnosis Date    CHF (congestive heart failure)     COPD (chronic obstructive pulmonary disease)     Coronary artery disease     Elevated cholesterol     on medication    Hypertension        Past Surgical History:   Procedure Laterality Date    CARDIAC SURGERY      coronary stent placed    CORONARY STENT PLACEMENT         Review of patient's allergies indicates:   Allergen Reactions    Contrast media Shortness Of Breath, Itching and Anxiety     Patient states that he had a bad reaction, anxiety , shortness of breath, itching .        No current facility-administered medications on file prior to encounter.     Current Outpatient Medications on File Prior to Encounter   Medication Sig    albuterol (PROVENTIL/VENTOLIN HFA) 90 mcg/actuation inhaler Inhale 1-2 puffs into the lungs every 6 (six) hours as needed for Wheezing. Rescue    amLODIPine (NORVASC) 5 MG tablet Take 1 tablet (5 mg total) by mouth once daily.    aspirin (ECOTRIN) 81 MG EC tablet Take 1 tablet (81 mg total) by mouth once daily.    atorvastatin (LIPITOR) 40 MG tablet Take 1 tablet (40 mg total) by mouth once daily.    clopidogreL (PLAVIX) 75 mg tablet Take 1 tablet (75 mg total) by mouth once daily.    fluticasone-salmeterol diskus inhaler 250-50 mcg Inhale 1 puff into the lungs 2 (two) times daily.    furosemide (LASIX) 40 MG tablet Take 1 tablet (40 mg total) by mouth 2 (two) times daily.    glipiZIDE (GLUCOTROL) 10 MG TR24 Take 1 tablet (10 mg total) by mouth daily with breakfast.    lisinopriL (PRINIVIL,ZESTRIL) 20 MG tablet Take 1 tablet (20 mg total) by mouth once daily.    metFORMIN (GLUCOPHAGE) 500 MG tablet Take 1 tablet (500 mg total) by mouth 2 (two) times daily with meals.    metoprolol succinate (TOPROL-XL) 25 MG 24 hr tablet Take 1 tablet (25 mg total) by mouth once daily.     Family History       Problem Relation (Age of Onset)    Cancer Mother    No Known Problems Father          Tobacco Use    Smoking status:  Former     Packs/day: 2.00     Years: 45.00     Pack years: 90.00     Types: Cigarettes     Quit date: 2017     Years since quittin.3    Smokeless tobacco: Never   Substance and Sexual Activity    Alcohol use: Yes     Comment: socially    Drug use: No    Sexual activity: Yes     Partners: Female     Birth control/protection: None     Review of Systems   Unable to perform ROS: Intubated   Objective:     Vital Signs (Most Recent):  Temp: 98.6 °F (37 °C) (23)  Pulse: 80 (23 09)  Resp: 11 (23)  BP: 134/78 (23 0757)  SpO2: 96 % (23)   Vital Signs (24h Range):  Temp:  [97.2 °F (36.2 °C)-98.6 °F (37 °C)] 98.6 °F (37 °C)  Pulse:  [] 80  Resp:  [10-39] 11  SpO2:  [90 %-99 %] 96 %  BP: (111-155)/(60-92) 134/78     Weight: 86.3 kg (190 lb 4.1 oz)  Body mass index is 27.3 kg/m².    Physical Exam  Constitutional:       Appearance: He is ill-appearing. He is not diaphoretic.   HENT:      Head: Normocephalic and atraumatic.   Eyes:      General:         Right eye: No discharge.         Left eye: No discharge.      Conjunctiva/sclera: Conjunctivae normal.   Cardiovascular:      Rate and Rhythm: Normal rate and regular rhythm.   Pulmonary:      Breath sounds: Rhonchi present.      Comments: Mechanically ventilated  Abdominal:      General: Bowel sounds are normal.      Palpations: Abdomen is soft.   Musculoskeletal:         General: No deformity or signs of injury.   Skin:     General: Skin is warm and dry.   Neurological:      Comments: Sedated           Significant Labs: All pertinent labs within the past 24 hours have been reviewed.  BMP:   Recent Labs   Lab 23  035   *      K 4.0   CL 96   CO2 33*   BUN 27*   CREATININE 0.9   CALCIUM 9.1   MG 2.4       CBC:   Recent Labs   Lab 23  0451 23  0357   WBC 14.12* 21.86*   HGB 10.7* 11.9*   HCT 32.7* 37.0*    217         Significant Imaging: I have reviewed all pertinent imaging  results/findings within the past 24 hours.

## 2023-03-09 PROBLEM — J44.1 CHRONIC OBSTRUCTIVE PULMONARY DISEASE WITH ACUTE EXACERBATION: Status: ACTIVE | Noted: 2023-03-09

## 2023-03-09 LAB
ANION GAP SERPL CALC-SCNC: 8 MMOL/L (ref 8–16)
BACTERIA SPEC AEROBE CULT: NORMAL
BASOPHILS # BLD AUTO: 0.02 K/UL (ref 0–0.2)
BASOPHILS NFR BLD: 0.1 % (ref 0–1.9)
BUN SERPL-MCNC: 25 MG/DL (ref 8–23)
CALCIUM SERPL-MCNC: 9 MG/DL (ref 8.7–10.5)
CHLORIDE SERPL-SCNC: 94 MMOL/L (ref 95–110)
CO2 SERPL-SCNC: 39 MMOL/L (ref 23–29)
CREAT SERPL-MCNC: 0.8 MG/DL (ref 0.5–1.4)
DIFFERENTIAL METHOD: ABNORMAL
EOSINOPHIL # BLD AUTO: 0 K/UL (ref 0–0.5)
EOSINOPHIL NFR BLD: 0 % (ref 0–8)
ERYTHROCYTE [DISTWIDTH] IN BLOOD BY AUTOMATED COUNT: 13.6 % (ref 11.5–14.5)
EST. GFR  (NO RACE VARIABLE): >60 ML/MIN/1.73 M^2
GLUCOSE SERPL-MCNC: 107 MG/DL (ref 70–110)
GRAM STN SPEC: NORMAL
HCT VFR BLD AUTO: 38.4 % (ref 40–54)
HGB BLD-MCNC: 12.4 G/DL (ref 14–18)
IMM GRANULOCYTES # BLD AUTO: 0.09 K/UL (ref 0–0.04)
IMM GRANULOCYTES NFR BLD AUTO: 0.4 % (ref 0–0.5)
LYMPHOCYTES # BLD AUTO: 2.1 K/UL (ref 1–4.8)
LYMPHOCYTES NFR BLD: 9.7 % (ref 18–48)
MAGNESIUM SERPL-MCNC: 2.4 MG/DL (ref 1.6–2.6)
MCH RBC QN AUTO: 28.7 PG (ref 27–31)
MCHC RBC AUTO-ENTMCNC: 32.3 G/DL (ref 32–36)
MCV RBC AUTO: 89 FL (ref 82–98)
MONOCYTES # BLD AUTO: 1.3 K/UL (ref 0.3–1)
MONOCYTES NFR BLD: 6.3 % (ref 4–15)
NEUTROPHILS # BLD AUTO: 17.6 K/UL (ref 1.8–7.7)
NEUTROPHILS NFR BLD: 83.5 % (ref 38–73)
NRBC BLD-RTO: 0 /100 WBC
PHOSPHATE SERPL-MCNC: 2.6 MG/DL (ref 2.7–4.5)
PLATELET # BLD AUTO: 234 K/UL (ref 150–450)
PMV BLD AUTO: 11 FL (ref 9.2–12.9)
POCT GLUCOSE: 101 MG/DL (ref 70–110)
POCT GLUCOSE: 121 MG/DL (ref 70–110)
POCT GLUCOSE: 128 MG/DL (ref 70–110)
POCT GLUCOSE: 142 MG/DL (ref 70–110)
POCT GLUCOSE: 150 MG/DL (ref 70–110)
POTASSIUM SERPL-SCNC: 3.4 MMOL/L (ref 3.5–5.1)
RBC # BLD AUTO: 4.32 M/UL (ref 4.6–6.2)
SODIUM SERPL-SCNC: 141 MMOL/L (ref 136–145)
WBC # BLD AUTO: 21.06 K/UL (ref 3.9–12.7)

## 2023-03-09 PROCEDURE — 25000242 PHARM REV CODE 250 ALT 637 W/ HCPCS: Performed by: NURSE PRACTITIONER

## 2023-03-09 PROCEDURE — 11000001 HC ACUTE MED/SURG PRIVATE ROOM

## 2023-03-09 PROCEDURE — 85025 COMPLETE CBC W/AUTO DIFF WBC: CPT | Performed by: HOSPITALIST

## 2023-03-09 PROCEDURE — 84100 ASSAY OF PHOSPHORUS: CPT | Performed by: HOSPITALIST

## 2023-03-09 PROCEDURE — 97166 OT EVAL MOD COMPLEX 45 MIN: CPT

## 2023-03-09 PROCEDURE — 36415 COLL VENOUS BLD VENIPUNCTURE: CPT | Performed by: HOSPITALIST

## 2023-03-09 PROCEDURE — 83735 ASSAY OF MAGNESIUM: CPT | Performed by: HOSPITALIST

## 2023-03-09 PROCEDURE — 80048 BASIC METABOLIC PNL TOTAL CA: CPT | Performed by: HOSPITALIST

## 2023-03-09 PROCEDURE — 63600175 PHARM REV CODE 636 W HCPCS: Performed by: NURSE PRACTITIONER

## 2023-03-09 PROCEDURE — 25000003 PHARM REV CODE 250: Performed by: HOSPITALIST

## 2023-03-09 PROCEDURE — 97161 PT EVAL LOW COMPLEX 20 MIN: CPT

## 2023-03-09 PROCEDURE — 27000221 HC OXYGEN, UP TO 24 HOURS

## 2023-03-09 PROCEDURE — 63600175 PHARM REV CODE 636 W HCPCS: Performed by: HOSPITALIST

## 2023-03-09 PROCEDURE — 99900035 HC TECH TIME PER 15 MIN (STAT)

## 2023-03-09 PROCEDURE — 99291 PR CRITICAL CARE, E/M 30-74 MINUTES: ICD-10-PCS | Mod: ,,, | Performed by: STUDENT IN AN ORGANIZED HEALTH CARE EDUCATION/TRAINING PROGRAM

## 2023-03-09 PROCEDURE — 94640 AIRWAY INHALATION TREATMENT: CPT

## 2023-03-09 PROCEDURE — 99291 CRITICAL CARE FIRST HOUR: CPT | Mod: ,,, | Performed by: STUDENT IN AN ORGANIZED HEALTH CARE EDUCATION/TRAINING PROGRAM

## 2023-03-09 PROCEDURE — 94761 N-INVAS EAR/PLS OXIMETRY MLT: CPT

## 2023-03-09 PROCEDURE — 63600175 PHARM REV CODE 636 W HCPCS: Mod: TB,JG | Performed by: NURSE PRACTITIONER

## 2023-03-09 PROCEDURE — 92610 EVALUATE SWALLOWING FUNCTION: CPT

## 2023-03-09 RX ORDER — LISINOPRIL 20 MG/1
20 TABLET ORAL DAILY
Status: DISCONTINUED | OUTPATIENT
Start: 2023-03-09 | End: 2023-03-11 | Stop reason: HOSPADM

## 2023-03-09 RX ORDER — AMLODIPINE BESYLATE 5 MG/1
10 TABLET ORAL DAILY
Status: DISCONTINUED | OUTPATIENT
Start: 2023-03-09 | End: 2023-03-11 | Stop reason: HOSPADM

## 2023-03-09 RX ORDER — IPRATROPIUM BROMIDE AND ALBUTEROL SULFATE 2.5; .5 MG/3ML; MG/3ML
3 SOLUTION RESPIRATORY (INHALATION) EVERY 6 HOURS PRN
Status: DISCONTINUED | OUTPATIENT
Start: 2023-03-09 | End: 2023-03-11 | Stop reason: HOSPADM

## 2023-03-09 RX ORDER — CLONIDINE HYDROCHLORIDE 0.1 MG/1
0.2 TABLET ORAL EVERY 4 HOURS PRN
Status: DISCONTINUED | OUTPATIENT
Start: 2023-03-09 | End: 2023-03-11 | Stop reason: HOSPADM

## 2023-03-09 RX ADMIN — ATORVASTATIN CALCIUM 40 MG: 40 TABLET, FILM COATED ORAL at 08:03

## 2023-03-09 RX ADMIN — FUROSEMIDE 20 MG: 10 INJECTION, SOLUTION INTRAMUSCULAR; INTRAVENOUS at 05:03

## 2023-03-09 RX ADMIN — CEFEPIME HYDROCHLORIDE 2 G: 2 INJECTION, SOLUTION INTRAVENOUS at 10:03

## 2023-03-09 RX ADMIN — IPRATROPIUM BROMIDE AND ALBUTEROL SULFATE 3 ML: 2.5; .5 SOLUTION RESPIRATORY (INHALATION) at 07:03

## 2023-03-09 RX ADMIN — MUPIROCIN: 20 OINTMENT TOPICAL at 08:03

## 2023-03-09 RX ADMIN — FAMOTIDINE 20 MG: 10 INJECTION INTRAVENOUS at 08:03

## 2023-03-09 RX ADMIN — PREDNISONE 40 MG: 20 TABLET ORAL at 08:03

## 2023-03-09 RX ADMIN — ENOXAPARIN SODIUM 40 MG: 40 INJECTION SUBCUTANEOUS at 05:03

## 2023-03-09 RX ADMIN — CEFEPIME HYDROCHLORIDE 2 G: 2 INJECTION, SOLUTION INTRAVENOUS at 02:03

## 2023-03-09 RX ADMIN — IPRATROPIUM BROMIDE AND ALBUTEROL SULFATE 3 ML: 2.5; .5 SOLUTION RESPIRATORY (INHALATION) at 01:03

## 2023-03-09 RX ADMIN — LISINOPRIL 20 MG: 20 TABLET ORAL at 10:03

## 2023-03-09 RX ADMIN — CLOPIDOGREL BISULFATE 75 MG: 75 TABLET, FILM COATED ORAL at 08:03

## 2023-03-09 RX ADMIN — AMLODIPINE BESYLATE 10 MG: 5 TABLET ORAL at 10:03

## 2023-03-09 RX ADMIN — ASPIRIN 81 MG: 81 TABLET, COATED ORAL at 08:03

## 2023-03-09 RX ADMIN — CEFEPIME HYDROCHLORIDE 2 G: 2 INJECTION, SOLUTION INTRAVENOUS at 07:03

## 2023-03-09 NOTE — NURSING
[facility] [place]  Nurses Note -- 4 Eyes      3/9/2023       Skin assessed on: Q Shift      [x] No Pressure Injuries Present    [x]Prevention Measures Documented    [] Yes LDA  for Pressure Injury Previously documented     [] Yes New Pressure Injury Discovered   [] LDA for New Pressure Injury Added      Attending RN:  JUDSON CAMERON RN     Second RN:  CHARLI Blanco

## 2023-03-09 NOTE — PLAN OF CARE
B/s swallow eval completed. Pt is safe to cont current regular diet with thin liquids. Meds whole. No further ST is required at this time, as the pt is consuming the safest and least restrictive diet.

## 2023-03-09 NOTE — PLAN OF CARE
Problem: Occupational Therapy  Goal: Occupational Therapy Goal  Description: Goals to be met by: 3/23/23     Patient will increase functional independence with ADLs by performing:    UE Dressing with Modified [place].  LE Dressing with Modified [place].  Grooming while standing at sink with Modified [place], Supervision, and Assistive Devices as needed.  Rolling to Bilateral with Modified [place].   Supine to sit with Modified [place].  Step transfer with Modified [place] and Supervision  Toilet transfer to toilet with Modified [place] and Supervision.  Upper extremity exercise program x15 reps per handout, with independence.  The patient will perform Pursed Lipped Breathing with SOB  Outcome: Ongoing, Progressing   The patient is able to transfer to a BSC and chair with nursing assist.

## 2023-03-09 NOTE — SUBJECTIVE & OBJECTIVE
Interval History: Looks great today and feeling well on nasal cannula    Objective:     Vital Signs (Most Recent):  Temp: 98.2 °F (36.8 °C) (03/09/23 0715)  Pulse: 76 (03/09/23 1045)  Resp: 16 (03/09/23 1045)  BP: (!) 166/80 (03/09/23 1045)  SpO2: (!) 93 % (03/09/23 1045)   Vital Signs (24h Range):  Temp:  [97.6 °F (36.4 °C)-98.2 °F (36.8 °C)] 98.2 °F (36.8 °C)  Pulse:  [57-99] 76  Resp:  [12-37] 16  SpO2:  [88 %-99 %] 93 %  BP: (121-190)/() 166/80     Weight: 83.8 kg (184 lb 11.9 oz)  Body mass index is 26.51 kg/m².      Intake/Output Summary (Last 24 hours) at 3/9/2023 1145  Last data filed at 3/9/2023 1036  Gross per 24 hour   Intake 1933.9 ml   Output 3900 ml   Net -1966.1 ml       Physical Exam  Vitals and nursing note reviewed.   Constitutional:       General: He is not in acute distress.     Appearance: He is not ill-appearing, toxic-appearing or diaphoretic.      Interventions: Nasal cannula in place.   HENT:      Head: Normocephalic and atraumatic.      Nose: No rhinorrhea.   Eyes:      General: No scleral icterus.     Extraocular Movements: Extraocular movements intact.   Cardiovascular:      Rate and Rhythm: Normal rate and regular rhythm.   Pulmonary:      Effort: No tachypnea, accessory muscle usage, respiratory distress or retractions.   Abdominal:      General: There is no distension.   Skin:     General: Skin is warm and dry.      Coloration: Skin is not jaundiced.      Findings: No rash.   Neurological:      General: No focal deficit present.      Mental Status: Mental status is at baseline.         Vents:  Vent Mode: PS/CPAP (03/08/23 0920)  Ventilator Initiated: Yes (03/06/23 0622)  Set Rate: 16 BPM (03/08/23 0757)  Vt Set: 450 mL (03/07/23 0726)  Pressure Support: 5 cmH20 (03/08/23 0920)  PEEP/CPAP: 5 cmH20 (03/08/23 0920)  Oxygen Concentration (%): 28 (03/09/23 0427)  Peak Airway Pressure: 10.3 cmH20 (03/08/23 0920)  Total Ve: 6.2 L/m (03/08/23 0920)  F/VT Ratio<105 (RSBI): (!) 28.72  (03/08/23 0920)    Lines/Drains/Airways       Peripheral Intravenous Line  Duration                  Peripheral IV - Single Lumen 03/06/23 0509 20 G Distal;Posterior;Right Wrist 3 days         Peripheral IV - Single Lumen 03/06/23 0510 18 G Left Antecubital 3 days         Peripheral IV - Single Lumen 03/06/23 0740 20 G Left;Posterior Hand 3 days                    Significant Labs:    CBC/Anemia Profile:  Recent Labs   Lab 03/08/23  0357 03/09/23  0400   WBC 21.86* 21.06*   HGB 11.9* 12.4*   HCT 37.0* 38.4*    234   MCV 88 89   RDW 13.7 13.6        Chemistries:  Recent Labs   Lab 03/07/23  1317 03/08/23  0357 03/09/23  0400    142 141   K 3.1* 4.0 3.4*   CL 92* 96 94*   CO2 39* 33* 39*   BUN 22 27* 25*   CREATININE 1.0 0.9 0.8   CALCIUM 8.9 9.1 9.0   MG  --  2.4 2.4   PHOS  --  3.8 2.6*       All pertinent labs within the past 24 hours have been reviewed.    Significant Imaging:  I have reviewed all pertinent imaging results/findings within the past 24 hours.

## 2023-03-09 NOTE — ASSESSMENT & PLAN NOTE
Likely combination of CHF and COPD exacerbations. CXR with no focal consolidation, vascular congestion, and pulmonary edema. BNP >1,000. WBC mildly elevated. Wheezing on exam. Known emphysema and PREETI with home CPAP. COVID negative.   - BiPAP qhs   - Wean for SpO2 >88%  - duonebs PRN  - continue steroids for 5 total days  - CAP coverage with rocephin X 5 days and azithro X 3 days. Broadened to cefepime 3/8.  Can complete 7 total days

## 2023-03-09 NOTE — PROGRESS NOTES
[facility] Medicine  Progress Note    Patient Name: Abelardo Irene Jr.  MRN: [id number]  Patient Class: IP- Inpatient   Admission Date: 3/6/2023  Length of Stay: 3 days  Attending Physician: Romeo Lopez MD  Primary Care Provider: Rolando Funes MD        Subjective:     Principal Problem:Acute respiratory failure with hypoxia and hypercarbia        HPI:  68 y/o male with a PMHx of HTN, COPD, CHF, CAD presents to the ER via EMS for evaluation of respiratory distress beginning last night.  Patient is currently intubated and unable to give history.  Patient complained of shortness of breath throughout the night that progressively worsened.  No improvement of symptoms with use of his inhaler.  Patient noted to have SpO2 of 49% RA on EMS evaluation.  EMS administered 1.5 solumedrol IN en route with increased on his stats to 93%.  No alleviating or exacerbating factors noted.  Patient noted to be in respiratory distress and placed on Bipap.  No improvement with Bipap and patient eventually intubated.  Unable to obtain any further history.      Overview/Hospital Course:  68 y/o male with a PMHx of HTN, COPD, CHF wit EF of about 30 %,, CAD presents to the ER via EMS for evaluation of respiratory distress,patient was intubated and was unable to give history.  Patient complained of shortness of breath throughout the night that progressively worsened.  No improvement of symptoms with use of his inhaler.  Patient noted to have SpO2 of 49% RA on EMS evaluation.  EMS administered 1.5 solumedrol IN en route with increased on his stats to 93%.  No alleviating or exacerbating factors noted.  Patient noted to be in respiratory distress and placed on Bipap.  No improvement with Bipap and patient eventually intubated.patient is in IV lasix for CHF exacerbation and  nebulizer and steroids for COPD exacerbation.pulmonology doing vent. Management.on empiric IV Abx,follow up with sputum culture.  S/P  extubation on 3.8.23,on daily NC o 2 and nightly Bipap,consulted PT,OT,ST.        Past Medical History:   Diagnosis Date    CHF (congestive heart failure)     COPD (chronic obstructive pulmonary disease)     Coronary artery disease     Elevated cholesterol     on medication    Hypertension        Past Surgical History:   Procedure Laterality Date    CARDIAC SURGERY      coronary stent placed    CORONARY STENT PLACEMENT         Review of patient's allergies indicates:   Allergen Reactions    Contrast media Shortness Of Breath, Itching and Anxiety     Patient states that he had a bad reaction, anxiety , shortness of breath, itching .        No current facility-administered medications on file prior to encounter.     Current Outpatient Medications on File Prior to Encounter   Medication Sig    albuterol (PROVENTIL/VENTOLIN HFA) 90 mcg/actuation inhaler Inhale 1-2 puffs into the lungs every 6 (six) hours as needed for Wheezing. Rescue    amLODIPine (NORVASC) 5 MG tablet Take 1 tablet (5 mg total) by mouth once daily.    aspirin (ECOTRIN) 81 MG EC tablet Take 1 tablet (81 mg total) by mouth once daily.    atorvastatin (LIPITOR) 40 MG tablet Take 1 tablet (40 mg total) by mouth once daily.    clopidogreL (PLAVIX) 75 mg tablet Take 1 tablet (75 mg total) by mouth once daily.    fluticasone-salmeterol diskus inhaler 250-50 mcg Inhale 1 puff into the lungs 2 (two) times daily.    furosemide (LASIX) 40 MG tablet Take 1 tablet (40 mg total) by mouth 2 (two) times daily.    glipiZIDE (GLUCOTROL) 10 MG TR24 Take 1 tablet (10 mg total) by mouth daily with breakfast.    lisinopriL (PRINIVIL,ZESTRIL) 20 MG tablet Take 1 tablet (20 mg total) by mouth once daily.    metFORMIN (GLUCOPHAGE) 500 MG tablet Take 1 tablet (500 mg total) by mouth 2 (two) times daily with meals.    metoprolol succinate (TOPROL-XL) 25 MG 24 hr tablet Take 1 tablet (25 mg total) by mouth once daily.     Family History       Problem Relation  (Age of Onset)    Cancer Mother    No Known Problems Father          Tobacco Use    Smoking status: Former     Packs/day: 2.00     Years: 45.00     Pack years: 90.00     Types: Cigarettes     Quit date: 2017     Years since quittin.3    Smokeless tobacco: Never   Substance and Sexual Activity    Alcohol use: Yes     Comment: socially    Drug use: No    Sexual activity: Yes     Partners: Female     Birth control/protection: None     Review of Systems   Unable to perform ROS: Intubated   Objective:     Vital Signs (Most Recent):  Temp: 97.7 °F (36.5 °C) (23 0430)  Pulse: 83 (23 0749)  Resp: (!) 24 (23 07)  BP: (!) 149/76 (23 0600)  SpO2: 96 % (23)   Vital Signs (24h Range):  Temp:  [97.6 °F (36.4 °C)-98.8 °F (37.1 °C)] 97.7 °F (36.5 °C)  Pulse:  [57-99] 83  Resp:  [11-34] 24  SpO2:  [88 %-99 %] 96 %  BP: (121-181)/() 149/76     Weight: 86.3 kg (190 lb 4.1 oz)  Body mass index is 27.3 kg/m².    Physical Exam  Constitutional:       Appearance: He is ill-appearing. He is not diaphoretic.   HENT:      Head: Normocephalic and atraumatic.   Eyes:      General:         Right eye: No discharge.         Left eye: No discharge.      Conjunctiva/sclera: Conjunctivae normal.   Cardiovascular:      Rate and Rhythm: Normal rate and regular rhythm.   Pulmonary:      Breath sounds: Rhonchi present.      Comments: Mechanically ventilated  Abdominal:      General: Bowel sounds are normal.      Palpations: Abdomen is soft.   Musculoskeletal:         General: No deformity or signs of injury.   Skin:     General: Skin is warm and dry.   Neurological:      Comments: Sedated           Significant Labs: All pertinent labs within the past 24 hours have been reviewed.  BMP:   Recent Labs   Lab 23  0400         K 3.4*   CL 94*   CO2 39*   BUN 25*   CREATININE 0.8   CALCIUM 9.0   MG 2.4       CBC:   Recent Labs   Lab 23  0357 23  0400   WBC 21.86* 21.06*    HGB 11.9* 12.4*   HCT 37.0* 38.4*    234         Significant Imaging: I have reviewed all pertinent imaging results/findings within the past 24 hours.      Assessment/Plan:      * Acute respiratory failure with hypoxia and hypercarbia  Patient with Hypercapnic and Hypoxic Respiratory failure which is Acute.  he is not on home oxygen. Supplemental oxygen was provided and noted- Vent Mode: PS/CPAP  Oxygen Concentration (%):  [28-35] 28  Resp Rate Total:  [12 br/min-14 br/min] 12 br/min  PEEP/CPAP:  [5 cmH20] 5 cmH20  Pressure Support:  [5 cmH20] 5 cmH20  Mean Airway Pressure:  [6.1 cmH20] 6.1 cmH20.   Signs/symptoms of respiratory failure include- respiratory distress. Contributing diagnoses includes - CHF and COPD Labs and images were reviewed. Patient Has recent ABG, which has been reviewed. Will treat underlying causes and adjust management of respiratory failure.  Patient presents with respiratory failure and intubated.  Probably combination of COPD and CHF.  Started on nebs and IV steroids.  Also started on IV diuresis.  Empirically started on ABX's.  Pulmonary consulted for vent management.  patient is in IV lasix for CHF exacerbation and  nebulizer for COPD exacerbation.pulmonology doing vent. Management.    S/P extubation on 3.8.23,on daily NC o 2 and nightly Bipap,consulted PT,OT,ST.    Chronic obstructive pulmonary disease with acute exacerbation    On  nebulizer and steroids for COPD exacerbation.may need home oxygen at DC time.    Uncontrolled type 2 diabetes mellitus with hyperglycemia  Patient's FSGs are uncontrolled due to hyperglycemia on current medication regimen.  Last A1c reviewed-   Lab Results   Component Value Date    HGBA1C 10.1 (H) 04/13/2022     Most recent fingerstick glucose reviewed- No results for input(s): POCTGLUCOSE in the last 24 hours.  Current correctional scale  Medium  Maintain anti-hyperglycemic dose as follows-   Antihyperglycemics (From admission, onward)    Start      Stop Route Frequency Ordered    03/06/23 1323  insulin aspart U-100 pen 1-10 Units         -- SubQ Every 6 hours PRN 03/06/23 1223      Hold Oral hypoglycemics while patient is in the hospital.  Hyperglycemia may worsen with steroids.  Continue to monitor.    Class 1 obesity due to excess calories with serious comorbidity in adult  Body mass index is 29.41 kg/m². Morbid obesity complicates all aspects of disease management from diagnostic modalities to treatment. Weight loss encouraged and health benefits explained to patient.         Coronary artery disease involving native coronary artery of native heart without angina pectoris  Continue ASA, Plavix and Statin.  No report of chest pain.  Initial Troponin negative.  Trend Troponin.      Dyslipidemia  Continue Statin.      Acute on chronic combined systolic and diastolic heart failure  With EF of about 30%,on IV lasix,      Tobacco abuse  Smoking cessation counseling zulma over 6 minutes.      Benign essential hypertension  Resume home medications with parameters.        VTE Risk Mitigation (From admission, onward)         Ordered     enoxaparin injection 40 mg  Daily         03/06/23 0823     IP VTE HIGH RISK PATIENT  Once         03/06/23 0823     Place sequential compression device  Until discontinued         03/06/23 0823                Discharge Planning   VERA:      Code Status: Full Code   Is the patient medically ready for discharge?:     Reason for patient still in hospital (select all that apply): Patient trending condition  Discharge Plan A:  (tbd)            Critical care time spent on the evaluation and treatment of severe organ dysfunction, review of pertinent labs and imaging studies, discussions with consulting providers and discussions with patient/family:  Over 45  minutes.      Romeo Lopez MD  Department of Hospital Medicine   [facility] - Intensive Care

## 2023-03-09 NOTE — PT/OT/SLP EVAL
Physical Therapy Evaluation    Patient Name:  Abelardo Irene Jr.   MRN:  [id number]    Recommendations:     Discharge Recommendations:  (Ongoing, most likely home)   Discharge Equipment Recommendations:  (Ongoing assessment , most likely none)   Barriers to discharge:  Pt in ICU    Assessment:     Abelardo Irene Jr. is a 67 y.o. male admitted with a medical diagnosis of Acute respiratory failure with hypoxia and hypercarbia.  He presents with the following impairments/functional limitations: weakness, impaired functional mobility, impaired endurance, gait instability, decreased coordination, impaired balance, impaired self care skills, impaired cardiopulmonary response to activity, impaired coordination, decreased safety awareness .    Rehab Prognosis: Good; patient would benefit from acute skilled PT services to address these deficits and reach maximum level of function.    Recent Surgery: * No surgery found *      Plan:     During this hospitalization, patient to be seen  (3-5x/wk) to address the identified rehab impairments via gait training, therapeutic activities, therapeutic exercises and progress toward the following goals:    Plan of Care Expires:  03/23/23    Subjective     Chief Complaint: fatigue, dizziness  Patient/Family Comments/goals: to take a nap   Pain/Comfort:  Pain Rating 1: 0/10    Patients cultural, spiritual, Gnosticism conflicts given the current situation: no    Living Environment:  Pt lives with his ex wife in a ground floor apt  Prior to admission, patients level of function was Independent with ambulation and ADL's.  Equipment used at home: oxygen.  DME owned (not currently used): none.  Upon discharge, patient will have assistance from Ex-wife and daughter?.    Objective:     Communicated with nsg prior to session.  Patient found HOB elevated with oxygen, peripheral IV (ICU monitoring)  upon PT entry to room.    General Precautions: Standard, fall, respiratory  Orthopedic Precautions:N/A    Braces: N/A  Respiratory Status: Nasal cannula, flow 3 L/min    Exams:  Cognitive Exam:  Patient is oriented to Person, Place, and Time  Gross Motor Coordination:  mildly impaired 2/2 gen weakness and deconditioning  Postural Exam:  Patient presented with the following abnormalities:    -       Rounded shoulders  -       Forward head  Sensation:    -       Intact  light/touch B LE's  Skin Integrity/Edema:      -       Skin integrity: Visible skin intact  -       Edema: Mild B LE's  RLE ROM: WFL  RLE Strength: WFL  LLE ROM: WFL  LLE Strength: WFL    Functional Mobility:  Bed Mobility:     Scooting: stand by assistance  Supine to Sit: stand by assistance  Transfers:     Sit to Stand:  minimum assistance with no AD  Gait: Pt ambulated with Lucina/HHA approx 5 small steps from bed to chair  Balance: FAir+ sit, Fair stand      AM-PAC 6 CLICK MOBILITY  Total Score:16       Treatment & Education:  Educated pt to perform Pursed lip breathing throughout treatment session.  Pt able to return demonstration with VC.  Chrise protocol:  Pt sat at EOB with SBA.  HR 97, /80, SPO2 89-93%.  Educated pt to continue to practice pursed lip breathing while up in chair and to perform B AP's and TKE's while up in chair.    Patient left up in chair with all lines intact, call button in reach, and nsg notified.    GOALS:   Multidisciplinary Problems       Physical Therapy Goals          Problem: Physical Therapy    Goal Priority Disciplines Outcome Goal Variances Interventions   Physical Therapy Goal     PT, PT/OT Ongoing, Progressing     Description: Goals to be met by: 3/23/23     Patient will increase functional independence with mobility by performin. Supine to sit with Modified [place]  2. Sit to stand transfer with Modified [place]  3. Gait  x 150 feet with Modified [place] with or without RW.   4. Lower extremity exercise program x10 reps per handout, with independence                         History:      Past Medical History:   Diagnosis Date    CHF (congestive heart failure)     COPD (chronic obstructive pulmonary disease)     Coronary artery disease     Elevated cholesterol     on medication    Hypertension        Past Surgical History:   Procedure Laterality Date    CARDIAC SURGERY      coronary stent placed    CORONARY STENT PLACEMENT         Time Tracking:     PT Received On: 03/09/23  PT Start Time: 1055     PT Stop Time: 1109  PT Total Time (min): 14 min     Billable Minutes: Evaluation 14 Co-evaluation with OT      03/09/2023

## 2023-03-09 NOTE — SUBJECTIVE & OBJECTIVE
Past Medical History:   Diagnosis Date    CHF (congestive heart failure)     COPD (chronic obstructive pulmonary disease)     Coronary artery disease     Elevated cholesterol     on medication    Hypertension        Past Surgical History:   Procedure Laterality Date    CARDIAC SURGERY      coronary stent placed    CORONARY STENT PLACEMENT         Review of patient's allergies indicates:   Allergen Reactions    Contrast media Shortness Of Breath, Itching and Anxiety     Patient states that he had a bad reaction, anxiety , shortness of breath, itching .        No current facility-administered medications on file prior to encounter.     Current Outpatient Medications on File Prior to Encounter   Medication Sig    albuterol (PROVENTIL/VENTOLIN HFA) 90 mcg/actuation inhaler Inhale 1-2 puffs into the lungs every 6 (six) hours as needed for Wheezing. Rescue    amLODIPine (NORVASC) 5 MG tablet Take 1 tablet (5 mg total) by mouth once daily.    aspirin (ECOTRIN) 81 MG EC tablet Take 1 tablet (81 mg total) by mouth once daily.    atorvastatin (LIPITOR) 40 MG tablet Take 1 tablet (40 mg total) by mouth once daily.    clopidogreL (PLAVIX) 75 mg tablet Take 1 tablet (75 mg total) by mouth once daily.    fluticasone-salmeterol diskus inhaler 250-50 mcg Inhale 1 puff into the lungs 2 (two) times daily.    furosemide (LASIX) 40 MG tablet Take 1 tablet (40 mg total) by mouth 2 (two) times daily.    glipiZIDE (GLUCOTROL) 10 MG TR24 Take 1 tablet (10 mg total) by mouth daily with breakfast.    lisinopriL (PRINIVIL,ZESTRIL) 20 MG tablet Take 1 tablet (20 mg total) by mouth once daily.    metFORMIN (GLUCOPHAGE) 500 MG tablet Take 1 tablet (500 mg total) by mouth 2 (two) times daily with meals.    metoprolol succinate (TOPROL-XL) 25 MG 24 hr tablet Take 1 tablet (25 mg total) by mouth once daily.     Family History       Problem Relation (Age of Onset)    Cancer Mother    No Known Problems Father          Tobacco Use    Smoking status:  Former     Packs/day: 2.00     Years: 45.00     Pack years: 90.00     Types: Cigarettes     Quit date: 2017     Years since quittin.3    Smokeless tobacco: Never   Substance and Sexual Activity    Alcohol use: Yes     Comment: socially    Drug use: No    Sexual activity: Yes     Partners: Female     Birth control/protection: None     Review of Systems   Unable to perform ROS: Intubated   Objective:     Vital Signs (Most Recent):  Temp: 97.7 °F (36.5 °C) (23 0430)  Pulse: 83 (23 0749)  Resp: (!) 24 (23)  BP: (!) 149/76 (23 0600)  SpO2: 96 % (23)   Vital Signs (24h Range):  Temp:  [97.6 °F (36.4 °C)-98.8 °F (37.1 °C)] 97.7 °F (36.5 °C)  Pulse:  [57-99] 83  Resp:  [11-34] 24  SpO2:  [88 %-99 %] 96 %  BP: (121-181)/() 149/76     Weight: 86.3 kg (190 lb 4.1 oz)  Body mass index is 27.3 kg/m².    Physical Exam  Constitutional:       Appearance: He is ill-appearing. He is not diaphoretic.   HENT:      Head: Normocephalic and atraumatic.   Eyes:      General:         Right eye: No discharge.         Left eye: No discharge.      Conjunctiva/sclera: Conjunctivae normal.   Cardiovascular:      Rate and Rhythm: Normal rate and regular rhythm.   Pulmonary:      Breath sounds: Rhonchi present.      Comments: Mechanically ventilated  Abdominal:      General: Bowel sounds are normal.      Palpations: Abdomen is soft.   Musculoskeletal:         General: No deformity or signs of injury.   Skin:     General: Skin is warm and dry.   Neurological:      Comments: Sedated           Significant Labs: All pertinent labs within the past 24 hours have been reviewed.  BMP:   Recent Labs   Lab 23  0400         K 3.4*   CL 94*   CO2 39*   BUN 25*   CREATININE 0.8   CALCIUM 9.0   MG 2.4       CBC:   Recent Labs   Lab 23  0357 23  0400   WBC 21.86* 21.06*   HGB 11.9* 12.4*   HCT 37.0* 38.4*    234         Significant Imaging: I have reviewed all pertinent  imaging results/findings within the past 24 hours.

## 2023-03-09 NOTE — PLAN OF CARE
Problem: Physical Therapy  Goal: Physical Therapy Goal  Description: Goals to be met by: 3/23/23     Patient will increase functional independence with mobility by performin. Supine to sit with Modified [place]  2. Sit to stand transfer with Modified [place]  3. Gait  x 150 feet with Modified [place] with or without RW.   4. Lower extremity exercise program x10 reps per handout, with independence    Outcome: Ongoing, Progressing   Initial PT evaluation performed today.  Pt could benefit from skilled pt services 3-5x/wk in order to maximize function prior to D/C.  HHPT and family support recommended.  Ongoing assessment pending pt progress for DME, may need RW?

## 2023-03-09 NOTE — ASSESSMENT & PLAN NOTE
Patient with Hypercapnic and Hypoxic Respiratory failure which is Acute.  he is not on home oxygen. Supplemental oxygen was provided and noted- Vent Mode: PS/CPAP  Oxygen Concentration (%):  [28-35] 28  Resp Rate Total:  [12 br/min-14 br/min] 12 br/min  PEEP/CPAP:  [5 cmH20] 5 cmH20  Pressure Support:  [5 cmH20] 5 cmH20  Mean Airway Pressure:  [6.1 cmH20] 6.1 cmH20.   Signs/symptoms of respiratory failure include- respiratory distress. Contributing diagnoses includes - CHF and COPD Labs and images were reviewed. Patient Has recent ABG, which has been reviewed. Will treat underlying causes and adjust management of respiratory failure.  Patient presents with respiratory failure and intubated.  Probably combination of COPD and CHF.  Started on nebs and IV steroids.  Also started on IV diuresis.  Empirically started on ABX's.  Pulmonary consulted for vent management.  patient is in IV lasix for CHF exacerbation and  nebulizer for COPD exacerbation.pulmonology doing vent. Management.    S/P extubation on 3.8.23,on daily NC o 2 and nightly Bipap,consulted PT,OT,ST.

## 2023-03-09 NOTE — PROGRESS NOTES
[facility] [place] Intensive Care  Critical Care Medicine  Progress Note    Patient Name: Abelardo Irene Jr.  MRN: [id number]  Admission Date: 3/6/2023  Hospital Length of Stay: 3 days  Code Status: Full Code  Attending Provider: Romeo Lopez MD  Primary Care Provider: Rolando Funes MD   Principal Problem: Acute respiratory failure with hypoxia and hypercarbia    Subjective:     Interval History: Looks great today and feeling well on nasal cannula    Objective:     Vital Signs (Most Recent):  Temp: 98.2 °F (36.8 °C) (03/09/23 0715)  Pulse: 76 (03/09/23 1045)  Resp: 16 (03/09/23 1045)  BP: (!) 166/80 (03/09/23 1045)  SpO2: (!) 93 % (03/09/23 1045)   Vital Signs (24h Range):  Temp:  [97.6 °F (36.4 °C)-98.2 °F (36.8 °C)] 98.2 °F (36.8 °C)  Pulse:  [57-99] 76  Resp:  [12-37] 16  SpO2:  [88 %-99 %] 93 %  BP: (121-190)/() 166/80     Weight: 83.8 kg (184 lb 11.9 oz)  Body mass index is 26.51 kg/m².      Intake/Output Summary (Last 24 hours) at 3/9/2023 1145  Last data filed at 3/9/2023 1036  Gross per 24 hour   Intake 1933.9 ml   Output 3900 ml   Net -1966.1 ml       Physical Exam  Vitals and nursing note reviewed.   Constitutional:       General: He is not in acute distress.     Appearance: He is not ill-appearing, toxic-appearing or diaphoretic.      Interventions: Nasal cannula in place.   HENT:      Head: Normocephalic and atraumatic.      Nose: No rhinorrhea.   Eyes:      General: No scleral icterus.     Extraocular Movements: Extraocular movements intact.   Cardiovascular:      Rate and Rhythm: Normal rate and regular rhythm.   Pulmonary:      Effort: No tachypnea, accessory muscle usage, respiratory distress or retractions.   Abdominal:      General: There is no distension.   Skin:     General: Skin is warm and dry.      Coloration: Skin is not jaundiced.      Findings: No rash.   Neurological:      General: No focal deficit present.      Mental Status: Mental status is at baseline.         Vents:  Vent Mode:  PS/CPAP (03/08/23 0920)  Ventilator Initiated: Yes (03/06/23 0622)  Set Rate: 16 BPM (03/08/23 0757)  Vt Set: 450 mL (03/07/23 0726)  Pressure Support: 5 cmH20 (03/08/23 0920)  PEEP/CPAP: 5 cmH20 (03/08/23 0920)  Oxygen Concentration (%): 28 (03/09/23 0427)  Peak Airway Pressure: 10.3 cmH20 (03/08/23 0920)  Total Ve: 6.2 L/m (03/08/23 0920)  F/VT Ratio<105 (RSBI): (!) 28.72 (03/08/23 0920)    Lines/Drains/Airways       Peripheral Intravenous Line  Duration                  Peripheral IV - Single Lumen 03/06/23 0509 20 G Distal;Posterior;Right Wrist 3 days         Peripheral IV - Single Lumen 03/06/23 0510 18 G Left Antecubital 3 days         Peripheral IV - Single Lumen 03/06/23 0740 20 G Left;Posterior Hand 3 days                    Significant Labs:    CBC/Anemia Profile:  Recent Labs   Lab 03/08/23  0357 03/09/23  0400   WBC 21.86* 21.06*   HGB 11.9* 12.4*   HCT 37.0* 38.4*    234   MCV 88 89   RDW 13.7 13.6        Chemistries:  Recent Labs   Lab 03/07/23  1317 03/08/23  0357 03/09/23  0400    142 141   K 3.1* 4.0 3.4*   CL 92* 96 94*   CO2 39* 33* 39*   BUN 22 27* 25*   CREATININE 1.0 0.9 0.8   CALCIUM 8.9 9.1 9.0   MG  --  2.4 2.4   PHOS  --  3.8 2.6*       All pertinent labs within the past 24 hours have been reviewed.    Significant Imaging:  I have reviewed all pertinent imaging results/findings within the past 24 hours.      ABG  Recent Labs   Lab 03/06/23  1247   PH 7.389   PO2 86   PCO2 58.6*   HCO3 35.4*   BE 9     Assessment/Plan:     Pulmonary  * Acute respiratory failure with hypoxia and hypercarbia  Likely combination of CHF and COPD exacerbations. CXR with no focal consolidation, vascular congestion, and pulmonary edema. BNP >1,000. WBC mildly elevated. Wheezing on exam. Known emphysema and PREETI with home CPAP. COVID negative.   - BiPAP qhs   - Wean for SpO2 >88%  - duonebs PRN  - continue steroids for 5 total days  - CAP coverage with rocephin X 5 days and azithro X 3 days. Broadened to  cefepime 3/8.  Can complete 7 total days      Cardiac/Vascular  Coronary artery disease involving native coronary artery of native heart without angina pectoris  Continue ASA, plavix    Dyslipidemia  Continue statin    Acute on chronic combined systolic and diastolic heart failure  Diurese as tolerated.     Benign essential hypertension  Continue home antihypertensives as tolerated    Endocrine  Uncontrolled type 2 diabetes mellitus with hyperglycemia  Hold oral antidiabetics while inpatient. BG goal 140-180.  Add and titrate insulin as needed    Other  Tobacco abuse  Discuss need for smoking cessation when able.  Cessation resources prior to discharge        Thank you for the consult. No further recommendations. Signing off. Please feel free to contact us with any question or concerns regarding the care of this patient.      Critical Care Time: 45 minutes  Critical secondary to Patient has a condition that poses threat to life and bodily function: Severe Respiratory Distress      Critical care was time spent personally by me on the following activities: development of treatment plan with patient or surrogate and bedside caregivers, discussions with consultants, evaluation of patient's response to treatment, examination of patient, ordering and performing treatments and interventions, ordering and review of laboratory studies, ordering and review of radiographic studies, pulse oximetry, re-evaluation of patient's condition. This critical care time did not overlap with that of any other provider or involve time for any procedures.     Jordon Leroy MD  Critical Care Medicine  [facility] - Intensive Care

## 2023-03-09 NOTE — PT/OT/SLP EVAL
Occupational Therapy   Evaluation    Name: Abelardo Irene Jr.  MRN: [id number]  Admitting Diagnosis: Acute respiratory failure with hypoxia and hypercarbia  Recent Surgery: * No surgery found *      Recommendations:     Discharge Recommendations:  (TBD, hopefully home with HH)  Discharge Equipment Recommendations:  bath bench, walker, rolling (TBD)  Barriers to discharge:   (generalized weakness, fall risk)    Assessment:     Abelardo Irene Jr. is a 67 y.o. male with a medical diagnosis of Acute respiratory failure with hypoxia and hypercarbia.  Performance deficits affecting function: weakness, impaired endurance, impaired self care skills, impaired functional mobility, gait instability, decreased upper extremity function, decreased lower extremity function, decreased coordination, decreased safety awareness, impaired cardiopulmonary response to activity.      Rehab Prognosis: Good; patient would benefit from acute skilled OT services to address these deficits and reach maximum level of function.       Plan:     Patient to be seen 3 x/week, 5 x/week to address the above listed problems via self-care/home management, therapeutic activities, therapeutic exercises  Plan of Care Expires: 03/23/23  Plan of Care Reviewed with: patient    Subjective     Chief Complaint: feels tired, dizzy on the EOB  Patient/Family Comments/goals: agreeable to sit in the chair    Occupational Profile:  Living Environment: lives with his ex-wife in a 1st floor apt, dtr and grand-child?  Previous level of function: Mod I amb and self care, no AD  Roles and Routines: states he does not drive, sits around the house, watching TV  Equipment Used at Home: none  Assistance upon Discharge:  ex-wife, dtr and grand-child    Pain/Comfort:  Pain Rating 1: 0/10    Patients cultural, spiritual, Scientologist conflicts given the current situation: no    Objective:     Communicated with: Denisse mcknight prior to session.  Patient found HOB elevated with oxygen, pulse  ox (continuous), peripheral IV, blood pressure cuff upon OT entry to room.    General Precautions: Standard, fall, respiratory  Orthopedic Precautions: N/A  Braces: N/A  Respiratory Status: Nasal cannula, flow 2 L/min    Occupational Performance:    Bed Mobility:    Patient completed Scooting/Bridging with stand by assistance  Patient completed Supine to Sit with stand by assistance    Functional Mobility/Transfers:  Patient completed Sit <> Stand Transfer with minimum assistance  with  hand-held assist   Functional Mobility: stepped from bed to chair with min assist/HHA    Activities of Daily Living:  Upper Body Dressing: moderate assistance to don back gown (limited by ICU lines)  Lower Body Dressing: dependence to don B socks in bed    Cognitive/Visual Perceptual:  Cognitive/Psychosocial Skills:     -       Oriented to: Person, Place, and Situation   -       Follows Commands/attention:Follows two-step commands  -       Communication: clear/fluent  -       Memory: No Deficits noted  -       Safety awareness/insight to disability: intact   -       Mood/Affect/Coping skills/emotional control: Appropriate to situation  Visual/Perceptual:      -Intact      Physical Exam:  Balance: -       fair+, fair  Postural examination/scapula alignment:    -       Rounded shoulders  -       Forward head  Skin integrity: Visible skin intact  Edema:  None noted  Upper Extremity Range of Motion:     -       Right Upper Extremity: WFL  -       Left Upper Extremity: WFL  Upper Extremity Strength:    -       Right Upper Extremity: WFL  -       Left Upper Extremity: WFL   Strength:    -       Right Upper Extremity: WFL  -       Left Upper Extremity: WFL    AMPAC 6 Click ADL:  AMPAC Total Score: 17    Treatment & Education:  Participated in OT eval  Educated re: OT role and POC  Performed self care and functional mobility as noted above  Educated the patient re: need to request nursing assist to return to bed  Monitored Vitals  EOB HR  97, /80, SPO2 89-93%  The patient was educated re: need to perform PLB with low O2 sats    Patient left up in chair with all lines intact and call button in reach    GOALS:   Multidisciplinary Problems       Occupational Therapy Goals          Problem: Occupational Therapy    Goal Priority Disciplines Outcome Interventions   Occupational Therapy Goal     OT, PT/OT Ongoing, Progressing    Description: Goals to be met by: 3/23/23     Patient will increase functional independence with ADLs by performing:    UE Dressing with Modified [place].  LE Dressing with Modified [place].  Grooming while standing at sink with Modified [place], Supervision, and Assistive Devices as needed.  Rolling to Bilateral with Modified [place].   Supine to sit with Modified [place].  Step transfer with Modified [place] and Supervision  Toilet transfer to toilet with Modified [place] and Supervision.  Upper extremity exercise program x15 reps per handout, with independence.  The patient will perform Pursed Lipped Breathing with SOB                       History:     Past Medical History:   Diagnosis Date    CHF (congestive heart failure)     COPD (chronic obstructive pulmonary disease)     Coronary artery disease     Elevated cholesterol     on medication    Hypertension          Past Surgical History:   Procedure Laterality Date    CARDIAC SURGERY      coronary stent placed    CORONARY STENT PLACEMENT         Time Tracking:     OT Date of Treatment: 03/09/23  OT Start Time: 1055  OT Stop Time: 1110  OT Total Time (min): 15 min    Billable Minutes:Evaluation 15 (with PT)    3/9/2023

## 2023-03-09 NOTE — PT/OT/SLP EVAL
Speech Language Pathology Evaluation  Bedside Swallow    Patient Name:  Abelardo Irene Jr.   MRN:  [id number]  Admitting Diagnosis: Acute respiratory failure with hypoxia and hypercarbia    Recommendations:                 General Recommendations:  Follow-up not indicated  Diet recommendations:  Regular Diet - IDDSI Level 7, Thin liquids - IDDSI Level 0   Aspiration Precautions: Meds whole 1 at a time and Standard aspiration precautions   General Precautions: Standard,    Communication strategies:  none    History:     Past Medical History:   Diagnosis Date    CHF (congestive heart failure)     COPD (chronic obstructive pulmonary disease)     Coronary artery disease     Elevated cholesterol     on medication    Hypertension        Past Surgical History:   Procedure Laterality Date    CARDIAC SURGERY      coronary stent placed    CORONARY STENT PLACEMENT         Social History: Patient lives with family.    Prior Intubation HX:  3/6-3/8    Modified Barium Swallow: None per EMR    Chest X-Rays:   3/6/23    FINDINGS:  Endotracheal tube.  Nasogastric tube.Prominence of interstitial markings within the lungs.  No interval focal lobar consolidation.  No pleural effusion. No evident pneumothorax.     The cardiac silhouette is normal in size. The hilar and mediastinal contours are unremarkable.     Bones are intact    Prior diet: Unrestricted per pt.    Subjective     ST obtained clearance from pt's nurse for b/s swallow evaluation prior to entrance. Pt was alert and awake, and agreeable to swallow eval. Pt was able to recall all events that led to admit, with speech 100% intelligible. Pt expressing dizziness, with ST noted elevated BP. ST notified pt's nurse of pt being symptomatic with BP (nurse and MD already aware).    Patient goals: Cont po intake     Pain/Comfort:  Pain Rating 1: 0/10    Respiratory Status: Nasal cannula, flow 3 L/min    Objective:     Oral Musculature Evaluation  Oral Musculature: WFL  Dentition:  scattered dentition  Secretion Management: adequate  Mucosal Quality: good  Oral Labial Strength and Mobility: WFL  Lingual Strength and Mobility: WFL  Volitional Cough: Intact  Volitional Swallow: Intact  Voice Prior to PO Intake: Clear and audible    Bedside Swallow Eval:   Consistencies Assessed:  Thin liquids -One cup of water via straw  Puree -Half a cup of pudding   Solids -x1 bite-sized pieces of leelee cracker       Oral Phase:   WFL    Pharyngeal Phase:   no overt clinical signs/symptoms of aspiration  no overt clinical signs/symptoms of pharyngeal dysphagia    Compensatory Strategies  None    Treatment: It should be noted that silent aspiration cannot be r/o via b/s assessment. ST provided education to the pt including diet recs. Pt verbalized an understanding of all information given and was agreeable to all recs.      ST notified pt's nurse and MD regarding diet recs.    Assessment:     Abelardo Irene Jr. is a 67 y.o. male with a dx of Acute respiratory failure with hypoxia and hypercarbia who presents with adequate swallowing abilities to cont current regular diet with thin liquids. No further ST is required at this time, as the pt is consuming the safest and least restrictive diet.     Goals:   Multidisciplinary Problems       SLP Goals       Not on file                    Plan:     Patient to be seen:      Plan of Care expires:     Plan of Care reviewed with:  patient   SLP Follow-Up:  No       Discharge recommendations:      Barriers to Discharge:  None    Time Tracking:     SLP Treatment Date:   03/09/23  Speech Start Time:  0946  Speech Stop Time:  0958     Speech Total Time (min):  12 min    Billable Minutes: Eval Swallow and Oral Function 12    03/09/2023

## 2023-03-09 NOTE — NURSING
[facility] Intensive Care  ICU Shift Summary  Date: 3/9/2023      Prehospitalization: Home  Admit Date / LOS : 3/6/2023/ 3 days    Diagnosis: Acute respiratory failure with hypoxia and hypercarbia    Consults:        Active: Palliative, Pulm CC, and SW       Needed: N/A     Code Status: Full Code   Advanced Directive: <no information>    LDA:  Lines/Drains/Airways       Peripheral Intravenous Line  Duration                  Peripheral IV - Single Lumen 03/06/23 0509 20 G Distal;Posterior;Right Wrist 2 days         Peripheral IV - Single Lumen 03/06/23 0510 18 G Left Antecubital 2 days         Peripheral IV - Single Lumen 03/06/23 0740 20 G Left;Posterior Hand 2 days                  Central Lines/Site/Justification:Patient Does Not Have Central Line  Urinary Cath/Order/Justification:Patient Does Not Have Urinary Catheter    Vasopressors/Infusions:        GOALS: Volume/ Hemodynamic: MAP >65                     RASS: 0  alert and calm    Pain Management: none       Pain Controlled: not applicable     Rhythm: NSR    Respiratory Device: Nasal Cannula    Vent Mode: PS/CPAP  Oxygen Concentration (%):  [28-35] 28  Resp Rate Total:  [12 br/min-41 br/min] 12 br/min  PEEP/CPAP:  [5 cmH20] 5 cmH20  Pressure Support:  [5 cmH20] 5 cmH20  Mean Airway Pressure:  [6.1 ozX21-76.2 cmH20] 6.1 cmH20             Most Recent SBT/ SAT: N/A       MOVE Screen: PASS  ICU Liberation: not applicable    VTE Prophylaxis: Mechanical  Mobility: Bedrest  Stress Ulcer Prophylaxis: Yes    Isolation: No active isolations    Dietary:   Current Diet Order   Procedures    Diet diabetic [facility]; 2000 Calorie; Cardiac (Low Na/Chol)     Order Specific Question:   Indicate patient location for additional diet options:     Answer:   [facility]     Order Specific Question:   Total calories:     Answer:   2000 Calorie     Order Specific Question:   Additional Diet Options:     Answer:   Cardiac (Low Na/Chol)      Tolerance: yes  Advancement: @  goal    I & O (24h):    Intake/Output Summary (Last 24 hours) at 3/9/2023 0443  Last data filed at 3/8/2023 1800  Gross per 24 hour   Intake 1446.79 ml   Output 3300 ml   Net -1853.21 ml        Restraints: No    Significant Dates:  Post Op Date: N/A  Rescue Date: N/A  Imaging/ Diagnostics:  See chart    Noteworthy Labs:  See below    COVID Test: (--)  CBC/Anemia Labs: Coags:    Recent Labs   Lab 03/08/23  0357 03/09/23  0400   WBC 21.86* 21.06*   HGB 11.9* 12.4*   HCT 37.0* 38.4*    234   MCV 88 89   RDW 13.7 13.6    No results for input(s): PT, INR, APTT in the last 168 hours.     Chemistries:   Recent Labs   Lab 03/06/23  0525 03/07/23  0451 03/08/23  0357 03/09/23  0400      < > 142 141   K 3.7   < > 4.0 3.4*   CL 94*   < > 96 94*   CO2 35*   < > 33* 39*   BUN 6*   < > 27* 25*   CREATININE 0.8   < > 0.9 0.8   CALCIUM 9.0   < > 9.1 9.0   PROT 7.8  --   --   --    BILITOT 0.4  --   --   --    ALKPHOS 92  --   --   --    ALT 17  --   --   --    AST 16  --   --   --    MG 2.1   < > 2.4 2.4   PHOS  --    < > 3.8 2.6*    < > = values in this interval not displayed.        Cardiac Enzymes: Ejection Fractions:    Recent Labs     03/06/23  1832 03/07/23  0227   TROPONINI 0.044* 0.040*    EF   Date Value Ref Range Status   03/08/2023 30 % Final        POCT Glucose: HbA1c:    Recent Labs   Lab 03/08/23  1130 03/08/23  1650 03/08/23  2115   POCTGLUCOSE 246* 235* 148*    Hemoglobin A1C   Date Value Ref Range Status   03/06/2023 7.7 (H) 4.0 - 5.6 % Final     Comment:     ADA Screening Guidelines:  5.7-6.4%  Consistent with prediabetes  >or=6.5%  Consistent with diabetes    High levels of fetal hemoglobin interfere with the HbA1C  assay. Heterozygous hemoglobin variants (HbS, HgC, etc)do  not significantly interfere with this assay.   However, presence of multiple variants may affect accuracy.             ICU LOS 2d 19h  Level of Care: OK to Transfer    Chart Check: 24 HR Done  Shift Summary/Plan for the shift:  Remains in ICU.  No acute distress noted overnight.  Compliant with BiPAP. VVS without support.

## 2023-03-10 LAB
ANION GAP SERPL CALC-SCNC: 12 MMOL/L (ref 8–16)
BACTERIA BLD CULT: NORMAL
BACTERIA BLD CULT: NORMAL
BASOPHILS # BLD AUTO: 0.02 K/UL (ref 0–0.2)
BASOPHILS NFR BLD: 0.1 % (ref 0–1.9)
BUN SERPL-MCNC: 22 MG/DL (ref 8–23)
CALCIUM SERPL-MCNC: 9.2 MG/DL (ref 8.7–10.5)
CHLORIDE SERPL-SCNC: 89 MMOL/L (ref 95–110)
CO2 SERPL-SCNC: 38 MMOL/L (ref 23–29)
CREAT SERPL-MCNC: 0.8 MG/DL (ref 0.5–1.4)
DIFFERENTIAL METHOD: ABNORMAL
EOSINOPHIL # BLD AUTO: 0 K/UL (ref 0–0.5)
EOSINOPHIL NFR BLD: 0 % (ref 0–8)
ERYTHROCYTE [DISTWIDTH] IN BLOOD BY AUTOMATED COUNT: 13.2 % (ref 11.5–14.5)
EST. GFR  (NO RACE VARIABLE): >60 ML/MIN/1.73 M^2
GLUCOSE SERPL-MCNC: 116 MG/DL (ref 70–110)
HCT VFR BLD AUTO: 43.1 % (ref 40–54)
HGB BLD-MCNC: 13.5 G/DL (ref 14–18)
IMM GRANULOCYTES # BLD AUTO: 0.06 K/UL (ref 0–0.04)
IMM GRANULOCYTES NFR BLD AUTO: 0.4 % (ref 0–0.5)
LYMPHOCYTES # BLD AUTO: 2 K/UL (ref 1–4.8)
LYMPHOCYTES NFR BLD: 12.9 % (ref 18–48)
MAGNESIUM SERPL-MCNC: 2.5 MG/DL (ref 1.6–2.6)
MCH RBC QN AUTO: 27.3 PG (ref 27–31)
MCHC RBC AUTO-ENTMCNC: 31.3 G/DL (ref 32–36)
MCV RBC AUTO: 87 FL (ref 82–98)
MONOCYTES # BLD AUTO: 1 K/UL (ref 0.3–1)
MONOCYTES NFR BLD: 6.5 % (ref 4–15)
NEUTROPHILS # BLD AUTO: 12.5 K/UL (ref 1.8–7.7)
NEUTROPHILS NFR BLD: 80.1 % (ref 38–73)
NRBC BLD-RTO: 0 /100 WBC
PHOSPHATE SERPL-MCNC: 3.2 MG/DL (ref 2.7–4.5)
PLATELET # BLD AUTO: 248 K/UL (ref 150–450)
PMV BLD AUTO: 11.3 FL (ref 9.2–12.9)
POCT GLUCOSE: 140 MG/DL (ref 70–110)
POCT GLUCOSE: 143 MG/DL (ref 70–110)
POCT GLUCOSE: 176 MG/DL (ref 70–110)
POCT GLUCOSE: 255 MG/DL (ref 70–110)
POTASSIUM SERPL-SCNC: 3.5 MMOL/L (ref 3.5–5.1)
RBC # BLD AUTO: 4.95 M/UL (ref 4.6–6.2)
SODIUM SERPL-SCNC: 139 MMOL/L (ref 136–145)
WBC # BLD AUTO: 15.63 K/UL (ref 3.9–12.7)

## 2023-03-10 PROCEDURE — 97535 SELF CARE MNGMENT TRAINING: CPT

## 2023-03-10 PROCEDURE — 11000001 HC ACUTE MED/SURG PRIVATE ROOM

## 2023-03-10 PROCEDURE — 25000003 PHARM REV CODE 250: Performed by: HOSPITALIST

## 2023-03-10 PROCEDURE — 63600175 PHARM REV CODE 636 W HCPCS: Performed by: NURSE PRACTITIONER

## 2023-03-10 PROCEDURE — 80048 BASIC METABOLIC PNL TOTAL CA: CPT | Performed by: HOSPITALIST

## 2023-03-10 PROCEDURE — 97110 THERAPEUTIC EXERCISES: CPT | Mod: CQ

## 2023-03-10 PROCEDURE — 94760 N-INVAS EAR/PLS OXIMETRY 1: CPT

## 2023-03-10 PROCEDURE — 63600175 PHARM REV CODE 636 W HCPCS: Performed by: HOSPITALIST

## 2023-03-10 PROCEDURE — 97116 GAIT TRAINING THERAPY: CPT | Mod: CQ

## 2023-03-10 PROCEDURE — 97530 THERAPEUTIC ACTIVITIES: CPT

## 2023-03-10 PROCEDURE — 84100 ASSAY OF PHOSPHORUS: CPT | Performed by: HOSPITALIST

## 2023-03-10 PROCEDURE — 83735 ASSAY OF MAGNESIUM: CPT | Performed by: HOSPITALIST

## 2023-03-10 PROCEDURE — 36415 COLL VENOUS BLD VENIPUNCTURE: CPT | Performed by: HOSPITALIST

## 2023-03-10 PROCEDURE — 85025 COMPLETE CBC W/AUTO DIFF WBC: CPT | Performed by: HOSPITALIST

## 2023-03-10 RX ADMIN — MUPIROCIN: 20 OINTMENT TOPICAL at 09:03

## 2023-03-10 RX ADMIN — ASPIRIN 81 MG: 81 TABLET, COATED ORAL at 09:03

## 2023-03-10 RX ADMIN — CEFEPIME HYDROCHLORIDE 2 G: 2 INJECTION, SOLUTION INTRAVENOUS at 03:03

## 2023-03-10 RX ADMIN — ATORVASTATIN CALCIUM 40 MG: 40 TABLET, FILM COATED ORAL at 09:03

## 2023-03-10 RX ADMIN — MUPIROCIN: 20 OINTMENT TOPICAL at 08:03

## 2023-03-10 RX ADMIN — CLONIDINE HYDROCHLORIDE 0.2 MG: 0.1 TABLET ORAL at 04:03

## 2023-03-10 RX ADMIN — CEFEPIME HYDROCHLORIDE 2 G: 2 INJECTION, SOLUTION INTRAVENOUS at 07:03

## 2023-03-10 RX ADMIN — CEFEPIME HYDROCHLORIDE 2 G: 2 INJECTION, SOLUTION INTRAVENOUS at 11:03

## 2023-03-10 RX ADMIN — FAMOTIDINE 20 MG: 10 INJECTION INTRAVENOUS at 09:03

## 2023-03-10 RX ADMIN — PREDNISONE 40 MG: 20 TABLET ORAL at 09:03

## 2023-03-10 RX ADMIN — ENOXAPARIN SODIUM 40 MG: 40 INJECTION SUBCUTANEOUS at 05:03

## 2023-03-10 RX ADMIN — FUROSEMIDE 20 MG: 10 INJECTION, SOLUTION INTRAMUSCULAR; INTRAVENOUS at 05:03

## 2023-03-10 RX ADMIN — FAMOTIDINE 20 MG: 10 INJECTION INTRAVENOUS at 08:03

## 2023-03-10 RX ADMIN — CLOPIDOGREL BISULFATE 75 MG: 75 TABLET, FILM COATED ORAL at 09:03

## 2023-03-10 RX ADMIN — INSULIN ASPART 6 UNITS: 100 INJECTION, SOLUTION INTRAVENOUS; SUBCUTANEOUS at 05:03

## 2023-03-10 NOTE — PLAN OF CARE
Problem: Physical Therapy  Goal: Physical Therapy Goal  Description: Goals to be met by: 3/23/23     Patient will increase functional independence with mobility by performin. Supine to sit with Modified [place]  2. Sit to stand transfer with Modified [place]  3. Gait  x 150 feet with Modified [place] with or without RW.   4. Lower extremity exercise program x10 reps per handout, with independence    Outcome: Ongoing, Progressing

## 2023-03-10 NOTE — ASSESSMENT & PLAN NOTE
Smoking cessation counseling zulma over 6 minutes.    Tobacco dependence. States he wants to quit

## 2023-03-10 NOTE — PLAN OF CARE
Pt free from falls, injury or any further trauma throughout shift. Pt AAO x4, 2L NC. Pt is ambulatory in room. BiPAP at night. Continued medications as ordered. Pain controlled with PRN medication.  Bed at lowest position, bed alarm on, call light in reach, instruct pt to call for assistance.        Problem: Infection  Goal: Absence of Infection Signs and Symptoms  Outcome: Ongoing, Progressing     Problem: Fall Injury Risk  Goal: Absence of Fall and Fall-Related Injury  Outcome: Ongoing, Progressing     Problem: Adult Inpatient Plan of Care  Goal: Plan of Care Review  Outcome: Ongoing, Progressing     Problem: Respiratory Compromise COPD (Chronic Obstructive Pulmonary Disease)  Goal: Effective Oxygenation and Ventilation  Outcome: Ongoing, Progressing     Problem: Coping Ineffective  Goal: Effective Coping  Outcome: Ongoing, Progressing

## 2023-03-10 NOTE — NURSING
Patient arrived to unit from ICU. Patient found in bed, awake. RR even and unlabored on room air. Patient placed on 2L NC per report given.   Patient AAOX4. Fall prevention instructions reviewed with patient. Patient verbalizes understanding. No acute distress. Will continue to monitor.

## 2023-03-10 NOTE — SUBJECTIVE & OBJECTIVE
Interval History: Feels better.    Review of Systems   Constitutional:  Negative for activity change.   HENT:  Negative for congestion.    Respiratory:  Negative for shortness of breath.    Genitourinary:  Negative for difficulty urinating.   Objective:     Vital Signs (Most Recent):  Temp: 98.1 °F (36.7 °C) (03/10/23 0749)  Pulse: 84 (03/10/23 0749)  Resp: 18 (03/10/23 0749)  BP: 120/63 (03/10/23 0749)  SpO2: 100 % (03/10/23 0749) Vital Signs (24h Range):  Temp:  [97.6 °F (36.4 °C)-98.4 °F (36.9 °C)] 98.1 °F (36.7 °C)  Pulse:  [44-96] 84  Resp:  [13-44] 18  SpO2:  [89 %-100 %] 100 %  BP: (120-176)/(63-97) 120/63     Weight: 83.8 kg (184 lb 11.9 oz)  Body mass index is 26.51 kg/m².    Intake/Output Summary (Last 24 hours) at 3/10/2023 0936  Last data filed at 3/10/2023 0619  Gross per 24 hour   Intake 715.67 ml   Output 2425 ml   Net -1709.33 ml      Physical Exam  Vitals and nursing note reviewed.   Constitutional:       Appearance: He is obese. He is not ill-appearing.   Pulmonary:      Effort: Pulmonary effort is normal. No respiratory distress.       Significant Labs: All pertinent labs within the past 24 hours have been reviewed.  BMP:   Recent Labs   Lab 03/10/23  0352   *      K 3.5   CL 89*   CO2 38*   BUN 22   CREATININE 0.8   CALCIUM 9.2   MG 2.5     CBC:   Recent Labs   Lab 03/09/23  0400 03/10/23  0352   WBC 21.06* 15.63*   HGB 12.4* 13.5*   HCT 38.4* 43.1    248       Significant Imaging:

## 2023-03-10 NOTE — NURSING
Pt lying in bed. 2L NC. Denies any discomfort. Telebox [id number]. SCDs applied. Instructed pt to call for assistance.

## 2023-03-10 NOTE — PROGRESS NOTES
[facility] Medicine  Progress Note    Patient Name: Abelardo Irene Jr.  MRN: [id number]  Patient Class: IP- Inpatient   Admission Date: 3/6/2023  Length of Stay: 4 days  Attending Physician: Padilla Rogers MD  Primary Care Provider: Rolando Funes MD        Subjective:     Principal Problem:Acute respiratory failure with hypoxia and hypercarbia        HPI:  68 y/o male with a PMHx of HTN, COPD, CHF, CAD presents to the ER via EMS for evaluation of respiratory distress beginning last night.  Patient is currently intubated and unable to give history.  Patient complained of shortness of breath throughout the night that progressively worsened.  No improvement of symptoms with use of his inhaler.  Patient noted to have SpO2 of 49% RA on EMS evaluation.  EMS administered 1.5 solumedrol IN en route with increased on his stats to 93%.  No alleviating or exacerbating factors noted.  Patient noted to be in respiratory distress and placed on Bipap.  No improvement with Bipap and patient eventually intubated.  Unable to obtain any further history.      Overview/Hospital Course:  68 y/o male with a PMHx of HTN, COPD, CHF wit EF of about 30 %,, CAD presents to the ER via EMS for evaluation of respiratory distress,patient was intubated and was unable to give history.  Patient complained of shortness of breath throughout the night that progressively worsened.  No improvement of symptoms with use of his inhaler.  Patient noted to have SpO2 of 49% RA on EMS evaluation.  EMS administered 1.5 solumedrol IN en route with increased on his stats to 93%.  No alleviating or exacerbating factors noted.  Patient noted to be in respiratory distress and placed on Bipap.  No improvement with Bipap and patient eventually intubated.patient is in IV lasix for CHF exacerbation and  nebulizer and steroids for COPD exacerbation.pulmonology doing vent. Management.on empiric IV Abx,follow up with sputum culture.  S/P extubation on  3.8.23,on daily NC o 2 and nightly Bipap,consulted PT,OT,ST.    Patient transferred out of the ICU on 3/9/23.  PT/OT consulted.          Interval History: Feels better.    Review of Systems   Constitutional:  Negative for activity change.   HENT:  Negative for congestion.    Respiratory:  Negative for shortness of breath.    Genitourinary:  Negative for difficulty urinating.   Objective:     Vital Signs (Most Recent):  Temp: 98.1 °F (36.7 °C) (03/10/23 0749)  Pulse: 84 (03/10/23 0749)  Resp: 18 (03/10/23 0749)  BP: 120/63 (03/10/23 0749)  SpO2: 100 % (03/10/23 0749) Vital Signs (24h Range):  Temp:  [97.6 °F (36.4 °C)-98.4 °F (36.9 °C)] 98.1 °F (36.7 °C)  Pulse:  [44-96] 84  Resp:  [13-44] 18  SpO2:  [89 %-100 %] 100 %  BP: (120-176)/(63-97) 120/63     Weight: 83.8 kg (184 lb 11.9 oz)  Body mass index is 26.51 kg/m².    Intake/Output Summary (Last 24 hours) at 3/10/2023 0936  Last data filed at 3/10/2023 0619  Gross per 24 hour   Intake 715.67 ml   Output 2425 ml   Net -1709.33 ml      Physical Exam  Vitals and nursing note reviewed.   Constitutional:       Appearance: He is obese. He is not ill-appearing.   Pulmonary:      Effort: Pulmonary effort is normal. No respiratory distress.       Significant Labs: All pertinent labs within the past 24 hours have been reviewed.  BMP:   Recent Labs   Lab 03/10/23  0352   *      K 3.5   CL 89*   CO2 38*   BUN 22   CREATININE 0.8   CALCIUM 9.2   MG 2.5     CBC:   Recent Labs   Lab 03/09/23  0400 03/10/23  0352   WBC 21.06* 15.63*   HGB 12.4* 13.5*   HCT 38.4* 43.1    248       Significant Imaging:       Assessment/Plan:      * Acute respiratory failure with hypoxia and hypercarbia  Patient with Hypercapnic and Hypoxic Respiratory failure which is Acute.  he is not on home oxygen. Supplemental oxygen was provided and noted- Oxygen Concentration (%):  [28] 28.   Signs/symptoms of respiratory failure include- respiratory distress. Contributing diagnoses  includes - CHF and COPD Labs and images were reviewed. Patient Has recent ABG, which has been reviewed. Will treat underlying causes and adjust management of respiratory failure.  Patient presents with respiratory failure and intubated.  Probably combination of COPD and CHF.  Started on nebs and IV steroids.  Also started on IV diuresis.  Empirically started on ABX's.  Pulmonary consulted for vent management.  patient is in IV lasix for CHF exacerbation and  nebulizer for COPD exacerbation.pulmonology doing vent. Management.    S/P extubation on 3.8.23,on daily NC o 2 and nightly Bipap,consulted PT,OT,ST.    Patient wears 02 at home.  Will monitor today. Home likely on 3/11.    Chronic obstructive pulmonary disease with acute exacerbation    On  nebulizer and steroids for COPD exacerbation.may need home oxygen at DC time.    Uncontrolled type 2 diabetes mellitus with hyperglycemia  Patient's FSGs are uncontrolled due to hyperglycemia on current medication regimen.  Last A1c reviewed-   Lab Results   Component Value Date    HGBA1C 10.1 (H) 04/13/2022     Most recent fingerstick glucose reviewed- No results for input(s): POCTGLUCOSE in the last 24 hours.  Current correctional scale  Medium  Maintain anti-hyperglycemic dose as follows-   Antihyperglycemics (From admission, onward)    Start     Stop Route Frequency Ordered    03/06/23 1323  insulin aspart U-100 pen 1-10 Units         -- SubQ Every 6 hours PRN 03/06/23 1223      Hold Oral hypoglycemics while patient is in the hospital.  Hyperglycemia may worsen with steroids.  Continue to monitor.    Class 1 obesity due to excess calories with serious comorbidity in adult  Body mass index is 29.41 kg/m². Morbid obesity complicates all aspects of disease management from diagnostic modalities to treatment. Weight loss encouraged and health benefits explained to patient.         Coronary artery disease involving native coronary artery of native heart without angina  pectoris  Continue ASA, Plavix and Statin.  No report of chest pain.  Initial Troponin negative.  Trend Troponin.      Dyslipidemia  Continue Statin.      Acute on chronic combined systolic and diastolic heart failure  With EF of about 30%,on IV lasix,      Tobacco abuse  Smoking cessation counseling zulma over 6 minutes.    Tobacco dependence. States he wants to quit       Benign essential hypertension  Resume home medications with parameters.      Debility- PT/OT recs TBD but hopefully H/H.    VTE Risk Mitigation (From admission, onward)         Ordered     enoxaparin injection 40 mg  Daily         03/06/23 0823     IP VTE HIGH RISK PATIENT  Once         03/06/23 0823     Place sequential compression device  Until discontinued         03/06/23 0823                Discharge Planning   VERA:      Code Status: Full Code   Is the patient medically ready for discharge?:     Reason for patient still in hospital (select all that apply): Patient unstable  Discharge Plan A:  (tbd)          PT/OT today. Likely home with H/H on 3/11.        Padilla Arcos MD  Department of Hospital Medicine   [facility]

## 2023-03-10 NOTE — PLAN OF CARE
Spoke with patient at bedside regarding discharge planning. Awaiting pt/ot final recommendations. Pt has home oxygen and stated he believes it is with [facility]. Pt stated family will help him home. Pt stated his home oxygen tanks are empty and his spouse is unable to bring tank to [facility] to be switched out.     Spoke with Thor with [employer], stated loan tank will be sent to patient at bedside for discharge on tomorrow.     Hospital follow up appointment with pcp scheduled and listed on avs.     1:30pm Per therapy recommendation for home health with RW and BB.    03/10/23 0958   Discharge Reassessment   Assessment Type Discharge Planning Reassessment   Did the patient's condition or plan change since previous assessment? Yes   Discharge Plan discussed with: Patient   Communicated VERA with patient/caregiver Yes   Discharge Plan A Home with family   Discharge Plan B Home   DME Needed Upon Discharge  none   Discharge Barriers Identified None   Why the patient remains in the hospital Requires continued medical care   Post-Acute Status   Coverage PHN   Discharge Delays None known at this time

## 2023-03-10 NOTE — PT/OT/SLP PROGRESS
Physical Therapy Treatment    Patient Name:  Abelardo Irene Jr.   MRN:  [id number]    Recommendations:     Discharge Recommendations:   home health PT   Discharge Equipment Recommendations:  rolling walker   Barriers to discharge: None    Assessment:     Abelardo Irene Jr. is a 67 y.o. male admitted with a medical diagnosis of Acute respiratory failure with hypoxia and hypercarbia.  He presents with the following impairments/functional limitations: weakness, impaired endurance, impaired functional mobility, impaired self care skills, gait instability, impaired balance, impaired cardiopulmonary response to activity, pain, decreased safety awareness, decreased lower extremity function, decreased ROM, decreased upper extremity function .    Rehab Prognosis: Good; patient would benefit from acute skilled PT services to address these deficits and reach maximum level of function.    Recent Surgery: * No surgery found *      Plan:     During this hospitalization, patient to be seen  (3-5x/wk) to address the identified rehab impairments via gait training, therapeutic activities, therapeutic exercises and progress toward the following goals:    Plan of Care Expires:  03/23/23    Subjective     Chief Complaint: tired   Patient/Family Comments/goals:  to go home   Pain/Comfort:  Pain Rating 1: 0/10  Pain Rating Post-Intervention 1: 0/10      Objective:     Communicated with nurse prior to session.  Patient found up in chair with telemetry, peripheral IV, oxygen upon PT entry to room.     General Precautions: Standard, fall, respiratory  Orthopedic Precautions: N/A  Braces: N/A  Respiratory Status: Nasal cannula, flow 2 L/min     Functional Mobility:  Transfers:     Sit to Stand:  supervision with no AD  Gait:  pt ambulated 50 ft  with no AD, CGA (2L O2NC) . Pt required 1 long standing rest break 2* fatigue. Noted with decreased daija,decreased step length, pt reaching for hallway handrail for balance . VC's for safety awareness and  pursed lip breathing technique .   Balance: fair+       AM-PAC 6 CLICK MOBILITY  Turning over in bed (including adjusting bedclothes, sheets and blankets)?: 4  Sitting down on and standing up from a chair with arms (e.g., wheelchair, bedside commode, etc.): 4  Moving from lying on back to sitting on the side of the bed?: 4  Moving to and from a bed to a chair (including a wheelchair)?: 4  Need to walk in hospital room?: 3  Climbing 3-5 steps with a railing?: 3  Basic Mobility Total Score: 22       Treatment & Education:  Lower Extremity Exercises.   Patient educated on the purpose of therapeutic exercise.    Patient verbalized acceptance/understanding of instructions, expectations, and limitations(for safety).  Patient performed: 10 reps (each) of B LE There Ex: AP, LAQ, Hip abd/add, Hip flexion while sitting up on chair .       Patient required rest breaks, verbal cues/tactile cues to ensure correct sequence, to maintain proper form, and to allow for self-correction.  BLE HEP given with instructions and demonstrations (pt verbalized understanding). Encouraged pt to perform BLE ex's per handout throughout the day.    Educated pt on pursed lip breathing technique and energy conservation techniques.     Patient left up in chair with all lines intact and call button in reach..    GOALS:   Multidisciplinary Problems       Physical Therapy Goals          Problem: Physical Therapy    Goal Priority Disciplines Outcome Goal Variances Interventions   Physical Therapy Goal     PT, PT/OT Ongoing, Progressing     Description: Goals to be met by: 3/23/23     Patient will increase functional independence with mobility by performin. Supine to sit with Modified [place]  2. Sit to stand transfer with Modified [place]  3. Gait  x 150 feet with Modified [place] with or without RW.   4. Lower extremity exercise program x10 reps per handout, with independence                         Time Tracking:     PT Received  On: 03/10/23  PT Start Time: 1429     PT Stop Time: 1452  PT Total Time (min): 23 min     Billable Minutes: Gait Training 12 and Therapeutic Exercise 11    Treatment Type: Treatment  PT/PTA: PTA     PTA Visit Number: 1     03/10/2023

## 2023-03-10 NOTE — PT/OT/SLP PROGRESS
"Occupational Therapy   Treatment    Name: Abelardo Irene Jr.  MRN: [id number]  Admitting Diagnosis:  Acute respiratory failure with hypoxia and hypercarbia       Recommendations:     Discharge Recommendations: home health OT (with family assist)  Discharge Equipment Recommendations:  bath bench, walker, rolling  Barriers to discharge:  None    Assessment:     Abelardo Irene Jr. is a 67 y.o. male with a medical diagnosis of Acute respiratory failure with hypoxia and hypercarbia.   Performance deficits affecting function are impaired endurance, impaired self care skills, impaired functional mobility, gait instability, impaired balance, decreased upper extremity function, decreased safety awareness, decreased coordination, impaired cardiopulmonary response to activity.   The patient was somewhat impulsive and stood and transfered to the chair without assistance, despite OT instructing the patient to sit and wait for OT. The patient  reported "twisting" his ankle on his attempt to stand unassisted while OT was setting up the chair. The patient states the pain stopped after a few minutes of rest. The patient amb using a RW to the sink with SBA/CGA and CGA/VC for RW use. The patient reports he removes O2 at home to amb to the bathroom. The patient's O2 sats were 92% on RA with rest but decreased to 79% with activity.  The patient  was educated re: PLB with O2 sats increased to 93% with 2L NC and PLB.      Rehab Prognosis:  Good; patient would benefit from acute skilled OT services to address these deficits and reach maximum level of function.       Plan:     Patient to be seen 3 x/week, 5 x/week to address the above listed problems via self-care/home management, therapeutic activities, therapeutic exercises  Plan of Care Expires: 03/23/23  Plan of Care Reviewed with: patient    Subjective     Pain/Comfort:  Pain Rating 1: 0/10    Objective:     Communicated with: Bk mcknight prior to session.  Patient found HOB elevated with " "oxygen, peripheral IV, telemetry, SCD upon OT entry to room.    General Precautions: Standard, fall, respiratory    Orthopedic Precautions:N/A  Braces: N/A  Respiratory Status: Nasal cannula, flow 1.5 L/min     Occupational Performance:     Bed Mobility:    Patient completed Scooting/Bridging with supervision  Patient completed Supine to Sit with modified independence and supervision     Functional Mobility/Transfers:  Patient completed Sit <> Stand Transfer with stand by assistance  with  rolling walker   Functional Mobility: The patient  reported "twisting" his ankle on his attempt to stand unassisted while OT was setting up the chair. The patient states the pain stopped after a few minutes of rest. The patient amb using a RW to the sink with SBA/CGA and CGA/VC for RW use.    Activities of Daily Living:  Grooming: stand by assistance to stand at the sink to wash his hands and face and to brush his teeth. The patient required verbal cues for placement of the RW at the sink.  Upper Body Dressing: contact guard assistance to don back gown      AMPA 6 Click ADL:      Treatment & Education:  Participated in self care and functional mobility as noted above \  Monitored O2 sats: EOB HR 78, SpO2 93% 1.5L, RA 92%. With activity on RA 79% and returned to 93% on 2 L and VC for PLB  Educated the patient re: PLB verbally and via handout. The patient verbalized/demonstrated understanding of PLB  Educated the patient re: need to request nursing assist to return to bed or amb to the bathroom  O2 tubing provided to allow the patient to amb to the bathroom with oxygen     Patient left up in chair with all lines intact, call button in reach, and nurse notified    GOALS:   Multidisciplinary Problems       Occupational Therapy Goals          Problem: Occupational Therapy    Goal Priority Disciplines Outcome Interventions   Occupational Therapy Goal     OT, PT/OT Ongoing, Progressing    Description: Goals to be met by: 3/23/23 "     Patient will increase functional independence with ADLs by performing:    UE Dressing with Modified [place].  LE Dressing with Modified [place].  Grooming while standing at sink with Modified [place], Supervision, and Assistive Devices as needed.  Rolling to Bilateral with Modified [place].   Supine to sit with Modified [place].  Step transfer with Modified [place] and Supervision  Toilet transfer to toilet with Modified [place] and Supervision.  Upper extremity exercise program x15 reps per handout, with independence.  The patient will perform Pursed Lipped Breathing with SOB                       Time Tracking:     OT Date of Treatment: 03/10/23  OT Start Time: 1002  OT Stop Time: 1028  OT Total Time (min): 26 min    Billable Minutes:Self Care/Home Management 16  Therapeutic Activity 10  Total Time 26    OT/OPAL: OT          3/10/2023

## 2023-03-10 NOTE — ASSESSMENT & PLAN NOTE
Patient with Hypercapnic and Hypoxic Respiratory failure which is Acute.  he is not on home oxygen. Supplemental oxygen was provided and noted- Oxygen Concentration (%):  [28] 28.   Signs/symptoms of respiratory failure include- respiratory distress. Contributing diagnoses includes - CHF and COPD Labs and images were reviewed. Patient Has recent ABG, which has been reviewed. Will treat underlying causes and adjust management of respiratory failure.  Patient presents with respiratory failure and intubated.  Probably combination of COPD and CHF.  Started on nebs and IV steroids.  Also started on IV diuresis.  Empirically started on ABX's.  Pulmonary consulted for vent management.  patient is in IV lasix for CHF exacerbation and  nebulizer for COPD exacerbation.pulmonology doing vent. Management.    S/P extubation on 3.8.23,on daily NC o 2 and nightly Bipap,consulted PT,OT,ST.    Patient wears 02 at home.  Will monitor today. Home likely on 3/11.

## 2023-03-10 NOTE — PLAN OF CARE
03/10/23 1001   Medicare Message   Important Message from Medicare regarding Discharge Appeal Rights Given to patient/caregiver;Explained to patient/caregiver;Signed/date by patient/caregiver   Date IMM was signed 03/10/23   Time IMM was signed 1001

## 2023-03-10 NOTE — PLAN OF CARE
Problem: Occupational Therapy  Goal: Occupational Therapy Goal  Description: Goals to be met by: 3/23/23     Patient will increase functional independence with ADLs by performing:    UE Dressing with Modified [place].  LE Dressing with Modified [place].  Grooming while standing at sink with Modified [place], Supervision, and Assistive Devices as needed.  Rolling to Bilateral with Modified [place].   Supine to sit with Modified [place].  Step transfer with Modified [place] and Supervision  Toilet transfer to toilet with Modified [place] and Supervision.  Upper extremity exercise program x15 reps per handout, with independence.  The patient will perform Pursed Lipped Breathing with SOB  Outcome: Ongoing, Progressing   The patient amb to the with a RW with (S) to the sink and chair.    The patient will benefit from HH OT and a RW for home.

## 2023-03-11 VITALS
WEIGHT: 184.75 LBS | OXYGEN SATURATION: 98 % | RESPIRATION RATE: 18 BRPM | BODY MASS INDEX: 26.45 KG/M2 | DIASTOLIC BLOOD PRESSURE: 58 MMHG | HEART RATE: 90 BPM | TEMPERATURE: 98 F | HEIGHT: 70 IN | SYSTOLIC BLOOD PRESSURE: 119 MMHG

## 2023-03-11 LAB
ANION GAP SERPL CALC-SCNC: 9 MMOL/L (ref 8–16)
BASOPHILS # BLD AUTO: 0.01 K/UL (ref 0–0.2)
BASOPHILS NFR BLD: 0.1 % (ref 0–1.9)
BUN SERPL-MCNC: 23 MG/DL (ref 8–23)
CALCIUM SERPL-MCNC: 8.8 MG/DL (ref 8.7–10.5)
CHLORIDE SERPL-SCNC: 90 MMOL/L (ref 95–110)
CO2 SERPL-SCNC: 37 MMOL/L (ref 23–29)
CREAT SERPL-MCNC: 0.7 MG/DL (ref 0.5–1.4)
DIFFERENTIAL METHOD: ABNORMAL
EOSINOPHIL # BLD AUTO: 0 K/UL (ref 0–0.5)
EOSINOPHIL NFR BLD: 0.2 % (ref 0–8)
ERYTHROCYTE [DISTWIDTH] IN BLOOD BY AUTOMATED COUNT: 13.1 % (ref 11.5–14.5)
EST. GFR  (NO RACE VARIABLE): >60 ML/MIN/1.73 M^2
GLUCOSE SERPL-MCNC: 130 MG/DL (ref 70–110)
HCT VFR BLD AUTO: 39.5 % (ref 40–54)
HGB BLD-MCNC: 12.7 G/DL (ref 14–18)
IMM GRANULOCYTES # BLD AUTO: 0.03 K/UL (ref 0–0.04)
IMM GRANULOCYTES NFR BLD AUTO: 0.2 % (ref 0–0.5)
LYMPHOCYTES # BLD AUTO: 2.6 K/UL (ref 1–4.8)
LYMPHOCYTES NFR BLD: 19.8 % (ref 18–48)
MAGNESIUM SERPL-MCNC: 2.2 MG/DL (ref 1.6–2.6)
MCH RBC QN AUTO: 28 PG (ref 27–31)
MCHC RBC AUTO-ENTMCNC: 32.2 G/DL (ref 32–36)
MCV RBC AUTO: 87 FL (ref 82–98)
MONOCYTES # BLD AUTO: 1 K/UL (ref 0.3–1)
MONOCYTES NFR BLD: 7.1 % (ref 4–15)
NEUTROPHILS # BLD AUTO: 9.7 K/UL (ref 1.8–7.7)
NEUTROPHILS NFR BLD: 72.6 % (ref 38–73)
NRBC BLD-RTO: 0 /100 WBC
PHOSPHATE SERPL-MCNC: 2.7 MG/DL (ref 2.7–4.5)
PLATELET # BLD AUTO: 214 K/UL (ref 150–450)
PMV BLD AUTO: 10.9 FL (ref 9.2–12.9)
POCT GLUCOSE: 116 MG/DL (ref 70–110)
POCT GLUCOSE: 122 MG/DL (ref 70–110)
POTASSIUM SERPL-SCNC: 3.5 MMOL/L (ref 3.5–5.1)
RBC # BLD AUTO: 4.53 M/UL (ref 4.6–6.2)
SODIUM SERPL-SCNC: 136 MMOL/L (ref 136–145)
WBC # BLD AUTO: 13.3 K/UL (ref 3.9–12.7)

## 2023-03-11 PROCEDURE — 83735 ASSAY OF MAGNESIUM: CPT | Performed by: HOSPITALIST

## 2023-03-11 PROCEDURE — 25000003 PHARM REV CODE 250: Performed by: HOSPITALIST

## 2023-03-11 PROCEDURE — 84100 ASSAY OF PHOSPHORUS: CPT | Performed by: HOSPITALIST

## 2023-03-11 PROCEDURE — 63600175 PHARM REV CODE 636 W HCPCS: Performed by: HOSPITALIST

## 2023-03-11 PROCEDURE — 80048 BASIC METABOLIC PNL TOTAL CA: CPT | Performed by: HOSPITALIST

## 2023-03-11 PROCEDURE — 27000221 HC OXYGEN, UP TO 24 HOURS

## 2023-03-11 PROCEDURE — 85025 COMPLETE CBC W/AUTO DIFF WBC: CPT | Performed by: HOSPITALIST

## 2023-03-11 PROCEDURE — 94761 N-INVAS EAR/PLS OXIMETRY MLT: CPT

## 2023-03-11 PROCEDURE — 99900035 HC TECH TIME PER 15 MIN (STAT)

## 2023-03-11 PROCEDURE — 63600175 PHARM REV CODE 636 W HCPCS: Mod: TB,JG | Performed by: NURSE PRACTITIONER

## 2023-03-11 PROCEDURE — 36415 COLL VENOUS BLD VENIPUNCTURE: CPT | Performed by: HOSPITALIST

## 2023-03-11 RX ORDER — DOXYCYCLINE 100 MG/1
100 CAPSULE ORAL EVERY 12 HOURS
Qty: 14 CAPSULE | Refills: 0 | Status: SHIPPED | OUTPATIENT
Start: 2023-03-11 | End: 2023-03-18

## 2023-03-11 RX ADMIN — ATORVASTATIN CALCIUM 40 MG: 40 TABLET, FILM COATED ORAL at 09:03

## 2023-03-11 RX ADMIN — LISINOPRIL 20 MG: 20 TABLET ORAL at 09:03

## 2023-03-11 RX ADMIN — AMLODIPINE BESYLATE 10 MG: 5 TABLET ORAL at 09:03

## 2023-03-11 RX ADMIN — CLOPIDOGREL BISULFATE 75 MG: 75 TABLET, FILM COATED ORAL at 09:03

## 2023-03-11 RX ADMIN — CEFEPIME HYDROCHLORIDE 2 G: 2 INJECTION, SOLUTION INTRAVENOUS at 04:03

## 2023-03-11 RX ADMIN — FUROSEMIDE 20 MG: 10 INJECTION, SOLUTION INTRAMUSCULAR; INTRAVENOUS at 05:03

## 2023-03-11 RX ADMIN — CEFEPIME HYDROCHLORIDE 2 G: 2 INJECTION, SOLUTION INTRAVENOUS at 10:03

## 2023-03-11 RX ADMIN — ASPIRIN 81 MG: 81 TABLET, COATED ORAL at 09:03

## 2023-03-11 RX ADMIN — FAMOTIDINE 20 MG: 10 INJECTION INTRAVENOUS at 09:03

## 2023-03-11 NOTE — ASSESSMENT & PLAN NOTE
Patient with Hypercapnic and Hypoxic Respiratory failure which is Acute.  he is not on home oxygen. Supplemental oxygen was provided and noted-  .   Signs/symptoms of respiratory failure include- respiratory distress. Contributing diagnoses includes - CHF and COPD Labs and images were reviewed. Patient Has recent ABG, which has been reviewed. Will treat underlying causes and adjust management of respiratory failure.  Patient presents with respiratory failure and intubated.  Probably combination of COPD and CHF.  Started on nebs and IV steroids.  Also started on IV diuresis.  Empirically started on ABX's.  Pulmonary consulted for vent management.  patient is in IV lasix for CHF exacerbation and  nebulizer for COPD exacerbation.pulmonology doing vent. Management.    S/P extubation on 3.8.23,on daily NC o 2 and nightly Bipap,consulted PT,OT,ST.    Patient wears 02 at home.  Will monitor today. Home likely on 3/11. Follow up with pulmonary

## 2023-03-11 NOTE — PROGRESS NOTES
Patient to discharge home with Home Health and rolling walker. Met with patient and family, and Patient choice for                completed for    Contacted PHN to set up services.

## 2023-03-11 NOTE — SUBJECTIVE & OBJECTIVE
Interval History: would like to go home       Review of Systems   Constitutional:  Negative for activity change.   HENT:  Negative for congestion.    Respiratory:  Negative for shortness of breath.    Genitourinary:  Negative for difficulty urinating.   Objective:     Vital Signs (Most Recent):  Temp: 97.8 °F (36.6 °C) (03/11/23 0741)  Pulse: 73 (03/11/23 0741)  Resp: 18 (03/11/23 0741)  BP: 111/70 (03/11/23 0741)  SpO2: 100 % (03/11/23 0741) Vital Signs (24h Range):  Temp:  [97.4 °F (36.3 °C)-98.3 °F (36.8 °C)] 97.8 °F (36.6 °C)  Pulse:  [68-89] 73  Resp:  [18-19] 18  SpO2:  [95 %-100 %] 100 %  BP: (108-133)/(53-82) 111/70     Weight: 83.8 kg (184 lb 11.9 oz)  Body mass index is 26.51 kg/m².    Intake/Output Summary (Last 24 hours) at 3/11/2023 0837  Last data filed at 3/11/2023 0515  Gross per 24 hour   Intake 480 ml   Output 1275 ml   Net -795 ml      Physical Exam  Vitals and nursing note reviewed.   Constitutional:       Appearance: He is obese. He is not ill-appearing.   Pulmonary:      Effort: Pulmonary effort is normal. No respiratory distress.       Significant Labs: All pertinent labs within the past 24 hours have been reviewed.  BMP:   Recent Labs   Lab 03/11/23  0425   *      K 3.5   CL 90*   CO2 37*   BUN 23   CREATININE 0.7   CALCIUM 8.8   MG 2.2     CBC:   Recent Labs   Lab 03/10/23  0352 03/11/23  0425   WBC 15.63* 13.30*   HGB 13.5* 12.7*   HCT 43.1 39.5*    214       Significant Imaging:

## 2023-03-11 NOTE — PROGRESS NOTES
[facility] Medicine  Progress Note    Patient Name: Abelardo Irene Jr.  MRN: [id number]  Patient Class: IP- Inpatient   Admission Date: 3/6/2023  Length of Stay: 5 days  Attending Physician: Padilla Rogers MD  Primary Care Provider: Rolando Funes MD        Subjective:     Principal Problem:Acute respiratory failure with hypoxia and hypercarbia        HPI:  66 y/o male with a PMHx of HTN, COPD, CHF, CAD presents to the ER via EMS for evaluation of respiratory distress beginning last night.  Patient is currently intubated and unable to give history.  Patient complained of shortness of breath throughout the night that progressively worsened.  No improvement of symptoms with use of his inhaler.  Patient noted to have SpO2 of 49% RA on EMS evaluation.  EMS administered 1.5 solumedrol IN en route with increased on his stats to 93%.  No alleviating or exacerbating factors noted.  Patient noted to be in respiratory distress and placed on Bipap.  No improvement with Bipap and patient eventually intubated.  Unable to obtain any further history.      Overview/Hospital Course:  66 y/o male with a PMHx of HTN, COPD, CHF wit EF of about 30 %,, CAD presents to the ER via EMS for evaluation of respiratory distress,patient was intubated and was unable to give history.  Patient complained of shortness of breath throughout the night that progressively worsened.  No improvement of symptoms with use of his inhaler.  Patient noted to have SpO2 of 49% RA on EMS evaluation.  EMS administered 1.5 solumedrol IN en route with increased on his stats to 93%.  No alleviating or exacerbating factors noted.  Patient noted to be in respiratory distress and placed on Bipap.  No improvement with Bipap and patient eventually intubated.patient is in IV lasix for CHF exacerbation and  nebulizer and steroids for COPD exacerbation.pulmonology doing vent. Management.on empiric IV Abx,follow up with sputum culture.  S/P extubation on  3.8.23,on daily NC o 2 and nightly Bipap,consulted PT,OT,ST.    Patient transferred out of the ICU on 3/9/23.  PT/OT consulted and rec: H/H with rolling walker. Patient clinically improved and was discharged to home. Activity as tolerated. Diet- low NA. Follow up with Pulmonary in one week          Interval History: would like to go home       Review of Systems   Constitutional:  Negative for activity change.   HENT:  Negative for congestion.    Respiratory:  Negative for shortness of breath.    Genitourinary:  Negative for difficulty urinating.   Objective:     Vital Signs (Most Recent):  Temp: 97.8 °F (36.6 °C) (03/11/23 0741)  Pulse: 73 (03/11/23 0741)  Resp: 18 (03/11/23 0741)  BP: 111/70 (03/11/23 0741)  SpO2: 100 % (03/11/23 0741) Vital Signs (24h Range):  Temp:  [97.4 °F (36.3 °C)-98.3 °F (36.8 °C)] 97.8 °F (36.6 °C)  Pulse:  [68-89] 73  Resp:  [18-19] 18  SpO2:  [95 %-100 %] 100 %  BP: (108-133)/(53-82) 111/70     Weight: 83.8 kg (184 lb 11.9 oz)  Body mass index is 26.51 kg/m².    Intake/Output Summary (Last 24 hours) at 3/11/2023 0837  Last data filed at 3/11/2023 0515  Gross per 24 hour   Intake 480 ml   Output 1275 ml   Net -795 ml      Physical Exam  Vitals and nursing note reviewed.   Constitutional:       Appearance: He is obese. He is not ill-appearing.   Pulmonary:      Effort: Pulmonary effort is normal. No respiratory distress.       Significant Labs: All pertinent labs within the past 24 hours have been reviewed.  BMP:   Recent Labs   Lab 03/11/23  0425   *      K 3.5   CL 90*   CO2 37*   BUN 23   CREATININE 0.7   CALCIUM 8.8   MG 2.2     CBC:   Recent Labs   Lab 03/10/23  0352 03/11/23  0425   WBC 15.63* 13.30*   HGB 13.5* 12.7*   HCT 43.1 39.5*    214       Significant Imaging:       Assessment/Plan:      * Acute respiratory failure with hypoxia and hypercarbia  Patient with Hypercapnic and Hypoxic Respiratory failure which is Acute.  he is not on home oxygen. Supplemental  oxygen was provided and noted-  .   Signs/symptoms of respiratory failure include- respiratory distress. Contributing diagnoses includes - CHF and COPD Labs and images were reviewed. Patient Has recent ABG, which has been reviewed. Will treat underlying causes and adjust management of respiratory failure.  Patient presents with respiratory failure and intubated.  Probably combination of COPD and CHF.  Started on nebs and IV steroids.  Also started on IV diuresis.  Empirically started on ABX's.  Pulmonary consulted for vent management.  patient is in IV lasix for CHF exacerbation and  nebulizer for COPD exacerbation.pulmonology doing vent. Management.    S/P extubation on 3.8.23,on daily NC o 2 and nightly Bipap,consulted PT,OT,ST.    Patient wears 02 at home.  Will monitor today. Home likely on 3/11. Follow up with pulmonary     Chronic obstructive pulmonary disease with acute exacerbation    On  nebulizer and steroids for COPD exacerbation.may need home oxygen at DC time.    Uncontrolled type 2 diabetes mellitus with hyperglycemia  Patient's FSGs are uncontrolled due to hyperglycemia on current medication regimen.  Last A1c reviewed-   Lab Results   Component Value Date    HGBA1C 10.1 (H) 04/13/2022     Most recent fingerstick glucose reviewed- No results for input(s): POCTGLUCOSE in the last 24 hours.  Current correctional scale  Medium  Maintain anti-hyperglycemic dose as follows-   Antihyperglycemics (From admission, onward)    Start     Stop Route Frequency Ordered    03/06/23 1323  insulin aspart U-100 pen 1-10 Units         -- SubQ Every 6 hours PRN 03/06/23 1223      Hold Oral hypoglycemics while patient is in the hospital.  Hyperglycemia may worsen with steroids.  Continue to monitor.    Class 1 obesity due to excess calories with serious comorbidity in adult  Body mass index is 29.41 kg/m². Morbid obesity complicates all aspects of disease management from diagnostic modalities to treatment. Weight loss  encouraged and health benefits explained to patient.         Coronary artery disease involving native coronary artery of native heart without angina pectoris  Continue ASA, Plavix and Statin.  No report of chest pain.  Initial Troponin negative.  Trend Troponin.      Dyslipidemia  Continue Statin.      Acute on chronic combined systolic and diastolic heart failure  With EF of about 30%,on IV lasix,      Tobacco dependence   Smoking cessation counseling zulma over 6 minutes.    Tobacco dependence. States he wants to quit       Benign essential hypertension  Resume home medications with parameters.        VTE Risk Mitigation (From admission, onward)         Ordered     enoxaparin injection 40 mg  Daily         03/06/23 0823     IP VTE HIGH RISK PATIENT  Once         03/06/23 0823     Place sequential compression device  Until discontinued         03/06/23 0823                Discharge Planning   VERA: 3/11/2023     Code Status: Full Code   Is the patient medically ready for discharge?:     Reason for patient still in hospital (select all that apply): Patient unstable  Discharge Plan A: Home with family   Discharge Delays: None known at this time    Will d/c to home           Padilla Arcos MD  Department of Hospital Medicine   [facility]

## 2023-03-11 NOTE — DISCHARGE SUMMARY
[facility] Medicine  Discharge Summary      Patient Name: Abelardo Irene Jr.  MRN: [id number]  [facility]: [id number]  Patient Class: IP- Inpatient  Admission Date: 3/6/2023  Hospital Length of Stay: 5 days  Discharge Date and Time:  03/11/2023 8:40 AM  Attending Physician: Padilla Rogers MD   Discharging Provider: Padilla Rogers MD  Primary Care Provider: Rolando Funes MD    Primary Care Team: Networked reference to record PCT     HPI:   66 y/o male with a PMHx of HTN, COPD, CHF, CAD presents to the ER via EMS for evaluation of respiratory distress beginning last night.  Patient is currently intubated and unable to give history.  Patient complained of shortness of breath throughout the night that progressively worsened.  No improvement of symptoms with use of his inhaler.  Patient noted to have SpO2 of 49% RA on EMS evaluation.  EMS administered 1.5 solumedrol IN en route with increased on his stats to 93%.  No alleviating or exacerbating factors noted.  Patient noted to be in respiratory distress and placed on Bipap.  No improvement with Bipap and patient eventually intubated.  Unable to obtain any further history.      * No surgery found *      Hospital Course:   66 y/o male with a PMHx of HTN, COPD, CHF wit EF of about 30 %,, CAD presents to the ER via EMS for evaluation of respiratory distress,patient was intubated and was unable to give history.  Patient complained of shortness of breath throughout the night that progressively worsened.  No improvement of symptoms with use of his inhaler.  Patient noted to have SpO2 of 49% RA on EMS evaluation.  EMS administered 1.5 solumedrol IN en route with increased on his stats to 93%.  No alleviating or exacerbating factors noted.  Patient noted to be in respiratory distress and placed on Bipap.  No improvement with Bipap and patient eventually intubated.patient is in IV lasix for CHF exacerbation and  nebulizer and steroids for COPD  exacerbation.pulmonology doing vent. Management.on empiric IV Abx,follow up with sputum culture.  S/P extubation on 3.8.23,on daily NC o 2 and nightly Bipap,consulted PT,OT,ST.    Patient transferred out of the ICU on 3/9/23.  PT/OT consulted and rec: H/H with rolling walker. Patient clinically improved and was discharged to home. Activity as tolerated. Diet- low NA. Follow up with Pulmonary in one week           Goals of Care Treatment Preferences:  Code Status: Full Code      Consults:   Consults (From admission, onward)        Status Ordering Provider     Inpatient consult to Palliative Care  Once        Provider:  Madyson Trinidad NP    Completed DORIAN LAU     Inpatient consult to Spiritual Care  Once        Provider:  (Not yet assigned)    Completed DORIAN LAU     Inpatient consult to Pulmonology  Once        Provider:  Jordon Leroy MD    Completed ROSA HOLDER          No new Assessment & Plan notes have been filed under this hospital service since the last note was generated.  Service: Hospital Medicine    Final Active Diagnoses:    Diagnosis Date Noted POA    PRINCIPAL PROBLEM:  Acute respiratory failure with hypoxia and hypercarbia [J96.01, J96.02] 03/06/2023 Yes    Chronic obstructive pulmonary disease with acute exacerbation [J44.1] 03/09/2023 Yes    Uncontrolled type 2 diabetes mellitus with hyperglycemia [E11.65] 04/12/2022 Yes    Palliative care encounter [Z51.5] 04/12/2022 Not Applicable    Class 1 obesity due to excess calories with serious comorbidity in adult [E66.09] 02/13/2021 Yes     Chronic    Coronary artery disease involving native coronary artery of native heart without angina pectoris [I25.10] 11/15/2017 Yes     Chronic    Acute on chronic combined systolic and diastolic heart failure [I50.43] 07/28/2017 Yes    Dyslipidemia [E78.5] 07/28/2017 Yes     Chronic    Tobacco abuse [Z72.0] 04/28/2016 Yes     Chronic    Benign essential hypertension  "[I10] 03/02/2013 Yes     Chronic      Problems Resolved During this Admission:       Discharged Condition: good    Disposition:     Follow Up:   Follow-up Information     Rolando Funes MD. Schedule an appointment as soon as possible for a visit on 3/14/2023.    Specialty: Internal Medicine  Why: Hospital follow up appointment Tuesday @ 9:45am  Contact information:  [address] [place] [address]  [id number]  [place] [address]  [phone]             [facility] Follow up.    Contact information:  [facility]  [facility] [address]  [phone]                     Patient Instructions:      WALKER FOR HOME USE     Order Specific Question Answer Comments   Type of Walker: Adult (5'4"-6'6")    With wheels? Yes    Height: 5' 10" (1.778 m)    Weight: 83.8 kg (184 lb 11.9 oz)    Length of need (1-99 months): 99    Does patient have medical equipment at home? oxygen    Please check all that apply: Patient's condition impairs ambulation.    Please check all that apply: Patient is unable to safely ambulate without equipment.    Please check all that apply: Patient needs help to get in and out of chair.    Please check all that apply: Walker will be used for gait training.      POCT ARTERIAL BLOOD GAS       Significant Diagnostic Studies:     Pending Diagnostic Studies:     None         Medications:  Reconciled Home Medications:      Medication List      ASK your doctor about these medications    albuterol 90 mcg/actuation inhaler  Commonly known as: PROVENTIL/VENTOLIN HFA  Inhale 1-2 puffs into the lungs every 6 (six) hours as needed for Wheezing. Rescue     amLODIPine 5 MG tablet  Commonly known as: NORVASC  Take 1 tablet (5 mg total) by mouth once daily.     aspirin 81 MG EC tablet  Commonly known as: ECOTRIN  Take 1 tablet (81 mg total) by mouth once daily.     atorvastatin 40 MG tablet  Commonly known as: LIPITOR  Take 1 tablet (40 mg total) by mouth once daily.     clopidogreL 75 mg tablet  Commonly known as: " PLAVIX  Take 1 tablet (75 mg total) by mouth once daily.     fluticasone-salmeterol 250-50 mcg/dose 250-50 mcg/dose diskus inhaler  Commonly known as: ADVAIR  Inhale 1 puff into the lungs 2 (two) times daily.     furosemide 40 MG tablet  Commonly known as: LASIX  Take 1 tablet (40 mg total) by mouth 2 (two) times daily.     glipiZIDE 10 MG Tr24  Commonly known as: GLUCOTROL  Take 1 tablet (10 mg total) by mouth daily with breakfast.     lisinopriL 20 MG tablet  Commonly known as: PRINIVIL,ZESTRIL  Take 1 tablet (20 mg total) by mouth once daily.     metFORMIN 500 MG tablet  Commonly known as: GLUCOPHAGE  Take 1 tablet (500 mg total) by mouth 2 (two) times daily with meals.     metoprolol succinate 25 MG 24 hr tablet  Commonly known as: TOPROL-XL  Take 1 tablet (25 mg total) by mouth once daily.            Indwelling Lines/Drains at time of discharge:   Lines/Drains/Airways     None                 Time spent on the discharge of patient: > 35  minutes         Padilla Arcos MD  Department of Hospital Medicine  [facility]

## 2023-03-11 NOTE — NURSING
Handoff report completed with night nurse. Patient found in bed awake, oriented. RR even and unlabored on 2L NC. No distress noted at this time. Will review plan of care and continue to monitor.

## 2023-03-11 NOTE — PLAN OF CARE
Plan of care is ongoing.      Problem: Infection  Goal: Absence of Infection Signs and Symptoms  Outcome: Ongoing, Progressing     Problem: Fall Injury Risk  Goal: Absence of Fall and Fall-Related Injury  Outcome: Ongoing, Progressing     Problem: Restraint, Nonbehavioral (Nonviolent)  Goal: Absence of Harm or Injury  Outcome: Ongoing, Progressing     Problem: Adult Inpatient Plan of Care  Goal: Plan of Care Review  Outcome: Ongoing, Progressing  Goal: Patient-Specific Goal (Individualized)  Outcome: Ongoing, Progressing  Goal: Absence of Hospital-Acquired Illness or Injury  Outcome: Ongoing, Progressing  Goal: Optimal Comfort and Wellbeing  Outcome: Ongoing, Progressing  Goal: Readiness for Transition of Care  Outcome: Ongoing, Progressing     Problem: Adjustment to Illness COPD (Chronic Obstructive Pulmonary Disease)  Goal: Optimal Chronic Illness Coping  Outcome: Ongoing, Progressing     Problem: Functional Ability Impaired COPD (Chronic Obstructive Pulmonary Disease)  Goal: Optimal Level of Functional [place]  Outcome: Ongoing, Progressing     Problem: Infection COPD (Chronic Obstructive Pulmonary Disease)  Goal: Absence of Infection Signs and Symptoms  Outcome: Ongoing, Progressing     Problem: Oral Intake Inadequate COPD (Chronic Obstructive Pulmonary Disease)  Goal: Improved Nutrition Intake  Outcome: Ongoing, Progressing     Problem: Respiratory Compromise COPD (Chronic Obstructive Pulmonary Disease)  Goal: Effective Oxygenation and Ventilation  Outcome: Ongoing, Progressing     Problem: Diabetes Comorbidity  Goal: Blood Glucose Level Within Targeted Range  Outcome: Ongoing, Progressing     Problem: Communication Impairment (Mechanical Ventilation, Invasive)  Goal: Effective Communication  Outcome: Ongoing, Progressing     Problem: Device-Related Complication Risk (Mechanical Ventilation, Invasive)  Goal: Optimal Device Function  Outcome: Ongoing, Progressing     Problem: Inability to Wean (Mechanical  Ventilation, Invasive)  Goal: Mechanical Ventilation Liberation  Outcome: Ongoing, Progressing     Problem: Nutrition Impairment (Mechanical Ventilation, Invasive)  Goal: Optimal Nutrition Delivery  Outcome: Ongoing, Progressing     Problem: Skin and Tissue Injury (Mechanical Ventilation, Invasive)  Goal: Absence of Device-Related Skin and Tissue Injury  Outcome: Ongoing, Progressing     Problem: Ventilator-Induced Lung Injury (Mechanical Ventilation, Invasive)  Goal: Absence of Ventilator-Induced Lung Injury  Outcome: Ongoing, Progressing     Problem: Communication Impairment (Artificial Airway)  Goal: Effective Communication  Outcome: Ongoing, Progressing     Problem: Skin Injury Risk Increased  Goal: Skin Health and Integrity  Outcome: Ongoing, Progressing     Problem: Coping Ineffective  Goal: Effective Coping  Outcome: Ongoing, Progressing

## 2023-03-11 NOTE — NURSING
No acute events during the day.Patient remained free of falls during shift.  Patient participated in PT/OT without difficult. Patient was up in chair during the day.  Patient in bed at this time. Awake, alert and oriented.  RR even and unlabored on 2L NC.   Will report to night nurse.

## 2023-03-11 NOTE — PLAN OF CARE
Patient choice form completed requesting [facility] choose HH. Referral faxed to MATIL[facility] at [phone].       03/11/23 1310   Final Note   Assessment Type Final Discharge Note   Anticipated Discharge Disposition [facility]   Hospital Resources/Appts/Education Provided Appointments scheduled and added to AVS   Post-Acute Status   Post-Acute Authorization Home Health   Home Health Status Referrals Sent   Discharge Delays None known at this time

## 2023-03-11 NOTE — PLAN OF CARE
[facility]      HOME HEALTH ORDERS  FACE TO FACE ENCOUNTER    Patient Name: Abelardo Irene Jr.  YOB: 1955    PCP: Rolando Funes MD   PCP Address: [address] [place] [address] [address] / [place] [address]  PCP Phone Number: [phone]  PCP Fax: [phone]    Encounter Date: 3/6/23    Admit to Home Health    Diagnoses: COPD    Active Hospital Problems    Diagnosis  POA    *Acute respiratory failure with hypoxia and hypercarbia [J96.01, J96.02]  Yes     Priority: 1 - High    Chronic obstructive pulmonary disease with acute exacerbation [J44.1]  Yes    Uncontrolled type 2 diabetes mellitus with hyperglycemia [E11.65]  Yes    Palliative care encounter [Z51.5]  Not Applicable    Class 1 obesity due to excess calories with serious comorbidity in adult [E66.09]  Yes     Chronic    Coronary artery disease involving native coronary artery of native heart without angina pectoris [I25.10]  Yes     Chronic    Acute on chronic combined systolic and diastolic heart failure [I50.43]  Yes    Dyslipidemia [E78.5]  Yes     Chronic    Tobacco abuse [Z72.0]  Yes     Chronic    Benign essential hypertension [I10]  Yes     Chronic      Resolved Hospital Problems   No resolved problems to display.       Follow Up Appointments:  No future appointments.    Allergies:  Review of patient's allergies indicates:   Allergen Reactions    Contrast media Shortness Of Breath, Itching and Anxiety     Patient states that he had a bad reaction, anxiety , shortness of breath, itching .        Medications: Review discharge medications with patient and family and provide education.    Current Facility-Administered Medications   Medication Dose Route Frequency Provider Last Rate Last Admin    acetaminophen tablet 650 mg  650 mg Oral Q4H PRN Iron Bledsoe MD        albuterol-ipratropium 2.5 mg-0.5 mg/3 mL nebulizer solution 3 mL  3 mL Nebulization Q6H PRN Jordon Leroy MD        amLODIPine tablet 10 mg  10 mg Oral Daily  Romeo Lopez MD   10 mg at 03/09/23 1020    aspirin EC tablet 81 mg  81 mg Oral Daily Iron Bledsoe MD   81 mg at 03/10/23 0911    atorvastatin tablet 40 mg  40 mg Oral Daily Iron Bledsoe MD   40 mg at 03/10/23 0911    calcium gluconate 1 g in NS IVPB (premixed)  1 g Intravenous PRN Lydia H. Ezekiel, NP        calcium gluconate 1 g in NS IVPB (premixed)  2 g Intravenous PRN Lydia H. Ezekiel, NP        calcium gluconate 1 g in NS IVPB (premixed)  3 g Intravenous PRN Lydia H. Ezekiel, NP        cefepime in dextrose 5 % IVPB 2 g  2 g Intravenous Q8H Lydia H. Ezekiel, NP   Stopped at 03/11/23 0452    cloNIDine tablet 0.2 mg  0.2 mg Oral Q4H PRN Romeo Lopez MD   0.2 mg at 03/10/23 0400    clopidogreL tablet 75 mg  75 mg Oral Daily Iron Bledsoe MD   75 mg at 03/10/23 0911    dextrose 10% bolus 125 mL 125 mL  12.5 g Intravenous PRN Iron Bledsoe MD        enoxaparin injection 40 mg  40 mg Subcutaneous Daily Iron Bledsoe MD   40 mg at 03/10/23 1702    famotidine (PF) injection 20 mg  20 mg Intravenous BID Iron Bledsoe MD   20 mg at 03/10/23 2046    furosemide injection 20 mg  20 mg Intravenous Q12H Romeo Lopez MD   20 mg at 03/11/23 0510    glucagon (human recombinant) injection 1 mg  1 mg Intramuscular PRN Iron Bledsoe MD        insulin aspart U-100 pen 1-10 Units  1-10 Units Subcutaneous QID (AC + HS) PRN Lydia H. Ezekiel NP   6 Units at 03/10/23 1702    lisinopriL tablet 20 mg  20 mg Oral Daily Romeo Lopez MD   20 mg at 03/09/23 1019    magnesium sulfate 2g in water 50mL IVPB (premix)  2 g Intravenous PRN Lydia H. Ezekiel, NP        magnesium sulfate 2g in water 50mL IVPB (premix)  4 g Intravenous PRN Lydia H. Ezekiel, NP        mupirocin 2 % ointment   Nasal BID Iron Bledsoe MD   Given at 03/10/23 2047    ondansetron injection 8 mg  8 mg Intravenous Q6H PRN Iron Bledsoe MD        polyethylene glycol packet 17 g  17 g Oral BID PRN Iron Bledsoe MD         potassium chloride 10 mEq in 100 mL IVPB  40 mEq Intravenous PRN Lydia H. Ezekiel, NP        And    potassium chloride 10 mEq in 100 mL IVPB  60 mEq Intravenous PRN Lydia H. Ezekiel,  mL/hr at 03/07/23 1440 60 mEq at 03/07/23 1440    And    potassium chloride 10 mEq in 100 mL IVPB  80 mEq Intravenous PRN Lydia H. Ezekiel, NP        sodium chloride 0.9% flush 3 mL  3 mL Intravenous PRN Iron Bledsoe MD        sodium phosphate 15 mmol in dextrose 5 % (D5W) 250 mL IVPB  15 mmol Intravenous PRN Lydia H. Ezekiel, NP        sodium phosphate 20.01 mmol in dextrose 5 % (D5W) 250 mL IVPB  20.01 mmol Intravenous PRN Lydia H. Ezekiel, NP        sodium phosphate 30 mmol in dextrose 5 % (D5W) 250 mL IVPB  30 mmol Intravenous PRN Lydia H. Ezekiel, NP         Current Discharge Medication List        START taking these medications    Details   doxycycline (VIBRAMYCIN) 100 MG Cap Take 1 capsule (100 mg total) by mouth every 12 (twelve) hours. for 7 days  Qty: 14 capsule, Refills: 0           CONTINUE these medications which have NOT CHANGED    Details   albuterol (PROVENTIL/VENTOLIN HFA) 90 mcg/actuation inhaler Inhale 1-2 puffs into the lungs every 6 (six) hours as needed for Wheezing. Rescue  Qty: 6.7 g, Refills: 6      amLODIPine (NORVASC) 5 MG tablet Take 1 tablet (5 mg total) by mouth once daily.  Qty: 30 tablet, Refills: 6    Comments: .      aspirin (ECOTRIN) 81 MG EC tablet Take 1 tablet (81 mg total) by mouth once daily.  Qty: 30 tablet, Refills: 0      atorvastatin (LIPITOR) 40 MG tablet Take 1 tablet (40 mg total) by mouth once daily.  Qty: 90 tablet, Refills: 6      clopidogreL (PLAVIX) 75 mg tablet Take 1 tablet (75 mg total) by mouth once daily.  Qty: 30 tablet, Refills: 6      fluticasone-salmeterol diskus inhaler 250-50 mcg Inhale 1 puff into the lungs 2 (two) times daily.  Qty: 60 each, Refills: 6      furosemide (LASIX) 40 MG tablet Take 1 tablet (40 mg total) by mouth 2 (two) times daily.  Qty: 60 tablet, Refills: 5       glipiZIDE (GLUCOTROL) 10 MG TR24 Take 1 tablet (10 mg total) by mouth daily with breakfast.  Qty: 30 tablet, Refills: 5      lisinopriL (PRINIVIL,ZESTRIL) 20 MG tablet Take 1 tablet (20 mg total) by mouth once daily.  Qty: 30 tablet, Refills: 6    Comments: .      metFORMIN (GLUCOPHAGE) 500 MG tablet Take 1 tablet (500 mg total) by mouth 2 (two) times daily with meals.  Qty: 60 tablet, Refills: 5      metoprolol succinate (TOPROL-XL) 25 MG 24 hr tablet Take 1 tablet (25 mg total) by mouth once daily.  Qty: 30 tablet, Refills: 11    Comments: .  Associated Diagnoses: Benign essential hypertension               I have seen and examined this patient within the last 30 days. My clinical findings that support the need for the home health skilled services and home bound status are the following:no   Weakness/numbness causing balance and gait disturbance due to Weakness/Debility making it taxing to leave home.     Diet:   diabetic diet 2000 calorie, Low Na         Referrals/ Consults  Physical Therapy to evaluate and treat. Evaluate for home safety and equipment needs; Establish/upgrade home exercise program. Perform / instruct on therapeutic exercises, gait training, transfer training, and Range of Motion.  Occupational Therapy to evaluate and treat. Evaluate home environment for safety and equipment needs. Perform/Instruct on transfers, ADL training, ROM, and therapeutic exercises.  Aide to provide assistance with personal care, ADLs, and vital signs.    Activities:   activity as tolerated    Nursing:   Agency to admit patient within 24 hours of hospital discharge unless specified on physician order or at patient request    SN to complete comprehensive assessment including routine vital signs. Instruct on disease process and s/s of complications to report to MD. Review/verify medication list sent home with the patient at time of discharge  and instruct patient/caregiver as needed. Frequency may be adjusted depending on  start of care date.     Skilled nurse to perform up to 3 visits PRN for symptoms related to diagnosis    Notify MD if SBP > 160 or < 90; DBP > 90 or < 50; HR > 120 or < 50; Temp > 101; O2 < 88%; Other:       Ok to schedule additional visits based on staff availability and patient request on consecutive days within the home health episode.    When multiple disciplines ordered:    Start of Care occurs on Sunday - Wednesday schedule remaining discipline evaluations as ordered on separate consecutive days following the start of care.    Thursday SOC -schedule subsequent evaluations Friday and Monday the following week.     Friday - Saturday SOC - schedule subsequent discipline evaluations on consecutive days starting Monday of the following week.    For all post-discharge communication and subsequent orders please contact patient's primary care physician.       I certify that this patient is confined to his home and needs intermittent skilled nursing care, physical therapy, and occupational therapy.

## 2023-03-16 ENCOUNTER — PES CALL (OUTPATIENT)
Dept: ADMINISTRATIVE | Facility: CLINIC | Age: 68
End: 2023-03-16
Payer: MEDICARE

## 2023-03-16 PROCEDURE — G0180 PR HOME HEALTH MD CERTIFICATION: ICD-10-PCS | Mod: ,,, | Performed by: INTERNAL MEDICINE

## 2023-03-16 PROCEDURE — G0180 MD CERTIFICATION HHA PATIENT: HCPCS | Mod: ,,, | Performed by: INTERNAL MEDICINE

## 2023-03-20 ENCOUNTER — OFFICE VISIT (OUTPATIENT)
Dept: HOME HEALTH SERVICES | Facility: CLINIC | Age: 68
End: 2023-03-20
Payer: MEDICARE

## 2023-03-20 DIAGNOSIS — J96.01 ACUTE RESPIRATORY FAILURE WITH HYPOXIA AND HYPERCARBIA: Primary | ICD-10-CM

## 2023-03-20 DIAGNOSIS — J96.02 ACUTE RESPIRATORY FAILURE WITH HYPOXIA AND HYPERCARBIA: Primary | ICD-10-CM

## 2023-03-20 DIAGNOSIS — J96.90 RESPIRATORY FAILURE, UNSPECIFIED CHRONICITY, UNSPECIFIED WHETHER WITH HYPOXIA OR HYPERCAPNIA: ICD-10-CM

## 2023-03-20 DIAGNOSIS — I10 BENIGN ESSENTIAL HYPERTENSION: Chronic | ICD-10-CM

## 2023-03-20 PROCEDURE — 3051F HG A1C>EQUAL 7.0%<8.0%: CPT | Mod: CPTII,S$GLB,, | Performed by: NURSE PRACTITIONER

## 2023-03-20 PROCEDURE — 3288F PR FALLS RISK ASSESSMENT DOCUMENTED: ICD-10-PCS | Mod: CPTII,S$GLB,, | Performed by: NURSE PRACTITIONER

## 2023-03-20 PROCEDURE — 99350 HOME/RES VST EST HIGH MDM 60: CPT | Mod: S$GLB,,, | Performed by: NURSE PRACTITIONER

## 2023-03-20 PROCEDURE — 3077F PR MOST RECENT SYSTOLIC BLOOD PRESSURE >= 140 MM HG: ICD-10-PCS | Mod: CPTII,S$GLB,, | Performed by: NURSE PRACTITIONER

## 2023-03-20 PROCEDURE — 3079F DIAST BP 80-89 MM HG: CPT | Mod: CPTII,S$GLB,, | Performed by: NURSE PRACTITIONER

## 2023-03-20 PROCEDURE — 3288F FALL RISK ASSESSMENT DOCD: CPT | Mod: CPTII,S$GLB,, | Performed by: NURSE PRACTITIONER

## 2023-03-20 PROCEDURE — 3079F PR MOST RECENT DIASTOLIC BLOOD PRESSURE 80-89 MM HG: ICD-10-PCS | Mod: CPTII,S$GLB,, | Performed by: NURSE PRACTITIONER

## 2023-03-20 PROCEDURE — 3051F PR MOST RECENT HEMOGLOBIN A1C LEVEL 7.0 - < 8.0%: ICD-10-PCS | Mod: CPTII,S$GLB,, | Performed by: NURSE PRACTITIONER

## 2023-03-20 PROCEDURE — 3077F SYST BP >= 140 MM HG: CPT | Mod: CPTII,S$GLB,, | Performed by: NURSE PRACTITIONER

## 2023-03-20 PROCEDURE — 1160F PR REVIEW ALL MEDS BY PRESCRIBER/CLIN PHARMACIST DOCUMENTED: ICD-10-PCS | Mod: CPTII,S$GLB,, | Performed by: NURSE PRACTITIONER

## 2023-03-20 PROCEDURE — 1126F PR PAIN SEVERITY QUANTIFIED, NO PAIN PRESENT: ICD-10-PCS | Mod: CPTII,S$GLB,, | Performed by: NURSE PRACTITIONER

## 2023-03-20 PROCEDURE — 1160F RVW MEDS BY RX/DR IN RCRD: CPT | Mod: CPTII,S$GLB,, | Performed by: NURSE PRACTITIONER

## 2023-03-20 PROCEDURE — 1101F PT FALLS ASSESS-DOCD LE1/YR: CPT | Mod: CPTII,S$GLB,, | Performed by: NURSE PRACTITIONER

## 2023-03-20 PROCEDURE — 1159F PR MEDICATION LIST DOCUMENTED IN MEDICAL RECORD: ICD-10-PCS | Mod: CPTII,S$GLB,, | Performed by: NURSE PRACTITIONER

## 2023-03-20 PROCEDURE — 1159F MED LIST DOCD IN RCRD: CPT | Mod: CPTII,S$GLB,, | Performed by: NURSE PRACTITIONER

## 2023-03-20 PROCEDURE — 99350 PR HOME VISIT,ESTAB PATIENT,LEVEL IV: ICD-10-PCS | Mod: S$GLB,,, | Performed by: NURSE PRACTITIONER

## 2023-03-20 PROCEDURE — 1126F AMNT PAIN NOTED NONE PRSNT: CPT | Mod: CPTII,S$GLB,, | Performed by: NURSE PRACTITIONER

## 2023-03-20 PROCEDURE — 1101F PR PT FALLS ASSESS DOC 0-1 FALLS W/OUT INJ PAST YR: ICD-10-PCS | Mod: CPTII,S$GLB,, | Performed by: NURSE PRACTITIONER

## 2023-03-21 VITALS
SYSTOLIC BLOOD PRESSURE: 142 MMHG | DIASTOLIC BLOOD PRESSURE: 80 MMHG | OXYGEN SATURATION: 98 % | RESPIRATION RATE: 16 BRPM | TEMPERATURE: 98 F | HEART RATE: 78 BPM

## 2023-03-21 NOTE — PROGRESS NOTES
[facility]  Transition of Care Home Visit    Visit Date: 3/20/2023  Encounter Provider: Shant Hill   PCP:  Rolando Funes MD    PRESENTING HISTORY      Patient ID: Abelardo Irene Jr. is a 67 y.o. male.    Consult Requested By:  Dr. Padilla Rogers  Reason for Consult:  Hospital Follow Up.    Abelardo is being seen at home due to being seen at home due to physical debility that presents a taxing effort to leave the home, to mitigate high risk of hospital readmission and/or due to the limited availability of reliable or safe options for transportation to the point of access to the provider. Prior to treatment on this visit the chart was reviewed and patient verbal consent was obtained.      Chief Complaint: Transitional Care        History of Present Illness: Mr. Abelardo Irene Jr. is a 67 y.o. male who was recently admitted to the hospital.    HPI:   68 y/o male with a PMHx of HTN, COPD, CHF, CAD presents to the ER via EMS for evaluation of respiratory distress beginning last night.  Patient is currently intubated and unable to give history.  Patient complained of shortness of breath throughout the night that progressively worsened.  No improvement of symptoms with use of his inhaler.  Patient noted to have SpO2 of 49% RA on EMS evaluation.  EMS administered 1.5 solumedrol IN en route with increased on his stats to 93%.  No alleviating or exacerbating factors noted.  Patient noted to be in respiratory distress and placed on Bipap.  No improvement with Bipap and patient eventually intubated.  Unable to obtain any further history.        * No surgery found *       Hospital Course:   68 y/o male with a PMHx of HTN, COPD, CHF wit EF of about 30 %,, CAD presents to the ER via EMS for evaluation of respiratory distress,patient was intubated and was unable to give history.  Patient complained of shortness of breath throughout the night that progressively worsened.  No improvement of symptoms with use of his  inhaler.  Patient noted to have SpO2 of 49% RA on EMS evaluation.  EMS administered 1.5 solumedrol IN en route with increased on his stats to 93%.  No alleviating or exacerbating factors noted.  Patient noted to be in respiratory distress and placed on Bipap.  No improvement with Bipap and patient eventually intubated.patient is in IV lasix for CHF exacerbation and  nebulizer and steroids for COPD exacerbation.pulmonology doing vent. Management.on empiric IV Abx,follow up with sputum culture.  S/P extubation on 3.8.23,on daily NC o 2 and nightly Bipap,consulted PT,OT,ST.     Patient transferred out of the ICU on 3/9/23.  PT/OT consulted and rec: H/H with rolling walker. Patient clinically improved and was discharged to home. Activity as tolerated. Diet- low NA. Follow up with Pulmonary in one week       ___________________________________________________________________    Today: With this visit today patient is found ambulating around his home. Patient is AAOx3.  He is not seeing home health and does not warrant home health at this time.  The patient verbalizes that he is adequate transportation to all outside appointments. Denies falls.  Denies smoking, alcohol abuse, illicit drug use. Patient endorses ability to perform ADls. Endorses eating x 3 meals per day, daily BMs, and adequate sleep pattern. No additional needs at this this time. All of my information was given to the patient and family if any questions or concerns arise.     VSS. Denies fever, chest pain, shortness of breath, nausea, vomiting, diarrhea. Risks of environmental exposure to coronavirus discussed including: social distancing, hand hygiene, and limiting departures from the home for necessities only.  Reports understanding and willingness to comply.  All hospital discharge orders reviewed and being followed, all medications reconciled and reviewed, patient and family verbalized understanding. No other needs identified at this time.       Attestation: Screening criteria to assess the level of the patient's risk for infection with COVID-19 as recommended by the CDC at the time of the above documented home visit concluded appropriateness to proceed. Universal precautions were maintained at all times, including provider use of 60% alcohol gel hand  immediately prior to entry and upon departing the patient's home.    Review of Systems   Constitutional:  Negative for activity change and appetite change.   HENT:  Negative for congestion and dental problem.    Eyes:  Negative for discharge and itching.   Respiratory:  Negative for choking and chest tightness.    Cardiovascular:  Negative for chest pain and palpitations.   Gastrointestinal:  Negative for rectal pain and vomiting.   Endocrine: Negative for cold intolerance and heat intolerance.   Genitourinary:  Negative for enuresis and flank pain.   Musculoskeletal:  Negative for myalgias and neck pain.   Skin:  Negative for color change and wound.   Allergic/Immunologic: Negative for environmental allergies and food allergies.   Neurological:  Negative for tremors and syncope.   Hematological:  Does not bruise/bleed easily.   Psychiatric/Behavioral:  Negative for decreased concentration and dysphoric mood.      PAST HISTORY:     Past Medical History:   Diagnosis Date    CHF (congestive heart failure)     COPD (chronic obstructive pulmonary disease)     Coronary artery disease     Elevated cholesterol     on medication    Hypertension        Past Surgical History:   Procedure Laterality Date    CARDIAC SURGERY      coronary stent placed    CORONARY STENT PLACEMENT         Family History   Problem Relation Age of Onset    Cancer Mother     No Known Problems Father        Social History     Socioeconomic History    Marital status: Legally    Tobacco Use    Smoking status: Former     Packs/day: 2.00     Years: 45.00     Pack years: 90.00     Types: Cigarettes     Quit date: 11/7/2017      Years since quittin.3    Smokeless tobacco: Never   Substance and Sexual Activity    Alcohol use: Yes     Comment: socially    Drug use: No    Sexual activity: Yes     Partners: Female     Birth control/protection: None       MEDICATIONS & ALLERGIES:     Current Outpatient Medications on File Prior to Visit   Medication Sig Dispense Refill    albuterol (PROVENTIL/VENTOLIN HFA) 90 mcg/actuation inhaler Inhale 1-2 puffs into the lungs every 6 (six) hours as needed for Wheezing. Rescue 6.7 g 6    amLODIPine (NORVASC) 5 MG tablet Take 1 tablet (5 mg total) by mouth once daily. 30 tablet 6    aspirin (ECOTRIN) 81 MG EC tablet Take 1 tablet (81 mg total) by mouth once daily. 30 tablet 0    atorvastatin (LIPITOR) 40 MG tablet Take 1 tablet (40 mg total) by mouth once daily. 90 tablet 6    clopidogreL (PLAVIX) 75 mg tablet Take 1 tablet (75 mg total) by mouth once daily. 30 tablet 6    fluticasone-salmeterol diskus inhaler 250-50 mcg Inhale 1 puff into the lungs 2 (two) times daily. 60 each 6    furosemide (LASIX) 40 MG tablet Take 1 tablet (40 mg total) by mouth 2 (two) times daily. 60 tablet 5    glipiZIDE (GLUCOTROL) 10 MG TR24 Take 1 tablet (10 mg total) by mouth daily with breakfast. 30 tablet 5    lisinopriL (PRINIVIL,ZESTRIL) 20 MG tablet Take 1 tablet (20 mg total) by mouth once daily. 30 tablet 6    metFORMIN (GLUCOPHAGE) 500 MG tablet Take 1 tablet (500 mg total) by mouth 2 (two) times daily with meals. 60 tablet 5    metoprolol succinate (TOPROL-XL) 25 MG 24 hr tablet Take 1 tablet (25 mg total) by mouth once daily. 30 tablet 11     No current facility-administered medications on file prior to visit.        Review of patient's allergies indicates:   Allergen Reactions    Contrast media Shortness Of Breath, Itching and Anxiety     Patient states that he had a bad reaction, anxiety , shortness of breath, itching .        OBJECTIVE:     Vital Signs:  Vitals:    23 0923   BP: (!) 142/80   Pulse: 78   Resp:  16   Temp: 97.7 °F (36.5 °C)     There is no height or weight on file to calculate BMI.     Physical Exam:  Physical Exam  Vitals reviewed.   Constitutional:       Appearance: He is well-developed.   HENT:      Head: Normocephalic and atraumatic.   Eyes:      Pupils: Pupils are equal, round, and reactive to light.   Cardiovascular:      Rate and Rhythm: Normal rate.   Pulmonary:      Effort: Pulmonary effort is normal.      Breath sounds: Examination of the right-lower field reveals decreased breath sounds. Examination of the left-lower field reveals decreased breath sounds. Decreased breath sounds present.   Abdominal:      General: Bowel sounds are normal.      Palpations: Abdomen is soft.   Musculoskeletal:         General: Normal range of motion.      Cervical back: Normal range of motion and neck supple.   Skin:     General: Skin is warm and dry.   Neurological:      Mental Status: He is alert. Mental status is at baseline.      GCS: GCS eye subscore is 4. GCS verbal subscore is 5. GCS motor subscore is 6.   Psychiatric:         Attention and Perception: Attention normal.         Mood and Affect: Mood normal.         Speech: Speech normal.     Laboratory  Lab Results   Component Value Date    WBC 13.30 (H) 03/11/2023    HGB 12.7 (L) 03/11/2023    HCT 39.5 (L) 03/11/2023    MCV 87 03/11/2023     03/11/2023     Lab Results   Component Value Date    INR 0.9 01/22/2018    INR 0.9 11/13/2017    INR 1.0 07/27/2017     Lab Results   Component Value Date    HGBA1C 7.7 (H) 03/06/2023     No results for input(s): POCTGLUCOSE in the last 72 hours.    TRANSITION OF CARE:     [facility] Contact Note: 3/16/23    Family and/or Caretaker present at visit?  No.  Diagnostic tests reviewed/disposition: No diagnosic tests pending after this hospitalization.  Disease/illness education: Importance of compliance with all prescribed medication and treatments, COVID precautions/Social Distancing/Mask Use  Home  health/community services discussion/referrals: Patient does not have home health established from hospital visit.  They do not need home health.  If needed, we will set up home health for the patient.   Establishment or re-establishment of referral orders for community resources: No other necessary community resources.   Discussion with other health care providers: No discussion with other health care providers necessary.     Transition of Care Visit:  I have reviewed and updated the history and problem list.  I have reconciled the medication list.  I have discussed the hospitalization and current medical issues, prognosis and plans with the patient/family.  I  spent more than 50% of time discussing the care with the patient/family.  Total Face-to-Face Encounter: 60 minutes.    Medications Reconciliation:   I have reconciled the patient's home medications and discharge medications with the patient/family. I have updated all changes.  Refer to After-Visit Medication List.    I have discussed discharge plans, follow-up instructions, future appointments, provider contact information, indicators to seek medical emergency treatment, and advisement to call with additional questions or concerns. Patient and caregiver verbalize understanding.    ASSESSMENT & PLAN:     1. Acute respiratory failure with hypoxia and hypercarbia  Assessment & Plan:  --nebs and inhalers in home  --3 L o2 prn  --instructed to f/up with pulmnology      2. Respiratory failure, unspecified chronicity, unspecified whether with hypoxia or hypercapnia  -     Ambulatory referral/consult to [facility]    3. Benign essential hypertension  Assessment & Plan:  --compliant with antihypertensives  --normotensive at visit             Were controlled substances prescribed?  No    Instructions for the patient:    Scheduled Follow-up :  No future appointments.    After Visit Medication List :     Medication List             Accurate as of March 20, 2023 11:59 PM. If you have any questions, ask your nurse or doctor.                CONTINUE taking these medications      albuterol 90 mcg/actuation inhaler  Commonly known as: PROVENTIL/VENTOLIN HFA  Inhale 1-2 puffs into the lungs every 6 (six) hours as needed for Wheezing. Rescue     amLODIPine 5 MG tablet  Commonly known as: NORVASC  Take 1 tablet (5 mg total) by mouth once daily.     aspirin 81 MG EC tablet  Commonly known as: ECOTRIN  Take 1 tablet (81 mg total) by mouth once daily.     atorvastatin 40 MG tablet  Commonly known as: LIPITOR  Take 1 tablet (40 mg total) by mouth once daily.     clopidogreL 75 mg tablet  Commonly known as: PLAVIX  Take 1 tablet (75 mg total) by mouth once daily.     fluticasone-salmeterol 250-50 mcg/dose 250-50 mcg/dose diskus inhaler  Commonly known as: ADVAIR  Inhale 1 puff into the lungs 2 (two) times daily.     furosemide 40 MG tablet  Commonly known as: LASIX  Take 1 tablet (40 mg total) by mouth 2 (two) times daily.     glipiZIDE 10 MG Tr24  Commonly known as: GLUCOTROL  Take 1 tablet (10 mg total) by mouth daily with breakfast.     lisinopriL 20 MG tablet  Commonly known as: PRINIVIL,ZESTRIL  Take 1 tablet (20 mg total) by mouth once daily.     metFORMIN 500 MG tablet  Commonly known as: GLUCOPHAGE  Take 1 tablet (500 mg total) by mouth 2 (two) times daily with meals.     metoprolol succinate 25 MG 24 hr tablet  Commonly known as: TOPROL-XL  Take 1 tablet (25 mg total) by mouth once daily.              Signature: Shant Hill NP

## 2023-05-09 ENCOUNTER — EXTERNAL HOME HEALTH (OUTPATIENT)
Dept: HOME HEALTH SERVICES | Facility: HOSPITAL | Age: 68
End: 2023-05-09
Payer: MEDICARE

## 2023-06-12 PROBLEM — J96.01 ACUTE RESPIRATORY FAILURE WITH HYPOXIA AND HYPERCARBIA: Status: RESOLVED | Noted: 2023-03-06 | Resolved: 2023-06-12

## 2023-06-12 PROBLEM — J96.02 ACUTE RESPIRATORY FAILURE WITH HYPOXIA AND HYPERCARBIA: Status: RESOLVED | Noted: 2023-03-06 | Resolved: 2023-06-12

## 2023-07-02 ENCOUNTER — HOSPITAL ENCOUNTER (INPATIENT)
Facility: HOSPITAL | Age: 68
LOS: 5 days | Discharge: HOME | DRG: 189 | End: 2023-07-07
Attending: EMERGENCY MEDICINE | Admitting: STUDENT IN AN ORGANIZED HEALTH CARE EDUCATION/TRAINING PROGRAM
Payer: MEDICARE

## 2023-07-02 DIAGNOSIS — J96.92 HYPERCAPNIC RESPIRATORY FAILURE: ICD-10-CM

## 2023-07-02 DIAGNOSIS — R06.02 SHORTNESS OF BREATH: ICD-10-CM

## 2023-07-02 DIAGNOSIS — I50.9 CHF (CONGESTIVE HEART FAILURE): ICD-10-CM

## 2023-07-02 DIAGNOSIS — J44.1 COPD EXACERBATION: ICD-10-CM

## 2023-07-02 DIAGNOSIS — I50.43 ACUTE ON CHRONIC COMBINED SYSTOLIC AND DIASTOLIC HEART FAILURE: Primary | ICD-10-CM

## 2023-07-02 PROBLEM — J96.02 ACUTE HYPERCAPNIC RESPIRATORY FAILURE: Status: RESOLVED | Noted: 2023-03-06 | Resolved: 2023-07-02

## 2023-07-02 LAB
ALBUMIN SERPL BCP-MCNC: 3.8 G/DL (ref 3.5–5.2)
ALLENS TEST: ABNORMAL
ALLENS TEST: ABNORMAL
ALP SERPL-CCNC: 105 U/L (ref 55–135)
ALT SERPL W/O P-5'-P-CCNC: 12 U/L (ref 10–44)
ANION GAP SERPL CALC-SCNC: 10 MMOL/L (ref 8–16)
AST SERPL-CCNC: 13 U/L (ref 10–40)
BASOPHILS # BLD AUTO: 0.06 K/UL (ref 0–0.2)
BASOPHILS NFR BLD: 0.6 % (ref 0–1.9)
BILIRUB SERPL-MCNC: 0.3 MG/DL (ref 0.1–1)
BNP SERPL-MCNC: 446 PG/ML (ref 0–99)
BUN SERPL-MCNC: 4 MG/DL (ref 8–23)
CALCIUM SERPL-MCNC: 9.1 MG/DL (ref 8.7–10.5)
CHLORIDE SERPL-SCNC: 97 MMOL/L (ref 95–110)
CO2 SERPL-SCNC: 34 MMOL/L (ref 23–29)
CREAT SERPL-MCNC: 0.8 MG/DL (ref 0.5–1.4)
CTP QC/QA: YES
CTP QC/QA: YES
DELSYS: ABNORMAL
DELSYS: ABNORMAL
DIFFERENTIAL METHOD: ABNORMAL
EOSINOPHIL # BLD AUTO: 0.4 K/UL (ref 0–0.5)
EOSINOPHIL NFR BLD: 3.8 % (ref 0–8)
EP: 8
EP: 8
ERYTHROCYTE [DISTWIDTH] IN BLOOD BY AUTOMATED COUNT: 13.1 % (ref 11.5–14.5)
ERYTHROCYTE [SEDIMENTATION RATE] IN BLOOD BY WESTERGREN METHOD: 12 MM/H
ERYTHROCYTE [SEDIMENTATION RATE] IN BLOOD BY WESTERGREN METHOD: 12 MM/H
EST. GFR  (NO RACE VARIABLE): >60 ML/MIN/1.73 M^2
FIO2: 100
FIO2: 45
GLUCOSE SERPL-MCNC: 192 MG/DL (ref 70–110)
HCO3 UR-SCNC: 41.2 MMOL/L (ref 24–28)
HCO3 UR-SCNC: 43 MMOL/L (ref 24–28)
HCT VFR BLD AUTO: 40.4 % (ref 40–54)
HGB BLD-MCNC: 12.4 G/DL (ref 14–18)
IMM GRANULOCYTES # BLD AUTO: 0.03 K/UL (ref 0–0.04)
IMM GRANULOCYTES NFR BLD AUTO: 0.3 % (ref 0–0.5)
IP: 16
IP: 16
LYMPHOCYTES # BLD AUTO: 3.8 K/UL (ref 1–4.8)
LYMPHOCYTES NFR BLD: 38.5 % (ref 18–48)
MCH RBC QN AUTO: 28.7 PG (ref 27–31)
MCHC RBC AUTO-ENTMCNC: 30.7 G/DL (ref 32–36)
MCV RBC AUTO: 94 FL (ref 82–98)
MIN VOL: 11.4
MIN VOL: 7.4
MODE: ABNORMAL
MODE: ABNORMAL
MONOCYTES # BLD AUTO: 0.6 K/UL (ref 0.3–1)
MONOCYTES NFR BLD: 6.1 % (ref 4–15)
NEUTROPHILS # BLD AUTO: 5.1 K/UL (ref 1.8–7.7)
NEUTROPHILS NFR BLD: 50.7 % (ref 38–73)
NRBC BLD-RTO: 0 /100 WBC
PCO2 BLDA: 109.2 MMHG (ref 35–45)
PCO2 BLDA: 84 MMHG (ref 35–45)
PH SMN: 7.2 [PH] (ref 7.35–7.45)
PH SMN: 7.3 [PH] (ref 7.35–7.45)
PLATELET # BLD AUTO: 242 K/UL (ref 150–450)
PMV BLD AUTO: 10.9 FL (ref 9.2–12.9)
PO2 BLDA: 415 MMHG (ref 80–100)
PO2 BLDA: 70 MMHG (ref 80–100)
POC BE: 11 MMOL/L
POC BE: 12 MMOL/L
POC MOLECULAR INFLUENZA A AGN: NEGATIVE
POC MOLECULAR INFLUENZA B AGN: NEGATIVE
POC SATURATED O2: 100 % (ref 95–100)
POC SATURATED O2: 90 % (ref 95–100)
POC TCO2: 44 MMOL/L (ref 23–27)
POC TCO2: 46 MMOL/L (ref 23–27)
POCT GLUCOSE: 230 MG/DL (ref 70–110)
POCT GLUCOSE: 270 MG/DL (ref 70–110)
POTASSIUM SERPL-SCNC: 4.1 MMOL/L (ref 3.5–5.1)
PROCALCITONIN SERPL IA-MCNC: <0.02 NG/ML
PROT SERPL-MCNC: 7.7 G/DL (ref 6–8.4)
RBC # BLD AUTO: 4.32 M/UL (ref 4.6–6.2)
SAMPLE: ABNORMAL
SAMPLE: ABNORMAL
SARS-COV-2 RDRP RESP QL NAA+PROBE: NEGATIVE
SITE: ABNORMAL
SITE: ABNORMAL
SODIUM SERPL-SCNC: 141 MMOL/L (ref 136–145)
SP02: 100
SP02: 96
SPONT RATE: 20
SPONT RATE: 34
TROPONIN I SERPL DL<=0.01 NG/ML-MCNC: 0.01 NG/ML (ref 0–0.03)
WBC # BLD AUTO: 9.97 K/UL (ref 3.9–12.7)

## 2023-07-02 PROCEDURE — 99900035 HC TECH TIME PER 15 MIN (STAT)

## 2023-07-02 PROCEDURE — 63600175 PHARM REV CODE 636 W HCPCS: Performed by: EMERGENCY MEDICINE

## 2023-07-02 PROCEDURE — 36600 WITHDRAWAL OF ARTERIAL BLOOD: CPT

## 2023-07-02 PROCEDURE — 82803 BLOOD GASES ANY COMBINATION: CPT

## 2023-07-02 PROCEDURE — 83880 ASSAY OF NATRIURETIC PEPTIDE: CPT | Performed by: EMERGENCY MEDICINE

## 2023-07-02 PROCEDURE — 94640 AIRWAY INHALATION TREATMENT: CPT

## 2023-07-02 PROCEDURE — 25000003 PHARM REV CODE 250: Performed by: STUDENT IN AN ORGANIZED HEALTH CARE EDUCATION/TRAINING PROGRAM

## 2023-07-02 PROCEDURE — 93010 ELECTROCARDIOGRAM REPORT: CPT | Mod: ,,, | Performed by: INTERNAL MEDICINE

## 2023-07-02 PROCEDURE — 25000242 PHARM REV CODE 250 ALT 637 W/ HCPCS: Performed by: STUDENT IN AN ORGANIZED HEALTH CARE EDUCATION/TRAINING PROGRAM

## 2023-07-02 PROCEDURE — 85025 COMPLETE CBC W/AUTO DIFF WBC: CPT | Performed by: EMERGENCY MEDICINE

## 2023-07-02 PROCEDURE — 25000242 PHARM REV CODE 250 ALT 637 W/ HCPCS: Performed by: EMERGENCY MEDICINE

## 2023-07-02 PROCEDURE — 87635 SARS-COV-2 COVID-19 AMP PRB: CPT | Performed by: EMERGENCY MEDICINE

## 2023-07-02 PROCEDURE — 84484 ASSAY OF TROPONIN QUANT: CPT | Performed by: EMERGENCY MEDICINE

## 2023-07-02 PROCEDURE — 84145 PROCALCITONIN (PCT): CPT | Performed by: STUDENT IN AN ORGANIZED HEALTH CARE EDUCATION/TRAINING PROGRAM

## 2023-07-02 PROCEDURE — 94761 N-INVAS EAR/PLS OXIMETRY MLT: CPT

## 2023-07-02 PROCEDURE — 93010 EKG 12-LEAD: ICD-10-PCS | Mod: ,,, | Performed by: INTERNAL MEDICINE

## 2023-07-02 PROCEDURE — 94660 CPAP INITIATION&MGMT: CPT

## 2023-07-02 PROCEDURE — 63600175 PHARM REV CODE 636 W HCPCS: Performed by: STUDENT IN AN ORGANIZED HEALTH CARE EDUCATION/TRAINING PROGRAM

## 2023-07-02 PROCEDURE — 80053 COMPREHEN METABOLIC PANEL: CPT | Performed by: EMERGENCY MEDICINE

## 2023-07-02 PROCEDURE — 27000221 HC OXYGEN, UP TO 24 HOURS

## 2023-07-02 PROCEDURE — 11000001 HC ACUTE MED/SURG PRIVATE ROOM

## 2023-07-02 PROCEDURE — 99291 CRITICAL CARE FIRST HOUR: CPT

## 2023-07-02 PROCEDURE — 96375 TX/PRO/DX INJ NEW DRUG ADDON: CPT

## 2023-07-02 PROCEDURE — 93005 ELECTROCARDIOGRAM TRACING: CPT

## 2023-07-02 PROCEDURE — 27000190 HC CPAP FULL FACE MASK W/VALVE

## 2023-07-02 PROCEDURE — 87502 INFLUENZA DNA AMP PROBE: CPT

## 2023-07-02 RX ORDER — ONDANSETRON 8 MG/1
8 TABLET, ORALLY DISINTEGRATING ORAL EVERY 8 HOURS PRN
Status: DISCONTINUED | OUTPATIENT
Start: 2023-07-02 | End: 2023-07-07 | Stop reason: HOSPADM

## 2023-07-02 RX ORDER — ACETAMINOPHEN 325 MG/1
650 TABLET ORAL EVERY 8 HOURS PRN
Status: DISCONTINUED | OUTPATIENT
Start: 2023-07-02 | End: 2023-07-07 | Stop reason: HOSPADM

## 2023-07-02 RX ORDER — GLUCAGON 1 MG
1 KIT INJECTION
Status: DISCONTINUED | OUTPATIENT
Start: 2023-07-02 | End: 2023-07-07 | Stop reason: HOSPADM

## 2023-07-02 RX ORDER — ALBUTEROL SULFATE 90 UG/1
2 AEROSOL, METERED RESPIRATORY (INHALATION) EVERY 6 HOURS PRN
Status: DISCONTINUED | OUTPATIENT
Start: 2023-07-02 | End: 2023-07-02

## 2023-07-02 RX ORDER — AZITHROMYCIN 250 MG/1
250 TABLET, FILM COATED ORAL DAILY
Status: DISCONTINUED | OUTPATIENT
Start: 2023-07-03 | End: 2023-07-07 | Stop reason: HOSPADM

## 2023-07-02 RX ORDER — ASPIRIN 81 MG/1
81 TABLET ORAL DAILY
Status: DISCONTINUED | OUTPATIENT
Start: 2023-07-02 | End: 2023-07-07 | Stop reason: HOSPADM

## 2023-07-02 RX ORDER — SODIUM CHLORIDE 0.9 % (FLUSH) 0.9 %
3 SYRINGE (ML) INJECTION
Status: DISCONTINUED | OUTPATIENT
Start: 2023-07-02 | End: 2023-07-07 | Stop reason: HOSPADM

## 2023-07-02 RX ORDER — IPRATROPIUM BROMIDE 0.5 MG/2.5ML
1 SOLUTION RESPIRATORY (INHALATION)
Status: COMPLETED | OUTPATIENT
Start: 2023-07-02 | End: 2023-07-02

## 2023-07-02 RX ORDER — IBUPROFEN 200 MG
24 TABLET ORAL
Status: DISCONTINUED | OUTPATIENT
Start: 2023-07-02 | End: 2023-07-07 | Stop reason: HOSPADM

## 2023-07-02 RX ORDER — FUROSEMIDE 10 MG/ML
40 INJECTION INTRAMUSCULAR; INTRAVENOUS ONCE
Status: COMPLETED | OUTPATIENT
Start: 2023-07-02 | End: 2023-07-02

## 2023-07-02 RX ORDER — CLOPIDOGREL BISULFATE 75 MG/1
75 TABLET ORAL DAILY
Status: DISCONTINUED | OUTPATIENT
Start: 2023-07-02 | End: 2023-07-07 | Stop reason: HOSPADM

## 2023-07-02 RX ORDER — INSULIN ASPART 100 [IU]/ML
0-5 INJECTION, SOLUTION INTRAVENOUS; SUBCUTANEOUS
Status: DISCONTINUED | OUTPATIENT
Start: 2023-07-02 | End: 2023-07-07 | Stop reason: HOSPADM

## 2023-07-02 RX ORDER — PREDNISONE 20 MG/1
40 TABLET ORAL DAILY
Status: COMPLETED | OUTPATIENT
Start: 2023-07-02 | End: 2023-07-06

## 2023-07-02 RX ORDER — ENOXAPARIN SODIUM 100 MG/ML
40 INJECTION SUBCUTANEOUS EVERY 24 HOURS
Status: DISCONTINUED | OUTPATIENT
Start: 2023-07-02 | End: 2023-07-07 | Stop reason: HOSPADM

## 2023-07-02 RX ORDER — IBUPROFEN 200 MG
16 TABLET ORAL
Status: DISCONTINUED | OUTPATIENT
Start: 2023-07-02 | End: 2023-07-07 | Stop reason: HOSPADM

## 2023-07-02 RX ORDER — METHYLPREDNISOLONE SOD SUCC 125 MG
125 VIAL (EA) INJECTION
Status: COMPLETED | OUTPATIENT
Start: 2023-07-02 | End: 2023-07-02

## 2023-07-02 RX ORDER — HYDROCODONE BITARTRATE AND ACETAMINOPHEN 5; 325 MG/1; MG/1
1 TABLET ORAL EVERY 6 HOURS PRN
Status: DISCONTINUED | OUTPATIENT
Start: 2023-07-02 | End: 2023-07-07 | Stop reason: HOSPADM

## 2023-07-02 RX ORDER — ATORVASTATIN CALCIUM 40 MG/1
40 TABLET, FILM COATED ORAL DAILY
Status: DISCONTINUED | OUTPATIENT
Start: 2023-07-02 | End: 2023-07-07 | Stop reason: HOSPADM

## 2023-07-02 RX ORDER — POLYETHYLENE GLYCOL 3350 17 G/17G
17 POWDER, FOR SOLUTION ORAL DAILY
Status: DISCONTINUED | OUTPATIENT
Start: 2023-07-02 | End: 2023-07-07 | Stop reason: HOSPADM

## 2023-07-02 RX ORDER — ALBUTEROL SULFATE 2.5 MG/.5ML
15 SOLUTION RESPIRATORY (INHALATION)
Status: COMPLETED | OUTPATIENT
Start: 2023-07-02 | End: 2023-07-02

## 2023-07-02 RX ORDER — IPRATROPIUM BROMIDE AND ALBUTEROL SULFATE 2.5; .5 MG/3ML; MG/3ML
3 SOLUTION RESPIRATORY (INHALATION)
Status: DISCONTINUED | OUTPATIENT
Start: 2023-07-02 | End: 2023-07-07 | Stop reason: HOSPADM

## 2023-07-02 RX ADMIN — ENOXAPARIN SODIUM 40 MG: 40 INJECTION SUBCUTANEOUS at 04:07

## 2023-07-02 RX ADMIN — ALBUTEROL SULFATE 15 MG: 2.5 SOLUTION RESPIRATORY (INHALATION) at 05:07

## 2023-07-02 RX ADMIN — IPRATROPIUM BROMIDE AND ALBUTEROL SULFATE 3 ML: .5; 3 SOLUTION RESPIRATORY (INHALATION) at 02:07

## 2023-07-02 RX ADMIN — AZITHROMYCIN MONOHYDRATE 500 MG: 500 INJECTION, POWDER, LYOPHILIZED, FOR SOLUTION INTRAVENOUS at 08:07

## 2023-07-02 RX ADMIN — IPRATROPIUM BROMIDE AND ALBUTEROL SULFATE 3 ML: .5; 3 SOLUTION RESPIRATORY (INHALATION) at 08:07

## 2023-07-02 RX ADMIN — ATORVASTATIN CALCIUM 40 MG: 40 TABLET, FILM COATED ORAL at 08:07

## 2023-07-02 RX ADMIN — PREDNISONE 40 MG: 20 TABLET ORAL at 08:07

## 2023-07-02 RX ADMIN — CLOPIDOGREL BISULFATE 75 MG: 75 TABLET ORAL at 08:07

## 2023-07-02 RX ADMIN — INSULIN ASPART 3 UNITS: 100 INJECTION, SOLUTION INTRAVENOUS; SUBCUTANEOUS at 06:07

## 2023-07-02 RX ADMIN — FUROSEMIDE 40 MG: 10 INJECTION, SOLUTION INTRAMUSCULAR; INTRAVENOUS at 12:07

## 2023-07-02 RX ADMIN — IPRATROPIUM BROMIDE 1 MG: 0.5 SOLUTION RESPIRATORY (INHALATION) at 05:07

## 2023-07-02 RX ADMIN — INSULIN ASPART 1 UNITS: 100 INJECTION, SOLUTION INTRAVENOUS; SUBCUTANEOUS at 09:07

## 2023-07-02 RX ADMIN — ASPIRIN 81 MG: 81 TABLET, COATED ORAL at 08:07

## 2023-07-02 RX ADMIN — METHYLPREDNISOLONE SODIUM SUCCINATE 125 MG: 125 INJECTION, POWDER, FOR SOLUTION INTRAMUSCULAR; INTRAVENOUS at 06:07

## 2023-07-02 NOTE — ASSESSMENT & PLAN NOTE
The patient was counseled on the dangers of tobacco use, and was advised to quit.  Reviewed strategies to maximize success, including removing cigarettes and smoking materials from environment and support of family/friends.  8 minutes spent discussion smoking cessation

## 2023-07-02 NOTE — Clinical Note
Diagnosis: Hypercapnic respiratory failure [4410141]   Admitting Provider:: GERALD QUEVEDO III [[id number]]   Future Attending Provider: GERALD QUEVEDO III [[id number]]   Reason for IP Medical Treatment  (Clinical interventions that can only be accomplished in the IP setting? ) :: Respiratory failure   I certify that Inpatient services for greater than or equal to 2 midnights are medically necessary:: Yes   Plans for Post-Acute care--if anticipated (pick the single best option):: A. No post acute care anticipated at this time

## 2023-07-02 NOTE — ASSESSMENT & PLAN NOTE
Patient with Hypercapnic and Hypoxic Respiratory failure which is Acute on chronic.  he is on home oxygen at 2 LPM. Supplemental oxygen was provided and noted- Oxygen Concentration (%):  [] 45    .   Signs/symptoms of respiratory failure include- tachypnea, increased work of breathing, respiratory distress and wheezing. Contributing diagnoses includes - CHF and COPD Labs and images were reviewed. Patient Has recent ABG, which has been reviewed. Will treat underlying causes and adjust management of respiratory failure as follows- Etiology appear to be related to copd vs chf exacerbation. procal negative making bacterial cause less likely.     -continue steroids, duonebs, and azithromycin  -iv lasix ordered  -wean from bipap to nc as tolerated.

## 2023-07-02 NOTE — PROGRESS NOTES
Latest Reference Range & Units 07/02/23 05:54   POC PH 7.35 - 7.45  7.203 (LL)   POC PCO2 35 - 45 mmHg 109.2 (HH)   POC PO2 80 - 100 mmHg 415 (H)   POC HCO3 24 - 28 mmol/L 43.0 (H)   POC SATURATED O2 95 - 100 % 100   Sample  ARTERIAL   POC TCO2 23 - 27 mmol/L 46 (H)   POC BE -2 to 2 mmol/L 11   FiO2  100   DelSys  CPAP/BiPAP   Site  LR   Mode  BiPAP   Rate  12   (LL): Data is critically low  (HH): Data is critically high  (H): Data is abnormally high      Results reported to Dr Tyson.   FIO2 decreased to 50%.   Repeat ABG at 0700.

## 2023-07-02 NOTE — ED NOTES
Assumed care from Mary Ann.     Patient identifiers for Abelardo Irene . checked and correct.    Review of patient's allergies indicates:  Review of patient's allergies indicates:   Allergen Reactions    Contrast media Shortness Of Breath, Itching and Anxiety     Patient states that he had a bad reaction, anxiety , shortness of breath, itching .      Pt AAOx4. Respirations even and unlabored with bipap. Updated patient on POC.  Pt on continuous cm/NiBP/SpO2 and Bipap.  Bed locked in low position with side rails up x2, call light within reach.  Will continue to monitor.

## 2023-07-02 NOTE — ASSESSMENT & PLAN NOTE
Patient is identified as having Combined Systolic and Diastolic heart failure that is Acute on chronic. CHF is currently uncontrolled due to Rales/crackles on pulmonary exam and Pulmonary edema/pleural effusion on CXR. Latest ECHO performed and demonstrates- Results for orders placed during the hospital encounter of 03/06/23    Echo    Interpretation Summary  · The left ventricle is normal in size with mild concentric hypertrophy and moderately decreased systolic function.  · Severe left atrial enlargement.  · The estimated ejection fraction is 25-30%.  · Grade I left ventricular diastolic dysfunction.  · Mild mitral regurgitation.  · Elevated central venous pressure (15 mmHg).  · The estimated PA systolic pressure is 24 mmHg.  · Mild right ventricular enlargement.  · Moderate right atrial enlargement.  . Continue Beta Blocker and Furosemide and monitor clinical status closely. Monitor on telemetry. Patient is on CHF pathway.  Monitor strict Is&Os and daily weights.  Place on fluid restriction of 1.5 L. Continue to stress to patient importance of self efficacy and  on diet for CHF. Last BNP reviewed- and noted below   Recent Labs   Lab 07/02/23  0554   *   .    Lasix as stated above. Repeat echo pending

## 2023-07-02 NOTE — ED PROVIDER NOTES
Encounter Date: 2023    SCRIBE #1 NOTE: I, Aundrea Beth, am scribing for, and in the presence of,  Mata Tyson MD.     History     Chief Complaint   Patient presents with    Respiratory Distress     EMS called to pt's residence for SOB x1 hour. Hx of CHF and COPD. On arrival to scene, pt had bilateral rales and SpO2 was 85% on home 6L NC. SpO2 increased to 95% with CPAP, 3 sublingual NTG and 1 inch of nitro paste.     68 yo M presenting to the ED for respiratory distress. His PMHx is significant for COPD, CHF, HTN, CAD s/p stent placement. He started experiencing severe dyspnea around 1h ago. Upon EMS arrival to scene he had bilateral rales and SpO2 of 85% on 6L NC home oxygen. He was placed on CPAP which increased his SpO2 to 93%. He was additionally given 3 SL NTG and 1 inch nitro paste en route.     Additional history is provided by independent historian: EMS. Hx from patient limited at this time secondary to acuity of condition.     History difficult to obtain as patient was short of breath and on BiPAP.    The history is provided by the patient and the EMS personnel.   Review of patient's allergies indicates:   Allergen Reactions    Contrast media Shortness Of Breath, Itching and Anxiety     Patient states that he had a bad reaction, anxiety , shortness of breath, itching .      Past Medical History:   Diagnosis Date    CHF (congestive heart failure)     COPD (chronic obstructive pulmonary disease)     Coronary artery disease     Elevated cholesterol     on medication    Hypertension      Past Surgical History:   Procedure Laterality Date    CARDIAC SURGERY      coronary stent placed    CORONARY STENT PLACEMENT       Family History   Problem Relation Age of Onset    Cancer Mother     No Known Problems Father      Social History     Tobacco Use    Smoking status: Former     Packs/day: 2.00     Years: 45.00     Pack years: 90.00     Types: Cigarettes     Quit date: 2017     Years since quittin.6     Smokeless tobacco: Never   Substance Use Topics    Alcohol use: Yes     Comment: socially    Drug use: No     Review of Systems   Unable to perform ROS: Acuity of condition   Respiratory:  Positive for shortness of breath.      Physical Exam     Initial Vitals   BP Pulse Resp Temp SpO2   07/02/23 0544 07/02/23 0544 07/02/23 0544 07/02/23 0552 07/02/23 0544   (!) 180/100 (!) 120 (!) 44 97.7 °F (36.5 °C) (!) 93 %      MAP       --                Physical Exam    Nursing note and vitals reviewed.  Constitutional: He appears well-developed. He is not diaphoretic. He appears ill. No distress.   HENT:   Head: Normocephalic.   Eyes: EOM are normal.   Cardiovascular:  Regular rhythm.   Tachycardia present.         No murmur heard.  Pulmonary/Chest: Tachypnea noted. He is in respiratory distress. He has wheezes.   Diffuse expiratory wheezing. Tachypnea. On BiPAP. Speaking in short sentences.    Abdominal: Abdomen is soft. He exhibits no distension. There is no abdominal tenderness.   Musculoskeletal:         General: Normal range of motion.      Comments: No lower extremity edema.     Neurological: He is alert.   Skin: Skin is warm.       ED Course   Critical Care    Date/Time: 7/2/2023 6:06 AM  Performed by: Mata Tyson MD  Authorized by: Mata Tyson MD   Direct patient critical care time: 20 minutes  Additional history critical care time: 5 minutes  Ordering / reviewing critical care time: 5 minutes  Documentation critical care time: 5 minutes  Consulting other physicians critical care time: 5 minutes  Consult with family critical care time: 5 minutes  Total critical care time (exclusive of procedural time) : 45 minutes  Critical care time was exclusive of separately billable procedures and treating other patients and teaching time.  Critical care was necessary to treat or prevent imminent or life-threatening deterioration of the following conditions: cardiac failure, circulatory failure, CNS failure or  compromise, shock and respiratory failure.  Critical care was time spent personally by me on the following activities: development of treatment plan with patient or surrogate, discussions with consultants, evaluation of patient's response to treatment, examination of patient, obtaining history from patient or surrogate, ordering and performing treatments and interventions, ordering and review of laboratory studies, ordering and review of radiographic studies, re-evaluation of patient's condition, review of old charts and pulse oximetry.  Comments: Respiratory distress and COPD exacerbation requiring BiPAP/mechanical ventilation      Labs Reviewed   CBC W/ AUTO DIFFERENTIAL - Abnormal; Notable for the following components:       Result Value    RBC 4.32 (*)     Hemoglobin 12.4 (*)     MCHC 30.7 (*)     All other components within normal limits   COMPREHENSIVE METABOLIC PANEL - Abnormal; Notable for the following components:    CO2 34 (*)     Glucose 192 (*)     BUN 4 (*)     All other components within normal limits   B-TYPE NATRIURETIC PEPTIDE - Abnormal; Notable for the following components:     (*)     All other components within normal limits   ISTAT PROCEDURE - Abnormal; Notable for the following components:    POC PH 7.203 (*)     POC PCO2 109.2 (*)     POC PO2 415 (*)     POC HCO3 43.0 (*)     POC TCO2 46 (*)     All other components within normal limits   CULTURE, RESPIRATORY   TROPONIN I   PROCALCITONIN   SARS-COV-2 RDRP GENE   POCT INFLUENZA A/B MOLECULAR          Imaging Results              X-Ray Chest 1 View (Final result)  Result time 07/02/23 06:51:19      Final result by Carlito Lugo MD (07/02/23 06:51:19)                   Impression:      Question mild new interstitial opacities since the 03/06/2023 examination, which could relate to vascular congestion versus mild degree of interstitial edema.    Question trace pleural effusions.      Electronically signed by: Carlito  Parish  Date:    07/02/2023  Time:    06:51               Narrative:    EXAMINATION:  XR CHEST 1 VIEW    CLINICAL HISTORY:  shortness of breath;    TECHNIQUE:  Single frontal view of the chest was performed.    COMPARISON:  Chest radiograph 03/06/2023, 06:06 hours.    FINDINGS:  Monitoring leads over the chest.  Grossly unchanged cardiomediastinal contours, again noting enlargement cardiac silhouette and prominence of central pulmonary vasculature.    Chronic interstitial coarsening noted.  Question superimposed new interstitial opacities.    No definite focal airspace consolidation.    Question basilar atelectasis.  Query trace pleural effusions.    No definite pneumothorax.    No acute findings the visualized abdomen osseous and soft tissue structures appear without definite acute change.                                       Medications   aspirin EC tablet 81 mg (has no administration in time range)   atorvastatin tablet 40 mg (has no administration in time range)   clopidogreL tablet 75 mg (has no administration in time range)   sodium chloride 0.9% flush 3 mL (has no administration in time range)   predniSONE tablet 40 mg (has no administration in time range)   albuterol-ipratropium 2.5 mg-0.5 mg/3 mL nebulizer solution 3 mL (has no administration in time range)   enoxaparin injection 40 mg (has no administration in time range)   azithromycin (ZITHROMAX) 500 mg in dextrose 5 % (D5W) 250 mL IVPB (Vial-Mate) (has no administration in time range)   ondansetron disintegrating tablet 8 mg (has no administration in time range)   polyethylene glycol packet 17 g (has no administration in time range)   acetaminophen tablet 650 mg (has no administration in time range)   HYDROcodone-acetaminophen 5-325 mg per tablet 1 tablet (has no administration in time range)   albuterol sulfate nebulizer solution 15 mg (15 mg Nebulization Given 7/2/23 0558)   ipratropium 0.02 % nebulizer solution 1 mg (1 mg Nebulization Given 7/2/23  0558)   methylPREDNISolone sodium succinate injection 125 mg (125 mg Intravenous Given 7/2/23 0613)     Medical Decision Making:   History:   Old Medical Records: I decided to obtain old medical records.  Old Records Summarized: other records.       <> Summary of Records: External documents reviewed  Initial Assessment:       67-year-old male presenting with dyspnea.  The patient initially was being treated for possible pulmonary overload by EMS.  Patient had received nitro paste.  Nitro paste was removed on arrival.  Patient had diffuse expiratory wheeze.  Patient provided continuous albuterol and Solu-Medrol.  Patient was transitioned from the EMS CPAP to BiPAP.  Patient continued to improve.  Initial CO2 109.  Patient has a history of hypercapnic respiratory failure.  Patient is mentating appropriately.  Patient to stay on BiPAP until acidosis cleared.  Differential does include CHF exacerbation.  Chest x-ray appears to be similar to previous.  BNP less than previously.  Patient does not appear to be fluid overload on physical exam.  The differential was discussed with Medicine Service who will place diuretics at this time.  Differential Diagnosis:   CHF exacerbation, COPD exacerbation, lower respiratory tract infection, ACS  Independently Interpreted Test(s):   I have ordered and independently interpreted X-rays - see summary below.  I have ordered and independently interpreted EKG Reading(s) - see summary below  Clinical Tests:   Lab Tests: Ordered and Reviewed  Radiological Study: Reviewed and Ordered  Medical Tests: Ordered and Reviewed  ED Management:    Problems considered includes dyspnea (Signs & symptoms, differentials)  Chronic problems impacting care includes CODP, CHF.  Please see workup section for emergency physician independent ECG interpretation.  Imaging independently reviewed.  Agree with radiologist's interpretation. Imaging includes bilateral interstitial edema.   All labs reviewed and  considered in the patient's differential diagnosis. Discussion of labs includes no elevation in troponin.  No electrolyte abnormalities.  Prior records were reviewed and considered in the differential diagnosis. This includes previous admissions with similar presentation of hypercapnia.  Additional history obtained from independent historians. Details includes some history obtained from EMS as patient was too short of breath to provide history.  (EMS, police, family, nursing home)    Decision regarding hospitalization.  The patient requires additional care in the hospital overnight.   Dr. HARISH Farias with hospital medicine will accept the patient. Labs, imaging, or procedures were discussed.    Plan was discussed with the patient.  This includes plan for admission to ICU, lab and imaging results    All questions or concerns have been addressed.          Scribe Attestation:   Scribe #1: I performed the above scribed service and the documentation accurately describes the services I performed. I attest to the accuracy of the note.      ED Course as of 07/02/23 0730   Sun Jul 02, 2023   0646 Troponin I: 0.013 [JM]   0649 Patient is awake at this time.  Patient easily responds.  Clear lungs to auscultation. [JM]   0659 EKG 12-lead  Time 5:54 a.m.     Rate 120, sinus, regular rhythm, left axis deviation.  IA 2-0 2 QRS 86 .  No ST elevation or depression.  No T-wave inversion or hyperacute T-waves.  No Q-waves present     Sinus tachycardia with left axis deviation.   [JM]      ED Course User Index  [JM] Mata Tyson MD               I, Mata Tyson, personally performed the services described in this documentation. All medical record entries made by the scribe were at my direction and in my presence. I have reviewed the chart and agree that the record reflects my personal performance and is accurate and complete.    Clinical Impression:   Final diagnoses:  [R06.02] Shortness of breath  [J96.92] Hypercapnic  respiratory failure  [J44.1] COPD exacerbation (Primary)        ED Disposition Condition    Admit Stable                Mata Tyson MD  07/02/23 0772

## 2023-07-02 NOTE — H&P
[facility]  [facility] Medicine  History & Physical    Patient Name: Abelardo Irene Jr.  MRN: [id number]  Patient Class: IP- Inpatient  Admission Date: 7/2/2023  Attending Physician: Satinder Farias III, MD   Primary Care Provider: Rolando Funes MD         Patient information was obtained from patient and ER records.     Subjective:     Principal Problem:Acute on chronic respiratory failure with hypoxia and hypercapnia    Chief Complaint:   Chief Complaint   Patient presents with    Respiratory Distress     EMS called to pt's residence for SOB x1 hour. Hx of CHF and COPD. On arrival to scene, pt had bilateral rales and SpO2 was 85% on home 6L NC. SpO2 increased to 95% with CPAP, 3 sublingual NTG and 1 inch of nitro paste.        HPI: 67M with pmh of copd, hfref 25-30%, CAD, hld, htn presented respiratory distress. He started experiencing severe dyspnea around 1h pta. Upon EMS arrival to scene he had bilateral rales and SpO2 of 85% on 6L [place] home oxygen. He was placed on CPAP which increased his SpO2 to 93%. He was additionally given 3 SL NTG and 1 inch nitro paste en route. Pt placed on bipap in the ed with improvement in oxygenation. On my conversation pt on bipap limiting history. Pt states he has run out of some of his home medications over the past couple of days, but took the ones he still had.  Patient unclear of which medications it is that he is run out of.  Patient states that his granddaughter has been around him and has a cold currently, but denies any known other sick contacts.  Patient does continue to smoke, but denies alcohol or drug use.  Patient states that he is having some worsening swelling of his lower extremities as well.  Patient denies chest pain, sensation of shortness a breath at this time, fever, cough, nausea, vomiting, abdominal pain, headache, or changes in vision.      Past Medical History:   Diagnosis Date    CHF (congestive heart failure)     COPD (chronic obstructive  pulmonary disease)     Coronary artery disease     Elevated cholesterol     on medication    Hypertension        Past Surgical History:   Procedure Laterality Date    CARDIAC SURGERY      coronary stent placed    CORONARY STENT PLACEMENT         Review of patient's allergies indicates:   Allergen Reactions    Contrast media Shortness Of Breath, Itching and Anxiety     Patient states that he had a bad reaction, anxiety , shortness of breath, itching .        No current facility-administered medications on file prior to encounter.     Current Outpatient Medications on File Prior to Encounter   Medication Sig    albuterol (PROVENTIL/VENTOLIN HFA) 90 mcg/actuation inhaler Inhale 1-2 puffs into the lungs every 6 (six) hours as needed for Wheezing. Rescue    amLODIPine (NORVASC) 5 MG tablet Take 1 tablet (5 mg total) by mouth once daily.    aspirin (ECOTRIN) 81 MG EC tablet Take 1 tablet (81 mg total) by mouth once daily.    atorvastatin (LIPITOR) 40 MG tablet Take 1 tablet (40 mg total) by mouth once daily.    clopidogreL (PLAVIX) 75 mg tablet Take 1 tablet (75 mg total) by mouth once daily.    fluticasone-salmeterol diskus inhaler 250-50 mcg Inhale 1 puff into the lungs 2 (two) times daily.    furosemide (LASIX) 40 MG tablet Take 1 tablet (40 mg total) by mouth 2 (two) times daily.    glipiZIDE (GLUCOTROL) 10 MG TR24 Take 1 tablet (10 mg total) by mouth daily with breakfast.    lisinopriL (PRINIVIL,ZESTRIL) 20 MG tablet Take 1 tablet (20 mg total) by mouth once daily.    metFORMIN (GLUCOPHAGE) 500 MG tablet Take 1 tablet (500 mg total) by mouth 2 (two) times daily with meals.    metoprolol succinate (TOPROL-XL) 25 MG 24 hr tablet Take 1 tablet (25 mg total) by mouth once daily.     Family History       Problem Relation (Age of Onset)    Cancer Mother    No Known Problems Father          Tobacco Use    Smoking status: Former     Packs/day: 2.00     Years: 45.00     Pack years: 90.00     Types:  Cigarettes     Quit date: 2017     Years since quittin.6    Smokeless tobacco: Never   Substance and Sexual Activity    Alcohol use: Yes     Comment: socially    Drug use: No    Sexual activity: Yes     Partners: Female     Birth control/protection: None     Review of Systems  Objective:     Vital Signs (Most Recent):  Temp: 97.5 °F (36.4 °C) (23 0852)  Pulse: 86 (23 1131)  Resp: 11 (23 1131)  BP: 124/70 (23 1132)  SpO2: 96 % (23 1131) Vital Signs (24h Range):  Temp:  [97.5 °F (36.4 °C)-97.7 °F (36.5 °C)] 97.5 °F (36.4 °C)  Pulse:  [] 86  Resp:  [11-44] 11  SpO2:  [90 %-100 %] 96 %  BP: (122-180)/() 124/70     Weight: 83 kg (182 lb 15.7 oz)  Body mass index is 26.26 kg/m².     Physical Exam  Vitals reviewed.   Constitutional:       General: He is not in acute distress (tolerating bipap well).  HENT:      Head: Normocephalic and atraumatic.      Mouth/Throat:      Mouth: Mucous membranes are dry.      Pharynx: Oropharynx is clear.   Cardiovascular:      Rate and Rhythm: Normal rate and regular rhythm.   Pulmonary:      Effort: Pulmonary effort is normal.      Breath sounds: Wheezing (upper) and rales (bases) present.   Abdominal:      General: There is no distension.      Palpations: Abdomen is soft.      Tenderness: There is no abdominal tenderness.   Musculoskeletal:         General: Swelling (bilateral pitting edema of LE) present.   Skin:     General: Skin is warm and dry.   Neurological:      General: No focal deficit present.      Mental Status: He is alert.   Psychiatric:         Mood and Affect: Mood normal.         Behavior: Behavior normal.              Significant Labs: All pertinent labs within the past 24 hours have been reviewed.    Significant Imaging: I have reviewed all pertinent imaging results/findings within the past 24 hours.    Assessment/Plan:     * Acute on chronic respiratory failure with hypoxia and hypercapnia  Patient with Hypercapnic  and Hypoxic Respiratory failure which is Acute on chronic.  he is on home oxygen at 2 LPM. Supplemental oxygen was provided and noted- Oxygen Concentration (%):  [] 45    .   Signs/symptoms of respiratory failure include- tachypnea, increased work of breathing, respiratory distress and wheezing. Contributing diagnoses includes - CHF and COPD Labs and images were reviewed. Patient Has recent ABG, which has been reviewed. Will treat underlying causes and adjust management of respiratory failure as follows- Etiology appear to be related to copd vs chf exacerbation. procal negative making bacterial cause less likely.     -continue steroids, duonebs, and azithromycin  -iv lasix ordered  -wean from bipap to nc as tolerated. Bipap hs    Chronic obstructive pulmonary disease with acute exacerbation  tx as stated above      Uncontrolled type 2 diabetes mellitus with hyperglycemia  Patient's FSGs are controlled on current medication regimen.  Last A1c reviewed-   Lab Results   Component Value Date    HGBA1C 7.7 (H) 03/06/2023     Most recent fingerstick glucose reviewed- No results for input(s): POCTGLUCOSE in the last 24 hours.  Current correctional scale  Low  Maintain anti-hyperglycemic dose as follows-   Antihyperglycemics (From admission, onward)    None        Hold Oral hypoglycemics while patient is in the hospital.    Coronary artery disease involving native coronary artery of native heart without angina pectoris  Restart home medications      Dyslipidemia  Continue statin      Acute on chronic combined systolic and diastolic heart failure  Patient is identified as having Combined Systolic and Diastolic heart failure that is Acute on chronic. CHF is currently uncontrolled due to Rales/crackles on pulmonary exam and Pulmonary edema/pleural effusion on CXR. Latest ECHO performed and demonstrates- Results for orders placed during the hospital encounter of 03/06/23    Echo    Interpretation Summary  · The left  ventricle is normal in size with mild concentric hypertrophy and moderately decreased systolic function.  · Severe left atrial enlargement.  · The estimated ejection fraction is 25-30%.  · Grade I left ventricular diastolic dysfunction.  · Mild mitral regurgitation.  · Elevated central venous pressure (15 mmHg).  · The estimated PA systolic pressure is 24 mmHg.  · Mild right ventricular enlargement.  · Moderate right atrial enlargement.  . Continue Beta Blocker and Furosemide and monitor clinical status closely. Monitor on telemetry. Patient is on CHF pathway.  Monitor strict Is&Os and daily weights.  Place on fluid restriction of 1.5 L. Continue to stress to patient importance of self efficacy and  on diet for CHF. Last BNP reviewed- and noted below   Recent Labs   Lab 07/02/23  0554   *   .    Lasix as stated above. Repeat echo pending      Tobacco abuse  The patient was counseled on the dangers of tobacco use, and was advised to quit.  Reviewed strategies to maximize success, including removing cigarettes and smoking materials from environment and support of family/friends.  8 minutes spent discussion smoking cessation      Benign essential hypertension  Restart home medications as tolerated.        VTE Risk Mitigation (From admission, onward)         Ordered     enoxaparin injection 40 mg  Daily         07/02/23 0718     IP VTE HIGH RISK PATIENT  Once         07/02/23 0718     Place sequential compression device  Until discontinued         07/02/23 0718                     Critical care time spent on the evaluation and treatment of severe organ dysfunction, review of pertinent labs and imaging studies, discussions with consulting providers and discussions with patient/family: 45 minutes.        Satinder Farias III, MD  Department of Hospital Medicine  [facility] - Emergency Dept

## 2023-07-02 NOTE — ASSESSMENT & PLAN NOTE
Patient's FSGs are controlled on current medication regimen.  Last A1c reviewed-   Lab Results   Component Value Date    HGBA1C 7.7 (H) 03/06/2023     Most recent fingerstick glucose reviewed- No results for input(s): POCTGLUCOSE in the last 24 hours.  Current correctional scale  Low  Maintain anti-hyperglycemic dose as follows-   Antihyperglycemics (From admission, onward)    None        Hold Oral hypoglycemics while patient is in the hospital.

## 2023-07-02 NOTE — ED NOTES
Pt presnets to ED c/o SOB. Pt has hx of COPD and CHF. EMS reports home oxygen at 6L NC with O2 stat of 86%. Pt placed on CPAP en route. Pt given 2 sublingual nitro and 1 inch nitro paste.

## 2023-07-02 NOTE — ASSESSMENT & PLAN NOTE
Patient with Hypercapnic and Hypoxic Respiratory failure which is Acute on chronic.  he is on home oxygen at 2 LPM. Supplemental oxygen was provided and noted- Oxygen Concentration (%):  [] 45    .   Signs/symptoms of respiratory failure include- tachypnea, increased work of breathing, respiratory distress and wheezing. Contributing diagnoses includes - CHF and COPD Labs and images were reviewed. Patient Has recent ABG, which has been reviewed. Will treat underlying causes and adjust management of respiratory failure as follows- Etiology appear to be related to copd vs chf exacerbation. procal negative making bacterial cause less likely.     -continue steroids, duonebs, and azithromycin  -iv lasix ordered  -wean from bipap to nc as tolerated. Bipap hs

## 2023-07-02 NOTE — PROGRESS NOTES
Pt received on EMS CPAP. Placed on V60 BIPAP. 16/8, R 12, FIO2 100%. Medium full face mask. Tolerated well.

## 2023-07-02 NOTE — SUBJECTIVE & OBJECTIVE
Past Medical History:   Diagnosis Date    CHF (congestive heart failure)     COPD (chronic obstructive pulmonary disease)     Coronary artery disease     Elevated cholesterol     on medication    Hypertension        Past Surgical History:   Procedure Laterality Date    CARDIAC SURGERY      coronary stent placed    CORONARY STENT PLACEMENT         Review of patient's allergies indicates:   Allergen Reactions    Contrast media Shortness Of Breath, Itching and Anxiety     Patient states that he had a bad reaction, anxiety , shortness of breath, itching .        No current facility-administered medications on file prior to encounter.     Current Outpatient Medications on File Prior to Encounter   Medication Sig    albuterol (PROVENTIL/VENTOLIN HFA) 90 mcg/actuation inhaler Inhale 1-2 puffs into the lungs every 6 (six) hours as needed for Wheezing. Rescue    amLODIPine (NORVASC) 5 MG tablet Take 1 tablet (5 mg total) by mouth once daily.    aspirin (ECOTRIN) 81 MG EC tablet Take 1 tablet (81 mg total) by mouth once daily.    atorvastatin (LIPITOR) 40 MG tablet Take 1 tablet (40 mg total) by mouth once daily.    clopidogreL (PLAVIX) 75 mg tablet Take 1 tablet (75 mg total) by mouth once daily.    fluticasone-salmeterol diskus inhaler 250-50 mcg Inhale 1 puff into the lungs 2 (two) times daily.    furosemide (LASIX) 40 MG tablet Take 1 tablet (40 mg total) by mouth 2 (two) times daily.    glipiZIDE (GLUCOTROL) 10 MG TR24 Take 1 tablet (10 mg total) by mouth daily with breakfast.    lisinopriL (PRINIVIL,ZESTRIL) 20 MG tablet Take 1 tablet (20 mg total) by mouth once daily.    metFORMIN (GLUCOPHAGE) 500 MG tablet Take 1 tablet (500 mg total) by mouth 2 (two) times daily with meals.    metoprolol succinate (TOPROL-XL) 25 MG 24 hr tablet Take 1 tablet (25 mg total) by mouth once daily.     Family History       Problem Relation (Age of Onset)    Cancer Mother    No Known Problems Father          Tobacco Use    Smoking status:  Former     Packs/day: 2.00     Years: 45.00     Pack years: 90.00     Types: Cigarettes     Quit date: 2017     Years since quittin.6    Smokeless tobacco: Never   Substance and Sexual Activity    Alcohol use: Yes     Comment: socially    Drug use: No    Sexual activity: Yes     Partners: Female     Birth control/protection: None     Review of Systems  Objective:     Vital Signs (Most Recent):  Temp: 97.5 °F (36.4 °C) (23 0852)  Pulse: 86 (23 1131)  Resp: 11 (23 1131)  BP: 124/70 (23 1132)  SpO2: 96 % (23 113) Vital Signs (24h Range):  Temp:  [97.5 °F (36.4 °C)-97.7 °F (36.5 °C)] 97.5 °F (36.4 °C)  Pulse:  [] 86  Resp:  [11-44] 11  SpO2:  [90 %-100 %] 96 %  BP: (122-180)/() 124/70     Weight: 83 kg (182 lb 15.7 oz)  Body mass index is 26.26 kg/m².     Physical Exam  Vitals reviewed.   Constitutional:       General: He is not in acute distress (tolerating bipap well).  HENT:      Head: Normocephalic and atraumatic.      Mouth/Throat:      Mouth: Mucous membranes are dry.      Pharynx: Oropharynx is clear.   Cardiovascular:      Rate and Rhythm: Normal rate and regular rhythm.   Pulmonary:      Effort: Pulmonary effort is normal.      Breath sounds: Wheezing (upper) and rales (bases) present.   Abdominal:      General: There is no distension.      Palpations: Abdomen is soft.      Tenderness: There is no abdominal tenderness.   Musculoskeletal:         General: Swelling (bilateral pitting edema of LE) present.   Skin:     General: Skin is warm and dry.   Neurological:      General: No focal deficit present.      Mental Status: He is alert.   Psychiatric:         Mood and Affect: Mood normal.         Behavior: Behavior normal.              Significant Labs: All pertinent labs within the past 24 hours have been reviewed.    Significant Imaging: I have reviewed all pertinent imaging results/findings within the past 24 hours.

## 2023-07-02 NOTE — NURSING
[facility]  Nurses Note -- 4 Eyes      7/2/2023       Skin assessed on: Admit      [x] No Pressure Injuries Present    [x]Prevention Measures Documented    [] Yes LDA  for Pressure Injury Previously documented     [] Yes New Pressure Injury Discovered   [] LDA for New Pressure Injury Added      Attending RN:  Nixon Sun, RN     Second RN:  Zee

## 2023-07-02 NOTE — HPI
67M with pmh of copd, hfref 25-30%, CAD, hld, htn presented respiratory distress. He started experiencing severe dyspnea around 1h pta. Upon EMS arrival to scene he had bilateral rales and SpO2 of 85% on 6L NC home oxygen. He was placed on CPAP which increased his SpO2 to 93%. He was additionally given 3 SL NTG and 1 inch nitro paste en route. Pt placed on bipap in the ed with improvement in oxygenation. On my conversation pt on bipap limiting history. Pt states he has run out of some of his home medications over the past couple of days, but took the ones he still had.  Patient unclear of which medications it is that he is run out of.  Patient states that his granddaughter has been around him and has a cold currently, but denies any known other sick contacts.  Patient does continue to smoke, but denies alcohol or drug use.  Patient states that he is having some worsening swelling of his lower extremities as well.  Patient denies chest pain, sensation of shortness a breath at this time, fever, cough, nausea, vomiting, abdominal pain, headache, or changes in vision.

## 2023-07-03 LAB
ANION GAP SERPL CALC-SCNC: 9 MMOL/L (ref 8–16)
ASCENDING AORTA: 3.5 CM
AV INDEX (PROSTH): 0.81
AV MEAN GRADIENT: 2 MMHG
AV PEAK GRADIENT: 3 MMHG
AV VALVE AREA: 3.77 CM2
AV VELOCITY RATIO: 0.89
BASOPHILS # BLD AUTO: 0.01 K/UL (ref 0–0.2)
BASOPHILS NFR BLD: 0.1 % (ref 0–1.9)
BSA FOR ECHO PROCEDURE: 1.96 M2
BUN SERPL-MCNC: 9 MG/DL (ref 8–23)
CALCIUM SERPL-MCNC: 8.9 MG/DL (ref 8.7–10.5)
CHLORIDE SERPL-SCNC: 94 MMOL/L (ref 95–110)
CO2 SERPL-SCNC: 38 MMOL/L (ref 23–29)
CREAT SERPL-MCNC: 0.7 MG/DL (ref 0.5–1.4)
CV ECHO LV RWT: 0.25 CM
DIFFERENTIAL METHOD: ABNORMAL
DOP CALC AO PEAK VEL: 0.88 M/S
DOP CALC AO VTI: 15.6 CM
DOP CALC LVOT AREA: 4.7 CM2
DOP CALC LVOT DIAMETER: 2.44 CM
DOP CALC LVOT PEAK VEL: 0.78 M/S
DOP CALC LVOT STROKE VOLUME: 58.89 CM3
DOP CALC MV VTI: 12 CM
DOP CALCLVOT PEAK VEL VTI: 12.6 CM
E WAVE DECELERATION TIME: 114.89 MSEC
E/A RATIO: 1.63
E/E' RATIO: 16.29 M/S
ECHO LV POSTERIOR WALL: 0.88 CM (ref 0.6–1.1)
EJECTION FRACTION: 15 %
EOSINOPHIL # BLD AUTO: 0 K/UL (ref 0–0.5)
EOSINOPHIL NFR BLD: 0 % (ref 0–8)
ERYTHROCYTE [DISTWIDTH] IN BLOOD BY AUTOMATED COUNT: 13.1 % (ref 11.5–14.5)
EST. GFR  (NO RACE VARIABLE): >60 ML/MIN/1.73 M^2
ESTIMATED AVG GLUCOSE: 154 MG/DL (ref 68–131)
FRACTIONAL SHORTENING: 15 % (ref 28–44)
GLUCOSE SERPL-MCNC: 169 MG/DL (ref 70–110)
HBA1C MFR BLD: 7 % (ref 4–5.6)
HCT VFR BLD AUTO: 38.3 % (ref 40–54)
HGB BLD-MCNC: 11.4 G/DL (ref 14–18)
IMM GRANULOCYTES # BLD AUTO: 0.03 K/UL (ref 0–0.04)
IMM GRANULOCYTES NFR BLD AUTO: 0.3 % (ref 0–0.5)
INTERVENTRICULAR SEPTUM: 0.83 CM (ref 0.6–1.1)
IVC DIAMETER: 1.9 CM
LA MAJOR: 7.91 CM
LA MINOR: 7.53 CM
LA WIDTH: 5.7 CM
LEFT INTERNAL DIMENSION IN SYSTOLE: 5.98 CM (ref 2.1–4)
LEFT VENTRICLE DIASTOLIC VOLUME INDEX: 132.96 ML/M2
LEFT VENTRICLE DIASTOLIC VOLUME: 255.29 ML
LEFT VENTRICLE MASS INDEX: 138 G/M2
LEFT VENTRICLE SYSTOLIC VOLUME INDEX: 93 ML/M2
LEFT VENTRICLE SYSTOLIC VOLUME: 178.52 ML
LEFT VENTRICULAR INTERNAL DIMENSION IN DIASTOLE: 7 CM (ref 3.5–6)
LEFT VENTRICULAR MASS: 264.99 G
LV LATERAL E/E' RATIO: 14.25 M/S
LV SEPTAL E/E' RATIO: 19 M/S
LVOT MG: 1.26 MMHG
LVOT MV: 0.53 CM/S
LYMPHOCYTES # BLD AUTO: 1.7 K/UL (ref 1–4.8)
LYMPHOCYTES NFR BLD: 14.7 % (ref 18–48)
MAGNESIUM SERPL-MCNC: 2.1 MG/DL (ref 1.6–2.6)
MCH RBC QN AUTO: 27.8 PG (ref 27–31)
MCHC RBC AUTO-ENTMCNC: 29.8 G/DL (ref 32–36)
MCV RBC AUTO: 93 FL (ref 82–98)
MONOCYTES # BLD AUTO: 0.8 K/UL (ref 0.3–1)
MONOCYTES NFR BLD: 6.9 % (ref 4–15)
MV MEAN GRADIENT: 1 MMHG
MV PEAK A VEL: 0.7 M/S
MV PEAK E VEL: 1.14 M/S
MV PEAK GRADIENT: 3 MMHG
MV STENOSIS PRESSURE HALF TIME: 41.75 MS
MV VALVE AREA BY CONTINUITY EQUATION: 4.91 CM2
MV VALVE AREA P 1/2 METHOD: 5.27 CM2
NEUTROPHILS # BLD AUTO: 9 K/UL (ref 1.8–7.7)
NEUTROPHILS NFR BLD: 78 % (ref 38–73)
NRBC BLD-RTO: 0 /100 WBC
PHOSPHATE SERPL-MCNC: 2.2 MG/DL (ref 2.7–4.5)
PISA MRMAX VEL: 3.39 M/S
PLATELET # BLD AUTO: 209 K/UL (ref 150–450)
PMV BLD AUTO: 10.8 FL (ref 9.2–12.9)
POCT GLUCOSE: 130 MG/DL (ref 70–110)
POCT GLUCOSE: 151 MG/DL (ref 70–110)
POCT GLUCOSE: 232 MG/DL (ref 70–110)
POCT GLUCOSE: 247 MG/DL (ref 70–110)
POTASSIUM SERPL-SCNC: 3.9 MMOL/L (ref 3.5–5.1)
PULM VEIN S/D RATIO: 1.19
PV PEAK D VEL: 0.37 M/S
PV PEAK S VEL: 0.44 M/S
PV PEAK VELOCITY: 1.08 CM/S
RA MAJOR: 6.53 CM
RA PRESSURE: 8 MMHG
RA WIDTH: 6.5 CM
RBC # BLD AUTO: 4.1 M/UL (ref 4.6–6.2)
RIGHT VENTRICULAR END-DIASTOLIC DIMENSION: 3.86 CM
RV TISSUE DOPPLER FREE WALL SYSTOLIC VELOCITY 1 (APICAL 4 CHAMBER VIEW): 14.86 CM/S
SINUS: 3.27 CM
SODIUM SERPL-SCNC: 141 MMOL/L (ref 136–145)
STJ: 2.49 CM
TDI LATERAL: 0.08 M/S
TDI SEPTAL: 0.06 M/S
TDI: 0.07 M/S
TRICUSPID ANNULAR PLANE SYSTOLIC EXCURSION: 2.2 CM
WBC # BLD AUTO: 11.54 K/UL (ref 3.9–12.7)

## 2023-07-03 PROCEDURE — 94640 AIRWAY INHALATION TREATMENT: CPT

## 2023-07-03 PROCEDURE — 99900035 HC TECH TIME PER 15 MIN (STAT)

## 2023-07-03 PROCEDURE — 27000221 HC OXYGEN, UP TO 24 HOURS

## 2023-07-03 PROCEDURE — 83036 HEMOGLOBIN GLYCOSYLATED A1C: CPT | Performed by: STUDENT IN AN ORGANIZED HEALTH CARE EDUCATION/TRAINING PROGRAM

## 2023-07-03 PROCEDURE — 63600175 PHARM REV CODE 636 W HCPCS: Performed by: STUDENT IN AN ORGANIZED HEALTH CARE EDUCATION/TRAINING PROGRAM

## 2023-07-03 PROCEDURE — 94761 N-INVAS EAR/PLS OXIMETRY MLT: CPT

## 2023-07-03 PROCEDURE — 36415 COLL VENOUS BLD VENIPUNCTURE: CPT | Performed by: STUDENT IN AN ORGANIZED HEALTH CARE EDUCATION/TRAINING PROGRAM

## 2023-07-03 PROCEDURE — 63700000 PHARM REV CODE 250 ALT 637 W/O HCPCS: Performed by: STUDENT IN AN ORGANIZED HEALTH CARE EDUCATION/TRAINING PROGRAM

## 2023-07-03 PROCEDURE — 85025 COMPLETE CBC W/AUTO DIFF WBC: CPT | Performed by: STUDENT IN AN ORGANIZED HEALTH CARE EDUCATION/TRAINING PROGRAM

## 2023-07-03 PROCEDURE — 84100 ASSAY OF PHOSPHORUS: CPT | Performed by: STUDENT IN AN ORGANIZED HEALTH CARE EDUCATION/TRAINING PROGRAM

## 2023-07-03 PROCEDURE — 83735 ASSAY OF MAGNESIUM: CPT | Performed by: STUDENT IN AN ORGANIZED HEALTH CARE EDUCATION/TRAINING PROGRAM

## 2023-07-03 PROCEDURE — 25000242 PHARM REV CODE 250 ALT 637 W/ HCPCS: Performed by: STUDENT IN AN ORGANIZED HEALTH CARE EDUCATION/TRAINING PROGRAM

## 2023-07-03 PROCEDURE — 25000003 PHARM REV CODE 250: Performed by: STUDENT IN AN ORGANIZED HEALTH CARE EDUCATION/TRAINING PROGRAM

## 2023-07-03 PROCEDURE — 11000001 HC ACUTE MED/SURG PRIVATE ROOM

## 2023-07-03 PROCEDURE — 80048 BASIC METABOLIC PNL TOTAL CA: CPT | Performed by: STUDENT IN AN ORGANIZED HEALTH CARE EDUCATION/TRAINING PROGRAM

## 2023-07-03 PROCEDURE — 63600175 PHARM REV CODE 636 W HCPCS: Performed by: HOSPITALIST

## 2023-07-03 RX ORDER — FUROSEMIDE 10 MG/ML
20 INJECTION INTRAMUSCULAR; INTRAVENOUS
Status: DISCONTINUED | OUTPATIENT
Start: 2023-07-03 | End: 2023-07-07 | Stop reason: HOSPADM

## 2023-07-03 RX ADMIN — ACETAMINOPHEN 650 MG: 325 TABLET ORAL at 08:07

## 2023-07-03 RX ADMIN — FUROSEMIDE 20 MG: 10 INJECTION, SOLUTION INTRAMUSCULAR; INTRAVENOUS at 10:07

## 2023-07-03 RX ADMIN — INSULIN ASPART 1 UNITS: 100 INJECTION, SOLUTION INTRAVENOUS; SUBCUTANEOUS at 08:07

## 2023-07-03 RX ADMIN — IPRATROPIUM BROMIDE AND ALBUTEROL SULFATE 3 ML: .5; 3 SOLUTION RESPIRATORY (INHALATION) at 07:07

## 2023-07-03 RX ADMIN — FUROSEMIDE 20 MG: 10 INJECTION, SOLUTION INTRAMUSCULAR; INTRAVENOUS at 08:07

## 2023-07-03 RX ADMIN — CLOPIDOGREL BISULFATE 75 MG: 75 TABLET ORAL at 08:07

## 2023-07-03 RX ADMIN — AZITHROMYCIN MONOHYDRATE 250 MG: 250 TABLET ORAL at 08:07

## 2023-07-03 RX ADMIN — ATORVASTATIN CALCIUM 40 MG: 40 TABLET, FILM COATED ORAL at 08:07

## 2023-07-03 RX ADMIN — IPRATROPIUM BROMIDE AND ALBUTEROL SULFATE 3 ML: .5; 3 SOLUTION RESPIRATORY (INHALATION) at 01:07

## 2023-07-03 RX ADMIN — PREDNISONE 40 MG: 20 TABLET ORAL at 08:07

## 2023-07-03 RX ADMIN — ENOXAPARIN SODIUM 40 MG: 40 INJECTION SUBCUTANEOUS at 04:07

## 2023-07-03 RX ADMIN — IPRATROPIUM BROMIDE AND ALBUTEROL SULFATE 3 ML: .5; 3 SOLUTION RESPIRATORY (INHALATION) at 08:07

## 2023-07-03 RX ADMIN — INSULIN ASPART 2 UNITS: 100 INJECTION, SOLUTION INTRAVENOUS; SUBCUTANEOUS at 04:07

## 2023-07-03 RX ADMIN — ASPIRIN 81 MG: 81 TABLET, COATED ORAL at 08:07

## 2023-07-03 NOTE — ASSESSMENT & PLAN NOTE
Patient with Hypercapnic and Hypoxic Respiratory failure which is Acute on chronic.  he is on home oxygen at 2 LPM. Supplemental oxygen was provided and noted- Oxygen Concentration (%):  [] 45    .   Signs/symptoms of respiratory failure include- tachypnea, increased work of breathing, respiratory distress and wheezing. Contributing diagnoses includes - CHF and COPD Labs and images were reviewed. Patient Has recent ABG, which has been reviewed. Will treat underlying causes and adjust management of respiratory failure as follows- Etiology appear to be related to copd vs chf exacerbation. procal negative making bacterial cause less likely.     -continue steroids, duonebs, and azithromycin  -iv lasix ordered  -wean from bipap to nc as tolerated. Bipap qhs,  His oxygenation with nebulizer and IV lasix continue improve,consulted PT,OT as well,

## 2023-07-03 NOTE — HOSPITAL COURSE
67M with pmh of copd, hfref 25-30%, CAD, hld, htn presented respiratory distress. He started experiencing severe dyspnea around 1h pta. Upon EMS arrival to scene he had bilateral rales and SpO2 of 85% on 6L NC home oxygen. He was placed on CPAP which increased his SpO2 to 93%. He was additionally given 3 SL NTG and 1 inch nitro paste en route. Pt placed on bipap in the ed with improvement in oxygenation.   His oxygenation with nebulizer and IV lasix continue improve,consulted PT,OT as well,did well.  New Echo. Show EF down to 15% from 25%,will continued with IV lasix,consulted cardiology..started on entrtesto.arranged out patient follow up with cardiology for out patient AICD placement.was seen by cardiology.  At DC time patient was on baseline hoe oxygen at 2-3  liter,patient was discharged home with diuretics,entresto and follow up with PCP and cardiology as out patient.

## 2023-07-03 NOTE — PLAN OF CARE
Case Management Assessment     PCP: Rolando Funes MD  Pharmacy: [id number] - [place], [place] - [address]      Patient Arrived From: Home  Existing Help at Home: Spouse     Barriers to Discharge: none    Discharge Plan:    A. Home   B. Home with family      Patient from home and lives with spouse. Pt currently on 4L O2 at home. Goal for discharge tomorrow. Follow up appointment with pcp scheduled, listed on avs. [place] to follow for dc needs.      07/03/23 1419   Discharge Assessment   Assessment Type Discharge Planning Assessment   Confirmed/corrected address, phone number and insurance Yes   Confirmed Demographics Correct on Facesheet   Source of Information patient   Does patient/caregiver understand observation status No   Was observation education provided? No   Communicated VERA with patient/caregiver Yes   People in Home spouse   Do you expect to return to your current living situation? Yes   Do you have help at home or someone to help you manage your care at home? Yes   Prior to hospitilization cognitive status: Alert/Oriented   Current cognitive status: Alert/Oriented   Equipment Currently Used at Home oxygen   Readmission within 30 days? No   Patient currently being followed by outpatient case management? No   Do you currently have service(s) that help you manage your care at home? No   Do you take prescription medications? Yes   Do you have prescription coverage? Yes   Do you have any problems affording any of your prescribed medications? TBD   Is the patient taking medications as prescribed? yes   How do you get to doctors appointments? family or friend will provide   Are you on dialysis? No   Do you take coumadin? No   Discharge Plan discussed with: Patient   Transition of Care Barriers None

## 2023-07-03 NOTE — PROGRESS NOTES
[facility] Medicine  Progress Note    Patient Name: Abelardo Irene Jr.  MRN: [id number]  Patient Class: IP- Inpatient   Admission Date: 7/2/2023  Length of Stay: 1 days  Attending Physician: Romeo Lopez MD  Primary Care Provider: Rolando Funes MD        Subjective:     Principal Problem:Acute on chronic respiratory failure with hypoxia and hypercapnia        HPI:  67M with pmh of copd, hfref 25-30%, CAD, hld, htn presented respiratory distress. He started experiencing severe dyspnea around 1h pta. Upon EMS arrival to scene he had bilateral rales and SpO2 of 85% on 6L NC home oxygen. He was placed on CPAP which increased his SpO2 to 93%. He was additionally given 3 SL NTG and 1 inch nitro paste en route. Pt placed on bipap in the ed with improvement in oxygenation. On my conversation pt on bipap limiting history. Pt states he has run out of some of his home medications over the past couple of days, but took the ones he still had.  Patient unclear of which medications it is that he is run out of.  Patient states that his granddaughter has been around him and has a cold currently, but denies any known other sick contacts.  Patient does continue to smoke, but denies alcohol or drug use.  Patient states that he is having some worsening swelling of his lower extremities as well.  Patient denies chest pain, sensation of shortness a breath at this time, fever, cough, nausea, vomiting, abdominal pain, headache, or changes in vision.      Overview/Hospital Course:  67M with pmh of copd, hfref 25-30%, CAD, hld, htn presented respiratory distress. He started experiencing severe dyspnea around 1h pta. Upon EMS arrival to scene he had bilateral rales and SpO2 of 85% on 6L NC home oxygen. He was placed on CPAP which increased his SpO2 to 93%. He was additionally given 3 SL NTG and 1 inch nitro paste en route. Pt placed on bipap in the ed with improvement in oxygenation.   His oxygenation with nebulizer  and IV lasix continue improve,consulted PT,OT as well,      Past Medical History:   Diagnosis Date    CHF (congestive heart failure)     COPD (chronic obstructive pulmonary disease)     Coronary artery disease     Elevated cholesterol     on medication    Hypertension        Past Surgical History:   Procedure Laterality Date    CARDIAC SURGERY      coronary stent placed    CORONARY STENT PLACEMENT         Review of patient's allergies indicates:   Allergen Reactions    Contrast media Shortness Of Breath, Itching and Anxiety     Patient states that he had a bad reaction, anxiety , shortness of breath, itching .        No current facility-administered medications on file prior to encounter.     Current Outpatient Medications on File Prior to Encounter   Medication Sig    albuterol (PROVENTIL/VENTOLIN HFA) 90 mcg/actuation inhaler Inhale 1-2 puffs into the lungs every 6 (six) hours as needed for Wheezing. Rescue    amLODIPine (NORVASC) 5 MG tablet Take 1 tablet (5 mg total) by mouth once daily.    aspirin (ECOTRIN) 81 MG EC tablet Take 1 tablet (81 mg total) by mouth once daily.    atorvastatin (LIPITOR) 40 MG tablet Take 1 tablet (40 mg total) by mouth once daily.    clopidogreL (PLAVIX) 75 mg tablet Take 1 tablet (75 mg total) by mouth once daily.    fluticasone-salmeterol diskus inhaler 250-50 mcg Inhale 1 puff into the lungs 2 (two) times daily.    furosemide (LASIX) 40 MG tablet Take 1 tablet (40 mg total) by mouth 2 (two) times daily.    glipiZIDE (GLUCOTROL) 10 MG TR24 Take 1 tablet (10 mg total) by mouth daily with breakfast.    lisinopriL (PRINIVIL,ZESTRIL) 20 MG tablet Take 1 tablet (20 mg total) by mouth once daily.    metFORMIN (GLUCOPHAGE) 500 MG tablet Take 1 tablet (500 mg total) by mouth 2 (two) times daily with meals.    metoprolol succinate (TOPROL-XL) 25 MG 24 hr tablet Take 1 tablet (25 mg total) by mouth once daily.     Family History       Problem Relation (Age of Onset)     Cancer Mother    No Known Problems Father          Tobacco Use    Smoking status: Every Day     Packs/day: 2.00     Years: 45.00     Pack years: 90.00     Types: Cigarettes     Last attempt to quit: 2017     Years since quittin.6    Smokeless tobacco: Never   Substance and Sexual Activity    Alcohol use: Yes     Comment: socially    Drug use: No    Sexual activity: Yes     Partners: Female     Birth control/protection: None     Review of Systems  Objective:     Vital Signs (Most Recent):  Temp: 98.1 °F (36.7 °C) (23)  Pulse: 87 (23)  Resp: 20 (23)  BP: 120/80 (23)  SpO2: 100 % (23) Vital Signs (24h Range):  Temp:  [97.7 °F (36.5 °C)-98.9 °F (37.2 °C)] 98.1 °F (36.7 °C)  Pulse:  [80-97] 87  Resp:  [11-20] 20  SpO2:  [96 %-100 %] 100 %  BP: (120-147)/(64-86) 120/80     Weight: 82.9 kg (182 lb 12.2 oz)  Body mass index is 29.5 kg/m².     Physical Exam  Vitals reviewed.   Constitutional:       General: He is not in acute distress (tolerating bipap well).  HENT:      Head: Normocephalic and atraumatic.      Mouth/Throat:      Mouth: Mucous membranes are dry.      Pharynx: Oropharynx is clear.   Cardiovascular:      Rate and Rhythm: Normal rate and regular rhythm.   Pulmonary:      Effort: Pulmonary effort is normal.      Breath sounds: Wheezing (upper) and rales (bases) present.   Abdominal:      General: There is no distension.      Palpations: Abdomen is soft.      Tenderness: There is no abdominal tenderness.   Musculoskeletal:         General: Swelling (bilateral pitting edema of LE) present.   Skin:     General: Skin is warm and dry.   Neurological:      General: No focal deficit present.      Mental Status: He is alert.   Psychiatric:         Mood and Affect: Mood normal.         Behavior: Behavior normal.              Significant Labs: All pertinent labs within the past 24 hours have been reviewed.    Significant Imaging: I have reviewed all  pertinent imaging results/findings within the past 24 hours.      Assessment/Plan:      * Acute on chronic respiratory failure with hypoxia and hypercapnia  Patient with Hypercapnic and Hypoxic Respiratory failure which is Acute on chronic.  he is on home oxygen at 2 LPM. Supplemental oxygen was provided and noted- Oxygen Concentration (%):  [] 45    .   Signs/symptoms of respiratory failure include- tachypnea, increased work of breathing, respiratory distress and wheezing. Contributing diagnoses includes - CHF and COPD Labs and images were reviewed. Patient Has recent ABG, which has been reviewed. Will treat underlying causes and adjust management of respiratory failure as follows- Etiology appear to be related to copd vs chf exacerbation. procal negative making bacterial cause less likely.     -continue steroids, duonebs, and azithromycin  -iv lasix ordered  -wean from bipap to nc as tolerated. Bipap qhs,  His oxygenation with nebulizer and IV lasix continue improve,consulted PT,OT as well,      Chronic obstructive pulmonary disease with acute exacerbation  tx as stated above      Uncontrolled type 2 diabetes mellitus with hyperglycemia  Patient's FSGs are controlled on current medication regimen.  Last A1c reviewed-   Lab Results   Component Value Date    HGBA1C 7.7 (H) 03/06/2023     Most recent fingerstick glucose reviewed- No results for input(s): POCTGLUCOSE in the last 24 hours.  Current correctional scale  Low  Maintain anti-hyperglycemic dose as follows-   Antihyperglycemics (From admission, onward)    None        Hold Oral hypoglycemics while patient is in the hospital.    COPD exacerbation  On nebulizer and steroids,      Coronary artery disease involving native coronary artery of native heart without angina pectoris  Restart home medications      Dyslipidemia  Continue statin      Acute on chronic combined systolic and diastolic heart failure  Patient is identified as having Combined Systolic and  Diastolic heart failure that is Acute on chronic. CHF is currently uncontrolled due to Rales/crackles on pulmonary exam and Pulmonary edema/pleural effusion on CXR. Latest ECHO performed and demonstrates- Results for orders placed during the hospital encounter of 03/06/23    Echo    Interpretation Summary  · The left ventricle is normal in size with mild concentric hypertrophy and moderately decreased systolic function.  · Severe left atrial enlargement.  · The estimated ejection fraction is 25-30%.  · Grade I left ventricular diastolic dysfunction.  · Mild mitral regurgitation.  · Elevated central venous pressure (15 mmHg).  · The estimated PA systolic pressure is 24 mmHg.  · Mild right ventricular enlargement.  · Moderate right atrial enlargement.  . Continue Beta Blocker and Furosemide and monitor clinical status closely. Monitor on telemetry. Patient is on CHF pathway.  Monitor strict Is&Os and daily weights.  Place on fluid restriction of 1.5 L. Continue to stress to patient importance of self efficacy and  on diet for CHF. Last BNP reviewed- and noted below   Recent Labs   Lab 07/02/23  0554   *   .    Lasix as stated above. Repeat echo pending      Tobacco abuse  The patient was counseled on the dangers of tobacco use, and was advised to quit.  Reviewed strategies to maximize success, including removing cigarettes and smoking materials from environment and support of family/friends.  8 minutes spent discussion smoking cessation      Benign essential hypertension  Restart home medications as tolerated.        VTE Risk Mitigation (From admission, onward)         Ordered     enoxaparin injection 40 mg  Daily         07/02/23 0718     IP VTE HIGH RISK PATIENT  Once         07/02/23 0718     Place sequential compression device  Until discontinued         07/02/23 0718                Discharge Planning   VERA: 7/2/2023     Code Status: Full Code   Is the patient medically ready for discharge?:      Reason for patient still in hospital (select all that apply): Patient trending condition                     Romeo Lopez MD  Department of Hospital Medicine   [facility]

## 2023-07-03 NOTE — PLAN OF CARE
Pt alert able to make needs known, tolerates medications well by mouth, no signs or symptoms of adverse reactions noted, repositioned self q 2hrs, pain denied this shift, plan of care explained, diet tolerated, remains free from falls and hospital acquired pressure injuries, safety maintained. Will continue following plan of care. Pt stable on 4L NC    Problem: Adult Inpatient Plan of Care  Goal: Plan of Care Review  Outcome: Ongoing, Progressing  Goal: Patient-Specific Goal (Individualized)  Outcome: Ongoing, Progressing  Goal: Absence of Hospital-Acquired Illness or Injury  Outcome: Ongoing, Progressing  Goal: Optimal Comfort and Wellbeing  Outcome: Ongoing, Progressing  Goal: Readiness for Transition of Care  Outcome: Ongoing, Progressing     Problem: Adjustment to Illness COPD (Chronic Obstructive Pulmonary Disease)  Goal: Optimal Chronic Illness Coping  Outcome: Ongoing, Progressing     Problem: Functional Ability Impaired COPD (Chronic Obstructive Pulmonary Disease)  Goal: Optimal Level of Functional [place]  Outcome: Ongoing, Progressing     Problem: Infection COPD (Chronic Obstructive Pulmonary Disease)  Goal: Absence of Infection Signs and Symptoms  Outcome: Ongoing, Progressing     Problem: Oral Intake Inadequate COPD (Chronic Obstructive Pulmonary Disease)  Goal: Improved Nutrition Intake  Outcome: Ongoing, Progressing     Problem: Respiratory Compromise COPD (Chronic Obstructive Pulmonary Disease)  Goal: Effective Oxygenation and Ventilation  Outcome: Ongoing, Progressing     Problem: Diabetes Comorbidity  Goal: Blood Glucose Level Within Targeted Range  Outcome: Ongoing, Progressing     Problem: Fall Injury Risk  Goal: Absence of Fall and Fall-Related Injury  Outcome: Ongoing, Progressing

## 2023-07-03 NOTE — SUBJECTIVE & OBJECTIVE
Past Medical History:   Diagnosis Date    CHF (congestive heart failure)     COPD (chronic obstructive pulmonary disease)     Coronary artery disease     Elevated cholesterol     on medication    Hypertension        Past Surgical History:   Procedure Laterality Date    CARDIAC SURGERY      coronary stent placed    CORONARY STENT PLACEMENT         Review of patient's allergies indicates:   Allergen Reactions    Contrast media Shortness Of Breath, Itching and Anxiety     Patient states that he had a bad reaction, anxiety , shortness of breath, itching .        No current facility-administered medications on file prior to encounter.     Current Outpatient Medications on File Prior to Encounter   Medication Sig    albuterol (PROVENTIL/VENTOLIN HFA) 90 mcg/actuation inhaler Inhale 1-2 puffs into the lungs every 6 (six) hours as needed for Wheezing. Rescue    amLODIPine (NORVASC) 5 MG tablet Take 1 tablet (5 mg total) by mouth once daily.    aspirin (ECOTRIN) 81 MG EC tablet Take 1 tablet (81 mg total) by mouth once daily.    atorvastatin (LIPITOR) 40 MG tablet Take 1 tablet (40 mg total) by mouth once daily.    clopidogreL (PLAVIX) 75 mg tablet Take 1 tablet (75 mg total) by mouth once daily.    fluticasone-salmeterol diskus inhaler 250-50 mcg Inhale 1 puff into the lungs 2 (two) times daily.    furosemide (LASIX) 40 MG tablet Take 1 tablet (40 mg total) by mouth 2 (two) times daily.    glipiZIDE (GLUCOTROL) 10 MG TR24 Take 1 tablet (10 mg total) by mouth daily with breakfast.    lisinopriL (PRINIVIL,ZESTRIL) 20 MG tablet Take 1 tablet (20 mg total) by mouth once daily.    metFORMIN (GLUCOPHAGE) 500 MG tablet Take 1 tablet (500 mg total) by mouth 2 (two) times daily with meals.    metoprolol succinate (TOPROL-XL) 25 MG 24 hr tablet Take 1 tablet (25 mg total) by mouth once daily.     Family History       Problem Relation (Age of Onset)    Cancer Mother    No Known Problems Father          Tobacco Use    Smoking status:  Every Day     Packs/day: 2.00     Years: 45.00     Pack years: 90.00     Types: Cigarettes     Last attempt to quit: 2017     Years since quittin.6    Smokeless tobacco: Never   Substance and Sexual Activity    Alcohol use: Yes     Comment: socially    Drug use: No    Sexual activity: Yes     Partners: Female     Birth control/protection: None     Review of Systems  Objective:     Vital Signs (Most Recent):  Temp: 98.1 °F (36.7 °C) (23)  Pulse: 87 (23)  Resp: 20 (23)  BP: 120/80 (23)  SpO2: 100 % (23) Vital Signs (24h Range):  Temp:  [97.7 °F (36.5 °C)-98.9 °F (37.2 °C)] 98.1 °F (36.7 °C)  Pulse:  [80-97] 87  Resp:  [11-20] 20  SpO2:  [96 %-100 %] 100 %  BP: (120-147)/(64-86) 120/80     Weight: 82.9 kg (182 lb 12.2 oz)  Body mass index is 29.5 kg/m².     Physical Exam  Vitals reviewed.   Constitutional:       General: He is not in acute distress (tolerating bipap well).  HENT:      Head: Normocephalic and atraumatic.      Mouth/Throat:      Mouth: Mucous membranes are dry.      Pharynx: Oropharynx is clear.   Cardiovascular:      Rate and Rhythm: Normal rate and regular rhythm.   Pulmonary:      Effort: Pulmonary effort is normal.      Breath sounds: Wheezing (upper) and rales (bases) present.   Abdominal:      General: There is no distension.      Palpations: Abdomen is soft.      Tenderness: There is no abdominal tenderness.   Musculoskeletal:         General: Swelling (bilateral pitting edema of LE) present.   Skin:     General: Skin is warm and dry.   Neurological:      General: No focal deficit present.      Mental Status: He is alert.   Psychiatric:         Mood and Affect: Mood normal.         Behavior: Behavior normal.              Significant Labs: All pertinent labs within the past 24 hours have been reviewed.    Significant Imaging: I have reviewed all pertinent imaging results/findings within the past 24 hours.

## 2023-07-03 NOTE — PLAN OF CARE
Patient alert and oriented x4. Respirations even and unlabored. Sob on exertion. Bipap worn throughout night. 02 at 4l via nasal cannula when bipap is off. Skin warm and dry. Iv saline locked. No complications noted. Patient denies pain. Pain medication available as needed. Blood sugar monitored per md orders. Insulin given per sliding scale. Scds to ble. Patient has no complaints at this time. Instructed to call for any needs. Bed in lowest position. Call bell within reach.     Problem: Adult Inpatient Plan of Care  Goal: Plan of Care Review  Outcome: Ongoing, Progressing  Goal: Patient-Specific Goal (Individualized)  Outcome: Ongoing, Progressing  Goal: Absence of Hospital-Acquired Illness or Injury  Outcome: Ongoing, Progressing  Intervention: Identify and Manage Fall Risk  Flowsheets (Taken 7/3/2023 0537)  Safety Promotion/Fall Prevention:   assistive device/personal item within reach   high risk medications identified   medications reviewed   bed alarm refused     Problem: Adjustment to Illness COPD (Chronic Obstructive Pulmonary Disease)  Goal: Optimal Chronic Illness Coping  Outcome: Ongoing, Progressing     Problem: Diabetes Comorbidity  Goal: Blood Glucose Level Within Targeted Range  Outcome: Ongoing, Progressing  Intervention: Monitor and Manage Glycemia  Flowsheets (Taken 7/3/2023 0537)  Glycemic Management:   blood glucose monitored   supplemental insulin given

## 2023-07-03 NOTE — NURSING
[facility], [facility]  Nurses Note -- 4 Eyes      7/3/2023       Skin assessed on: Q Shift      [x] No Pressure Injuries Present    []Prevention Measures Documented    [] Yes LDA  for Pressure Injury Previously documented     [] Yes New Pressure Injury Discovered   [] LDA for New Pressure Injury Added      Attending RN:  Ayanna Medina LPN     Second RN:  Ashanti Bay RN

## 2023-07-03 NOTE — NURSING
[facility], [facility] [place]  Nurses Note -- 4 Eyes      7/2/23        Skin assessed on: Q Shift      [x] No Pressure Injuries Present    []Prevention Measures Documented    [] Yes LDA  for Pressure Injury Previously documented     [] Yes New Pressure Injury Discovered   [] LDA for New Pressure Injury Added      Attending RN:  Ashanti Bay RN     Second RN:  Nixon Sun Rn

## 2023-07-04 LAB
ANION GAP SERPL CALC-SCNC: 5 MMOL/L (ref 8–16)
BASOPHILS # BLD AUTO: 0.04 K/UL (ref 0–0.2)
BASOPHILS NFR BLD: 0.3 % (ref 0–1.9)
BUN SERPL-MCNC: 13 MG/DL (ref 8–23)
CALCIUM SERPL-MCNC: 9 MG/DL (ref 8.7–10.5)
CHLORIDE SERPL-SCNC: 95 MMOL/L (ref 95–110)
CO2 SERPL-SCNC: 40 MMOL/L (ref 23–29)
CREAT SERPL-MCNC: 0.7 MG/DL (ref 0.5–1.4)
DIFFERENTIAL METHOD: ABNORMAL
EOSINOPHIL # BLD AUTO: 0 K/UL (ref 0–0.5)
EOSINOPHIL NFR BLD: 0.3 % (ref 0–8)
ERYTHROCYTE [DISTWIDTH] IN BLOOD BY AUTOMATED COUNT: 13.2 % (ref 11.5–14.5)
EST. GFR  (NO RACE VARIABLE): >60 ML/MIN/1.73 M^2
GLUCOSE SERPL-MCNC: 148 MG/DL (ref 70–110)
HCT VFR BLD AUTO: 34.3 % (ref 40–54)
HGB BLD-MCNC: 10.6 G/DL (ref 14–18)
IMM GRANULOCYTES # BLD AUTO: 0.05 K/UL (ref 0–0.04)
IMM GRANULOCYTES NFR BLD AUTO: 0.4 % (ref 0–0.5)
LYMPHOCYTES # BLD AUTO: 3.5 K/UL (ref 1–4.8)
LYMPHOCYTES NFR BLD: 28.6 % (ref 18–48)
MAGNESIUM SERPL-MCNC: 2.1 MG/DL (ref 1.6–2.6)
MCH RBC QN AUTO: 28.2 PG (ref 27–31)
MCHC RBC AUTO-ENTMCNC: 30.9 G/DL (ref 32–36)
MCV RBC AUTO: 91 FL (ref 82–98)
MONOCYTES # BLD AUTO: 0.7 K/UL (ref 0.3–1)
MONOCYTES NFR BLD: 5.7 % (ref 4–15)
NEUTROPHILS # BLD AUTO: 7.9 K/UL (ref 1.8–7.7)
NEUTROPHILS NFR BLD: 64.7 % (ref 38–73)
NRBC BLD-RTO: 0 /100 WBC
PHOSPHATE SERPL-MCNC: 2.5 MG/DL (ref 2.7–4.5)
PLATELET # BLD AUTO: 207 K/UL (ref 150–450)
PMV BLD AUTO: 10.6 FL (ref 9.2–12.9)
POCT GLUCOSE: 113 MG/DL (ref 70–110)
POCT GLUCOSE: 119 MG/DL (ref 70–110)
POCT GLUCOSE: 204 MG/DL (ref 70–110)
POCT GLUCOSE: 284 MG/DL (ref 70–110)
POTASSIUM SERPL-SCNC: 3.5 MMOL/L (ref 3.5–5.1)
RBC # BLD AUTO: 3.76 M/UL (ref 4.6–6.2)
SODIUM SERPL-SCNC: 140 MMOL/L (ref 136–145)
WBC # BLD AUTO: 12.2 K/UL (ref 3.9–12.7)

## 2023-07-04 PROCEDURE — 87077 CULTURE AEROBIC IDENTIFY: CPT | Performed by: STUDENT IN AN ORGANIZED HEALTH CARE EDUCATION/TRAINING PROGRAM

## 2023-07-04 PROCEDURE — 94640 AIRWAY INHALATION TREATMENT: CPT

## 2023-07-04 PROCEDURE — 87070 CULTURE OTHR SPECIMN AEROBIC: CPT | Performed by: STUDENT IN AN ORGANIZED HEALTH CARE EDUCATION/TRAINING PROGRAM

## 2023-07-04 PROCEDURE — 36415 COLL VENOUS BLD VENIPUNCTURE: CPT | Performed by: STUDENT IN AN ORGANIZED HEALTH CARE EDUCATION/TRAINING PROGRAM

## 2023-07-04 PROCEDURE — 63600175 PHARM REV CODE 636 W HCPCS: Performed by: STUDENT IN AN ORGANIZED HEALTH CARE EDUCATION/TRAINING PROGRAM

## 2023-07-04 PROCEDURE — 25000242 PHARM REV CODE 250 ALT 637 W/ HCPCS: Performed by: STUDENT IN AN ORGANIZED HEALTH CARE EDUCATION/TRAINING PROGRAM

## 2023-07-04 PROCEDURE — 80048 BASIC METABOLIC PNL TOTAL CA: CPT | Performed by: STUDENT IN AN ORGANIZED HEALTH CARE EDUCATION/TRAINING PROGRAM

## 2023-07-04 PROCEDURE — 94761 N-INVAS EAR/PLS OXIMETRY MLT: CPT

## 2023-07-04 PROCEDURE — 87205 SMEAR GRAM STAIN: CPT | Performed by: STUDENT IN AN ORGANIZED HEALTH CARE EDUCATION/TRAINING PROGRAM

## 2023-07-04 PROCEDURE — 87185 SC STD ENZYME DETCJ PER NZM: CPT | Performed by: STUDENT IN AN ORGANIZED HEALTH CARE EDUCATION/TRAINING PROGRAM

## 2023-07-04 PROCEDURE — 97165 OT EVAL LOW COMPLEX 30 MIN: CPT

## 2023-07-04 PROCEDURE — 11000001 HC ACUTE MED/SURG PRIVATE ROOM

## 2023-07-04 PROCEDURE — 25000003 PHARM REV CODE 250: Performed by: STUDENT IN AN ORGANIZED HEALTH CARE EDUCATION/TRAINING PROGRAM

## 2023-07-04 PROCEDURE — 83735 ASSAY OF MAGNESIUM: CPT | Performed by: STUDENT IN AN ORGANIZED HEALTH CARE EDUCATION/TRAINING PROGRAM

## 2023-07-04 PROCEDURE — 63600175 PHARM REV CODE 636 W HCPCS: Performed by: HOSPITALIST

## 2023-07-04 PROCEDURE — 27000221 HC OXYGEN, UP TO 24 HOURS

## 2023-07-04 PROCEDURE — 84100 ASSAY OF PHOSPHORUS: CPT | Performed by: STUDENT IN AN ORGANIZED HEALTH CARE EDUCATION/TRAINING PROGRAM

## 2023-07-04 PROCEDURE — 97110 THERAPEUTIC EXERCISES: CPT

## 2023-07-04 PROCEDURE — 99222 PR INITIAL HOSPITAL CARE,LEVL II: ICD-10-PCS | Mod: ,,, | Performed by: INTERNAL MEDICINE

## 2023-07-04 PROCEDURE — 99222 1ST HOSP IP/OBS MODERATE 55: CPT | Mod: ,,, | Performed by: INTERNAL MEDICINE

## 2023-07-04 PROCEDURE — 85025 COMPLETE CBC W/AUTO DIFF WBC: CPT | Performed by: STUDENT IN AN ORGANIZED HEALTH CARE EDUCATION/TRAINING PROGRAM

## 2023-07-04 PROCEDURE — 99900035 HC TECH TIME PER 15 MIN (STAT)

## 2023-07-04 PROCEDURE — 63700000 PHARM REV CODE 250 ALT 637 W/O HCPCS: Performed by: STUDENT IN AN ORGANIZED HEALTH CARE EDUCATION/TRAINING PROGRAM

## 2023-07-04 RX ADMIN — CLOPIDOGREL BISULFATE 75 MG: 75 TABLET ORAL at 09:07

## 2023-07-04 RX ADMIN — ENOXAPARIN SODIUM 40 MG: 40 INJECTION SUBCUTANEOUS at 04:07

## 2023-07-04 RX ADMIN — INSULIN ASPART 1 UNITS: 100 INJECTION, SOLUTION INTRAVENOUS; SUBCUTANEOUS at 08:07

## 2023-07-04 RX ADMIN — ATORVASTATIN CALCIUM 40 MG: 40 TABLET, FILM COATED ORAL at 09:07

## 2023-07-04 RX ADMIN — ASPIRIN 81 MG: 81 TABLET, COATED ORAL at 09:07

## 2023-07-04 RX ADMIN — FUROSEMIDE 20 MG: 10 INJECTION, SOLUTION INTRAMUSCULAR; INTRAVENOUS at 09:07

## 2023-07-04 RX ADMIN — INSULIN ASPART 3 UNITS: 100 INJECTION, SOLUTION INTRAVENOUS; SUBCUTANEOUS at 04:07

## 2023-07-04 RX ADMIN — IPRATROPIUM BROMIDE AND ALBUTEROL SULFATE 3 ML: .5; 3 SOLUTION RESPIRATORY (INHALATION) at 07:07

## 2023-07-04 RX ADMIN — PREDNISONE 40 MG: 20 TABLET ORAL at 09:07

## 2023-07-04 RX ADMIN — IPRATROPIUM BROMIDE AND ALBUTEROL SULFATE 3 ML: .5; 3 SOLUTION RESPIRATORY (INHALATION) at 01:07

## 2023-07-04 RX ADMIN — IPRATROPIUM BROMIDE AND ALBUTEROL SULFATE 3 ML: .5; 3 SOLUTION RESPIRATORY (INHALATION) at 09:07

## 2023-07-04 RX ADMIN — AZITHROMYCIN MONOHYDRATE 250 MG: 250 TABLET ORAL at 09:07

## 2023-07-04 RX ADMIN — FUROSEMIDE 20 MG: 10 INJECTION, SOLUTION INTRAMUSCULAR; INTRAVENOUS at 08:07

## 2023-07-04 NOTE — HPI
Abelardo Irene is a 68yo male presented with complaints of shortness of breath.  Initially placed on BiPAP.  Nitro paste.  Chest x-ray showed volume overload.  .  Echocardiogram on this admission showed ejection fraction 15%.  Previous 25-30%.  COPD.  Hypertension.  Hyperlipidemia.  Diabetes.  EKG showed sinus tachycardia.    Seen in clinic on 03/01/2021:    Abelardo Irene presents for continued care.  Seen as a consultation on 02/14/2021 for evaluation of shortness of breath.  History of ski kassy cardiomyopathy.  Prior PCI of his right coronary artery.  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  Was to have hybrid procedure with LIMA to LAD in 2018.  Patient has been in [place] for the last few years.  Was followed by a cardiologist.  Denied any interventions.  States that he was to have an angiogram of his lower extremity secondary to claudication symptoms.  His echocardiogram showed an EF of 35%.  Grade 2 diastolic dysfunction.  No pulmonary hypertension.  No significant valvular abnormalities.  His   blood pressure was elevated on presentation.  Lipids not controlled.  Troponin negative.  EKG showed sinus tachycardia, anteroseptal infarction.  Appeared different than EKG from 2018 with regards to anteroseptal infarction.       Echocardiogram 07/02/2023:    The left ventricle is severely enlarged with eccentric hypertrophy and severely decreased systolic function.  The estimated ejection fraction is 15%.  Grade I left ventricular diastolic dysfunction.  Normal right ventricular size with normal right ventricular systolic function.  Moderate left atrial enlargement.  Intermediate central venous pressure (8 mmHg).    Echocardiogram 03/08/2023:    The left ventricle is normal in size with mild concentric hypertrophy and moderately decreased systolic function.  Severe left atrial enlargement.  The estimated ejection fraction is 25-30%.  Grade I left ventricular diastolic  dysfunction.  Mild mitral regurgitation.  Elevated central venous pressure (15 mmHg).  The estimated PA systolic pressure is 24 mmHg.  Mild right ventricular enlargement.  Moderate right atrial enlargement.    Nuclear stress 03/12/2021:      Abnormal myocardial perfusion scan.    There is a severe intensity, small sized, reversible defect that is consistent with ischemia in the apical wall(s) in the typical distribution of the LAD territory.    There is a second moderate intensity, mostly reversible with some fixed areas defect in the basal to distal inferior wall(s) in the typical distribution of the RCA territory.    The gated perfusion images showed an ejection fraction of 24% post stress.    There is  and severe global hypokinesis at stress .    LV cavity size is severely enlarged at rest.    The EKG portion of this study is negative for ischemia.    The patient reported no chest pain during the stress test.    During stress, rare PVCs are noted.

## 2023-07-04 NOTE — ASSESSMENT & PLAN NOTE
Patient is identified as having Combined Systolic and Diastolic heart failure that is Acute on chronic. CHF is currently uncontrolled due to Rales/crackles on pulmonary exam and Pulmonary edema/pleural effusion on CXR. Latest ECHO performed and demonstrates- Results for orders placed during the hospital encounter of 03/06/23    Echo    Interpretation Summary  · The left ventricle is normal in size with mild concentric hypertrophy and moderately decreased systolic function.  · Severe left atrial enlargement.  · The estimated ejection fraction is 25-30%.  · Grade I left ventricular diastolic dysfunction.  · Mild mitral regurgitation.  · Elevated central venous pressure (15 mmHg).  · The estimated PA systolic pressure is 24 mmHg.  · Mild right ventricular enlargement.  · Moderate right atrial enlargement.  . Continue Beta Blocker and Furosemide and monitor clinical status closely. Monitor on telemetry. Patient is on CHF pathway.  Monitor strict Is&Os and daily weights.  Place on fluid restriction of 1.5 L. Continue to stress to patient importance of self efficacy and  on diet for CHF. Last BNP reviewed- and noted below   Recent Labs   Lab 07/02/23  0554   *   .  New Echo. Show EF down to 15% from 25%,will continue with IV lasix,consulted cardiology.

## 2023-07-04 NOTE — NURSING
[facility], [facility] [place]  Nurses Note -- 4 Eyes      7/4/2023       Skin assessed on: Q Shift      [x] No Pressure Injuries Present    []Prevention Measures Documented    [] Yes LDA  for Pressure Injury Previously documented     [] Yes New Pressure Injury Discovered   [] LDA for New Pressure Injury Added      Attending RN:  Imani English RN     Second RN:  Ashanti

## 2023-07-04 NOTE — PROGRESS NOTES
[facility] Medicine  Progress Note    Patient Name: Abelardo Irene Jr.  MRN: [id number]  Patient Class: IP- Inpatient   Admission Date: 7/2/2023  Length of Stay: 2 days  Attending Physician: Romeo Lopez MD  Primary Care Provider: Rolando Funes MD        Subjective:     Principal Problem:Acute on chronic respiratory failure with hypoxia and hypercapnia        HPI:  67M with pmh of copd, hfref 25-30%, CAD, hld, htn presented respiratory distress. He started experiencing severe dyspnea around 1h pta. Upon EMS arrival to scene he had bilateral rales and SpO2 of 85% on 6L [place] home oxygen. He was placed on CPAP which increased his SpO2 to 93%. He was additionally given 3 SL NTG and 1 inch nitro paste en route. Pt placed on bipap in the ed with improvement in oxygenation. On my conversation pt on bipap limiting history. Pt states he has run out of some of his home medications over the past couple of days, but took the ones he still had.  Patient unclear of which medications it is that he is run out of.  Patient states that his granddaughter has been around him and has a cold currently, but denies any known other sick contacts.  Patient does continue to smoke, but denies alcohol or drug use.  Patient states that he is having some worsening swelling of his lower extremities as well.  Patient denies chest pain, sensation of shortness a breath at this time, fever, cough, nausea, vomiting, abdominal pain, headache, or changes in vision.      Overview/Hospital Course:  67M with pmh of copd, hfref 25-30%, CAD, hld, htn presented respiratory distress. He started experiencing severe dyspnea around 1h pta. Upon EMS arrival to scene he had bilateral rales and SpO2 of 85% on 6L [place] home oxygen. He was placed on CPAP which increased his SpO2 to 93%. He was additionally given 3 SL NTG and 1 inch nitro paste en route. Pt placed on bipap in the ed with improvement in oxygenation.   His oxygenation with nebulizer  and IV lasix continue improve,consulted PT,OT as well,  New Echo. Show EF down to 15% from 25%,will continue with IV lasix,consulted cardiology.      Past Medical History:   Diagnosis Date    CHF (congestive heart failure)     COPD (chronic obstructive pulmonary disease)     Coronary artery disease     Elevated cholesterol     on medication    Hypertension        Past Surgical History:   Procedure Laterality Date    CARDIAC SURGERY      coronary stent placed    CORONARY STENT PLACEMENT         Review of patient's allergies indicates:   Allergen Reactions    Contrast media Shortness Of Breath, Itching and Anxiety     Patient states that he had a bad reaction, anxiety , shortness of breath, itching .        No current facility-administered medications on file prior to encounter.     Current Outpatient Medications on File Prior to Encounter   Medication Sig    albuterol (PROVENTIL/VENTOLIN HFA) 90 mcg/actuation inhaler Inhale 1-2 puffs into the lungs every 6 (six) hours as needed for Wheezing. Rescue    amLODIPine (NORVASC) 5 MG tablet Take 1 tablet (5 mg total) by mouth once daily.    aspirin (ECOTRIN) 81 MG EC tablet Take 1 tablet (81 mg total) by mouth once daily.    atorvastatin (LIPITOR) 40 MG tablet Take 1 tablet (40 mg total) by mouth once daily.    clopidogreL (PLAVIX) 75 mg tablet Take 1 tablet (75 mg total) by mouth once daily.    fluticasone-salmeterol diskus inhaler 250-50 mcg Inhale 1 puff into the lungs 2 (two) times daily.    furosemide (LASIX) 40 MG tablet Take 1 tablet (40 mg total) by mouth 2 (two) times daily.    glipiZIDE (GLUCOTROL) 10 MG TR24 Take 1 tablet (10 mg total) by mouth daily with breakfast.    lisinopriL (PRINIVIL,ZESTRIL) 20 MG tablet Take 1 tablet (20 mg total) by mouth once daily.    metFORMIN (GLUCOPHAGE) 500 MG tablet Take 1 tablet (500 mg total) by mouth 2 (two) times daily with meals.    metoprolol succinate (TOPROL-XL) 25 MG 24 hr tablet Take 1 tablet (25 mg  total) by mouth once daily.     Family History       Problem Relation (Age of Onset)    Cancer Mother    No Known Problems Father          Tobacco Use    Smoking status: Every Day     Packs/day: 2.00     Years: 45.00     Pack years: 90.00     Types: Cigarettes     Last attempt to quit: 2017     Years since quittin.6    Smokeless tobacco: Never   Substance and Sexual Activity    Alcohol use: Yes     Comment: socially    Drug use: No    Sexual activity: Yes     Partners: Female     Birth control/protection: None     Review of Systems  Objective:     Vital Signs (Most Recent):  Temp: 97.4 °F (36.3 °C) (23)  Pulse: 96 (23)  Resp: 19 (23)  BP: 124/84 (23)  SpO2: 100 % (23) Vital Signs (24h Range):  Temp:  [97.4 °F (36.3 °C)-98.3 °F (36.8 °C)] 97.4 °F (36.3 °C)  Pulse:  [] 96  Resp:  [18-20] 19  SpO2:  [99 %-100 %] 100 %  BP: (124-142)/(75-95) 124/84     Weight: 82.6 kg (182 lb)  Body mass index is 29.38 kg/m².     Physical Exam  Vitals reviewed.   Constitutional:       General: He is not in acute distress (tolerating bipap well).  HENT:      Head: Normocephalic and atraumatic.      Mouth/Throat:      Mouth: Mucous membranes are dry.      Pharynx: Oropharynx is clear.   Cardiovascular:      Rate and Rhythm: Normal rate and regular rhythm.   Pulmonary:      Effort: Pulmonary effort is normal.      Breath sounds: Wheezing (upper) and rales (bases) present.   Abdominal:      General: There is no distension.      Palpations: Abdomen is soft.      Tenderness: There is no abdominal tenderness.   Musculoskeletal:         General: Swelling (bilateral pitting edema of LE) present.   Skin:     General: Skin is warm and dry.   Neurological:      General: No focal deficit present.      Mental Status: He is alert.   Psychiatric:         Mood and Affect: Mood normal.         Behavior: Behavior normal.              Significant Labs: All pertinent labs within the  past 24 hours have been reviewed.    Significant Imaging: I have reviewed all pertinent imaging results/findings within the past 24 hours.      Assessment/Plan:      * Acute on chronic respiratory failure with hypoxia and hypercapnia  Patient with Hypercapnic and Hypoxic Respiratory failure which is Acute on chronic.  he is on home oxygen at 2 LPM. Supplemental oxygen was provided and noted- Oxygen Concentration (%):  [] 45    .   Signs/symptoms of respiratory failure include- tachypnea, increased work of breathing, respiratory distress and wheezing. Contributing diagnoses includes - CHF and COPD Labs and images were reviewed. Patient Has recent ABG, which has been reviewed. Will treat underlying causes and adjust management of respiratory failure as follows- Etiology appear to be related to copd vs chf exacerbation. procal negative making bacterial cause less likely.     -continue steroids, duonebs, and azithromycin  -iv lasix ordered  -wean from bipap to nc as tolerated. Bipap qhs,  His oxygenation with nebulizer and IV lasix continue improve,consulted PT,OT as well,      Chronic obstructive pulmonary disease with acute exacerbation  tx as stated above      Uncontrolled type 2 diabetes mellitus with hyperglycemia  Patient's FSGs are controlled on current medication regimen.  Last A1c reviewed-   Lab Results   Component Value Date    HGBA1C 7.7 (H) 03/06/2023     Most recent fingerstick glucose reviewed- No results for input(s): POCTGLUCOSE in the last 24 hours.  Current correctional scale  Low  Maintain anti-hyperglycemic dose as follows-   Antihyperglycemics (From admission, onward)    None        Hold Oral hypoglycemics while patient is in the hospital.    COPD exacerbation  On nebulizer and steroids,      Coronary artery disease involving native coronary artery of native heart without angina pectoris  Restart home medications      Dyslipidemia  Continue statin      Acute on chronic combined systolic and  diastolic heart failure  Patient is identified as having Combined Systolic and Diastolic heart failure that is Acute on chronic. CHF is currently uncontrolled due to Rales/crackles on pulmonary exam and Pulmonary edema/pleural effusion on CXR. Latest ECHO performed and demonstrates- Results for orders placed during the hospital encounter of 03/06/23    Echo    Interpretation Summary  · The left ventricle is normal in size with mild concentric hypertrophy and moderately decreased systolic function.  · Severe left atrial enlargement.  · The estimated ejection fraction is 25-30%.  · Grade I left ventricular diastolic dysfunction.  · Mild mitral regurgitation.  · Elevated central venous pressure (15 mmHg).  · The estimated PA systolic pressure is 24 mmHg.  · Mild right ventricular enlargement.  · Moderate right atrial enlargement.  . Continue Beta Blocker and Furosemide and monitor clinical status closely. Monitor on telemetry. Patient is on CHF pathway.  Monitor strict Is&Os and daily weights.  Place on fluid restriction of 1.5 L. Continue to stress to patient importance of self efficacy and  on diet for CHF. Last BNP reviewed- and noted below   Recent Labs   Lab 07/02/23  0554   *   .  New Echo. Show EF down to 15% from 25%,will continue with IV lasix,consulted cardiology.      Tobacco abuse  The patient was counseled on the dangers of tobacco use, and was advised to quit.  Reviewed strategies to maximize success, including removing cigarettes and smoking materials from environment and support of family/friends.  8 minutes spent discussion smoking cessation      Benign essential hypertension  Restart home medications as tolerated.        VTE Risk Mitigation (From admission, onward)         Ordered     enoxaparin injection 40 mg  Daily         07/02/23 0718     IP VTE HIGH RISK PATIENT  Once         07/02/23 0718     Place sequential compression device  Until discontinued         07/02/23 0718                 Discharge Planning   VERA: 7/4/2023     Code Status: Full Code   Is the patient medically ready for discharge?:     Reason for patient still in hospital (select all that apply): Patient trending condition                     Romeo Lopez MD  Department of Hospital Medicine   [facility]

## 2023-07-04 NOTE — SUBJECTIVE & OBJECTIVE
Past Medical History:   Diagnosis Date    CHF (congestive heart failure)     COPD (chronic obstructive pulmonary disease)     Coronary artery disease     Elevated cholesterol     on medication    Hypertension        Past Surgical History:   Procedure Laterality Date    CARDIAC SURGERY      coronary stent placed    CORONARY STENT PLACEMENT         Review of patient's allergies indicates:   Allergen Reactions    Contrast media Shortness Of Breath, Itching and Anxiety     Patient states that he had a bad reaction, anxiety , shortness of breath, itching .        No current facility-administered medications on file prior to encounter.     Current Outpatient Medications on File Prior to Encounter   Medication Sig    albuterol (PROVENTIL/VENTOLIN HFA) 90 mcg/actuation inhaler Inhale 1-2 puffs into the lungs every 6 (six) hours as needed for Wheezing. Rescue    amLODIPine (NORVASC) 5 MG tablet Take 1 tablet (5 mg total) by mouth once daily.    aspirin (ECOTRIN) 81 MG EC tablet Take 1 tablet (81 mg total) by mouth once daily.    atorvastatin (LIPITOR) 40 MG tablet Take 1 tablet (40 mg total) by mouth once daily.    clopidogreL (PLAVIX) 75 mg tablet Take 1 tablet (75 mg total) by mouth once daily.    fluticasone-salmeterol diskus inhaler 250-50 mcg Inhale 1 puff into the lungs 2 (two) times daily.    furosemide (LASIX) 40 MG tablet Take 1 tablet (40 mg total) by mouth 2 (two) times daily.    glipiZIDE (GLUCOTROL) 10 MG TR24 Take 1 tablet (10 mg total) by mouth daily with breakfast.    lisinopriL (PRINIVIL,ZESTRIL) 20 MG tablet Take 1 tablet (20 mg total) by mouth once daily.    metFORMIN (GLUCOPHAGE) 500 MG tablet Take 1 tablet (500 mg total) by mouth 2 (two) times daily with meals.    metoprolol succinate (TOPROL-XL) 25 MG 24 hr tablet Take 1 tablet (25 mg total) by mouth once daily.     Family History       Problem Relation (Age of Onset)    Cancer Mother    No Known Problems Father          Tobacco Use    Smoking status:  Every Day     Packs/day: 2.00     Years: 45.00     Pack years: 90.00     Types: Cigarettes     Last attempt to quit: 2017     Years since quittin.6    Smokeless tobacco: Never   Substance and Sexual Activity    Alcohol use: Yes     Comment: socially    Drug use: No    Sexual activity: Yes     Partners: Female     Birth control/protection: None     Review of Systems   Constitutional: Negative for decreased appetite, diaphoresis, malaise/fatigue, weight gain and weight loss.   HENT:  Negative for congestion, hearing loss, hoarse voice, nosebleeds, odynophagia, stridor and tinnitus.    Eyes:  Negative for blurred vision, double vision, photophobia, visual disturbance and visual halos.   Cardiovascular:  Negative for chest pain, claudication, cyanosis, dyspnea on exertion, irregular heartbeat, leg swelling, near-syncope, orthopnea, palpitations, paroxysmal nocturnal dyspnea and syncope.   Respiratory:  Positive for shortness of breath. Negative for cough, hemoptysis, sleep disturbances due to breathing, snoring, sputum production and wheezing.    Endocrine: Negative for cold intolerance, heat intolerance, polydipsia, polyphagia and polyuria.   Skin:  Negative for color change, poor wound healing, suspicious lesions and unusual hair distribution.   Musculoskeletal:  Negative for falls, joint pain, muscle cramps, muscle weakness, myalgias, neck pain and stiffness.   Gastrointestinal:  Negative for bloating, abdominal pain, constipation, diarrhea, dysphagia, heartburn, hematemesis, jaundice, melena, nausea and vomiting.   Neurological:  Negative for disturbances in coordination, excessive daytime sleepiness, dizziness, focal weakness, headaches, light-headedness, loss of balance, numbness, paresthesias, seizures, sensory change, tremors, vertigo and weakness.   Psychiatric/Behavioral:  Negative for altered mental status, depression, hallucinations and memory loss. The patient does not have insomnia and is not  nervous/anxious.    Objective:     Vital Signs (Most Recent):  Temp: 97.9 °F (36.6 °C) (07/04/23 1100)  Pulse: 98 (07/04/23 1100)  Resp: 18 (07/04/23 1100)  BP: 115/82 (07/04/23 1100)  SpO2: 100 % (07/04/23 1100) Vital Signs (24h Range):  Temp:  [97.4 °F (36.3 °C)-98.3 °F (36.8 °C)] 97.9 °F (36.6 °C)  Pulse:  [] 98  Resp:  [18-20] 18  SpO2:  [99 %-100 %] 100 %  BP: (115-142)/(75-95) 115/82     Weight: 82.6 kg (182 lb)  Body mass index is 29.38 kg/m².    SpO2: 100 %         Intake/Output Summary (Last 24 hours) at 7/4/2023 1200  Last data filed at 7/3/2023 1730  Gross per 24 hour   Intake 480 ml   Output --   Net 480 ml       Lines/Drains/Airways       Peripheral Intravenous Line  Duration                  Peripheral IV - Single Lumen 07/02/23 20 G Right Antecubital 2 days                     Physical Exam  Vitals reviewed.   Constitutional:       General: He is not in acute distress.     Appearance: Normal appearance. He is well-developed. He is not ill-appearing, toxic-appearing or diaphoretic.   HENT:      Head: Normocephalic.   Neck:      Vascular: No carotid bruit or JVD.   Cardiovascular:      Rate and Rhythm: Normal rate and regular rhythm.      Pulses: Normal pulses.   Pulmonary:      Effort: Pulmonary effort is normal.      Breath sounds: Wheezing present.   Abdominal:      General: Bowel sounds are normal.      Palpations: Abdomen is soft.      Tenderness: There is no abdominal tenderness.   Musculoskeletal:      Right lower leg: No edema.      Left lower leg: No edema.   Neurological:      Mental Status: He is alert and oriented to person, place, and time.   Psychiatric:         Mood and Affect: Mood normal.         Speech: Speech normal.         Behavior: Behavior normal.         Thought Content: Thought content normal.        Significant Labs: CMP   Recent Labs   Lab 07/03/23  0352 07/04/23  0329    140   K 3.9 3.5   CL 94* 95   CO2 38* 40*   * 148*   BUN 9 13   CREATININE 0.7 0.7    CALCIUM 8.9 9.0   ANIONGAP 9 5*   , CBC   Recent Labs   Lab 07/03/23  0352 07/04/23  0329   WBC 11.54 12.20   HGB 11.4* 10.6*   HCT 38.3* 34.3*    207   , and Troponin No results for input(s): TROPONINI in the last 48 hours.    Significant Imaging: Echocardiogram: Transthoracic echo (TTE) complete (Cupid Only):   Results for orders placed or performed during the hospital encounter of 07/02/23   Echo   Result Value Ref Range    BSA 1.96 m2    TDI SEPTAL 0.06 m/s    LV LATERAL E/E' RATIO 14.25 m/s    LV SEPTAL E/E' RATIO 19.00 m/s    LA WIDTH 5.70 cm    IVC diameter 1.90 cm    Left Ventricular Outflow Tract Mean Velocity 0.53 cm/s    Left Ventricular Outflow Tract Mean Gradient 1.26 mmHg    TDI LATERAL 0.08 m/s    PV PEAK VELOCITY 1.08 cm/s    LVIDd 7.00 (A) 3.5 - 6.0 cm    IVS 0.83 0.6 - 1.1 cm    Posterior Wall 0.88 0.6 - 1.1 cm    LVIDs 5.98 (A) 2.1 - 4.0 cm    FS 15 28 - 44 %    Sinus 3.27 cm    STJ 2.49 cm    Ascending aorta 3.50 cm    LV mass 264.99 g    RVDD 3.86 cm    TAPSE 2.20 cm    RV S' 14.86 cm/s    Left Ventricle Relative Wall Thickness 0.25 cm    AV mean gradient 2 mmHg    AV valve area 3.77 cm2    AV Velocity Ratio 0.89     AV index (prosthetic) 0.81     MV mean gradient 1 mmHg    MV valve area p 1/2 method 5.27 cm2    MV valve area by continuity eq 4.91 cm2    E/A ratio 1.63     Mean e' 0.07 m/s    E wave deceleration time 114.89 msec    Pulm vein S/D ratio 1.19     LVOT diameter 2.44 cm    LVOT area 4.7 cm2    LVOT peak alek 0.78 m/s    LVOT peak VTI 12.60 cm    Ao peak alek 0.88 m/s    Ao VTI 15.6 cm    Mr max alek 3.39 m/s    LVOT stroke volume 58.89 cm3    AV peak gradient 3 mmHg    MV peak gradient 3 mmHg    E/E' ratio 16.29 m/s    MV Peak E Alek 1.14 m/s    MV VTI 12.0 cm    MV stenosis pressure 1/2 time 41.75 ms    MV Peak A Alek 0.70 m/s    PV Peak S Alek 0.44 m/s    PV Peak D Alek 0.37 m/s    LV Systolic Volume 178.52 mL    LV Systolic Volume Index 93.0 mL/m2    LV Diastolic Volume 255.29  mL    LV Diastolic Volume Index 132.96 mL/m2    LV Mass Index 138 g/m2    RA Major Axis 6.53 cm    Left Atrium Minor Axis 7.53 cm    Left Atrium Major Axis 7.91 cm    RA Width 6.50 cm    Right Atrial Pressure (from IVC) 8 mmHg    EF 15 %    Narrative    · The left ventricle is severely enlarged with eccentric hypertrophy and   severely decreased systolic function.  · The estimated ejection fraction is 15%.  · Grade I left ventricular diastolic dysfunction.  · Normal right ventricular size with normal right ventricular systolic   function.  · Moderate left atrial enlargement.  · Intermediate central venous pressure (8 mmHg).

## 2023-07-04 NOTE — PLAN OF CARE
Problem: Adult Inpatient Plan of Care  Goal: Plan of Care Review  Outcome: Ongoing, Progressing  Flowsheets (Taken 7/4/2023 0815)  Plan of Care Reviewed With: patient  Goal: Patient-Specific Goal (Individualized)  Outcome: Ongoing, Progressing  Goal: Absence of Hospital-Acquired Illness or Injury  Outcome: Ongoing, Progressing  Intervention: Identify and Manage Fall Risk  Flowsheets (Taken 7/4/2023 0815)  Safety Promotion/Fall Prevention:   Fall Risk reviewed with patient/family   lighting adjusted   medications reviewed   nonskid shoes/socks when out of bed   room near unit station   side rails raised x 2     Problem: Adult Inpatient Plan of Care  Goal: Plan of Care Review  Outcome: Ongoing, Progressing  Flowsheets (Taken 7/4/2023 0815)  Plan of Care Reviewed With: patient     Problem: Adult Inpatient Plan of Care  Goal: Plan of Care Review  Outcome: Ongoing, Progressing  Flowsheets (Taken 7/4/2023 0815)  Plan of Care Reviewed With: patient     Problem: Adult Inpatient Plan of Care  Goal: Patient-Specific Goal (Individualized)  Outcome: Ongoing, Progressing     Problem: Adult Inpatient Plan of Care  Goal: Patient-Specific Goal (Individualized)  Outcome: Ongoing, Progressing     Problem: Adult Inpatient Plan of Care  Goal: Absence of Hospital-Acquired Illness or Injury  Outcome: Ongoing, Progressing  Intervention: Identify and Manage Fall Risk  Flowsheets (Taken 7/4/2023 0815)  Safety Promotion/Fall Prevention:   Fall Risk reviewed with patient/family   lighting adjusted   medications reviewed   nonskid shoes/socks when out of bed   room near unit station   side rails raised x 2     Problem: Adult Inpatient Plan of Care  Goal: Absence of Hospital-Acquired Illness or Injury  Outcome: Ongoing, Progressing     Problem: Adult Inpatient Plan of Care  Goal: Absence of Hospital-Acquired Illness or Injury  Intervention: Identify and Manage Fall Risk  Flowsheets (Taken 7/4/2023 0815)  Safety Promotion/Fall Prevention:    Fall Risk reviewed with patient/family   lighting adjusted   medications reviewed   nonskid shoes/socks when out of bed   room near unit station   side rails raised x 2     Problem: Adult Inpatient Plan of Care  Goal: Absence of Hospital-Acquired Illness or Injury  Intervention: Identify and Manage Fall Risk  Flowsheets (Taken 7/4/2023 0676)  Safety Promotion/Fall Prevention:   Fall Risk reviewed with patient/family   lighting adjusted   medications reviewed   nonskid shoes/socks when out of bed   room near unit station   side rails raised x 2

## 2023-07-04 NOTE — NURSING
[facility]  Nurses Note -- 4 Eyes      7/3/2023       Skin assessed on: Q Shift      [x] No Pressure Injuries Present    [x]Prevention Measures Documented    [] Yes LDA  for Pressure Injury Previously documented     [] Yes New Pressure Injury Discovered   [] LDA for New Pressure Injury Added      Attending RN:  Ashanti Bay RN     Second RN:  Ayanna Medina Lpn

## 2023-07-04 NOTE — PT/OT/SLP EVAL
"Occupational Therapy   Evaluation and Discharge Note    Name: Abelardo Irene Jr.  MRN: [id number]  Admitting Diagnosis: Acute on chronic respiratory failure with hypoxia and hypercapnia  Recent Surgery: * No surgery found *      Recommendations:     Discharge Recommendations: home  Discharge Equipment Recommendations: none  Barriers to discharge:   (Condition trend.)    Assessment:     Abelardo Irene Jr. is a 67 y.o. male with a medical diagnosis of Acute on chronic respiratory failure with hypoxia and hypercapnia. At this time, patient is functioning at their prior level of function and does not require further acute OT services.     Plan:     During this hospitalization, patient does not require further acute OT services.  Please re-consult if situation changes.    Plan of Care Reviewed with: patient    Subjective     Chief Complaint: None stated. Per Patient "I'm just fine."   Patient/Family Comments/goals: return to own home at [facility] as soon as medically cleared by attending MD. Patient wishing no further inpatient or community level therapy related interventions at this time.     Occupational Profile:  Living Environment: [facility], with Spouse, dtr, grand child. No step s at entry, t/s combo, no DME used or wanted at this time. (I) ADLs and home management, (-) driving.  Equipment Used at home: CPAP, nebulizer (?)  Assistance upon Discharge: Self and Family in home.     Pain/Comfort:  Pain Rating 1: 0/10    Patients cultural, spiritual, Restorationism conflicts given the current situation: no    Objective:     Communicated with: RN prior to session.  Patient found  relaxed, semi recumbent  with peripheral IV upon OT entry to room.    General Precautions: Standard, respiratory  Orthopedic Precautions: N/A  Braces: N/A  Respiratory Status: Nasal cannula, flow 4 L/min . Patient removed volitionally for in room amb, no outward (-) effects observed or reports, O2 stable. Patient dialoging pleasantly throughout all OOB. " "    Occupational Performance:    Functional Mobility/Transfers: Mod (I) indicators herein 2* unpredictable nature of current inorganic care setting, medical equipment related environmental variables.   Patient completed Sit <> Stand Transfer with independence <>  modified independence  with  no a/equip of contact assist.   Patient completed Bed <> Chair Transfer  as above.   Patient completed Toilet Transfer as above. Patient politely declining off of BSC set up nearer to bed.   Functional Mobility: Good task initiation, and OOB standing task tolerance at time of eval. Patient demonstrating and provided good verbal recitation of general in room safety/     Activities of Daily Living:  Feeding:  independence    Grooming: independence stand at basin initiate, however halted per Patient  politely stating"I just did all of this."   Upper Body Dressing: independence own street clothing  Lower Body Dressing: independence own street clothes, and footwear donned in stand w/o external support of therapist.   Toileting: independence with urinal. Per Patient and PCT (I) toileting at toilet level completed prior to OT arrival/NT formally, needs untimely.        Cognitive/Visual Perceptual:Intact, A+OX4, able to follow complex direction, able to make complex needs known, congruent information integration at time of eval. Patient cooperative / active in own POC.    Upper Body Physical Exam:  RESPIRATORY: non-labored / normal effort / rate. (-) accessory MM engagement.    UB SKIN: Observable UB skin warm and dry.   Sensation: Norm in UB extremities light touch/localization.  Posture: Norm   BUE AROM WNL  BUE MMT  >/=4+/5  UB GM/FM coordination WNL  Good <>  Norm dynamic standing at time of eval. 2 360 degree turns w/o LOB observed.   (-) UB edema      Treatment & Education:  Discussed role of OT,eval and collaborative POC dev completed. Patient states understanding and agreement with all herein.   Interventions:   UE ROM/MMT " assessment  Bed mobility training / assessment  Functional mobility assessment  Sit/standing balance assessment  Educated on importance of sitting OOB in bedside chair to promote increased strength, endurance & proper breathing.  Intro UB seated  AROM constructs BUE all joints, all planes seated 2x10, x3 daily recommended for (I) ex as suggested to counter (-) effects of limited mobility inherent in acute setting. Pecs/lats, and core strengthening ex for pull/push all planes EOB sitting with therapist for resistant also undertaken.   Good return demo and verbal recitation.        AMPAC 6 Click ADL:  AMPAC Total Score: 24    Patient left sitting edge of bed with all lines intact, call button in reach, and RN notified and endorsing Patient readiness to dine.     GOALS:   Multidisciplinary Problems       Occupational Therapy Goals       Not on file                    History:     Past Medical History:   Diagnosis Date    CHF (congestive heart failure)     COPD (chronic obstructive pulmonary disease)     Coronary artery disease     Elevated cholesterol     on medication    Hypertension          Past Surgical History:   Procedure Laterality Date    CARDIAC SURGERY      coronary stent placed    CORONARY STENT PLACEMENT         Time Tracking:     OT Date of Treatment: 07/04/23  OT Start Time: 1045  OT Stop Time: 1110  OT Total Time (min): 25 min    Billable Minutes:Evaluation 15  Therapeutic Exercise 10    7/4/2023

## 2023-07-04 NOTE — CONSULTS
[facility] Surg  Cardiology  Consult Note    Patient Name: Abelardo Irene Jr.  MRN: [id number]  Admission Date: 7/2/2023  Hospital Length of Stay: 2 days  Code Status: Full Code   Attending Provider: Romeo Lopez MD   Consulting Provider: Sean Cabrera MD  Primary Care Physician: Rolando Funes MD  Principal Problem:Acute on chronic respiratory failure with hypoxia and hypercapnia    Patient information was obtained from patient, past medical records and ER records.     Inpatient consult to Cardiology  Consult performed by: Sean Cabrera MD  Consult ordered by: Romeo Lopez MD        Subjective:     Chief Complaint:  Shortness of breath     HPI:   Abelardo Irene is a 68yo male presented with complaints of shortness of breath.  Initially placed on BiPAP.  Nitro paste.  Chest x-ray showed volume overload.  .  Echocardiogram on this admission showed ejection fraction 15%.  Previous 25-30%.  COPD.  Hypertension.  Hyperlipidemia.  Diabetes.  EKG showed sinus tachycardia.    Seen in clinic on 03/01/2021:    Abelardo Irene presents for continued care.  Seen as a consultation on 02/14/2021 for evaluation of shortness of breath.  History of ski kassy cardiomyopathy.  Prior PCI of his right coronary artery.  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  Was to have hybrid procedure with LIMA to LAD in 2018.  Patient has been in [place] for the last few years.  Was followed by a cardiologist.  Denied any interventions.  States that he was to have an angiogram of his lower extremity secondary to claudication symptoms.  His echocardiogram showed an EF of 35%.  Grade 2 diastolic dysfunction.  No pulmonary hypertension.  No significant valvular abnormalities.  His   blood pressure was elevated on presentation.  Lipids not controlled.  Troponin negative.  EKG showed sinus tachycardia, anteroseptal infarction.  Appeared different than EKG from 2018 with regards to  anteroseptal infarction.       Echocardiogram 07/02/2023:     The left ventricle is severely enlarged with eccentric hypertrophy and severely decreased systolic function.   The estimated ejection fraction is 15%.   Grade I left ventricular diastolic dysfunction.   Normal right ventricular size with normal right ventricular systolic function.   Moderate left atrial enlargement.   Intermediate central venous pressure (8 mmHg).    Echocardiogram 03/08/2023:     The left ventricle is normal in size with mild concentric hypertrophy and moderately decreased systolic function.   Severe left atrial enlargement.   The estimated ejection fraction is 25-30%.   Grade I left ventricular diastolic dysfunction.   Mild mitral regurgitation.   Elevated central venous pressure (15 mmHg).   The estimated PA systolic pressure is 24 mmHg.   Mild right ventricular enlargement.   Moderate right atrial enlargement.    Nuclear stress 03/12/2021:      Abnormal myocardial perfusion scan.    There is a severe intensity, small sized, reversible defect that is consistent with ischemia in the apical wall(s) in the typical distribution of the LAD territory.    There is a second moderate intensity, mostly reversible with some fixed areas defect in the basal to distal inferior wall(s) in the typical distribution of the RCA territory.    The gated perfusion images showed an ejection fraction of 24% post stress.    There is  and severe global hypokinesis at stress .    LV cavity size is severely enlarged at rest.    The EKG portion of this study is negative for ischemia.    The patient reported no chest pain during the stress test.    During stress, rare PVCs are noted.      Past Medical History:   Diagnosis Date    CHF (congestive heart failure)     COPD (chronic obstructive pulmonary disease)     Coronary artery disease     Elevated cholesterol     on medication    Hypertension        Past Surgical History:   Procedure  Laterality Date    CARDIAC SURGERY      coronary stent placed    CORONARY STENT PLACEMENT         Review of patient's allergies indicates:   Allergen Reactions    Contrast media Shortness Of Breath, Itching and Anxiety     Patient states that he had a bad reaction, anxiety , shortness of breath, itching .        No current facility-administered medications on file prior to encounter.     Current Outpatient Medications on File Prior to Encounter   Medication Sig    albuterol (PROVENTIL/VENTOLIN HFA) 90 mcg/actuation inhaler Inhale 1-2 puffs into the lungs every 6 (six) hours as needed for Wheezing. Rescue    amLODIPine (NORVASC) 5 MG tablet Take 1 tablet (5 mg total) by mouth once daily.    aspirin (ECOTRIN) 81 MG EC tablet Take 1 tablet (81 mg total) by mouth once daily.    atorvastatin (LIPITOR) 40 MG tablet Take 1 tablet (40 mg total) by mouth once daily.    clopidogreL (PLAVIX) 75 mg tablet Take 1 tablet (75 mg total) by mouth once daily.    fluticasone-salmeterol diskus inhaler 250-50 mcg Inhale 1 puff into the lungs 2 (two) times daily.    furosemide (LASIX) 40 MG tablet Take 1 tablet (40 mg total) by mouth 2 (two) times daily.    glipiZIDE (GLUCOTROL) 10 MG TR24 Take 1 tablet (10 mg total) by mouth daily with breakfast.    lisinopriL (PRINIVIL,ZESTRIL) 20 MG tablet Take 1 tablet (20 mg total) by mouth once daily.    metFORMIN (GLUCOPHAGE) 500 MG tablet Take 1 tablet (500 mg total) by mouth 2 (two) times daily with meals.    metoprolol succinate (TOPROL-XL) 25 MG 24 hr tablet Take 1 tablet (25 mg total) by mouth once daily.     Family History       Problem Relation (Age of Onset)    Cancer Mother    No Known Problems Father          Tobacco Use    Smoking status: Every Day     Packs/day: 2.00     Years: 45.00     Pack years: 90.00     Types: Cigarettes     Last attempt to quit: 2017     Years since quittin.6    Smokeless tobacco: Never   Substance and Sexual Activity    Alcohol use:  Yes     Comment: socially    Drug use: No    Sexual activity: Yes     Partners: Female     Birth control/protection: None     Review of Systems   Constitutional: Negative for decreased appetite, diaphoresis, malaise/fatigue, weight gain and weight loss.   HENT:  Negative for congestion, hearing loss, hoarse voice, nosebleeds, odynophagia, stridor and tinnitus.    Eyes:  Negative for blurred vision, double vision, photophobia, visual disturbance and visual halos.   Cardiovascular:  Negative for chest pain, claudication, cyanosis, dyspnea on exertion, irregular heartbeat, leg swelling, near-syncope, orthopnea, palpitations, paroxysmal nocturnal dyspnea and syncope.   Respiratory:  Positive for shortness of breath. Negative for cough, hemoptysis, sleep disturbances due to breathing, snoring, sputum production and wheezing.    Endocrine: Negative for cold intolerance, heat intolerance, polydipsia, polyphagia and polyuria.   Skin:  Negative for color change, poor wound healing, suspicious lesions and unusual hair distribution.   Musculoskeletal:  Negative for falls, joint pain, muscle cramps, muscle weakness, myalgias, neck pain and stiffness.   Gastrointestinal:  Negative for bloating, abdominal pain, constipation, diarrhea, dysphagia, heartburn, hematemesis, jaundice, melena, nausea and vomiting.   Neurological:  Negative for disturbances in coordination, excessive daytime sleepiness, dizziness, focal weakness, headaches, light-headedness, loss of balance, numbness, paresthesias, seizures, sensory change, tremors, vertigo and weakness.   Psychiatric/Behavioral:  Negative for altered mental status, depression, hallucinations and memory loss. The patient does not have insomnia and is not nervous/anxious.    Objective:     Vital Signs (Most Recent):  Temp: 97.9 °F (36.6 °C) (07/04/23 1100)  Pulse: 98 (07/04/23 1100)  Resp: 18 (07/04/23 1100)  BP: 115/82 (07/04/23 1100)  SpO2: 100 % (07/04/23 1100) Vital Signs (24h  Range):  Temp:  [97.4 °F (36.3 °C)-98.3 °F (36.8 °C)] 97.9 °F (36.6 °C)  Pulse:  [] 98  Resp:  [18-20] 18  SpO2:  [99 %-100 %] 100 %  BP: (115-142)/(75-95) 115/82     Weight: 82.6 kg (182 lb)  Body mass index is 29.38 kg/m².    SpO2: 100 %         Intake/Output Summary (Last 24 hours) at 7/4/2023 1200  Last data filed at 7/3/2023 1730  Gross per 24 hour   Intake 480 ml   Output --   Net 480 ml       Lines/Drains/Airways       Peripheral Intravenous Line  Duration                  Peripheral IV - Single Lumen 07/02/23 20 G Right Antecubital 2 days                     Physical Exam  Vitals reviewed.   Constitutional:       General: He is not in acute distress.     Appearance: Normal appearance. He is well-developed. He is not ill-appearing, toxic-appearing or diaphoretic.   HENT:      Head: Normocephalic.   Neck:      Vascular: No carotid bruit or JVD.   Cardiovascular:      Rate and Rhythm: Normal rate and regular rhythm.      Pulses: Normal pulses.   Pulmonary:      Effort: Pulmonary effort is normal.      Breath sounds: Wheezing present.   Abdominal:      General: Bowel sounds are normal.      Palpations: Abdomen is soft.      Tenderness: There is no abdominal tenderness.   Musculoskeletal:      Right lower leg: No edema.      Left lower leg: No edema.   Neurological:      Mental Status: He is alert and oriented to person, place, and time.   Psychiatric:         Mood and Affect: Mood normal.         Speech: Speech normal.         Behavior: Behavior normal.         Thought Content: Thought content normal.        Significant Labs: CMP   Recent Labs   Lab 07/03/23  0352 07/04/23  0329    140   K 3.9 3.5   CL 94* 95   CO2 38* 40*   * 148*   BUN 9 13   CREATININE 0.7 0.7   CALCIUM 8.9 9.0   ANIONGAP 9 5*   , CBC   Recent Labs   Lab 07/03/23  0352 07/04/23  0329   WBC 11.54 12.20   HGB 11.4* 10.6*   HCT 38.3* 34.3*    207   , and Troponin No results for input(s): TROPONINI in the last 48  hours.    Significant Imaging: Echocardiogram: Transthoracic echo (TTE) complete (Cupid Only):   Results for orders placed or performed during the hospital encounter of 07/02/23   Echo   Result Value Ref Range    BSA 1.96 m2    TDI SEPTAL 0.06 m/s    LV LATERAL E/E' RATIO 14.25 m/s    LV SEPTAL E/E' RATIO 19.00 m/s    LA WIDTH 5.70 cm    IVC diameter 1.90 cm    Left Ventricular Outflow Tract Mean Velocity 0.53 cm/s    Left Ventricular Outflow Tract Mean Gradient 1.26 mmHg    TDI LATERAL 0.08 m/s    PV PEAK VELOCITY 1.08 cm/s    LVIDd 7.00 (A) 3.5 - 6.0 cm    IVS 0.83 0.6 - 1.1 cm    Posterior Wall 0.88 0.6 - 1.1 cm    LVIDs 5.98 (A) 2.1 - 4.0 cm    FS 15 28 - 44 %    Sinus 3.27 cm    STJ 2.49 cm    Ascending aorta 3.50 cm    LV mass 264.99 g    RVDD 3.86 cm    TAPSE 2.20 cm    RV S' 14.86 cm/s    Left Ventricle Relative Wall Thickness 0.25 cm    AV mean gradient 2 mmHg    AV valve area 3.77 cm2    AV Velocity Ratio 0.89     AV index (prosthetic) 0.81     MV mean gradient 1 mmHg    MV valve area p 1/2 method 5.27 cm2    MV valve area by continuity eq 4.91 cm2    E/A ratio 1.63     Mean e' 0.07 m/s    E wave deceleration time 114.89 msec    Pulm vein S/D ratio 1.19     LVOT diameter 2.44 cm    LVOT area 4.7 cm2    LVOT peak alek 0.78 m/s    LVOT peak VTI 12.60 cm    Ao peak alek 0.88 m/s    Ao VTI 15.6 cm    Mr max alek 3.39 m/s    LVOT stroke volume 58.89 cm3    AV peak gradient 3 mmHg    MV peak gradient 3 mmHg    E/E' ratio 16.29 m/s    MV Peak E Alek 1.14 m/s    MV VTI 12.0 cm    MV stenosis pressure 1/2 time 41.75 ms    MV Peak A Alek 0.70 m/s    PV Peak S Alek 0.44 m/s    PV Peak D Alek 0.37 m/s    LV Systolic Volume 178.52 mL    LV Systolic Volume Index 93.0 mL/m2    LV Diastolic Volume 255.29 mL    LV Diastolic Volume Index 132.96 mL/m2    LV Mass Index 138 g/m2    RA Major Axis 6.53 cm    Left Atrium Minor Axis 7.53 cm    Left Atrium Major Axis 7.91 cm    RA Width 6.50 cm    Right Atrial Pressure (from IVC) 8 mmHg     EF 15 %    Narrative    · The left ventricle is severely enlarged with eccentric hypertrophy and   severely decreased systolic function.  · The estimated ejection fraction is 15%.  · Grade I left ventricular diastolic dysfunction.  · Normal right ventricular size with normal right ventricular systolic   function.  · Moderate left atrial enlargement.  · Intermediate central venous pressure (8 mmHg).        Assessment and Plan:     Coronary artery disease involving native coronary artery of native heart without angina pectoris  07/04/2023: Stable.    Acute on chronic combined systolic and diastolic heart failure  07/04/2023:  Consider change from lisinopril to Entresto.  Optimize heart failure regimen.        VTE Risk Mitigation (From admission, onward)         Ordered     enoxaparin injection 40 mg  Daily         07/02/23 0718     IP VTE HIGH RISK PATIENT  Once         07/02/23 0718     Place sequential compression device  Until discontinued         07/02/23 0718                Thank you for your consult. I will follow-up with patient. Please contact us if you have any additional questions.    Sean Cabrera MD  Cardiology   [facility] Surg

## 2023-07-05 LAB
ANION GAP SERPL CALC-SCNC: 9 MMOL/L (ref 8–16)
BASOPHILS # BLD AUTO: 0.05 K/UL (ref 0–0.2)
BASOPHILS NFR BLD: 0.4 % (ref 0–1.9)
BUN SERPL-MCNC: 14 MG/DL (ref 8–23)
CALCIUM SERPL-MCNC: 9.1 MG/DL (ref 8.7–10.5)
CHLORIDE SERPL-SCNC: 92 MMOL/L (ref 95–110)
CO2 SERPL-SCNC: 40 MMOL/L (ref 23–29)
CREAT SERPL-MCNC: 0.8 MG/DL (ref 0.5–1.4)
DIFFERENTIAL METHOD: ABNORMAL
EOSINOPHIL # BLD AUTO: 0 K/UL (ref 0–0.5)
EOSINOPHIL NFR BLD: 0.2 % (ref 0–8)
ERYTHROCYTE [DISTWIDTH] IN BLOOD BY AUTOMATED COUNT: 13.1 % (ref 11.5–14.5)
EST. GFR  (NO RACE VARIABLE): >60 ML/MIN/1.73 M^2
GLUCOSE SERPL-MCNC: 193 MG/DL (ref 70–110)
HCT VFR BLD AUTO: 36.5 % (ref 40–54)
HGB BLD-MCNC: 11.4 G/DL (ref 14–18)
IMM GRANULOCYTES # BLD AUTO: 0.03 K/UL (ref 0–0.04)
IMM GRANULOCYTES NFR BLD AUTO: 0.2 % (ref 0–0.5)
LYMPHOCYTES # BLD AUTO: 3.3 K/UL (ref 1–4.8)
LYMPHOCYTES NFR BLD: 26.3 % (ref 18–48)
MAGNESIUM SERPL-MCNC: 2 MG/DL (ref 1.6–2.6)
MCH RBC QN AUTO: 28.1 PG (ref 27–31)
MCHC RBC AUTO-ENTMCNC: 31.2 G/DL (ref 32–36)
MCV RBC AUTO: 90 FL (ref 82–98)
MONOCYTES # BLD AUTO: 0.9 K/UL (ref 0.3–1)
MONOCYTES NFR BLD: 6.9 % (ref 4–15)
NEUTROPHILS # BLD AUTO: 8.3 K/UL (ref 1.8–7.7)
NEUTROPHILS NFR BLD: 66 % (ref 38–73)
NRBC BLD-RTO: 0 /100 WBC
PHOSPHATE SERPL-MCNC: 2.7 MG/DL (ref 2.7–4.5)
PLATELET # BLD AUTO: 210 K/UL (ref 150–450)
PMV BLD AUTO: 10.9 FL (ref 9.2–12.9)
POCT GLUCOSE: 109 MG/DL (ref 70–110)
POCT GLUCOSE: 181 MG/DL (ref 70–110)
POCT GLUCOSE: 194 MG/DL (ref 70–110)
POCT GLUCOSE: 266 MG/DL (ref 70–110)
POTASSIUM SERPL-SCNC: 3.7 MMOL/L (ref 3.5–5.1)
RBC # BLD AUTO: 4.05 M/UL (ref 4.6–6.2)
SODIUM SERPL-SCNC: 141 MMOL/L (ref 136–145)
WBC # BLD AUTO: 12.52 K/UL (ref 3.9–12.7)

## 2023-07-05 PROCEDURE — 94640 AIRWAY INHALATION TREATMENT: CPT

## 2023-07-05 PROCEDURE — 25000242 PHARM REV CODE 250 ALT 637 W/ HCPCS: Performed by: STUDENT IN AN ORGANIZED HEALTH CARE EDUCATION/TRAINING PROGRAM

## 2023-07-05 PROCEDURE — 99233 PR SUBSEQUENT HOSPITAL CARE,LEVL III: ICD-10-PCS | Mod: ,,, | Performed by: INTERNAL MEDICINE

## 2023-07-05 PROCEDURE — 27000221 HC OXYGEN, UP TO 24 HOURS

## 2023-07-05 PROCEDURE — 80048 BASIC METABOLIC PNL TOTAL CA: CPT | Performed by: STUDENT IN AN ORGANIZED HEALTH CARE EDUCATION/TRAINING PROGRAM

## 2023-07-05 PROCEDURE — 63700000 PHARM REV CODE 250 ALT 637 W/O HCPCS: Performed by: STUDENT IN AN ORGANIZED HEALTH CARE EDUCATION/TRAINING PROGRAM

## 2023-07-05 PROCEDURE — 99233 SBSQ HOSP IP/OBS HIGH 50: CPT | Mod: ,,, | Performed by: INTERNAL MEDICINE

## 2023-07-05 PROCEDURE — 83735 ASSAY OF MAGNESIUM: CPT | Performed by: STUDENT IN AN ORGANIZED HEALTH CARE EDUCATION/TRAINING PROGRAM

## 2023-07-05 PROCEDURE — 11000001 HC ACUTE MED/SURG PRIVATE ROOM

## 2023-07-05 PROCEDURE — 85025 COMPLETE CBC W/AUTO DIFF WBC: CPT | Performed by: STUDENT IN AN ORGANIZED HEALTH CARE EDUCATION/TRAINING PROGRAM

## 2023-07-05 PROCEDURE — 25000003 PHARM REV CODE 250: Performed by: INTERNAL MEDICINE

## 2023-07-05 PROCEDURE — 36415 COLL VENOUS BLD VENIPUNCTURE: CPT | Performed by: STUDENT IN AN ORGANIZED HEALTH CARE EDUCATION/TRAINING PROGRAM

## 2023-07-05 PROCEDURE — 99900035 HC TECH TIME PER 15 MIN (STAT)

## 2023-07-05 PROCEDURE — 63600175 PHARM REV CODE 636 W HCPCS: Performed by: STUDENT IN AN ORGANIZED HEALTH CARE EDUCATION/TRAINING PROGRAM

## 2023-07-05 PROCEDURE — 63600175 PHARM REV CODE 636 W HCPCS: Performed by: HOSPITALIST

## 2023-07-05 PROCEDURE — 84100 ASSAY OF PHOSPHORUS: CPT | Performed by: STUDENT IN AN ORGANIZED HEALTH CARE EDUCATION/TRAINING PROGRAM

## 2023-07-05 PROCEDURE — 94761 N-INVAS EAR/PLS OXIMETRY MLT: CPT

## 2023-07-05 PROCEDURE — 97161 PT EVAL LOW COMPLEX 20 MIN: CPT

## 2023-07-05 PROCEDURE — 25000003 PHARM REV CODE 250: Performed by: STUDENT IN AN ORGANIZED HEALTH CARE EDUCATION/TRAINING PROGRAM

## 2023-07-05 RX ADMIN — IPRATROPIUM BROMIDE AND ALBUTEROL SULFATE 3 ML: .5; 3 SOLUTION RESPIRATORY (INHALATION) at 03:07

## 2023-07-05 RX ADMIN — ASPIRIN 81 MG: 81 TABLET, COATED ORAL at 09:07

## 2023-07-05 RX ADMIN — IPRATROPIUM BROMIDE AND ALBUTEROL SULFATE 3 ML: .5; 3 SOLUTION RESPIRATORY (INHALATION) at 08:07

## 2023-07-05 RX ADMIN — ENOXAPARIN SODIUM 40 MG: 40 INJECTION SUBCUTANEOUS at 05:07

## 2023-07-05 RX ADMIN — AZITHROMYCIN MONOHYDRATE 250 MG: 250 TABLET ORAL at 09:07

## 2023-07-05 RX ADMIN — SACUBITRIL AND VALSARTAN 1 TABLET: 24; 26 TABLET, FILM COATED ORAL at 08:07

## 2023-07-05 RX ADMIN — FUROSEMIDE 20 MG: 10 INJECTION, SOLUTION INTRAMUSCULAR; INTRAVENOUS at 09:07

## 2023-07-05 RX ADMIN — ATORVASTATIN CALCIUM 40 MG: 40 TABLET, FILM COATED ORAL at 09:07

## 2023-07-05 RX ADMIN — CLOPIDOGREL BISULFATE 75 MG: 75 TABLET ORAL at 09:07

## 2023-07-05 RX ADMIN — INSULIN ASPART 3 UNITS: 100 INJECTION, SOLUTION INTRAVENOUS; SUBCUTANEOUS at 05:07

## 2023-07-05 RX ADMIN — SACUBITRIL AND VALSARTAN 1 TABLET: 24; 26 TABLET, FILM COATED ORAL at 11:07

## 2023-07-05 RX ADMIN — PREDNISONE 40 MG: 20 TABLET ORAL at 09:07

## 2023-07-05 RX ADMIN — FUROSEMIDE 20 MG: 10 INJECTION, SOLUTION INTRAMUSCULAR; INTRAVENOUS at 08:07

## 2023-07-05 NOTE — NURSING
[facility] [place]  Nurses Note -- 4 Eyes      7/4/2023       Skin assessed on: Q Shift      [x] No Pressure Injuries Present    [x]Prevention Measures Documented    [] Yes LDA  for Pressure Injury Previously documented     [] Yes New Pressure Injury Discovered   [] LDA for New Pressure Injury Added      Attending RN:  BEULAH LAM RN     Second RN:  Imani AUGUSTIN

## 2023-07-05 NOTE — PLAN OF CARE
Pt is AAOx4. 3L NC. Tele maintained.   No falls or injuries reported during shift, safety precautions maintained.     Problem: Adult Inpatient Plan of Care  Goal: Plan of Care Review  Outcome: Ongoing, Progressing     Problem: Adult Inpatient Plan of Care  Goal: Patient-Specific Goal (Individualized)  Outcome: Ongoing, Progressing

## 2023-07-05 NOTE — PROGRESS NOTES
[facility] Surg  Cardiology  Progress Note    Patient Name: Abelardo Irene Jr.  MRN: [id number]  Admission Date: 7/2/2023  Hospital Length of Stay: 3 days  Code Status: Full Code   Attending Physician: Romeo Lopez MD   Primary Care Physician: Rolando Funes MD  Expected Discharge Date: 7/4/2023  Principal Problem:Acute on chronic respiratory failure with hypoxia and hypercapnia    Subjective:     Hospital Course:   7/5/23 Ambulating in halls - SOB improved. Denies CP    Echo 7/3/23   The left ventricle is severely enlarged with eccentric hypertrophy and severely decreased systolic function.   The estimated ejection fraction is 15%.   Grade I left ventricular diastolic dysfunction.   Normal right ventricular size with normal right ventricular systolic function.   Moderate left atrial enlargement.   Intermediate central venous pressure (8 mmHg).      Interval History:     Review of Systems   Constitutional: Negative for decreased appetite.   HENT:  Negative for ear discharge.    Eyes:  Negative for blurred vision.   Endocrine: Negative for polyphagia.   Skin:  Negative for nail changes.   Genitourinary:  Negative for bladder incontinence.   Neurological:  Negative for aphonia.   Psychiatric/Behavioral:  Negative for hallucinations.    Allergic/Immunologic: Negative for hives.   Objective:     Vital Signs (Most Recent):  Temp: 98.7 °F (37.1 °C) (07/05/23 0720)  Pulse: 90 (07/05/23 0852)  Resp: 18 (07/05/23 0852)  BP: 125/80 (07/05/23 0720)  SpO2: 96 % (07/05/23 0852) Vital Signs (24h Range):  Temp:  [97.7 °F (36.5 °C)-98.7 °F (37.1 °C)] 98.7 °F (37.1 °C)  Pulse:  [86-98] 90  Resp:  [18-20] 18  SpO2:  [96 %-100 %] 96 %  BP: (115-129)/(74-92) 125/80     Weight: 82.6 kg (182 lb)  Body mass index is 29.38 kg/m².     SpO2: 96 %         Intake/Output Summary (Last 24 hours) at 7/5/2023 1030  Last data filed at 7/5/2023 0900  Gross per 24 hour   Intake 1340 ml   Output 2200 ml   Net -860 ml        Lines/Drains/Airways       Peripheral Intravenous Line  Duration                  Peripheral IV - Single Lumen 07/02/23 20 G Right Antecubital 3 days                       Physical Exam  Constitutional:       Appearance: He is well-developed.   HENT:      Head: Normocephalic and atraumatic.   Eyes:      Conjunctiva/sclera: Conjunctivae normal.      Pupils: Pupils are equal, round, and reactive to light.   Cardiovascular:      Rate and Rhythm: Normal rate.      Pulses: Intact distal pulses.      Heart sounds: Normal heart sounds.   Pulmonary:      Effort: Pulmonary effort is normal.      Breath sounds: Normal breath sounds.   Abdominal:      General: Bowel sounds are normal.      Palpations: Abdomen is soft.   Musculoskeletal:         General: Normal range of motion.      Cervical back: Normal range of motion and neck supple.   Skin:     General: Skin is warm and dry.   Neurological:      Mental Status: He is alert and oriented to person, place, and time.          Significant Labs: All pertinent lab results from the last 24 hours have been reviewed.    Significant Imaging: Echocardiogram: 2D echo with color flow doppler:   Results for orders placed or performed during the hospital encounter of 01/22/18   2D echo with color flow doppler   Result Value Ref Range    EF + QEF 35 (A) 55 - 65    Mitral Valve Regurgitation TRIVIAL     Diastolic Dysfunction Yes (A)     Mitral Valve Mobility NORMAL     Narrative    Date of Procedure: 01/22/2018        TEST DESCRIPTION   Technical Quality: This is a technically adequate study.     Aorta: The aortic root is normal in size, measuring 3.1 cm at sinotubular junction and 3.3 cm at Sinuses of Valsalva. The proximal ascending aorta is normal in size, measuring 3.3 cm across.     Left Atrium: The left atrial volume index is severely enlarged, measuring 48.08 cc/m2.     Left Ventricle: The left ventricle is mildly enlarged, with an end-diastolic diameter of 5.9 cm, and an  end-systolic diameter of 5.0 cm. LV wall thickness is normal, with the septum and the posterior wall each measuring 0.8 cm across. Relative wall   thickness was normal at 0.27, and the LV mass index was 105.8 g/m2 consistent with normal left ventricular mass. There is global hypokinesis with more severe hypokinesis of the anterior septum.  There are no regional wall motion abnormalities. Left ventricular systolic function appears moderately depressed. Visually estimated ejection fraction is 35-40%. The LV Doppler derived stroke volume equals 59.0 ccs.     Diastolic indices: E wave velocity 0.4 m/s, E/A ratio 0.6,  msec., E/e' ratio(avg) 9. Pulmonary vein systolic/diastolic ratio is reversed. There is diastolic dysfunction secondary to relaxation abnormality.     Right Atrium: The right atrium is normal in size, measuring 4.5 cm in length and 4.1 cm in width in the apical view.     Right Ventricle: The right ventricle is normal in size measuring 2.9 cm at the base in the apical right ventricle-focused view. Global right ventricular systolic function appears normal. Tricuspid annular plane systolic excursion (TAPSE) is 2.2 cm.   Tissue Doppler-derived tricuspid annular peak systolic velocity (S prime) is 14.3 cm/s.     Aortic Valve:  The aortic valve is normal in structure with normal leaflet mobility. The aortic valve is tri-leaflet in structure. There is aortic annular calcification.     Mitral Valve:  The mitral valve is mildly sclerotic with normal leaflet mobility. There is trivial mitral regurgitation.     Tricuspid Valve:  The tricuspid valve is normal in structure with normal leaflet mobility.     Pulmonary Valve:  The pulmonic valve is normal in structure with normal leaflet mobility. There is trivial pulmonic regurgitation.     IVC: IVC is normal in size and collapses > 50% with a sniff, suggesting normal right atrial pressure of 3 mmHg.     Intracavitary: There is no evidence of pericardial effusion,  intracavity mass, thrombi, or vegetation.         CONCLUSIONS     1 - Severe left atrial enlargement.     2 - Mild left ventricular enlargement.     3 - Moderately depressed left ventricular systolic function (EF 35-40%).     4 - Impaired LV relaxation, normal LAP (grade 1 diastolic dysfunction).     5 - Normal right ventricular systolic function .             This document has been electronically    SIGNED BY: Herbert Potts MD On: 01/22/2018 09:46    This document was originally electronically signed on: 01/22/2018 09:42     Assessment and Plan:     Brief HPI:     Coronary artery disease involving native coronary artery of native heart without angina pectoris  07/04/2023: Stable.    Dyslipidemia  On statin    Acute on chronic combined systolic and diastolic heart failure  07/04/2023:  Consider change from lisinopril to Entresto.  Optimize heart failure regimen.  7/5/23 Clinically improved. Continue diuresis and afterload reduction as tolerated. Ok for d/c. Out patient AICD evaluation        VTE Risk Mitigation (From admission, onward)         Ordered     enoxaparin injection 40 mg  Daily         07/02/23 0718     IP VTE HIGH RISK PATIENT  Once         07/02/23 0718     Place sequential compression device  Until discontinued         07/02/23 0718                Jerardo Quarles MD  Cardiology  [facility] [place] Med S[facility]

## 2023-07-05 NOTE — NURSING
[facility] - [place]  Nurses Note -- 4 Eyes      7/5/2023       Skin assessed on: Q Shift      [x] No Pressure Injuries Present    []Prevention Measures Documented    [] Yes LDA  for Pressure Injury Previously documented     [] Yes New Pressure Injury Discovered   [] LDA for New Pressure Injury Added      Attending RN:  Imani English RN     Second RN: Mary

## 2023-07-05 NOTE — HOSPITAL COURSE
7/5/23 Ambulating in [place] - SOB improved. Denies CP    Echo 7/3/23  The left ventricle is severely enlarged with eccentric hypertrophy and severely decreased systolic function.  The estimated ejection fraction is 15%.  Grade I left ventricular diastolic dysfunction.  Normal right ventricular size with normal right ventricular systolic function.  Moderate left atrial enlargement.  Intermediate central venous pressure (8 mmHg).

## 2023-07-05 NOTE — PT/OT/SLP EVAL
Physical Therapy Evaluation     Patient Name: Abelardo Irene Jr.   MRN: [id number]  Recent Surgery: * No surgery found *      Recommendations:     Discharge Recommendations: home   Discharge Equipment Recommendations: none       Assessment:     Abelardo Irene Jr. is a 67 y.o. male admitted with a medical diagnosis of Acute on chronic respiratory failure with hypoxia and hypercapnia. He presents with the following impairments/functional limitations: impaired endurance, decreased lower extremity function.     Rehab Prognosis: Good; patient would benefit from acute PT services to address these deficits and reach maximum level of function.    Plan:     During this hospitalization, patient to be seen 1x to address the above listed problems via initial evaluation    Plan of Care Expires: 07/05/23    Subjective     Chief Complaint: Pain (R) knee  Patient Comments/Goals: Pt agreeable to PT evaluation.  Pain/Comfort:  Pain Rating 1: 5/10  Location - Side 1: Right  Location 1: knee  Pain Addressed 1: Reposition    Social History:  Living Environment: Patient lives with their spouse in a first floor apartment with number of outside stair(s): none  Prior Level of Function: Prior to admission, patient was independent  Equipment Used at Home: oxygen; 4L  DME owned (not currently used): none  Assistance Upon Discharge:  wife    Objective:     Communicated with nurseImani prior to session. Patient found supine with oxygen upon PT entry to room.    General Precautions: Standard,     Orthopedic Precautions: N/A   Braces: N/A    Respiratory Status: Nasal cannula, flow 2 L/min    Exams:  Cognition: Patient is oriented to Person, Place, Time, Situation  RLE ROM: WFL  RLE Strength: WFL  LLE ROM: WFL  LLE Strength: WFL  Sensation:    -       Intact  light/touch BLEs    Functional Mobility:  Gait belt applied - No  Bed Mobility  Supine to Sit: independence for all mobility.  Transfers  Sit to Stand: independence with no AD  Gait  Patient  ambulated 150' with no AD and independence and supervision. Patient demonstrates antalgic gait.  portable Supplemental O2 2L utilized.  Balance  Sitting: independence  Standing: independence      Therapeutic Activities and Exercises:   Patient educated on role of acute care PT and PT POC  Patient educated about importance of OOB mobility and remaining up in chair most of the day.  Pt demonstrates safety with transfers and gait, no further skilled PT needed at this time.      AM-PAC 6 CLICK MOBILITY  Total Score:24    Patient left up in chair with all lines intact and call button in reach.    GOALS:   Multidisciplinary Problems       Physical Therapy Goals          Problem: Physical Therapy    Goal Priority Disciplines Outcome Goal Variances Interventions   Physical Therapy Goal     PT, PT/OT Adequate for Care Transition                         History:     Past Medical History:   Diagnosis Date    CHF (congestive heart failure)     COPD (chronic obstructive pulmonary disease)     Coronary artery disease     Elevated cholesterol     on medication    Hypertension        Past Surgical History:   Procedure Laterality Date    CARDIAC SURGERY      coronary stent placed    CORONARY STENT PLACEMENT         Time Tracking:     PT Received On: 07/05/23  PT Start Time: 0915  PT Stop Time: 0925  PT Total Time (min): 10 min     Billable Minutes: Evaluation 10    7/5/2023

## 2023-07-05 NOTE — ASSESSMENT & PLAN NOTE
07/04/2023:  Consider change from lisinopril to Entresto.  Optimize heart failure regimen.  7/5/23 Clinically improved. Continue diuresis and afterload reduction as tolerated. Ok for d/c. Out patient AICD evaluation

## 2023-07-05 NOTE — ASSESSMENT & PLAN NOTE
Patient is identified as having Combined Systolic and Diastolic heart failure that is Acute on chronic. CHF is currently uncontrolled due to Rales/crackles on pulmonary exam and Pulmonary edema/pleural effusion on CXR. Latest ECHO performed and demonstrates- Results for orders placed during the hospital encounter of 03/06/23    Echo    Interpretation Summary  · The left ventricle is normal in size with mild concentric hypertrophy and moderately decreased systolic function.  · Severe left atrial enlargement.  · The estimated ejection fraction is 25-30%.  · Grade I left ventricular diastolic dysfunction.  · Mild mitral regurgitation.  · Elevated central venous pressure (15 mmHg).  · The estimated PA systolic pressure is 24 mmHg.  · Mild right ventricular enlargement.  · Moderate right atrial enlargement.  . Continue Beta Blocker and Furosemide and monitor clinical status closely. Monitor on telemetry. Patient is on CHF pathway.  Monitor strict Is&Os and daily weights.  Place on fluid restriction of 1.5 L. Continue to stress to patient importance of self efficacy and  on diet for CHF. Last BNP reviewed- and noted below   Recent Labs   Lab 07/02/23  0554   *   .  New Echo. Show EF down to 15% from 25%,will continue with IV lasix,consulted cardiology.will arrange follow up.started on entrtesto.

## 2023-07-05 NOTE — PLAN OF CARE
07/05/23 1408   Medicare Message   Important Message from Medicare regarding Discharge Appeal Rights Given to patient/caregiver;Explained to patient/caregiver;Signed/date by patient/caregiver   Date IMM was signed 07/05/23   Time IMM was signed 140

## 2023-07-05 NOTE — PROGRESS NOTES
[facility] Medicine  Progress Note    Patient Name: Abelardo Irene Jr.  MRN: [id number]  Patient Class: IP- Inpatient   Admission Date: 7/2/2023  Length of Stay: 3 days  Attending Physician: Romeo Lopez MD  Primary Care Provider: Rolando Funes MD        Subjective:     Principal Problem:Acute on chronic respiratory failure with hypoxia and hypercapnia        HPI:  67M with pmh of copd, hfref 25-30%, CAD, hld, htn presented respiratory distress. He started experiencing severe dyspnea around 1h pta. Upon EMS arrival to scene he had bilateral rales and SpO2 of 85% on 6L [place] home oxygen. He was placed on CPAP which increased his SpO2 to 93%. He was additionally given 3 SL NTG and 1 inch nitro paste en route. Pt placed on bipap in the ed with improvement in oxygenation. On my conversation pt on bipap limiting history. Pt states he has run out of some of his home medications over the past couple of days, but took the ones he still had.  Patient unclear of which medications it is that he is run out of.  Patient states that his granddaughter has been around him and has a cold currently, but denies any known other sick contacts.  Patient does continue to smoke, but denies alcohol or drug use.  Patient states that he is having some worsening swelling of his lower extremities as well.  Patient denies chest pain, sensation of shortness a breath at this time, fever, cough, nausea, vomiting, abdominal pain, headache, or changes in vision.      Overview/Hospital Course:  67M with pmh of copd, hfref 25-30%, CAD, hld, htn presented respiratory distress. He started experiencing severe dyspnea around 1h pta. Upon EMS arrival to scene he had bilateral rales and SpO2 of 85% on 6L [place] home oxygen. He was placed on CPAP which increased his SpO2 to 93%. He was additionally given 3 SL NTG and 1 inch nitro paste en route. Pt placed on bipap in the ed with improvement in oxygenation.   His oxygenation with nebulizer  and IV lasix continue improve,consulted PT,OT as well,  New Echo. Show EF down to 15% from 25%,will continue with IV lasix,consulted cardiology.will arrange follow up.started on entrtesto.      Past Medical History:   Diagnosis Date    CHF (congestive heart failure)     COPD (chronic obstructive pulmonary disease)     Coronary artery disease     Elevated cholesterol     on medication    Hypertension        Past Surgical History:   Procedure Laterality Date    CARDIAC SURGERY      coronary stent placed    CORONARY STENT PLACEMENT         Review of patient's allergies indicates:   Allergen Reactions    Contrast media Shortness Of Breath, Itching and Anxiety     Patient states that he had a bad reaction, anxiety , shortness of breath, itching .        No current facility-administered medications on file prior to encounter.     Current Outpatient Medications on File Prior to Encounter   Medication Sig    albuterol (PROVENTIL/VENTOLIN HFA) 90 mcg/actuation inhaler Inhale 1-2 puffs into the lungs every 6 (six) hours as needed for Wheezing. Rescue    amLODIPine (NORVASC) 5 MG tablet Take 1 tablet (5 mg total) by mouth once daily.    aspirin (ECOTRIN) 81 MG EC tablet Take 1 tablet (81 mg total) by mouth once daily.    atorvastatin (LIPITOR) 40 MG tablet Take 1 tablet (40 mg total) by mouth once daily.    clopidogreL (PLAVIX) 75 mg tablet Take 1 tablet (75 mg total) by mouth once daily.    fluticasone-salmeterol diskus inhaler 250-50 mcg Inhale 1 puff into the lungs 2 (two) times daily.    furosemide (LASIX) 40 MG tablet Take 1 tablet (40 mg total) by mouth 2 (two) times daily.    glipiZIDE (GLUCOTROL) 10 MG TR24 Take 1 tablet (10 mg total) by mouth daily with breakfast.    lisinopriL (PRINIVIL,ZESTRIL) 20 MG tablet Take 1 tablet (20 mg total) by mouth once daily.    metFORMIN (GLUCOPHAGE) 500 MG tablet Take 1 tablet (500 mg total) by mouth 2 (two) times daily with meals.    metoprolol succinate  (TOPROL-XL) 25 MG 24 hr tablet Take 1 tablet (25 mg total) by mouth once daily.     Family History       Problem Relation (Age of Onset)    Cancer Mother    No Known Problems Father          Tobacco Use    Smoking status: Every Day     Packs/day: 2.00     Years: 45.00     Pack years: 90.00     Types: Cigarettes     Last attempt to quit: 2017     Years since quittin.6    Smokeless tobacco: Never   Substance and Sexual Activity    Alcohol use: Yes     Comment: socially    Drug use: No    Sexual activity: Yes     Partners: Female     Birth control/protection: None     Review of Systems  Objective:     Vital Signs (Most Recent):  Temp: 98.7 °F (37.1 °C) (23)  Pulse: 90 (23)  Resp: 18 (23)  BP: 125/80 (23)  SpO2: 96 % (23) Vital Signs (24h Range):  Temp:  [97.7 °F (36.5 °C)-98.7 °F (37.1 °C)] 98.7 °F (37.1 °C)  Pulse:  [86-98] 90  Resp:  [18-20] 18  SpO2:  [96 %-100 %] 96 %  BP: (115-129)/(74-92) 125/80     Weight: 82.6 kg (182 lb)  Body mass index is 29.38 kg/m².     Physical Exam  Vitals reviewed.   Constitutional:       General: He is not in acute distress (tolerating bipap well).  HENT:      Head: Normocephalic and atraumatic.      Mouth/Throat:      Mouth: Mucous membranes are dry.      Pharynx: Oropharynx is clear.   Cardiovascular:      Rate and Rhythm: Normal rate and regular rhythm.   Pulmonary:      Effort: Pulmonary effort is normal.      Breath sounds: Wheezing (upper) and rales (bases) present.   Abdominal:      General: There is no distension.      Palpations: Abdomen is soft.      Tenderness: There is no abdominal tenderness.   Musculoskeletal:         General: Swelling (bilateral pitting edema of LE) present.   Skin:     General: Skin is warm and dry.   Neurological:      General: No focal deficit present.      Mental Status: He is alert.   Psychiatric:         Mood and Affect: Mood normal.         Behavior: Behavior normal.               Significant Labs: All pertinent labs within the past 24 hours have been reviewed.    Significant Imaging: I have reviewed all pertinent imaging results/findings within the past 24 hours.      Assessment/Plan:      * Acute on chronic respiratory failure with hypoxia and hypercapnia  Patient with Hypercapnic and Hypoxic Respiratory failure which is Acute on chronic.  he is on home oxygen at 2 LPM. Supplemental oxygen was provided and noted- Oxygen Concentration (%):  [] 45    .   Signs/symptoms of respiratory failure include- tachypnea, increased work of breathing, respiratory distress and wheezing. Contributing diagnoses includes - CHF and COPD Labs and images were reviewed. Patient Has recent ABG, which has been reviewed. Will treat underlying causes and adjust management of respiratory failure as follows- Etiology appear to be related to copd vs chf exacerbation. procal negative making bacterial cause less likely.     -continue steroids, duonebs, and azithromycin  -iv lasix ordered  -wean from bipap to nc as tolerated. Bipap qhs,  His oxygenation with nebulizer and IV lasix continue improve,consulted PT,OT as well,      Chronic obstructive pulmonary disease with acute exacerbation  tx as stated above      Uncontrolled type 2 diabetes mellitus with hyperglycemia  Patient's FSGs are controlled on current medication regimen.  Last A1c reviewed-   Lab Results   Component Value Date    HGBA1C 7.7 (H) 03/06/2023     Most recent fingerstick glucose reviewed- No results for input(s): POCTGLUCOSE in the last 24 hours.  Current correctional scale  Low  Maintain anti-hyperglycemic dose as follows-   Antihyperglycemics (From admission, onward)    None        Hold Oral hypoglycemics while patient is in the hospital.    COPD exacerbation  On nebulizer and steroids,      Coronary artery disease involving native coronary artery of native heart without angina pectoris  Restart home medications      Dyslipidemia  Continue  statin      Acute on chronic combined systolic and diastolic heart failure  Patient is identified as having Combined Systolic and Diastolic heart failure that is Acute on chronic. CHF is currently uncontrolled due to Rales/crackles on pulmonary exam and Pulmonary edema/pleural effusion on CXR. Latest ECHO performed and demonstrates- Results for orders placed during the hospital encounter of 03/06/23    Echo    Interpretation Summary  · The left ventricle is normal in size with mild concentric hypertrophy and moderately decreased systolic function.  · Severe left atrial enlargement.  · The estimated ejection fraction is 25-30%.  · Grade I left ventricular diastolic dysfunction.  · Mild mitral regurgitation.  · Elevated central venous pressure (15 mmHg).  · The estimated PA systolic pressure is 24 mmHg.  · Mild right ventricular enlargement.  · Moderate right atrial enlargement.  . Continue Beta Blocker and Furosemide and monitor clinical status closely. Monitor on telemetry. Patient is on CHF pathway.  Monitor strict Is&Os and daily weights.  Place on fluid restriction of 1.5 L. Continue to stress to patient importance of self efficacy and  on diet for CHF. Last BNP reviewed- and noted below   Recent Labs   Lab 07/02/23  0554   *   .  New Echo. Show EF down to 15% from 25%,will continue with IV lasix,consulted cardiology.will arrange follow up.started on entrtesto.      Tobacco abuse  The patient was counseled on the dangers of tobacco use, and was advised to quit.  Reviewed strategies to maximize success, including removing cigarettes and smoking materials from environment and support of family/friends.  8 minutes spent discussion smoking cessation      Benign essential hypertension  Restart home medications as tolerated.        VTE Risk Mitigation (From admission, onward)         Ordered     enoxaparin injection 40 mg  Daily         07/02/23 0718     IP VTE HIGH RISK PATIENT  Once         07/02/23  0718     Place sequential compression device  Until discontinued         07/02/23 0718                Discharge Planning   VERA: 7/4/2023     Code Status: Full Code   Is the patient medically ready for discharge?:     Reason for patient still in hospital (select all that apply): Patient trending condition                     Romeo Lopez MD  Department of Hospital Medicine   [facility]

## 2023-07-05 NOTE — SUBJECTIVE & OBJECTIVE
Past Medical History:   Diagnosis Date    CHF (congestive heart failure)     COPD (chronic obstructive pulmonary disease)     Coronary artery disease     Elevated cholesterol     on medication    Hypertension        Past Surgical History:   Procedure Laterality Date    CARDIAC SURGERY      coronary stent placed    CORONARY STENT PLACEMENT         Review of patient's allergies indicates:   Allergen Reactions    Contrast media Shortness Of Breath, Itching and Anxiety     Patient states that he had a bad reaction, anxiety , shortness of breath, itching .        No current facility-administered medications on file prior to encounter.     Current Outpatient Medications on File Prior to Encounter   Medication Sig    albuterol (PROVENTIL/VENTOLIN HFA) 90 mcg/actuation inhaler Inhale 1-2 puffs into the lungs every 6 (six) hours as needed for Wheezing. Rescue    amLODIPine (NORVASC) 5 MG tablet Take 1 tablet (5 mg total) by mouth once daily.    aspirin (ECOTRIN) 81 MG EC tablet Take 1 tablet (81 mg total) by mouth once daily.    atorvastatin (LIPITOR) 40 MG tablet Take 1 tablet (40 mg total) by mouth once daily.    clopidogreL (PLAVIX) 75 mg tablet Take 1 tablet (75 mg total) by mouth once daily.    fluticasone-salmeterol diskus inhaler 250-50 mcg Inhale 1 puff into the lungs 2 (two) times daily.    furosemide (LASIX) 40 MG tablet Take 1 tablet (40 mg total) by mouth 2 (two) times daily.    glipiZIDE (GLUCOTROL) 10 MG TR24 Take 1 tablet (10 mg total) by mouth daily with breakfast.    lisinopriL (PRINIVIL,ZESTRIL) 20 MG tablet Take 1 tablet (20 mg total) by mouth once daily.    metFORMIN (GLUCOPHAGE) 500 MG tablet Take 1 tablet (500 mg total) by mouth 2 (two) times daily with meals.    metoprolol succinate (TOPROL-XL) 25 MG 24 hr tablet Take 1 tablet (25 mg total) by mouth once daily.     Family History       Problem Relation (Age of Onset)    Cancer Mother    No Known Problems Father          Tobacco Use    Smoking status:  Every Day     Packs/day: 2.00     Years: 45.00     Pack years: 90.00     Types: Cigarettes     Last attempt to quit: 2017     Years since quittin.6    Smokeless tobacco: Never   Substance and Sexual Activity    Alcohol use: Yes     Comment: socially    Drug use: No    Sexual activity: Yes     Partners: Female     Birth control/protection: None     Review of Systems  Objective:     Vital Signs (Most Recent):  Temp: 98.7 °F (37.1 °C) (23)  Pulse: 90 (23)  Resp: 18 (23)  BP: 125/80 (23)  SpO2: 96 % (23) Vital Signs (24h Range):  Temp:  [97.7 °F (36.5 °C)-98.7 °F (37.1 °C)] 98.7 °F (37.1 °C)  Pulse:  [86-98] 90  Resp:  [18-20] 18  SpO2:  [96 %-100 %] 96 %  BP: (115-129)/(74-92) 125/80     Weight: 82.6 kg (182 lb)  Body mass index is 29.38 kg/m².     Physical Exam  Vitals reviewed.   Constitutional:       General: He is not in acute distress (tolerating bipap well).  HENT:      Head: Normocephalic and atraumatic.      Mouth/Throat:      Mouth: Mucous membranes are dry.      Pharynx: Oropharynx is clear.   Cardiovascular:      Rate and Rhythm: Normal rate and regular rhythm.   Pulmonary:      Effort: Pulmonary effort is normal.      Breath sounds: Wheezing (upper) and rales (bases) present.   Abdominal:      General: There is no distension.      Palpations: Abdomen is soft.      Tenderness: There is no abdominal tenderness.   Musculoskeletal:         General: Swelling (bilateral pitting edema of LE) present.   Skin:     General: Skin is warm and dry.   Neurological:      General: No focal deficit present.      Mental Status: He is alert.   Psychiatric:         Mood and Affect: Mood normal.         Behavior: Behavior normal.              Significant Labs: All pertinent labs within the past 24 hours have been reviewed.    Significant Imaging: I have reviewed all pertinent imaging results/findings within the past 24 hours.

## 2023-07-05 NOTE — PLAN OF CARE
Problem: Physical Therapy  Goal: Physical Therapy Goal  Outcome: Adequate for Care Transition   Initial evaluation completed, see in chart for details.

## 2023-07-05 NOTE — PLAN OF CARE
Problem: Adult Inpatient Plan of Care  Goal: Plan of Care Review  Outcome: Ongoing, Progressing  Goal: Absence of Hospital-Acquired Illness or Injury  Outcome: Ongoing, Progressing     Problem: Functional Ability Impaired COPD (Chronic Obstructive Pulmonary Disease)  Goal: Optimal Level of Functional [place]  Outcome: Ongoing, Progressing     Problem: Infection COPD (Chronic Obstructive Pulmonary Disease)  Goal: Absence of Infection Signs and Symptoms  Outcome: Ongoing, Progressing     Problem: Oral Intake Inadequate COPD (Chronic Obstructive Pulmonary Disease)  Goal: Improved Nutrition Intake  Outcome: Ongoing, Progressing     Problem: Respiratory Compromise COPD (Chronic Obstructive Pulmonary Disease)  Goal: Effective Oxygenation and Ventilation  Outcome: Ongoing, Progressing     Problem: Fall Injury Risk  Goal: Absence of Fall and Fall-Related Injury  Outcome: Ongoing, Progressing

## 2023-07-05 NOTE — SUBJECTIVE & OBJECTIVE
Interval History:     Review of Systems   Constitutional: Negative for decreased appetite.   HENT:  Negative for ear discharge.    Eyes:  Negative for blurred vision.   Endocrine: Negative for polyphagia.   Skin:  Negative for nail changes.   Genitourinary:  Negative for bladder incontinence.   Neurological:  Negative for aphonia.   Psychiatric/Behavioral:  Negative for hallucinations.    Allergic/Immunologic: Negative for hives.   Objective:     Vital Signs (Most Recent):  Temp: 98.7 °F (37.1 °C) (07/05/23 0720)  Pulse: 90 (07/05/23 0852)  Resp: 18 (07/05/23 0852)  BP: 125/80 (07/05/23 0720)  SpO2: 96 % (07/05/23 0852) Vital Signs (24h Range):  Temp:  [97.7 °F (36.5 °C)-98.7 °F (37.1 °C)] 98.7 °F (37.1 °C)  Pulse:  [86-98] 90  Resp:  [18-20] 18  SpO2:  [96 %-100 %] 96 %  BP: (115-129)/(74-92) 125/80     Weight: 82.6 kg (182 lb)  Body mass index is 29.38 kg/m².     SpO2: 96 %         Intake/Output Summary (Last 24 hours) at 7/5/2023 1030  Last data filed at 7/5/2023 0900  Gross per 24 hour   Intake 1340 ml   Output 2200 ml   Net -860 ml       Lines/Drains/Airways       Peripheral Intravenous Line  Duration                  Peripheral IV - Single Lumen 07/02/23 20 G Right Antecubital 3 days                       Physical Exam  Constitutional:       Appearance: He is well-developed.   HENT:      Head: Normocephalic and atraumatic.   Eyes:      Conjunctiva/sclera: Conjunctivae normal.      Pupils: Pupils are equal, round, and reactive to light.   Cardiovascular:      Rate and Rhythm: Normal rate.      Pulses: Intact distal pulses.      Heart sounds: Normal heart sounds.   Pulmonary:      Effort: Pulmonary effort is normal.      Breath sounds: Normal breath sounds.   Abdominal:      General: Bowel sounds are normal.      Palpations: Abdomen is soft.   Musculoskeletal:         General: Normal range of motion.      Cervical back: Normal range of motion and neck supple.   Skin:     General: Skin is warm and dry.    Neurological:      Mental Status: He is alert and oriented to person, place, and time.          Significant Labs: All pertinent lab results from the last 24 hours have been reviewed.    Significant Imaging: Echocardiogram: 2D echo with color flow doppler:   Results for orders placed or performed during the hospital encounter of 01/22/18   2D echo with color flow doppler   Result Value Ref Range    EF + QEF 35 (A) 55 - 65    Mitral Valve Regurgitation TRIVIAL     Diastolic Dysfunction Yes (A)     Mitral Valve Mobility NORMAL     Narrative    Date of Procedure: 01/22/2018        TEST DESCRIPTION   Technical Quality: This is a technically adequate study.     Aorta: The aortic root is normal in size, measuring 3.1 cm at sinotubular junction and 3.3 cm at Sinuses of Valsalva. The proximal ascending aorta is normal in size, measuring 3.3 cm across.     Left Atrium: The left atrial volume index is severely enlarged, measuring 48.08 cc/m2.     Left Ventricle: The left ventricle is mildly enlarged, with an end-diastolic diameter of 5.9 cm, and an end-systolic diameter of 5.0 cm. LV wall thickness is normal, with the septum and the posterior wall each measuring 0.8 cm across. Relative wall   thickness was normal at 0.27, and the LV mass index was 105.8 g/m2 consistent with normal left ventricular mass. There is global hypokinesis with more severe hypokinesis of the anterior septum.  There are no regional wall motion abnormalities. Left ventricular systolic function appears moderately depressed. Visually estimated ejection fraction is 35-40%. The LV Doppler derived stroke volume equals 59.0 ccs.     Diastolic indices: E wave velocity 0.4 m/s, E/A ratio 0.6,  msec., E/e' ratio(avg) 9. Pulmonary vein systolic/diastolic ratio is reversed. There is diastolic dysfunction secondary to relaxation abnormality.     Right Atrium: The right atrium is normal in size, measuring 4.5 cm in length and 4.1 cm in width in the apical  view.     Right Ventricle: The right ventricle is normal in size measuring 2.9 cm at the base in the apical right ventricle-focused view. Global right ventricular systolic function appears normal. Tricuspid annular plane systolic excursion (TAPSE) is 2.2 cm.   Tissue Doppler-derived tricuspid annular peak systolic velocity (S prime) is 14.3 cm/s.     Aortic Valve:  The aortic valve is normal in structure with normal leaflet mobility. The aortic valve is tri-leaflet in structure. There is aortic annular calcification.     Mitral Valve:  The mitral valve is mildly sclerotic with normal leaflet mobility. There is trivial mitral regurgitation.     Tricuspid Valve:  The tricuspid valve is normal in structure with normal leaflet mobility.     Pulmonary Valve:  The pulmonic valve is normal in structure with normal leaflet mobility. There is trivial pulmonic regurgitation.     IVC: IVC is normal in size and collapses > 50% with a sniff, suggesting normal right atrial pressure of 3 mmHg.     Intracavitary: There is no evidence of pericardial effusion, intracavity mass, thrombi, or vegetation.         CONCLUSIONS     1 - Severe left atrial enlargement.     2 - Mild left ventricular enlargement.     3 - Moderately depressed left ventricular systolic function (EF 35-40%).     4 - Impaired LV relaxation, normal LAP (grade 1 diastolic dysfunction).     5 - Normal right ventricular systolic function .             This document has been electronically    SIGNED BY: Herbert Potts MD On: 01/22/2018 09:46    This document was originally electronically signed on: 01/22/2018 09:42

## 2023-07-06 LAB
ANION GAP SERPL CALC-SCNC: 9 MMOL/L (ref 8–16)
BASOPHILS # BLD AUTO: 0.04 K/UL (ref 0–0.2)
BASOPHILS NFR BLD: 0.3 % (ref 0–1.9)
BUN SERPL-MCNC: 11 MG/DL (ref 8–23)
CALCIUM SERPL-MCNC: 9.3 MG/DL (ref 8.7–10.5)
CHLORIDE SERPL-SCNC: 94 MMOL/L (ref 95–110)
CO2 SERPL-SCNC: 35 MMOL/L (ref 23–29)
CREAT SERPL-MCNC: 0.7 MG/DL (ref 0.5–1.4)
DIFFERENTIAL METHOD: ABNORMAL
EOSINOPHIL # BLD AUTO: 0.1 K/UL (ref 0–0.5)
EOSINOPHIL NFR BLD: 0.4 % (ref 0–8)
ERYTHROCYTE [DISTWIDTH] IN BLOOD BY AUTOMATED COUNT: 12.9 % (ref 11.5–14.5)
EST. GFR  (NO RACE VARIABLE): >60 ML/MIN/1.73 M^2
GLUCOSE SERPL-MCNC: 175 MG/DL (ref 70–110)
HCT VFR BLD AUTO: 40.9 % (ref 40–54)
HGB BLD-MCNC: 12.9 G/DL (ref 14–18)
IMM GRANULOCYTES # BLD AUTO: 0.04 K/UL (ref 0–0.04)
IMM GRANULOCYTES NFR BLD AUTO: 0.3 % (ref 0–0.5)
LYMPHOCYTES # BLD AUTO: 3.8 K/UL (ref 1–4.8)
LYMPHOCYTES NFR BLD: 28 % (ref 18–48)
MAGNESIUM SERPL-MCNC: 2 MG/DL (ref 1.6–2.6)
MCH RBC QN AUTO: 28.2 PG (ref 27–31)
MCHC RBC AUTO-ENTMCNC: 31.5 G/DL (ref 32–36)
MCV RBC AUTO: 90 FL (ref 82–98)
MONOCYTES # BLD AUTO: 1 K/UL (ref 0.3–1)
MONOCYTES NFR BLD: 7.5 % (ref 4–15)
NEUTROPHILS # BLD AUTO: 8.5 K/UL (ref 1.8–7.7)
NEUTROPHILS NFR BLD: 63.5 % (ref 38–73)
NRBC BLD-RTO: 0 /100 WBC
PHOSPHATE SERPL-MCNC: 3.1 MG/DL (ref 2.7–4.5)
PLATELET # BLD AUTO: 225 K/UL (ref 150–450)
PMV BLD AUTO: 10.9 FL (ref 9.2–12.9)
POCT GLUCOSE: 151 MG/DL (ref 70–110)
POCT GLUCOSE: 214 MG/DL (ref 70–110)
POCT GLUCOSE: 236 MG/DL (ref 70–110)
POCT GLUCOSE: 350 MG/DL (ref 70–110)
POTASSIUM SERPL-SCNC: 3.8 MMOL/L (ref 3.5–5.1)
RBC # BLD AUTO: 4.57 M/UL (ref 4.6–6.2)
SODIUM SERPL-SCNC: 138 MMOL/L (ref 136–145)
WBC # BLD AUTO: 13.41 K/UL (ref 3.9–12.7)

## 2023-07-06 PROCEDURE — 25000242 PHARM REV CODE 250 ALT 637 W/ HCPCS: Performed by: STUDENT IN AN ORGANIZED HEALTH CARE EDUCATION/TRAINING PROGRAM

## 2023-07-06 PROCEDURE — 25000003 PHARM REV CODE 250: Performed by: INTERNAL MEDICINE

## 2023-07-06 PROCEDURE — 36415 COLL VENOUS BLD VENIPUNCTURE: CPT | Performed by: STUDENT IN AN ORGANIZED HEALTH CARE EDUCATION/TRAINING PROGRAM

## 2023-07-06 PROCEDURE — 84100 ASSAY OF PHOSPHORUS: CPT | Performed by: STUDENT IN AN ORGANIZED HEALTH CARE EDUCATION/TRAINING PROGRAM

## 2023-07-06 PROCEDURE — 85025 COMPLETE CBC W/AUTO DIFF WBC: CPT | Performed by: STUDENT IN AN ORGANIZED HEALTH CARE EDUCATION/TRAINING PROGRAM

## 2023-07-06 PROCEDURE — 25000003 PHARM REV CODE 250: Performed by: STUDENT IN AN ORGANIZED HEALTH CARE EDUCATION/TRAINING PROGRAM

## 2023-07-06 PROCEDURE — 83735 ASSAY OF MAGNESIUM: CPT | Performed by: STUDENT IN AN ORGANIZED HEALTH CARE EDUCATION/TRAINING PROGRAM

## 2023-07-06 PROCEDURE — 63700000 PHARM REV CODE 250 ALT 637 W/O HCPCS: Performed by: STUDENT IN AN ORGANIZED HEALTH CARE EDUCATION/TRAINING PROGRAM

## 2023-07-06 PROCEDURE — 63600175 PHARM REV CODE 636 W HCPCS: Performed by: STUDENT IN AN ORGANIZED HEALTH CARE EDUCATION/TRAINING PROGRAM

## 2023-07-06 PROCEDURE — 63600175 PHARM REV CODE 636 W HCPCS: Performed by: HOSPITALIST

## 2023-07-06 PROCEDURE — 80048 BASIC METABOLIC PNL TOTAL CA: CPT | Performed by: STUDENT IN AN ORGANIZED HEALTH CARE EDUCATION/TRAINING PROGRAM

## 2023-07-06 PROCEDURE — 94640 AIRWAY INHALATION TREATMENT: CPT

## 2023-07-06 PROCEDURE — 27000221 HC OXYGEN, UP TO 24 HOURS

## 2023-07-06 PROCEDURE — 94760 N-INVAS EAR/PLS OXIMETRY 1: CPT

## 2023-07-06 PROCEDURE — 11000001 HC ACUTE MED/SURG PRIVATE ROOM

## 2023-07-06 RX ADMIN — ENOXAPARIN SODIUM 40 MG: 40 INJECTION SUBCUTANEOUS at 05:07

## 2023-07-06 RX ADMIN — ACETAMINOPHEN 650 MG: 325 TABLET ORAL at 04:07

## 2023-07-06 RX ADMIN — ASPIRIN 81 MG: 81 TABLET, COATED ORAL at 09:07

## 2023-07-06 RX ADMIN — AZITHROMYCIN MONOHYDRATE 250 MG: 250 TABLET ORAL at 09:07

## 2023-07-06 RX ADMIN — SACUBITRIL AND VALSARTAN 1 TABLET: 24; 26 TABLET, FILM COATED ORAL at 09:07

## 2023-07-06 RX ADMIN — INSULIN ASPART 2 UNITS: 100 INJECTION, SOLUTION INTRAVENOUS; SUBCUTANEOUS at 09:07

## 2023-07-06 RX ADMIN — FUROSEMIDE 20 MG: 10 INJECTION, SOLUTION INTRAMUSCULAR; INTRAVENOUS at 09:07

## 2023-07-06 RX ADMIN — SACUBITRIL AND VALSARTAN 1 TABLET: 24; 26 TABLET, FILM COATED ORAL at 08:07

## 2023-07-06 RX ADMIN — PREDNISONE 40 MG: 20 TABLET ORAL at 09:07

## 2023-07-06 RX ADMIN — CLOPIDOGREL BISULFATE 75 MG: 75 TABLET ORAL at 09:07

## 2023-07-06 RX ADMIN — FUROSEMIDE 20 MG: 10 INJECTION, SOLUTION INTRAMUSCULAR; INTRAVENOUS at 08:07

## 2023-07-06 RX ADMIN — ATORVASTATIN CALCIUM 40 MG: 40 TABLET, FILM COATED ORAL at 09:07

## 2023-07-06 RX ADMIN — IPRATROPIUM BROMIDE AND ALBUTEROL SULFATE 3 ML: .5; 3 SOLUTION RESPIRATORY (INHALATION) at 01:07

## 2023-07-06 RX ADMIN — IPRATROPIUM BROMIDE AND ALBUTEROL SULFATE 3 ML: .5; 3 SOLUTION RESPIRATORY (INHALATION) at 08:07

## 2023-07-06 NOTE — PLAN OF CARE
Pt is AAOx4. 2L NC. Tele maintained.   No falls or injuries reported during shift, safety precautions maintained.     Problem: Adult Inpatient Plan of Care  Goal: Plan of Care Review  Outcome: Ongoing, Progressing     Problem: Adult Inpatient Plan of Care  Goal: Patient-Specific Goal (Individualized)  Outcome: Ongoing, Progressing

## 2023-07-06 NOTE — SUBJECTIVE & OBJECTIVE
Past Medical History:   Diagnosis Date    CHF (congestive heart failure)     COPD (chronic obstructive pulmonary disease)     Coronary artery disease     Elevated cholesterol     on medication    Hypertension        Past Surgical History:   Procedure Laterality Date    CARDIAC SURGERY      coronary stent placed    CORONARY STENT PLACEMENT         Review of patient's allergies indicates:   Allergen Reactions    Contrast media Shortness Of Breath, Itching and Anxiety     Patient states that he had a bad reaction, anxiety , shortness of breath, itching .        No current facility-administered medications on file prior to encounter.     Current Outpatient Medications on File Prior to Encounter   Medication Sig    albuterol (PROVENTIL/VENTOLIN HFA) 90 mcg/actuation inhaler Inhale 1-2 puffs into the lungs every 6 (six) hours as needed for Wheezing. Rescue    amLODIPine (NORVASC) 5 MG tablet Take 1 tablet (5 mg total) by mouth once daily.    aspirin (ECOTRIN) 81 MG EC tablet Take 1 tablet (81 mg total) by mouth once daily.    atorvastatin (LIPITOR) 40 MG tablet Take 1 tablet (40 mg total) by mouth once daily.    clopidogreL (PLAVIX) 75 mg tablet Take 1 tablet (75 mg total) by mouth once daily.    fluticasone-salmeterol diskus inhaler 250-50 mcg Inhale 1 puff into the lungs 2 (two) times daily.    furosemide (LASIX) 40 MG tablet Take 1 tablet (40 mg total) by mouth 2 (two) times daily.    glipiZIDE (GLUCOTROL) 10 MG TR24 Take 1 tablet (10 mg total) by mouth daily with breakfast.    lisinopriL (PRINIVIL,ZESTRIL) 20 MG tablet Take 1 tablet (20 mg total) by mouth once daily.    metFORMIN (GLUCOPHAGE) 500 MG tablet Take 1 tablet (500 mg total) by mouth 2 (two) times daily with meals.    metoprolol succinate (TOPROL-XL) 25 MG 24 hr tablet Take 1 tablet (25 mg total) by mouth once daily.     Family History       Problem Relation (Age of Onset)    Cancer Mother    No Known Problems Father          Tobacco Use    Smoking status:  Every Day     Packs/day: 2.00     Years: 45.00     Pack years: 90.00     Types: Cigarettes     Last attempt to quit: 2017     Years since quittin.6    Smokeless tobacco: Never   Substance and Sexual Activity    Alcohol use: Yes     Comment: socially    Drug use: No    Sexual activity: Yes     Partners: Female     Birth control/protection: None     Review of Systems  Objective:     Vital Signs (Most Recent):  Temp: 98.2 °F (36.8 °C) (23 1059)  Pulse: 90 (23 1059)  Resp: 18 (23 1059)  BP: 102/62 (23 1059)  SpO2: 100 % (23 1059) Vital Signs (24h Range):  Temp:  [97.8 °F (36.6 °C)-98.3 °F (36.8 °C)] 98.2 °F (36.8 °C)  Pulse:  [81-98] 90  Resp:  [17-20] 18  SpO2:  [90 %-100 %] 100 %  BP: (102-124)/(62-91) 102/62     Weight: 82.6 kg (182 lb)  Body mass index is 29.38 kg/m².     Physical Exam  Vitals reviewed.   Constitutional:       General: He is not in acute distress (tolerating bipap well).  HENT:      Head: Normocephalic and atraumatic.      Mouth/Throat:      Mouth: Mucous membranes are dry.      Pharynx: Oropharynx is clear.   Cardiovascular:      Rate and Rhythm: Normal rate and regular rhythm.   Pulmonary:      Effort: Pulmonary effort is normal.      Breath sounds: Wheezing (upper) and rales (bases) present.   Abdominal:      General: There is no distension.      Palpations: Abdomen is soft.      Tenderness: There is no abdominal tenderness.   Musculoskeletal:         General: Swelling (bilateral pitting edema of LE) present.   Skin:     General: Skin is warm and dry.   Neurological:      General: No focal deficit present.      Mental Status: He is alert.   Psychiatric:         Mood and Affect: Mood normal.         Behavior: Behavior normal.              Significant Labs: All pertinent labs within the past 24 hours have been reviewed.    Significant Imaging: I have reviewed all pertinent imaging results/findings within the past 24 hours.

## 2023-07-06 NOTE — NURSING
[facility], [facility] - [place]  Nurses Note -- 4 Eyes      7/5/2023       Skin assessed on: Q Shift      [x] No Pressure Injuries Present    [x]Prevention Measures Documented    [] Yes LDA  for Pressure Injury Previously documented     [] Yes New Pressure Injury Discovered   [] LDA for New Pressure Injury Added      Attending RN:  BEULAH LAM RN     Second RN:  Imani AUGUSTIN

## 2023-07-06 NOTE — NURSING
[facility], [facility] - [place]  Nurses Note -- 4 Eyes        Date: 07/06/2023        Skin assessed on: Q shift        [x] No Pressure Injuries Present                 []Prevention Measures Documented     [] Yes LDA Previously documented      [] Yes New Pressure Injury Discovered              [] LDA Added        Attending RN:  FARZANA Maloney     Second RN: CHARLI Hernandez

## 2023-07-06 NOTE — PLAN OF CARE
Case Management Re-assessment      PCP: Rolando Funes MD  Pharmacy: [id number] - [place], [place] - [address]        Patient Arrived From: Home  Existing Help at Home: Spouse      Barriers to Discharge: none     Discharge Plan:                A. Home               B. Home with family    Follow up appointment with Dr Quarles, cardiology scheduled and listed on avs.    07/06/23 6674   Discharge Reassessment   Assessment Type Discharge Planning Reassessment   Did the patient's condition or plan change since previous assessment? No   Discharge Plan discussed with: Patient   Communicated VERA with patient/caregiver Yes   DME Needed Upon Discharge  none   Transition of Care Barriers None   Why the patient remains in the hospital Requires continued medical care   Post-Acute Status   Discharge Delays None known at this time

## 2023-07-06 NOTE — PROGRESS NOTES
[facility] Medicine  Progress Note    Patient Name: Abelardo Irene Jr.  MRN: [id number]  Patient Class: IP- Inpatient   Admission Date: 7/2/2023  Length of Stay: 4 days  Attending Physician: Romeo Lopez MD  Primary Care Provider: Rolando Funes MD        Subjective:     Principal Problem:Acute on chronic respiratory failure with hypoxia and hypercapnia        HPI:  67M with pmh of copd, hfref 25-30%, CAD, hld, htn presented respiratory distress. He started experiencing severe dyspnea around 1h pta. Upon EMS arrival to scene he had bilateral rales and SpO2 of 85% on 6L NC home oxygen. He was placed on CPAP which increased his SpO2 to 93%. He was additionally given 3 SL NTG and 1 inch nitro paste en route. Pt placed on bipap in the ed with improvement in oxygenation. On my conversation pt on bipap limiting history. Pt states he has run out of some of his home medications over the past couple of days, but took the ones he still had.  Patient unclear of which medications it is that he is run out of.  Patient states that his granddaughter has been around him and has a cold currently, but denies any known other sick contacts.  Patient does continue to smoke, but denies alcohol or drug use.  Patient states that he is having some worsening swelling of his lower extremities as well.  Patient denies chest pain, sensation of shortness a breath at this time, fever, cough, nausea, vomiting, abdominal pain, headache, or changes in vision.      Overview/Hospital Course:  67M with pmh of copd, hfref 25-30%, CAD, hld, htn presented respiratory distress. He started experiencing severe dyspnea around 1h pta. Upon EMS arrival to scene he had bilateral rales and SpO2 of 85% on 6L NC home oxygen. He was placed on CPAP which increased his SpO2 to 93%. He was additionally given 3 SL NTG and 1 inch nitro paste en route. Pt placed on bipap in the ed with improvement in oxygenation.   His oxygenation with nebulizer  and IV lasix continue improve,consulted PT,OT as well,  New Echo. Show EF down to 15% from 25%,will continue with IV lasix,consulted cardiology.will arrange follow up.started on entrtesto.arranging out patient follow up with cardiology for out patient AICD placement.      Past Medical History:   Diagnosis Date    CHF (congestive heart failure)     COPD (chronic obstructive pulmonary disease)     Coronary artery disease     Elevated cholesterol     on medication    Hypertension        Past Surgical History:   Procedure Laterality Date    CARDIAC SURGERY      coronary stent placed    CORONARY STENT PLACEMENT         Review of patient's allergies indicates:   Allergen Reactions    Contrast media Shortness Of Breath, Itching and Anxiety     Patient states that he had a bad reaction, anxiety , shortness of breath, itching .        No current facility-administered medications on file prior to encounter.     Current Outpatient Medications on File Prior to Encounter   Medication Sig    albuterol (PROVENTIL/VENTOLIN HFA) 90 mcg/actuation inhaler Inhale 1-2 puffs into the lungs every 6 (six) hours as needed for Wheezing. Rescue    amLODIPine (NORVASC) 5 MG tablet Take 1 tablet (5 mg total) by mouth once daily.    aspirin (ECOTRIN) 81 MG EC tablet Take 1 tablet (81 mg total) by mouth once daily.    atorvastatin (LIPITOR) 40 MG tablet Take 1 tablet (40 mg total) by mouth once daily.    clopidogreL (PLAVIX) 75 mg tablet Take 1 tablet (75 mg total) by mouth once daily.    fluticasone-salmeterol diskus inhaler 250-50 mcg Inhale 1 puff into the lungs 2 (two) times daily.    furosemide (LASIX) 40 MG tablet Take 1 tablet (40 mg total) by mouth 2 (two) times daily.    glipiZIDE (GLUCOTROL) 10 MG TR24 Take 1 tablet (10 mg total) by mouth daily with breakfast.    lisinopriL (PRINIVIL,ZESTRIL) 20 MG tablet Take 1 tablet (20 mg total) by mouth once daily.    metFORMIN (GLUCOPHAGE) 500 MG tablet Take 1 tablet (500 mg  total) by mouth 2 (two) times daily with meals.    metoprolol succinate (TOPROL-XL) 25 MG 24 hr tablet Take 1 tablet (25 mg total) by mouth once daily.     Family History       Problem Relation (Age of Onset)    Cancer Mother    No Known Problems Father          Tobacco Use    Smoking status: Every Day     Packs/day: 2.00     Years: 45.00     Pack years: 90.00     Types: Cigarettes     Last attempt to quit: 2017     Years since quittin.6    Smokeless tobacco: Never   Substance and Sexual Activity    Alcohol use: Yes     Comment: socially    Drug use: No    Sexual activity: Yes     Partners: Female     Birth control/protection: None     Review of Systems  Objective:     Vital Signs (Most Recent):  Temp: 98.2 °F (36.8 °C) (23 1059)  Pulse: 90 (23 1059)  Resp: 18 (23 1059)  BP: 102/62 (23 1059)  SpO2: 100 % (23 1059) Vital Signs (24h Range):  Temp:  [97.8 °F (36.6 °C)-98.3 °F (36.8 °C)] 98.2 °F (36.8 °C)  Pulse:  [81-98] 90  Resp:  [17-20] 18  SpO2:  [90 %-100 %] 100 %  BP: (102-124)/(62-91) 102/62     Weight: 82.6 kg (182 lb)  Body mass index is 29.38 kg/m².     Physical Exam  Vitals reviewed.   Constitutional:       General: He is not in acute distress (tolerating bipap well).  HENT:      Head: Normocephalic and atraumatic.      Mouth/Throat:      Mouth: Mucous membranes are dry.      Pharynx: Oropharynx is clear.   Cardiovascular:      Rate and Rhythm: Normal rate and regular rhythm.   Pulmonary:      Effort: Pulmonary effort is normal.      Breath sounds: Wheezing (upper) and rales (bases) present.   Abdominal:      General: There is no distension.      Palpations: Abdomen is soft.      Tenderness: There is no abdominal tenderness.   Musculoskeletal:         General: Swelling (bilateral pitting edema of LE) present.   Skin:     General: Skin is warm and dry.   Neurological:      General: No focal deficit present.      Mental Status: He is alert.   Psychiatric:          Mood and Affect: Mood normal.         Behavior: Behavior normal.              Significant Labs: All pertinent labs within the past 24 hours have been reviewed.    Significant Imaging: I have reviewed all pertinent imaging results/findings within the past 24 hours.      Assessment/Plan:      * Acute on chronic respiratory failure with hypoxia and hypercapnia  Patient with Hypercapnic and Hypoxic Respiratory failure which is Acute on chronic.  he is on home oxygen at 2 LPM. Supplemental oxygen was provided and noted- Oxygen Concentration (%):  [] 45    .   Signs/symptoms of respiratory failure include- tachypnea, increased work of breathing, respiratory distress and wheezing. Contributing diagnoses includes - CHF and COPD Labs and images were reviewed. Patient Has recent ABG, which has been reviewed. Will treat underlying causes and adjust management of respiratory failure as follows- Etiology appear to be related to copd vs chf exacerbation. procal negative making bacterial cause less likely.     -continue steroids, duonebs, and azithromycin  -iv lasix ordered  -wean from bipap to nc as tolerated. Bipap qhs,  His oxygenation with nebulizer and IV lasix continue improve,consulted PT,OT as well,      Chronic obstructive pulmonary disease with acute exacerbation  tx as stated above      Uncontrolled type 2 diabetes mellitus with hyperglycemia  Patient's FSGs are controlled on current medication regimen.  Last A1c reviewed-   Lab Results   Component Value Date    HGBA1C 7.7 (H) 03/06/2023     Most recent fingerstick glucose reviewed- No results for input(s): POCTGLUCOSE in the last 24 hours.  Current correctional scale  Low  Maintain anti-hyperglycemic dose as follows-   Antihyperglycemics (From admission, onward)    None        Hold Oral hypoglycemics while patient is in the hospital.    COPD exacerbation  On nebulizer and steroids,      Coronary artery disease involving native coronary artery of native heart  without angina pectoris  Restart home medications      Dyslipidemia  Continue statin      Acute on chronic combined systolic and diastolic heart failure  Patient is identified as having Combined Systolic and Diastolic heart failure that is Acute on chronic. CHF is currently uncontrolled due to Rales/crackles on pulmonary exam and Pulmonary edema/pleural effusion on CXR. Latest ECHO performed and demonstrates- Results for orders placed during the hospital encounter of 03/06/23    Echo    Interpretation Summary  · The left ventricle is normal in size with mild concentric hypertrophy and moderately decreased systolic function.  · Severe left atrial enlargement.  · The estimated ejection fraction is 25-30%.  · Grade I left ventricular diastolic dysfunction.  · Mild mitral regurgitation.  · Elevated central venous pressure (15 mmHg).  · The estimated PA systolic pressure is 24 mmHg.  · Mild right ventricular enlargement.  · Moderate right atrial enlargement.  . Continue Beta Blocker and Furosemide and monitor clinical status closely. Monitor on telemetry. Patient is on CHF pathway.  Monitor strict Is&Os and daily weights.  Place on fluid restriction of 1.5 L. Continue to stress to patient importance of self efficacy and  on diet for CHF. Last BNP reviewed- and noted below   Recent Labs   Lab 07/02/23  0554   *   .  New Echo. Show EF down to 15% from 25%,will continue with IV lasix,consulted cardiology.will arrange follow up.started on entrtesto.arranging out patient follow up with cardiology for out patient AICD placement.      Tobacco abuse  The patient was counseled on the dangers of tobacco use, and was advised to quit.  Reviewed strategies to maximize success, including removing cigarettes and smoking materials from environment and support of family/friends.  8 minutes spent discussion smoking cessation      Benign essential hypertension  Restart home medications as tolerated.        VTE Risk Mitigation  (From admission, onward)         Ordered     enoxaparin injection 40 mg  Daily         07/02/23 0718     IP VTE HIGH RISK PATIENT  Once         07/02/23 0718     Place sequential compression device  Until discontinued         07/02/23 0718                Discharge Planning   VERA: 7/4/2023     Code Status: Full Code   Is the patient medically ready for discharge?:     Reason for patient still in hospital (select all that apply): Patient trending condition                     Romeo Lopez MD  Department of Hospital Medicine   [facility]

## 2023-07-06 NOTE — ASSESSMENT & PLAN NOTE
Patient is identified as having Combined Systolic and Diastolic heart failure that is Acute on chronic. CHF is currently uncontrolled due to Rales/crackles on pulmonary exam and Pulmonary edema/pleural effusion on CXR. Latest ECHO performed and demonstrates- Results for orders placed during the hospital encounter of 03/06/23    Echo    Interpretation Summary  · The left ventricle is normal in size with mild concentric hypertrophy and moderately decreased systolic function.  · Severe left atrial enlargement.  · The estimated ejection fraction is 25-30%.  · Grade I left ventricular diastolic dysfunction.  · Mild mitral regurgitation.  · Elevated central venous pressure (15 mmHg).  · The estimated PA systolic pressure is 24 mmHg.  · Mild right ventricular enlargement.  · Moderate right atrial enlargement.  . Continue Beta Blocker and Furosemide and monitor clinical status closely. Monitor on telemetry. Patient is on CHF pathway.  Monitor strict Is&Os and daily weights.  Place on fluid restriction of 1.5 L. Continue to stress to patient importance of self efficacy and  on diet for CHF. Last BNP reviewed- and noted below   Recent Labs   Lab 07/02/23  0554   *   .  New Echo. Show EF down to 15% from 25%,will continue with IV lasix,consulted cardiology.will arrange follow up.started on entrtesto.arranging out patient follow up with cardiology for out patient AICD placement.

## 2023-07-07 VITALS
HEIGHT: 66 IN | DIASTOLIC BLOOD PRESSURE: 70 MMHG | OXYGEN SATURATION: 96 % | SYSTOLIC BLOOD PRESSURE: 138 MMHG | BODY MASS INDEX: 29.25 KG/M2 | HEART RATE: 110 BPM | RESPIRATION RATE: 18 BRPM | TEMPERATURE: 99 F | WEIGHT: 182 LBS

## 2023-07-07 LAB
ANION GAP SERPL CALC-SCNC: 13 MMOL/L (ref 8–16)
BACTERIA SPEC AEROBE CULT: ABNORMAL
BASOPHILS # BLD AUTO: 0.05 K/UL (ref 0–0.2)
BASOPHILS NFR BLD: 0.4 % (ref 0–1.9)
BUN SERPL-MCNC: 15 MG/DL (ref 8–23)
CALCIUM SERPL-MCNC: 9.7 MG/DL (ref 8.7–10.5)
CHLORIDE SERPL-SCNC: 92 MMOL/L (ref 95–110)
CO2 SERPL-SCNC: 34 MMOL/L (ref 23–29)
CREAT SERPL-MCNC: 0.8 MG/DL (ref 0.5–1.4)
DIFFERENTIAL METHOD: ABNORMAL
EOSINOPHIL # BLD AUTO: 0 K/UL (ref 0–0.5)
EOSINOPHIL NFR BLD: 0.2 % (ref 0–8)
ERYTHROCYTE [DISTWIDTH] IN BLOOD BY AUTOMATED COUNT: 13 % (ref 11.5–14.5)
EST. GFR  (NO RACE VARIABLE): >60 ML/MIN/1.73 M^2
GLUCOSE SERPL-MCNC: 165 MG/DL (ref 70–110)
GRAM STN SPEC: ABNORMAL
HCT VFR BLD AUTO: 45.7 % (ref 40–54)
HGB BLD-MCNC: 14.1 G/DL (ref 14–18)
IMM GRANULOCYTES # BLD AUTO: 0.04 K/UL (ref 0–0.04)
IMM GRANULOCYTES NFR BLD AUTO: 0.3 % (ref 0–0.5)
LYMPHOCYTES # BLD AUTO: 3.5 K/UL (ref 1–4.8)
LYMPHOCYTES NFR BLD: 25.2 % (ref 18–48)
MAGNESIUM SERPL-MCNC: 2 MG/DL (ref 1.6–2.6)
MCH RBC QN AUTO: 28.1 PG (ref 27–31)
MCHC RBC AUTO-ENTMCNC: 30.9 G/DL (ref 32–36)
MCV RBC AUTO: 91 FL (ref 82–98)
MONOCYTES # BLD AUTO: 0.9 K/UL (ref 0.3–1)
MONOCYTES NFR BLD: 6.3 % (ref 4–15)
NEUTROPHILS # BLD AUTO: 9.4 K/UL (ref 1.8–7.7)
NEUTROPHILS NFR BLD: 67.6 % (ref 38–73)
NRBC BLD-RTO: 0 /100 WBC
PHOSPHATE SERPL-MCNC: 3.3 MG/DL (ref 2.7–4.5)
PLATELET # BLD AUTO: 254 K/UL (ref 150–450)
PMV BLD AUTO: 11.1 FL (ref 9.2–12.9)
POCT GLUCOSE: 158 MG/DL (ref 70–110)
POCT GLUCOSE: 189 MG/DL (ref 70–110)
POTASSIUM SERPL-SCNC: 3.8 MMOL/L (ref 3.5–5.1)
RBC # BLD AUTO: 5.02 M/UL (ref 4.6–6.2)
SODIUM SERPL-SCNC: 139 MMOL/L (ref 136–145)
WBC # BLD AUTO: 13.91 K/UL (ref 3.9–12.7)

## 2023-07-07 PROCEDURE — 25000003 PHARM REV CODE 250: Performed by: STUDENT IN AN ORGANIZED HEALTH CARE EDUCATION/TRAINING PROGRAM

## 2023-07-07 PROCEDURE — 84100 ASSAY OF PHOSPHORUS: CPT | Performed by: STUDENT IN AN ORGANIZED HEALTH CARE EDUCATION/TRAINING PROGRAM

## 2023-07-07 PROCEDURE — 83735 ASSAY OF MAGNESIUM: CPT | Performed by: STUDENT IN AN ORGANIZED HEALTH CARE EDUCATION/TRAINING PROGRAM

## 2023-07-07 PROCEDURE — 63600175 PHARM REV CODE 636 W HCPCS: Performed by: HOSPITALIST

## 2023-07-07 PROCEDURE — 63700000 PHARM REV CODE 250 ALT 637 W/O HCPCS: Performed by: STUDENT IN AN ORGANIZED HEALTH CARE EDUCATION/TRAINING PROGRAM

## 2023-07-07 PROCEDURE — 94640 AIRWAY INHALATION TREATMENT: CPT

## 2023-07-07 PROCEDURE — 25000242 PHARM REV CODE 250 ALT 637 W/ HCPCS: Performed by: STUDENT IN AN ORGANIZED HEALTH CARE EDUCATION/TRAINING PROGRAM

## 2023-07-07 PROCEDURE — 94761 N-INVAS EAR/PLS OXIMETRY MLT: CPT

## 2023-07-07 PROCEDURE — 27000221 HC OXYGEN, UP TO 24 HOURS

## 2023-07-07 PROCEDURE — 85025 COMPLETE CBC W/AUTO DIFF WBC: CPT | Performed by: STUDENT IN AN ORGANIZED HEALTH CARE EDUCATION/TRAINING PROGRAM

## 2023-07-07 PROCEDURE — 80048 BASIC METABOLIC PNL TOTAL CA: CPT | Performed by: STUDENT IN AN ORGANIZED HEALTH CARE EDUCATION/TRAINING PROGRAM

## 2023-07-07 PROCEDURE — 36415 COLL VENOUS BLD VENIPUNCTURE: CPT | Performed by: STUDENT IN AN ORGANIZED HEALTH CARE EDUCATION/TRAINING PROGRAM

## 2023-07-07 PROCEDURE — 25000003 PHARM REV CODE 250: Performed by: INTERNAL MEDICINE

## 2023-07-07 RX ORDER — SPIRONOLACTONE 25 MG/1
25 TABLET ORAL DAILY
Qty: 30 TABLET | Refills: 2 | Status: SHIPPED | OUTPATIENT
Start: 2023-07-07 | End: 2023-10-19

## 2023-07-07 RX ORDER — FUROSEMIDE 40 MG/1
40 TABLET ORAL 2 TIMES DAILY
Qty: 60 TABLET | Refills: 5 | Status: ON HOLD | OUTPATIENT
Start: 2023-07-07 | End: 2024-01-27

## 2023-07-07 RX ADMIN — ASPIRIN 81 MG: 81 TABLET, COATED ORAL at 08:07

## 2023-07-07 RX ADMIN — FUROSEMIDE 20 MG: 10 INJECTION, SOLUTION INTRAMUSCULAR; INTRAVENOUS at 08:07

## 2023-07-07 RX ADMIN — AZITHROMYCIN MONOHYDRATE 250 MG: 250 TABLET ORAL at 08:07

## 2023-07-07 RX ADMIN — CLOPIDOGREL BISULFATE 75 MG: 75 TABLET ORAL at 08:07

## 2023-07-07 RX ADMIN — ATORVASTATIN CALCIUM 40 MG: 40 TABLET, FILM COATED ORAL at 08:07

## 2023-07-07 RX ADMIN — SACUBITRIL AND VALSARTAN 1 TABLET: 24; 26 TABLET, FILM COATED ORAL at 08:07

## 2023-07-07 RX ADMIN — IPRATROPIUM BROMIDE AND ALBUTEROL SULFATE 3 ML: .5; 3 SOLUTION RESPIRATORY (INHALATION) at 08:07

## 2023-07-07 NOTE — PLAN OF CARE
[facility] - Med Surg  Discharge Final Note    Patient clear to discharge from case management stand point. Reviewed follow up appointments for pcp and cardiology, listed on avs.     Primary Care Provider: Rolando Funes MD    Expected Discharge Date: 7/7/2023    Final Discharge Note (most recent)       Final Note - 07/07/23 0938          Final Note    Assessment Type Final Discharge Note     Anticipated Discharge Disposition Home or Self Care     Hospital Resources/Appts/Education Provided Provided patient/caregiver with written discharge plan information;Appointments scheduled and added to AVS        Post-Acute Status    Discharge Delays None known at this time (P)                      Important Message from Medicare  Important Message from Medicare regarding Discharge Appeal Rights: Given to patient/caregiver, Explained to patient/caregiver, Signed/date by patient/caregiver     Date IMM was signed: 07/05/23  Time IMM was signed: 1408    Contact Info       Rolando Funes MD   Specialty: Internal Medicine   Relationship: PCP - [address]  [address]  [place] [address]   Phone: [phone]       Next Steps: Schedule an appointment as soon as possible for a visit on 7/14/2023    Instructions: Hospital follow up appointment @3:15pm

## 2023-07-07 NOTE — DISCHARGE SUMMARY
[facility] Medicine  Discharge Summary      Patient Name: Abelardo Irene Jr.  MRN: [id number]  [facility]: [id number]  Patient Class: IP- Inpatient  Admission Date: 7/2/2023  Hospital Length of Stay: 5 days  Discharge Date and Time:  07/07/2023 10:28 AM  Attending Physician: Romeo Lopez MD   Discharging Provider: Romeo Lopez MD  Primary Care Provider: Rolando Funes MD    Primary Care Team: Networked reference to record PCT     HPI:   67M with pmh of copd, hfref 25-30%, CAD, hld, htn presented respiratory distress. He started experiencing severe dyspnea around 1h pta. Upon EMS arrival to scene he had bilateral rales and SpO2 of 85% on 6L [place] home oxygen. He was placed on CPAP which increased his SpO2 to 93%. He was additionally given 3 SL NTG and 1 inch nitro paste en route. Pt placed on bipap in the ed with improvement in oxygenation. On my conversation pt on bipap limiting history. Pt states he has run out of some of his home medications over the past couple of days, but took the ones he still had.  Patient unclear of which medications it is that he is run out of.  Patient states that his granddaughter has been around him and has a cold currently, but denies any known other sick contacts.  Patient does continue to smoke, but denies alcohol or drug use.  Patient states that he is having some worsening swelling of his lower extremities as well.  Patient denies chest pain, sensation of shortness a breath at this time, fever, cough, nausea, vomiting, abdominal pain, headache, or changes in vision.      * No surgery found *      Hospital Course:   67M with pmh of copd, hfref 25-30%, CAD, hld, htn presented respiratory distress. He started experiencing severe dyspnea around 1h pta. Upon EMS arrival to scene he had bilateral rales and SpO2 of 85% on 6L [place] home oxygen. He was placed on CPAP which increased his SpO2 to 93%. He was additionally given 3 SL NTG and 1 inch nitro paste en route.  Pt placed on bipap in the ed with improvement in oxygenation.   His oxygenation with nebulizer and IV lasix continue improve,consulted PT,OT as well,did well.  New Echo. Show EF down to 15% from 25%,will continued with IV lasix,consulted cardiology..started on entrtesto.arranged out patient follow up with cardiology for out patient AICD placement.was seen by cardiology.  At DC time patient was on baseline hoe oxygen at 2-3  liter,patient was discharged home with diuretics,entresto and follow up with PCP and cardiology as out patient.       Goals of Care Treatment Preferences:  Code Status: Full Code      Consults:   Consults (From admission, onward)        Status Ordering Provider     Inpatient consult to Cardiology  Once        Provider:  Sena Cabrera MD    Completed DORIAN LAU          No new Assessment & Plan notes have been filed under this hospital service since the last note was generated.  Service: Hospital Medicine    Final Active Diagnoses:    Diagnosis Date Noted POA    PRINCIPAL PROBLEM:  Acute on chronic respiratory failure with hypoxia and hypercapnia [J96.21, J96.22] 04/12/2022 Yes    Chronic obstructive pulmonary disease with acute exacerbation [J44.1] 03/09/2023 Yes    Uncontrolled type 2 diabetes mellitus with hyperglycemia [E11.65] 04/12/2022 Yes    Class 1 obesity due to excess calories with serious comorbidity in adult [E66.09] 02/13/2021 Yes     Chronic    COPD exacerbation [J44.1] 02/13/2021 Yes    Coronary artery disease involving native coronary artery of native heart without angina pectoris [I25.10] 11/15/2017 Yes     Chronic    Acute on chronic combined systolic and diastolic heart failure [I50.43] 07/28/2017 Yes    Dyslipidemia [E78.5] 07/28/2017 Yes     Chronic    Tobacco abuse [Z72.0] 04/28/2016 Yes     Chronic    Benign essential hypertension [I10] 03/02/2013 Yes     Chronic      Problems Resolved During this Admission:    Diagnosis Date Noted Date Resolved  POA    Acute hypercapnic respiratory failure [J96.02] 03/06/2023 07/02/2023 Yes       Discharged Condition: stable    Disposition: Home or Self Care    Follow Up:   Follow-up Information     Rolando Funes MD. Schedule an appointment as soon as possible for a visit on 7/14/2023.    Specialty: Internal Medicine  Why: Hospital follow up appointment @3:15pm  Contact information:  [address] [place] [address] [address]  [address] [place] [address]  [phone]                       Patient Instructions:      Ambulatory referral/consult to Cardiology   Standing Status: Future   Referral Priority: Routine Referral Type: Consultation   Referral Reason: Specialty Services Required   Requested Specialty: Cardiology   Number of Visits Requested: 1       Significant Diagnostic Studies: Labs:   BMP:   Recent Labs   Lab 07/06/23  0517 07/07/23 0349   * 165*    139   K 3.8 3.8   CL 94* 92*   CO2 35* 34*   BUN 11 15   CREATININE 0.7 0.8   CALCIUM 9.3 9.7   MG 2.0 2.0   , CMP   Recent Labs   Lab 07/06/23  0517 07/07/23 0349    139   K 3.8 3.8   CL 94* 92*   CO2 35* 34*   * 165*   BUN 11 15   CREATININE 0.7 0.8   CALCIUM 9.3 9.7   ANIONGAP 9 13    and CBC   Recent Labs   Lab 07/06/23 0517 07/07/23 0349   WBC 13.41* 13.91*   HGB 12.9* 14.1   HCT 40.9 45.7    254     Radiology: X-Ray: CXR: X-Ray Chest 1 View (CXR):   Results for orders placed or performed during the hospital encounter of 07/02/23   X-Ray Chest 1 View    Narrative    EXAMINATION:  XR CHEST 1 VIEW    CLINICAL HISTORY:  shortness of breath;    TECHNIQUE:  Single frontal view of the chest was performed.    COMPARISON:  Chest radiograph 03/06/2023, 06:06 hours.    FINDINGS:  Monitoring leads over the chest.  Grossly unchanged cardiomediastinal contours, again noting enlargement cardiac silhouette and prominence of central pulmonary vasculature.    Chronic interstitial coarsening noted.  Question superimposed new interstitial opacities.    No  definite focal airspace consolidation.    Question basilar atelectasis.  Query trace pleural effusions.    No definite pneumothorax.    No acute findings the visualized abdomen osseous and soft tissue structures appear without definite acute change.      Impression    Question mild new interstitial opacities since the 03/06/2023 examination, which could relate to vascular congestion versus mild degree of interstitial edema.    Question trace pleural effusions.      Electronically signed by: Carlito Lugo  Date:    07/02/2023  Time:    06:51    and X-Ray Chest PA and Lateral (CXR): No results found for this visit on 07/02/23.  Cardiac Graphics: Echocardiogram:   2D echo with color flow doppler:   Results for orders placed or performed during the hospital encounter of 01/22/18   2D echo with color flow doppler   Result Value Ref Range    EF + QEF 35 (A) 55 - 65    Mitral Valve Regurgitation TRIVIAL     Diastolic Dysfunction Yes (A)     Mitral Valve Mobility NORMAL     Narrative    Date of Procedure: 01/22/2018        TEST DESCRIPTION   Technical Quality: This is a technically adequate study.     Aorta: The aortic root is normal in size, measuring 3.1 cm at sinotubular junction and 3.3 cm at Sinuses of Valsalva. The proximal ascending aorta is normal in size, measuring 3.3 cm across.     Left Atrium: The left atrial volume index is severely enlarged, measuring 48.08 cc/m2.     Left Ventricle: The left ventricle is mildly enlarged, with an end-diastolic diameter of 5.9 cm, and an end-systolic diameter of 5.0 cm. LV wall thickness is normal, with the septum and the posterior wall each measuring 0.8 cm across. Relative wall   thickness was normal at 0.27, and the LV mass index was 105.8 g/m2 consistent with normal left ventricular mass. There is global hypokinesis with more severe hypokinesis of the anterior septum.  There are no regional wall motion abnormalities. Left ventricular systolic function appears moderately  depressed. Visually estimated ejection fraction is 35-40%. The LV Doppler derived stroke volume equals 59.0 ccs.     Diastolic indices: E wave velocity 0.4 m/s, E/A ratio 0.6,  msec., E/e' ratio(avg) 9. Pulmonary vein systolic/diastolic ratio is reversed. There is diastolic dysfunction secondary to relaxation abnormality.     Right Atrium: The right atrium is normal in size, measuring 4.5 cm in length and 4.1 cm in width in the apical view.     Right Ventricle: The right ventricle is normal in size measuring 2.9 cm at the base in the apical right ventricle-focused view. Global right ventricular systolic function appears normal. Tricuspid annular plane systolic excursion (TAPSE) is 2.2 cm.   Tissue Doppler-derived tricuspid annular peak systolic velocity (S prime) is 14.3 cm/s.     Aortic Valve:  The aortic valve is normal in structure with normal leaflet mobility. The aortic valve is tri-leaflet in structure. There is aortic annular calcification.     Mitral Valve:  The mitral valve is mildly sclerotic with normal leaflet mobility. There is trivial mitral regurgitation.     Tricuspid Valve:  The tricuspid valve is normal in structure with normal leaflet mobility.     Pulmonary Valve:  The pulmonic valve is normal in structure with normal leaflet mobility. There is trivial pulmonic regurgitation.     IVC: IVC is normal in size and collapses > 50% with a sniff, suggesting normal right atrial pressure of 3 mmHg.     Intracavitary: There is no evidence of pericardial effusion, intracavity mass, thrombi, or vegetation.         CONCLUSIONS     1 - Severe left atrial enlargement.     2 - Mild left ventricular enlargement.     3 - Moderately depressed left ventricular systolic function (EF 35-40%).     4 - Impaired LV relaxation, normal LAP (grade 1 diastolic dysfunction).     5 - Normal right ventricular systolic function .             This document has been electronically    SIGNED BY: Herbert Potts MD On:  01/22/2018 09:46    This document was originally electronically signed on: 01/22/2018 09:42    and Transthoracic echo (TTE) complete (Cupid Only):   Results for orders placed or performed during the hospital encounter of 07/02/23   Echo   Result Value Ref Range    BSA 1.96 m2    TDI SEPTAL 0.06 m/s    LV LATERAL E/E' RATIO 14.25 m/s    LV SEPTAL E/E' RATIO 19.00 m/s    LA WIDTH 5.70 cm    IVC diameter 1.90 cm    Left Ventricular Outflow Tract Mean Velocity 0.53 cm/s    Left Ventricular Outflow Tract Mean Gradient 1.26 mmHg    TDI LATERAL 0.08 m/s    PV PEAK VELOCITY 1.08 cm/s    LVIDd 7.00 (A) 3.5 - 6.0 cm    IVS 0.83 0.6 - 1.1 cm    Posterior Wall 0.88 0.6 - 1.1 cm    LVIDs 5.98 (A) 2.1 - 4.0 cm    FS 15 28 - 44 %    Sinus 3.27 cm    STJ 2.49 cm    Ascending aorta 3.50 cm    LV mass 264.99 g    RVDD 3.86 cm    TAPSE 2.20 cm    RV S' 14.86 cm/s    Left Ventricle Relative Wall Thickness 0.25 cm    AV mean gradient 2 mmHg    AV valve area 3.77 cm2    AV Velocity Ratio 0.89     AV index (prosthetic) 0.81     MV mean gradient 1 mmHg    MV valve area p 1/2 method 5.27 cm2    MV valve area by continuity eq 4.91 cm2    E/A ratio 1.63     Mean e' 0.07 m/s    E wave deceleration time 114.89 msec    Pulm vein S/D ratio 1.19     LVOT diameter 2.44 cm    LVOT area 4.7 cm2    LVOT peak alek 0.78 m/s    LVOT peak VTI 12.60 cm    Ao peak alek 0.88 m/s    Ao VTI 15.6 cm    Mr max alek 3.39 m/s    LVOT stroke volume 58.89 cm3    AV peak gradient 3 mmHg    MV peak gradient 3 mmHg    E/E' ratio 16.29 m/s    MV Peak E Alek 1.14 m/s    MV VTI 12.0 cm    MV stenosis pressure 1/2 time 41.75 ms    MV Peak A Alek 0.70 m/s    PV Peak S Alek 0.44 m/s    PV Peak D Alek 0.37 m/s    LV Systolic Volume 178.52 mL    LV Systolic Volume Index 93.0 mL/m2    LV Diastolic Volume 255.29 mL    LV Diastolic Volume Index 132.96 mL/m2    LV Mass Index 138 g/m2    RA Major Axis 6.53 cm    Left Atrium Minor Axis 7.53 cm    Left Atrium Major Axis 7.91 cm    RA Width  6.50 cm    Right Atrial Pressure (from IVC) 8 mmHg    EF 15 %    Narrative    · The left ventricle is severely enlarged with eccentric hypertrophy and   severely decreased systolic function.  · The estimated ejection fraction is 15%.  · Grade I left ventricular diastolic dysfunction.  · Normal right ventricular size with normal right ventricular systolic   function.  · Moderate left atrial enlargement.  · Intermediate central venous pressure (8 mmHg).          Pending Diagnostic Studies:     None         Medications:  Reconciled Home Medications:      Medication List      START taking these medications    sacubitriL-valsartan 24-26 mg per tablet  Commonly known as: ENTRESTO  Take 1 tablet by mouth 2 (two) times daily.     spironolactone 25 MG tablet  Commonly known as: ALDACTONE  Take 1 tablet (25 mg total) by mouth once daily.        CONTINUE taking these medications    albuterol 90 mcg/actuation inhaler  Commonly known as: PROVENTIL/VENTOLIN HFA  Inhale 1-2 puffs into the lungs every 6 (six) hours as needed for Wheezing. Rescue     aspirin 81 MG EC tablet  Commonly known as: ECOTRIN  Take 1 tablet (81 mg total) by mouth once daily.     atorvastatin 40 MG tablet  Commonly known as: LIPITOR  Take 1 tablet (40 mg total) by mouth once daily.     clopidogreL 75 mg tablet  Commonly known as: PLAVIX  Take 1 tablet (75 mg total) by mouth once daily.     fluticasone-salmeterol 250-50 mcg/dose 250-50 mcg/dose diskus inhaler  Commonly known as: ADVAIR  Inhale 1 puff into the lungs 2 (two) times daily.     furosemide 40 MG tablet  Commonly known as: LASIX  Take 1 tablet (40 mg total) by mouth 2 (two) times daily.     glipiZIDE 10 MG Tr24  Commonly known as: GLUCOTROL  Take 1 tablet (10 mg total) by mouth daily with breakfast.     metoprolol succinate 25 MG 24 hr tablet  Commonly known as: TOPROL-XL  Take 1 tablet (25 mg total) by mouth once daily.        STOP taking these medications    amLODIPine 5 MG tablet  Commonly known  as: NORVASC     lisinopriL 20 MG tablet  Commonly known as: PRINIVIL,ZESTRIL     metFORMIN 500 MG tablet  Commonly known as: GLUCOPHAGE            Indwelling Lines/Drains at time of discharge:   Lines/Drains/Airways     None                 Time spent on the discharge of patient: over 30  minutes         Romeo Lopez MD  Department of Hospital Medicine  [facility] - [place], [place] - [facility] Surg

## 2023-07-07 NOTE — NURSING
[facility] [place]  Nurses Note -- 4 Eyes      7/6/2023       Skin assessed on: Q Shift      [x] No Pressure Injuries Present    []Prevention Measures Documented    [] Yes LDA  for Pressure Injury Previously documented     [] Yes New Pressure Injury Discovered   [] LDA for New Pressure Injury Added      Attending RN:  Artis Castellon RN     Second RN:  alfredo jones

## 2023-07-07 NOTE — PLAN OF CARE
Problem: Adjustment to Illness COPD (Chronic Obstructive Pulmonary Disease)  Goal: Optimal Chronic Illness Coping  Intervention: Support and Optimize Psychosocial Response  Flowsheets (Taken 7/7/2023 0309)  Supportive Measures:   active listening utilized   positive reinforcement provided     Problem: Diabetes Comorbidity  Goal: Blood Glucose Level Within Targeted Range  Intervention: Monitor and Manage Glycemia  Flowsheets (Taken 7/7/2023 0309)  Glycemic Management:   blood glucose monitored   supplemental insulin given     Problem: Fall Injury Risk  Goal: Absence of Fall and Fall-Related Injury  Intervention: Identify and Manage Contributors  Flowsheets (Taken 7/7/2023 0309)  Self-Care Promotion: independence encouraged  Medication Review/Management:   medications reviewed   high-risk medications identified

## 2023-10-02 PROBLEM — J96.21 ACUTE ON CHRONIC RESPIRATORY FAILURE WITH HYPOXIA AND HYPERCAPNIA: Status: RESOLVED | Noted: 2022-04-12 | Resolved: 2023-10-02

## 2023-10-02 PROBLEM — J96.22 ACUTE ON CHRONIC RESPIRATORY FAILURE WITH HYPOXIA AND HYPERCAPNIA: Status: RESOLVED | Noted: 2022-04-12 | Resolved: 2023-10-02

## 2023-10-19 ENCOUNTER — HOSPITAL ENCOUNTER (INPATIENT)
Facility: HOSPITAL | Age: 68
LOS: 2 days | Discharge: HOME | DRG: 189 | End: 2023-10-21
Attending: STUDENT IN AN ORGANIZED HEALTH CARE EDUCATION/TRAINING PROGRAM | Admitting: STUDENT IN AN ORGANIZED HEALTH CARE EDUCATION/TRAINING PROGRAM
Payer: MEDICARE

## 2023-10-19 DIAGNOSIS — I50.43 ACUTE ON CHRONIC COMBINED SYSTOLIC AND DIASTOLIC HEART FAILURE: Primary | ICD-10-CM

## 2023-10-19 DIAGNOSIS — R07.9 CHEST PAIN: ICD-10-CM

## 2023-10-19 DIAGNOSIS — Z51.5 PALLIATIVE CARE ENCOUNTER: ICD-10-CM

## 2023-10-19 DIAGNOSIS — J44.1 CHRONIC OBSTRUCTIVE PULMONARY DISEASE WITH ACUTE EXACERBATION: ICD-10-CM

## 2023-10-19 DIAGNOSIS — J44.1 COPD EXACERBATION: ICD-10-CM

## 2023-10-19 PROBLEM — I50.20 HFREF (HEART FAILURE WITH REDUCED EJECTION FRACTION): Status: ACTIVE | Noted: 2023-10-19

## 2023-10-19 LAB
ALBUMIN SERPL BCP-MCNC: 4 G/DL (ref 3.5–5.2)
ALLENS TEST: ABNORMAL
ALP SERPL-CCNC: 90 U/L (ref 55–135)
ALT SERPL W/O P-5'-P-CCNC: 12 U/L (ref 10–44)
ANION GAP SERPL CALC-SCNC: 16 MMOL/L (ref 8–16)
ANION GAP SERPL CALC-SCNC: 7 MMOL/L (ref 8–16)
AST SERPL-CCNC: 13 U/L (ref 10–40)
BACTERIA #/AREA URNS HPF: ABNORMAL /HPF
BASOPHILS # BLD AUTO: 0.08 K/UL (ref 0–0.2)
BASOPHILS NFR BLD: 0.5 % (ref 0–1.9)
BILIRUB SERPL-MCNC: 0.4 MG/DL (ref 0.1–1)
BILIRUB UR QL STRIP: NEGATIVE
BNP SERPL-MCNC: 885 PG/ML (ref 0–99)
BUN SERPL-MCNC: 4 MG/DL (ref 6–30)
BUN SERPL-MCNC: 6 MG/DL (ref 8–23)
CALCIUM SERPL-MCNC: 8.9 MG/DL (ref 8.7–10.5)
CHLORIDE SERPL-SCNC: 100 MMOL/L (ref 95–110)
CHLORIDE SERPL-SCNC: 98 MMOL/L (ref 95–110)
CLARITY UR: ABNORMAL
CO2 SERPL-SCNC: 36 MMOL/L (ref 23–29)
COLOR UR: YELLOW
CREAT SERPL-MCNC: 0.6 MG/DL (ref 0.5–1.4)
CREAT SERPL-MCNC: 0.9 MG/DL (ref 0.5–1.4)
DELSYS: ABNORMAL
DIFFERENTIAL METHOD: ABNORMAL
EOSINOPHIL # BLD AUTO: 0.2 K/UL (ref 0–0.5)
EOSINOPHIL NFR BLD: 1.2 % (ref 0–8)
EP: 5
EP: 5
EP: 6
EP: 6
ERYTHROCYTE [DISTWIDTH] IN BLOOD BY AUTOMATED COUNT: 13.3 % (ref 11.5–14.5)
ERYTHROCYTE [SEDIMENTATION RATE] IN BLOOD BY WESTERGREN METHOD: 12 MM/H
EST. GFR  (NO RACE VARIABLE): >60 ML/MIN/1.73 M^2
FIO2: 40
FIO2: 60
GLUCOSE SERPL-MCNC: 187 MG/DL (ref 70–110)
GLUCOSE SERPL-MCNC: 208 MG/DL (ref 70–110)
GLUCOSE UR QL STRIP: ABNORMAL
HCO3 UR-SCNC: 30.7 MMOL/L (ref 24–28)
HCO3 UR-SCNC: 38.9 MMOL/L (ref 24–28)
HCO3 UR-SCNC: 43.4 MMOL/L (ref 24–28)
HCT VFR BLD AUTO: 42.3 % (ref 40–54)
HCT VFR BLD CALC: 58 %PCV (ref 36–54)
HGB BLD-MCNC: 12.5 G/DL (ref 14–18)
HGB UR QL STRIP: NEGATIVE
HYALINE CASTS #/AREA URNS LPF: 5 /LPF
IMM GRANULOCYTES # BLD AUTO: 0.04 K/UL (ref 0–0.04)
IMM GRANULOCYTES NFR BLD AUTO: 0.3 % (ref 0–0.5)
IP: 10
IP: 10
IP: 12
IP: 16
KETONES UR QL STRIP: NEGATIVE
LACTATE SERPL-SCNC: 1.6 MMOL/L (ref 0.5–2.2)
LEUKOCYTE ESTERASE UR QL STRIP: NEGATIVE
LYMPHOCYTES # BLD AUTO: 3.6 K/UL (ref 1–4.8)
LYMPHOCYTES NFR BLD: 24.4 % (ref 18–48)
MAGNESIUM SERPL-MCNC: 1.9 MG/DL (ref 1.6–2.6)
MCH RBC QN AUTO: 27.5 PG (ref 27–31)
MCHC RBC AUTO-ENTMCNC: 29.6 G/DL (ref 32–36)
MCV RBC AUTO: 93 FL (ref 82–98)
MICROSCOPIC COMMENT: ABNORMAL
MODE: ABNORMAL
MONOCYTES # BLD AUTO: 0.8 K/UL (ref 0.3–1)
MONOCYTES NFR BLD: 5.5 % (ref 4–15)
NEUTROPHILS # BLD AUTO: 10.1 K/UL (ref 1.8–7.7)
NEUTROPHILS NFR BLD: 68.1 % (ref 38–73)
NITRITE UR QL STRIP: NEGATIVE
NRBC BLD-RTO: 0 /100 WBC
PCO2 BLDA: 125.3 MMHG (ref 35–45)
PCO2 BLDA: 85.1 MMHG (ref 35–45)
PCO2 BLDA: 94.5 MMHG (ref 35–45)
PH SMN: 7.12 [PH] (ref 7.35–7.45)
PH SMN: 7.15 [PH] (ref 7.35–7.45)
PH SMN: 7.27 [PH] (ref 7.35–7.45)
PH UR STRIP: 6 [PH] (ref 5–8)
PLATELET # BLD AUTO: 218 K/UL (ref 150–450)
PMV BLD AUTO: 11.6 FL (ref 9.2–12.9)
PO2 BLDA: 22 MMHG (ref 40–60)
PO2 BLDA: 71 MMHG (ref 40–60)
PO2 BLDA: 84 MMHG (ref 80–100)
POC BE: -3 MMOL/L
POC BE: 8 MMOL/L
POC BE: 9 MMOL/L
POC IONIZED CALCIUM: 1.12 MMOL/L (ref 1.06–1.42)
POC SATURATED O2: 22 % (ref 95–100)
POC SATURATED O2: 86 % (ref 95–100)
POC SATURATED O2: 94 % (ref 95–100)
POC TCO2 (MEASURED): 35 MMOL/L (ref 23–29)
POC TCO2: 34 MMOL/L (ref 24–29)
POC TCO2: 41 MMOL/L (ref 23–27)
POC TCO2: 47 MMOL/L (ref 24–29)
POCT GLUCOSE: 207 MG/DL (ref 70–110)
POCT GLUCOSE: 232 MG/DL (ref 70–110)
POTASSIUM BLD-SCNC: 2.7 MMOL/L (ref 3.5–5.1)
POTASSIUM SERPL-SCNC: 3.8 MMOL/L (ref 3.5–5.1)
PROCALCITONIN SERPL IA-MCNC: 0.02 NG/ML
PROT SERPL-MCNC: 7.6 G/DL (ref 6–8.4)
PROT UR QL STRIP: ABNORMAL
RBC # BLD AUTO: 4.55 M/UL (ref 4.6–6.2)
RBC #/AREA URNS HPF: 1 /HPF (ref 0–4)
SAMPLE: ABNORMAL
SITE: ABNORMAL
SODIUM BLD-SCNC: 147 MMOL/L (ref 136–145)
SODIUM SERPL-SCNC: 141 MMOL/L (ref 136–145)
SP GR UR STRIP: 1.01 (ref 1–1.03)
TROPONIN I SERPL DL<=0.01 NG/ML-MCNC: <0.006 NG/ML (ref 0–0.03)
URN SPEC COLLECT METH UR: ABNORMAL
UROBILINOGEN UR STRIP-ACNC: NEGATIVE EU/DL
WBC # BLD AUTO: 14.78 K/UL (ref 3.9–12.7)
WBC #/AREA URNS HPF: 1 /HPF (ref 0–5)
YEAST URNS QL MICRO: ABNORMAL

## 2023-10-19 PROCEDURE — 93005 ELECTROCARDIOGRAM TRACING: CPT

## 2023-10-19 PROCEDURE — 82803 BLOOD GASES ANY COMBINATION: CPT

## 2023-10-19 PROCEDURE — 25000003 PHARM REV CODE 250: Performed by: STUDENT IN AN ORGANIZED HEALTH CARE EDUCATION/TRAINING PROGRAM

## 2023-10-19 PROCEDURE — 94660 CPAP INITIATION&MGMT: CPT

## 2023-10-19 PROCEDURE — 94640 AIRWAY INHALATION TREATMENT: CPT

## 2023-10-19 PROCEDURE — 83735 ASSAY OF MAGNESIUM: CPT | Performed by: STUDENT IN AN ORGANIZED HEALTH CARE EDUCATION/TRAINING PROGRAM

## 2023-10-19 PROCEDURE — 84484 ASSAY OF TROPONIN QUANT: CPT | Performed by: STUDENT IN AN ORGANIZED HEALTH CARE EDUCATION/TRAINING PROGRAM

## 2023-10-19 PROCEDURE — 63600175 PHARM REV CODE 636 W HCPCS: Performed by: STUDENT IN AN ORGANIZED HEALTH CARE EDUCATION/TRAINING PROGRAM

## 2023-10-19 PROCEDURE — 27000221 HC OXYGEN, UP TO 24 HOURS

## 2023-10-19 PROCEDURE — 84145 PROCALCITONIN (PCT): CPT | Performed by: STUDENT IN AN ORGANIZED HEALTH CARE EDUCATION/TRAINING PROGRAM

## 2023-10-19 PROCEDURE — 93010 ELECTROCARDIOGRAM REPORT: CPT | Mod: ,,, | Performed by: INTERNAL MEDICINE

## 2023-10-19 PROCEDURE — 36415 COLL VENOUS BLD VENIPUNCTURE: CPT | Performed by: STUDENT IN AN ORGANIZED HEALTH CARE EDUCATION/TRAINING PROGRAM

## 2023-10-19 PROCEDURE — 25000242 PHARM REV CODE 250 ALT 637 W/ HCPCS: Performed by: STUDENT IN AN ORGANIZED HEALTH CARE EDUCATION/TRAINING PROGRAM

## 2023-10-19 PROCEDURE — 83036 HEMOGLOBIN GLYCOSYLATED A1C: CPT | Performed by: STUDENT IN AN ORGANIZED HEALTH CARE EDUCATION/TRAINING PROGRAM

## 2023-10-19 PROCEDURE — 83880 ASSAY OF NATRIURETIC PEPTIDE: CPT | Performed by: STUDENT IN AN ORGANIZED HEALTH CARE EDUCATION/TRAINING PROGRAM

## 2023-10-19 PROCEDURE — 85014 HEMATOCRIT: CPT

## 2023-10-19 PROCEDURE — 80053 COMPREHEN METABOLIC PANEL: CPT | Performed by: STUDENT IN AN ORGANIZED HEALTH CARE EDUCATION/TRAINING PROGRAM

## 2023-10-19 PROCEDURE — 82800 BLOOD PH: CPT

## 2023-10-19 PROCEDURE — 96375 TX/PRO/DX INJ NEW DRUG ADDON: CPT

## 2023-10-19 PROCEDURE — 84295 ASSAY OF SERUM SODIUM: CPT

## 2023-10-19 PROCEDURE — 81000 URINALYSIS NONAUTO W/SCOPE: CPT | Performed by: STUDENT IN AN ORGANIZED HEALTH CARE EDUCATION/TRAINING PROGRAM

## 2023-10-19 PROCEDURE — 27000190 HC CPAP FULL FACE MASK W/VALVE

## 2023-10-19 PROCEDURE — 87040 BLOOD CULTURE FOR BACTERIA: CPT | Performed by: STUDENT IN AN ORGANIZED HEALTH CARE EDUCATION/TRAINING PROGRAM

## 2023-10-19 PROCEDURE — 36600 WITHDRAWAL OF ARTERIAL BLOOD: CPT

## 2023-10-19 PROCEDURE — 99900035 HC TECH TIME PER 15 MIN (STAT)

## 2023-10-19 PROCEDURE — 83605 ASSAY OF LACTIC ACID: CPT | Performed by: STUDENT IN AN ORGANIZED HEALTH CARE EDUCATION/TRAINING PROGRAM

## 2023-10-19 PROCEDURE — 94799 UNLISTED PULMONARY SVC/PX: CPT

## 2023-10-19 PROCEDURE — 99285 EMERGENCY DEPT VISIT HI MDM: CPT | Mod: 25

## 2023-10-19 PROCEDURE — 84132 ASSAY OF SERUM POTASSIUM: CPT

## 2023-10-19 PROCEDURE — 93010 EKG 12-LEAD: ICD-10-PCS | Mod: ,,, | Performed by: INTERNAL MEDICINE

## 2023-10-19 PROCEDURE — 20000000 HC ICU ROOM

## 2023-10-19 PROCEDURE — 99291 CRITICAL CARE FIRST HOUR: CPT | Mod: ,,, | Performed by: STUDENT IN AN ORGANIZED HEALTH CARE EDUCATION/TRAINING PROGRAM

## 2023-10-19 PROCEDURE — 82330 ASSAY OF CALCIUM: CPT

## 2023-10-19 PROCEDURE — 85025 COMPLETE CBC W/AUTO DIFF WBC: CPT | Performed by: STUDENT IN AN ORGANIZED HEALTH CARE EDUCATION/TRAINING PROGRAM

## 2023-10-19 PROCEDURE — 99291 PR CRITICAL CARE, E/M 30-74 MINUTES: ICD-10-PCS | Mod: ,,, | Performed by: STUDENT IN AN ORGANIZED HEALTH CARE EDUCATION/TRAINING PROGRAM

## 2023-10-19 PROCEDURE — 96365 THER/PROPH/DIAG IV INF INIT: CPT

## 2023-10-19 PROCEDURE — 94761 N-INVAS EAR/PLS OXIMETRY MLT: CPT

## 2023-10-19 PROCEDURE — 82565 ASSAY OF CREATININE: CPT

## 2023-10-19 RX ORDER — FUROSEMIDE 10 MG/ML
100 INJECTION INTRAMUSCULAR; INTRAVENOUS
Status: COMPLETED | OUTPATIENT
Start: 2023-10-19 | End: 2023-10-19

## 2023-10-19 RX ORDER — ENOXAPARIN SODIUM 100 MG/ML
40 INJECTION SUBCUTANEOUS EVERY 24 HOURS
Status: DISCONTINUED | OUTPATIENT
Start: 2023-10-19 | End: 2023-10-21 | Stop reason: HOSPADM

## 2023-10-19 RX ORDER — MUPIROCIN 20 MG/G
OINTMENT TOPICAL 2 TIMES DAILY
Status: DISCONTINUED | OUTPATIENT
Start: 2023-10-19 | End: 2023-10-21 | Stop reason: HOSPADM

## 2023-10-19 RX ORDER — FUROSEMIDE 10 MG/ML
40 INJECTION INTRAMUSCULAR; INTRAVENOUS
Status: DISCONTINUED | OUTPATIENT
Start: 2023-10-19 | End: 2023-10-21

## 2023-10-19 RX ORDER — IPRATROPIUM BROMIDE AND ALBUTEROL SULFATE 2.5; .5 MG/3ML; MG/3ML
3 SOLUTION RESPIRATORY (INHALATION)
Status: DISCONTINUED | OUTPATIENT
Start: 2023-10-19 | End: 2023-10-21 | Stop reason: HOSPADM

## 2023-10-19 RX ORDER — GLUCAGON 1 MG
1 KIT INJECTION
Status: DISCONTINUED | OUTPATIENT
Start: 2023-10-19 | End: 2023-10-21 | Stop reason: HOSPADM

## 2023-10-19 RX ORDER — MAGNESIUM SULFATE HEPTAHYDRATE 40 MG/ML
2 INJECTION, SOLUTION INTRAVENOUS ONCE
Status: COMPLETED | OUTPATIENT
Start: 2023-10-19 | End: 2023-10-19

## 2023-10-19 RX ORDER — ASPIRIN 81 MG/1
81 TABLET ORAL DAILY
Status: DISCONTINUED | OUTPATIENT
Start: 2023-10-19 | End: 2023-10-21 | Stop reason: HOSPADM

## 2023-10-19 RX ORDER — SODIUM CHLORIDE 0.9 % (FLUSH) 0.9 %
3 SYRINGE (ML) INJECTION
Status: DISCONTINUED | OUTPATIENT
Start: 2023-10-19 | End: 2023-10-21 | Stop reason: HOSPADM

## 2023-10-19 RX ORDER — ALBUTEROL SULFATE 2.5 MG/.5ML
2.5 SOLUTION RESPIRATORY (INHALATION)
Status: COMPLETED | OUTPATIENT
Start: 2023-10-19 | End: 2023-10-19

## 2023-10-19 RX ORDER — ATORVASTATIN CALCIUM 40 MG/1
40 TABLET, FILM COATED ORAL DAILY
Status: DISCONTINUED | OUTPATIENT
Start: 2023-10-19 | End: 2023-10-21 | Stop reason: HOSPADM

## 2023-10-19 RX ORDER — METOPROLOL SUCCINATE 25 MG/1
25 TABLET, EXTENDED RELEASE ORAL DAILY
Status: DISCONTINUED | OUTPATIENT
Start: 2023-10-19 | End: 2023-10-21 | Stop reason: HOSPADM

## 2023-10-19 RX ORDER — METHYLPREDNISOLONE SOD SUCC 125 MG
40 VIAL (EA) INJECTION EVERY 8 HOURS
Status: DISCONTINUED | OUTPATIENT
Start: 2023-10-19 | End: 2023-10-19

## 2023-10-19 RX ORDER — INSULIN ASPART 100 [IU]/ML
0-5 INJECTION, SOLUTION INTRAVENOUS; SUBCUTANEOUS
Status: DISCONTINUED | OUTPATIENT
Start: 2023-10-19 | End: 2023-10-21 | Stop reason: HOSPADM

## 2023-10-19 RX ORDER — IBUPROFEN 200 MG
16 TABLET ORAL
Status: DISCONTINUED | OUTPATIENT
Start: 2023-10-19 | End: 2023-10-21 | Stop reason: HOSPADM

## 2023-10-19 RX ORDER — POLYETHYLENE GLYCOL 3350 17 G/17G
17 POWDER, FOR SOLUTION ORAL DAILY
Status: DISCONTINUED | OUTPATIENT
Start: 2023-10-19 | End: 2023-10-21 | Stop reason: HOSPADM

## 2023-10-19 RX ORDER — POTASSIUM CHLORIDE 7.45 MG/ML
10 INJECTION INTRAVENOUS ONCE
Status: COMPLETED | OUTPATIENT
Start: 2023-10-19 | End: 2023-10-19

## 2023-10-19 RX ORDER — ONDANSETRON 8 MG/1
8 TABLET, ORALLY DISINTEGRATING ORAL EVERY 8 HOURS PRN
Status: DISCONTINUED | OUTPATIENT
Start: 2023-10-19 | End: 2023-10-21 | Stop reason: HOSPADM

## 2023-10-19 RX ORDER — IBUPROFEN 200 MG
24 TABLET ORAL
Status: DISCONTINUED | OUTPATIENT
Start: 2023-10-19 | End: 2023-10-21 | Stop reason: HOSPADM

## 2023-10-19 RX ADMIN — INSULIN ASPART 1 UNITS: 100 INJECTION, SOLUTION INTRAVENOUS; SUBCUTANEOUS at 09:10

## 2023-10-19 RX ADMIN — METOPROLOL SUCCINATE ER TABLETS 25 MG: 25 TABLET, FILM COATED, EXTENDED RELEASE ORAL at 08:10

## 2023-10-19 RX ADMIN — ALBUTEROL SULFATE 2.5 MG: 2.5 SOLUTION RESPIRATORY (INHALATION) at 06:10

## 2023-10-19 RX ADMIN — POTASSIUM CHLORIDE 10 MEQ: 7.46 INJECTION, SOLUTION INTRAVENOUS at 05:10

## 2023-10-19 RX ADMIN — AZITHROMYCIN 500 MG: 500 INJECTION, POWDER, LYOPHILIZED, FOR SOLUTION INTRAVENOUS at 06:10

## 2023-10-19 RX ADMIN — IPRATROPIUM BROMIDE AND ALBUTEROL SULFATE 3 ML: 2.5; .5 SOLUTION RESPIRATORY (INHALATION) at 02:10

## 2023-10-19 RX ADMIN — ASPIRIN 81 MG: 81 TABLET, COATED ORAL at 08:10

## 2023-10-19 RX ADMIN — FUROSEMIDE 100 MG: 10 INJECTION, SOLUTION INTRAMUSCULAR; INTRAVENOUS at 05:10

## 2023-10-19 RX ADMIN — IPRATROPIUM BROMIDE AND ALBUTEROL SULFATE 3 ML: 2.5; .5 SOLUTION RESPIRATORY (INHALATION) at 07:10

## 2023-10-19 RX ADMIN — MAGNESIUM SULFATE HEPTAHYDRATE 2 G: 40 INJECTION, SOLUTION INTRAVENOUS at 04:10

## 2023-10-19 RX ADMIN — ENOXAPARIN SODIUM 40 MG: 40 INJECTION SUBCUTANEOUS at 04:10

## 2023-10-19 RX ADMIN — ATORVASTATIN CALCIUM 40 MG: 40 TABLET, FILM COATED ORAL at 08:10

## 2023-10-19 RX ADMIN — ALBUTEROL SULFATE 2.5 MG: 2.5 SOLUTION RESPIRATORY (INHALATION) at 04:10

## 2023-10-19 RX ADMIN — CEFTRIAXONE SODIUM 2 G: 2 INJECTION, POWDER, FOR SOLUTION INTRAMUSCULAR; INTRAVENOUS at 06:10

## 2023-10-19 RX ADMIN — PREDNISONE 50 MG: 5 TABLET ORAL at 01:10

## 2023-10-19 RX ADMIN — INSULIN ASPART 2 UNITS: 100 INJECTION, SOLUTION INTRAVENOUS; SUBCUTANEOUS at 04:10

## 2023-10-19 RX ADMIN — FUROSEMIDE 40 MG: 10 INJECTION, SOLUTION INTRAVENOUS at 07:10

## 2023-10-19 NOTE — Clinical Note
Diagnosis: COPD exacerbation [558368]   Admitting Provider:: JEFFERSON CASILLAS [[id number]]   Future Attending Provider: GERALD QUEVEDO III [[id number]]   Reason for IP Medical Treatment  (Clinical interventions that can only be accomplished in the IP setting? ) :: Hypercapnic respiratory failure   I certify that Inpatient services for greater than or equal to 2 midnights are medically necessary:: Yes   Plans for Post-Acute care--if anticipated (pick the single best option):: A. No post acute care anticipated at this time

## 2023-10-19 NOTE — ASSESSMENT & PLAN NOTE
"Patient's FSGs are controlled on current medication regimen.  Last A1c reviewed-   Lab Results   Component Value Date    HGBA1C 7.0 (H) 07/03/2023     Most recent fingerstick glucose reviewed- No results for input(s): "POCTGLUCOSE" in the last 24 hours.  Current correctional scale  Low  Maintain anti-hyperglycemic dose as follows-   Antihyperglycemics (From admission, onward)    None        Hold Oral hypoglycemics while patient is in the hospital.  "

## 2023-10-19 NOTE — H&P
[facility]  History & Physical    Patient Name: Abelardo Irene Jr.  MRN: [id number]  Patient Class: IP- Inpatient  Admission Date: 10/19/2023  Attending Physician: Satinder Farias III, MD   Primary Care Provider: Rolando Funes MD         Patient information was obtained from patient and ER records.     Subjective:     Principal Problem:Acute hypercapnic respiratory failure    Chief Complaint:   Chief Complaint   Patient presents with    Shortness of Breath     Patient presents to the ED via EMS with reports of having shortness of breath with hx of CHF and COPD. Room air oxygen saturation of 85%. Treated with duoneb and 125 mg of solumedrol prior to arrival.         HPI: 67M with pmh of copd, chf, cad, hld, htn who presents due to respiratory distress at home. Per EMS patient was in acute respiratory distress satting 85% on room air.  Patient was given Solu-Medrol and DuoNeb treatment EN route with mild improvement of his symptoms. Pt states sob has worsened over the past day requiring him to call ems for help. History somewhat limited due to patient on bipap. Pt denies cp, fever, chills, n/v/d, abd pain, ha, blurry vision. No know sick contacts. Continues to smoke      Past Medical History:   Diagnosis Date    CHF (congestive heart failure)     COPD (chronic obstructive pulmonary disease)     Coronary artery disease     Elevated cholesterol     on medication    Hypertension        Past Surgical History:   Procedure Laterality Date    CARDIAC SURGERY      coronary stent placed    CORONARY STENT PLACEMENT         Review of patient's allergies indicates:   Allergen Reactions    Contrast media Shortness Of Breath, Itching and Anxiety     Patient states that he had a bad reaction, anxiety , shortness of breath, itching .        No current facility-administered medications on file prior to encounter.     Current Outpatient Medications on File Prior to Encounter   Medication Sig     aspirin (ECOTRIN) 81 MG EC tablet Take 1 tablet (81 mg total) by mouth once daily.    atorvastatin (LIPITOR) 40 MG tablet Take 1 tablet (40 mg total) by mouth once daily.    clopidogreL (PLAVIX) 75 mg tablet Take 1 tablet (75 mg total) by mouth once daily.    furosemide (LASIX) 40 MG tablet Take 1 tablet (40 mg total) by mouth 2 (two) times daily.    [DISCONTINUED] albuterol (PROVENTIL/VENTOLIN HFA) 90 mcg/actuation inhaler Inhale 1-2 puffs into the lungs every 6 (six) hours as needed for Wheezing. Rescue    [DISCONTINUED] fluticasone-salmeterol diskus inhaler 250-50 mcg Inhale 1 puff into the lungs 2 (two) times daily.    [DISCONTINUED] glipiZIDE (GLUCOTROL) 10 MG TR24 Take 1 tablet (10 mg total) by mouth daily with breakfast.    [DISCONTINUED] metoprolol succinate (TOPROL-XL) 25 MG 24 hr tablet Take 1 tablet (25 mg total) by mouth once daily.    [DISCONTINUED] spironolactone (ALDACTONE) 25 MG tablet Take 1 tablet (25 mg total) by mouth once daily.     Family History       Problem Relation (Age of Onset)    Cancer Mother    No Known Problems Father          Tobacco Use    Smoking status: Every Day     Current packs/day: 0.00     Average packs/day: 2.0 packs/day for 45.0 years (90.0 ttl pk-yrs)     Types: Cigarettes     Start date: 1972     Last attempt to quit: 2017     Years since quittin.9    Smokeless tobacco: Never   Substance and Sexual Activity    Alcohol use: Yes     Comment: socially    Drug use: No    Sexual activity: Yes     Partners: Female     Birth control/protection: None     Review of Systems  Objective:     Vital Signs (Most Recent):  Temp: 97.9 °F (36.6 °C) (10/19/23 1100)  Pulse: 82 (10/19/23 1200)  Resp: (!) 31 (10/19/23 1200)  BP: 124/70 (10/19/23 1200)  SpO2: 98 % (10/19/23 1200) Vital Signs (24h Range):  Temp:  [96.8 °F (36 °C)-97.9 °F (36.6 °C)] 97.9 °F (36.6 °C)  Pulse:  [] 82  Resp:  [14-34] 31  SpO2:  [96 %-100 %] 98 %  BP: (114-213)/() 124/70      Weight: 86.3 kg (190 lb 4.1 oz)  Body mass index is 30.71 kg/m².     Physical Exam  Vitals reviewed.   Constitutional:       General: He is not in acute distress (tolerating bipap without issue).  HENT:      Head: Normocephalic and atraumatic.      Nose: No congestion or rhinorrhea.      Mouth/Throat:      Mouth: Mucous membranes are moist.      Pharynx: Oropharynx is clear.   Eyes:      General: No scleral icterus.     Extraocular Movements: Extraocular movements intact.   Cardiovascular:      Rate and Rhythm: Normal rate and regular rhythm.   Pulmonary:      Effort: Pulmonary effort is normal. No respiratory distress (decreased breath sounds bilateral).   Abdominal:      General: There is no distension.      Palpations: Abdomen is soft.      Tenderness: There is no abdominal tenderness. There is no guarding or rebound.   Musculoskeletal:         General: No swelling or tenderness.      Cervical back: Neck supple. No rigidity.   Skin:     General: Skin is warm and dry.   Neurological:      General: No focal deficit present.      Mental Status: He is alert and oriented to person, place, and time.   Psychiatric:         Mood and Affect: Mood normal.         Behavior: Behavior normal.                Significant Labs: All pertinent labs within the past 24 hours have been reviewed.    Significant Imaging: I have reviewed all pertinent imaging results/findings within the past 24 hours.    Assessment/Plan:     * Acute hypercapnic respiratory failure  Patient with Hypercapnic and Hypoxic Respiratory failure which is Acute.  he is not on home oxygen. Supplemental oxygen was provided and noted- Oxygen Concentration (%):  [40-60] 60    .   Signs/symptoms of respiratory failure include- tachypnea, increased work of breathing and respiratory distress. Contributing diagnoses includes - COPD Labs and images were reviewed. Patient Has recent ABG, which has been reviewed. Will treat underlying causes and adjust management of  "respiratory failure as follows-    -abx to cover CAP, but procal negative likely able to d/c  -continue duonebs and steroids  -wean from bipap as tolerated to nc    HFrEF (heart failure with reduced ejection fraction)  Pt with h/o chf with ef of 15% from echo 7/23. Does not appear to be in acute exacerbation at this time    -continue lasix and metoprolol  -monitor for signs of fluid overload.    Chronic obstructive pulmonary disease with acute exacerbation  Tx as stated above    Uncontrolled type 2 diabetes mellitus with hyperglycemia  Patient's FSGs are controlled on current medication regimen.  Last A1c reviewed-   Lab Results   Component Value Date    HGBA1C 7.0 (H) 07/03/2023     Most recent fingerstick glucose reviewed- No results for input(s): "POCTGLUCOSE" in the last 24 hours.  Current correctional scale  Low  Maintain anti-hyperglycemic dose as follows-   Antihyperglycemics (From admission, onward)    None        Hold Oral hypoglycemics while patient is in the hospital.    Class 1 obesity due to excess calories with serious comorbidity in adult  Body mass index is 30.71 kg/m². Morbid obesity complicates all aspects of disease management from diagnostic modalities to treatment. Weight loss encouraged and health benefits explained to patient.         Coronary artery disease involving native coronary artery of native heart without angina pectoris  No cp at this time. Continue asa and statin      Dyslipidemia  Continue statin      Tobacco abuse  Cessation encouraged      Benign essential hypertension  Continue home medications        VTE Risk Mitigation (From admission, onward)         Ordered     enoxaparin injection 40 mg  Daily         10/19/23 0637     IP VTE HIGH RISK PATIENT  Once         10/19/23 0637     Place sequential compression device  Until discontinued         10/19/23 0637              Critical care time spent on the evaluation and treatment of severe organ dysfunction, review of pertinent labs " and imaging studies, discussions with consulting providers and discussions with patient/family: 35 minutes.                 Satinder Farias III, MD  Department of Hospital Medicine  [facility] - [place] - Intensive Care

## 2023-10-19 NOTE — PHARMACY MED REC
"Admission Medication History     The home medication history was taken by Alina Cevallos CPhT.      You may go to "Admission" then "Reconcile Home Medications" tabs to review and/or act upon these items.     The home medication list has been updated by the Pharmacy department.   Please read ALL comments highlighted in yellow.   Please address this information as you see fit.    Feel free to contact us if you have any questions or require assistance.      The medications listed below were removed from the home medication list. Please reorder if appropriate:  Patient reports no longer taking the following medication(s):  Proventil/Ventolin   Lipitor 40 mg tab  Fluticasone salmeterol diskus inhaler   Glucotrol 10 mg tab  Aldactone 25 mg tab      Medications listed below were obtained from: Patient/family and Patient's pharmacy  (Not in a hospital admission)      Potential issues to be addressed PRIOR TO DISCHARGE  Please discuss with the patient barriers to adherence with medication treatment plans  Patient requires education regarding drug therapies     Patient stated that he has not taken any medications in 3 months.    Alina Cevallos CPhT.  [phone]                  .          "

## 2023-10-19 NOTE — NURSING
[facility]  Nurses Note -- 4 Eyes      10/19/2023       Skin assessed on: Q Shift      [x] No Pressure Injuries Present    [x]Prevention Measures Documented    [] Yes LDA  for Pressure Injury Previously documented     [] Yes New Pressure Injury Discovered   [] LDA for New Pressure Injury Added      Attending RN:  ZOILA VERNON RN     Second RN:  CHARLI Baig

## 2023-10-19 NOTE — ED TRIAGE NOTES
Patient presents to the ED via EMS c/o SOB ongoing for a few hours. Pt has a hx of COPD and CHF. Per EMS, Pt had audible wheezing and increased WOB prior to administering Duoneb and Solumedrol PTA. Upon arrival, Pt is tachypneic, diaphoretic, and only able to speak one word at a time.

## 2023-10-19 NOTE — ASSESSMENT & PLAN NOTE
Body mass index is 30.71 kg/m². Morbid obesity complicates all aspects of disease management from diagnostic modalities to treatment. Weight loss encouraged and health benefits explained to patient.

## 2023-10-19 NOTE — PLAN OF CARE
Case Management Assessment     PCP: Rolando Funes MD  Pharmacy: [facility] [place]    Patient Arrived From: Home  Existing Help at Home: spouse and daughter     Barriers to Discharge: none    Discharge Plan:    A. Home with family   B. TBD      This patient has been screened for Case Management needs.  Treatment is ongoing in the ICU at this time.     Patient stated that he does not have a living will or advance directive.  Patient's choice of someone to speak on his behalf if unable:  Lillina Irene-spouse    Advance directive information packet left at bedside for patient and family to view and discuss.  CM instructed patient to call if help is needed to complete Advance Directive.    CM provided patient with contact info and encouraged him to call for any discharge needs.  CM will continue to follow while in the ICU and assist with DC planning as needed.        10/19/23 150   Discharge Assessment   Assessment Type Discharge Planning Assessment   Confirmed/corrected address, phone number and insurance Yes   Confirmed Demographics Correct on Facesheet   Source of Information patient;health record   Communicated VERA with patient/caregiver Date not available/Unable to determine   People in Home spouse   Do you expect to return to your current living situation? Yes   Do you have help at home or someone to help you manage your care at home? Yes   Prior to hospitilization cognitive status: Alert/Oriented   Equipment Currently Used at Home oxygen  (3L at home)   Readmission within 30 days? No   Patient currently being followed by outpatient case management? No   Do you currently have service(s) that help you manage your care at home? No   Do you take prescription medications? Yes   Do you have prescription coverage? Yes   Do you have any problems affording any of your prescribed medications? TBD   Is the patient taking medications as prescribed? yes   How do you get to doctors appointments? family or friend will provide   Are  you on dialysis? No   Do you take coumadin? No   DME Needed Upon Discharge  other (see comments)  (TBD)   Discharge Plan discussed with: Patient   Transition of Care Barriers None

## 2023-10-19 NOTE — SUBJECTIVE & OBJECTIVE
Past Medical History:   Diagnosis Date    CHF (congestive heart failure)     COPD (chronic obstructive pulmonary disease)     Coronary artery disease     Elevated cholesterol     on medication    Hypertension        Past Surgical History:   Procedure Laterality Date    CARDIAC SURGERY      coronary stent placed    CORONARY STENT PLACEMENT         Review of patient's allergies indicates:   Allergen Reactions    Contrast media Shortness Of Breath, Itching and Anxiety     Patient states that he had a bad reaction, anxiety , shortness of breath, itching .        No current facility-administered medications on file prior to encounter.     Current Outpatient Medications on File Prior to Encounter   Medication Sig    aspirin (ECOTRIN) 81 MG EC tablet Take 1 tablet (81 mg total) by mouth once daily.    atorvastatin (LIPITOR) 40 MG tablet Take 1 tablet (40 mg total) by mouth once daily.    clopidogreL (PLAVIX) 75 mg tablet Take 1 tablet (75 mg total) by mouth once daily.    furosemide (LASIX) 40 MG tablet Take 1 tablet (40 mg total) by mouth 2 (two) times daily.    [DISCONTINUED] albuterol (PROVENTIL/VENTOLIN HFA) 90 mcg/actuation inhaler Inhale 1-2 puffs into the lungs every 6 (six) hours as needed for Wheezing. Rescue    [DISCONTINUED] fluticasone-salmeterol diskus inhaler 250-50 mcg Inhale 1 puff into the lungs 2 (two) times daily.    [DISCONTINUED] glipiZIDE (GLUCOTROL) 10 MG TR24 Take 1 tablet (10 mg total) by mouth daily with breakfast.    [DISCONTINUED] metoprolol succinate (TOPROL-XL) 25 MG 24 hr tablet Take 1 tablet (25 mg total) by mouth once daily.    [DISCONTINUED] spironolactone (ALDACTONE) 25 MG tablet Take 1 tablet (25 mg total) by mouth once daily.     Family History       Problem Relation (Age of Onset)    Cancer Mother    No Known Problems Father          Tobacco Use    Smoking status: Every Day     Current packs/day: 0.00     Average packs/day: 2.0 packs/day for 45.0 years (90.0 ttl pk-yrs)     Types:  Cigarettes     Start date: 1972     Last attempt to quit: 2017     Years since quittin.9    Smokeless tobacco: Never   Substance and Sexual Activity    Alcohol use: Yes     Comment: socially    Drug use: No    Sexual activity: Yes     Partners: Female     Birth control/protection: None     Review of Systems  Objective:     Vital Signs (Most Recent):  Temp: 97.9 °F (36.6 °C) (10/19/23 1100)  Pulse: 82 (10/19/23 1200)  Resp: (!) 31 (10/19/23 1200)  BP: 124/70 (10/19/23 1200)  SpO2: 98 % (10/19/23 1200) Vital Signs (24h Range):  Temp:  [96.8 °F (36 °C)-97.9 °F (36.6 °C)] 97.9 °F (36.6 °C)  Pulse:  [] 82  Resp:  [14-34] 31  SpO2:  [96 %-100 %] 98 %  BP: (114-213)/() 124/70     Weight: 86.3 kg (190 lb 4.1 oz)  Body mass index is 30.71 kg/m².     Physical Exam  Vitals reviewed.   Constitutional:       General: He is not in acute distress (tolerating bipap without issue).  HENT:      Head: Normocephalic and atraumatic.      Nose: No congestion or rhinorrhea.      Mouth/Throat:      Mouth: Mucous membranes are moist.      Pharynx: Oropharynx is clear.   Eyes:      General: No scleral icterus.     Extraocular Movements: Extraocular movements intact.   Cardiovascular:      Rate and Rhythm: Normal rate and regular rhythm.   Pulmonary:      Effort: Pulmonary effort is normal. No respiratory distress (decreased breath sounds bilateral).   Abdominal:      General: There is no distension.      Palpations: Abdomen is soft.      Tenderness: There is no abdominal tenderness. There is no guarding or rebound.   Musculoskeletal:         General: No swelling or tenderness.      Cervical back: Neck supple. No rigidity.   Skin:     General: Skin is warm and dry.   Neurological:      General: No focal deficit present.      Mental Status: He is alert and oriented to person, place, and time.   Psychiatric:         Mood and Affect: Mood normal.         Behavior: Behavior normal.                Significant Labs: All  pertinent labs within the past 24 hours have been reviewed.    Significant Imaging: I have reviewed all pertinent imaging results/findings within the past 24 hours.

## 2023-10-19 NOTE — ASSESSMENT & PLAN NOTE
Pt with h/o chf with ef of 15% from echo 7/23. Does not appear to be in acute exacerbation at this time    -continue lasix and metoprolol  -monitor for signs of fluid overload.

## 2023-10-19 NOTE — HPI
67-year-old male smoker with history of COPD, CAD, HTN, combined systolic and diastolic heart failure presenting hypoxic and acute respiratory distress.  Symptoms mildly improved with steroids and nebulizer treatments but ultimately was placed on BiPAP.

## 2023-10-19 NOTE — PLAN OF CARE
Problem: Adult Inpatient Plan of Care  Goal: Absence of Hospital-Acquired Illness or Injury  Outcome: Ongoing, Progressing     Problem: Adult Inpatient Plan of Care  Goal: Optimal Comfort and Wellbeing  Outcome: Ongoing, Progressing     Problem: Adjustment to Illness COPD (Chronic Obstructive Pulmonary Disease)  Goal: Optimal Chronic Illness Coping  Outcome: Ongoing, Progressing     Problem: Infection COPD (Chronic Obstructive Pulmonary Disease)  Goal: Absence of Infection Signs and Symptoms  Outcome: Ongoing, Progressing

## 2023-10-19 NOTE — HPI
67M with pmh of copd, chf, cad, hld, htn who presents due to respiratory distress at home. Per EMS patient was in acute respiratory distress satting 85% on room air.  Patient was given Solu-Medrol and DuoNeb treatment EN route with mild improvement of his symptoms. Pt states sob has worsened over the past day requiring him to call ems for help. History somewhat limited due to patient on bipap. Pt denies cp, fever, chills, n/v/d, abd pain, ha, blurry vision. No know sick contacts. Continues to smoke

## 2023-10-19 NOTE — ASSESSMENT & PLAN NOTE
Multifactorial issue including longstanding smoking history and combined systolic and diastolic heart failure.  Diuresing and treating for COPD exacerbation.  Needs nightly NIV going forward.

## 2023-10-19 NOTE — ASSESSMENT & PLAN NOTE
Patient with Hypercapnic and Hypoxic Respiratory failure which is Acute.  he is not on home oxygen. Supplemental oxygen was provided and noted- Oxygen Concentration (%):  [40-60] 60    .   Signs/symptoms of respiratory failure include- tachypnea, increased work of breathing and respiratory distress. Contributing diagnoses includes - COPD Labs and images were reviewed. Patient Has recent ABG, which has been reviewed. Will treat underlying causes and adjust management of respiratory failure as follows-    -abx to cover CAP, but procal negative likely able to d/c  -continue duonebs and steroids  -wean from bipap as tolerated to nc

## 2023-10-19 NOTE — CONSULTS
[facility]  Critical Care Medicine  Consult Note    Patient Name: Abelardo Irene Jr.  MRN: [id number]  Admission Date: 10/19/2023  Hospital Length of Stay: 0 days  Code Status: Full Code  Attending Physician: Satinder Farias III, MD   Primary Care Provider: Rolando Funes MD   Principal Problem: Acute respiratory failure with hypoxia and hypercarbia    [unfilled]  Subjective:     HPI:  67-year-old male smoker with history of COPD, CAD, HTN, combined systolic and diastolic heart failure presenting hypoxic and acute respiratory distress.  Symptoms mildly improved with steroids and nebulizer treatments but ultimately was placed on BiPAP.      Hospital/ICU Course:  No notes on file    Past Medical History:   Diagnosis Date    CHF (congestive heart failure)     COPD (chronic obstructive pulmonary disease)     Coronary artery disease     Elevated cholesterol     on medication    Hypertension        Past Surgical History:   Procedure Laterality Date    CARDIAC SURGERY      coronary stent placed    CORONARY STENT PLACEMENT         Review of patient's allergies indicates:   Allergen Reactions    Contrast media Shortness Of Breath, Itching and Anxiety     Patient states that he had a bad reaction, anxiety , shortness of breath, itching .        Family History       Problem Relation (Age of Onset)    Cancer Mother    No Known Problems Father          Tobacco Use    Smoking status: Every Day     Current packs/day: 0.00     Average packs/day: 2.0 packs/day for 45.0 years (90.0 ttl pk-yrs)     Types: Cigarettes     Start date: 1972     Last attempt to quit: 2017     Years since quittin.9    Smokeless tobacco: Never   Substance and Sexual Activity    Alcohol use: Yes     Comment: socially    Drug use: No    Sexual activity: Yes     Partners: Female     Birth control/protection: None         Objective:     Vital Signs (Most Recent):  Temp: 97.9 °F (36.6 °C) (10/19/23 1100)  Pulse: 90  (10/19/23 1500)  Resp: (!) 33 (10/19/23 1500)  BP: 130/67 (10/19/23 1500)  SpO2: 98 % (10/19/23 1500) Vital Signs (24h Range):  Temp:  [96.8 °F (36 °C)-97.9 °F (36.6 °C)] 97.9 °F (36.6 °C)  Pulse:  [] 90  Resp:  [14-34] 33  SpO2:  [96 %-100 %] 98 %  BP: (114-213)/() 130/67     Weight: 86.3 kg (190 lb 4.1 oz)  Body mass index is 30.71 kg/m².      Intake/Output Summary (Last 24 hours) at 10/19/2023 1551  Last data filed at 10/19/2023 1501  Gross per 24 hour   Intake 954.42 ml   Output 2100 ml   Net -1145.58 ml        Physical Exam  Vitals and nursing note reviewed.   Constitutional:       General: He is not in acute distress.     Appearance: He is obese. He is ill-appearing. He is not toxic-appearing or diaphoretic.      Interventions: Nasal cannula in place.   HENT:      Head: Normocephalic and atraumatic.   Eyes:      General: No scleral icterus.     Extraocular Movements: Extraocular movements intact.   Cardiovascular:      Rate and Rhythm: Normal rate and regular rhythm.   Pulmonary:      Effort: No tachypnea, accessory muscle usage, respiratory distress or retractions.   Abdominal:      General: Abdomen is protuberant.   Musculoskeletal:      Right lower leg: Edema present.      Left lower leg: Edema present.   Skin:     General: Skin is warm and dry.      Coloration: Skin is not jaundiced.      Findings: No rash.   Neurological:      General: No focal deficit present.          Vents:  Oxygen Concentration (%): 60 (10/19/23 0805)    Lines/Drains/Airways       Peripheral Intravenous Line  Duration                  Peripheral IV - Single Lumen 10/19/23 0405 20 G Posterior;Right Hand <1 day         Peripheral IV - Single Lumen 10/19/23 0421 20 G Posterior;Left Forearm <1 day                    Significant Labs:    CBC/Anemia Profile:  Recent Labs   Lab 10/19/23  0424 10/19/23  0435   WBC 14.78*  --    HGB 12.5*  --    HCT 42.3 58*     --    MCV 93  --    RDW 13.3  --         Chemistries:  Recent  Labs   Lab 10/19/23  0424      K 3.8   CL 98   CO2 36*   BUN 6*   CREATININE 0.9   CALCIUM 8.9   ALBUMIN 4.0   PROT 7.6   BILITOT 0.4   ALKPHOS 90   ALT 12   AST 13   MG 1.9       All pertinent labs within the past 24 hours have been reviewed.    Significant Imaging:   I have reviewed all pertinent imaging results/findings within the past 24 hours.      ABG  Recent Labs   Lab 10/19/23  0708   PH 7.268*   PO2 84   PCO2 85.1*   HCO3 38.9*   BE 8     Assessment/Plan:     Pulmonary  * Acute respiratory failure with hypoxia and hypercarbia  Multifactorial issue including longstanding smoking history and combined systolic and diastolic heart failure.  Diuresing and treating for COPD exacerbation.  Needs nightly NIV going forward.    Chronic obstructive pulmonary disease with acute exacerbation  Panlobular emphysema on 2018 CT chest.  2017 PFTs without obstruction.  Given lack of recent PFTs and ongoing tobacco use, can complete 5 days of prednisone with duo nebs q.4 scheduled Q4 PRN.    Cardiac/Vascular  Acute on chronic combined systolic and diastolic heart failure  Most recent EF 15%.  Aggressively diurese with strict I/O.    Other  Tobacco abuse  Current smoker.  Counseled extensively on the need for cessation.  Needs repeat PFTs as outpatient.         Critical Care Time: 50 minutes  Critical secondary to Patient has a condition that poses threat to life and bodily function: Severe Respiratory Distress     Critical care was time spent personally by me on the following activities: development of treatment plan with patient or surrogate and bedside caregivers, discussions with consultants, evaluation of patient's response to treatment, examination of patient, ordering and performing treatments and interventions, ordering and review of laboratory studies, ordering and review of radiographic studies, pulse oximetry, re-evaluation of patient's condition. This critical care time did not overlap with that of any other  provider or involve time for any procedures.    Thank you for your consult. I will sign off. Please contact us if you have any additional questions.     Jordon Leroy MD  Critical Care Medicine  [facility] [place] [facility]

## 2023-10-19 NOTE — ED PROVIDER NOTES
Encounter Date: 10/19/2023       History     Chief Complaint   Patient presents with    Shortness of Breath     Patient presents to the ED via EMS with reports of having shortness of breath with hx of CHF and COPD. Room air oxygen saturation of 85%. Treated with duoneb and 125 mg of solumedrol prior to arrival.      67 y.o. male who  has a past medical history of HFrEF (EF 15%),  COPD (chronic obstructive pulmonary disease), Coronary artery disease,and Hypertension brought in by EMS secondary to shortness of breath.    Per EMS patient was in acute respiratory distress satting 85% on room air.  Patient was given Solu-Medrol and DuoNeb treatment EN route with mild improvement of his symptoms.    History limited secondary to acute respiratory distress.    Patient reports symptoms have been progressively worsening for the past day that has been having a productive cough.  Denies any chest pain cough fevers or chills.  Denies any recent sick contacts.  Reports taking all his medications    The history is provided by the EMS personnel and the patient. The history is limited by the condition of the patient.     Review of patient's allergies indicates:   Allergen Reactions    Contrast media Shortness Of Breath, Itching and Anxiety     Patient states that he had a bad reaction, anxiety , shortness of breath, itching .      Past Medical History:   Diagnosis Date    CHF (congestive heart failure)     COPD (chronic obstructive pulmonary disease)     Coronary artery disease     Elevated cholesterol     on medication    Hypertension      Past Surgical History:   Procedure Laterality Date    CARDIAC SURGERY      coronary stent placed    CORONARY STENT PLACEMENT       Family History   Problem Relation Age of Onset    Cancer Mother     No Known Problems Father      Social History     Tobacco Use    Smoking status: Every Day     Current packs/day: 0.00     Average packs/day: 2.0 packs/day for 45.0 years (90.0 ttl pk-yrs)     Types:  Cigarettes     Start date: 1972     Last attempt to quit: 2017     Years since quittin.9    Smokeless tobacco: Never   Substance Use Topics    Alcohol use: Yes     Comment: socially    Drug use: No     Review of Systems   Unable to perform ROS: Severe respiratory distress       Physical Exam     Initial Vitals   BP Pulse Resp Temp SpO2   10/19/23 0403 10/19/23 0403 10/19/23 0403 10/19/23 0602 10/19/23 0403   (!) 196/126 (!) 128 (!) 32 96.8 °F (36 °C) 100 %      MAP       --                Physical Exam    Constitutional: He is diaphoretic. He appears distressed.   HENT:   Head: Normocephalic and atraumatic.   Eyes: EOM are normal. Pupils are equal, round, and reactive to light.   Neck: No JVD present.   Pulmonary/Chest: He is in respiratory distress. He has wheezes.   Abdominal: Bowel sounds are normal. He exhibits distension. There is no abdominal tenderness. There is no rebound.   Musculoskeletal:         General: No edema.     Lymphadenopathy:     He has no cervical adenopathy.   Neurological: He is alert and oriented to person, place, and time.   Skin: Skin is warm. Capillary refill takes less than 2 seconds.         ED Course   Procedures  Labs Reviewed   CBC W/ AUTO DIFFERENTIAL - Abnormal; Notable for the following components:       Result Value    WBC 14.78 (*)     RBC 4.55 (*)     Hemoglobin 12.5 (*)     MCHC 29.6 (*)     Gran # (ANC) 10.1 (*)     All other components within normal limits   COMPREHENSIVE METABOLIC PANEL - Abnormal; Notable for the following components:    CO2 36 (*)     Glucose 208 (*)     BUN 6 (*)     Anion Gap 7 (*)     All other components within normal limits   B-TYPE NATRIURETIC PEPTIDE - Abnormal; Notable for the following components:     (*)     All other components within normal limits   URINALYSIS, REFLEX TO URINE CULTURE - Abnormal; Notable for the following components:    Appearance, UA Hazy (*)     Protein, UA Trace (*)     Glucose, UA 3+ (*)     All other  components within normal limits    Narrative:     Specimen Source->Urine   URINALYSIS MICROSCOPIC - Abnormal; Notable for the following components:    Hyaline Casts, UA 5 (*)     All other components within normal limits    Narrative:     Specimen Source->Urine   ISTAT PROCEDURE - Abnormal; Notable for the following components:    POC PH 7.120 (*)     POC PCO2 94.5 (*)     POC PO2 71 (*)     POC HCO3 30.7 (*)     POC TCO2 34 (*)     All other components within normal limits   ISTAT PROCEDURE - Abnormal; Notable for the following components:    POC Glucose 187 (*)     POC BUN 4 (*)     POC Sodium 147 (*)     POC Potassium 2.7 (*)     POC TCO2 (MEASURED) 35 (*)     POC Hematocrit 58 (*)     All other components within normal limits   ISTAT PROCEDURE - Abnormal; Notable for the following components:    POC PH 7.148 (*)     POC PCO2 125.3 (*)     POC PO2 22 (*)     POC HCO3 43.4 (*)     POC TCO2 47 (*)     All other components within normal limits   ISTAT PROCEDURE - Abnormal; Notable for the following components:    POC PH 7.268 (*)     POC PCO2 85.1 (*)     POC HCO3 38.9 (*)     POC TCO2 41 (*)     All other components within normal limits   TROPONIN I   PROCALCITONIN   LACTIC ACID, PLASMA   MAGNESIUM   ISTAT CHEM8        ECG Results              EKG 12-lead (Final result)  Result time 10/19/23 16:42:20      Final result by Interface, Lab In [facility] (10/19/23 16:42:20)                   Narrative:    Test Reason : R07.9,    Vent. Rate : 126 BPM     Atrial Rate : 126 BPM     P-R Int : 174 ms          QRS Dur : 100 ms      QT Int : 292 ms       P-R-T Axes : 059 -22 082 degrees     QTc Int : 422 ms    Sinus tachycardia with Premature atrial complexes with Aberrant conduction  Biatrial enlargement  Septal infarct (cited on or before 02-JUL-2023)  ST and T wave abnormality, consider lateral ischemia  Abnormal ECG  When compared with ECG of 02-JUL-2023 05:54,  Significant changes have occurred  Confirmed by Tito OWEN,  Golden ([id number]) on 10/19/2023 4:42:09 PM    Referred By: [email or web address]   SELF           Confirmed By:Golden Francis MD                                     EKG 12-LEAD (Final result)  Result time 10/19/23 12:23:32      Final result by Unknown User (10/19/23 12:23:32)                                      Imaging Results              X-Ray Chest AP Portable (Final result)  Result time 10/19/23 05:27:08      Final result by Reece Larson MD (10/19/23 05:27:08)                   Impression:      Mild cardiac enlargement.  Increased interstitial attenuation bilaterally which may relate to interstitial edema or atypical interstitial infection.  Clinical correlation is advised.      Electronically signed by: Reece Larson MD  Date:    10/19/2023  Time:    05:27               Narrative:    EXAMINATION:  XR CHEST AP PORTABLE    CLINICAL HISTORY:  SOB;    TECHNIQUE:  Single frontal view of the chest was performed.    COMPARISON:  Chest radiograph 07/02/2023, CT chest 01/22/2018    FINDINGS:  Cardiac monitoring leads overlie the chest.  The cardiac silhouette is mildly enlarged.  There is atherosclerotic calcification of the thoracic aorta.  Lungs demonstrate prominent accentuated bilateral interstitial lung markings.  There is mild bibasilar subsegmental atelectasis or scarring.  No significant volume of pleural fluid or pneumothorax identified.  Osseous structures demonstrate degenerative changes.                                       Medications   aspirin EC tablet 81 mg (81 mg Oral Given 10/19/23 0842)   atorvastatin tablet 40 mg (40 mg Oral Given 10/19/23 0842)   metoprolol succinate (TOPROL-XL) 24 hr tablet 25 mg (25 mg Oral Given 10/19/23 0842)   sodium chloride 0.9% flush 3 mL (has no administration in time range)   albuterol-ipratropium 2.5 mg-0.5 mg/3 mL nebulizer solution 3 mL (3 mLs Nebulization Given 10/19/23 1910)   enoxaparin injection 40 mg (40 mg Subcutaneous Given 10/19/23 1624)   polyethylene glycol  packet 17 g (17 g Oral Not Given 10/19/23 0900)   ondansetron disintegrating tablet 8 mg (has no administration in time range)   glucose chewable tablet 16 g (has no administration in time range)   glucose chewable tablet 24 g (has no administration in time range)   glucagon (human recombinant) injection 1 mg (has no administration in time range)   insulin aspart U-100 pen 0-5 Units (2 Units Subcutaneous Given 10/19/23 1623)   dextrose 10% bolus 125 mL 125 mL (has no administration in time range)   dextrose 10% bolus 250 mL 250 mL (has no administration in time range)   predniSONE tablet 50 mg (50 mg Oral Given 10/19/23 1353)   mupirocin 2 % ointment (has no administration in time range)   furosemide injection 40 mg (40 mg Intravenous Given 10/19/23 1925)   magnesium sulfate 2g in water 50mL IVPB (premix) (0 g Intravenous Stopped 10/19/23 0456)   albuterol sulfate nebulizer solution 2.5 mg (2.5 mg Nebulization Given 10/19/23 0428)   potassium chloride 10 mEq in 100 mL IVPB (0 mEq Intravenous Stopped 10/19/23 0620)   furosemide injection 100 mg (100 mg Intravenous Given 10/19/23 0510)   cefTRIAXone (ROCEPHIN) 2 g in dextrose 5 % in water (D5W) 100 mL IVPB (MB+) (0 g Intravenous Stopped 10/19/23 0656)   azithromycin (ZITHROMAX) 500 mg in dextrose 5 % (D5W) 250 mL IVPB (Vial-Mate) (0 mg Intravenous Stopped 10/19/23 0758)   albuterol sulfate nebulizer solution 2.5 mg (2.5 mg Nebulization Given 10/19/23 0634)     Medical Decision Making:   History:   Old Medical Records: I decided to obtain old medical records.  Old Records Summarized: records from previous admission(s).       <> Summary of Records: 07/07/23  Patient was admitted for COPD exacerbation.  Echo at that time showed worsening of have prep with a new EF of 15%.  Initial Assessment:   67 y.o. male who  has a past medical history of HFrEF (EF 15%),  COPD (chronic obstructive pulmonary disease), Coronary artery disease,and Hypertension brought in by EMS secondary  to shortness of breath.  Acute respiratory distress upon arrival.  Patient started on BiPAP.  Suspect symptoms are secondary to COPD exacerbation but given his history of heart failure CHF exacerbation as well.  No signs of volume overload on exam.  EKG with no STEMI  Will obtain cardiac workup chest x-ray give additional breathing treatments.  Patient already received steroids from EMS.  Anticipate admission.  Differential Diagnosis:   Differential Diagnosis includes, but is not limited to:  COPD exacerbation, CHF exacerbation , MI/ACS, pneumothorax, pericardial effusion/tamonade, pneumonia, pleural effusion, metabolic derangement, allergy/atopy, viral syndrome.    Clinical Tests:   Lab Tests: Ordered and Reviewed  Radiological Study: Ordered and Reviewed  Medical Tests: Ordered and Reviewed               ED Course as of 10/19/23 2011   u Oct 19, 2023   0426 Independent Interpretation of EKG:  Rhythm: Sinus tachycardia  Rate: 126  QTC: 422  No STEMI  Nonspecific T-wave abnormalities.  PACs [AS]   0448 Potassium, Blood Gas(!!): 2.7  Will replete via IV and give patient Lasix [AS]   0450 VB.12 PCO2: 94.5 O2 94 [AS]   0517 Comprehensive metabolic panel(!)  CBC with mild leukocytosis suspect de marginalization.  Checks x-ray with volume overload versus infectious etiology.  Will obtain blood cultures start empiric antibiotics for cap.  Lactic acid and protocol within normal limits so I have low suspicion for infection at this time. Chem 14 negative for hypo-or hyper natremia, kalemia , chloridemia, or other electrolyte abnormalities; BUN and creatinine (at baseline), ALT and AST were within normal limits indicating normal liver function.   [AS]   0524 Repeat hypercapnia although patient's clinical status significantly improved since presentation.  Will give another breathing treatment and admit to the ICU.  After review of the patient's physical exam, ED testing, and history/symptoms, the patient requires  additional care in the hospital for the treatment of COPD exacerbation . Discussed patients case with Dr. Vásquez who will accept the patient and any pending labs/imaging/interventions.   I discussed the objective findings with the patient including laboratory studies, diagnostic imaging, and any consultant involvement. The patient/ family was educated on their clinical presentation and all questions were answered. They acknowledged and verbally agreed to the treatment plan. The patient will be admitted to the hospital for further management.    [AS]      ED Course User Index  [AS] Sandee Velásquez MD          Medical Decision Making  Amount and/or Complexity of Data Reviewed  Labs: ordered. Decision-making details documented in ED Course.  Radiology: ordered.    Risk  Prescription drug management.  Decision regarding hospitalization.         Critical Care Procedure Note  Authorized and Performed by: Sandee Velásquez MD  Total critical care time: 65 minutes  Due to a high probability of clinically significant, life threatening deterioration, the patient required my highest level of preparedness to intervene emergently and I personally spent this critical care time directly and personally managing the patient. This critical care time included obtaining a history; examining the patient; pulse oximetry; ordering and review of studies; arranging urgent treatment with development of a management plan; evaluation of patient's response to treatment; frequent reassessment; and, discussions with other providers.  This critical care time was performed to assess and manage the high probability of imminent, life-threatening deterioration that could result in multi-organ failure. It was exclusive of separately billable procedures and treating other patients and teaching time.  Please see MDM section and the rest of the note for further information on patient assessment and treatment.    Clinical Impression:   Final  diagnoses:  [R07.9] Chest pain  [J44.1] COPD exacerbation          ED Disposition Condition    Admit Stable               DISCLAIMER: This note was prepared with [employer] voice recognition transcription software. Garbled syntax, mangled pronouns, and other bizarre constructions may be attributed to that software system.     Sandee Velásquez MD  10/19/23 2011

## 2023-10-19 NOTE — ASSESSMENT & PLAN NOTE
Current smoker.  Counseled extensively on the need for cessation.  Needs repeat PFTs as outpatient.

## 2023-10-19 NOTE — NURSING
[facility] [place]  Nurses Note -- 4 Eyes      10/19/2023       Skin assessed on: Admit      [x] No Pressure Injuries Present    []Prevention Measures Documented    [] Yes LDA  for Pressure Injury Previously documented     [] Yes New Pressure Injury Discovered   [] LDA for New Pressure Injury Added      Attending RN:  Safia Acosta RN     Second RN:

## 2023-10-19 NOTE — ASSESSMENT & PLAN NOTE
Panlobular emphysema on 2018 CT chest.  2017 PFTs without obstruction.  Given lack of recent PFTs and ongoing tobacco use, can complete 5 days of prednisone with duo nebs q.4 scheduled Q4 PRN.

## 2023-10-19 NOTE — ED NOTES
Pt was c/o midsternal chest pain that came and went. pt reports pain is now resolved. EKG was obtained. Provider made aware. Pt denies any other complaints at this time.

## 2023-10-19 NOTE — SUBJECTIVE & OBJECTIVE
Past Medical History:   Diagnosis Date    CHF (congestive heart failure)     COPD (chronic obstructive pulmonary disease)     Coronary artery disease     Elevated cholesterol     on medication    Hypertension        Past Surgical History:   Procedure Laterality Date    CARDIAC SURGERY      coronary stent placed    CORONARY STENT PLACEMENT         Review of patient's allergies indicates:   Allergen Reactions    Contrast media Shortness Of Breath, Itching and Anxiety     Patient states that he had a bad reaction, anxiety , shortness of breath, itching .        Family History       Problem Relation (Age of Onset)    Cancer Mother    No Known Problems Father          Tobacco Use    Smoking status: Every Day     Current packs/day: 0.00     Average packs/day: 2.0 packs/day for 45.0 years (90.0 ttl pk-yrs)     Types: Cigarettes     Start date: 1972     Last attempt to quit: 2017     Years since quittin.9    Smokeless tobacco: Never   Substance and Sexual Activity    Alcohol use: Yes     Comment: socially    Drug use: No    Sexual activity: Yes     Partners: Female     Birth control/protection: None         Objective:     Vital Signs (Most Recent):  Temp: 97.9 °F (36.6 °C) (10/19/23 1100)  Pulse: 90 (10/19/23 1500)  Resp: (!) 33 (10/19/23 1500)  BP: 130/67 (10/19/23 1500)  SpO2: 98 % (10/19/23 1500) Vital Signs (24h Range):  Temp:  [96.8 °F (36 °C)-97.9 °F (36.6 °C)] 97.9 °F (36.6 °C)  Pulse:  [] 90  Resp:  [14-34] 33  SpO2:  [96 %-100 %] 98 %  BP: (114-213)/() 130/67     Weight: 86.3 kg (190 lb 4.1 oz)  Body mass index is 30.71 kg/m².      Intake/Output Summary (Last 24 hours) at 10/19/2023 1551  Last data filed at 10/19/2023 1501  Gross per 24 hour   Intake 954.42 ml   Output 2100 ml   Net -1145.58 ml        Physical Exam  Vitals and nursing note reviewed.   Constitutional:       General: He is not in acute distress.     Appearance: He is obese. He is ill-appearing. He is not toxic-appearing or  diaphoretic.      Interventions: Nasal cannula in place.   HENT:      Head: Normocephalic and atraumatic.   Eyes:      General: No scleral icterus.     Extraocular Movements: Extraocular movements intact.   Cardiovascular:      Rate and Rhythm: Normal rate and regular rhythm.   Pulmonary:      Effort: No tachypnea, accessory muscle usage, respiratory distress or retractions.   Abdominal:      General: Abdomen is protuberant.   Musculoskeletal:      Right lower leg: Edema present.      Left lower leg: Edema present.   Skin:     General: Skin is warm and dry.      Coloration: Skin is not jaundiced.      Findings: No rash.   Neurological:      General: No focal deficit present.          Vents:  Oxygen Concentration (%): 60 (10/19/23 0805)    Lines/Drains/Airways       Peripheral Intravenous Line  Duration                  Peripheral IV - Single Lumen 10/19/23 0405 20 G Posterior;Right Hand <1 day         Peripheral IV - Single Lumen 10/19/23 0421 20 G Posterior;Left Forearm <1 day                    Significant Labs:    CBC/Anemia Profile:  Recent Labs   Lab 10/19/23  0424 10/19/23  0435   WBC 14.78*  --    HGB 12.5*  --    HCT 42.3 58*     --    MCV 93  --    RDW 13.3  --         Chemistries:  Recent Labs   Lab 10/19/23  0424      K 3.8   CL 98   CO2 36*   BUN 6*   CREATININE 0.9   CALCIUM 8.9   ALBUMIN 4.0   PROT 7.6   BILITOT 0.4   ALKPHOS 90   ALT 12   AST 13   MG 1.9       All pertinent labs within the past 24 hours have been reviewed.    Significant Imaging:   I have reviewed all pertinent imaging results/findings within the past 24 hours.

## 2023-10-20 LAB
ALLENS TEST: ABNORMAL
ANION GAP SERPL CALC-SCNC: 10 MMOL/L (ref 8–16)
BASOPHILS # BLD AUTO: 0.01 K/UL (ref 0–0.2)
BASOPHILS NFR BLD: 0.1 % (ref 0–1.9)
BUN SERPL-MCNC: 12 MG/DL (ref 8–23)
CALCIUM SERPL-MCNC: 8.5 MG/DL (ref 8.7–10.5)
CHLORIDE SERPL-SCNC: 95 MMOL/L (ref 95–110)
CO2 SERPL-SCNC: 35 MMOL/L (ref 23–29)
CREAT SERPL-MCNC: 0.8 MG/DL (ref 0.5–1.4)
DELSYS: ABNORMAL
DIFFERENTIAL METHOD: ABNORMAL
EOSINOPHIL # BLD AUTO: 0 K/UL (ref 0–0.5)
EOSINOPHIL NFR BLD: 0 % (ref 0–8)
ERYTHROCYTE [DISTWIDTH] IN BLOOD BY AUTOMATED COUNT: 13.2 % (ref 11.5–14.5)
EST. GFR  (NO RACE VARIABLE): >60 ML/MIN/1.73 M^2
ESTIMATED AVG GLUCOSE: 154 MG/DL (ref 68–131)
GLUCOSE SERPL-MCNC: 210 MG/DL (ref 70–110)
HBA1C MFR BLD: 7 % (ref 4–5.6)
HCO3 UR-SCNC: 41.4 MMOL/L (ref 24–28)
HCT VFR BLD AUTO: 36.1 % (ref 40–54)
HGB BLD-MCNC: 11.1 G/DL (ref 14–18)
IMM GRANULOCYTES # BLD AUTO: 0.08 K/UL (ref 0–0.04)
IMM GRANULOCYTES NFR BLD AUTO: 0.6 % (ref 0–0.5)
LYMPHOCYTES # BLD AUTO: 1.2 K/UL (ref 1–4.8)
LYMPHOCYTES NFR BLD: 8.9 % (ref 18–48)
MAGNESIUM SERPL-MCNC: 2.1 MG/DL (ref 1.6–2.6)
MCH RBC QN AUTO: 27.8 PG (ref 27–31)
MCHC RBC AUTO-ENTMCNC: 30.7 G/DL (ref 32–36)
MCV RBC AUTO: 91 FL (ref 82–98)
MONOCYTES # BLD AUTO: 0.6 K/UL (ref 0.3–1)
MONOCYTES NFR BLD: 4.8 % (ref 4–15)
NEUTROPHILS # BLD AUTO: 11.2 K/UL (ref 1.8–7.7)
NEUTROPHILS NFR BLD: 85.6 % (ref 38–73)
NRBC BLD-RTO: 0 /100 WBC
PCO2 BLDA: 63.6 MMHG (ref 35–45)
PH SMN: 7.42 [PH] (ref 7.35–7.45)
PHOSPHATE SERPL-MCNC: 1.5 MG/DL (ref 2.7–4.5)
PLATELET # BLD AUTO: 176 K/UL (ref 150–450)
PMV BLD AUTO: 11.5 FL (ref 9.2–12.9)
PO2 BLDA: 50 MMHG (ref 80–100)
POC BE: 14 MMOL/L
POC SATURATED O2: 84 % (ref 95–100)
POC TCO2: 43 MMOL/L (ref 23–27)
POCT GLUCOSE: 146 MG/DL (ref 70–110)
POCT GLUCOSE: 147 MG/DL (ref 70–110)
POCT GLUCOSE: 161 MG/DL (ref 70–110)
POCT GLUCOSE: 207 MG/DL (ref 70–110)
POTASSIUM SERPL-SCNC: 4.1 MMOL/L (ref 3.5–5.1)
RBC # BLD AUTO: 3.99 M/UL (ref 4.6–6.2)
SAMPLE: ABNORMAL
SITE: ABNORMAL
SODIUM SERPL-SCNC: 140 MMOL/L (ref 136–145)
WBC # BLD AUTO: 13.09 K/UL (ref 3.9–12.7)

## 2023-10-20 PROCEDURE — 94761 N-INVAS EAR/PLS OXIMETRY MLT: CPT

## 2023-10-20 PROCEDURE — 94760 N-INVAS EAR/PLS OXIMETRY 1: CPT

## 2023-10-20 PROCEDURE — 99900035 HC TECH TIME PER 15 MIN (STAT)

## 2023-10-20 PROCEDURE — 25000003 PHARM REV CODE 250: Performed by: STUDENT IN AN ORGANIZED HEALTH CARE EDUCATION/TRAINING PROGRAM

## 2023-10-20 PROCEDURE — 94799 UNLISTED PULMONARY SVC/PX: CPT

## 2023-10-20 PROCEDURE — 63600175 PHARM REV CODE 636 W HCPCS: Performed by: STUDENT IN AN ORGANIZED HEALTH CARE EDUCATION/TRAINING PROGRAM

## 2023-10-20 PROCEDURE — 80048 BASIC METABOLIC PNL TOTAL CA: CPT | Performed by: STUDENT IN AN ORGANIZED HEALTH CARE EDUCATION/TRAINING PROGRAM

## 2023-10-20 PROCEDURE — 25000242 PHARM REV CODE 250 ALT 637 W/ HCPCS: Performed by: STUDENT IN AN ORGANIZED HEALTH CARE EDUCATION/TRAINING PROGRAM

## 2023-10-20 PROCEDURE — 20000000 HC ICU ROOM

## 2023-10-20 PROCEDURE — 27000221 HC OXYGEN, UP TO 24 HOURS

## 2023-10-20 PROCEDURE — 36600 WITHDRAWAL OF ARTERIAL BLOOD: CPT

## 2023-10-20 PROCEDURE — 82803 BLOOD GASES ANY COMBINATION: CPT

## 2023-10-20 PROCEDURE — 94640 AIRWAY INHALATION TREATMENT: CPT

## 2023-10-20 PROCEDURE — 36415 COLL VENOUS BLD VENIPUNCTURE: CPT | Performed by: STUDENT IN AN ORGANIZED HEALTH CARE EDUCATION/TRAINING PROGRAM

## 2023-10-20 PROCEDURE — 84100 ASSAY OF PHOSPHORUS: CPT | Performed by: STUDENT IN AN ORGANIZED HEALTH CARE EDUCATION/TRAINING PROGRAM

## 2023-10-20 PROCEDURE — 85025 COMPLETE CBC W/AUTO DIFF WBC: CPT | Performed by: STUDENT IN AN ORGANIZED HEALTH CARE EDUCATION/TRAINING PROGRAM

## 2023-10-20 PROCEDURE — 83735 ASSAY OF MAGNESIUM: CPT | Performed by: STUDENT IN AN ORGANIZED HEALTH CARE EDUCATION/TRAINING PROGRAM

## 2023-10-20 RX ORDER — AMOXICILLIN AND CLAVULANATE POTASSIUM 875; 125 MG/1; MG/1
1 TABLET, FILM COATED ORAL EVERY 12 HOURS
Status: DISCONTINUED | OUTPATIENT
Start: 2023-10-20 | End: 2023-10-21 | Stop reason: HOSPADM

## 2023-10-20 RX ORDER — AMOXICILLIN AND CLAVULANATE POTASSIUM 875; 125 MG/1; MG/1
1 TABLET, FILM COATED ORAL EVERY 12 HOURS
Status: DISCONTINUED | OUTPATIENT
Start: 2023-10-20 | End: 2023-10-20

## 2023-10-20 RX ADMIN — POLYETHYLENE GLYCOL 3350 17 G: 17 POWDER, FOR SOLUTION ORAL at 08:10

## 2023-10-20 RX ADMIN — IPRATROPIUM BROMIDE AND ALBUTEROL SULFATE 3 ML: 2.5; .5 SOLUTION RESPIRATORY (INHALATION) at 02:10

## 2023-10-20 RX ADMIN — AMOXICILLIN AND CLAVULANATE POTASSIUM 1 TABLET: 875; 125 TABLET, FILM COATED ORAL at 07:10

## 2023-10-20 RX ADMIN — AMOXICILLIN AND CLAVULANATE POTASSIUM 1 TABLET: 875; 125 TABLET, FILM COATED ORAL at 09:10

## 2023-10-20 RX ADMIN — ATORVASTATIN CALCIUM 40 MG: 40 TABLET, FILM COATED ORAL at 08:10

## 2023-10-20 RX ADMIN — IPRATROPIUM BROMIDE AND ALBUTEROL SULFATE 3 ML: 2.5; .5 SOLUTION RESPIRATORY (INHALATION) at 07:10

## 2023-10-20 RX ADMIN — CEFTRIAXONE SODIUM 2 G: 2 INJECTION, POWDER, FOR SOLUTION INTRAMUSCULAR; INTRAVENOUS at 07:10

## 2023-10-20 RX ADMIN — SODIUM PHOSPHATE, MONOBASIC, MONOHYDRATE AND SODIUM PHOSPHATE, DIBASIC, ANHYDROUS 39.99 MMOL: 142; 276 INJECTION, SOLUTION INTRAVENOUS at 08:10

## 2023-10-20 RX ADMIN — MUPIROCIN: 20 OINTMENT TOPICAL at 09:10

## 2023-10-20 RX ADMIN — ASPIRIN 81 MG: 81 TABLET, COATED ORAL at 08:10

## 2023-10-20 RX ADMIN — MUPIROCIN: 20 OINTMENT TOPICAL at 08:10

## 2023-10-20 RX ADMIN — FUROSEMIDE 40 MG: 10 INJECTION, SOLUTION INTRAVENOUS at 06:10

## 2023-10-20 RX ADMIN — PREDNISONE 50 MG: 5 TABLET ORAL at 08:10

## 2023-10-20 RX ADMIN — ENOXAPARIN SODIUM 40 MG: 40 INJECTION SUBCUTANEOUS at 05:10

## 2023-10-20 RX ADMIN — FUROSEMIDE 40 MG: 10 INJECTION, SOLUTION INTRAVENOUS at 05:10

## 2023-10-20 RX ADMIN — METOPROLOL SUCCINATE ER TABLETS 25 MG: 25 TABLET, FILM COATED, EXTENDED RELEASE ORAL at 08:10

## 2023-10-20 RX ADMIN — INSULIN ASPART 2 UNITS: 100 INJECTION, SOLUTION INTRAVENOUS; SUBCUTANEOUS at 04:10

## 2023-10-20 NOTE — HOSPITAL COURSE
Patient admitted with COPD exacerbation and acute on chronic heart failure exacerbation with .  Given COPD medications, Lasix, antibiotics, and placed on BiPAP in the ICU.  His pH was 7.1 with pCO2 95.  Improved with BiPAP, now nightly in step-down to the floor.  Patient grew Haemophilus influenzae in his sputum, was given azithromycin and ceftriaxone on day 1.  Will give 1 more dose of IV ceftriaxone today, then switch to a 5 day course of Augmentin, if beta lactamase negative can switch to ampicillin alone at discharge.     Repeat ABG ordered, please do no ROOM AIR, to see if he will qualify for home BiPAP.

## 2023-10-20 NOTE — PLAN OF CARE
Problem: Adult Inpatient Plan of Care  Goal: Plan of Care Review  Outcome: Ongoing, Progressing  Goal: Patient-Specific Goal (Individualized)  Outcome: Ongoing, Progressing  Goal: Absence of Hospital-Acquired Illness or Injury  Outcome: Ongoing, Progressing  Goal: Optimal Comfort and Wellbeing  Outcome: Ongoing, Progressing  Goal: Readiness for Transition of Care  Outcome: Ongoing, Progressing     Problem: Respiratory Compromise COPD (Chronic Obstructive Pulmonary Disease)  Goal: Effective Oxygenation and Ventilation  Outcome: Ongoing, Progressing     Problem: Diabetes Comorbidity  Goal: Blood Glucose Level Within Targeted Range  Outcome: Ongoing, Progressing

## 2023-10-20 NOTE — PROGRESS NOTES
[facility]  Progress Note    Patient Name: Abelardo Irene Jr.  MRN: [id number]  Patient Class: IP- Inpatient   Admission Date: 10/19/2023  Length of Stay: 1 days  Attending Physician: Satinder Farias III, MD  Primary Care Provider: Rolando Funes MD        Subjective:     Principal Problem:Acute respiratory failure with hypoxia and hypercarbia        HPI:  67M with pmh of copd, chf, cad, hld, htn who presents due to respiratory distress at home. Per EMS patient was in acute respiratory distress satting 85% on room air.  Patient was given Solu-Medrol and DuoNeb treatment EN route with mild improvement of his symptoms. Pt states sob has worsened over the past day requiring him to call ems for help. History somewhat limited due to patient on bipap. Pt denies cp, fever, chills, n/v/d, abd pain, ha, blurry vision. No know sick contacts. Continues to smoke      Overview/Hospital Course:  Patient admitted with COPD exacerbation and acute on chronic heart failure exacerbation with .  Given COPD medications, Lasix, antibiotics, and placed on BiPAP in the ICU.  His pH was 7.1 with pCO2 95.  Improved with BiPAP, now nightly in step-down to the floor.  Patient grew Haemophilus influenzae in his sputum, was given azithromycin and ceftriaxone on day 1.  Will give 1 more dose of IV ceftriaxone today, then switch to a 5 day course of Augmentin, if beta lactamase negative can switch to ampicillin alone at discharge.     Repeat ABG ordered, please do no ROOM AIR, to see if he will qualify for home BiPAP.      Interval History:  NAEON.  No new issues.   Denies complaints.  All questions answered and updates on care given.       ROS:  General: Negative for fevers or chills.  Cardiac: Negative for chest pain or orthopnea        Vitals:    10/20/23 0308 10/20/23 0400 10/20/23 0500 10/20/23 0600   BP: (!) 123/58 115/73 124/73 125/72   Pulse: 79 80 77 79   Resp: 18 20 20 (!) 25   Temp: 98.5 °F (36.9  °C)      TempSrc: Oral      SpO2: 95% 100% 99% 97%   Weight:       Height:              Body mass index is 30.71 kg/m².      PHYSICAL EXAM:  GENERAL APPEARANCE: alert and cooperative, and appears to be in no acute distress.  HEENT:     HEAD: NC/AT     EYES: PERRL, EOMI.  Vision is grossly intact.  NECK: Neck supple, non-tender without LAD, masses or thyromegaly.  CARDIAC: There is no peripheral edema, cyanosis or pallor.   LUNGS: Clear to auscultation and percussion without rales or wheezing  ABDOMEN: Non-distended. No guarding.  MSK: No joint erythema or tenderness.   EXTREMITIES: No significant deformity or joint abnormality. No edema.   NEUROLOGICAL: CN II-XII grossly intact.   SKIN: Skin normal color, texture and turgor with no lesions or eruptions.  PSYCHIATRIC: Oriented. No tangential speech. No Hyperactive features.        Recent Results (from the past 24 hour(s))   ISTAT PROCEDURE    Collection Time: 10/19/23  7:08 AM   Result Value Ref Range    POC PH 7.268 (LL) 7.35 - 7.45    POC PCO2 85.1 (HH) 35 - 45 mmHg    POC PO2 84 80 - 100 mmHg    POC HCO3 38.9 (H) 24 - 28 mmol/L    POC BE 8 -2 to 2 mmol/L    POC SATURATED O2 94 95 - 100 %    POC TCO2 41 (H) 23 - 27 mmol/L    Rate 12     Sample ARTERIAL     Site RR     Allens Test Pass     DelSys CPAP/BiPAP     Mode BiPAP     FiO2 60     IP 16     EP 06    POCT glucose    Collection Time: 10/19/23  4:22 PM   Result Value Ref Range    POCT Glucose 232 (H) 70 - 110 mg/dL   POCT glucose    Collection Time: 10/19/23  9:06 PM   Result Value Ref Range    POCT Glucose 207 (H) 70 - 110 mg/dL   Basic metabolic panel    Collection Time: 10/20/23  4:05 AM   Result Value Ref Range    Sodium 140 136 - 145 mmol/L    Potassium 4.1 3.5 - 5.1 mmol/L    Chloride 95 95 - 110 mmol/L    CO2 35 (H) 23 - 29 mmol/L    Glucose 210 (H) 70 - 110 mg/dL    BUN 12 8 - 23 mg/dL    Creatinine 0.8 0.5 - 1.4 mg/dL    Calcium 8.5 (L) 8.7 - 10.5 mg/dL    Anion Gap 10 8 - 16 mmol/L    eGFR >60 >60  mL/min/1.73 m^2   Magnesium    Collection Time: 10/20/23  4:05 AM   Result Value Ref Range    Magnesium 2.1 1.6 - 2.6 mg/dL   Phosphorus    Collection Time: 10/20/23  4:05 AM   Result Value Ref Range    Phosphorus 1.5 (L) 2.7 - 4.5 mg/dL   CBC auto differential    Collection Time: 10/20/23  4:05 AM   Result Value Ref Range    WBC 13.09 (H) 3.90 - 12.70 K/uL    RBC 3.99 (L) 4.60 - 6.20 M/uL    Hemoglobin 11.1 (L) 14.0 - 18.0 g/dL    Hematocrit 36.1 (L) 40.0 - 54.0 %    MCV 91 82 - 98 fL    MCH 27.8 27.0 - 31.0 pg    MCHC 30.7 (L) 32.0 - 36.0 g/dL    RDW 13.2 11.5 - 14.5 %    Platelets 176 150 - 450 K/uL    MPV 11.5 9.2 - 12.9 fL    Immature Granulocytes 0.6 (H) 0.0 - 0.5 %    Gran # (ANC) 11.2 (H) 1.8 - 7.7 K/uL    Immature Grans (Abs) 0.08 (H) 0.00 - 0.04 K/uL    Lymph # 1.2 1.0 - 4.8 K/uL    Mono # 0.6 0.3 - 1.0 K/uL    Eos # 0.0 0.0 - 0.5 K/uL    Baso # 0.01 0.00 - 0.20 K/uL    nRBC 0 0 /100 WBC    Gran % 85.6 (H) 38.0 - 73.0 %    Lymph % 8.9 (L) 18.0 - 48.0 %    Mono % 4.8 4.0 - 15.0 %    Eosinophil % 0.0 0.0 - 8.0 %    Basophil % 0.1 0.0 - 1.9 %    Differential Method Automated        Microbiology Results (last 7 days)       Procedure Component Value Units Date/Time    Blood culture #1 **CANNOT BE ORDERED STAT** [[id number]] Collected: 10/19/23 0624    Order Status: Completed Specimen: Blood from Peripheral, Forearm, Right Updated: 10/19/23 1516     Blood Culture, Routine No Growth to date    Blood culture #2 **CANNOT BE ORDERED STAT** [[id number]] Collected: 10/19/23 0617    Order Status: Completed Specimen: Blood from Peripheral, Antecubital, Right Updated: 10/19/23 1512     Blood Culture, Routine No Growth to date             Imaging Results              X-Ray Chest AP Portable (Final result)  Result time 10/19/23 05:27:08      Final result by Reece Larson MD (10/19/23 05:27:08)                   Impression:      Mild cardiac enlargement.  Increased interstitial attenuation bilaterally which may relate  to interstitial edema or atypical interstitial infection.  Clinical correlation is advised.      Electronically signed by: Reece Larson MD  Date:    10/19/2023  Time:    05:27               Narrative:    EXAMINATION:  XR CHEST AP PORTABLE    CLINICAL HISTORY:  SOB;    TECHNIQUE:  Single frontal view of the chest was performed.    COMPARISON:  Chest radiograph 07/02/2023, CT chest 01/22/2018    FINDINGS:  Cardiac monitoring leads overlie the chest.  The cardiac silhouette is mildly enlarged.  There is atherosclerotic calcification of the thoracic aorta.  Lungs demonstrate prominent accentuated bilateral interstitial lung markings.  There is mild bibasilar subsegmental atelectasis or scarring.  No significant volume of pleural fluid or pneumothorax identified.  Osseous structures demonstrate degenerative changes.                                             Assessment/Plan:      * Acute respiratory failure with hypoxia and hypercarbia  Patient with Hypercapnic and Hypoxic Respiratory failure which is Acute.  he is not on home oxygen. Supplemental oxygen was provided and noted- Oxygen Concentration (%):  [40-60] 60    .   Signs/symptoms of respiratory failure include- tachypnea, increased work of breathing and respiratory distress. Contributing diagnoses includes - COPD Labs and images were reviewed. Patient Has recent ABG, which has been reviewed. Will treat underlying causes and adjust management of respiratory failure as follows-    -abx to cover CAP, but procal negative likely able to d/c  -continue duonebs and steroids  -wean from bipap as tolerated to nc    Acute on chronic combined systolic and diastolic heart failure  Pt with h/o chf with ef of 15% from echo 7/23. Does not appear to be in acute exacerbation at this time    -continue lasix and metoprolol  -monitor for signs of fluid overload.    Chronic obstructive pulmonary disease with acute exacerbation  Tx as stated above    Uncontrolled type 2 diabetes  "mellitus with hyperglycemia  Patient's FSGs are controlled on current medication regimen.  Last A1c reviewed-   Lab Results   Component Value Date    HGBA1C 7.0 (H) 07/03/2023     Most recent fingerstick glucose reviewed- No results for input(s): "POCTGLUCOSE" in the last 24 hours.  Current correctional scale  Low  Maintain anti-hyperglycemic dose as follows-   Antihyperglycemics (From admission, onward)      None          Hold Oral hypoglycemics while patient is in the hospital.    Class 1 obesity due to excess calories with serious comorbidity in adult  Body mass index is 30.71 kg/m². Morbid obesity complicates all aspects of disease management from diagnostic modalities to treatment. Weight loss encouraged and health benefits explained to patient.         Coronary artery disease involving native coronary artery of native heart without angina pectoris  No cp at this time. Continue asa and statin      Dyslipidemia  Continue statin      Tobacco abuse  Cessation encouraged      Benign essential hypertension  Continue home medications        VTE Risk Mitigation (From admission, onward)           Ordered     enoxaparin injection 40 mg  Daily         10/19/23 0637     IP VTE HIGH RISK PATIENT  Once         10/19/23 0637     Place sequential compression device  Until discontinued         10/19/23 0637                    Discharge Planning   VERA:      Code Status: Full Code   Is the patient medically ready for discharge?:     Reason for patient still in hospital (select all that apply): Patient trending condition and Treatment               Critical care time spent on the evaluation and treatment of severe organ dysfunction, review of pertinent labs and imaging studies, discussions with consulting providers and discussions with patient/family: 35 minutes.      Guanako Rowell MD  Department of Hospital Medicine   [facility] - Intensive Care    "

## 2023-10-20 NOTE — PLAN OF CARE
Mental status:AO X 4   Rhythm:SR   Respiratory:NC   Tube/lines:PIV X 2  Intake:1514  Output:1126    Diuresing with lasix IVP. Urinal at bedside. Care explained to pt. Free from fall and injuries.

## 2023-10-20 NOTE — NURSING
[facility] - [place], [place]  Nurses Note -- 4 Eyes      10/20/2023       Skin assessed on: Q Shift      [x] No Pressure Injuries Present    [x]Prevention Measures Documented    [] Yes LDA  for Pressure Injury Previously documented     [] Yes New Pressure Injury Discovered   [] LDA for New Pressure Injury Added      Attending RN:  Maren Mckeon RN     Second RN:  CHARLI Aceves

## 2023-10-20 NOTE — NURSING
"[facility] [place] [facility]ensive [facility]  ICU Shift Summary  Date: 10/20/2023      Prehospitalization: Home  Admit Date / LOS : 10/19/2023/ 1 days    Diagnosis: Acute respiratory failure with hypoxia and hypercarbia    Consults:        Active: Pulm CC       Needed: N/A  Code Status: Full Code   Advanced Directive: <no information>    LDA:  Lines/Drains/Airways       Peripheral Intravenous Line  Duration                  Peripheral IV - Single Lumen 10/19/23 0405 20 G Posterior;Right Hand 1 day         Peripheral IV - Single Lumen 10/19/23 0421 20 G Posterior;Left Forearm 1 day                  Central Lines/Site/Justification:Patient Does Not Have Central Line  Urinary Cath/Order/Justification:Patient Does Not Have Urinary Catheter    Vasopressors/Infusions:        GOALS: Volume/ Hemodynamic: MAP >65                     RASS: 0  alert and calm    Pain Management: none       Pain Controlled: not applicable     Rhythm: NSR    Respiratory Device: Nasal Cannula    Oxygen Concentration (%):  [40] 40                 Most Recent SBT/ SAT: N/A       MOVE Screen: PASS  ICU Liberation: not applicable    VTE Prophylaxis: Ambulation  Mobility: Ambulatory and S: Self  Stress Ulcer Prophylaxis: No    Isolation: No active isolations    Dietary:   Current Diet Order   Procedures    Diet Cardiac      Tolerance: yes  Advancement: @ goal    I & O (24h):    Intake/Output Summary (Last 24 hours) at 10/20/2023 1342  Last data filed at 10/20/2023 0830  Gross per 24 hour   Intake 1130 ml   Output 1876 ml   Net -746 ml        Restraints: No    Significant Dates:  Post Op Date: N/A  Rescue Date: N/A  Imaging/ Diagnostics: N/A    Noteworthy Labs:  Phos, cbg    COVID Test: (--)  CBC/Anemia Labs: Coags:    Recent Labs   Lab 10/19/23  0424 10/19/23  0435 10/20/23  0405   WBC 14.78*  --  13.09*   HGB 12.5*  --  11.1*   HCT 42.3 58* 36.1*     --  176   MCV 93  --  91   RDW 13.3  --  13.2    No results for input(s): "PT", "INR", "APTT" in the last 168 " hours.     Chemistries:   Recent Labs   Lab 10/19/23  0424 10/20/23  0405    140   K 3.8 4.1   CL 98 95   CO2 36* 35*   BUN 6* 12   CREATININE 0.9 0.8   CALCIUM 8.9 8.5*   PROT 7.6  --    BILITOT 0.4  --    ALKPHOS 90  --    ALT 12  --    AST 13  --    MG 1.9 2.1   PHOS  --  1.5*        Cardiac Enzymes: Ejection Fractions:    Recent Labs     10/19/23  0424   TROPONINI <0.006    EF   Date Value Ref Range Status   07/03/2023 15 % Final        POCT Glucose: HbA1c:    Recent Labs   Lab 10/19/23  1622 10/19/23  2106   POCTGLUCOSE 232* 207*    Hemoglobin A1C   Date Value Ref Range Status   07/03/2023 7.0 (H) 4.0 - 5.6 % Final     Comment:     ADA Screening Guidelines:  5.7-6.4%  Consistent with prediabetes  >or=6.5%  Consistent with diabetes    High levels of fetal hemoglobin interfere with the HbA1C  assay. Heterozygous hemoglobin variants (HbS, HgC, etc)do  not significantly interfere with this assay.   However, presence of multiple variants may affect accuracy.             ICU LOS 1d 5h  Level of Care: Critical Care    Chart Check: 12 HR Done  Shift Summary/Plan for the shift: See care plan

## 2023-10-21 VITALS
SYSTOLIC BLOOD PRESSURE: 117 MMHG | BODY MASS INDEX: 30.58 KG/M2 | HEART RATE: 95 BPM | HEIGHT: 66 IN | RESPIRATION RATE: 24 BRPM | DIASTOLIC BLOOD PRESSURE: 74 MMHG | TEMPERATURE: 98 F | OXYGEN SATURATION: 91 % | WEIGHT: 190.25 LBS

## 2023-10-21 LAB
ANION GAP SERPL CALC-SCNC: 7 MMOL/L (ref 8–16)
BASOPHILS # BLD AUTO: 0.03 K/UL (ref 0–0.2)
BASOPHILS NFR BLD: 0.2 % (ref 0–1.9)
BUN SERPL-MCNC: 13 MG/DL (ref 8–23)
CALCIUM SERPL-MCNC: 8.6 MG/DL (ref 8.7–10.5)
CHLORIDE SERPL-SCNC: 96 MMOL/L (ref 95–110)
CO2 SERPL-SCNC: 38 MMOL/L (ref 23–29)
CREAT SERPL-MCNC: 0.7 MG/DL (ref 0.5–1.4)
DIFFERENTIAL METHOD: ABNORMAL
EOSINOPHIL # BLD AUTO: 0 K/UL (ref 0–0.5)
EOSINOPHIL NFR BLD: 0.1 % (ref 0–8)
ERYTHROCYTE [DISTWIDTH] IN BLOOD BY AUTOMATED COUNT: 13.3 % (ref 11.5–14.5)
EST. GFR  (NO RACE VARIABLE): >60 ML/MIN/1.73 M^2
GLUCOSE SERPL-MCNC: 136 MG/DL (ref 70–110)
HCT VFR BLD AUTO: 35.6 % (ref 40–54)
HGB BLD-MCNC: 10.9 G/DL (ref 14–18)
IMM GRANULOCYTES # BLD AUTO: 0.05 K/UL (ref 0–0.04)
IMM GRANULOCYTES NFR BLD AUTO: 0.3 % (ref 0–0.5)
LYMPHOCYTES # BLD AUTO: 3.3 K/UL (ref 1–4.8)
LYMPHOCYTES NFR BLD: 21.9 % (ref 18–48)
MAGNESIUM SERPL-MCNC: 2.3 MG/DL (ref 1.6–2.6)
MCH RBC QN AUTO: 27.5 PG (ref 27–31)
MCHC RBC AUTO-ENTMCNC: 30.6 G/DL (ref 32–36)
MCV RBC AUTO: 90 FL (ref 82–98)
MONOCYTES # BLD AUTO: 1 K/UL (ref 0.3–1)
MONOCYTES NFR BLD: 6.5 % (ref 4–15)
NEUTROPHILS # BLD AUTO: 10.7 K/UL (ref 1.8–7.7)
NEUTROPHILS NFR BLD: 71 % (ref 38–73)
NRBC BLD-RTO: 0 /100 WBC
PHOSPHATE SERPL-MCNC: 1.9 MG/DL (ref 2.7–4.5)
PLATELET # BLD AUTO: 191 K/UL (ref 150–450)
PMV BLD AUTO: 11.4 FL (ref 9.2–12.9)
POCT GLUCOSE: 118 MG/DL (ref 70–110)
POCT GLUCOSE: 212 MG/DL (ref 70–110)
POTASSIUM SERPL-SCNC: 3.4 MMOL/L (ref 3.5–5.1)
RBC # BLD AUTO: 3.96 M/UL (ref 4.6–6.2)
SODIUM SERPL-SCNC: 141 MMOL/L (ref 136–145)
WBC # BLD AUTO: 15 K/UL (ref 3.9–12.7)

## 2023-10-21 PROCEDURE — 84100 ASSAY OF PHOSPHORUS: CPT | Performed by: STUDENT IN AN ORGANIZED HEALTH CARE EDUCATION/TRAINING PROGRAM

## 2023-10-21 PROCEDURE — 25000003 PHARM REV CODE 250: Performed by: STUDENT IN AN ORGANIZED HEALTH CARE EDUCATION/TRAINING PROGRAM

## 2023-10-21 PROCEDURE — 94761 N-INVAS EAR/PLS OXIMETRY MLT: CPT

## 2023-10-21 PROCEDURE — 25000242 PHARM REV CODE 250 ALT 637 W/ HCPCS: Performed by: STUDENT IN AN ORGANIZED HEALTH CARE EDUCATION/TRAINING PROGRAM

## 2023-10-21 PROCEDURE — 85025 COMPLETE CBC W/AUTO DIFF WBC: CPT | Performed by: STUDENT IN AN ORGANIZED HEALTH CARE EDUCATION/TRAINING PROGRAM

## 2023-10-21 PROCEDURE — 36415 COLL VENOUS BLD VENIPUNCTURE: CPT | Performed by: STUDENT IN AN ORGANIZED HEALTH CARE EDUCATION/TRAINING PROGRAM

## 2023-10-21 PROCEDURE — 63600175 PHARM REV CODE 636 W HCPCS: Performed by: STUDENT IN AN ORGANIZED HEALTH CARE EDUCATION/TRAINING PROGRAM

## 2023-10-21 PROCEDURE — 94640 AIRWAY INHALATION TREATMENT: CPT

## 2023-10-21 PROCEDURE — 83735 ASSAY OF MAGNESIUM: CPT | Performed by: STUDENT IN AN ORGANIZED HEALTH CARE EDUCATION/TRAINING PROGRAM

## 2023-10-21 PROCEDURE — 80048 BASIC METABOLIC PNL TOTAL CA: CPT | Performed by: STUDENT IN AN ORGANIZED HEALTH CARE EDUCATION/TRAINING PROGRAM

## 2023-10-21 PROCEDURE — 99900035 HC TECH TIME PER 15 MIN (STAT)

## 2023-10-21 PROCEDURE — 27000221 HC OXYGEN, UP TO 24 HOURS

## 2023-10-21 RX ORDER — POTASSIUM CHLORIDE 20 MEQ/1
40 TABLET, EXTENDED RELEASE ORAL ONCE
Status: COMPLETED | OUTPATIENT
Start: 2023-10-21 | End: 2023-10-21

## 2023-10-21 RX ORDER — AMOXICILLIN AND CLAVULANATE POTASSIUM 875; 125 MG/1; MG/1
1 TABLET, FILM COATED ORAL 2 TIMES DAILY
Qty: 10 TABLET | Refills: 0 | Status: SHIPPED | OUTPATIENT
Start: 2023-10-21 | End: 2023-10-26

## 2023-10-21 RX ORDER — SODIUM,POTASSIUM PHOSPHATES 280-250MG
2 POWDER IN PACKET (EA) ORAL ONCE
Status: COMPLETED | OUTPATIENT
Start: 2023-10-21 | End: 2023-10-21

## 2023-10-21 RX ADMIN — METOPROLOL SUCCINATE ER TABLETS 25 MG: 25 TABLET, FILM COATED, EXTENDED RELEASE ORAL at 08:10

## 2023-10-21 RX ADMIN — POTASSIUM CHLORIDE 40 MEQ: 1500 TABLET, EXTENDED RELEASE ORAL at 10:10

## 2023-10-21 RX ADMIN — IPRATROPIUM BROMIDE AND ALBUTEROL SULFATE 3 ML: 2.5; .5 SOLUTION RESPIRATORY (INHALATION) at 08:10

## 2023-10-21 RX ADMIN — ASPIRIN 81 MG: 81 TABLET, COATED ORAL at 08:10

## 2023-10-21 RX ADMIN — MUPIROCIN: 20 OINTMENT TOPICAL at 08:10

## 2023-10-21 RX ADMIN — AMOXICILLIN AND CLAVULANATE POTASSIUM 1 TABLET: 875; 125 TABLET, FILM COATED ORAL at 08:10

## 2023-10-21 RX ADMIN — Medication 2 PACKET: at 10:10

## 2023-10-21 RX ADMIN — PREDNISONE 50 MG: 5 TABLET ORAL at 08:10

## 2023-10-21 RX ADMIN — ATORVASTATIN CALCIUM 40 MG: 40 TABLET, FILM COATED ORAL at 08:10

## 2023-10-21 RX ADMIN — FUROSEMIDE 40 MG: 10 INJECTION, SOLUTION INTRAVENOUS at 07:10

## 2023-10-21 RX ADMIN — INSULIN ASPART 2 UNITS: 100 INJECTION, SOLUTION INTRAVENOUS; SUBCUTANEOUS at 11:10

## 2023-10-21 NOTE — DISCHARGE SUMMARY
[facility]  Discharge Summary      Patient Name: Abelardo Irene Jr.  MRN: [id number]  LIVE: [id number]  Patient Class: IP- Inpatient  Admission Date: 10/19/2023  Hospital Length of Stay: 2 days  Discharge Date and Time:  10/21/2023 9:07 AM  Attending Physician: Guanako Rowell MD   Discharging Provider: Guanako Rowell MD  Primary Care Provider: Rolando Funes MD    Primary Care Team: Networked reference to record PCT     HPI:   67M with pmh of copd, chf, cad, hld, htn who presents due to respiratory distress at home. Per EMS patient was in acute respiratory distress satting 85% on room air.  Patient was given Solu-Medrol and DuoNeb treatment EN route with mild improvement of his symptoms. Pt states sob has worsened over the past day requiring him to call ems for help. History somewhat limited due to patient on bipap. Pt denies cp, fever, chills, n/v/d, abd pain, ha, blurry vision. No know sick contacts. Continues to smoke      * No surgery found *      Hospital Course:   Patient admitted with COPD exacerbation and acute on chronic heart failure exacerbation with .  Given COPD medications, Lasix, antibiotics, and placed on BiPAP in the ICU.  His pH was 7.1 with pCO2 95.  Improved with BiPAP, now nightly in step-down to the floor.  Patient grew Haemophilus influenzae in his sputum, was given azithromycin and ceftriaxone on day 1.  Will give 1 more dose of IV ceftriaxone today, then switch to a 5 day course of Augmentin, if beta lactamase negative can switch to ampicillin alone at discharge.     Patient's pCO2 still quite elevated on ABG at room air and will benefit from bipap at home due to hypercapnia 2/2 to advanced COPD.          Take amoxicillin twice daily for 5 more days  You qualified for a BiPAP machine to wear nightly  We will try to order it for you, follow-up with pulmonology for assistance  Follow-up with your PCP soon to help coordinate    Thank you for trusting [facility] and me with your care.  We are honored that you entrusted us with your healthcare needs. Your satisfaction is very important to us and we hope you have been very pleased with your experience at [facility]. After your discharge you may receive a survey asking you to rate your hospital experience. We encourage you to take the time to complete the survey as your feedback allows us to identify areas for improvement as well as recognize our staff for their care. We hope that you have received the very best care possible during your hospitalization at [facility], as your satisfaction is our top priority.    Let me know if there is anything more I can do!!        Guanako Rowell MD  Internal Medicine Staff    ROS:  General: Negative for fevers or chills.  Cardiac: Negative for chest pain or orthopnea   Pulmonary: Negative for dyspnea or wheezing.  GI: Negative for abdominal distention or pain     Vitals:    10/21/23 0600 10/21/23 0700 10/21/23 0715 10/21/23 0841   BP: 126/63 134/72     Pulse: 61 65 82 76   Resp: 19 (!) 24 (!) 28 (!) 28   Temp:   98.2 °F (36.8 °C)    TempSrc:   Oral    SpO2: 100% 100% 99% 100%   Weight:       Height:              Body mass index is 30.71 kg/m².      PHYSICAL EXAM:  GENERAL APPEARANCE: alert and cooperative, and appears to be in no acute distress.  HEENT:     HEAD: NC/AT     EYES: PERRL, EOMI.  Vision is grossly intact.  NECK: Neck supple, non-tender without LAD, masses or thyromegaly.  CARDIAC: There is no peripheral edema, cyanosis or pallor.   LUNGS: Clear to auscultation and percussion without rales or wheezing  ABDOMEN: Non-distended. No guarding.  MSK: No joint erythema or tenderness.   EXTREMITIES: No significant deformity or joint abnormality. No edema.   NEUROLOGICAL: CN II-XII grossly intact.   SKIN: Skin normal color, texture and turgor with no lesions or eruptions.  PSYCHIATRIC: Oriented. No tangential speech. No Hyperactive features.      Goals of  Care Treatment Preferences:  Code Status: Full Code      Consults:   Consults (From admission, onward)          Status Ordering Provider     Inpatient consult to Social Work  Once        Provider:  (Not yet assigned)    Ordered NUSRAT FERGUSON A     Inpatient consult to Pulmonology  Once        Provider:  Jordon Leroy MD    Completed GERALD QUEVEDO III            No new Assessment & Plan notes have been filed under this hospital service since the last note was generated.  Service: Hospital Medicine    Final Active Diagnoses:    Diagnosis Date Noted POA    PRINCIPAL PROBLEM:  Acute respiratory failure with hypoxia and hypercarbia [J96.01, J96.02] 03/06/2023 Yes    Acute on chronic combined systolic and diastolic heart failure [I50.43] 10/19/2023 Yes    Chronic obstructive pulmonary disease with acute exacerbation [J44.1] 03/09/2023 Yes    Uncontrolled type 2 diabetes mellitus with hyperglycemia [E11.65] 04/12/2022 Yes    Class 1 obesity due to excess calories with serious comorbidity in adult [E66.09] 02/13/2021 Yes     Chronic    Coronary artery disease involving native coronary artery of native heart without angina pectoris [I25.10] 11/15/2017 Yes     Chronic    Dyslipidemia [E78.5] 07/28/2017 Yes     Chronic    Tobacco abuse [Z72.0] 04/28/2016 Yes     Chronic    Benign essential hypertension [I10] 03/02/2013 Yes     Chronic      Problems Resolved During this Admission:    Diagnosis Date Noted Date Resolved POA    Acute on chronic combined systolic and diastolic heart failure [I50.43] 07/28/2017 10/19/2023 Yes       Discharged Condition: good    Disposition: home    Follow Up:    Patient Instructions:      Ambulatory referral/consult to Internal Medicine   Standing Status: Future   Referral Priority: Routine Referral Type: Consultation   Referral Reason: Specialty Services Required   Requested Specialty: Internal Medicine   Number of Visits Requested: 1     Ambulatory referral/consult to Pulmonology   Standing  Status: Future   Referral Priority: Routine Referral Type: Consultation   Referral Reason: Specialty Services Required   Requested Specialty: Pulmonary Disease   Number of Visits Requested: 1       Significant Diagnostic Studies:     Recent Results (from the past 100 hour(s))   CBC auto differential    Collection Time: 10/19/23  4:24 AM   Result Value Ref Range    WBC 14.78 (H) 3.90 - 12.70 K/uL    RBC 4.55 (L) 4.60 - 6.20 M/uL    Hemoglobin 12.5 (L) 14.0 - 18.0 g/dL    Hematocrit 42.3 40.0 - 54.0 %    MCV 93 82 - 98 fL    MCH 27.5 27.0 - 31.0 pg    MCHC 29.6 (L) 32.0 - 36.0 g/dL    RDW 13.3 11.5 - 14.5 %    Platelets 218 150 - 450 K/uL    MPV 11.6 9.2 - 12.9 fL    Immature Granulocytes 0.3 0.0 - 0.5 %    Gran # (ANC) 10.1 (H) 1.8 - 7.7 K/uL    Immature Grans (Abs) 0.04 0.00 - 0.04 K/uL    Lymph # 3.6 1.0 - 4.8 K/uL    Mono # 0.8 0.3 - 1.0 K/uL    Eos # 0.2 0.0 - 0.5 K/uL    Baso # 0.08 0.00 - 0.20 K/uL    nRBC 0 0 /100 WBC    Gran % 68.1 38.0 - 73.0 %    Lymph % 24.4 18.0 - 48.0 %    Mono % 5.5 4.0 - 15.0 %    Eosinophil % 1.2 0.0 - 8.0 %    Basophil % 0.5 0.0 - 1.9 %    Differential Method Automated    Comprehensive metabolic panel    Collection Time: 10/19/23  4:24 AM   Result Value Ref Range    Sodium 141 136 - 145 mmol/L    Potassium 3.8 3.5 - 5.1 mmol/L    Chloride 98 95 - 110 mmol/L    CO2 36 (H) 23 - 29 mmol/L    Glucose 208 (H) 70 - 110 mg/dL    BUN 6 (L) 8 - 23 mg/dL    Creatinine 0.9 0.5 - 1.4 mg/dL    Calcium 8.9 8.7 - 10.5 mg/dL    Total Protein 7.6 6.0 - 8.4 g/dL    Albumin 4.0 3.5 - 5.2 g/dL    Total Bilirubin 0.4 0.1 - 1.0 mg/dL    Alkaline Phosphatase 90 55 - 135 U/L    AST 13 10 - 40 U/L    ALT 12 10 - 44 U/L    eGFR >60 >60 mL/min/1.73 m^2    Anion Gap 7 (L) 8 - 16 mmol/L   Troponin I #1    Collection Time: 10/19/23  4:24 AM   Result Value Ref Range    Troponin I <0.006 0.000 - 0.026 ng/mL   B-Type natriuretic peptide (BNP)    Collection Time: 10/19/23  4:24 AM   Result Value Ref Range      (H) 0 - 99 pg/mL   Procalcitonin    Collection Time: 10/19/23  4:24 AM   Result Value Ref Range    Procalcitonin 0.02 <0.25 ng/mL   Lactic acid, plasma    Collection Time: 10/19/23  4:24 AM   Result Value Ref Range    Lactate (Lactic Acid) 1.6 0.5 - 2.2 mmol/L   Magnesium    Collection Time: 10/19/23  4:24 AM   Result Value Ref Range    Magnesium 1.9 1.6 - 2.6 mg/dL   Hemoglobin A1c if not done in past 3 months    Collection Time: 10/19/23  4:24 AM   Result Value Ref Range    Hemoglobin A1C 7.0 (H) 4.0 - 5.6 %    Estimated Avg Glucose 154 (H) 68 - 131 mg/dL   ISTAT PROCEDURE    Collection Time: 10/19/23  4:30 AM   Result Value Ref Range    POC PH 7.120 (LL) 7.35 - 7.45    POC PCO2 94.5 (H) 35 - 45 mmHg    POC PO2 71 (HH) 40 - 60 mmHg    POC HCO3 30.7 (H) 24 - 28 mmol/L    POC BE -3 -2 to 2 mmol/L    POC SATURATED O2 86 95 - 100 %    POC TCO2 34 (H) 24 - 29 mmol/L    Rate 12     Sample VENOUS     Site Other     Allens Test N/A     DelSys CPAP/BiPAP     Mode BiPAP     FiO2 60     IP 10     EP 5    ISTAT PROCEDURE    Collection Time: 10/19/23  4:35 AM   Result Value Ref Range    POC Glucose 187 (H) 70 - 110 mg/dL    POC BUN 4 (L) 6 - 30 mg/dL    POC Creatinine 0.6 0.5 - 1.4 mg/dL    POC Sodium 147 (H) 136 - 145 mmol/L    POC Potassium 2.7 (LL) 3.5 - 5.1 mmol/L    POC Chloride 100 95 - 110 mmol/L    POC TCO2 (MEASURED) 35 (H) 23 - 29 mmol/L    POC Anion Gap 16 8 - 16 mmol/L    POC Ionized Calcium 1.12 1.06 - 1.42 mmol/L    POC Hematocrit 58 (H) 36 - 54 %PCV    Rate 12     Sample VENOUS     Site Other     Allens Test N/A     DelSys CPAP/BiPAP     Mode BiPAP     FiO2 60     IP 10     EP 5    Urinalysis, Reflex to Urine Culture Urine, Clean Catch    Collection Time: 10/19/23  5:55 AM    Specimen: Urine   Result Value Ref Range    Specimen UA Urine, Clean Catch     Color, UA Yellow Yellow, Straw, Martha    Appearance, UA Hazy (A) Clear    pH, UA 6.0 5.0 - 8.0    Specific Gravity, UA 1.010 1.005 - 1.030    Protein, UA Trace  (A) Negative    Glucose, UA 3+ (A) Negative    Ketones, UA Negative Negative    Bilirubin (UA) Negative Negative    Occult Blood UA Negative Negative    Nitrite, UA Negative Negative    Urobilinogen, UA Negative <2.0 EU/dL    Leukocytes, UA Negative Negative   Urinalysis Microscopic    Collection Time: 10/19/23  5:55 AM   Result Value Ref Range    RBC, UA 1 0 - 4 /hpf    WBC, UA 1 0 - 5 /hpf    Bacteria Rare None-Occ /hpf    Yeast, UA None None    Hyaline Casts, UA 5 (A) 0-1/lpf /lpf    Microscopic Comment SEE COMMENT    ISTAT PROCEDURE    Collection Time: 10/19/23  5:55 AM   Result Value Ref Range    POC PH 7.148 (LL) 7.35 - 7.45    POC PCO2 125.3 (H) 35 - 45 mmHg    POC PO2 22 (LL) 40 - 60 mmHg    POC HCO3 43.4 (H) 24 - 28 mmol/L    POC BE 9 -2 to 2 mmol/L    POC SATURATED O2 22 95 - 100 %    POC TCO2 47 (H) 24 - 29 mmol/L    Rate 12     Sample VENOUS     Site Other     Allens Test N/A     DelSys CPAP/BiPAP     Mode BiPAP     FiO2 40     IP 12     EP 6    Blood culture #2 **CANNOT BE ORDERED STAT**    Collection Time: 10/19/23  6:17 AM    Specimen: Peripheral, Antecubital, Right; Blood   Result Value Ref Range    Blood Culture, Routine No Growth to date     Blood Culture, Routine No Growth to date     Blood Culture, Routine No Growth to date    Blood culture #1 **CANNOT BE ORDERED STAT**    Collection Time: 10/19/23  6:24 AM    Specimen: Peripheral, Forearm, Right; Blood   Result Value Ref Range    Blood Culture, Routine No Growth to date     Blood Culture, Routine No Growth to date     Blood Culture, Routine No Growth to date    ISTAT PROCEDURE    Collection Time: 10/19/23  7:08 AM   Result Value Ref Range    POC PH 7.268 (LL) 7.35 - 7.45    POC PCO2 85.1 (HH) 35 - 45 mmHg    POC PO2 84 80 - 100 mmHg    POC HCO3 38.9 (H) 24 - 28 mmol/L    POC BE 8 -2 to 2 mmol/L    POC SATURATED O2 94 95 - 100 %    POC TCO2 41 (H) 23 - 27 mmol/L    Rate 12     Sample ARTERIAL     Site RR     Allens Test Pass     DelSys CPAP/BiPAP      Mode BiPAP     FiO2 60     IP 16     EP 06    POCT glucose    Collection Time: 10/19/23  4:22 PM   Result Value Ref Range    POCT Glucose 232 (H) 70 - 110 mg/dL   POCT glucose    Collection Time: 10/19/23  9:06 PM   Result Value Ref Range    POCT Glucose 207 (H) 70 - 110 mg/dL   Basic metabolic panel    Collection Time: 10/20/23  4:05 AM   Result Value Ref Range    Sodium 140 136 - 145 mmol/L    Potassium 4.1 3.5 - 5.1 mmol/L    Chloride 95 95 - 110 mmol/L    CO2 35 (H) 23 - 29 mmol/L    Glucose 210 (H) 70 - 110 mg/dL    BUN 12 8 - 23 mg/dL    Creatinine 0.8 0.5 - 1.4 mg/dL    Calcium 8.5 (L) 8.7 - 10.5 mg/dL    Anion Gap 10 8 - 16 mmol/L    eGFR >60 >60 mL/min/1.73 m^2   Magnesium    Collection Time: 10/20/23  4:05 AM   Result Value Ref Range    Magnesium 2.1 1.6 - 2.6 mg/dL   Phosphorus    Collection Time: 10/20/23  4:05 AM   Result Value Ref Range    Phosphorus 1.5 (L) 2.7 - 4.5 mg/dL   CBC auto differential    Collection Time: 10/20/23  4:05 AM   Result Value Ref Range    WBC 13.09 (H) 3.90 - 12.70 K/uL    RBC 3.99 (L) 4.60 - 6.20 M/uL    Hemoglobin 11.1 (L) 14.0 - 18.0 g/dL    Hematocrit 36.1 (L) 40.0 - 54.0 %    MCV 91 82 - 98 fL    MCH 27.8 27.0 - 31.0 pg    MCHC 30.7 (L) 32.0 - 36.0 g/dL    RDW 13.2 11.5 - 14.5 %    Platelets 176 150 - 450 K/uL    MPV 11.5 9.2 - 12.9 fL    Immature Granulocytes 0.6 (H) 0.0 - 0.5 %    Gran # (ANC) 11.2 (H) 1.8 - 7.7 K/uL    Immature Grans (Abs) 0.08 (H) 0.00 - 0.04 K/uL    Lymph # 1.2 1.0 - 4.8 K/uL    Mono # 0.6 0.3 - 1.0 K/uL    Eos # 0.0 0.0 - 0.5 K/uL    Baso # 0.01 0.00 - 0.20 K/uL    nRBC 0 0 /100 WBC    Gran % 85.6 (H) 38.0 - 73.0 %    Lymph % 8.9 (L) 18.0 - 48.0 %    Mono % 4.8 4.0 - 15.0 %    Eosinophil % 0.0 0.0 - 8.0 %    Basophil % 0.1 0.0 - 1.9 %    Differential Method Automated    POCT glucose    Collection Time: 10/20/23  7:58 AM   Result Value Ref Range    POCT Glucose 146 (H) 70 - 110 mg/dL   ISTAT PROCEDURE    Collection Time: 10/20/23  8:53 AM   Result  Value Ref Range    POC PH 7.421 7.35 - 7.45    POC PCO2 63.6 (HH) 35 - 45 mmHg    POC PO2 50 (LL) 80 - 100 mmHg    POC HCO3 41.4 (H) 24 - 28 mmol/L    POC BE 14 (H) -2 to 2 mmol/L    POC SATURATED O2 84 95 - 100 %    POC TCO2 43 (H) 23 - 27 mmol/L    Sample ARTERIAL     Site LR     Allens Test Pass     DelSys Room Air    POCT glucose    Collection Time: 10/20/23 11:29 AM   Result Value Ref Range    POCT Glucose 147 (H) 70 - 110 mg/dL   POCT glucose    Collection Time: 10/20/23  4:55 PM   Result Value Ref Range    POCT Glucose 207 (H) 70 - 110 mg/dL   POCT glucose    Collection Time: 10/20/23  9:49 PM   Result Value Ref Range    POCT Glucose 161 (H) 70 - 110 mg/dL   Basic metabolic panel    Collection Time: 10/21/23  5:01 AM   Result Value Ref Range    Sodium 141 136 - 145 mmol/L    Potassium 3.4 (L) 3.5 - 5.1 mmol/L    Chloride 96 95 - 110 mmol/L    CO2 38 (H) 23 - 29 mmol/L    Glucose 136 (H) 70 - 110 mg/dL    BUN 13 8 - 23 mg/dL    Creatinine 0.7 0.5 - 1.4 mg/dL    Calcium 8.6 (L) 8.7 - 10.5 mg/dL    Anion Gap 7 (L) 8 - 16 mmol/L    eGFR >60 >60 mL/min/1.73 m^2   Magnesium    Collection Time: 10/21/23  5:01 AM   Result Value Ref Range    Magnesium 2.3 1.6 - 2.6 mg/dL   Phosphorus    Collection Time: 10/21/23  5:01 AM   Result Value Ref Range    Phosphorus 1.9 (L) 2.7 - 4.5 mg/dL   CBC auto differential    Collection Time: 10/21/23  5:01 AM   Result Value Ref Range    WBC 15.00 (H) 3.90 - 12.70 K/uL    RBC 3.96 (L) 4.60 - 6.20 M/uL    Hemoglobin 10.9 (L) 14.0 - 18.0 g/dL    Hematocrit 35.6 (L) 40.0 - 54.0 %    MCV 90 82 - 98 fL    MCH 27.5 27.0 - 31.0 pg    MCHC 30.6 (L) 32.0 - 36.0 g/dL    RDW 13.3 11.5 - 14.5 %    Platelets 191 150 - 450 K/uL    MPV 11.4 9.2 - 12.9 fL    Immature Granulocytes 0.3 0.0 - 0.5 %    Gran # (ANC) 10.7 (H) 1.8 - 7.7 K/uL    Immature Grans (Abs) 0.05 (H) 0.00 - 0.04 K/uL    Lymph # 3.3 1.0 - 4.8 K/uL    Mono # 1.0 0.3 - 1.0 K/uL    Eos # 0.0 0.0 - 0.5 K/uL    Baso # 0.03 0.00 - 0.20 K/uL     nRBC 0 0 /100 WBC    Gran % 71.0 38.0 - 73.0 %    Lymph % 21.9 18.0 - 48.0 %    Mono % 6.5 4.0 - 15.0 %    Eosinophil % 0.1 0.0 - 8.0 %    Basophil % 0.2 0.0 - 1.9 %    Differential Method Automated    POCT glucose    Collection Time: 10/21/23  7:06 AM   Result Value Ref Range    POCT Glucose 118 (H) 70 - 110 mg/dL       Microbiology Results (last 7 days)       Procedure Component Value Units Date/Time    Blood culture #1 **CANNOT BE ORDERED STAT** [[id number]] Collected: 10/19/23 0624    Order Status: Completed Specimen: Blood from Peripheral, Forearm, Right Updated: 10/21/23 0903     Blood Culture, Routine No Growth to date      No Growth to date      No Growth to date    Blood culture #2 **CANNOT BE ORDERED STAT** [[id number]] Collected: 10/19/23 0617    Order Status: Completed Specimen: Blood from Peripheral, Antecubital, Right Updated: 10/21/23 0903     Blood Culture, Routine No Growth to date      No Growth to date      No Growth to date            Imaging Results              X-Ray Chest AP Portable (Final result)  Result time 10/19/23 05:27:08      Final result by Reece Larson MD (10/19/23 05:27:08)                   Impression:      Mild cardiac enlargement.  Increased interstitial attenuation bilaterally which may relate to interstitial edema or atypical interstitial infection.  Clinical correlation is advised.      Electronically signed by: Reece Larson MD  Date:    10/19/2023  Time:    05:27               Narrative:    EXAMINATION:  XR CHEST AP PORTABLE    CLINICAL HISTORY:  SOB;    TECHNIQUE:  Single frontal view of the chest was performed.    COMPARISON:  Chest radiograph 07/02/2023, CT chest 01/22/2018    FINDINGS:  Cardiac monitoring leads overlie the chest.  The cardiac silhouette is mildly enlarged.  There is atherosclerotic calcification of the thoracic aorta.  Lungs demonstrate prominent accentuated bilateral interstitial lung markings.  There is mild bibasilar subsegmental  atelectasis or scarring.  No significant volume of pleural fluid or pneumothorax identified.  Osseous structures demonstrate degenerative changes.                                          Pending Diagnostic Studies:       None           Medications:  Reconciled Home Medications:      Medication List        START taking these medications      amoxicillin-clavulanate 875-125mg 875-125 mg per tablet  Commonly known as: AUGMENTIN  Take 1 tablet by mouth 2 (two) times daily. for 5 days            CONTINUE taking these medications      aspirin 81 MG EC tablet  Commonly known as: ECOTRIN  Take 1 tablet (81 mg total) by mouth once daily.     atorvastatin 40 MG tablet  Commonly known as: LIPITOR  Take 1 tablet (40 mg total) by mouth once daily.     clopidogreL 75 mg tablet  Commonly known as: PLAVIX  Take 1 tablet (75 mg total) by mouth once daily.     furosemide 40 MG tablet  Commonly known as: LASIX  Take 1 tablet (40 mg total) by mouth 2 (two) times daily.              Indwelling Lines/Drains at time of discharge:   Lines/Drains/Airways       None                   Time spent on the discharge of patient: 35 minutes    Critical care time spent on the evaluation and treatment of severe organ dysfunction, review of pertinent labs and imaging studies, discussions with consulting providers and discussions with patient/family: 0 minutes.     Guanako Rowell MD  Department of Hospital Medicine  [facility] [place] - Intensive Care

## 2023-10-21 NOTE — NURSING
Rep came and showed the patient how to use BIPAP. The patient received all his personal belongings.

## 2023-10-21 NOTE — NURSING
I took him with wheelchair to the front door. His daughter took him home with a car.     The patient went home with a BIPAP.

## 2023-10-21 NOTE — NURSING
Nurses Note -- 4 Eyes      10/20/2023   7:00 PM      Skin assessed during: Q Shift Change      [x] No Altered Skin Integrity Present    [x]Prevention Measures Documented      [] Yes- Altered Skin Integrity Present or Discovered   [] LDA Added if Not in Epic (Describe Wound)   [] New Altered Skin Integrity was Present on Admit and Documented in LDA   [] Wound Image Taken    Wound Care Consulted? No    Attending Nurse:  Callie Manuel RN/Staff Member:   Giuseppe Engel

## 2023-10-21 NOTE — PLAN OF CARE
Problem: Adult Inpatient Plan of Care  Goal: Plan of Care Review  Outcome: Ongoing, Progressing  Goal: Patient-Specific Goal (Individualized)  Outcome: Ongoing, Progressing  Goal: Absence of Hospital-Acquired Illness or Injury  Outcome: Ongoing, Progressing  Goal: Optimal Comfort and Wellbeing  Outcome: Ongoing, Progressing  Goal: Readiness for Transition of Care  Outcome: Ongoing, Progressing     Problem: Adjustment to Illness COPD (Chronic Obstructive Pulmonary Disease)  Goal: Optimal Chronic Illness Coping  Outcome: Ongoing, Progressing     Problem: Functional Ability Impaired COPD (Chronic Obstructive Pulmonary Disease)  Goal: Optimal Level of Functional Riley  Outcome: Ongoing, Progressing     Problem: Infection COPD (Chronic Obstructive Pulmonary Disease)  Goal: Absence of Infection Signs and Symptoms  Outcome: Ongoing, Progressing     Problem: Oral Intake Inadequate COPD (Chronic Obstructive Pulmonary Disease)  Goal: Improved Nutrition Intake  Outcome: Ongoing, Progressing     Problem: Respiratory Compromise COPD (Chronic Obstructive Pulmonary Disease)  Goal: Effective Oxygenation and Ventilation  Outcome: Ongoing, Progressing     Problem: Diabetes Comorbidity  Goal: Blood Glucose Level Within Targeted Range  Outcome: Ongoing, Progressing

## 2023-10-21 NOTE — PLAN OF CARE
Patient will receive bipap prior to DC.  Rep in route to deliver and demonstrate to patient and family.  Nurse, Aleksandr paredes.       10/21/23 1237   Final Note   Assessment Type Final Discharge Note   Anticipated Discharge Disposition Home   Hospital Resources/Appts/Education Provided Appointments scheduled and added to AVS   Post-Acute Status   Post-Acute Authorization HME   HME Status Set-up Complete/Auth obtained  (Bipap to be delivered at the bedside.)   Other Status No Post-Acute Service Needs   Discharge Delays (!) Home Medical Equipment (Insurance, Delivery)

## 2023-10-21 NOTE — DISCHARGE INSTRUCTIONS
Take amoxicillin twice daily for 5 more days  You qualified for a BiPAP machine to wear nightly  We will try to order it for you, follow-up with pulmonology for assistance  Follow-up with your PCP soon to help coordinate    Thank you for trusting [facility] and me with your care.  We are honored that you entrusted us with your healthcare needs. Your satisfaction is very important to us and we hope you have been very pleased with your experience at [facility]. After your discharge you may receive a survey asking you to rate your hospital experience. We encourage you to take the time to complete the survey as your feedback allows us to identify areas for improvement as well as recognize our staff for their care. We hope that you have received the very best care possible during your hospitalization at [facility], as your satisfaction is our top priority.    Let me know if there is anything more I can do!!        Guanako Rowell MD  Internal Medicine Staff        PATIENT GENERAL DISCHARGE INFORMATION   Things that YOU are responsible for to Manage Your Care At Home:  1. Getting your prescriptions filled.  2. Taking you medications as directed. (DO NOT MISS ANY DOSES!)  3. Going to your follow-up doctor appointments.                 *This is important because it allows the doctor to monitor your progress and make changes.      If you are unable to make your follow up appointments, please call the number listed and reschedule this appointment.   After discharge, if you need assistance, you can call [facility] Line for 24/7 assistance at [phone]  If you are experience any signs or symptoms, Call your doctor or Call 911 and come to your nearest Emergency Room.    You should receive a call from [facility] within 48-72 hours to help manage your care after discharge.   Please try to make sure that you answer your phone for this important phone call.

## 2023-10-23 LAB
BACTERIA BLD CULT: NORMAL
BACTERIA BLD CULT: NORMAL

## 2023-10-25 NOTE — PHYSICIAN QUERY
PT Name: Abelardo Irene Jr.  MR #: [id number]     DOCUMENTATION CLARIFICATION      CDS: Brennan Tenorio RN CCDS               Contact information: [email or web address]    This form is a permanent document in the medical record.     Query Date: October 25, 2023    Dear Provider,  By submitting this query, we are merely seeking further clarification of documentation.  Please utilize your independent clinical judgment when addressing the question(s) below.     The Medical Record contains the following:    Supporting Clinical Findings Location in Medical Record   abx to cover CAP, but procal negative likely able to d/c    presents due to respiratory distress at home. Per EMS patient was in acute respiratory distress satting 85% on room air. decreased breath sounds bilateral 10/19/2023 H&P   given azithromycin and ceftriaxone on day 1.  Will give 1 more dose of IV ceftriaxone today, then switch to a 5 day course of Augmentin 10/21/2023 DC summary   T 96.8 (rectal)- 98.1 (oral), HR , RR 18-33, /126, FIO2 @ 60% via BiPAP 10/19/2023 Vitals    10/19/23 04:24 10/20/23 04:05 10/21/23 05:01   WBC 14.78 (H) 13.09 (H) 15.00 (H)      10/19/23 04:24   Procalcitonin 0.02    Lab values   Mild cardiac enlargement.  Increased interstitial attenuation bilaterally which may relate to interstitial edema or atypical interstitial infection.  Clinical correlation is advised. 10/19/2023 CXR     Please clarify if the CAP diagnosis has been:    [   x] Ruled In   [   ] Ruled Out   [   ] Still to be ruled out at the time of discharge   [   ] Other/Clarification of findings (please specify): _______________    [   ] Clinically undetermined           Please document in your progress notes daily for the duration of treatment, until resolved, and include in your discharge summary.    Form No. [id number]

## 2024-01-22 PROBLEM — J96.01 ACUTE RESPIRATORY FAILURE WITH HYPOXIA AND HYPERCARBIA: Status: RESOLVED | Noted: 2023-03-06 | Resolved: 2024-01-22

## 2024-01-22 PROBLEM — J96.02 ACUTE RESPIRATORY FAILURE WITH HYPOXIA AND HYPERCARBIA: Status: RESOLVED | Noted: 2023-03-06 | Resolved: 2024-01-22

## 2024-01-23 ENCOUNTER — HOSPITAL ENCOUNTER (INPATIENT)
Facility: HOSPITAL | Age: 69
LOS: 4 days | Discharge: HOME HEALTH | DRG: 208 | End: 2024-01-27
Attending: EMERGENCY MEDICINE | Admitting: STUDENT IN AN ORGANIZED HEALTH CARE EDUCATION/TRAINING PROGRAM
Payer: MEDICARE

## 2024-01-23 DIAGNOSIS — I50.43 ACUTE ON CHRONIC COMBINED SYSTOLIC AND DIASTOLIC HEART FAILURE: ICD-10-CM

## 2024-01-23 DIAGNOSIS — Z71.89 ADVANCE CARE PLANNING: ICD-10-CM

## 2024-01-23 DIAGNOSIS — J96.22 ACUTE ON CHRONIC RESPIRATORY FAILURE WITH HYPOXIA AND HYPERCAPNIA: Primary | ICD-10-CM

## 2024-01-23 DIAGNOSIS — J96.00 ACUTE RESPIRATORY FAILURE: ICD-10-CM

## 2024-01-23 DIAGNOSIS — R07.9 CHEST PAIN: ICD-10-CM

## 2024-01-23 DIAGNOSIS — J44.1 CHRONIC OBSTRUCTIVE PULMONARY DISEASE WITH ACUTE EXACERBATION: ICD-10-CM

## 2024-01-23 DIAGNOSIS — J96.21 ACUTE ON CHRONIC RESPIRATORY FAILURE WITH HYPOXIA AND HYPERCAPNIA: Primary | ICD-10-CM

## 2024-01-23 LAB
ALBUMIN SERPL BCP-MCNC: 3.5 G/DL (ref 3.5–5.2)
ALLENS TEST: ABNORMAL
ALLENS TEST: ABNORMAL
ALP SERPL-CCNC: 86 U/L (ref 55–135)
ALT SERPL W/O P-5'-P-CCNC: 13 U/L (ref 10–44)
ANION GAP SERPL CALC-SCNC: 12 MMOL/L (ref 8–16)
AST SERPL-CCNC: 15 U/L (ref 10–40)
BASOPHILS # BLD AUTO: 0.06 K/UL (ref 0–0.2)
BASOPHILS NFR BLD: 0.3 % (ref 0–1.9)
BILIRUB SERPL-MCNC: 0.5 MG/DL (ref 0.1–1)
BNP SERPL-MCNC: 609 PG/ML (ref 0–99)
BUN SERPL-MCNC: 8 MG/DL (ref 8–23)
CALCIUM SERPL-MCNC: 8.7 MG/DL (ref 8.7–10.5)
CHLORIDE SERPL-SCNC: 101 MMOL/L (ref 95–110)
CO2 SERPL-SCNC: 26 MMOL/L (ref 23–29)
CREAT SERPL-MCNC: 0.9 MG/DL (ref 0.5–1.4)
DELSYS: ABNORMAL
DELSYS: ABNORMAL
DIFFERENTIAL METHOD BLD: ABNORMAL
EOSINOPHIL # BLD AUTO: 0.2 K/UL (ref 0–0.5)
EOSINOPHIL NFR BLD: 0.9 % (ref 0–8)
EP: 8
ERYTHROCYTE [DISTWIDTH] IN BLOOD BY AUTOMATED COUNT: 13.8 % (ref 11.5–14.5)
ERYTHROCYTE [SEDIMENTATION RATE] IN BLOOD BY WESTERGREN METHOD: 30 MM/H
EST. GFR  (NO RACE VARIABLE): >60 ML/MIN/1.73 M^2
FIO2: 40
FIO2: 80
GLUCOSE SERPL-MCNC: 333 MG/DL (ref 70–110)
HCO3 UR-SCNC: 33.3 MMOL/L (ref 24–28)
HCO3 UR-SCNC: 37.9 MMOL/L (ref 24–28)
HCT VFR BLD AUTO: 36 % (ref 40–54)
HGB BLD-MCNC: 11.1 G/DL (ref 14–18)
IMM GRANULOCYTES # BLD AUTO: 0.08 K/UL (ref 0–0.04)
IMM GRANULOCYTES NFR BLD AUTO: 0.4 % (ref 0–0.5)
IP: 18
LYMPHOCYTES # BLD AUTO: 4.2 K/UL (ref 1–4.8)
LYMPHOCYTES NFR BLD: 22.6 % (ref 18–48)
MCH RBC QN AUTO: 28.9 PG (ref 27–31)
MCHC RBC AUTO-ENTMCNC: 30.8 G/DL (ref 32–36)
MCV RBC AUTO: 94 FL (ref 82–98)
MODE: ABNORMAL
MODE: ABNORMAL
MONOCYTES # BLD AUTO: 0.8 K/UL (ref 0.3–1)
MONOCYTES NFR BLD: 4.4 % (ref 4–15)
NEUTROPHILS # BLD AUTO: 13.3 K/UL (ref 1.8–7.7)
NEUTROPHILS NFR BLD: 71.4 % (ref 38–73)
NRBC BLD-RTO: 0 /100 WBC
PCO2 BLDA: 106.5 MMHG (ref 35–45)
PCO2 BLDA: 61.2 MMHG (ref 35–45)
PEEP: 8
PH SMN: 7.16 [PH] (ref 7.35–7.45)
PH SMN: 7.34 [PH] (ref 7.35–7.45)
PLATELET # BLD AUTO: 211 K/UL (ref 150–450)
PMV BLD AUTO: 11.2 FL (ref 9.2–12.9)
PO2 BLDA: 106 MMHG (ref 80–100)
PO2 BLDA: 110 MMHG (ref 40–60)
POC BE: 6 MMOL/L
POC BE: 6 MMOL/L
POC SATURATED O2: 96 % (ref 95–100)
POC SATURATED O2: 98 % (ref 95–100)
POC TCO2: 35 MMOL/L (ref 23–27)
POC TCO2: 41 MMOL/L (ref 24–29)
POTASSIUM SERPL-SCNC: 3.8 MMOL/L (ref 3.5–5.1)
PROT SERPL-MCNC: 7.2 G/DL (ref 6–8.4)
RBC # BLD AUTO: 3.84 M/UL (ref 4.6–6.2)
SAMPLE: ABNORMAL
SAMPLE: ABNORMAL
SITE: ABNORMAL
SITE: ABNORMAL
SODIUM SERPL-SCNC: 139 MMOL/L (ref 136–145)
TROPONIN I SERPL DL<=0.01 NG/ML-MCNC: 0.01 NG/ML (ref 0–0.03)
VT: 420
WBC # BLD AUTO: 18.58 K/UL (ref 3.9–12.7)

## 2024-01-23 PROCEDURE — 87635 SARS-COV-2 COVID-19 AMP PRB: CPT | Performed by: EMERGENCY MEDICINE

## 2024-01-23 PROCEDURE — 93010 ELECTROCARDIOGRAM REPORT: CPT | Mod: ,,, | Performed by: INTERNAL MEDICINE

## 2024-01-23 PROCEDURE — 93005 ELECTROCARDIOGRAM TRACING: CPT

## 2024-01-23 PROCEDURE — 63600175 PHARM REV CODE 636 W HCPCS: Performed by: EMERGENCY MEDICINE

## 2024-01-23 PROCEDURE — 87502 INFLUENZA DNA AMP PROBE: CPT

## 2024-01-23 PROCEDURE — 96375 TX/PRO/DX INJ NEW DRUG ADDON: CPT

## 2024-01-23 PROCEDURE — 36600 WITHDRAWAL OF ARTERIAL BLOOD: CPT

## 2024-01-23 PROCEDURE — 99285 EMERGENCY DEPT VISIT HI MDM: CPT | Mod: 25

## 2024-01-23 PROCEDURE — 94640 AIRWAY INHALATION TREATMENT: CPT

## 2024-01-23 PROCEDURE — 27000190 HC CPAP FULL FACE MASK W/VALVE

## 2024-01-23 PROCEDURE — 84484 ASSAY OF TROPONIN QUANT: CPT

## 2024-01-23 PROCEDURE — 99900035 HC TECH TIME PER 15 MIN (STAT)

## 2024-01-23 PROCEDURE — 82800 BLOOD PH: CPT

## 2024-01-23 PROCEDURE — 85025 COMPLETE CBC W/AUTO DIFF WBC: CPT | Performed by: EMERGENCY MEDICINE

## 2024-01-23 PROCEDURE — 80053 COMPREHEN METABOLIC PANEL: CPT | Performed by: EMERGENCY MEDICINE

## 2024-01-23 PROCEDURE — 25000242 PHARM REV CODE 250 ALT 637 W/ HCPCS: Performed by: EMERGENCY MEDICINE

## 2024-01-23 PROCEDURE — 94761 N-INVAS EAR/PLS OXIMETRY MLT: CPT | Mod: XB

## 2024-01-23 PROCEDURE — 12000002 HC ACUTE/MED SURGE SEMI-PRIVATE ROOM

## 2024-01-23 PROCEDURE — 25000003 PHARM REV CODE 250: Performed by: EMERGENCY MEDICINE

## 2024-01-23 PROCEDURE — 82803 BLOOD GASES ANY COMBINATION: CPT

## 2024-01-23 PROCEDURE — 63600175 PHARM REV CODE 636 W HCPCS

## 2024-01-23 PROCEDURE — 84484 ASSAY OF TROPONIN QUANT: CPT | Performed by: EMERGENCY MEDICINE

## 2024-01-23 PROCEDURE — 94002 VENT MGMT INPAT INIT DAY: CPT

## 2024-01-23 PROCEDURE — 96374 THER/PROPH/DIAG INJ IV PUSH: CPT

## 2024-01-23 PROCEDURE — 87040 BLOOD CULTURE FOR BACTERIA: CPT | Mod: 59 | Performed by: EMERGENCY MEDICINE

## 2024-01-23 PROCEDURE — 99900031 HC PATIENT EDUCATION (STAT)

## 2024-01-23 PROCEDURE — 83880 ASSAY OF NATRIURETIC PEPTIDE: CPT | Performed by: EMERGENCY MEDICINE

## 2024-01-23 PROCEDURE — 94660 CPAP INITIATION&MGMT: CPT | Mod: XB

## 2024-01-23 PROCEDURE — 84145 PROCALCITONIN (PCT): CPT | Performed by: EMERGENCY MEDICINE

## 2024-01-23 RX ORDER — SODIUM CHLORIDE 0.9 % (FLUSH) 0.9 %
10 SYRINGE (ML) INJECTION
Status: DISCONTINUED | OUTPATIENT
Start: 2024-01-24 | End: 2024-01-27 | Stop reason: HOSPADM

## 2024-01-23 RX ORDER — MAGNESIUM SULFATE HEPTAHYDRATE 40 MG/ML
2 INJECTION, SOLUTION INTRAVENOUS
Status: DISCONTINUED | OUTPATIENT
Start: 2024-01-24 | End: 2024-01-27

## 2024-01-23 RX ORDER — TALC
6 POWDER (GRAM) TOPICAL NIGHTLY PRN
Status: DISCONTINUED | OUTPATIENT
Start: 2024-01-24 | End: 2024-01-27 | Stop reason: HOSPADM

## 2024-01-23 RX ORDER — ATORVASTATIN CALCIUM 40 MG/1
40 TABLET, FILM COATED ORAL DAILY
Status: DISCONTINUED | OUTPATIENT
Start: 2024-01-24 | End: 2024-01-27 | Stop reason: HOSPADM

## 2024-01-23 RX ORDER — CLOPIDOGREL BISULFATE 75 MG/1
75 TABLET ORAL DAILY
Status: DISCONTINUED | OUTPATIENT
Start: 2024-01-24 | End: 2024-01-27 | Stop reason: HOSPADM

## 2024-01-23 RX ORDER — ALBUTEROL SULFATE 2.5 MG/.5ML
10 SOLUTION RESPIRATORY (INHALATION)
Status: COMPLETED | OUTPATIENT
Start: 2024-01-23 | End: 2024-01-23

## 2024-01-23 RX ORDER — MAGNESIUM SULFATE HEPTAHYDRATE 40 MG/ML
4 INJECTION, SOLUTION INTRAVENOUS
Status: DISCONTINUED | OUTPATIENT
Start: 2024-01-24 | End: 2024-01-27

## 2024-01-23 RX ORDER — CALCIUM GLUCONATE 20 MG/ML
3 INJECTION, SOLUTION INTRAVENOUS
Status: DISCONTINUED | OUTPATIENT
Start: 2024-01-24 | End: 2024-01-27 | Stop reason: HOSPADM

## 2024-01-23 RX ORDER — FUROSEMIDE 10 MG/ML
60 INJECTION INTRAMUSCULAR; INTRAVENOUS
Status: COMPLETED | OUTPATIENT
Start: 2024-01-24 | End: 2024-01-24

## 2024-01-23 RX ORDER — CALCIUM GLUCONATE 20 MG/ML
1 INJECTION, SOLUTION INTRAVENOUS
Status: DISCONTINUED | OUTPATIENT
Start: 2024-01-24 | End: 2024-01-27 | Stop reason: HOSPADM

## 2024-01-23 RX ORDER — PROPOFOL 10 MG/ML
INJECTION, EMULSION INTRAVENOUS
Status: COMPLETED
Start: 2024-01-23 | End: 2024-01-23

## 2024-01-23 RX ORDER — POTASSIUM CHLORIDE 7.45 MG/ML
60 INJECTION INTRAVENOUS
Status: DISCONTINUED | OUTPATIENT
Start: 2024-01-24 | End: 2024-01-27

## 2024-01-23 RX ORDER — CALCIUM GLUCONATE 20 MG/ML
2 INJECTION, SOLUTION INTRAVENOUS
Status: DISCONTINUED | OUTPATIENT
Start: 2024-01-24 | End: 2024-01-27 | Stop reason: HOSPADM

## 2024-01-23 RX ORDER — ONDANSETRON HYDROCHLORIDE 2 MG/ML
4 INJECTION, SOLUTION INTRAVENOUS EVERY 8 HOURS PRN
Status: DISCONTINUED | OUTPATIENT
Start: 2024-01-24 | End: 2024-01-27 | Stop reason: HOSPADM

## 2024-01-23 RX ORDER — PROPOFOL 10 MG/ML
0-50 INJECTION, EMULSION INTRAVENOUS CONTINUOUS
Status: DISCONTINUED | OUTPATIENT
Start: 2024-01-24 | End: 2024-01-25

## 2024-01-23 RX ORDER — ROCURONIUM BROMIDE 10 MG/ML
100 INJECTION, SOLUTION INTRAVENOUS
Status: COMPLETED | OUTPATIENT
Start: 2024-01-23 | End: 2024-01-23

## 2024-01-23 RX ORDER — PROCHLORPERAZINE EDISYLATE 5 MG/ML
5 INJECTION INTRAMUSCULAR; INTRAVENOUS EVERY 6 HOURS PRN
Status: DISCONTINUED | OUTPATIENT
Start: 2024-01-24 | End: 2024-01-27 | Stop reason: HOSPADM

## 2024-01-23 RX ORDER — IPRATROPIUM BROMIDE AND ALBUTEROL SULFATE 2.5; .5 MG/3ML; MG/3ML
3 SOLUTION RESPIRATORY (INHALATION) EVERY 4 HOURS PRN
Status: DISCONTINUED | OUTPATIENT
Start: 2024-01-24 | End: 2024-01-25

## 2024-01-23 RX ORDER — ASPIRIN 81 MG/1
81 TABLET ORAL DAILY
Status: DISCONTINUED | OUTPATIENT
Start: 2024-01-24 | End: 2024-01-24

## 2024-01-23 RX ORDER — ACETAMINOPHEN 325 MG/1
650 TABLET ORAL EVERY 4 HOURS PRN
Status: DISCONTINUED | OUTPATIENT
Start: 2024-01-24 | End: 2024-01-27

## 2024-01-23 RX ORDER — ETOMIDATE 2 MG/ML
20 INJECTION INTRAVENOUS
Status: COMPLETED | OUTPATIENT
Start: 2024-01-23 | End: 2024-01-23

## 2024-01-23 RX ORDER — POTASSIUM CHLORIDE 7.45 MG/ML
80 INJECTION INTRAVENOUS
Status: DISCONTINUED | OUTPATIENT
Start: 2024-01-24 | End: 2024-01-27

## 2024-01-23 RX ORDER — POTASSIUM CHLORIDE 7.45 MG/ML
40 INJECTION INTRAVENOUS
Status: DISCONTINUED | OUTPATIENT
Start: 2024-01-24 | End: 2024-01-27

## 2024-01-23 RX ORDER — PROPOFOL 10 MG/ML
0-50 INJECTION, EMULSION INTRAVENOUS CONTINUOUS
Status: CANCELLED | OUTPATIENT
Start: 2024-01-23

## 2024-01-23 RX ORDER — HEPARIN SODIUM 5000 [USP'U]/ML
5000 INJECTION, SOLUTION INTRAVENOUS; SUBCUTANEOUS EVERY 8 HOURS
Status: DISCONTINUED | OUTPATIENT
Start: 2024-01-24 | End: 2024-01-27 | Stop reason: HOSPADM

## 2024-01-23 RX ORDER — FAMOTIDINE 10 MG/ML
20 INJECTION INTRAVENOUS DAILY
Status: DISCONTINUED | OUTPATIENT
Start: 2024-01-24 | End: 2024-01-26

## 2024-01-23 RX ADMIN — AZITHROMYCIN 500 MG: 500 INJECTION, POWDER, LYOPHILIZED, FOR SOLUTION INTRAVENOUS at 11:01

## 2024-01-23 RX ADMIN — ALBUTEROL SULFATE 10 MG: 2.5 SOLUTION RESPIRATORY (INHALATION) at 10:01

## 2024-01-23 RX ADMIN — ROCURONIUM BROMIDE 100 MG: 10 INJECTION INTRAVENOUS at 10:01

## 2024-01-23 RX ADMIN — ETOMIDATE 20 MG: 2 INJECTION INTRAVENOUS at 10:01

## 2024-01-23 RX ADMIN — PROPOFOL 10 MCG/KG/MIN: 10 INJECTION, EMULSION INTRAVENOUS at 10:01

## 2024-01-24 LAB
ALBUMIN SERPL BCP-MCNC: 3.3 G/DL (ref 3.5–5.2)
ALP SERPL-CCNC: 78 U/L (ref 55–135)
ALT SERPL W/O P-5'-P-CCNC: 14 U/L (ref 10–44)
ANION GAP SERPL CALC-SCNC: 14 MMOL/L (ref 8–16)
AST SERPL-CCNC: 17 U/L (ref 10–40)
BASOPHILS # BLD AUTO: 0.02 K/UL (ref 0–0.2)
BASOPHILS NFR BLD: 0.2 % (ref 0–1.9)
BILIRUB SERPL-MCNC: 0.5 MG/DL (ref 0.1–1)
BUN SERPL-MCNC: 9 MG/DL (ref 8–23)
CALCIUM SERPL-MCNC: 8.7 MG/DL (ref 8.7–10.5)
CHLORIDE SERPL-SCNC: 98 MMOL/L (ref 95–110)
CO2 SERPL-SCNC: 29 MMOL/L (ref 23–29)
CREAT SERPL-MCNC: 0.9 MG/DL (ref 0.5–1.4)
CTP QC/QA: YES
CTP QC/QA: YES
DIFFERENTIAL METHOD BLD: ABNORMAL
EOSINOPHIL # BLD AUTO: 0 K/UL (ref 0–0.5)
EOSINOPHIL NFR BLD: 0 % (ref 0–8)
ERYTHROCYTE [DISTWIDTH] IN BLOOD BY AUTOMATED COUNT: 13.8 % (ref 11.5–14.5)
EST. GFR  (NO RACE VARIABLE): >60 ML/MIN/1.73 M^2
ESTIMATED AVG GLUCOSE: 134 MG/DL (ref 68–131)
GLUCOSE SERPL-MCNC: 278 MG/DL (ref 70–110)
HBA1C MFR BLD: 6.3 % (ref 4–5.6)
HCT VFR BLD AUTO: 33 % (ref 40–54)
HGB BLD-MCNC: 10 G/DL (ref 14–18)
IMM GRANULOCYTES # BLD AUTO: 0.05 K/UL (ref 0–0.04)
IMM GRANULOCYTES NFR BLD AUTO: 0.4 % (ref 0–0.5)
LYMPHOCYTES # BLD AUTO: 0.4 K/UL (ref 1–4.8)
LYMPHOCYTES NFR BLD: 2.8 % (ref 18–48)
MAGNESIUM SERPL-MCNC: 2.1 MG/DL (ref 1.6–2.6)
MCH RBC QN AUTO: 27.8 PG (ref 27–31)
MCHC RBC AUTO-ENTMCNC: 30.3 G/DL (ref 32–36)
MCV RBC AUTO: 92 FL (ref 82–98)
MONOCYTES # BLD AUTO: 0.2 K/UL (ref 0.3–1)
MONOCYTES NFR BLD: 1.8 % (ref 4–15)
NEUTROPHILS # BLD AUTO: 12.5 K/UL (ref 1.8–7.7)
NEUTROPHILS NFR BLD: 94.8 % (ref 38–73)
NRBC BLD-RTO: 0 /100 WBC
PHOSPHATE SERPL-MCNC: 1.8 MG/DL (ref 2.7–4.5)
PLATELET # BLD AUTO: 197 K/UL (ref 150–450)
PMV BLD AUTO: 11 FL (ref 9.2–12.9)
POC MOLECULAR INFLUENZA A AGN: NEGATIVE
POC MOLECULAR INFLUENZA B AGN: NEGATIVE
POCT GLUCOSE: 259 MG/DL (ref 70–110)
POTASSIUM SERPL-SCNC: 3 MMOL/L (ref 3.5–5.1)
PROCALCITONIN SERPL IA-MCNC: 0.05 NG/ML
PROT SERPL-MCNC: 6.8 G/DL (ref 6–8.4)
RBC # BLD AUTO: 3.6 M/UL (ref 4.6–6.2)
SARS-COV-2 RDRP RESP QL NAA+PROBE: NEGATIVE
SODIUM SERPL-SCNC: 141 MMOL/L (ref 136–145)
WBC # BLD AUTO: 13.12 K/UL (ref 3.9–12.7)

## 2024-01-24 PROCEDURE — 99900031 HC PATIENT EDUCATION (STAT)

## 2024-01-24 PROCEDURE — 63600175 PHARM REV CODE 636 W HCPCS: Performed by: EMERGENCY MEDICINE

## 2024-01-24 PROCEDURE — 63600175 PHARM REV CODE 636 W HCPCS: Performed by: NURSE PRACTITIONER

## 2024-01-24 PROCEDURE — 94003 VENT MGMT INPAT SUBQ DAY: CPT

## 2024-01-24 PROCEDURE — 99291 CRITICAL CARE FIRST HOUR: CPT | Mod: FS,,, | Performed by: NURSE PRACTITIONER

## 2024-01-24 PROCEDURE — 20000000 HC ICU ROOM

## 2024-01-24 PROCEDURE — 25000003 PHARM REV CODE 250: Performed by: NURSE PRACTITIONER

## 2024-01-24 PROCEDURE — 99499 UNLISTED E&M SERVICE: CPT | Mod: ,,, | Performed by: INTERNAL MEDICINE

## 2024-01-24 PROCEDURE — 83735 ASSAY OF MAGNESIUM: CPT | Performed by: STUDENT IN AN ORGANIZED HEALTH CARE EDUCATION/TRAINING PROGRAM

## 2024-01-24 PROCEDURE — 87070 CULTURE OTHR SPECIMN AEROBIC: CPT | Performed by: NURSE PRACTITIONER

## 2024-01-24 PROCEDURE — 36415 COLL VENOUS BLD VENIPUNCTURE: CPT | Performed by: STUDENT IN AN ORGANIZED HEALTH CARE EDUCATION/TRAINING PROGRAM

## 2024-01-24 PROCEDURE — 94640 AIRWAY INHALATION TREATMENT: CPT

## 2024-01-24 PROCEDURE — 63600175 PHARM REV CODE 636 W HCPCS: Performed by: STUDENT IN AN ORGANIZED HEALTH CARE EDUCATION/TRAINING PROGRAM

## 2024-01-24 PROCEDURE — 25000003 PHARM REV CODE 250: Performed by: HOSPITALIST

## 2024-01-24 PROCEDURE — 87205 SMEAR GRAM STAIN: CPT | Performed by: NURSE PRACTITIONER

## 2024-01-24 PROCEDURE — 25000242 PHARM REV CODE 250 ALT 637 W/ HCPCS: Performed by: STUDENT IN AN ORGANIZED HEALTH CARE EDUCATION/TRAINING PROGRAM

## 2024-01-24 PROCEDURE — 99900035 HC TECH TIME PER 15 MIN (STAT)

## 2024-01-24 PROCEDURE — 27000221 HC OXYGEN, UP TO 24 HOURS

## 2024-01-24 PROCEDURE — 85025 COMPLETE CBC W/AUTO DIFF WBC: CPT | Performed by: STUDENT IN AN ORGANIZED HEALTH CARE EDUCATION/TRAINING PROGRAM

## 2024-01-24 PROCEDURE — 5A1945Z RESPIRATORY VENTILATION, 24-96 CONSECUTIVE HOURS: ICD-10-PCS | Performed by: EMERGENCY MEDICINE

## 2024-01-24 PROCEDURE — 25000003 PHARM REV CODE 250

## 2024-01-24 PROCEDURE — 99292 CRITICAL CARE ADDL 30 MIN: CPT | Mod: FS,,, | Performed by: NURSE PRACTITIONER

## 2024-01-24 PROCEDURE — 94761 N-INVAS EAR/PLS OXIMETRY MLT: CPT

## 2024-01-24 PROCEDURE — 80053 COMPREHEN METABOLIC PANEL: CPT | Performed by: STUDENT IN AN ORGANIZED HEALTH CARE EDUCATION/TRAINING PROGRAM

## 2024-01-24 PROCEDURE — 0BH18EZ INSERTION OF ENDOTRACHEAL AIRWAY INTO TRACHEA, VIA NATURAL OR ARTIFICIAL OPENING ENDOSCOPIC: ICD-10-PCS | Performed by: EMERGENCY MEDICINE

## 2024-01-24 PROCEDURE — 25000003 PHARM REV CODE 250: Performed by: STUDENT IN AN ORGANIZED HEALTH CARE EDUCATION/TRAINING PROGRAM

## 2024-01-24 PROCEDURE — 99900026 HC AIRWAY MAINTENANCE (STAT)

## 2024-01-24 PROCEDURE — 87186 SC STD MICRODIL/AGAR DIL: CPT | Performed by: NURSE PRACTITIONER

## 2024-01-24 PROCEDURE — 25000003 PHARM REV CODE 250: Performed by: EMERGENCY MEDICINE

## 2024-01-24 PROCEDURE — 84100 ASSAY OF PHOSPHORUS: CPT | Performed by: STUDENT IN AN ORGANIZED HEALTH CARE EDUCATION/TRAINING PROGRAM

## 2024-01-24 PROCEDURE — 83036 HEMOGLOBIN GLYCOSYLATED A1C: CPT | Performed by: STUDENT IN AN ORGANIZED HEALTH CARE EDUCATION/TRAINING PROGRAM

## 2024-01-24 RX ORDER — IPRATROPIUM BROMIDE AND ALBUTEROL SULFATE 2.5; .5 MG/3ML; MG/3ML
3 SOLUTION RESPIRATORY (INHALATION) EVERY 4 HOURS
Status: DISCONTINUED | OUTPATIENT
Start: 2024-01-24 | End: 2024-01-27

## 2024-01-24 RX ORDER — FUROSEMIDE 10 MG/ML
40 INJECTION INTRAMUSCULAR; INTRAVENOUS
Status: DISCONTINUED | OUTPATIENT
Start: 2024-01-24 | End: 2024-01-25

## 2024-01-24 RX ORDER — INSULIN ASPART 100 [IU]/ML
0-5 INJECTION, SOLUTION INTRAVENOUS; SUBCUTANEOUS
Status: DISCONTINUED | OUTPATIENT
Start: 2024-01-24 | End: 2024-01-27 | Stop reason: HOSPADM

## 2024-01-24 RX ORDER — FENTANYL CITRATE-0.9 % NACL/PF 10 MCG/ML
0-250 PLASTIC BAG, INJECTION (ML) INTRAVENOUS CONTINUOUS
Status: DISCONTINUED | OUTPATIENT
Start: 2024-01-24 | End: 2024-01-25

## 2024-01-24 RX ORDER — MUPIROCIN 20 MG/G
OINTMENT TOPICAL 2 TIMES DAILY
Status: DISCONTINUED | OUTPATIENT
Start: 2024-01-24 | End: 2024-01-27 | Stop reason: HOSPADM

## 2024-01-24 RX ORDER — IBUPROFEN 200 MG
24 TABLET ORAL
Status: DISCONTINUED | OUTPATIENT
Start: 2024-01-24 | End: 2024-01-27 | Stop reason: HOSPADM

## 2024-01-24 RX ORDER — METHYLPREDNISOLONE SOD SUCC 125 MG
40 VIAL (EA) INJECTION
Status: DISCONTINUED | OUTPATIENT
Start: 2024-01-24 | End: 2024-01-26

## 2024-01-24 RX ORDER — IBUPROFEN 200 MG
16 TABLET ORAL
Status: DISCONTINUED | OUTPATIENT
Start: 2024-01-24 | End: 2024-01-27 | Stop reason: HOSPADM

## 2024-01-24 RX ORDER — GLUCAGON 1 MG
1 KIT INJECTION
Status: DISCONTINUED | OUTPATIENT
Start: 2024-01-24 | End: 2024-01-27 | Stop reason: HOSPADM

## 2024-01-24 RX ORDER — NOREPINEPHRINE BITARTRATE/D5W 4MG/250ML
PLASTIC BAG, INJECTION (ML) INTRAVENOUS
Status: COMPLETED
Start: 2024-01-24 | End: 2024-01-24

## 2024-01-24 RX ORDER — HYDROMORPHONE HYDROCHLORIDE 1 MG/ML
1 INJECTION, SOLUTION INTRAMUSCULAR; INTRAVENOUS; SUBCUTANEOUS
Status: COMPLETED | OUTPATIENT
Start: 2024-01-24 | End: 2024-01-24

## 2024-01-24 RX ORDER — NOREPINEPHRINE BITARTRATE/D5W 4MG/250ML
0-3 PLASTIC BAG, INJECTION (ML) INTRAVENOUS CONTINUOUS
Status: DISCONTINUED | OUTPATIENT
Start: 2024-01-24 | End: 2024-01-25

## 2024-01-24 RX ORDER — NAPROXEN SODIUM 220 MG/1
81 TABLET, FILM COATED ORAL DAILY
Status: DISCONTINUED | OUTPATIENT
Start: 2024-01-24 | End: 2024-01-27 | Stop reason: HOSPADM

## 2024-01-24 RX ADMIN — FUROSEMIDE 40 MG: 10 INJECTION, SOLUTION INTRAVENOUS at 09:01

## 2024-01-24 RX ADMIN — METHYLPREDNISOLONE SODIUM SUCCINATE 40 MG: 125 INJECTION, POWDER, FOR SOLUTION INTRAMUSCULAR; INTRAVENOUS at 09:01

## 2024-01-24 RX ADMIN — Medication 25 MCG/HR: at 12:01

## 2024-01-24 RX ADMIN — FUROSEMIDE 60 MG: 10 INJECTION, SOLUTION INTRAMUSCULAR; INTRAVENOUS at 12:01

## 2024-01-24 RX ADMIN — IPRATROPIUM BROMIDE AND ALBUTEROL SULFATE 3 ML: 2.5; .5 SOLUTION RESPIRATORY (INHALATION) at 04:01

## 2024-01-24 RX ADMIN — IPRATROPIUM BROMIDE AND ALBUTEROL SULFATE 3 ML: 2.5; .5 SOLUTION RESPIRATORY (INHALATION) at 12:01

## 2024-01-24 RX ADMIN — PROPOFOL 40 MCG/KG/MIN: 10 INJECTION, EMULSION INTRAVENOUS at 03:01

## 2024-01-24 RX ADMIN — ASPIRIN 81 MG CHEWABLE TABLET 81 MG: 81 TABLET CHEWABLE at 08:01

## 2024-01-24 RX ADMIN — MUPIROCIN: 20 OINTMENT TOPICAL at 09:01

## 2024-01-24 RX ADMIN — POTASSIUM PHOSPHATE, MONOBASIC AND POTASSIUM PHOSPHATE, DIBASIC 15 MMOL: 224; 236 INJECTION, SOLUTION, CONCENTRATE INTRAVENOUS at 08:01

## 2024-01-24 RX ADMIN — Medication 0.02 MCG/KG/MIN: at 12:01

## 2024-01-24 RX ADMIN — FUROSEMIDE 40 MG: 10 INJECTION, SOLUTION INTRAVENOUS at 08:01

## 2024-01-24 RX ADMIN — AZITHROMYCIN 500 MG: 500 INJECTION, POWDER, LYOPHILIZED, FOR SOLUTION INTRAVENOUS at 12:01

## 2024-01-24 RX ADMIN — PROPOFOL 35 MCG/KG/MIN: 10 INJECTION, EMULSION INTRAVENOUS at 07:01

## 2024-01-24 RX ADMIN — ATORVASTATIN CALCIUM 40 MG: 40 TABLET, FILM COATED ORAL at 08:01

## 2024-01-24 RX ADMIN — HEPARIN SODIUM 5000 UNITS: 5000 INJECTION INTRAVENOUS; SUBCUTANEOUS at 06:01

## 2024-01-24 RX ADMIN — IPRATROPIUM BROMIDE AND ALBUTEROL SULFATE 3 ML: 2.5; .5 SOLUTION RESPIRATORY (INHALATION) at 08:01

## 2024-01-24 RX ADMIN — MUPIROCIN: 20 OINTMENT TOPICAL at 11:01

## 2024-01-24 RX ADMIN — CEFTRIAXONE 2 G: 2 INJECTION, POWDER, FOR SOLUTION INTRAMUSCULAR; INTRAVENOUS at 12:01

## 2024-01-24 RX ADMIN — HEPARIN SODIUM 5000 UNITS: 5000 INJECTION INTRAVENOUS; SUBCUTANEOUS at 09:01

## 2024-01-24 RX ADMIN — CLOPIDOGREL BISULFATE 75 MG: 75 TABLET ORAL at 08:01

## 2024-01-24 RX ADMIN — METHYLPREDNISOLONE SODIUM SUCCINATE 40 MG: 125 INJECTION, POWDER, FOR SOLUTION INTRAMUSCULAR; INTRAVENOUS at 08:01

## 2024-01-24 RX ADMIN — HEPARIN SODIUM 5000 UNITS: 5000 INJECTION INTRAVENOUS; SUBCUTANEOUS at 02:01

## 2024-01-24 RX ADMIN — PROPOFOL 35 MCG/KG/MIN: 10 INJECTION, EMULSION INTRAVENOUS at 01:01

## 2024-01-24 RX ADMIN — PROPOFOL 35 MCG/KG/MIN: 10 INJECTION, EMULSION INTRAVENOUS at 06:01

## 2024-01-24 RX ADMIN — INSULIN ASPART 3 UNITS: 100 INJECTION, SOLUTION INTRAVENOUS; SUBCUTANEOUS at 11:01

## 2024-01-24 RX ADMIN — IPRATROPIUM BROMIDE AND ALBUTEROL SULFATE 3 ML: 2.5; .5 SOLUTION RESPIRATORY (INHALATION) at 11:01

## 2024-01-24 RX ADMIN — HYDROMORPHONE HYDROCHLORIDE 1 MG: 1 INJECTION, SOLUTION INTRAMUSCULAR; INTRAVENOUS; SUBCUTANEOUS at 12:01

## 2024-01-24 RX ADMIN — NOREPINEPHRINE BITARTRATE 0.02 MCG/KG/MIN: 4 INJECTION, SOLUTION INTRAVENOUS at 12:01

## 2024-01-24 RX ADMIN — FAMOTIDINE 20 MG: 10 INJECTION INTRAVENOUS at 08:01

## 2024-01-24 RX ADMIN — IPRATROPIUM BROMIDE AND ALBUTEROL SULFATE 3 ML: 2.5; .5 SOLUTION RESPIRATORY (INHALATION) at 07:01

## 2024-01-24 RX ADMIN — CEFTRIAXONE 1 G: 1 INJECTION, POWDER, FOR SOLUTION INTRAMUSCULAR; INTRAVENOUS at 11:01

## 2024-01-24 RX ADMIN — IPRATROPIUM BROMIDE AND ALBUTEROL SULFATE 3 ML: 2.5; .5 SOLUTION RESPIRATORY (INHALATION) at 03:01

## 2024-01-24 RX ADMIN — POTASSIUM CHLORIDE 60 MEQ: 7.46 INJECTION, SOLUTION INTRAVENOUS at 05:01

## 2024-01-24 NOTE — CONSULTS
[facility] - Intensive Care  Pulmonology  Consult Note    Patient Name: Abelardo Irene Jr.  MRN: [id number]  Admission Date: 1/23/2024  Hospital Length of Stay: 1 days  Code Status: Full Code  Attending Physician: Iron Bledsoe MD  Primary Care Provider: Rolando Funes MD   Principal Problem: Acute on chronic respiratory failure with hypoxia and hypercapnia    Inpatient consult to Pulmonology  Consult performed by: Lydia Falcon, NP  Consult ordered by: Nolan Najera MD        Subjective:     HPI:  Mr. Abelardo Irene Jr. is a 67 y.o male with a PMHx of HTN, COPD (on 4L O2), CHF (EF 15%), CAD, HTN, DMII, and tobacco abuse that presented to the ED for worsening shortness of breath throughout the day. In the ED, the patient was tachycardic, tachypneic, hypoxic and ultimately required intubation after failing BiPAP. Labs were remarkable for leukocytosis (18.5), elevated BNP (609), hyperglycemia (333).  VBG showed a pH of 7.15, pCO2 of 106.5 which improved to 7.34, 61.2 with intubation. He was admitted for acute on chronic combined respiratory failure 2/2 COPD and CHF exacerbations. Pulmonary was consulted for ventilator management.     Of note, Mr. Irene is known to our service as he has been admitted several times a year with similar presentations.     Past Medical History:   Diagnosis Date    CHF (congestive heart failure)     COPD (chronic obstructive pulmonary disease)     Coronary artery disease     Elevated cholesterol     on medication    Hypertension        Past Surgical History:   Procedure Laterality Date    CARDIAC SURGERY      coronary stent placed    CORONARY STENT PLACEMENT         Review of patient's allergies indicates:   Allergen Reactions    Contrast media Shortness Of Breath, Itching and Anxiety     Patient states that he had a bad reaction, anxiety , shortness of breath, itching .        Family History       Problem Relation (Age of Onset)    Cancer Mother    No Known Problems Father          Tobacco  Use    Smoking status: Every Day     Current packs/day: 0.00     Average packs/day: 2.0 packs/day for 45.0 years (90.0 ttl pk-yrs)     Types: Cigarettes     Start date: 1972     Last attempt to quit: 2017     Years since quittin.2    Smokeless tobacco: Never   Substance and Sexual Activity    Alcohol use: Yes     Comment: socially    Drug use: No    Sexual activity: Yes     Partners: Female     Birth control/protection: None         Review of Systems   Unable to perform ROS: Intubated     Objective:     Vital Signs (Most Recent):  Temp: 98.3 °F (36.8 °C) (24 0800)  Pulse: 70 (24 1000)  Resp: (!) 26 (24 1000)  BP: (!) 99/56 (24 1000)  SpO2: 95 % (24 1000) Vital Signs (24h Range):  Temp:  [97.9 °F (36.6 °C)-98.7 °F (37.1 °C)] 98.3 °F (36.8 °C)  Pulse:  [] 70  Resp:  [19-40] 26  SpO2:  [86 %-100 %] 95 %  BP: ()/()      Weight: 82.9 kg (182 lb 12.2 oz)  Body mass index is 29.5 kg/m².      Intake/Output Summary (Last 24 hours) at 2024 1015  Last data filed at 2024 1000  Gross per 24 hour   Intake 1169.9 ml   Output 950 ml   Net 219.9 ml        Physical Exam  Vitals and nursing note reviewed.   Constitutional:       Appearance: Normal appearance.      Interventions: He is sedated, intubated and restrained.   HENT:      Head: Normocephalic and atraumatic.      Nose: Nose normal.   Cardiovascular:      Rate and Rhythm: Normal rate and regular rhythm.      Pulses: Normal pulses.      Heart sounds: No murmur heard.  Pulmonary:      Effort: Pulmonary effort is normal. No respiratory distress. He is intubated.      Breath sounds: Wheezing and rales present.   Abdominal:      General: Abdomen is flat.      Palpations: Abdomen is soft.      Tenderness: There is no abdominal tenderness.   Musculoskeletal:      Right lower leg: No edema.      Left lower leg: No edema.   Skin:     General: Skin is warm.      Capillary Refill: Capillary refill takes less  than 2 seconds.   Neurological:      General: No focal deficit present.      Comments: + cough, gag, and corneal reflexes. Localized pain. Does not follow commands.           Vents:  Vent Mode: PRVC (01/24/24 0734)  Ventilator Initiated: Yes (01/23/24 2302)  Set Rate: 26 BPM (01/24/24 0734)  Vt Set: 420 mL (01/24/24 0734)  PEEP/CPAP: 5 cmH20 (01/24/24 0734)  Oxygen Concentration (%): 25 (01/24/24 1000)  Peak Airway Pressure: 19.5 cmH20 (01/24/24 0734)  Total Ve: 10.69 L/m (01/24/24 0734)  F/VT Ratio<105 (RSBI): (!) 63.2 (01/24/24 0734)    Lines/Drains/Airways       Drain  Duration                  NG/OG Tube 01/23/24 2330 [place] sump 16 Fr. Right mouth <1 day         Urethral Catheter 01/23/24 2330 Straight-tip 16 Fr. <1 day              Airway  Duration                  Airway - Non-Surgical 01/23/24 2233 Endotracheal Tube <1 day              Peripheral Intravenous Line  Duration                  Peripheral IV - Single Lumen 01/23/24 2150 18 G Anterior;Right Forearm <1 day         Peripheral IV - Single Lumen 01/23/24 2226 20 G;1 in Left;Posterior Hand <1 day         Peripheral IV - Single Lumen 01/23/24 2341 20 G;1 in Anterior;Right Upper Arm <1 day                    Significant Labs:    CBC/Anemia Profile:  Recent Labs   Lab 01/23/24 2226 01/24/24  0409   WBC 18.58* 13.12*   HGB 11.1* 10.0*   HCT 36.0* 33.0*    197   MCV 94 92   RDW 13.8 13.8        Chemistries:  Recent Labs   Lab 01/23/24 2226 01/24/24  0409    141   K 3.8 3.0*    98   CO2 26 29   BUN 8 9   CREATININE 0.9 0.9   CALCIUM 8.7 8.7   ALBUMIN 3.5 3.3*   PROT 7.2 6.8   BILITOT 0.5 0.5   ALKPHOS 86 78   ALT 13 14   AST 15 17   MG  --  2.1   PHOS  --  1.8*       All pertinent labs within the past 24 hours have been reviewed.    Significant Imaging:   I have reviewed all pertinent imaging results/findings within the past 24 hours.    AB  Recent Labs   Lab 01/23/24  2331   PH 7.343*   PO2 106*   PCO2 61.2*   HCO3 33.3*   BE 6*      Assessment/Plan:     Pulmonary  * Acute on chronic respiratory failure with hypoxia and hypercapnia  Likely combination of CHF and COPD exacerbations. CXR with vascular congestion, mild bilateral interstitial opacities, and small bilateral pleural effusions. BNP >600. WBC elevated. Wheezing on exam. Known emphysema and PREETI on home O2 with nightly CPAP. COVID and Flu negative.     - maintain LPV. Wean for SpO2 >88%  - continue scheduled duonebs q4h  - continue steroids for 5 days; transition to prednisone 40mg daily when able to tolerated PO  - CAP coverage with rocephin X 5 days and azithro X 3 days  - procal WNL; f/u sputum culture  - continue aggressive diuresis  - daily evaluation for SAT/SBT; plan for extubation to BiPAP when ready        Chronic obstructive pulmonary disease with acute exacerbation  HX of COPD. PFT in 2017 with moderate restriction with reduced DLCO.  Previous CT chest imaging with emphysema.    - treatment discussed in respiratory failure section    Other  Tobacco abuse  Needs cessation counseling once extubated     Plan discussed with Dr. Farmer and Dr. Bledsoe.     Critical Care Time: 55 minutes  Critical care was time spent personally by me on the following activities: development of treatment plan with patient or surrogate and bedside caregivers, discussions with consultants, evaluation of patient's response to treatment, examination of patient, ordering and performing treatments and interventions, ordering and review of laboratory studies, ordering and review of radiographic studies, pulse oximetry, re-evaluation of patient's condition. This critical care time did not overlap with that of any other provider or involve time for any procedures.      Thank you for your consult. I will follow-up with patient. Please contact us if you have any additional questions.     Lydia Falcon NP  Pulmonology  [facility] - Intensive Care

## 2024-01-24 NOTE — SUBJECTIVE & OBJECTIVE
Past Medical History:   Diagnosis Date    CHF (congestive heart failure)     COPD (chronic obstructive pulmonary disease)     Coronary artery disease     Elevated cholesterol     on medication    Hypertension        Past Surgical History:   Procedure Laterality Date    CARDIAC SURGERY      coronary stent placed    CORONARY STENT PLACEMENT         Review of patient's allergies indicates:   Allergen Reactions    Contrast media Shortness Of Breath, Itching and Anxiety     Patient states that he had a bad reaction, anxiety , shortness of breath, itching .        Family History       Problem Relation (Age of Onset)    Cancer Mother    No Known Problems Father          Tobacco Use    Smoking status: Every Day     Current packs/day: 0.00     Average packs/day: 2.0 packs/day for 45.0 years (90.0 ttl pk-yrs)     Types: Cigarettes     Start date: 1972     Last attempt to quit: 2017     Years since quittin.2    Smokeless tobacco: Never   Substance and Sexual Activity    Alcohol use: Yes     Comment: socially    Drug use: No    Sexual activity: Yes     Partners: Female     Birth control/protection: None         Review of Systems   Unable to perform ROS: Intubated     Objective:     Vital Signs (Most Recent):  Temp: 98.3 °F (36.8 °C) (24 0800)  Pulse: 70 (24 1000)  Resp: (!) 26 (24 1000)  BP: (!) 99/56 (24 1000)  SpO2: 95 % (24 1000) Vital Signs (24h Range):  Temp:  [97.9 °F (36.6 °C)-98.7 °F (37.1 °C)] 98.3 °F (36.8 °C)  Pulse:  [] 70  Resp:  [19-40] 26  SpO2:  [86 %-100 %] 95 %  BP: ()/() 99/56     Weight: 82.9 kg (182 lb 12.2 oz)  Body mass index is 29.5 kg/m².      Intake/Output Summary (Last 24 hours) at 2024 1015  Last data filed at 2024 1000  Gross per 24 hour   Intake 1169.9 ml   Output 950 ml   Net 219.9 ml        Physical Exam  Vitals and nursing note reviewed.   Constitutional:       Appearance: Normal appearance.      Interventions: He is  sedated, intubated and restrained.   HENT:      Head: Normocephalic and atraumatic.      Nose: Nose normal.   Cardiovascular:      Rate and Rhythm: Normal rate and regular rhythm.      Pulses: Normal pulses.      Heart sounds: No murmur heard.  Pulmonary:      Effort: Pulmonary effort is normal. No respiratory distress. He is intubated.      Breath sounds: Wheezing and rales present.   Abdominal:      General: Abdomen is flat.      Palpations: Abdomen is soft.      Tenderness: There is no abdominal tenderness.   Musculoskeletal:      Right lower leg: No edema.      Left lower leg: No edema.   Skin:     General: Skin is warm.      Capillary Refill: Capillary refill takes less than 2 seconds.   Neurological:      General: No focal deficit present.      Comments: + cough, gag, and corneal reflexes. Localized pain. Does not follow commands.           Vents:  Vent Mode: PRVC (01/24/24 0734)  Ventilator Initiated: Yes (01/23/24 2302)  Set Rate: 26 BPM (01/24/24 0734)  Vt Set: 420 mL (01/24/24 0734)  PEEP/CPAP: 5 cmH20 (01/24/24 0734)  Oxygen Concentration (%): 25 (01/24/24 1000)  Peak Airway Pressure: 19.5 cmH20 (01/24/24 0734)  Total Ve: 10.69 L/m (01/24/24 0734)  F/VT Ratio<105 (RSBI): (!) 63.2 (01/24/24 0734)    Lines/Drains/Airways       Drain  Duration                  NG/OG Tube 01/23/24 2330 [place] sump 16 Fr. Right mouth <1 day         Urethral Catheter 01/23/24 2330 Straight-tip 16 Fr. <1 day              Airway  Duration                  Airway - Non-Surgical 01/23/24 2233 Endotracheal Tube <1 day              Peripheral Intravenous Line  Duration                  Peripheral IV - Single Lumen 01/23/24 2150 18 G Anterior;Right Forearm <1 day         Peripheral IV - Single Lumen 01/23/24 2226 20 G;1 in Left;Posterior Hand <1 day         Peripheral IV - Single Lumen 01/23/24 2341 20 G;1 in Anterior;Right Upper Arm <1 day                    Significant Labs:    CBC/Anemia Profile:  Recent Labs   Lab 01/23/24 2226  01/24/24  0409   WBC 18.58* 13.12*   HGB 11.1* 10.0*   HCT 36.0* 33.0*    197   MCV 94 92   RDW 13.8 13.8        Chemistries:  Recent Labs   Lab 01/23/24 2226 01/24/24  0409    141   K 3.8 3.0*    98   CO2 26 29   BUN 8 9   CREATININE 0.9 0.9   CALCIUM 8.7 8.7   ALBUMIN 3.5 3.3*   PROT 7.2 6.8   BILITOT 0.5 0.5   ALKPHOS 86 78   ALT 13 14   AST 15 17   MG  --  2.1   PHOS  --  1.8*       All pertinent labs within the past 24 hours have been reviewed.    Significant Imaging:   I have reviewed all pertinent imaging results/findings within the past 24 hours.

## 2024-01-24 NOTE — ASSESSMENT & PLAN NOTE
Likely combination of CHF and COPD exacerbations. CXR with vascular congestion, mild bilateral interstitial opacities, and small bilateral pleural effusions. BNP >600. WBC elevated. Wheezing on exam. Known emphysema and PREETI on home O2 with nightly CPAP. COVID and Flu negative.     - maintain LPV. Wean for SpO2 >88%  - continue scheduled duonebs q4h  - continue steroids for 5 days; transition to prednisone 40mg daily when able to tolerated PO  - CAP coverage with rocephin X 5 days and azithro X 3 days  - procal WNL; f/u sputum culture  - continue aggressive diuresis  - daily evaluation for SAT/SBT; plan for extubation to BiPAP when ready

## 2024-01-24 NOTE — ED NOTES
Abelardo Irene Jr., a 68 y.o. male presents to the ED w/ complaint of SOB. Patient arrived on BIPAP via EMS. Bed locked, in lowest position, bed rails up x2, call light in reach, all monitoring attached.

## 2024-01-24 NOTE — CARE UPDATE
Patient seen and examined.  See H/P, treatment and plan per Dr. Najera.  69 y/o male admitted with acute respiratory failure.  Failed Bipap in ER and then intubated.  Hx of COPD on home oxygen and CHF EF 15%.  Likely combination of CHF and COPD exacerbations. CXR with vascular congestion, mild bilateral interstitial opacities, and small bilateral pleural effusions. BNP >600.   Started on nebs, IV diuresis and steroids.  Pulmonary consulted.  Elevated WBC and started on ABX's.  Minimal oxygen demands.  Hopefully extubation soon.  Daily evaluation with SAT/SBT.  Continue current management.

## 2024-01-24 NOTE — ED PROVIDER NOTES
Encounter Date: 2024    SCRIBE #1 NOTE: I, Aundrea Li, am scribing for, and in the presence of,  Messi Edward MD.       History     Chief Complaint   Patient presents with    Shortness of Breath     Patient arrives via EMS from home with SOB all day. On home O2 patient's SPO2 was 84% with wheezing. History of COPD. EMS put on CPAP, still having respiratory distress. Solumedrol given, and mag drip started PTA.     67 yo M w/ PMHx of COPD on home oxygen, CHF, HTN, CAD, presenting to the ED BIB EMS for respiratory distress. PTA today EMS found him w/ SpO2 of 84% on his home oxygen. Placed on CPAP and given duonebs, solumedrol, mag w/ only improvement to 92%. , bp 160s/90s en route. EMS also notes wheezing in multiple lung fields.     History limited at this time d/t acuity of condition.     The history is provided by the EMS personnel.     Review of patient's allergies indicates:   Allergen Reactions    Contrast media Shortness Of Breath, Itching and Anxiety     Patient states that he had a bad reaction, anxiety , shortness of breath, itching .      Past Medical History:   Diagnosis Date    CHF (congestive heart failure)     COPD (chronic obstructive pulmonary disease)     Coronary artery disease     Elevated cholesterol     on medication    Hypertension      Past Surgical History:   Procedure Laterality Date    CARDIAC SURGERY      coronary stent placed    CORONARY STENT PLACEMENT       Family History   Problem Relation Age of Onset    Cancer Mother     No Known Problems Father      Social History     Tobacco Use    Smoking status: Every Day     Current packs/day: 0.00     Average packs/day: 2.0 packs/day for 45.0 years (90.0 ttl pk-yrs)     Types: Cigarettes     Start date: 1972     Last attempt to quit: 2017     Years since quittin.2    Smokeless tobacco: Never   Substance Use Topics    Alcohol use: Yes     Comment: socially    Drug use: No     Review of Systems   Unable to perform  ROS: Severe respiratory distress       Physical Exam     Initial Vitals   BP Pulse Resp Temp SpO2   01/23/24 2219 01/23/24 2219 01/23/24 2219 01/23/24 2352 01/23/24 2219   (!) 160/90 (!) 129 (!) 40 98.7 °F (37.1 °C) (!) 93 %      MAP       --                Physical Exam    Nursing note and vitals reviewed.  Constitutional: He is diaphoretic. He appears distressed (severely, drowsy, unable to respond verbally).   HENT:   Head: Normocephalic and atraumatic.   Nose: Nose normal.   Mouth/Throat: No oropharyngeal exudate.   Eyes: EOM are normal. Pupils are equal, round, and reactive to light.   Neck: Neck supple. No tracheal deviation present. No JVD present.   Normal range of motion.  Cardiovascular:  Regular rhythm, normal heart sounds and intact distal pulses.           Tachycardic   Pulmonary/Chest: He is in respiratory distress (severely tachypneic, diffuse wheezing, one-word answers intermittently). He has wheezes.   Abdominal: Abdomen is soft. Bowel sounds are normal. He exhibits no distension. There is no abdominal tenderness. There is no rebound and no guarding.   Musculoskeletal:         General: No tenderness or edema. Normal range of motion.      Cervical back: Normal range of motion and neck supple.     Neurological:   Drowsy, arousable to physical stimulation   Skin: Skin is warm. Capillary refill takes less than 2 seconds. No rash noted. No erythema.         ED Course   Critical Care    Date/Time: 1/23/2024 10:30 PM    Performed by: Messi Edward MD  Authorized by: Messi Edward MD  Direct patient critical care time: 20 minutes  Additional history critical care time: 10 minutes  Ordering / reviewing critical care time: 5 minutes  Documentation critical care time: 5 minutes  Total critical care time (exclusive of procedural time) : 40 minutes  Critical care time was exclusive of separately billable procedures and treating other patients and teaching time.  Critical care was necessary to  treat or prevent imminent or life-threatening deterioration of the following conditions: shock and respiratory failure.  Critical care was time spent personally by me on the following activities: development of treatment plan with patient or surrogate, evaluation of patient's response to treatment, examination of patient, obtaining history from patient or surrogate, ordering and performing treatments and interventions, ordering and review of laboratory studies, ordering and review of radiographic studies, re-evaluation of patient's condition and review of old charts.      Intubation    Date/Time: 1/23/2024 10:30 PM  Location procedure was performed: [facility] EMERGENCY DEPARTMENT    Performed by: Messi Edward MD  Authorized by: Messi Edward MD  Consent Done: Emergent Situation  Indications: respiratory failure and hypercapnia  Intubation method: video-assisted  Patient status: paralyzed (RSI)  Preoxygenation: BVM  Sedatives: etomidate  Paralytic: rocuronium  Laryngoscope size: Mac 3  Tube size: 8.0 mm  Tube type: cuffed  Number of attempts: 1  Cricoid pressure: no  Cords visualized: yes  Post-procedure assessment: chest rise and CO2 detector  Breath sounds: wheezing  Cuff inflated: yes  ETT to lip: 24 cm  Tube secured with: ETT buchanan  Chest x-ray interpreted by me, other physician and radiologist.  Chest x-ray findings: endotracheal tube in appropriate position  Patient tolerance: Patient tolerated the procedure well with no immediate complications  Complications: No  Specimens: No  Implants: No        Labs Reviewed   CBC W/ AUTO DIFFERENTIAL - Abnormal; Notable for the following components:       Result Value    WBC 18.58 (*)     RBC 3.84 (*)     Hemoglobin 11.1 (*)     Hematocrit 36.0 (*)     MCHC 30.8 (*)     Gran # (ANC) 13.3 (*)     Immature Grans (Abs) 0.08 (*)     All other components within normal limits   COMPREHENSIVE METABOLIC PANEL - Abnormal; Notable for the following components:     "Glucose 333 (*)     All other components within normal limits   B-TYPE NATRIURETIC PEPTIDE - Abnormal; Notable for the following components:     (*)     All other components within normal limits   ISTAT PROCEDURE - Abnormal; Notable for the following components:    POC PH 7.159 (*)     POC PCO2 106.5 (*)     POC PO2 110 (*)     POC HCO3 37.9 (*)     POC BE 6 (*)     POC TCO2 41 (*)     All other components within normal limits   ISTAT PROCEDURE - Abnormal; Notable for the following components:    POC PH 7.343 (*)     POC PCO2 61.2 (*)     POC PO2 106 (*)     POC HCO3 33.3 (*)     POC BE 6 (*)     POC TCO2 35 (*)     All other components within normal limits   SARS-COV-2 RDRP GENE - Normal   POCT INFLUENZA A/B MOLECULAR - Normal   CULTURE, BLOOD   CULTURE, BLOOD   TROPONIN I   PROCALCITONIN   HEMOGLOBIN A1C   PHOSPHORUS   MAGNESIUM   COMPREHENSIVE METABOLIC PANEL   CBC W/ AUTO DIFFERENTIAL   POCT GLUCOSE MONITORING CONTINUOUS   POCT GLUCOSE MONITORING CONTINUOUS          Imaging Results              X-Ray Chest AP Portable (Final result)  Result time 01/23/24 22:56:35      Final result by Waldo Brito MD (01/23/24 22:56:35)                   Impression:      Positioning of support devices discussed in the body of the report.    Cardiomegaly with probable interstitial edema and small pleural effusions.      Electronically signed by: Waldo Brito MD  Date:    01/23/2024  Time:    22:56               Narrative:    EXAMINATION:  XR CHEST AP PORTABLE    CLINICAL HISTORY:  Provided history is "Chest Pain;  ".    TECHNIQUE:  One view of the chest.    COMPARISON:  10/19/2023.    FINDINGS:  Endotracheal tube terminates approximately 3 cm above the christine.  Enteric tube extends below the field of view.  Cardiac silhouette is enlarged and similar to the prior study.  Central vascular congestion with mild bilateral interstitial opacities.  Small right pleural effusion and possible small left pleural effusion.  " Passive atelectasis in the lung bases, right side worse than left.  Aeration is similar to slightly worse when compared with the prior study.  Pulmonary emphysema is suspected.  No distinct pneumothorax.                                       XR NG/OG tube placement check, non-radiologist performed (Final result)  Result time 01/23/24 22:57:03      Final result by Waldo Brito MD (01/23/24 22:57:03)                   Impression:      Enteric tube overlies the stomach.      Electronically signed by: Waldo Brito MD  Date:    01/23/2024  Time:    22:57               Narrative:    EXAMINATION:  XR NG/OG TUBE PLACEMENT CHECK, NON-RADIOLOGIST PERFORMED    CLINICAL HISTORY:  orogastric;    TECHNIQUE:  Single AP View of the abdomen was performed.    COMPARISON:  None.    FINDINGS:  Enteric tube overlies the stomach with the side port below the level of the gastroesophageal junction.  Lower chest is similar to same day chest radiograph.  Bowel gas pattern is unremarkable.                                       Medications   propofol (DIPRIVAN) 10 mg/mL infusion (35 mcg/kg/min × 86.2 kg Intravenous Restarted 1/24/24 0055)   aspirin EC tablet 81 mg (has no administration in time range)   atorvastatin tablet 40 mg (has no administration in time range)   clopidogreL tablet 75 mg (has no administration in time range)   sodium chloride 0.9% flush 10 mL (has no administration in time range)   acetaminophen tablet 650 mg (has no administration in time range)   famotidine (PF) injection 20 mg (has no administration in time range)   ondansetron injection 4 mg (has no administration in time range)   prochlorperazine injection Soln 5 mg (has no administration in time range)   melatonin tablet 6 mg (has no administration in time range)   potassium chloride 10 mEq in 100 mL IVPB (has no administration in time range)     And   potassium chloride 10 mEq in 100 mL IVPB (has no administration in time range)     And   potassium  chloride 10 mEq in 100 mL IVPB (has no administration in time range)   magnesium sulfate 2g in water 50mL IVPB (premix) (has no administration in time range)   magnesium sulfate 2g in water 50mL IVPB (premix) (has no administration in time range)   calcium gluconate 1 g in NS IVPB (premixed) (has no administration in time range)   calcium gluconate 1 g in NS IVPB (premixed) (has no administration in time range)   calcium gluconate 1 g in NS IVPB (premixed) (has no administration in time range)   albuterol-ipratropium 2.5 mg-0.5 mg/3 mL nebulizer solution 3 mL (has no administration in time range)   heparin (porcine) injection 5,000 Units (has no administration in time range)   furosemide injection 40 mg (has no administration in time range)   methylPREDNISolone sodium succinate injection 40 mg (has no administration in time range)   albuterol-ipratropium 2.5 mg-0.5 mg/3 mL nebulizer solution 3 mL (3 mLs Nebulization Given 1/24/24 0049)   glucose chewable tablet 16 g (has no administration in time range)   glucose chewable tablet 24 g (has no administration in time range)   glucagon (human recombinant) injection 1 mg (has no administration in time range)   insulin aspart U-100 pen 0-5 Units (has no administration in time range)   dextrose 10% bolus 125 mL 125 mL (has no administration in time range)   dextrose 10% bolus 250 mL 250 mL (has no administration in time range)   fentaNYL 2500 mcg in 0.9% sodium chloride 250 mL infusion premix (titrating) (25 mcg/hr Intravenous New Bag 1/24/24 0039)   NORepinephrine 4 mg in dextrose 5% 250 mL infusion (premix) (0.04 mcg/kg/min × 86.2 kg Intravenous Rate/Dose Change 1/24/24 0056)   albuterol sulfate nebulizer solution 10 mg (10 mg Nebulization Given 1/23/24 2226)   etomidate injection 20 mg (20 mg Intravenous Given 1/23/24 2232)   rocuronium injection 100 mg (100 mg Intravenous Given 1/23/24 2233)   cefTRIAXone (ROCEPHIN) 2 g in dextrose 5 % in water (D5W) 100 mL IVPB (MB+)  (2 g Intravenous New Bag 1/24/24 0057)   azithromycin (ZITHROMAX) 500 mg in dextrose 5 % (D5W) 250 mL IVPB (Vial-Mate) (0 mg Intravenous Stopped 1/24/24 0051)   furosemide injection 60 mg (60 mg Intravenous Given 1/24/24 0017)   HYDROmorphone injection 1 mg (1 mg Intravenous Given 1/24/24 0033)     Medical Decision Making  Amount and/or Complexity of Data Reviewed  Independent Historian: EMS     Details: See hpi   Labs: ordered. Decision-making details documented in ED Course.  Radiology: ordered. Decision-making details documented in ED Course.  ECG/medicine tests: ordered and independent interpretation performed. Decision-making details documented in ED Course.    Risk  Prescription drug management.  Decision regarding hospitalization.      MDM:    68-year-old male with past medical history as noted above presenting with shortness breath.  Differential Diagnosis includes:  Respiratory failure, acute mi/ACS, COPD exacerbation, CHF exacerbation, aortic dissection, PE.  Physical exam as noted above.  ED workup notable for chest x-ray showing cardiomegaly with interstitial edema small bilateral pleural effusions, troponin within normal limits, , CMP with glucose 333, CBC white blood cell count 18.58, hemoglobin 11.1, pH 7.15, pCO2 106.5, procalcitonin negative.  EKG showed sinus tachycardia rate of 114 beats per minute, nonspecific ST and T-wave changes noted, change when compared to prior,  MS, no STEMI.  Patient initially presenting in significant respiratory distress, diaphoretic, drowsy transitioned to BiPAP after magnesium Solu-Medrol given by EMS, continuous albuterol neb started.  After brief.  Patient's mental status did not significantly improved, still remained tachypneic with respiratory rate greater than 30 with diffuse wheezing noted.  Given his significant work breath, altered mental status and inability to answer questions decision was made to proceed with emergent patient at this time.   Procedures documented above, patient is sedated, and will be admitted to the ICU for further evaluation and management, repeat blood gas after intubation shows considerable improvement with near normalization.  Transferred to the ICU in stable and improved condition.        Note was created using voice recognition software. Note may have occasional typographical or grammatical errors, garbled syntax, and other bizarre constructions that may not have been identified and edited despite good mike initial review prior to signing.             Scribe Attestation:   Scribe #1: I performed the above scribed service and the documentation accurately describes the services I performed. I attest to the accuracy of the note.                           I, Messi Edward M.D., personally performed the services described in this documentation. All medical record entries made by the scribe were at my direction and in my presence. I have reviewed the chart and agree that the record reflects my personal performance and is accurate and complete.      Clinical Impression:  Final diagnoses:  [R07.9] Chest pain  [J96.00] Acute respiratory failure          ED Disposition Condition    Admit                 Messi Edward MD  01/24/24 0138

## 2024-01-24 NOTE — ASSESSMENT & PLAN NOTE
Patient with Hypercapnic and Hypoxic Respiratory failure which is Acute on chronic.  he is on home oxygen at 4 LPM. Supplemental oxygen was provided and noted- Vent Mode: PRVC  Oxygen Concentration (%):  [40-75] 40  Resp Rate Total:  [30 br/min] 30 br/min  Vt Set:  [420 mL] 420 mL  PEEP/CPAP:  [8 cmH20] 8 cmH20  Mean Airway Pressure:  [14.7 olM34-42.4 cmH20] 14.7 cmH20    .   Signs/symptoms of respiratory failure include- increased work of breathing and respiratory distress. Contributing diagnoses includes - CHF and COPD Labs and images were reviewed. Patient Has recent ABG, which has been reviewed. Will treat underlying causes and adjust management of respiratory failure as follows- Continue Solu-medrol, Duo-Nebs, Lasix

## 2024-01-24 NOTE — ASSESSMENT & PLAN NOTE
Results for orders placed during the hospital encounter of 07/02/23    Echo    Interpretation Summary  · The left ventricle is severely enlarged with eccentric hypertrophy and severely decreased systolic function.  · The estimated ejection fraction is 15%.  · Grade I left ventricular diastolic dysfunction.  · Normal right ventricular size with normal right ventricular systolic function.  · Moderate left atrial enlargement.  · Intermediate central venous pressure (8 mmHg).  Continue Lasix

## 2024-01-24 NOTE — HPI
Mr. Abelardo Irene Jr. is a 67 y.o male with a PMHx of HTN, COPD (on 4L O2), CHF (EF 15%), CAD, HTN, DMII, and tobacco abuse that presented to the ED for worsening shortness of breath throughout the day. In the ED, the patient was tachycardic, tachypneic, hypoxic and ultimately required intubation after failing BiPAP. Labs were remarkable for leukocytosis (18.5), elevated BNP (609), hyperglycemia (333).  VBG showed a pH of 7.15, pCO2 of 106.5 which improved to 7.34, 61.2 with intubation. He was admitted for acute on chronic combined respiratory failure 2/2 COPD and CHF exacerbations. Pulmonary was consulted for ventilator management.     Of note, Mr. Irene is known to our service as he has been admitted several times a year with similar presentations.

## 2024-01-24 NOTE — ASSESSMENT & PLAN NOTE
"Patient's FSGs are controlled on current medication regimen.  Last A1c reviewed-   Lab Results   Component Value Date    HGBA1C 7.0 (H) 10/19/2023     Most recent fingerstick glucose reviewed- No results for input(s): "POCTGLUCOSE" in the last 24 hours.  Current correctional scale  Low  Maintain anti-hyperglycemic dose as follows-   Antihyperglycemics (From admission, onward)      Start     Stop Route Frequency Ordered    01/24/24 0102  insulin aspart U-100 pen 0-5 Units         -- SubQ Before meals & nightly PRN 01/24/24 0002          Hold Oral hypoglycemics while patient is in the hospital.  " no

## 2024-01-24 NOTE — H&P
[facility]  [facility] Medicine  History & Physical    Patient Name: Abelardo Irene Jr.  MRN: [id number]  Patient Class: IP- Inpatient  Admission Date: 1/23/2024  Attending Physician: Iron Bledsoe MD   Primary Care Provider: Rolando Funes MD         Patient information was obtained from ER records.     Subjective:     Principal Problem:Acute on chronic respiratory failure with hypoxia and hypercapnia    Chief Complaint:   Chief Complaint   Patient presents with    Shortness of Breath     Patient arrives via EMS from home with SOB all day. On home O2 patient's SPO2 was 84% with wheezing. History of COPD. EMS put on CPAP, still having respiratory distress. Solumedrol given, and mag drip started PTA.        HPI: This is a 68-year-old male with a past medical history of COPD (on 4 L O2), HFrEF (EF: 15%, GIDD), CAD, hypertension, type 2 diabetes, hyperlipidemia, tobacco use who presents with shortness of breath.    Patient presents with shortness of breath that has been ongoing for the whole day.  He has recurrent hospitalization/ED presentations for COPD/heart failure exacerbation.  Patient is unable to contribute to the history as he is intubated.    In the ED, the patient was tachycardic, tachypneic, hypoxic and ultimately required to be intubated.  Labs were remarkable for leukocytosis (18.5), elevated BNP (609), hyperglycemia (333).  VBG showed a pH of 7.15, pCO2 of 106.5 > 7.34, 61.2.  Chest x-ray showed cardiomegaly with probable interstitial edema and small pleural effusions. Patient was briefly trialed on BiPAP but was ultimately required to be intubated for persistent tachypnea and declined GCS.  EMS administered Solu-Medrol, magnesium sulfate, and DuoNeb.  In the ER, he received DuoNeb x1, ceftriaxone, azithromycin, rocuronium 100 mg IV, etomidate 20 mg IV, and was started on propofol.  Patient was admitted for further management.    Past Medical History:   Diagnosis Date    CHF  (congestive heart failure)     COPD (chronic obstructive pulmonary disease)     Coronary artery disease     Elevated cholesterol     on medication    Hypertension        Past Surgical History:   Procedure Laterality Date    CARDIAC SURGERY      coronary stent placed    CORONARY STENT PLACEMENT         Review of patient's allergies indicates:   Allergen Reactions    Contrast media Shortness Of Breath, Itching and Anxiety     Patient states that he had a bad reaction, anxiety , shortness of breath, itching .        No current facility-administered medications on file prior to encounter.     Current Outpatient Medications on File Prior to Encounter   Medication Sig    aspirin (ECOTRIN) 81 MG EC tablet Take 1 tablet (81 mg total) by mouth once daily.    atorvastatin (LIPITOR) 40 MG tablet Take 1 tablet (40 mg total) by mouth once daily.    clopidogreL (PLAVIX) 75 mg tablet Take 1 tablet (75 mg total) by mouth once daily.    furosemide (LASIX) 40 MG tablet Take 1 tablet (40 mg total) by mouth 2 (two) times daily.     Family History       Problem Relation (Age of Onset)    Cancer Mother    No Known Problems Father          Tobacco Use    Smoking status: Every Day     Current packs/day: 0.00     Average packs/day: 2.0 packs/day for 45.0 years (90.0 ttl pk-yrs)     Types: Cigarettes     Start date: 1972     Last attempt to quit: 2017     Years since quittin.2    Smokeless tobacco: Never   Substance and Sexual Activity    Alcohol use: Yes     Comment: socially    Drug use: No    Sexual activity: Yes     Partners: Female     Birth control/protection: None     Review of Systems   Unable to perform ROS: Intubated     Objective:     Vital Signs (Most Recent):  Temp: 98.7 °F (37.1 °C) (24)  Pulse: 104 (24)  Resp: (!) 26 (24)  BP: (!) 160/89 (24)  SpO2: 97 % (24) Vital Signs (24h Range):  Temp:  [98.7 °F (37.1 °C)] 98.7 °F (37.1 °C)  Pulse:  [104-229] 104  Resp:   [26-40] 26  SpO2:  [86 %-100 %] 97 %  BP: (131-164)/() 160/89     Weight: 86.2 kg (190 lb)  Body mass index is 30.67 kg/m².     Physical Exam  Vitals and nursing note reviewed.   Constitutional:       Appearance: Normal appearance.   HENT:      Head: Normocephalic and atraumatic.      Nose: Nose normal.      Mouth/Throat:      Comments: ET tube in place  Cardiovascular:      Rate and Rhythm: Tachycardia present.      Pulses: Normal pulses.      Heart sounds: No murmur heard.  Pulmonary:      Effort: Pulmonary effort is normal. No respiratory distress.      Breath sounds: Wheezing and rales present.   Abdominal:      General: Abdomen is flat.      Palpations: Abdomen is soft.      Tenderness: There is no abdominal tenderness.   Musculoskeletal:      Right lower leg: Edema present.      Left lower leg: Edema present.   Skin:     General: Skin is warm.      Capillary Refill: Capillary refill takes less than 2 seconds.   Neurological:      General: No focal deficit present.      Mental Status: He is alert.   Psychiatric:         Mood and Affect: Mood normal.                Significant Labs: All pertinent labs within the past 24 hours have been reviewed.    Significant Imaging: I have reviewed all pertinent imaging results/findings within the past 24 hours.  Assessment/Plan:     * Acute on chronic respiratory failure with hypoxia and hypercapnia  Patient with Hypercapnic and Hypoxic Respiratory failure which is Acute on chronic.  he is on home oxygen at 4 LPM. Supplemental oxygen was provided and noted- Vent Mode: PRVC  Oxygen Concentration (%):  [40-75] 40  Resp Rate Total:  [30 br/min] 30 br/min  Vt Set:  [420 mL] 420 mL  PEEP/CPAP:  [8 cmH20] 8 cmH20  Mean Airway Pressure:  [14.7 qmZ11-09.4 cmH20] 14.7 cmH20    .   Signs/symptoms of respiratory failure include- increased work of breathing and respiratory distress. Contributing diagnoses includes - CHF and COPD Labs and images were reviewed. Patient Has recent  "ABG, which has been reviewed. Will treat underlying causes and adjust management of respiratory failure as follows- Continue Solu-medrol, Duo-Nebs, Lasix     Acute on chronic combined systolic and diastolic heart failure  Results for orders placed during the hospital encounter of 07/02/23    Echo    Interpretation Summary  · The left ventricle is severely enlarged with eccentric hypertrophy and severely decreased systolic function.  · The estimated ejection fraction is 15%.  · Grade I left ventricular diastolic dysfunction.  · Normal right ventricular size with normal right ventricular systolic function.  · Moderate left atrial enlargement.  · Intermediate central venous pressure (8 mmHg).  Continue Lasix       Chronic obstructive pulmonary disease with acute exacerbation  Patient's COPD is with exacerbation noted by continued dyspnea currently.  Patient is currently on COPD Pathway. Continue scheduled inhalers Steroids and Supplemental oxygen and monitor respiratory status closely.     Uncontrolled type 2 diabetes mellitus with hyperglycemia  Patient's FSGs are controlled on current medication regimen.  Last A1c reviewed-   Lab Results   Component Value Date    HGBA1C 7.0 (H) 10/19/2023     Most recent fingerstick glucose reviewed- No results for input(s): "POCTGLUCOSE" in the last 24 hours.  Current correctional scale  Low  Maintain anti-hyperglycemic dose as follows-   Antihyperglycemics (From admission, onward)      Start     Stop Route Frequency Ordered    01/24/24 0102  insulin aspart U-100 pen 0-5 Units         -- SubQ Before meals & nightly PRN 01/24/24 0002          Hold Oral hypoglycemics while patient is in the hospital.    Coronary artery disease involving native coronary artery of native heart without angina pectoris  Resume plavix, statin     Dyslipidemia  Resume statin       Tobacco abuse   when appropriate       Benign essential hypertension  Chronic, controlled. Latest blood pressure and " vitals reviewed-     Temp:  [98.7 °F (37.1 °C)]   Pulse:  [104-229]   Resp:  [26-40]   BP: (131-164)/()   SpO2:  [86 %-100 %] .   Home meds for hypertension were reviewed and noted below.   Hypertension Medications               furosemide (LASIX) 40 MG tablet Take 1 tablet (40 mg total) by mouth 2 (two) times daily.            While in the hospital, will manage blood pressure as follows; Continue home antihypertensive regimen    Will utilize p.r.n. blood pressure medication only if patient's blood pressure greater than 180/110 and he develops symptoms such as worsening chest pain or shortness of breath.      VTE Risk Mitigation (From admission, onward)           Ordered     heparin (porcine) injection 5,000 Units  Every 8 hours         01/23/24 2353     IP VTE HIGH RISK PATIENT  Once         01/23/24 2353     Place sequential compression device  Until discontinued         01/23/24 2353                       Critical care time spent on the evaluation and treatment of severe organ dysfunction, review of pertinent labs and imaging studies, discussions with consulting providers and discussions with patient/family: 60 minutes.          AdmissionCare    Guideline: Respiratory Failure, Inpatient    Based on the indications selected for the patient, the bed status of Admit to Inpatient was determined to be MET    The following indications were selected as present at the time of evaluation of the patient:      - New (acute) need for mechanical invasive or noninvasive (eg, bilevel positive airway pressure (BPAP), volume-assured pressure support (VAPS), or volume control) ventilation    AdmissionCare documentation entered by: Sherry Mg    [facility] [employer] 27th edition, Copyright © 2023 [facility] [employer] [facility] All Rights Reserved.  [id number]:00    Nolan Najera MD  Department of Hospital Medicine  [facility] [place] - Emergency Dept

## 2024-01-24 NOTE — PROGRESS NOTES
67 yo male active smoker w/ PMHx COPD on home O2, CAD, HTN, sys and diastolic HF HFrEF 15%, HL here w/ acte SOB.   Noted to be in type 2 respiratory faulure d/t infective COPD and CHF exacerbation.     BIPAP was tried w/o improvement, and so was intubated.     WBC 18.5      ABG 7.34/61/106 ON RR 30 AND 40% fIo2 w/ peep +8  Would f/up for s/s auto PEEP    BUN/Cr 8/0.9  Flu, COVID and PCT negative    S/p cx, pt is on ceftriaxone and azithro  Nebs optimized  IV solumedrol noted  Sugar management  Diuretics being given    Hep SC for DVT prophy  Pepcid for SUP    Please consider nicotine patch if +ve s/s withdrawal noted.     Detailed H&P noted in the chart    CXR:  - Endotracheal tube terminates approximately 3 cm above the christine.  Enteric tube extends below the field of view.  Cardiac silhouette is enlarged and similar to the prior study.  Central vascular congestion with mild bilateral interstitial opacities.  Small right pleural effusion and possible small left pleural effusion.  Passive atelectasis in the lung bases, right side worse than left.  Aeration is similar to slightly worse when compared with the prior study.  Pulmonary emphysema is suspected.  No distinct pneumothorax.   - Enteric tube overlies the stomach with the side port below the level of the gastroesophageal junction.     Please call us for further assistance.

## 2024-01-24 NOTE — SUBJECTIVE & OBJECTIVE
Past Medical History:   Diagnosis Date    CHF (congestive heart failure)     COPD (chronic obstructive pulmonary disease)     Coronary artery disease     Elevated cholesterol     on medication    Hypertension        Past Surgical History:   Procedure Laterality Date    CARDIAC SURGERY      coronary stent placed    CORONARY STENT PLACEMENT         Review of patient's allergies indicates:   Allergen Reactions    Contrast media Shortness Of Breath, Itching and Anxiety     Patient states that he had a bad reaction, anxiety , shortness of breath, itching .        No current facility-administered medications on file prior to encounter.     Current Outpatient Medications on File Prior to Encounter   Medication Sig    aspirin (ECOTRIN) 81 MG EC tablet Take 1 tablet (81 mg total) by mouth once daily.    atorvastatin (LIPITOR) 40 MG tablet Take 1 tablet (40 mg total) by mouth once daily.    clopidogreL (PLAVIX) 75 mg tablet Take 1 tablet (75 mg total) by mouth once daily.    furosemide (LASIX) 40 MG tablet Take 1 tablet (40 mg total) by mouth 2 (two) times daily.     Family History       Problem Relation (Age of Onset)    Cancer Mother    No Known Problems Father          Tobacco Use    Smoking status: Every Day     Current packs/day: 0.00     Average packs/day: 2.0 packs/day for 45.0 years (90.0 ttl pk-yrs)     Types: Cigarettes     Start date: 1972     Last attempt to quit: 2017     Years since quittin.2    Smokeless tobacco: Never   Substance and Sexual Activity    Alcohol use: Yes     Comment: socially    Drug use: No    Sexual activity: Yes     Partners: Female     Birth control/protection: None     Review of Systems   Unable to perform ROS: Intubated     Objective:     Vital Signs (Most Recent):  Temp: 98.7 °F (37.1 °C) (24)  Pulse: 104 (24)  Resp: (!) 26 (24)  BP: (!) 160/89 (24)  SpO2: 97 % (24) Vital Signs (24h Range):  Temp:  [98.7 °F (37.1 °C)]  98.7 °F (37.1 °C)  Pulse:  [104-229] 104  Resp:  [26-40] 26  SpO2:  [86 %-100 %] 97 %  BP: (131-164)/() 160/89     Weight: 86.2 kg (190 lb)  Body mass index is 30.67 kg/m².     Physical Exam  Vitals and nursing note reviewed.   Constitutional:       Appearance: Normal appearance.   HENT:      Head: Normocephalic and atraumatic.      Nose: Nose normal.      Mouth/Throat:      Comments: ET tube in place  Cardiovascular:      Rate and Rhythm: Tachycardia present.      Pulses: Normal pulses.      Heart sounds: No murmur heard.  Pulmonary:      Effort: Pulmonary effort is normal. No respiratory distress.      Breath sounds: Wheezing and rales present.   Abdominal:      General: Abdomen is flat.      Palpations: Abdomen is soft.      Tenderness: There is no abdominal tenderness.   Musculoskeletal:      Right lower leg: Edema present.      Left lower leg: Edema present.   Skin:     General: Skin is warm.      Capillary Refill: Capillary refill takes less than 2 seconds.   Neurological:      General: No focal deficit present.      Mental Status: He is alert.   Psychiatric:         Mood and Affect: Mood normal.                Significant Labs: All pertinent labs within the past 24 hours have been reviewed.    Significant Imaging: I have reviewed all pertinent imaging results/findings within the past 24 hours.

## 2024-01-24 NOTE — ASSESSMENT & PLAN NOTE
Chronic, controlled. Latest blood pressure and vitals reviewed-     Temp:  [98.7 °F (37.1 °C)]   Pulse:  [104-229]   Resp:  [26-40]   BP: (131-164)/()   SpO2:  [86 %-100 %] .   Home meds for hypertension were reviewed and noted below.   Hypertension Medications               furosemide (LASIX) 40 MG tablet Take 1 tablet (40 mg total) by mouth 2 (two) times daily.            While in the hospital, will manage blood pressure as follows; Continue home antihypertensive regimen    Will utilize p.r.n. blood pressure medication only if patient's blood pressure greater than 180/110 and he develops symptoms such as worsening chest pain or shortness of breath.

## 2024-01-24 NOTE — HPI
This is a 68-year-old male with a past medical history of COPD (on 4 L O2), HFrEF (EF: 15%, GIDD), CAD, hypertension, type 2 diabetes, hyperlipidemia, tobacco use who presents with shortness of breath.    Patient presents with shortness of breath that has been ongoing for the whole day.  He has recurrent hospitalization/ED presentations for COPD/heart failure exacerbation.  Patient is unable to contribute to the history as he is intubated.    In the ED, the patient was tachycardic, tachypneic, hypoxic and ultimately required to be intubated.  Labs were remarkable for leukocytosis (18.5), elevated BNP (609), hyperglycemia (333).  VBG showed a pH of 7.15, pCO2 of 106.5 > 7.34, 61.2.  Chest x-ray showed cardiomegaly with probable interstitial edema and small pleural effusions. Patient was briefly trialed on BiPAP but was ultimately required to be intubated for persistent tachypnea and declined GCS.  EMS administered Solu-Medrol, magnesium sulfate, and DuoNeb.  In the ER, he received DuoNeb x1, ceftriaxone, azithromycin, rocuronium 100 mg IV, etomidate 20 mg IV, and was started on propofol.  Patient was admitted for further management.

## 2024-01-24 NOTE — PLAN OF CARE
Problem: Adult Inpatient Plan of Care  Goal: Plan of Care Review  Outcome: Ongoing, Progressing  Goal: Patient-Specific Goal (Individualized)  Outcome: Ongoing, Progressing  Goal: Absence of Hospital-Acquired Illness or Injury  Outcome: Ongoing, Progressing  Goal: Optimal Comfort and Wellbeing  Outcome: Ongoing, Progressing  Goal: Readiness for Transition of Care  Outcome: Ongoing, Progressing     Problem: Fall Injury Risk  Goal: Absence of Fall and Fall-Related Injury  Outcome: Ongoing, Progressing     Problem: Restraint, Nonbehavioral (Nonviolent)  Goal: Absence of Harm or Injury  Outcome: Ongoing, Progressing     Problem: Infection  Goal: Absence of Infection Signs and Symptoms  Outcome: Ongoing, Progressing     Problem: Diabetes Comorbidity  Goal: Blood Glucose Level Within Targeted Range  Outcome: Ongoing, Progressing

## 2024-01-24 NOTE — ADMISSIONCARE
AdmissionCare    Guideline: Respiratory Failure, Inpatient    Based on the indications selected for the patient, the bed status of Admit to Inpatient was determined to be MET    The following indications were selected as present at the time of evaluation of the patient:      - New (acute) need for mechanical invasive or noninvasive (eg, bilevel positive airway pressure (BPAP), volume-assured pressure support (VAPS), or volume control) ventilation    AdmissionCare documentation entered by: Sherry Mg    [facility], 27th edition, Copyright © 2023 [facility] [employer], [facility] All Rights Reserved.  [id number]

## 2024-01-25 PROBLEM — Z71.89 ADVANCE CARE PLANNING: Status: ACTIVE | Noted: 2024-01-25

## 2024-01-25 LAB
ALLENS TEST: ABNORMAL
ANION GAP SERPL CALC-SCNC: 13 MMOL/L (ref 8–16)
BASOPHILS # BLD AUTO: 0.01 K/UL (ref 0–0.2)
BASOPHILS NFR BLD: 0.1 % (ref 0–1.9)
BUN SERPL-MCNC: 21 MG/DL (ref 8–23)
CALCIUM SERPL-MCNC: 9 MG/DL (ref 8.7–10.5)
CHLORIDE SERPL-SCNC: 99 MMOL/L (ref 95–110)
CO2 SERPL-SCNC: 30 MMOL/L (ref 23–29)
CREAT SERPL-MCNC: 1 MG/DL (ref 0.5–1.4)
DELSYS: ABNORMAL
DIFFERENTIAL METHOD BLD: ABNORMAL
EOSINOPHIL # BLD AUTO: 0 K/UL (ref 0–0.5)
EOSINOPHIL NFR BLD: 0 % (ref 0–8)
ERYTHROCYTE [DISTWIDTH] IN BLOOD BY AUTOMATED COUNT: 14.5 % (ref 11.5–14.5)
EST. GFR  (NO RACE VARIABLE): >60 ML/MIN/1.73 M^2
FIO2: 30
GLUCOSE SERPL-MCNC: 173 MG/DL (ref 70–110)
HCO3 UR-SCNC: 36.7 MMOL/L (ref 24–28)
HCT VFR BLD AUTO: 34.2 % (ref 40–54)
HGB BLD-MCNC: 10.8 G/DL (ref 14–18)
IMM GRANULOCYTES # BLD AUTO: 0.12 K/UL (ref 0–0.04)
IMM GRANULOCYTES NFR BLD AUTO: 0.6 % (ref 0–0.5)
LYMPHOCYTES # BLD AUTO: 1.1 K/UL (ref 1–4.8)
LYMPHOCYTES NFR BLD: 5.7 % (ref 18–48)
MCH RBC QN AUTO: 28 PG (ref 27–31)
MCHC RBC AUTO-ENTMCNC: 31.6 G/DL (ref 32–36)
MCV RBC AUTO: 89 FL (ref 82–98)
MODE: ABNORMAL
MONOCYTES # BLD AUTO: 0.8 K/UL (ref 0.3–1)
MONOCYTES NFR BLD: 4.2 % (ref 4–15)
NEUTROPHILS # BLD AUTO: 17.5 K/UL (ref 1.8–7.7)
NEUTROPHILS NFR BLD: 89.4 % (ref 38–73)
NRBC BLD-RTO: 0 /100 WBC
PCO2 BLDA: 47 MMHG (ref 35–45)
PEEP: 7
PH SMN: 7.5 [PH] (ref 7.35–7.45)
PLATELET # BLD AUTO: 215 K/UL (ref 150–450)
PMV BLD AUTO: 10.8 FL (ref 9.2–12.9)
PO2 BLDA: 83 MMHG (ref 80–100)
POC BE: 12 MMOL/L
POC SATURATED O2: 97 % (ref 95–100)
POC TCO2: 38 MMOL/L (ref 23–27)
POCT GLUCOSE: 140 MG/DL (ref 70–110)
POCT GLUCOSE: 151 MG/DL (ref 70–110)
POCT GLUCOSE: 162 MG/DL (ref 70–110)
POCT GLUCOSE: 200 MG/DL (ref 70–110)
POCT GLUCOSE: 230 MG/DL (ref 70–110)
POTASSIUM SERPL-SCNC: 3.8 MMOL/L (ref 3.5–5.1)
PS: 10
RBC # BLD AUTO: 3.86 M/UL (ref 4.6–6.2)
SAMPLE: ABNORMAL
SITE: ABNORMAL
SODIUM SERPL-SCNC: 142 MMOL/L (ref 136–145)
SP02: 96
SPONT RATE: 19
WBC # BLD AUTO: 19.6 K/UL (ref 3.9–12.7)

## 2024-01-25 PROCEDURE — 63600175 PHARM REV CODE 636 W HCPCS: Performed by: STUDENT IN AN ORGANIZED HEALTH CARE EDUCATION/TRAINING PROGRAM

## 2024-01-25 PROCEDURE — 99900026 HC AIRWAY MAINTENANCE (STAT)

## 2024-01-25 PROCEDURE — 94660 CPAP INITIATION&MGMT: CPT | Mod: XB

## 2024-01-25 PROCEDURE — 85025 COMPLETE CBC W/AUTO DIFF WBC: CPT | Performed by: HOSPITALIST

## 2024-01-25 PROCEDURE — 94003 VENT MGMT INPAT SUBQ DAY: CPT

## 2024-01-25 PROCEDURE — 63600175 PHARM REV CODE 636 W HCPCS: Performed by: NURSE PRACTITIONER

## 2024-01-25 PROCEDURE — 25000242 PHARM REV CODE 250 ALT 637 W/ HCPCS: Performed by: STUDENT IN AN ORGANIZED HEALTH CARE EDUCATION/TRAINING PROGRAM

## 2024-01-25 PROCEDURE — 94640 AIRWAY INHALATION TREATMENT: CPT

## 2024-01-25 PROCEDURE — 99291 CRITICAL CARE FIRST HOUR: CPT | Mod: ,,, | Performed by: INTERNAL MEDICINE

## 2024-01-25 PROCEDURE — 63600175 PHARM REV CODE 636 W HCPCS: Performed by: INTERNAL MEDICINE

## 2024-01-25 PROCEDURE — 63600175 PHARM REV CODE 636 W HCPCS: Performed by: EMERGENCY MEDICINE

## 2024-01-25 PROCEDURE — 36600 WITHDRAWAL OF ARTERIAL BLOOD: CPT

## 2024-01-25 PROCEDURE — 25000003 PHARM REV CODE 250: Performed by: NURSE PRACTITIONER

## 2024-01-25 PROCEDURE — 99900035 HC TECH TIME PER 15 MIN (STAT)

## 2024-01-25 PROCEDURE — 27000221 HC OXYGEN, UP TO 24 HOURS

## 2024-01-25 PROCEDURE — 94761 N-INVAS EAR/PLS OXIMETRY MLT: CPT | Mod: XB

## 2024-01-25 PROCEDURE — 82803 BLOOD GASES ANY COMBINATION: CPT

## 2024-01-25 PROCEDURE — 25000003 PHARM REV CODE 250: Performed by: STUDENT IN AN ORGANIZED HEALTH CARE EDUCATION/TRAINING PROGRAM

## 2024-01-25 PROCEDURE — 27000190 HC CPAP FULL FACE MASK W/VALVE

## 2024-01-25 PROCEDURE — 80048 BASIC METABOLIC PNL TOTAL CA: CPT | Performed by: HOSPITALIST

## 2024-01-25 PROCEDURE — 25000003 PHARM REV CODE 250: Performed by: HOSPITALIST

## 2024-01-25 PROCEDURE — 99223 1ST HOSP IP/OBS HIGH 75: CPT | Mod: ,,, | Performed by: REGISTERED NURSE

## 2024-01-25 PROCEDURE — 36415 COLL VENOUS BLD VENIPUNCTURE: CPT | Performed by: HOSPITALIST

## 2024-01-25 PROCEDURE — 20000000 HC ICU ROOM

## 2024-01-25 RX ORDER — FUROSEMIDE 10 MG/ML
60 INJECTION INTRAMUSCULAR; INTRAVENOUS
Status: DISCONTINUED | OUTPATIENT
Start: 2024-01-25 | End: 2024-01-26

## 2024-01-25 RX ADMIN — IPRATROPIUM BROMIDE AND ALBUTEROL SULFATE 3 ML: 2.5; .5 SOLUTION RESPIRATORY (INHALATION) at 08:01

## 2024-01-25 RX ADMIN — ASPIRIN 81 MG CHEWABLE TABLET 81 MG: 81 TABLET CHEWABLE at 08:01

## 2024-01-25 RX ADMIN — PROPOFOL 35 MCG/KG/MIN: 10 INJECTION, EMULSION INTRAVENOUS at 12:01

## 2024-01-25 RX ADMIN — AZITHROMYCIN 500 MG: 500 INJECTION, POWDER, LYOPHILIZED, FOR SOLUTION INTRAVENOUS at 11:01

## 2024-01-25 RX ADMIN — INSULIN ASPART 2 UNITS: 100 INJECTION, SOLUTION INTRAVENOUS; SUBCUTANEOUS at 05:01

## 2024-01-25 RX ADMIN — IPRATROPIUM BROMIDE AND ALBUTEROL SULFATE 3 ML: 2.5; .5 SOLUTION RESPIRATORY (INHALATION) at 04:01

## 2024-01-25 RX ADMIN — HEPARIN SODIUM 5000 UNITS: 5000 INJECTION INTRAVENOUS; SUBCUTANEOUS at 03:01

## 2024-01-25 RX ADMIN — MUPIROCIN: 20 OINTMENT TOPICAL at 09:01

## 2024-01-25 RX ADMIN — HEPARIN SODIUM 5000 UNITS: 5000 INJECTION INTRAVENOUS; SUBCUTANEOUS at 09:01

## 2024-01-25 RX ADMIN — PROPOFOL 35 MCG/KG/MIN: 10 INJECTION, EMULSION INTRAVENOUS at 05:01

## 2024-01-25 RX ADMIN — CLOPIDOGREL BISULFATE 75 MG: 75 TABLET ORAL at 08:01

## 2024-01-25 RX ADMIN — MUPIROCIN: 20 OINTMENT TOPICAL at 08:01

## 2024-01-25 RX ADMIN — ATORVASTATIN CALCIUM 40 MG: 40 TABLET, FILM COATED ORAL at 08:01

## 2024-01-25 RX ADMIN — IPRATROPIUM BROMIDE AND ALBUTEROL SULFATE 3 ML: 2.5; .5 SOLUTION RESPIRATORY (INHALATION) at 12:01

## 2024-01-25 RX ADMIN — FUROSEMIDE 60 MG: 10 INJECTION, SOLUTION INTRAMUSCULAR; INTRAVENOUS at 09:01

## 2024-01-25 RX ADMIN — CEFTRIAXONE 1 G: 1 INJECTION, POWDER, FOR SOLUTION INTRAMUSCULAR; INTRAVENOUS at 11:01

## 2024-01-25 RX ADMIN — HEPARIN SODIUM 5000 UNITS: 5000 INJECTION INTRAVENOUS; SUBCUTANEOUS at 05:01

## 2024-01-25 RX ADMIN — METHYLPREDNISOLONE SODIUM SUCCINATE 40 MG: 125 INJECTION, POWDER, FOR SOLUTION INTRAMUSCULAR; INTRAVENOUS at 09:01

## 2024-01-25 RX ADMIN — FAMOTIDINE 20 MG: 10 INJECTION INTRAVENOUS at 09:01

## 2024-01-25 NOTE — ASSESSMENT & PLAN NOTE
Chronic, controlled. Latest blood pressure and vitals reviewed-     Temp:  [98.1 °F (36.7 °C)-98.8 °F (37.1 °C)]   Pulse:  []   Resp:  [17.4-36]   BP: ()/(50-80)   SpO2:  [93 %-99 %] .   Home meds for hypertension were reviewed and noted below.   Hypertension Medications               furosemide (LASIX) 40 MG tablet Take 1 tablet (40 mg total) by mouth 2 (two) times daily.            While in the hospital, will manage blood pressure as follows; Continue home antihypertensive regimen    Will utilize p.r.n. blood pressure medication only if patient's blood pressure greater than 180/110 and he develops symptoms such as worsening chest pain or shortness of breath.

## 2024-01-25 NOTE — ASSESSMENT & PLAN NOTE
- chronic condition noted; exacerbation along with CHF exacerbation that resulted in pt's 2nd intubation (prior intubation in 2023)   - contributes to appropriateness for hospice when aligns with GOC   - continue management per hospital primary and PCCM

## 2024-01-25 NOTE — SUBJECTIVE & OBJECTIVE
Past Medical History:   Diagnosis Date    CHF (congestive heart failure)     COPD (chronic obstructive pulmonary disease)     Coronary artery disease     Elevated cholesterol     on medication    Hypertension        Past Surgical History:   Procedure Laterality Date    CARDIAC SURGERY      coronary stent placed    CORONARY STENT PLACEMENT         Review of patient's allergies indicates:   Allergen Reactions    Contrast media Shortness Of Breath, Itching and Anxiety     Patient states that he had a bad reaction, anxiety , shortness of breath, itching .        Medications:  Continuous Infusions:  Scheduled Meds:   albuterol-ipratropium  3 mL Nebulization Q4H    aspirin  81 mg Oral Daily    atorvastatin  40 mg Oral Daily    cefTRIAXone (Rocephin) IV (PEDS and ADULTS)  1 g Intravenous Q24H    clopidogreL  75 mg Oral Daily    famotidine (PF)  20 mg Intravenous Daily    furosemide (LASIX) injection  60 mg Intravenous Q12H    heparin (porcine)  5,000 Units Subcutaneous Q8H    methylPREDNISolone sodium succinate injection  40 mg Intravenous Q12H    mupirocin   Nasal BID     PRN Meds:acetaminophen, calcium gluconate IVPB, calcium gluconate IVPB, calcium gluconate IVPB, dextrose 10%, dextrose 10%, glucagon (human recombinant), glucose, glucose, insulin aspart U-100, magnesium sulfate IVPB, magnesium sulfate IVPB, melatonin, ondansetron, potassium chloride **AND** potassium chloride **AND** potassium chloride, prochlorperazine, sodium chloride 0.9%    Family History       Problem Relation (Age of Onset)    Cancer Mother    No Known Problems Father          Tobacco Use    Smoking status: Every Day     Current packs/day: 0.00     Average packs/day: 2.0 packs/day for 45.0 years (90.0 ttl pk-yrs)     Types: Cigarettes     Start date: 1972     Last attempt to quit: 2017     Years since quittin.2    Smokeless tobacco: Never   Substance and Sexual Activity    Alcohol use: Yes     Comment: socially    Drug use: No     Sexual activity: Yes     Partners: Female     Birth control/protection: None       Review of Systems   Constitutional:  Positive for activity change and fatigue.   Respiratory:  Positive for shortness of breath.    Musculoskeletal:  Negative for gait problem.   Neurological:  Positive for weakness.     Objective:     Vital Signs (Most Recent):  Temp: 98.1 °F (36.7 °C) (01/25/24 1200)  Pulse: 108 (01/25/24 1300)  Resp: (!) 22 (01/25/24 1300)  BP: 106/74 (01/25/24 1300)  SpO2: 98 % (01/25/24 1300) Vital Signs (24h Range):  Temp:  [98.1 °F (36.7 °C)-98.8 °F (37.1 °C)] 98.1 °F (36.7 °C)  Pulse:  [] 108  Resp:  [17.4-36] 22  SpO2:  [93 %-99 %] 98 %  BP: ()/(50-80) 106/74     Weight: 82.9 kg (182 lb 12.2 oz)  Body mass index is 29.5 kg/m².       Physical Exam  Vitals and nursing note reviewed.   Constitutional:       General: He is not in acute distress.     Appearance: He is ill-appearing.   HENT:      Head: Normocephalic and atraumatic.   Pulmonary:      Comments: Now extubated; BiPap mask in place (pt tolerating well)   Musculoskeletal:      Right lower leg: No edema.      Left lower leg: No edema.   Neurological:      Mental Status: He is alert and oriented to person, place, and time.        Advance Care Planning   Advance Directives:   Living Will: No    LaPOST: No    Do Not Resuscitate Status: No    Medical Power of : No (Confirms wife, Lillian, is prefereed MPOA (also legal surrogate decision maker) however, she is currently admitted inpatient as well.  2nd choice is sister Bahman, 3rd choice is sister, Marilou (see ACP))      Decision Making:  Patient answered questions  Goals of Care: The patient endorses that what is most important right now is to focus on spending time at home, remaining as independent as possible, symptom/pain control, and extending life as long as possible, even it it means sacrificing quality    Accordingly, we have decided that the best plan to meet the patient's goals includes  continuing with treatment         Significant Labs: All pertinent labs within the past 24 hours have been reviewed.  CBC:   Recent Labs   Lab 01/25/24  0902   WBC 19.60*   HGB 10.8*   HCT 34.2*   MCV 89        BMP:  Recent Labs   Lab 01/25/24  0902   *      K 3.8   CL 99   CO2 30*   BUN 21   CREATININE 1.0   CALCIUM 9.0     LFT:  Lab Results   Component Value Date    AST 17 01/24/2024    ALKPHOS 78 01/24/2024    BILITOT 0.5 01/24/2024     Albumin:   Albumin   Date Value Ref Range Status   01/24/2024 3.3 (L) 3.5 - 5.2 g/dL Final     Protein:   Total Protein   Date Value Ref Range Status   01/24/2024 6.8 6.0 - 8.4 g/dL Final     Lactic acid:   Lab Results   Component Value Date    LACTATE 1.6 10/19/2023    LACTATE 1.2 04/12/2022       Significant Imaging: I have reviewed all pertinent imaging results/findings within the past 24 hours.

## 2024-01-25 NOTE — SUBJECTIVE & OBJECTIVE
Interval History: no events overnight.  Remains on vent.    Review of Systems   Unable to perform ROS: Intubated     Objective:     Vital Signs (Most Recent):  Temp: 98.1 °F (36.7 °C) (01/25/24 1200)  Pulse: 108 (01/25/24 1300)  Resp: (!) 22 (01/25/24 1300)  BP: 106/74 (01/25/24 1300)  SpO2: 98 % (01/25/24 1300) Vital Signs (24h Range):  Temp:  [98.1 °F (36.7 °C)-98.8 °F (37.1 °C)] 98.1 °F (36.7 °C)  Pulse:  [] 108  Resp:  [17.4-36] 22  SpO2:  [93 %-99 %] 98 %  BP: ()/(50-80) 106/74     Weight: 82.9 kg (182 lb 12.2 oz)  Body mass index is 29.5 kg/m².    Intake/Output Summary (Last 24 hours) at 1/25/2024 1310  Last data filed at 1/25/2024 1200  Gross per 24 hour   Intake 792.4 ml   Output 2050 ml   Net -1257.6 ml         Physical Exam  Vitals and nursing note reviewed.   Constitutional:       Appearance: Normal appearance.      Interventions: He is sedated, intubated and restrained.   HENT:      Head: Normocephalic and atraumatic.      Nose: Nose normal.   Cardiovascular:      Rate and Rhythm: Normal rate and regular rhythm.      Pulses: Normal pulses.      Heart sounds: No murmur heard.  Pulmonary:      Effort: Pulmonary effort is normal. No respiratory distress. He is intubated.      Breath sounds: Wheezing and rales present.   Abdominal:      General: Abdomen is flat.      Palpations: Abdomen is soft.      Tenderness: There is no abdominal tenderness.   Musculoskeletal:      Right lower leg: No edema.      Left lower leg: No edema.   Skin:     General: Skin is warm.      Capillary Refill: Capillary refill takes less than 2 seconds.   Neurological:      General: No focal deficit present.      Comments: Sedated              Significant Labs: All pertinent labs within the past 24 hours have been reviewed.  BMP:   Recent Labs   Lab 01/24/24  0409 01/25/24  0902   * 173*    142   K 3.0* 3.8   CL 98 99   CO2 29 30*   BUN 9 21   CREATININE 0.9 1.0   CALCIUM 8.7 9.0   MG 2.1  --      CBC:   Recent  Labs   Lab 01/23/24  2226 01/24/24  0409 01/25/24  0902   WBC 18.58* 13.12* 19.60*   HGB 11.1* 10.0* 10.8*   HCT 36.0* 33.0* 34.2*    197 215       Significant Imaging: I have reviewed all pertinent imaging results/findings within the past 24 hours.

## 2024-01-25 NOTE — ASSESSMENT & PLAN NOTE
- intubated at time of consult, but with plan for extubation delayed discussion with pt until extubation   - doing well on BiPap at time of assessment   - resp distress likely 2/2 to CHF and COPD per PCCM  - continued management per hospital primary and PCCM

## 2024-01-25 NOTE — ASSESSMENT & PLAN NOTE
- EF of 15% and diastolic dysfunction on 2023 echo   - plan for further discussion of trajectory of life-limiting illness when off BiPap   - hospice appropriate if/when aligns with GOC

## 2024-01-25 NOTE — CARE UPDATE
[facility]  Nurses Note -- 4 Eyes      1/25/2024       Skin assessed on: Q Shift      [x] No Pressure Injuries Present    [x]Prevention Measures Documented    [] Yes LDA  for Pressure Injury Previously documented     [] Yes New Pressure Injury Discovered   [] LDA for New Pressure Injury Added      Attending RN:  En Andrade Jr., RN     Second RN:  LUCÍA Coates RN

## 2024-01-25 NOTE — ASSESSMENT & PLAN NOTE
Likely combination of CHF and COPD exacerbations. CXR with vascular congestion, mild bilateral interstitial opacities, and small bilateral pleural effusions. BNP >600. WBC elevated. Known emphysema and PREETI on home O2 with nightly CPAP. COVID and Flu negative.     - maintain LPV. Wean for SpO2 >88%  - continue scheduled duonebs q4h  - continue steroids for 5 days; transition to prednisone 40mg daily when able to tolerated PO  - CAP coverage with rocephin X 5 days and azithro X 3 days  - procal WNL; f/u sputum culture  - continue aggressive diuresis  - daily evaluation for SAT/SBT; plan for extubation to BiPAP when ready

## 2024-01-25 NOTE — ASSESSMENT & PLAN NOTE
HX of COPD. PFT in 2017 with moderate restriction with reduced DLCO.  Previous CT chest imaging with emphysema.    see respiratory failure section

## 2024-01-25 NOTE — ASSESSMENT & PLAN NOTE
"1/25/2024 - Consult   - consult received; interval chart reviewed in detail; discussed pt with MDT during ICU rounds   - pt known to myself and palliative medicine team from previous admission, prior palliative involvement following prior intubation 3/2023  - met with patient at bedside; introduction to palliative medicine team and role in current care and admission; pt was wearing BIPap mask post extubation so GOC/ACP discussion was brief, with plans for further discussion 1/26 or when able to not wear BiPap for easier discussion   - pt is happy to be extubated and doing well; confirms recalling prior intubation as well as meeting with JAVI Coughlin and discussing wishes that admission; confirms wishes discussed which were for his wife Lillian to be his preferred MPOA as well as wishing to be intubated again if needed to extend life  - pt shares that his wife Lillian is currently admitted to an inpatient hospital as well currently due to her CKD/HD; in the event that wife was unavailable, and pt lacked capacity his 2nd choice MPOA is his sister Bahman (he was not aware of her number at this time), 3rd choice is his "baby sister" Marilou; plan for completion of MPOA documentation listing these wishes as pt has 5 adult children as well (who would be next legal surrogate decision maker after Lillian otherwise )  - pt declines need for  visits, or Hoahaoism/spiritual needs that impact his care at this time   - emotional support provided   - Allowed time for questions/concerns; all addressed; expressed availability of myself/palliative team for additional questions/concerns   - plan for continued GOC/ACP discussion and completion of MPOA document; would benefit from at minimum home palliative care, although hospice appropriate doesn't yet seem to align with GOC; MDT updated     "

## 2024-01-25 NOTE — HOSPITAL COURSE
69 y/o male admitted with acute respiratory failure.  Failed Bipap in ER and then intubated.  Hx of COPD on home oxygen and CHF EF 15%.  Likely combination of CHF and COPD exacerbations. CXR with vascular congestion, mild bilateral interstitial opacities, and small bilateral pleural effusions. BNP >600.  Started on nebs, IV diuresis and steroids.  Pulmonary consulted. Elevated WBC and started on ABX's.  Improved and extubated on 1/25.  States he ran out of all his medications.  68-year-old male with a past medical history of COPD (on 4 L O2), HFrEF (EF: 15%, GIDD), CAD, hypertension, type 2 diabetes, hyperlipidemia, tobacco use was admitted with shortness of breath. He was found to in acute respiratory failure from acute decompensation CHF. He was intubated transferred to ICU. He was started on diuresis which he has responded well. During the course of this admission he had CXR which showed Cardiomegaly with probable interstitial edema and small pleural effusions. His last 2D echo was on 7/23The left ventricle is severely enlarged with eccentric hypertrophy and severely decreased systolic function.The estimated ejection fraction is 15%. Grade I left ventricular diastolic dysfunction. Serial routine labs with persistence leukocytosis and has been receiving prednisone, also hyperglycemia otherwise no major significance prior to DC. His sputum culture grow Strep Pneumoniae. He was placed on IV Abx, then switched to PO. He is stable for for DC counseling given regarding medication compliance. He was strongly advise to follow up with PCP, cardiology, as well as palliative care as outpatient. Case management for DC planning.

## 2024-01-25 NOTE — PROGRESS NOTES
[facility] Intensive Care  Critical Care Medicine  Progress Note    Patient Name: Abelardo Irene Jr.  MRN: [id number]  Admission Date: 1/23/2024  Hospital Length of Stay: 2 days  Code Status: Full Code  Attending Provider: Iron Bledsoe MD  Primary Care Provider: Rolando Funes MD   Principal Problem: Acute on chronic respiratory failure with hypoxia and hypercapnia    Subjective:     HPI:  Mr. Abelardo Irene Jr. is a 67 y.o male with a PMHx of HTN, COPD (on 4L O2), CHF (EF 15%), CAD, HTN, DMII, and tobacco abuse that presented to the ED for worsening shortness of breath throughout the day. In the ED, the patient was tachycardic, tachypneic, hypoxic and ultimately required intubation after failing BiPAP. Labs were remarkable for leukocytosis (18.5), elevated BNP (609), hyperglycemia (333).  VBG showed a pH of 7.15, pCO2 of 106.5 which improved to 7.34, 61.2 with intubation. He was admitted for acute on chronic combined respiratory failure 2/2 COPD and CHF exacerbations. Pulmonary was consulted for ventilator management.     Of note, Mr. Irene is known to our service as he has been admitted several times a year with similar presentations.     Hospital/ICU Course:  No notes on file    Interval History: no acute issue.  Negative 200 cc over past 24 hours.        Objective:     Vital Signs (Most Recent):  Temp: 98.3 °F (36.8 °C) (01/25/24 0800)  Pulse: 109 (01/25/24 0829)  Resp: 19.4 (01/25/24 0829)  BP: (!) 90/52 (01/25/24 0817)  SpO2: 99 % (01/25/24 0829) Vital Signs (24h Range):  Temp:  [98.3 °F (36.8 °C)-98.8 °F (37.1 °C)] 98.3 °F (36.8 °C)  Pulse:  [] 109  Resp:  [19.4-35.9] 19.4  SpO2:  [90 %-100 %] 99 %  BP: ()/(50-64) 90/52     Weight: 82.9 kg (182 lb 12.2 oz)  Body mass index is 29.5 kg/m².      Intake/Output Summary (Last 24 hours) at 1/25/2024 0837  Last data filed at 1/25/2024 0800  Gross per 24 hour   Intake 1813.56 ml   Output 2050 ml   Net -236.44 ml        Physical Exam  Vitals and nursing  note reviewed.   Constitutional:       Appearance: Normal appearance.      Interventions: He is sedated, intubated and restrained.   HENT:      Head: Normocephalic and atraumatic.      Nose: Nose normal.   Cardiovascular:      Rate and Rhythm: Normal rate and regular rhythm.      Pulses: Normal pulses.      Heart sounds: No murmur heard.  Pulmonary:      Effort: Pulmonary effort is normal. No respiratory distress. He is intubated.      Breath sounds: Rales present. No wheezing.   Abdominal:      General: Abdomen is flat.      Palpations: Abdomen is soft.      Tenderness: There is no abdominal tenderness.   Musculoskeletal:      Right lower leg: No edema.      Left lower leg: No edema.   Skin:     General: Skin is warm.      Capillary Refill: Capillary refill takes less than 2 seconds.   Neurological:      General: No focal deficit present.      Comments: + cough, gag, and corneal reflexes. Localized pain. Does not follow commands.            Review of Systems    Vents:  Vent Mode: PS/CPAP (01/25/24 0829)  Ventilator Initiated: Yes (01/23/24 2302)  Set Rate: 15 BPM (01/25/24 0817)  Vt Set: 420 mL (01/25/24 0415)  Pressure Support: 10 cmH20 (01/25/24 0829)  PEEP/CPAP: 7 cmH20 (01/25/24 0829)  Oxygen Concentration (%): 30 (01/25/24 0829)  Peak Airway Pressure: 17.8 cmH20 (01/25/24 0829)  Total Ve: 10.4 L/m (01/25/24 0829)  F/VT Ratio<105 (RSBI): (!) 36.77 (01/25/24 0829)    Lines/Drains/Airways       Drain  Duration                  NG/OG Tube 01/23/24 2330 [place] sump 16 Fr. Right mouth 1 day         Urethral Catheter 01/23/24 2330 Straight-tip 16 Fr. 1 day              Airway  Duration                  Airway - Non-Surgical 01/23/24 2233 Endotracheal Tube 1 day              Peripheral Intravenous Line  Duration                  Peripheral IV - Single Lumen 01/23/24 2150 18 G Anterior;Right Forearm 1 day         Peripheral IV - Single Lumen 01/23/24 2226 20 G;1 in Left;Posterior Hand 1 day         Peripheral IV -  "Single Lumen 01/23/24 2341 20 G;1 in Anterior;Right Upper Arm 1 day         Peripheral IV - Single Lumen 01/24/24 1113 20 G Anterior;Left;Proximal Forearm <1 day                    Significant Labs:    CBC/Anemia Profile:  Recent Labs   Lab 01/23/24  2226 01/24/24  0409   WBC 18.58* 13.12*   HGB 11.1* 10.0*   HCT 36.0* 33.0*    197   MCV 94 92   RDW 13.8 13.8        Chemistries:  Recent Labs   Lab 01/23/24  2226 01/24/24  0409    141   K 3.8 3.0*    98   CO2 26 29   BUN 8 9   CREATININE 0.9 0.9   CALCIUM 8.7 8.7   ALBUMIN 3.5 3.3*   PROT 7.2 6.8   BILITOT 0.5 0.5   ALKPHOS 86 78   ALT 13 14   AST 15 17   MG  --  2.1   PHOS  --  1.8*       ABGs:   Recent Labs   Lab 01/23/24  2331   PH 7.343*   PCO2 61.2*   HCO3 33.3*   POCSATURATED 98   BE 6*     Blood Culture:   Recent Labs   Lab 01/23/24  2349 01/23/24  2350   LABBLOO No Growth to date  No Growth to date No Growth to date  No Growth to date     Cardiac Markers: No results for input(s): "CKMB", "TROPONINT", "MYOGLOBIN" in the last 48 hours.  Lactic Acid: No results for input(s): "LACTATE" in the last 48 hours.  Respiratory Culture:   Recent Labs   Lab 01/24/24  1131   GSRESP <10 epithelial cells per low power field.  Few WBC's  Moderate Gram positive cocci in pairs  Few Gram positive cocci     Echo 7/2/23  The left ventricle is severely enlarged with eccentric hypertrophy and severely decreased systolic function.  The estimated ejection fraction is 15%.  Grade I left ventricular diastolic dysfunction.  Normal right ventricular size with normal right ventricular systolic function.  Moderate left atrial enlargement.  Intermediate central venous pressure (8 mmHg).    Significant Imaging:  I have reviewed all pertinent imaging results/findings within the past 24 hours.  CXR: I have reviewed all pertinent results/findings within the past 24 hours and my personal findings are:  1/23/24 mild blunting of right costophrenic angle.      ABG  Recent " Labs   Lab 01/23/24  2331   PH 7.343*   PO2 106*   PCO2 61.2*   HCO3 33.3*   BE 6*     Assessment/Plan:     Pulmonary  * Acute on chronic respiratory failure with hypoxia and hypercapnia  Likely combination of CHF and COPD exacerbations. CXR with vascular congestion, mild bilateral interstitial opacities, and small bilateral pleural effusions. BNP >600. WBC elevated. Known emphysema and PREETI on home O2 with nightly CPAP. COVID and Flu negative.     - maintain LPV. Wean for SpO2 >88%  - continue scheduled duonebs q4h  - continue steroids for 5 days; transition to prednisone 40mg daily when able to tolerated PO  - CAP coverage with rocephin X 5 days and azithro X 3 days  - procal WNL; f/u sputum culture  - continue aggressive diuresis  - daily evaluation for SAT/SBT; plan for extubation to BiPAP when ready        Chronic obstructive pulmonary disease with acute exacerbation  HX of COPD. PFT in 2017 with moderate restriction with reduced DLCO.  Previous CT chest imaging with emphysema.    see respiratory failure section    Cardiac/Vascular  Acute on chronic combined systolic and diastolic heart failure  EF 15%  Will escalate diuresis    Palliative Care  Advance care planning  Chronic MOF.  Will consult palliative care to help facilitate goc discussion.      Other  Tobacco abuse  Needs cessation counseling once extubated        Critical Care Daily Checklist:    A: Awake: RASS Goal/Actual Goal:    Actual:     B: Spontaneous Breathing Trial Performed?     C: SAT & SBT Coordinated?  yes                      D: Delirium: CAM-ICU     E: Early Mobility Performed? No   F: Feeding Goal:    Status:     Current Diet Order   Procedures    Diet NPO      AS: Analgesia/Sedation propofol   T: Thromboembolic Prophylaxis heparin   H: HOB > 300 Yes   U: Stress Ulcer Prophylaxis (if needed) pepcid   G: Glucose Control iss   B: Bowel Function     I: Indwelling Catheter (Lines & Moore) Necessity moore   D: De-escalation of  Antimicrobials/Pharmacotherapies no    Plan for the day/ETD Sbt and possible extubation    Code Status:  Family/Goals of Care: Full Code       Critical Care Time: 55 minutes  Critical secondary to Patient has a condition that poses threat to life and bodily function: respiratory failure      Critical care was time spent personally by me on the following activities: development of treatment plan with patient or surrogate and bedside caregivers, discussions with consultants, evaluation of patient's response to treatment, examination of patient, ordering and performing treatments and interventions, ordering and review of laboratory studies, ordering and review of radiographic studies, pulse oximetry, re-evaluation of patient's condition. This critical care time did not overlap with that of any other provider or involve time for any procedures.     Shahid Farmer MD  Critical Care Medicine  [facility] - Intensive Care

## 2024-01-25 NOTE — PLAN OF CARE
Case Management Assessment     PCP: Rolando Funes MD   Pharmacy: [facility] [place]    Patient Arrived From: home  Existing Help at Home: Ana braga    Barriers to Discharge: none    Discharge Plan:    A. Home with family   B. Home health      This patient has been screened for Case Management needs.  Treatment is ongoing in the ICU at this time.     Patient stated that he does not have a living will or advance directive.  He is not interested in advance directive at this time    CM provided patient with contact info and encouraged him to call for any discharge needs.  CM will continue to follow while in the ICU and assist with DC planning as needed.       01/25/24 1421   Discharge Assessment   Assessment Type Discharge Planning Assessment   Confirmed/corrected address, phone number and insurance Yes   Confirmed Demographics Correct on Facesheet   Source of Information patient   Communicated VERA with patient/caregiver Date not available/Unable to determine   Reason For Admission Acute resp failure   People in Home spouse   Do you expect to return to your current living situation? Yes   Do you have help at home or someone to help you manage your care at home? Yes   Who are your caregiver(s) and their phone number(s)? daughterAna [phone]   Prior to hospitilization cognitive status: Alert/Oriented   Current cognitive status: Alert/Oriented   Walking or Climbing Stairs Difficulty no   Dressing/Bathing Difficulty no   Home Layout Able to live on 1st floor   Equipment Currently Used at Home oxygen   Readmission within 30 days? No   Patient currently being followed by outpatient case management? No   Do you currently have service(s) that help you manage your care at home? No   Do you take prescription medications? Yes   Do you have prescription coverage? Yes   Coverage PHN   Do you have any problems affording any of your prescribed medications? No   Is the patient taking medications as prescribed? yes    Who is going to help you get home at discharge? daughterCarlos Eduardo   How do you get to doctors appointments? car, drives self;family or friend will provide   Are you on dialysis? No   Do you take coumadin? No   Discharge Plan A Home with family   Discharge Plan B Home Health   DME Needed Upon Discharge  none   Transition of Care Barriers None   OTHER   Name(s) of People in Home spouse, Lillian and daughter, Ana

## 2024-01-25 NOTE — ASSESSMENT & PLAN NOTE
Patient's FSGs are controlled on current medication regimen.  Last A1c reviewed-   Lab Results   Component Value Date    HGBA1C 6.3 (H) 01/24/2024     Most recent fingerstick glucose reviewed-   Recent Labs   Lab 01/24/24  2108 01/25/24  0552 01/25/24  1247   POCTGLUCOSE 151* 230* 200*     Current correctional scale  Low  Maintain anti-hyperglycemic dose as follows-   Antihyperglycemics (From admission, onward)      Start     Stop Route Frequency Ordered    01/24/24 0102  insulin aspart U-100 pen 0-5 Units         -- SubQ Before meals & nightly PRN 01/24/24 0002          Hold Oral hypoglycemics while patient is in the hospital.  More hyperglycemic secondary to steroids.  Continue sliding scale.

## 2024-01-25 NOTE — PROGRESS NOTES
[facility] Medicine  Progress Note    Patient Name: Abelardo Irene Jr.  MRN: [id number]  Patient Class: IP- Inpatient   Admission Date: 1/23/2024  Length of Stay: 2 days  Attending Physician: Iron Bledsoe MD  Primary Care Provider: Rolando Funes MD        Subjective:     Principal Problem:Acute on chronic respiratory failure with hypoxia and hypercapnia        HPI:  This is a 68-year-old male with a past medical history of COPD (on 4 L O2), HFrEF (EF: 15%, GIDD), CAD, hypertension, type 2 diabetes, hyperlipidemia, tobacco use who presents with shortness of breath.    Patient presents with shortness of breath that has been ongoing for the whole day.  He has recurrent hospitalization/ED presentations for COPD/heart failure exacerbation.  Patient is unable to contribute to the history as he is intubated.    In the ED, the patient was tachycardic, tachypneic, hypoxic and ultimately required to be intubated.  Labs were remarkable for leukocytosis (18.5), elevated BNP (609), hyperglycemia (333).  VBG showed a pH of 7.15, pCO2 of 106.5 > 7.34, 61.2.  Chest x-ray showed cardiomegaly with probable interstitial edema and small pleural effusions. Patient was briefly trialed on BiPAP but was ultimately required to be intubated for persistent tachypnea and declined GCS.  EMS administered Solu-Medrol, magnesium sulfate, and DuoNeb.  In the ER, he received DuoNeb x1, ceftriaxone, azithromycin, rocuronium 100 mg IV, etomidate 20 mg IV, and was started on propofol.  Patient was admitted for further management.    Overview/Hospital Course:  67 y/o male admitted with acute respiratory failure.  Failed Bipap in ER and then intubated.  Hx of COPD on home oxygen and CHF EF 15%.  Likely combination of CHF and COPD exacerbations. CXR with vascular congestion, mild bilateral interstitial opacities, and small bilateral pleural effusions. BNP >600.  Started on nebs, IV diuresis and steroids.  Pulmonary  consulted. Elevated WBC and started on ABX's.    Interval History: no events overnight.  Remains on vent.    Review of Systems   Unable to perform ROS: Intubated     Objective:     Vital Signs (Most Recent):  Temp: 98.1 °F (36.7 °C) (01/25/24 1200)  Pulse: 108 (01/25/24 1300)  Resp: (!) 22 (01/25/24 1300)  BP: 106/74 (01/25/24 1300)  SpO2: 98 % (01/25/24 1300) Vital Signs (24h Range):  Temp:  [98.1 °F (36.7 °C)-98.8 °F (37.1 °C)] 98.1 °F (36.7 °C)  Pulse:  [] 108  Resp:  [17.4-36] 22  SpO2:  [93 %-99 %] 98 %  BP: ()/(50-80) 106/74     Weight: 82.9 kg (182 lb 12.2 oz)  Body mass index is 29.5 kg/m².    Intake/Output Summary (Last 24 hours) at 1/25/2024 1310  Last data filed at 1/25/2024 1200  Gross per 24 hour   Intake 792.4 ml   Output 2050 ml   Net -1257.6 ml         Physical Exam  Vitals and nursing note reviewed.   Constitutional:       Appearance: Normal appearance.      Interventions: He is sedated, intubated and restrained.   HENT:      Head: Normocephalic and atraumatic.      Nose: Nose normal.   Cardiovascular:      Rate and Rhythm: Normal rate and regular rhythm.      Pulses: Normal pulses.      Heart sounds: No murmur heard.  Pulmonary:      Effort: Pulmonary effort is normal. No respiratory distress. He is intubated.      Breath sounds: Wheezing and rales present.   Abdominal:      General: Abdomen is flat.      Palpations: Abdomen is soft.      Tenderness: There is no abdominal tenderness.   Musculoskeletal:      Right lower leg: No edema.      Left lower leg: No edema.   Skin:     General: Skin is warm.      Capillary Refill: Capillary refill takes less than 2 seconds.   Neurological:      General: No focal deficit present.      Comments: Sedated              Significant Labs: All pertinent labs within the past 24 hours have been reviewed.  BMP:   Recent Labs   Lab 01/24/24  0409 01/25/24  0902   * 173*    142   K 3.0* 3.8   CL 98 99   CO2 29 30*   BUN 9 21   CREATININE 0.9 1.0    CALCIUM 8.7 9.0   MG 2.1  --      CBC:   Recent Labs   Lab 01/23/24  2226 01/24/24  0409 01/25/24  0902   WBC 18.58* 13.12* 19.60*   HGB 11.1* 10.0* 10.8*   HCT 36.0* 33.0* 34.2*    197 215       Significant Imaging: I have reviewed all pertinent imaging results/findings within the past 24 hours.    Assessment/Plan:      * Acute on chronic respiratory failure with hypoxia and hypercapnia  Patient with Hypercapnic and Hypoxic Respiratory failure which is Acute on chronic.  he is on home oxygen at 4 LPM. Supplemental oxygen was provided and noted- Vent Mode: PS/CPAP  Oxygen Concentration (%):  [25-30] 30  Vt Set:  [420 mL] 420 mL  PEEP/CPAP:  [7 cmH20] 7 cmH20  Pressure Support:  [10 cmH20] 10 cmH20  Mean Airway Pressure:  [9.3 cmH20-15.1 cmH20] 15.1 cmH20  Signs/symptoms of respiratory failure include- increased work of breathing and respiratory distress. Contributing diagnoses includes - CHF and COPD Labs and images were reviewed. Patient Has recent ABG, which has been reviewed. Will treat underlying causes and adjust management of respiratory failure as follows- Continue Solu-medrol, Duo-Nebs, Lasix.  Pulmonary consulted for vent management.  Doing better today on SBT.  Probable extubation today.    Acute on chronic combined systolic and diastolic heart failure  Results for orders placed during the hospital encounter of 07/02/23    Echo    Interpretation Summary  · The left ventricle is severely enlarged with eccentric hypertrophy and severely decreased systolic function.  · The estimated ejection fraction is 15%.  · Grade I left ventricular diastolic dysfunction.  · Normal right ventricular size with normal right ventricular systolic function.  · Moderate left atrial enlargement.  · Intermediate central venous pressure (8 mmHg).  Continue Lasix       Chronic obstructive pulmonary disease with acute exacerbation  Patient's COPD is with exacerbation noted by continued dyspnea currently.  Patient is  currently on COPD Pathway. Continue scheduled inhalers Steroids and Supplemental oxygen and monitor respiratory status closely.     Uncontrolled type 2 diabetes mellitus with hyperglycemia  Patient's FSGs are controlled on current medication regimen.  Last A1c reviewed-   Lab Results   Component Value Date    HGBA1C 6.3 (H) 01/24/2024     Most recent fingerstick glucose reviewed-   Recent Labs   Lab 01/24/24  2108 01/25/24  0552 01/25/24  1247   POCTGLUCOSE 151* 230* 200*     Current correctional scale  Low  Maintain anti-hyperglycemic dose as follows-   Antihyperglycemics (From admission, onward)      Start     Stop Route Frequency Ordered    01/24/24 0102  insulin aspart U-100 pen 0-5 Units         -- SubQ Before meals & nightly PRN 01/24/24 0002          Hold Oral hypoglycemics while patient is in the hospital.  More hyperglycemic secondary to steroids.  Continue sliding scale.    Coronary artery disease involving native coronary artery of native heart without angina pectoris  Resume plavix, statin, ASA    Dyslipidemia  Resume statin       Tobacco abuse   when appropriate       Benign essential hypertension  Chronic, controlled. Latest blood pressure and vitals reviewed-     Temp:  [98.1 °F (36.7 °C)-98.8 °F (37.1 °C)]   Pulse:  []   Resp:  [17.4-36]   BP: ()/(50-80)   SpO2:  [93 %-99 %] .   Home meds for hypertension were reviewed and noted below.   Hypertension Medications               furosemide (LASIX) 40 MG tablet Take 1 tablet (40 mg total) by mouth 2 (two) times daily.            While in the hospital, will manage blood pressure as follows; Continue home antihypertensive regimen    Will utilize p.r.n. blood pressure medication only if patient's blood pressure greater than 180/110 and he develops symptoms such as worsening chest pain or shortness of breath.      VTE Risk Mitigation (From admission, onward)           Ordered     heparin (porcine) injection 5,000 Units  Every 8 hours          01/23/24 2353     IP VTE HIGH RISK PATIENT  Once         01/23/24 2353     Place sequential compression device  Until discontinued         01/23/24 2353                    Discharge Planning   VERA:      Code Status: Full Code   Is the patient medically ready for discharge?:     Reason for patient still in hospital (select all that apply): Patient unstable               Critical care time spent on the evaluation and treatment of severe organ dysfunction, review of pertinent labs and imaging studies, discussions with consulting providers and discussions with patient/family: 40 minutes.      Iron Bledsoe MD  Department of Hospital Medicine   [facility] - Intensive Care

## 2024-01-25 NOTE — SUBJECTIVE & OBJECTIVE
Interval History: no acute issue.  Negative 200 cc over past 24 hours.        Objective:     Vital Signs (Most Recent):  Temp: 98.3 °F (36.8 °C) (01/25/24 0800)  Pulse: 109 (01/25/24 0829)  Resp: 19.4 (01/25/24 0829)  BP: (!) 90/52 (01/25/24 0817)  SpO2: 99 % (01/25/24 0829) Vital Signs (24h Range):  Temp:  [98.3 °F (36.8 °C)-98.8 °F (37.1 °C)] 98.3 °F (36.8 °C)  Pulse:  [] 109  Resp:  [19.4-35.9] 19.4  SpO2:  [90 %-100 %] 99 %  BP: ()/(50-64) 90/52     Weight: 82.9 kg (182 lb 12.2 oz)  Body mass index is 29.5 kg/m².      Intake/Output Summary (Last 24 hours) at 1/25/2024 0837  Last data filed at 1/25/2024 0800  Gross per 24 hour   Intake 1813.56 ml   Output 2050 ml   Net -236.44 ml        Physical Exam  Vitals and nursing note reviewed.   Constitutional:       Appearance: Normal appearance.      Interventions: He is sedated, intubated and restrained.   HENT:      Head: Normocephalic and atraumatic.      Nose: Nose normal.   Cardiovascular:      Rate and Rhythm: Normal rate and regular rhythm.      Pulses: Normal pulses.      Heart sounds: No murmur heard.  Pulmonary:      Effort: Pulmonary effort is normal. No respiratory distress. He is intubated.      Breath sounds: Rales present. No wheezing.   Abdominal:      General: Abdomen is flat.      Palpations: Abdomen is soft.      Tenderness: There is no abdominal tenderness.   Musculoskeletal:      Right lower leg: No edema.      Left lower leg: No edema.   Skin:     General: Skin is warm.      Capillary Refill: Capillary refill takes less than 2 seconds.   Neurological:      General: No focal deficit present.      Comments: + cough, gag, and corneal reflexes. Localized pain. Does not follow commands.            Review of Systems    Vents:  Vent Mode: PS/CPAP (01/25/24 0829)  Ventilator Initiated: Yes (01/23/24 2302)  Set Rate: 15 BPM (01/25/24 0817)  Vt Set: 420 mL (01/25/24 0415)  Pressure Support: 10 cmH20 (01/25/24 0829)  PEEP/CPAP: 7 cmH20 (01/25/24  "0829)  Oxygen Concentration (%): 30 (01/25/24 0829)  Peak Airway Pressure: 17.8 cmH20 (01/25/24 0829)  Total Ve: 10.4 L/m (01/25/24 0829)  F/VT Ratio<105 (RSBI): (!) 36.77 (01/25/24 0829)    Lines/Drains/Airways       Drain  Duration                  NG/OG Tube 01/23/24 2330 [place] sump 16 Fr. Right mouth 1 day         Urethral Catheter 01/23/24 2330 Straight-tip 16 Fr. 1 day              Airway  Duration                  Airway - Non-Surgical 01/23/24 2233 Endotracheal Tube 1 day              Peripheral Intravenous Line  Duration                  Peripheral IV - Single Lumen 01/23/24 2150 18 G Anterior;Right Forearm 1 day         Peripheral IV - Single Lumen 01/23/24 2226 20 G;1 in Left;Posterior Hand 1 day         Peripheral IV - Single Lumen 01/23/24 2341 20 G;1 in Anterior;Right Upper Arm 1 day         Peripheral IV - Single Lumen 01/24/24 1113 20 G Anterior;Left;Proximal Forearm <1 day                    Significant Labs:    CBC/Anemia Profile:  Recent Labs   Lab 01/23/24 2226 01/24/24  0409   WBC 18.58* 13.12*   HGB 11.1* 10.0*   HCT 36.0* 33.0*    197   MCV 94 92   RDW 13.8 13.8        Chemistries:  Recent Labs   Lab 01/23/24 2226 01/24/24  0409    141   K 3.8 3.0*    98   CO2 26 29   BUN 8 9   CREATININE 0.9 0.9   CALCIUM 8.7 8.7   ALBUMIN 3.5 3.3*   PROT 7.2 6.8   BILITOT 0.5 0.5   ALKPHOS 86 78   ALT 13 14   AST 15 17   MG  --  2.1   PHOS  --  1.8*       ABGs:   Recent Labs   Lab 01/23/24  2331   PH 7.343*   PCO2 61.2*   HCO3 33.3*   POCSATURATED 98   BE 6*     Blood Culture:   Recent Labs   Lab 01/23/24  2349 01/23/24  2350   LABBLOO No Growth to date  No Growth to date No Growth to date  No Growth to date     Cardiac Markers: No results for input(s): "CKMB", "TROPONINT", "MYOGLOBIN" in the last 48 hours.  Lactic Acid: No results for input(s): "LACTATE" in the last 48 hours.  Respiratory Culture:   Recent Labs   Lab 01/24/24  1131   GSRESP <10 epithelial cells per low power field.  " Few WBC's  Moderate Gram positive cocci in pairs  Few Gram positive cocci     Echo 7/2/23  The left ventricle is severely enlarged with eccentric hypertrophy and severely decreased systolic function.  The estimated ejection fraction is 15%.  Grade I left ventricular diastolic dysfunction.  Normal right ventricular size with normal right ventricular systolic function.  Moderate left atrial enlargement.  Intermediate central venous pressure (8 mmHg).    Significant Imaging:  I have reviewed all pertinent imaging results/findings within the past 24 hours.  CXR: I have reviewed all pertinent results/findings within the past 24 hours and my personal findings are:  1/23/24 mild blunting of right costophrenic angle.

## 2024-01-25 NOTE — HPI
"From H&P: "This is a 68-year-old male with a past medical history of COPD (on 4 L O2), HFrEF (EF: 15%, GIDD), CAD, hypertension, type 2 diabetes, hyperlipidemia, tobacco use who presents with shortness of breath.     Patient presents with shortness of breath that has been ongoing for the whole day.  He has recurrent hospitalization/ED presentations for COPD/heart failure exacerbation.  Patient is unable to contribute to the history as he is intubated."    Palliative medicine consulted for goals of care discussion and advance care planning; for details of visit, see advance care planning section of plan.   "

## 2024-01-25 NOTE — ASSESSMENT & PLAN NOTE
Patient with Hypercapnic and Hypoxic Respiratory failure which is Acute on chronic.  he is on home oxygen at 4 LPM. Supplemental oxygen was provided and noted- Vent Mode: PS/CPAP  Oxygen Concentration (%):  [25-30] 30  Vt Set:  [420 mL] 420 mL  PEEP/CPAP:  [7 cmH20] 7 cmH20  Pressure Support:  [10 cmH20] 10 cmH20  Mean Airway Pressure:  [9.3 cmH20-15.1 cmH20] 15.1 cmH20  Signs/symptoms of respiratory failure include- increased work of breathing and respiratory distress. Contributing diagnoses includes - CHF and COPD Labs and images were reviewed. Patient Has recent ABG, which has been reviewed. Will treat underlying causes and adjust management of respiratory failure as follows- Continue Solu-medrol, Duo-Nebs, Lasix.  Pulmonary consulted for vent management.  Doing better today on SBT.  Probable extubation today.

## 2024-01-25 NOTE — CONSULTS
"[facility] - Intensive Care  Palliative Medicine  Consult Note    Patient Name: Abelardo Irene Jr.  MRN: [id number]  Admission Date: 1/23/2024  Hospital Length of Stay: 2 days  Code Status: Full Code   Attending Provider: Iron Bledsoe MD  Consulting Provider: Aundrea Cano NP  Primary Care Physician: Rolando Funes MD  Principal Problem:Acute on chronic respiratory failure with hypoxia and hypercapnia    Patient information was obtained from patient, past medical records, ER records, and primary team.      Inpatient consult to Palliative Care  Consult performed by: Aundrea Cano NP  Consult ordered by: Shahid Farmer MD  Reason for consult: goals of care and advanced care planning        Assessment/Plan:     Palliative Care  Advance Care Planning   1/25/2024 - Consult   - consult received; interval chart reviewed in detail; discussed pt with MDT during ICU rounds   - pt known to myself and palliative medicine team from previous admission, prior palliative involvement following prior intubation 3/2023  - met with patient at bedside; introduction to palliative medicine team and role in current care and admission; pt was wearing BIPap mask post extubation so GOC/ACP discussion was brief, with plans for further discussion 1/26 or when able to not wear BiPap for easier discussion   - pt is happy to be extubated and doing well; confirms recalling prior intubation as well as meeting with JAVI Coughlin and discussing wishes that admission; confirms wishes discussed which were for his wife Lillian to be his preferred MPOA as well as wishing to be intubated again if needed to extend life  - pt shares that his wife Lillian is currently admitted to an inpatient hospital as well currently due to her CKD/HD; in the event that wife was unavailable, and pt lacked capacity his 2nd choice MPOA is his sister Bahman (he was not aware of her number at this time), 3rd choice is his "baby sister" Marilou; plan for completion of MPOA " documentation listing these wishes as pt has 5 adult children as well (who would be next legal surrogate decision maker after Lillian otherwise )  - pt declines need for  visits, or Yazdanism/spiritual needs that impact his care at this time   - emotional support provided   - Allowed time for questions/concerns; all addressed; expressed availability of myself/palliative team for additional questions/concerns   - plan for continued GOC/ACP discussion and completion of MPOA document; would benefit from at minimum home palliative care, although hospice appropriate doesn't yet seem to align with GOC; MDT updated     Pulmonary  * Acute on chronic respiratory failure with hypoxia and hypercapnia  - intubated at time of consult, but with plan for extubation delayed discussion with pt until extubation   - doing well on BiPap at time of assessment   - resp distress likely 2/2 to CHF and COPD per PCCM  - continued management per hospital primary and PCCM     Chronic obstructive pulmonary disease with acute exacerbation  - chronic condition noted; exacerbation along with CHF exacerbation that resulted in pt's 2nd intubation (prior intubation in 2023)   - contributes to appropriateness for hospice when aligns with GOC   - continue management per hospital primary and PCCM     Cardiac/Vascular  Acute on chronic combined systolic and diastolic heart failure  - EF of 15% and diastolic dysfunction on 2023 echo   - plan for further discussion of trajectory of life-limiting illness when off BiPap   - hospice appropriate if/when aligns with GOC     Thank you for your consult. I will follow-up with patient. Please contact us if you have any additional questions.    Total visit time: 70 minutes    70 min visit time including: face to face time in discussion of symptom assessment, and exploring options and burdens of offered treatments.  This also includes non-face to face time preparing to see the patient including chart review,  "obtaining and/or reviewing separately obtained history, documenting clinical information in the electronic or other health record, independently interpreting results and communicating results to the patient/family/caregiver, family discussions by phone if not able to be present, coordination of care with other specialists, and discharge planning.     ACP time spent included in initial visit time: goals of care, advanced care planning, emotional support, formulating and communicating prognosis, exploring burden/ benefit of various approaches of treatment.     Aundrea Cano NP  Palliative Medicine  [facility]      Subjective:     HPI:   From H&P: "This is a 68-year-old male with a past medical history of COPD (on 4 L O2), HFrEF (EF: 15%, GIDD), CAD, hypertension, type 2 diabetes, hyperlipidemia, tobacco use who presents with shortness of breath.     Patient presents with shortness of breath that has been ongoing for the whole day.  He has recurrent hospitalization/ED presentations for COPD/heart failure exacerbation.  Patient is unable to contribute to the history as he is intubated."    Palliative medicine consulted for goals of care discussion and advance care planning; for details of visit, see advance care planning section of plan.     Hospital Course:  No notes on file      Past Medical History:   Diagnosis Date    CHF (congestive heart failure)     COPD (chronic obstructive pulmonary disease)     Coronary artery disease     Elevated cholesterol     on medication    Hypertension        Past Surgical History:   Procedure Laterality Date    CARDIAC SURGERY      coronary stent placed    CORONARY STENT PLACEMENT         Review of patient's allergies indicates:   Allergen Reactions    Contrast media Shortness Of Breath, Itching and Anxiety     Patient states that he had a bad reaction, anxiety , shortness of breath, itching .        Medications:  Continuous Infusions:  Scheduled Meds:   " albuterol-ipratropium  3 mL Nebulization Q4H    aspirin  81 mg Oral Daily    atorvastatin  40 mg Oral Daily    cefTRIAXone (Rocephin) IV (PEDS and ADULTS)  1 g Intravenous Q24H    clopidogreL  75 mg Oral Daily    famotidine (PF)  20 mg Intravenous Daily    furosemide (LASIX) injection  60 mg Intravenous Q12H    heparin (porcine)  5,000 Units Subcutaneous Q8H    methylPREDNISolone sodium succinate injection  40 mg Intravenous Q12H    mupirocin   Nasal BID     PRN Meds:acetaminophen, calcium gluconate IVPB, calcium gluconate IVPB, calcium gluconate IVPB, dextrose 10%, dextrose 10%, glucagon (human recombinant), glucose, glucose, insulin aspart U-100, magnesium sulfate IVPB, magnesium sulfate IVPB, melatonin, ondansetron, potassium chloride **AND** potassium chloride **AND** potassium chloride, prochlorperazine, sodium chloride 0.9%    Family History       Problem Relation (Age of Onset)    Cancer Mother    No Known Problems Father          Tobacco Use    Smoking status: Every Day     Current packs/day: 0.00     Average packs/day: 2.0 packs/day for 45.0 years (90.0 ttl pk-yrs)     Types: Cigarettes     Start date: 1972     Last attempt to quit: 2017     Years since quittin.2    Smokeless tobacco: Never   Substance and Sexual Activity    Alcohol use: Yes     Comment: socially    Drug use: No    Sexual activity: Yes     Partners: Female     Birth control/protection: None       Review of Systems   Constitutional:  Positive for activity change and fatigue.   Respiratory:  Positive for shortness of breath.    Musculoskeletal:  Negative for gait problem.   Neurological:  Positive for weakness.     Objective:     Vital Signs (Most Recent):  Temp: 98.1 °F (36.7 °C) (24 1200)  Pulse: 108 (24 1300)  Resp: (!) 22 (24 1300)  BP: 106/74 (24 1300)  SpO2: 98 % (24 1300) Vital Signs (24h Range):  Temp:  [98.1 °F (36.7 °C)-98.8 °F (37.1 °C)] 98.1 °F (36.7 °C)  Pulse:  [] 108  Resp:   [17.4-36] 22  SpO2:  [93 %-99 %] 98 %  BP: ()/(50-80) 106/74     Weight: 82.9 kg (182 lb 12.2 oz)  Body mass index is 29.5 kg/m².       Physical Exam  Vitals and nursing note reviewed.   Constitutional:       General: He is not in acute distress.     Appearance: He is ill-appearing.   HENT:      Head: Normocephalic and atraumatic.   Pulmonary:      Comments: Now extubated; BiPap mask in place (pt tolerating well)   Musculoskeletal:      Right lower leg: No edema.      Left lower leg: No edema.   Neurological:      Mental Status: He is alert and oriented to person, place, and time.        Advance Care Planning  Advance Directives:   Living Will: No    LaPOST: No    Do Not Resuscitate Status: No    Medical Power of : No (Confirms wife, Lillian, is prefereed MPOA (also legal surrogate decision maker) however, she is currently admitted inpatient as well.  2nd choice is sister Bahman, 3rd choice is sisterMarilou (see ACP))      Decision Making:  Patient answered questions  Goals of Care: The patient endorses that what is most important right now is to focus on spending time at home, remaining as independent as possible, symptom/pain control, and extending life as long as possible, even it it means sacrificing quality    Accordingly, we have decided that the best plan to meet the patient's goals includes continuing with treatment         Significant Labs: All pertinent labs within the past 24 hours have been reviewed.  CBC:   Recent Labs   Lab 01/25/24  0902   WBC 19.60*   HGB 10.8*   HCT 34.2*   MCV 89        BMP:  Recent Labs   Lab 01/25/24  0902   *      K 3.8   CL 99   CO2 30*   BUN 21   CREATININE 1.0   CALCIUM 9.0     LFT:  Lab Results   Component Value Date    AST 17 01/24/2024    ALKPHOS 78 01/24/2024    BILITOT 0.5 01/24/2024     Albumin:   Albumin   Date Value Ref Range Status   01/24/2024 3.3 (L) 3.5 - 5.2 g/dL Final     Protein:   Total Protein   Date Value Ref Range Status    01/24/2024 6.8 6.0 - 8.4 g/dL Final     Lactic acid:   Lab Results   Component Value Date    LACTATE 1.6 10/19/2023    LACTATE 1.2 04/12/2022       Significant Imaging: I have reviewed all pertinent imaging results/findings within the past 24 hours.      I spent a total of 70 minutes on the day of the visit. This includes face to face time in discussion of goals of care, symptom assessment, coordination of care and emotional support.  This also includes non-face to face time preparing to see the patient (eg, review of tests/imaging), obtaining and/or reviewing separately obtained history, documenting clinical information in the electronic or other health record, independently interpreting results and communicating results to the patient/family/caregiver, or care coordinator.    Aundrea Cano NP  Palliative Medicine  [facility] - Intensive Care

## 2024-01-26 LAB
ANION GAP SERPL CALC-SCNC: 9 MMOL/L (ref 8–16)
BASOPHILS # BLD AUTO: 0.02 K/UL (ref 0–0.2)
BASOPHILS NFR BLD: 0.1 % (ref 0–1.9)
BUN SERPL-MCNC: 30 MG/DL (ref 8–23)
CALCIUM SERPL-MCNC: 8.9 MG/DL (ref 8.7–10.5)
CHLORIDE SERPL-SCNC: 97 MMOL/L (ref 95–110)
CO2 SERPL-SCNC: 34 MMOL/L (ref 23–29)
CREAT SERPL-MCNC: 0.9 MG/DL (ref 0.5–1.4)
DIFFERENTIAL METHOD BLD: ABNORMAL
EOSINOPHIL # BLD AUTO: 0 K/UL (ref 0–0.5)
EOSINOPHIL NFR BLD: 0 % (ref 0–8)
ERYTHROCYTE [DISTWIDTH] IN BLOOD BY AUTOMATED COUNT: 14.5 % (ref 11.5–14.5)
EST. GFR  (NO RACE VARIABLE): >60 ML/MIN/1.73 M^2
GLUCOSE SERPL-MCNC: 175 MG/DL (ref 70–110)
HCT VFR BLD AUTO: 36.1 % (ref 40–54)
HGB BLD-MCNC: 11.6 G/DL (ref 14–18)
IMM GRANULOCYTES # BLD AUTO: 0.1 K/UL (ref 0–0.04)
IMM GRANULOCYTES NFR BLD AUTO: 0.5 % (ref 0–0.5)
LYMPHOCYTES # BLD AUTO: 1.1 K/UL (ref 1–4.8)
LYMPHOCYTES NFR BLD: 5.5 % (ref 18–48)
MCH RBC QN AUTO: 29.1 PG (ref 27–31)
MCHC RBC AUTO-ENTMCNC: 32.1 G/DL (ref 32–36)
MCV RBC AUTO: 91 FL (ref 82–98)
MONOCYTES # BLD AUTO: 0.5 K/UL (ref 0.3–1)
MONOCYTES NFR BLD: 2.6 % (ref 4–15)
NEUTROPHILS # BLD AUTO: 17.9 K/UL (ref 1.8–7.7)
NEUTROPHILS NFR BLD: 91.3 % (ref 38–73)
NRBC BLD-RTO: 0 /100 WBC
PLATELET # BLD AUTO: 242 K/UL (ref 150–450)
PMV BLD AUTO: 10.9 FL (ref 9.2–12.9)
POCT GLUCOSE: 153 MG/DL (ref 70–110)
POCT GLUCOSE: 161 MG/DL (ref 70–110)
POCT GLUCOSE: 221 MG/DL (ref 70–110)
POTASSIUM SERPL-SCNC: 4 MMOL/L (ref 3.5–5.1)
RBC # BLD AUTO: 3.98 M/UL (ref 4.6–6.2)
SODIUM SERPL-SCNC: 140 MMOL/L (ref 136–145)
WBC # BLD AUTO: 19.56 K/UL (ref 3.9–12.7)

## 2024-01-26 PROCEDURE — 99497 ADVNCD CARE PLAN 30 MIN: CPT | Mod: ,,, | Performed by: REGISTERED NURSE

## 2024-01-26 PROCEDURE — 25000003 PHARM REV CODE 250: Performed by: NURSE PRACTITIONER

## 2024-01-26 PROCEDURE — 99900031 HC PATIENT EDUCATION (STAT)

## 2024-01-26 PROCEDURE — 85025 COMPLETE CBC W/AUTO DIFF WBC: CPT | Performed by: HOSPITALIST

## 2024-01-26 PROCEDURE — 21400001 HC TELEMETRY ROOM

## 2024-01-26 PROCEDURE — 25000003 PHARM REV CODE 250: Performed by: STUDENT IN AN ORGANIZED HEALTH CARE EDUCATION/TRAINING PROGRAM

## 2024-01-26 PROCEDURE — 99233 SBSQ HOSP IP/OBS HIGH 50: CPT | Mod: ,,, | Performed by: INTERNAL MEDICINE

## 2024-01-26 PROCEDURE — 94660 CPAP INITIATION&MGMT: CPT

## 2024-01-26 PROCEDURE — 27000221 HC OXYGEN, UP TO 24 HOURS

## 2024-01-26 PROCEDURE — 25000003 PHARM REV CODE 250: Performed by: HOSPITALIST

## 2024-01-26 PROCEDURE — 63600175 PHARM REV CODE 636 W HCPCS: Performed by: NURSE PRACTITIONER

## 2024-01-26 PROCEDURE — 36415 COLL VENOUS BLD VENIPUNCTURE: CPT | Performed by: HOSPITALIST

## 2024-01-26 PROCEDURE — 94640 AIRWAY INHALATION TREATMENT: CPT

## 2024-01-26 PROCEDURE — 25000242 PHARM REV CODE 250 ALT 637 W/ HCPCS: Performed by: STUDENT IN AN ORGANIZED HEALTH CARE EDUCATION/TRAINING PROGRAM

## 2024-01-26 PROCEDURE — 94761 N-INVAS EAR/PLS OXIMETRY MLT: CPT

## 2024-01-26 PROCEDURE — 99900035 HC TECH TIME PER 15 MIN (STAT)

## 2024-01-26 PROCEDURE — 99233 SBSQ HOSP IP/OBS HIGH 50: CPT | Mod: ,,, | Performed by: REGISTERED NURSE

## 2024-01-26 PROCEDURE — 63600175 PHARM REV CODE 636 W HCPCS: Performed by: INTERNAL MEDICINE

## 2024-01-26 PROCEDURE — 80048 BASIC METABOLIC PNL TOTAL CA: CPT | Performed by: HOSPITALIST

## 2024-01-26 PROCEDURE — 63600175 PHARM REV CODE 636 W HCPCS: Performed by: STUDENT IN AN ORGANIZED HEALTH CARE EDUCATION/TRAINING PROGRAM

## 2024-01-26 RX ORDER — FUROSEMIDE 10 MG/ML
60 INJECTION INTRAMUSCULAR; INTRAVENOUS DAILY
Status: DISCONTINUED | OUTPATIENT
Start: 2024-01-27 | End: 2024-01-27 | Stop reason: HOSPADM

## 2024-01-26 RX ORDER — PREDNISONE 50 MG/1
50 TABLET ORAL DAILY
Status: DISCONTINUED | OUTPATIENT
Start: 2024-01-26 | End: 2024-01-26

## 2024-01-26 RX ORDER — PREDNISONE 50 MG/1
50 TABLET ORAL DAILY
Status: DISCONTINUED | OUTPATIENT
Start: 2024-01-27 | End: 2024-01-27

## 2024-01-26 RX ORDER — FAMOTIDINE 20 MG/1
20 TABLET, FILM COATED ORAL DAILY
Status: DISCONTINUED | OUTPATIENT
Start: 2024-01-26 | End: 2024-01-27 | Stop reason: HOSPADM

## 2024-01-26 RX ORDER — METOPROLOL TARTRATE 25 MG/1
12.5 TABLET ORAL 2 TIMES DAILY
Status: DISCONTINUED | OUTPATIENT
Start: 2024-01-26 | End: 2024-01-27 | Stop reason: HOSPADM

## 2024-01-26 RX ADMIN — HEPARIN SODIUM 5000 UNITS: 5000 INJECTION INTRAVENOUS; SUBCUTANEOUS at 05:01

## 2024-01-26 RX ADMIN — HEPARIN SODIUM 5000 UNITS: 5000 INJECTION INTRAVENOUS; SUBCUTANEOUS at 03:01

## 2024-01-26 RX ADMIN — IPRATROPIUM BROMIDE AND ALBUTEROL SULFATE 3 ML: 2.5; .5 SOLUTION RESPIRATORY (INHALATION) at 08:01

## 2024-01-26 RX ADMIN — ASPIRIN 81 MG CHEWABLE TABLET 81 MG: 81 TABLET CHEWABLE at 08:01

## 2024-01-26 RX ADMIN — IPRATROPIUM BROMIDE AND ALBUTEROL SULFATE 3 ML: 2.5; .5 SOLUTION RESPIRATORY (INHALATION) at 07:01

## 2024-01-26 RX ADMIN — HEPARIN SODIUM 5000 UNITS: 5000 INJECTION INTRAVENOUS; SUBCUTANEOUS at 09:01

## 2024-01-26 RX ADMIN — IPRATROPIUM BROMIDE AND ALBUTEROL SULFATE 3 ML: 2.5; .5 SOLUTION RESPIRATORY (INHALATION) at 04:01

## 2024-01-26 RX ADMIN — CEFTRIAXONE 1 G: 1 INJECTION, POWDER, FOR SOLUTION INTRAMUSCULAR; INTRAVENOUS at 12:01

## 2024-01-26 RX ADMIN — FAMOTIDINE 20 MG: 20 TABLET ORAL at 08:01

## 2024-01-26 RX ADMIN — METOPROLOL TARTRATE 12.5 MG: 25 TABLET, FILM COATED ORAL at 09:01

## 2024-01-26 RX ADMIN — ATORVASTATIN CALCIUM 40 MG: 40 TABLET, FILM COATED ORAL at 08:01

## 2024-01-26 RX ADMIN — INSULIN ASPART 2 UNITS: 100 INJECTION, SOLUTION INTRAVENOUS; SUBCUTANEOUS at 04:01

## 2024-01-26 RX ADMIN — METHYLPREDNISOLONE SODIUM SUCCINATE 40 MG: 125 INJECTION, POWDER, FOR SOLUTION INTRAMUSCULAR; INTRAVENOUS at 08:01

## 2024-01-26 RX ADMIN — CLOPIDOGREL BISULFATE 75 MG: 75 TABLET ORAL at 08:01

## 2024-01-26 RX ADMIN — IPRATROPIUM BROMIDE AND ALBUTEROL SULFATE 3 ML: 2.5; .5 SOLUTION RESPIRATORY (INHALATION) at 12:01

## 2024-01-26 RX ADMIN — MUPIROCIN: 20 OINTMENT TOPICAL at 08:01

## 2024-01-26 RX ADMIN — METOPROLOL TARTRATE 12.5 MG: 25 TABLET, FILM COATED ORAL at 11:01

## 2024-01-26 RX ADMIN — MUPIROCIN: 20 OINTMENT TOPICAL at 09:01

## 2024-01-26 RX ADMIN — FUROSEMIDE 60 MG: 10 INJECTION, SOLUTION INTRAMUSCULAR; INTRAVENOUS at 08:01

## 2024-01-26 NOTE — PROGRESS NOTES
[facility] Intensive Care  Critical Care Medicine  Progress Note    Patient Name: Abelardo Irene Jr.  MRN: [id number]  Admission Date: 1/23/2024  Hospital Length of Stay: 3 days  Code Status: Full Code  Attending Provider: Iron Bledsoe MD  Primary Care Provider: Rolando Funes MD   Principal Problem: Acute on chronic respiratory failure with hypoxia and hypercapnia    Subjective:     HPI:  Mr. Abelardo Irene Jr. is a 67 y.o male with a PMHx of HTN, COPD (on 4L O2), CHF (EF 15%), CAD, HTN, DMII, and tobacco abuse that presented to the ED for worsening shortness of breath throughout the day. In the ED, the patient was tachycardic, tachypneic, hypoxic and ultimately required intubation after failing BiPAP. Labs were remarkable for leukocytosis (18.5), elevated BNP (609), hyperglycemia (333).  VBG showed a pH of 7.15, pCO2 of 106.5 which improved to 7.34, 61.2 with intubation. He was admitted for acute on chronic combined respiratory failure 2/2 COPD and CHF exacerbations. Pulmonary was consulted for ventilator management.     Of note, Mr. Irene is known to our service as he has been admitted several times a year with similar presentations.     Hospital/ICU Course:  No notes on file    Interval History: no acute issue.  Extubated without issue.  Patient endorse running out of medication prior to hospitalization.  .        Objective:     Vital Signs (Most Recent):  Temp: 98.5 °F (36.9 °C) (01/26/24 0800)  Pulse: 101 (01/26/24 0800)  Resp: 20 (01/26/24 0800)  BP: 128/76 (01/26/24 0800)  SpO2: 97 % (01/26/24 0800) Vital Signs (24h Range):  Temp:  [98 °F (36.7 °C)-98.6 °F (37 °C)] 98.5 °F (36.9 °C)  Pulse:  [] 101  Resp:  [12-36] 20  SpO2:  [93 %-100 %] 97 %  BP: (106-139)/(57-80) 128/76     Weight: 82.9 kg (182 lb 12.2 oz)  Body mass index is 29.5 kg/m².      Intake/Output Summary (Last 24 hours) at 1/26/2024 0900  Last data filed at 1/26/2024 0843  Gross per 24 hour   Intake 353.54 ml   Output 3150 ml   Net  -2796.46 ml          Physical Exam  Vitals and nursing note reviewed.   Constitutional:       Appearance: Normal appearance.      Interventions: He is sedated and restrained. He is not intubated.  HENT:      Head: Normocephalic and atraumatic.      Nose: Nose normal.   Cardiovascular:      Rate and Rhythm: Normal rate and regular rhythm.      Pulses: Normal pulses.      Heart sounds: No murmur heard.  Pulmonary:      Effort: Pulmonary effort is normal. No respiratory distress. He is not intubated.      Breath sounds: Rales present. No wheezing.   Abdominal:      General: Abdomen is flat.      Palpations: Abdomen is soft.      Tenderness: There is no abdominal tenderness.   Musculoskeletal:      Right lower leg: No edema.      Left lower leg: No edema.   Skin:     General: Skin is warm.      Capillary Refill: Capillary refill takes less than 2 seconds.   Neurological:      General: No focal deficit present.      Mental Status: He is oriented to person, place, and time.           Review of Systems    Vents:  Vent Mode: PS/CPAP (01/25/24 1020)  Ventilator Initiated: Yes (01/23/24 2302)  Set Rate: 15 BPM (01/25/24 0817)  Vt Set: 420 mL (01/25/24 0415)  Pressure Support: 10 cmH20 (01/25/24 1020)  PEEP/CPAP: 7 cmH20 (01/25/24 1020)  Oxygen Concentration (%): 30 (01/26/24 0018)  Peak Airway Pressure: 17.9 cmH20 (01/25/24 1020)  Total Ve: 5.3 L/m (01/25/24 1020)  F/VT Ratio<105 (RSBI): 1363.64 (01/25/24 1020)    Lines/Drains/Airways       Peripheral Intravenous Line  Duration                  Peripheral IV - Single Lumen 01/23/24 2150 18 G Anterior;Right Forearm 2 days         Peripheral IV - Single Lumen 01/23/24 2341 20 G;1 in Anterior;Right Upper Arm 2 days         Peripheral IV - Single Lumen 01/24/24 1113 20 G Anterior;Left;Proximal Forearm 1 day                    Significant Labs:    CBC/Anemia Profile:  Recent Labs   Lab 01/25/24  0902 01/26/24  0434   WBC 19.60* 19.56*   HGB 10.8* 11.6*   HCT 34.2* 36.1*     "242   MCV 89 91   RDW 14.5 14.5          Chemistries:  Recent Labs   Lab 01/25/24  0902 01/26/24  0434    140   K 3.8 4.0   CL 99 97   CO2 30* 34*   BUN 21 30*   CREATININE 1.0 0.9   CALCIUM 9.0 8.9         ABGs:   Recent Labs   Lab 01/25/24  1011   PH 7.501*   PCO2 47.0*   HCO3 36.7*   POCSATURATED 97   BE 12*       Blood Culture:   No results for input(s): "LABBLOO" in the last 48 hours.    Cardiac Markers: No results for input(s): "CKMB", "TROPONINT", "MYOGLOBIN" in the last 48 hours.  Lactic Acid: No results for input(s): "LACTATE" in the last 48 hours.  Respiratory Culture:   Recent Labs   Lab 01/24/24  1131   GSRESP <10 epithelial cells per low power field.  Few WBC's  Moderate Gram positive cocci in pairs  Few Gram positive cocci   RESPIRATORYC Further report to follow       Echo 7/2/23  The left ventricle is severely enlarged with eccentric hypertrophy and severely decreased systolic function.  The estimated ejection fraction is 15%.  Grade I left ventricular diastolic dysfunction.  Normal right ventricular size with normal right ventricular systolic function.  Moderate left atrial enlargement.  Intermediate central venous pressure (8 mmHg).    Significant Imaging:  I have reviewed all pertinent imaging results/findings within the past 24 hours.  CXR: I have reviewed all pertinent results/findings within the past 24 hours and my personal findings are:  1/23/24 mild blunting of right costophrenic angle.      ABG  Recent Labs   Lab 01/25/24  1011   PH 7.501*   PO2 83   PCO2 47.0*   HCO3 36.7*   BE 12*     Assessment/Plan:     Pulmonary  * Acute on chronic respiratory failure with hypoxia and hypercapnia  Likely combination of CHF and COPD exacerbations. CXR with vascular congestion, mild bilateral interstitial opacities, and small bilateral pleural effusions. BNP >600. WBC elevated. Known emphysema and PREETI on home O2 with nightly CPAP. COVID and Flu negative.     - patient endorsed running out of " medication 1 week prior to admission.    - continue steroids for 5 days; transition to prednisone 40mg daily when able to tolerated PO x 4 more days.    - CAP coverage given elevated wbc and cxr findings.  Recommend 5 days of antibiotics.    - procal WNL; f/u sputum culture  - continue aggressive diuresis.  Will cut back to qd lasix.    - extubated on 1/25/24 without issue.       Chronic obstructive pulmonary disease with acute exacerbation  HX of COPD. PFT in 2017 with moderate restriction with reduced DLCO.  Previous CT chest imaging with emphysema.    Will need lama, laba/ics upon discharge    Cardiac/Vascular  Acute on chronic combined systolic and diastolic heart failure  EF 15%  Diuresing well.  Will lower lasix given increasing bun    Palliative Care  Advance care planning  Chronic MOF.  Appreciate palliative care inputs.      Other  Tobacco abuse  Encourage smoking cessation  Benefit from smoking cessation referral          Shahid Farmer MD  Critical Care Medicine  [facility] - Intensive Care    Will be available upon request.

## 2024-01-26 NOTE — ASSESSMENT & PLAN NOTE
- EF of 15% and diastolic dysfunction on 2023 echo   - further discussed trajectory of life-limiting illness   - hospice appropriate if/when aligns with GOC

## 2024-01-26 NOTE — CONSULTS
1903:   Consult was performed with the patient's medical provider and the patient. Medical Provider reports that the patient has not had any visitors, in addition he stated that the patient's spouse is currently hospitalized..  My visit with the patient found him sitting as if he anticipated my visit.. The patient stated that he is doing much better than he was  a day ago.  Prayers were offered for the patient and his family. The Spiritual Care Team will continue to provide spiritual support to the patient and his family.        TIARRA Wood   [phone]

## 2024-01-26 NOTE — ASSESSMENT & PLAN NOTE
Patient's FSGs are controlled on current medication regimen.  Last A1c reviewed-   Lab Results   Component Value Date    HGBA1C 6.3 (H) 01/24/2024     Most recent fingerstick glucose reviewed-   Recent Labs   Lab 01/25/24  1247 01/25/24  1639 01/25/24  2108 01/26/24  0549   POCTGLUCOSE 200* 140* 162* 153*       Current correctional scale  Low  Maintain anti-hyperglycemic dose as follows-   Antihyperglycemics (From admission, onward)    Start     Stop Route Frequency Ordered    01/24/24 0102  insulin aspart U-100 pen 0-5 Units         -- SubQ Before meals & nightly PRN 01/24/24 0002        Hold Oral hypoglycemics while patient is in the hospital.  More hyperglycemic secondary to steroids.  Continue sliding scale.

## 2024-01-26 NOTE — PROVIDER TRANSFER
Transfer Note    69 y/o male admitted with acute respiratory failure.  Failed Bipap in ER and then intubated.  Hx of COPD on home oxygen and CHF EF 15%.  Likely combination of CHF and COPD exacerbations. CXR with vascular congestion, mild bilateral interstitial opacities, and small bilateral pleural effusions. BNP >600.  Started on nebs, IV diuresis and steroids.  Pulmonary consulted. Elevated WBC and started on ABX's.  Improved and extubated on 1/25.  States he ran out of all his medications.  Switching steroids to PO.  Decreasing diuresis.  Can switch ABX to PO tomorrow.  Starting him on B blocker.  Add ACEI if tolerated by BP.  Can probably go home tomorrow.  Will need prescriptions.  SW consult for discharge planning.

## 2024-01-26 NOTE — ASSESSMENT & PLAN NOTE
1/26/2024  - interval chart reviewed; discussed pt with MDT during ICU rounds   - met with pt at bedside, doing well off BiPap now with NC   - shared some social stressors, including his living arrangements which he attributes to current admission; he is exploring options at [facility]; he shares that he was out of his medications and oxygen for some time prior to admission, he was using his wife's O2 while she was admitted inpatient; case management consulted/following   - discussed recommendation for home palliative NP visits for additional support and continued GOC/ACP discussions outside of acute hospital setting as this is pt's 2nd time being intubated and with significant CHF; pt agreeable   - reviewed wishes regarding MPOA and completed MPOA document listing his wife, Lillian ([phone]), as 1st choice, but since Lillian has many health problems and admitted inpatient often, he wished to appoint his sisters as alternates; Bahman ([phone]) listed as 2nd on form (bc she lives closer), and Marilou ([phone]) as 3rd on form; pt shares that either can act independently of each other as 2nd choice if Ms. Snyder is unavailable); photo of completed form added to media until scanning to EMR takes place following discharge; copy also provided to pt for his records   - sister marilou was on speaker phone through completion of MPOA document as is aware/agreeable; encouraged discussion of GOC with Marilou, Bahman, and Lillian; encouraged pt to make his children aware of MPOA wishes as well; provided brief medical update to Marilou and provided phone numbers to facility   - emotional support provided; allowed time for questions/concerns, all addressed   - CM/SW and hospital primary updated     1/25/2024 - Consult   - consult received; interval chart reviewed in detail; discussed pt with MDT during ICU rounds   - pt known to myself and palliative medicine team from previous admission, prior palliative involvement following prior  "intubation 3/2023  - met with patient at bedside; introduction to palliative medicine team and role in current care and admission; pt was wearing BIPap mask post extubation so GOC/ACP discussion was brief, with plans for further discussion 1/26 or when able to not wear BiPap for easier discussion   - pt is happy to be extubated and doing well; confirms recalling prior intubation as well as meeting with JAVI Coughlin and discussing wishes that admission; confirms wishes discussed which were for his wife Lillian to be his preferred MPOA as well as wishing to be intubated again if needed to extend life  - pt shares that his wife Lillian is currently admitted to an inpatient hospital as well currently due to her CKD/HD; in the event that wife was unavailable, and pt lacked capacity his 2nd choice MPOA is his sister Bahman (he was not aware of her number at this time), 3rd choice is his "baby sister" Marilou; plan for completion of MPOA documentation listing these wishes as pt has 5 adult children as well (who would be next legal surrogate decision maker after Lillian otherwise )  - pt declines need for  visits, or Caodaism/spiritual needs that impact his care at this time   - emotional support provided   - Allowed time for questions/concerns; all addressed; expressed availability of myself/palliative team for additional questions/concerns   - plan for continued GOC/ACP discussion and completion of MPOA document; would benefit from at minimum home palliative care, although hospice appropriate doesn't yet seem to align with GOC; MDT updated     "

## 2024-01-26 NOTE — ASSESSMENT & PLAN NOTE
Patient with Hypercapnic and Hypoxic Respiratory failure which is Acute on chronic.  he is on home oxygen at 4 LPM. Supplemental oxygen was provided and noted- Vent Mode: PS/CPAP  Oxygen Concentration (%):  [30] 30  PEEP/CPAP:  [7 cmH20] 7 cmH20  Pressure Support:  [10 cmH20] 10 cmH20  Mean Airway Pressure:  [15.1 cmH20] 15.1 cmH20  Signs/symptoms of respiratory failure include- increased work of breathing and respiratory distress. Contributing diagnoses includes - CHF and COPD Labs and images were reviewed. Patient Has recent ABG, which has been reviewed. Will treat underlying causes and adjust management of respiratory failure as follows- Continue Solu-medrol, Duo-Nebs, Lasix.  Pulmonary consulted for vent management.  Extubated on 1/25.  Steroids switched to PO.  Decreasing IV Lasix.

## 2024-01-26 NOTE — PROGRESS NOTES
[facility] Medicine  Progress Note    Patient Name: Abelardo Irene Jr.  MRN: [id number]  Patient Class: IP- Inpatient   Admission Date: 1/23/2024  Length of Stay: 3 days  Attending Physician: Iron Bledsoe MD  Primary Care Provider: Rolando Funes MD        Subjective:     Principal Problem:Acute on chronic respiratory failure with hypoxia and hypercapnia        HPI:  This is a 68-year-old male with a past medical history of COPD (on 4 L O2), HFrEF (EF: 15%, GIDD), CAD, hypertension, type 2 diabetes, hyperlipidemia, tobacco use who presents with shortness of breath.    Patient presents with shortness of breath that has been ongoing for the whole day.  He has recurrent hospitalization/ED presentations for COPD/heart failure exacerbation.  Patient is unable to contribute to the history as he is intubated.    In the ED, the patient was tachycardic, tachypneic, hypoxic and ultimately required to be intubated.  Labs were remarkable for leukocytosis (18.5), elevated BNP (609), hyperglycemia (333).  VBG showed a pH of 7.15, pCO2 of 106.5 > 7.34, 61.2.  Chest x-ray showed cardiomegaly with probable interstitial edema and small pleural effusions. Patient was briefly trialed on BiPAP but was ultimately required to be intubated for persistent tachypnea and declined GCS.  EMS administered Solu-Medrol, magnesium sulfate, and DuoNeb.  In the ER, he received DuoNeb x1, ceftriaxone, azithromycin, rocuronium 100 mg IV, etomidate 20 mg IV, and was started on propofol.  Patient was admitted for further management.    Overview/Hospital Course:  69 y/o male admitted with acute respiratory failure.  Failed Bipap in ER and then intubated.  Hx of COPD on home oxygen and CHF EF 15%.  Likely combination of CHF and COPD exacerbations. CXR with vascular congestion, mild bilateral interstitial opacities, and small bilateral pleural effusions. BNP >600.  Started on nebs, IV diuresis and steroids.  Pulmonary  consulted. Elevated WBC and started on ABX's.  Improved and extubated on 1/25.  States he ran out of all his medications.    Interval History: feeling well.    Review of Systems   HENT:  Negative for ear discharge and ear pain.    Eyes:  Negative for discharge and itching.   Endocrine: Negative for cold intolerance and heat intolerance.   Neurological:  Negative for seizures and syncope.     Objective:     Vital Signs (Most Recent):  Temp: 98.5 °F (36.9 °C) (01/26/24 0800)  Pulse: 101 (01/26/24 0800)  Resp: 20 (01/26/24 0800)  BP: 128/76 (01/26/24 0800)  SpO2: 97 % (01/26/24 0800) Vital Signs (24h Range):  Temp:  [98 °F (36.7 °C)-98.6 °F (37 °C)] 98.5 °F (36.9 °C)  Pulse:  [] 101  Resp:  [12-36] 20  SpO2:  [93 %-100 %] 97 %  BP: (106-139)/(57-80) 128/76     Weight: 82.9 kg (182 lb 12.2 oz)  Body mass index is 29.5 kg/m².    Intake/Output Summary (Last 24 hours) at 1/26/2024 0941  Last data filed at 1/26/2024 0843  Gross per 24 hour   Intake 353.54 ml   Output 3150 ml   Net -2796.46 ml         Physical Exam  Vitals and nursing note reviewed.   Constitutional:       Appearance: Normal appearance.      Interventions: He is sedated and restrained. He is not intubated.  HENT:      Head: Normocephalic and atraumatic.      Nose: Nose normal.   Cardiovascular:      Rate and Rhythm: Normal rate and regular rhythm.      Pulses: Normal pulses.      Heart sounds: No murmur heard.  Pulmonary:      Effort: Pulmonary effort is normal. No respiratory distress. He is not intubated.      Breath sounds: Rales present. No wheezing.   Abdominal:      General: Abdomen is flat.      Palpations: Abdomen is soft.      Tenderness: There is no abdominal tenderness.   Musculoskeletal:      Right lower leg: No edema.      Left lower leg: No edema.   Skin:     General: Skin is warm.      Capillary Refill: Capillary refill takes less than 2 seconds.   Neurological:      General: No focal deficit present.      Mental Status: He is oriented  to person, place, and time.             Significant Labs: All pertinent labs within the past 24 hours have been reviewed.  BMP:   Recent Labs   Lab 01/26/24  0434   *      K 4.0   CL 97   CO2 34*   BUN 30*   CREATININE 0.9   CALCIUM 8.9     CBC:   Recent Labs   Lab 01/25/24  0902 01/26/24  0434   WBC 19.60* 19.56*   HGB 10.8* 11.6*   HCT 34.2* 36.1*    242       Significant Imaging: I have reviewed all pertinent imaging results/findings within the past 24 hours.    Assessment/Plan:      * Acute on chronic respiratory failure with hypoxia and hypercapnia  Patient with Hypercapnic and Hypoxic Respiratory failure which is Acute on chronic.  he is on home oxygen at 4 LPM. Supplemental oxygen was provided and noted- Vent Mode: PS/CPAP  Oxygen Concentration (%):  [30] 30  PEEP/CPAP:  [7 cmH20] 7 cmH20  Pressure Support:  [10 cmH20] 10 cmH20  Mean Airway Pressure:  [15.1 cmH20] 15.1 cmH20  Signs/symptoms of respiratory failure include- increased work of breathing and respiratory distress. Contributing diagnoses includes - CHF and COPD Labs and images were reviewed. Patient Has recent ABG, which has been reviewed. Will treat underlying causes and adjust management of respiratory failure as follows- Continue Solu-medrol, Duo-Nebs, Lasix.  Pulmonary consulted for vent management.  Extubated on 1/25.  Steroids switched to PO.  Decreasing IV Lasix.      Acute on chronic combined systolic and diastolic heart failure  Results for orders placed during the hospital encounter of 07/02/23    Echo    Interpretation Summary  · The left ventricle is severely enlarged with eccentric hypertrophy and severely decreased systolic function.  · The estimated ejection fraction is 15%.  · Grade I left ventricular diastolic dysfunction.  · Normal right ventricular size with normal right ventricular systolic function.  · Moderate left atrial enlargement.  · Intermediate central venous pressure (8 mmHg).  Continue Lasix.  Will  start B blocker.  Add ACEI if tolerated by BP.      Chronic obstructive pulmonary disease with acute exacerbation  Patient's COPD is with exacerbation noted by continued dyspnea currently.  Patient is currently on COPD Pathway. Continue scheduled inhalers Steroids and Supplemental oxygen and monitor respiratory status closely.     Uncontrolled type 2 diabetes mellitus with hyperglycemia  Patient's FSGs are controlled on current medication regimen.  Last A1c reviewed-   Lab Results   Component Value Date    HGBA1C 6.3 (H) 01/24/2024     Most recent fingerstick glucose reviewed-   Recent Labs   Lab 01/25/24  1247 01/25/24  1639 01/25/24  2108 01/26/24  0549   POCTGLUCOSE 200* 140* 162* 153*       Current correctional scale  Low  Maintain anti-hyperglycemic dose as follows-   Antihyperglycemics (From admission, onward)      Start     Stop Route Frequency Ordered    01/24/24 0102  insulin aspart U-100 pen 0-5 Units         -- SubQ Before meals & nightly PRN 01/24/24 0002          Hold Oral hypoglycemics while patient is in the hospital.  More hyperglycemic secondary to steroids.  Continue sliding scale.    Coronary artery disease involving native coronary artery of native heart without angina pectoris  Resume plavix, statin, ASA    Dyslipidemia  Resume statin       Tobacco abuse  Spent more than 3 minutes on smoking cessation counseling.      Benign essential hypertension  Chronic, controlled. Latest blood pressure and vitals reviewed-     Temp:  [98 °F (36.7 °C)-98.6 °F (37 °C)]   Pulse:  []   Resp:  [12-36]   BP: (106-139)/(57-80)   SpO2:  [93 %-100 %] .   Home meds for hypertension were reviewed and noted below.   Hypertension Medications               furosemide (LASIX) 40 MG tablet Take 1 tablet (40 mg total) by mouth 2 (two) times daily.            While in the hospital, will manage blood pressure as follows; Continue home antihypertensive regimen    Will utilize p.r.n. blood pressure medication only if  patient's blood pressure greater than 180/110 and he develops symptoms such as worsening chest pain or shortness of breath.      VTE Risk Mitigation (From admission, onward)           Ordered     heparin (porcine) injection 5,000 Units  Every 8 hours         01/23/24 2353     IP VTE HIGH RISK PATIENT  Once         01/23/24 2353     Place sequential compression device  Until discontinued         01/23/24 2353                    Discharge Planning   VERA:      Code Status: Full Code   Is the patient medically ready for discharge?:     Reason for patient still in hospital (select all that apply): Patient trending condition  Discharge Plan A: Home with family            Iron Bledsoe MD  Department of Hospital Medicine   [facility] [place] - Intensive Care

## 2024-01-26 NOTE — ASSESSMENT & PLAN NOTE
Likely combination of CHF and COPD exacerbations. CXR with vascular congestion, mild bilateral interstitial opacities, and small bilateral pleural effusions. BNP >600. WBC elevated. Known emphysema and PREETI on home O2 with nightly CPAP. COVID and Flu negative.     - patient endorsed running out of medication 1 week prior to admission.    - continue steroids for 5 days; transition to prednisone 40mg daily when able to tolerated PO x 4 more days.    - CAP coverage given elevated wbc and cxr findings.  Recommend 5 days of antibiotics.    - procal WNL; f/u sputum culture  - continue aggressive diuresis.  Will cut back to qd lasix.    - extubated on 1/25/24 without issue.

## 2024-01-26 NOTE — PLAN OF CARE
Patient from home. Lives with wife, daughter and grandchildren. Patient reported that he ran out of all of his medication. Daughter said patient is forgetful and does not remember where he places things. Daughter said that patient refuses to go to doctor's appointments. Patient will benefit from [facility]; [facility], and Home Health SN to assist with medication compliance. Daughter agrees with suggestions.        01/26/24 1058   Discharge Reassessment   Assessment Type Discharge Planning Reassessment   Communicated VERA with patient/caregiver Date not available/Unable to determine   Discharge Plan A Home Health   Discharge Plan B Home with family   DME Needed Upon Discharge  none   Transition of Care Barriers None   Why the patient remains in the hospital Requires continued medical care   Post-Acute Status   Post-Acute Authorization Home Health   Home Health Status Pending medical clearance/testing   Coverage PHN   Discharge Delays (!) Post-Acute Set-up

## 2024-01-26 NOTE — SUBJECTIVE & OBJECTIVE
Interval History: feeling well.    Review of Systems   HENT:  Negative for ear discharge and ear pain.    Eyes:  Negative for discharge and itching.   Endocrine: Negative for cold intolerance and heat intolerance.   Neurological:  Negative for seizures and syncope.     Objective:     Vital Signs (Most Recent):  Temp: 98.5 °F (36.9 °C) (01/26/24 0800)  Pulse: 101 (01/26/24 0800)  Resp: 20 (01/26/24 0800)  BP: 128/76 (01/26/24 0800)  SpO2: 97 % (01/26/24 0800) Vital Signs (24h Range):  Temp:  [98 °F (36.7 °C)-98.6 °F (37 °C)] 98.5 °F (36.9 °C)  Pulse:  [] 101  Resp:  [12-36] 20  SpO2:  [93 %-100 %] 97 %  BP: (106-139)/(57-80) 128/76     Weight: 82.9 kg (182 lb 12.2 oz)  Body mass index is 29.5 kg/m².    Intake/Output Summary (Last 24 hours) at 1/26/2024 0941  Last data filed at 1/26/2024 0843  Gross per 24 hour   Intake 353.54 ml   Output 3150 ml   Net -2796.46 ml         Physical Exam  Vitals and nursing note reviewed.   Constitutional:       Appearance: Normal appearance.      Interventions: He is sedated and restrained. He is not intubated.  HENT:      Head: Normocephalic and atraumatic.      Nose: Nose normal.   Cardiovascular:      Rate and Rhythm: Normal rate and regular rhythm.      Pulses: Normal pulses.      Heart sounds: No murmur heard.  Pulmonary:      Effort: Pulmonary effort is normal. No respiratory distress. He is not intubated.      Breath sounds: Rales present. No wheezing.   Abdominal:      General: Abdomen is flat.      Palpations: Abdomen is soft.      Tenderness: There is no abdominal tenderness.   Musculoskeletal:      Right lower leg: No edema.      Left lower leg: No edema.   Skin:     General: Skin is warm.      Capillary Refill: Capillary refill takes less than 2 seconds.   Neurological:      General: No focal deficit present.      Mental Status: He is oriented to person, place, and time.             Significant Labs: All pertinent labs within the past 24 hours have been  reviewed.  BMP:   Recent Labs   Lab 01/26/24  0434   *      K 4.0   CL 97   CO2 34*   BUN 30*   CREATININE 0.9   CALCIUM 8.9     CBC:   Recent Labs   Lab 01/25/24  0902 01/26/24 0434   WBC 19.60* 19.56*   HGB 10.8* 11.6*   HCT 34.2* 36.1*    242       Significant Imaging: I have reviewed all pertinent imaging results/findings within the past 24 hours.

## 2024-01-26 NOTE — PROGRESS NOTES
Pharmacist Intervention IV to PO Note    Abelardo Irene Jr. is a 68 y.o. male being treated with IV medication famotidine    Patient Data:    Vital Signs (Most Recent):  Temp: 98.5 °F (36.9 °C) (01/26/24 0800)  Pulse: 101 (01/26/24 0800)  Resp: 20 (01/26/24 0800)  BP: 125/73 (01/26/24 0700)  SpO2: 97 % (01/26/24 0800) Vital Signs (72h Range):  Temp:  [97.9 °F (36.6 °C)-98.8 °F (37.1 °C)]   Pulse:  []   Resp:  [12-40]   BP: ()/()   SpO2:  [86 %-100 %]      CBC:  Recent Labs   Lab 01/24/24  0409 01/25/24  0902 01/26/24  0434   WBC 13.12* 19.60* 19.56*   RBC 3.60* 3.86* 3.98*   HGB 10.0* 10.8* 11.6*   HCT 33.0* 34.2* 36.1*    215 242   MCV 92 89 91   MCH 27.8 28.0 29.1   MCHC 30.3* 31.6* 32.1     CMP:     Recent Labs   Lab 01/23/24 2226 01/24/24  0409 01/25/24  0902 01/26/24  0434   * 278* 173* 175*   CALCIUM 8.7 8.7 9.0 8.9   ALBUMIN 3.5 3.3*  --   --    PROT 7.2 6.8  --   --     141 142 140   K 3.8 3.0* 3.8 4.0   CO2 26 29 30* 34*    98 99 97   BUN 8 9 21 30*   CREATININE 0.9 0.9 1.0 0.9   ALKPHOS 86 78  --   --    ALT 13 14  --   --    AST 15 17  --   --    BILITOT 0.5 0.5  --   --        Dietary Orders:  Diet Orders            Diet diabetic Cardiac (Low Na/Chol); 2000 Calorie; Fluid - 1500mL: Diabetic starting at 01/25 1313            Based on the following criteria, this patient qualifies for intravenous to oral conversion:  [x] The patients gastrointestinal tract is functioning (tolerating medications via oral or enteral route for 24 hours and tolerating food or enteral feeds for 24 hours).  [x] The patient is hemodynamically stable for 24 hours (heart rate <100 beats per minute, systolic blood pressure >99 mm Hg, and respiratory rate <20 breaths per minute).  [x] The patient shows clinical improvement (afebrile for at least 24 hours and white blood cell count downtrending or normalized). Additionally, the patient must be non-neutropenic (absolute neutrophil count >500  cells/mm3).  [x] For antimicrobials, the patient has received IV therapy for at least 24 hours.    IV medication famotidine 20 mg IV daily will be changed famotidine 20 mg PO daily    Pharmacist's Name: Alvarez Beaver Jr  Pharmacist's Extension: [phone]

## 2024-01-26 NOTE — ASSESSMENT & PLAN NOTE
Results for orders placed during the hospital encounter of 07/02/23    Echo    Interpretation Summary  · The left ventricle is severely enlarged with eccentric hypertrophy and severely decreased systolic function.  · The estimated ejection fraction is 15%.  · Grade I left ventricular diastolic dysfunction.  · Normal right ventricular size with normal right ventricular systolic function.  · Moderate left atrial enlargement.  · Intermediate central venous pressure (8 mmHg).  Continue Lasix.  Will start B blocker.  Add ACEI if tolerated by BP.

## 2024-01-26 NOTE — PROGRESS NOTES
[facility] - Intensive Care  Palliative Medicine  Progress Note    Patient Name: Abelardo Irene Jr.  MRN: [id number]  Admission Date: 1/23/2024  Hospital Length of Stay: 3 days  Code Status: Full Code   Attending Provider: Iron Bledsoe MD  Consulting Provider: Aundrea Cano NP  Primary Care Physician: Rolando Funes MD  Principal Problem:Acute on chronic respiratory failure with hypoxia and hypercapnia    Patient information was obtained from patient, relative(s), and primary team.      Assessment/Plan:     Palliative Care  Advance Care Planning     1/26/2024  - interval chart reviewed; discussed pt with MDT during ICU rounds   - met with pt at bedside, doing well off BiPap now with NC   - shared some social stressors, including his living arrangements which he attributes to current admission; he is exploring options at [facility]; he shares that he was out of his medications and oxygen for some time prior to admission, he was using his wife's O2 while she was admitted inpatient; case management consulted/following   - discussed recommendation for home palliative NP visits for additional support and continued GOC/ACP discussions outside of acute hospital setting as this is pt's 2nd time being intubated and with significant CHF; pt agreeable   - reviewed wishes regarding MPOA and completed MPOA document listing his wife, Lillian ([phone]), as 1st choice, but since Lillian has many health problems and admitted inpatient often, he wished to appoint his sisters as alternates; Bahman ([phone]) listed as 2nd on form (bc she lives closer), and Deejay ([phone]) as 3rd on form; pt shares that either can act independently of each other as 2nd choice if Ms. Snyder is unavailable); photo of completed form added to media until scanning to EMR takes place following discharge; copy also provided to pt for his records   - sister deejay was on speaker phone through completion of MPOA document as is aware/agreeable;  "encouraged discussion of GOC with Mariolu, Bahman, and Lillian; encouraged pt to make his children aware of MPOA wishes as well; provided brief medical update to Marilou and provided phone numbers to facility   - emotional support provided; allowed time for questions/concerns, all addressed   - CM/SW and hospital primary updated     1/25/2024 - Consult   - consult received; interval chart reviewed in detail; discussed pt with MDT during ICU rounds   - pt known to myself and palliative medicine team from previous admission, prior palliative involvement following prior intubation 3/2023  - met with patient at bedside; introduction to palliative medicine team and role in current care and admission; pt was wearing BIPap mask post extubation so GOC/ACP discussion was brief, with plans for further discussion 1/26 or when able to not wear BiPap for easier discussion   - pt is happy to be extubated and doing well; confirms recalling prior intubation as well as meeting with JAVI Coughlin and discussing wishes that admission; confirms wishes discussed which were for his wife Lillian to be his preferred MPOA as well as wishing to be intubated again if needed to extend life  - pt shares that his wife Lillian is currently admitted to an inpatient hospital as well currently due to her CKD/HD; in the event that wife was unavailable, and pt lacked capacity his 2nd choice MPOA is his sister Bahman (he was not aware of her number at this time), 3rd choice is his "baby sister" Marilou; plan for completion of MPOA documentation listing these wishes as pt has 5 adult children as well (who would be next legal surrogate decision maker after Lillian otherwise )  - pt declines need for  visits, or Mormon/spiritual needs that impact his care at this time   - emotional support provided   - Allowed time for questions/concerns; all addressed; expressed availability of myself/palliative team for additional questions/concerns   - plan for continued GOC/ACP " discussion and completion of MPOA document; would benefit from at minimum home palliative care, although hospice appropriate doesn't yet seem to align with GOC; MDT updated     Pulmonary  * Acute on chronic respiratory failure with hypoxia and hypercapnia  - intubated at time of consult, but with plan for extubation delayed discussion with pt until extubation   - doing well on BiPap at time of assessment   - resp distress likely 2/2 to CHF and COPD per PCCM  - continued management per hospital primary and PCCM     Chronic obstructive pulmonary disease with acute exacerbation  - chronic condition noted; exacerbation along with CHF exacerbation that resulted in pt's 2nd intubation (prior intubation in 2023)   - contributes to appropriateness for hospice when aligns with GOC   - continue management per hospital primary and PCCM     Cardiac/Vascular  Acute on chronic combined systolic and diastolic heart failure  - EF of 15% and diastolic dysfunction on 2023 echo   - further discussed trajectory of life-limiting illness   - hospice appropriate if/when aligns with GOC       I will follow-up with patient. Please contact us if you have any additional questions.    Total visit time: 60 minutes    35 min visit time including: face to face time in discussion of symptom assessment, and exploring options and burdens of offered treatments.  This also includes non-face to face time preparing to see the patient including chart review, obtaining and/or reviewing separately obtained history, documenting clinical information in the electronic or other health record, independently interpreting results and communicating results to the patient/family/caregiver, family discussions by phone if not able to be present, coordination of care with other specialists, and discharge planning.     25 min ACP time spent: goals of care, advanced care planning, emotional support, formulating and communicating prognosis, exploring burden/ benefit of  "various approaches of treatment.     Aundrea Cano NP  Palliative Medicine  [facility]      Subjective:     Chief Complaint:   Chief Complaint   Patient presents with    Shortness of Breath     Patient arrives via EMS from home with SOB all day. On home O2 patient's SPO2 was 84% with wheezing. History of COPD. EMS put on CPAP, still having respiratory distress. Solumedrol given, and mag drip started PTA.       HPI:   From H&P: "This is a 68-year-old male with a past medical history of COPD (on 4 L O2), HFrEF (EF: 15%, GIDD), CAD, hypertension, type 2 diabetes, hyperlipidemia, tobacco use who presents with shortness of breath.     Patient presents with shortness of breath that has been ongoing for the whole day.  He has recurrent hospitalization/ED presentations for COPD/heart failure exacerbation.  Patient is unable to contribute to the history as he is intubated."    Palliative medicine consulted for goals of care discussion and advance care planning; for details of visit, see advance care planning section of plan.     Hospital Course:  No notes on file    Medications:  Continuous Infusions:  Scheduled Meds:   albuterol-ipratropium  3 mL Nebulization Q4H    aspirin  81 mg Oral Daily    atorvastatin  40 mg Oral Daily    cefTRIAXone (Rocephin) IV (PEDS and ADULTS)  1 g Intravenous Q24H    clopidogreL  75 mg Oral Daily    famotidine  20 mg Oral Daily    [START ON 1/27/2024] furosemide (LASIX) injection  60 mg Intravenous Daily    heparin (porcine)  5,000 Units Subcutaneous Q8H    metoprolol tartrate  12.5 mg Oral BID    mupirocin   Nasal BID    [START ON 1/27/2024] predniSONE  50 mg Oral Daily     PRN Meds:acetaminophen, calcium gluconate IVPB, calcium gluconate IVPB, calcium gluconate IVPB, dextrose 10%, dextrose 10%, glucagon (human recombinant), glucose, glucose, insulin aspart U-100, magnesium sulfate IVPB, magnesium sulfate IVPB, melatonin, ondansetron, potassium chloride **AND** " potassium chloride **AND** potassium chloride, prochlorperazine, sodium chloride 0.9%    Objective:     Vital Signs (Most Recent):  Temp: 98.4 °F (36.9 °C) (01/26/24 1200)  Pulse: 93 (01/26/24 1226)  Resp: (!) 22 (01/26/24 1226)  BP: (!) 134/91 (01/26/24 1200)  SpO2: (!) 93 % (01/26/24 1226) Vital Signs (24h Range):  Temp:  [98 °F (36.7 °C)-98.6 °F (37 °C)] 98.4 °F (36.9 °C)  Pulse:  [] 93  Resp:  [12-34] 22  SpO2:  [93 %-100 %] 93 %  BP: (106-134)/(59-91) 134/91     Weight: 82.9 kg (182 lb 12.2 oz)  Body mass index is 29.5 kg/m².       Physical Exam  Vitals and nursing note reviewed.   Constitutional:       General: He is not in acute distress.     Appearance: He is ill-appearing.      Comments: Lying upright in bed; participated in discussions appropriately and eagerly    HENT:      Head: Normocephalic and atraumatic.   Pulmonary:      Effort: Pulmonary effort is normal. No respiratory distress.      Comments: NC   Neurological:      Mental Status: He is alert and oriented to person, place, and time.       Advance Care Planning  Advance Directives:   Living Will: No    LaPOST: No    Do Not Resuscitate Status: No    Medical Power of : Yes (Confirms wife, Lillian, is prefereed MPOA (also legal surrogate decision maker) however, she is currently admitted inpatient as well.  2nd choice is sister Bahman, 3rd choice is sisterMarilou (see ACP))      Decision Making:  Patient answered questions  Goals of Care: What is most important right now is to focus on spending time at home, remaining as independent as possible, symptom/pain control, extending life as long as possible, even it it means sacrificing quality. Accordingly, we have decided that the best plan to meet the patient's goals includes continuing with treatment.     Significant Labs: All pertinent labs within the past 24 hours have been reviewed.  CBC:   Recent Labs   Lab 01/26/24  0434   WBC 19.56*   HGB 11.6*   HCT 36.1*   MCV 91         BMP:  Recent Labs   Lab 01/26/24  0434   *      K 4.0   CL 97   CO2 34*   BUN 30*   CREATININE 0.9   CALCIUM 8.9     LFT:  Lab Results   Component Value Date    AST 17 01/24/2024    ALKPHOS 78 01/24/2024    BILITOT 0.5 01/24/2024     Albumin:   Albumin   Date Value Ref Range Status   01/24/2024 3.3 (L) 3.5 - 5.2 g/dL Final     Protein:   Total Protein   Date Value Ref Range Status   01/24/2024 6.8 6.0 - 8.4 g/dL Final     Lactic acid:   Lab Results   Component Value Date    LACTATE 1.6 10/19/2023    LACTATE 1.2 04/12/2022       Significant Imaging: I have reviewed all pertinent imaging results/findings within the past 24 hours.    Aundrea Cano NP  Palliative Medicine  [facility] - Intensive Care                 Discharged

## 2024-01-26 NOTE — ASSESSMENT & PLAN NOTE
Chronic, controlled. Latest blood pressure and vitals reviewed-     Temp:  [98 °F (36.7 °C)-98.6 °F (37 °C)]   Pulse:  []   Resp:  [12-36]   BP: (106-139)/(57-80)   SpO2:  [93 %-100 %] .   Home meds for hypertension were reviewed and noted below.   Hypertension Medications               furosemide (LASIX) 40 MG tablet Take 1 tablet (40 mg total) by mouth 2 (two) times daily.            While in the hospital, will manage blood pressure as follows; Continue home antihypertensive regimen    Will utilize p.r.n. blood pressure medication only if patient's blood pressure greater than 180/110 and he develops symptoms such as worsening chest pain or shortness of breath.

## 2024-01-26 NOTE — PLAN OF CARE
Problem: Adult Inpatient Plan of Care  Goal: Plan of Care Review  Outcome: Ongoing, Progressing    Patient was extubated yesterday ans is doing well. He is awake alert oriented. Able to ambulate to the restroom with minimal assistance. Brown remain in place still getting lasix scheduled and had approx 3 liters of urine output for the shift. Vital signs stable and within limits. No skin breakdown was noted> Denies any shortness of breath. Did wear hi BIPAP last night and tolerated well. Possible transfer to the floor today.

## 2024-01-26 NOTE — CARE UPDATE
[facility]  Nurses Note -- 4 Eyes      1/25/2024       Skin assessed on: Q Shift      [x] No Pressure Injuries Present    [x]Prevention Measures Documented    [] Yes LDA  for Pressure Injury Previously documented     [] Yes New Pressure Injury Discovered   [] LDA for New Pressure Injury Added      Attending RN:  En Andrade Jr., RN

## 2024-01-26 NOTE — SUBJECTIVE & OBJECTIVE
Medications:  Continuous Infusions:  Scheduled Meds:   albuterol-ipratropium  3 mL Nebulization Q4H    aspirin  81 mg Oral Daily    atorvastatin  40 mg Oral Daily    cefTRIAXone (Rocephin) IV (PEDS and ADULTS)  1 g Intravenous Q24H    clopidogreL  75 mg Oral Daily    famotidine  20 mg Oral Daily    [START ON 1/27/2024] furosemide (LASIX) injection  60 mg Intravenous Daily    heparin (porcine)  5,000 Units Subcutaneous Q8H    metoprolol tartrate  12.5 mg Oral BID    mupirocin   Nasal BID    [START ON 1/27/2024] predniSONE  50 mg Oral Daily     PRN Meds:acetaminophen, calcium gluconate IVPB, calcium gluconate IVPB, calcium gluconate IVPB, dextrose 10%, dextrose 10%, glucagon (human recombinant), glucose, glucose, insulin aspart U-100, magnesium sulfate IVPB, magnesium sulfate IVPB, melatonin, ondansetron, potassium chloride **AND** potassium chloride **AND** potassium chloride, prochlorperazine, sodium chloride 0.9%    Objective:     Vital Signs (Most Recent):  Temp: 98.4 °F (36.9 °C) (01/26/24 1200)  Pulse: 93 (01/26/24 1226)  Resp: (!) 22 (01/26/24 1226)  BP: (!) 134/91 (01/26/24 1200)  SpO2: (!) 93 % (01/26/24 1226) Vital Signs (24h Range):  Temp:  [98 °F (36.7 °C)-98.6 °F (37 °C)] 98.4 °F (36.9 °C)  Pulse:  [] 93  Resp:  [12-34] 22  SpO2:  [93 %-100 %] 93 %  BP: (106-134)/(59-91) 134/91     Weight: 82.9 kg (182 lb 12.2 oz)  Body mass index is 29.5 kg/m².       Physical Exam  Vitals and nursing note reviewed.   Constitutional:       General: He is not in acute distress.     Appearance: He is ill-appearing.      Comments: Lying upright in bed; participated in discussions appropriately and eagerly    HENT:      Head: Normocephalic and atraumatic.   Pulmonary:      Effort: Pulmonary effort is normal. No respiratory distress.      Comments: NC   Neurological:      Mental Status: He is alert and oriented to person, place, and time.       Advance Care Planning   Advance Directives:   Living Will: No    LaPOST: No     Do Not Resuscitate Status: No    Medical Power of : Yes (Confirms wife, Lillian, is prefereed JENNIFERA (also legal surrogate decision maker) however, she is currently admitted inpatient as well.  2nd choice is sister Bahman, 3rd choice is sisterMarilou (see ACP))      Decision Making:  Patient answered questions  Goals of Care: What is most important right now is to focus on spending time at home, remaining as independent as possible, symptom/pain control, extending life as long as possible, even it it means sacrificing quality. Accordingly, we have decided that the best plan to meet the patient's goals includes continuing with treatment.     Significant Labs: All pertinent labs within the past 24 hours have been reviewed.  CBC:   Recent Labs   Lab 01/26/24 0434   WBC 19.56*   HGB 11.6*   HCT 36.1*   MCV 91        BMP:  Recent Labs   Lab 01/26/24 0434   *      K 4.0   CL 97   CO2 34*   BUN 30*   CREATININE 0.9   CALCIUM 8.9     LFT:  Lab Results   Component Value Date    AST 17 01/24/2024    ALKPHOS 78 01/24/2024    BILITOT 0.5 01/24/2024     Albumin:   Albumin   Date Value Ref Range Status   01/24/2024 3.3 (L) 3.5 - 5.2 g/dL Final     Protein:   Total Protein   Date Value Ref Range Status   01/24/2024 6.8 6.0 - 8.4 g/dL Final     Lactic acid:   Lab Results   Component Value Date    LACTATE 1.6 10/19/2023    LACTATE 1.2 04/12/2022       Significant Imaging: I have reviewed all pertinent imaging results/findings within the past 24 hours.

## 2024-01-26 NOTE — ASSESSMENT & PLAN NOTE
HX of COPD. PFT in 2017 with moderate restriction with reduced DLCO.  Previous CT chest imaging with emphysema.    Will need lama, laba/ics upon discharge

## 2024-01-26 NOTE — SUBJECTIVE & OBJECTIVE
Interval History: no acute issue.  Extubated without issue.  Patient endorse running out of medication prior to hospitalization.  .        Objective:     Vital Signs (Most Recent):  Temp: 98.5 °F (36.9 °C) (01/26/24 0800)  Pulse: 101 (01/26/24 0800)  Resp: 20 (01/26/24 0800)  BP: 128/76 (01/26/24 0800)  SpO2: 97 % (01/26/24 0800) Vital Signs (24h Range):  Temp:  [98 °F (36.7 °C)-98.6 °F (37 °C)] 98.5 °F (36.9 °C)  Pulse:  [] 101  Resp:  [12-36] 20  SpO2:  [93 %-100 %] 97 %  BP: (106-139)/(57-80) 128/76     Weight: 82.9 kg (182 lb 12.2 oz)  Body mass index is 29.5 kg/m².      Intake/Output Summary (Last 24 hours) at 1/26/2024 0900  Last data filed at 1/26/2024 0843  Gross per 24 hour   Intake 353.54 ml   Output 3150 ml   Net -2796.46 ml          Physical Exam  Vitals and nursing note reviewed.   Constitutional:       Appearance: Normal appearance.      Interventions: He is sedated and restrained. He is not intubated.  HENT:      Head: Normocephalic and atraumatic.      Nose: Nose normal.   Cardiovascular:      Rate and Rhythm: Normal rate and regular rhythm.      Pulses: Normal pulses.      Heart sounds: No murmur heard.  Pulmonary:      Effort: Pulmonary effort is normal. No respiratory distress. He is not intubated.      Breath sounds: Rales present. No wheezing.   Abdominal:      General: Abdomen is flat.      Palpations: Abdomen is soft.      Tenderness: There is no abdominal tenderness.   Musculoskeletal:      Right lower leg: No edema.      Left lower leg: No edema.   Skin:     General: Skin is warm.      Capillary Refill: Capillary refill takes less than 2 seconds.   Neurological:      General: No focal deficit present.      Mental Status: He is oriented to person, place, and time.           Review of Systems    Vents:  Vent Mode: PS/CPAP (01/25/24 1020)  Ventilator Initiated: Yes (01/23/24 2302)  Set Rate: 15 BPM (01/25/24 0817)  Vt Set: 420 mL (01/25/24 0415)  Pressure Support: 10 cmH20 (01/25/24  "1020)  PEEP/CPAP: 7 cmH20 (01/25/24 1020)  Oxygen Concentration (%): 30 (01/26/24 0018)  Peak Airway Pressure: 17.9 cmH20 (01/25/24 1020)  Total Ve: 5.3 L/m (01/25/24 1020)  F/VT Ratio<105 (RSBI): 1363.64 (01/25/24 1020)    Lines/Drains/Airways       Peripheral Intravenous Line  Duration                  Peripheral IV - Single Lumen 01/23/24 2150 18 G Anterior;Right Forearm 2 days         Peripheral IV - Single Lumen 01/23/24 2341 20 G;1 in Anterior;Right Upper Arm 2 days         Peripheral IV - Single Lumen 01/24/24 1113 20 G Anterior;Left;Proximal Forearm 1 day                    Significant Labs:    CBC/Anemia Profile:  Recent Labs   Lab 01/25/24  0902 01/26/24  0434   WBC 19.60* 19.56*   HGB 10.8* 11.6*   HCT 34.2* 36.1*    242   MCV 89 91   RDW 14.5 14.5          Chemistries:  Recent Labs   Lab 01/25/24  0902 01/26/24  0434    140   K 3.8 4.0   CL 99 97   CO2 30* 34*   BUN 21 30*   CREATININE 1.0 0.9   CALCIUM 9.0 8.9         ABGs:   Recent Labs   Lab 01/25/24  1011   PH 7.501*   PCO2 47.0*   HCO3 36.7*   POCSATURATED 97   BE 12*       Blood Culture:   No results for input(s): "LABBLOO" in the last 48 hours.    Cardiac Markers: No results for input(s): "CKMB", "TROPONINT", "MYOGLOBIN" in the last 48 hours.  Lactic Acid: No results for input(s): "LACTATE" in the last 48 hours.  Respiratory Culture:   Recent Labs   Lab 01/24/24  1131   GSRESP <10 epithelial cells per low power field.  Few WBC's  Moderate Gram positive cocci in pairs  Few Gram positive cocci   RESPIRATORYC Further report to follow       Echo 7/2/23  The left ventricle is severely enlarged with eccentric hypertrophy and severely decreased systolic function.  The estimated ejection fraction is 15%.  Grade I left ventricular diastolic dysfunction.  Normal right ventricular size with normal right ventricular systolic function.  Moderate left atrial enlargement.  Intermediate central venous pressure (8 mmHg).    Significant Imaging:  I " have reviewed all pertinent imaging results/findings within the past 24 hours.  CXR: I have reviewed all pertinent results/findings within the past 24 hours and my personal findings are:  1/23/24 mild blunting of right costophrenic angle.

## 2024-01-27 VITALS
HEIGHT: 66 IN | SYSTOLIC BLOOD PRESSURE: 117 MMHG | BODY MASS INDEX: 28.63 KG/M2 | HEART RATE: 79 BPM | OXYGEN SATURATION: 99 % | TEMPERATURE: 98 F | WEIGHT: 178.13 LBS | DIASTOLIC BLOOD PRESSURE: 74 MMHG | RESPIRATION RATE: 24 BRPM

## 2024-01-27 LAB
ANION GAP SERPL CALC-SCNC: 5 MMOL/L (ref 8–16)
BACTERIA SPEC AEROBE CULT: ABNORMAL
BUN SERPL-MCNC: 28 MG/DL (ref 8–23)
CALCIUM SERPL-MCNC: 8.6 MG/DL (ref 8.7–10.5)
CHLORIDE SERPL-SCNC: 97 MMOL/L (ref 95–110)
CO2 SERPL-SCNC: 36 MMOL/L (ref 23–29)
CREAT SERPL-MCNC: 0.8 MG/DL (ref 0.5–1.4)
EST. GFR  (NO RACE VARIABLE): >60 ML/MIN/1.73 M^2
GLUCOSE SERPL-MCNC: 154 MG/DL (ref 70–110)
GRAM STN SPEC: ABNORMAL
MAGNESIUM SERPL-MCNC: 2.5 MG/DL (ref 1.6–2.6)
POCT GLUCOSE: 115 MG/DL (ref 70–110)
POCT GLUCOSE: 149 MG/DL (ref 70–110)
POTASSIUM SERPL-SCNC: 3.7 MMOL/L (ref 3.5–5.1)
SODIUM SERPL-SCNC: 138 MMOL/L (ref 136–145)

## 2024-01-27 PROCEDURE — 25000003 PHARM REV CODE 250: Performed by: HOSPITALIST

## 2024-01-27 PROCEDURE — 25000003 PHARM REV CODE 250: Performed by: FAMILY MEDICINE

## 2024-01-27 PROCEDURE — 99900035 HC TECH TIME PER 15 MIN (STAT)

## 2024-01-27 PROCEDURE — 94761 N-INVAS EAR/PLS OXIMETRY MLT: CPT

## 2024-01-27 PROCEDURE — 25000003 PHARM REV CODE 250: Performed by: STUDENT IN AN ORGANIZED HEALTH CARE EDUCATION/TRAINING PROGRAM

## 2024-01-27 PROCEDURE — 63600175 PHARM REV CODE 636 W HCPCS: Performed by: HOSPITALIST

## 2024-01-27 PROCEDURE — 94660 CPAP INITIATION&MGMT: CPT

## 2024-01-27 PROCEDURE — 80048 BASIC METABOLIC PNL TOTAL CA: CPT | Performed by: HOSPITALIST

## 2024-01-27 PROCEDURE — 27000221 HC OXYGEN, UP TO 24 HOURS

## 2024-01-27 PROCEDURE — 94640 AIRWAY INHALATION TREATMENT: CPT

## 2024-01-27 PROCEDURE — 83735 ASSAY OF MAGNESIUM: CPT | Performed by: HOSPITALIST

## 2024-01-27 PROCEDURE — 25000242 PHARM REV CODE 250 ALT 637 W/ HCPCS: Performed by: HOSPITALIST

## 2024-01-27 PROCEDURE — 36415 COLL VENOUS BLD VENIPUNCTURE: CPT | Performed by: HOSPITALIST

## 2024-01-27 RX ORDER — LANOLIN ALCOHOL/MO/W.PET/CERES
800 CREAM (GRAM) TOPICAL
Status: DISCONTINUED | OUTPATIENT
Start: 2024-01-27 | End: 2024-01-27 | Stop reason: HOSPADM

## 2024-01-27 RX ORDER — ACETAMINOPHEN 325 MG/1
650 TABLET ORAL EVERY 4 HOURS PRN
Status: DISCONTINUED | OUTPATIENT
Start: 2024-01-27 | End: 2024-01-27 | Stop reason: HOSPADM

## 2024-01-27 RX ORDER — ATORVASTATIN CALCIUM 40 MG/1
40 TABLET, FILM COATED ORAL DAILY
Qty: 90 TABLET | Refills: 6 | Status: SHIPPED | OUTPATIENT
Start: 2024-01-27 | End: 2025-01-26

## 2024-01-27 RX ORDER — SODIUM,POTASSIUM PHOSPHATES 280-250MG
2 POWDER IN PACKET (EA) ORAL
Status: DISCONTINUED | OUTPATIENT
Start: 2024-01-27 | End: 2024-01-27 | Stop reason: HOSPADM

## 2024-01-27 RX ORDER — AMOXICILLIN AND CLAVULANATE POTASSIUM 875; 125 MG/1; MG/1
1 TABLET, FILM COATED ORAL EVERY 12 HOURS
Status: DISCONTINUED | OUTPATIENT
Start: 2024-01-27 | End: 2024-01-27 | Stop reason: HOSPADM

## 2024-01-27 RX ORDER — PREDNISONE 20 MG/1
20 TABLET ORAL DAILY
Status: DISCONTINUED | OUTPATIENT
Start: 2024-01-28 | End: 2024-01-27 | Stop reason: HOSPADM

## 2024-01-27 RX ORDER — METOPROLOL TARTRATE 25 MG/1
12.5 TABLET, FILM COATED ORAL 2 TIMES DAILY
Qty: 30 TABLET | Refills: 11 | Status: SHIPPED | OUTPATIENT
Start: 2024-01-27 | End: 2025-01-26

## 2024-01-27 RX ORDER — IPRATROPIUM BROMIDE AND ALBUTEROL SULFATE 2.5; .5 MG/3ML; MG/3ML
3 SOLUTION RESPIRATORY (INHALATION)
Status: DISCONTINUED | OUTPATIENT
Start: 2024-01-27 | End: 2024-01-27 | Stop reason: HOSPADM

## 2024-01-27 RX ORDER — IPRATROPIUM BROMIDE AND ALBUTEROL SULFATE 2.5; .5 MG/3ML; MG/3ML
3 SOLUTION RESPIRATORY (INHALATION)
Qty: 75 ML | Refills: 0 | Status: SHIPPED | OUTPATIENT
Start: 2024-01-27 | End: 2025-01-26

## 2024-01-27 RX ORDER — ASPIRIN 81 MG/1
81 TABLET ORAL DAILY
Qty: 30 TABLET | Refills: 0 | Status: SHIPPED | OUTPATIENT
Start: 2024-01-27

## 2024-01-27 RX ORDER — PREDNISONE 20 MG/1
20 TABLET ORAL DAILY
Qty: 5 TABLET | Refills: 0 | Status: SHIPPED | OUTPATIENT
Start: 2024-01-28

## 2024-01-27 RX ORDER — FUROSEMIDE 40 MG/1
40 TABLET ORAL 2 TIMES DAILY
Qty: 60 TABLET | Refills: 5 | Status: SHIPPED | OUTPATIENT
Start: 2024-01-27 | End: 2024-07-25

## 2024-01-27 RX ORDER — AMOXICILLIN AND CLAVULANATE POTASSIUM 875; 125 MG/1; MG/1
1 TABLET, FILM COATED ORAL EVERY 12 HOURS
Qty: 20 TABLET | Refills: 0 | Status: SHIPPED | OUTPATIENT
Start: 2024-01-27

## 2024-01-27 RX ORDER — CLOPIDOGREL BISULFATE 75 MG/1
75 TABLET ORAL DAILY
Qty: 30 TABLET | Refills: 6 | Status: SHIPPED | OUTPATIENT
Start: 2024-01-27

## 2024-01-27 RX ADMIN — AMOXICILLIN AND CLAVULANATE POTASSIUM 1 TABLET: 875; 125 TABLET, FILM COATED ORAL at 02:01

## 2024-01-27 RX ADMIN — IPRATROPIUM BROMIDE AND ALBUTEROL SULFATE 3 ML: 2.5; .5 SOLUTION RESPIRATORY (INHALATION) at 08:01

## 2024-01-27 RX ADMIN — IPRATROPIUM BROMIDE AND ALBUTEROL SULFATE 3 ML: 2.5; .5 SOLUTION RESPIRATORY (INHALATION) at 01:01

## 2024-01-27 RX ADMIN — MUPIROCIN: 20 OINTMENT TOPICAL at 08:01

## 2024-01-27 RX ADMIN — METOPROLOL TARTRATE 12.5 MG: 25 TABLET, FILM COATED ORAL at 08:01

## 2024-01-27 RX ADMIN — FAMOTIDINE 20 MG: 20 TABLET ORAL at 08:01

## 2024-01-27 RX ADMIN — ATORVASTATIN CALCIUM 40 MG: 40 TABLET, FILM COATED ORAL at 08:01

## 2024-01-27 RX ADMIN — CLOPIDOGREL BISULFATE 75 MG: 75 TABLET ORAL at 08:01

## 2024-01-27 RX ADMIN — PREDNISONE 50 MG: 50 TABLET ORAL at 08:01

## 2024-01-27 RX ADMIN — ASPIRIN 81 MG CHEWABLE TABLET 81 MG: 81 TABLET CHEWABLE at 08:01

## 2024-01-27 RX ADMIN — CEFTRIAXONE 1 G: 1 INJECTION, POWDER, FOR SOLUTION INTRAMUSCULAR; INTRAVENOUS at 11:01

## 2024-01-27 RX ADMIN — HEPARIN SODIUM 5000 UNITS: 5000 INJECTION INTRAVENOUS; SUBCUTANEOUS at 02:01

## 2024-01-27 RX ADMIN — ACETAMINOPHEN 650 MG: 325 TABLET ORAL at 08:01

## 2024-01-27 RX ADMIN — POTASSIUM BICARBONATE 50 MEQ: 977.5 TABLET, EFFERVESCENT ORAL at 05:01

## 2024-01-27 RX ADMIN — FUROSEMIDE 60 MG: 10 INJECTION, SOLUTION INTRAMUSCULAR; INTRAVENOUS at 08:01

## 2024-01-27 RX ADMIN — HEPARIN SODIUM 5000 UNITS: 5000 INJECTION INTRAVENOUS; SUBCUTANEOUS at 05:01

## 2024-01-27 NOTE — PLAN OF CARE
[facility] - Intensive Care  Discharge Final Note    Primary Care Provider: Rolando Funes MD    Expected Discharge Date: 1/27/2024    Final Discharge Note (most recent)       Final Note - 01/27/24 1514          Final Note    Assessment Type Final Discharge Note     Anticipated Discharge Disposition [place] [facility]     Hospital Resources/Appts/Education Provided Post-Acute resouces added to AVS        Post-Acute Status    Post-Acute Authorization Home Health     Home Health Status Pending Payor Review     Coverage PHN                     Important Message from Medicare             Contact Info       [facility]    [address]  [place] [address]   Phone: [phone]       Next Steps: Follow up    Instructions: please call if you have not heard from someone no later than Monday regarding home health care services, Home Health    Rolando Funes MD   Specialty: Internal Medicine   Relationship: PCP - [address]  [id number]  [place] [address]   Phone: [phone]       Next Steps: Follow up    Instructions: call on Monday to schedule follow up appointment post hospital discharge

## 2024-01-27 NOTE — PLAN OF CARE
[facility] orders sent via fax to [facility].  TN indicated on orders that the pt is discharging to home on today and will need to contact pt/family with assigned agency information and when services will begin

## 2024-01-27 NOTE — NURSING
[facility]  Nurses Note -- 4 Eyes      1/26/2024       Skin assessed on: Q Shift      [x] No Pressure Injuries Present    []Prevention Measures Documented    [] Yes LDA  for Pressure Injury Previously documented     [] Yes New Pressure Injury Discovered   [] LDA for New Pressure Injury Added      Attending RN:  Shala Queen RN     Second RN:  CHARLI Waite

## 2024-01-27 NOTE — NURSING
Discharge instructions provided and explained. No questions or concerns. Telemetry removed. IVs removed.

## 2024-01-27 NOTE — PLAN OF CARE
[facility]      HOME HEALTH ORDERS  FACE TO FACE ENCOUNTER    Patient Name: Abelardo Irene Jr.  YOB: 1955    PCP: Rolando Funes MD   PCP Address: [address] [place] [address] [address] / [place] [address]  PCP Phone Number: [phone]  PCP Fax: [phone]    Encounter Date: 1/23/24    Admit to Home Health    Diagnoses:  Active Hospital Problems    Diagnosis  POA    *Acute on chronic respiratory failure with hypoxia and hypercapnia [J96.21, J96.22]  Yes    Advance care planning [Z71.89]  Not Applicable    Acute on chronic combined systolic and diastolic heart failure [I50.43]  Yes    Chronic obstructive pulmonary disease with acute exacerbation [J44.1]  Yes    Uncontrolled type 2 diabetes mellitus with hyperglycemia [E11.65]  Yes    Coronary artery disease involving native coronary artery of native heart without angina pectoris [I25.10]  Yes     Chronic    Dyslipidemia [E78.5]  Yes     Chronic    Tobacco abuse [Z72.0]  Yes     Chronic    Benign essential hypertension [I10]  Yes     Chronic      Resolved Hospital Problems   No resolved problems to display.       Follow Up Appointments:  No future appointments.    Allergies:  Review of patient's allergies indicates:   Allergen Reactions    Contrast media Shortness Of Breath, Itching and Anxiety     Patient states that he had a bad reaction, anxiety , shortness of breath, itching .        Medications: Review discharge medications with patient and family and provide education.    Current Facility-Administered Medications   Medication Dose Route Frequency Provider Last Rate Last Admin    acetaminophen tablet 650 mg  650 mg Oral Q4H PRN Iron Bledsoe MD   650 mg at 01/27/24 0824    albuterol-ipratropium 2.5 mg-0.5 mg/3 mL nebulizer solution 3 mL  3 mL Nebulization Q6H [place] Iron Bledsoe MD   3 mL at 01/27/24 1335    amoxicillin-clavulanate 875-125mg per tablet 1 tablet  1 tablet Oral Q12H Iron Funes MD        aspirin  chewable tablet 81 mg  81 mg Oral Daily Iron Bledsoe MD   81 mg at 01/27/24 0825    atorvastatin tablet 40 mg  40 mg Oral Daily Iron Bledsoe MD   40 mg at 01/27/24 0825    calcium gluconate 1 g in NS IVPB (premixed)  1 g Intravenous PRN Iron Bledsoe MD        calcium gluconate 1 g in NS IVPB (premixed)  2 g Intravenous PRN Iron Bledsoe MD        calcium gluconate 1 g in NS IVPB (premixed)  3 g Intravenous PRN Iron Bledsoe MD        clopidogreL tablet 75 mg  75 mg Oral Daily Iron Bledsoe MD   75 mg at 01/27/24 0824    dextrose 10% bolus 125 mL 125 mL  12.5 g Intravenous PRN Iron Bledsoe MD        dextrose 10% bolus 250 mL 250 mL  25 g Intravenous PRN Iron Bledsoe MD        famotidine tablet 20 mg  20 mg Oral Daily Iron Bledsoe MD   20 mg at 01/27/24 0825    furosemide injection 60 mg  60 mg Intravenous Daily Iron Bledsoe MD   60 mg at 01/27/24 0824    glucagon (human recombinant) injection 1 mg  1 mg Intramuscular PRN Iron Bledsoe MD        glucose chewable tablet 16 g  16 g Oral PRN Iron Bledsoe MD        glucose chewable tablet 24 g  24 g Oral PRN Iron Bledsoe MD        heparin (porcine) injection 5,000 Units  5,000 Units Subcutaneous Q8H Iron Bledsoe MD   5,000 Units at 01/27/24 0546    insulin aspart U-100 pen 0-5 Units  0-5 Units Subcutaneous QID (AC + HS) PRN Iron Bledsoe MD   2 Units at 01/26/24 1632    magnesium oxide tablet 800 mg  800 mg Oral PRN Nolan Najera MD        magnesium oxide tablet 800 mg  800 mg Oral PRN Nolan Najera MD        melatonin tablet 6 mg  6 mg Oral Nightly PRN Iron Bledsoe MD        metoprolol tartrate (LOPRESSOR) split tablet 12.5 mg  12.5 mg Oral BID Iron Bledsoe MD   12.5 mg at 01/27/24 0824    mupirocin 2 % ointment   Nasal BID Iron Bledsoe MD   Given at 01/27/24 0825    ondansetron injection 4 mg  4 mg Intravenous Q8H PRN Iron Bledsoe MD        potassium bicarbonate  disintegrating tablet 35 mEq  35 mEq Oral PRN Nolan Najera MD        potassium bicarbonate disintegrating tablet 50 mEq  50 mEq Oral PRN Nolan Najera MD   50 mEq at 01/27/24 0546    potassium bicarbonate disintegrating tablet 60 mEq  60 mEq Oral PRN Nolan Najera MD        potassium, sodium phosphates 280-160-250 mg packet 2 packet  2 packet Oral PRN Nolan Najera MD        potassium, sodium phosphates 280-160-250 mg packet 2 packet  2 packet Oral PRN Nolan Najera MD        potassium, sodium phosphates 280-160-250 mg packet 2 packet  2 packet Oral PRN Nolan Najera MD        [START ON 1/28/2024] predniSONE tablet 20 mg  20 mg Oral Daily Iron Funes MD        prochlorperazine injection Soln 5 mg  5 mg Intravenous Q6H PRN Iron Bledsoe MD        sodium chloride 0.9% flush 10 mL  10 mL Intravenous PRN Iron Bledsoe MD         Current Discharge Medication List        START taking these medications    Details   albuterol-ipratropium (DUO-NEB) 2.5 mg-0.5 mg/3 mL nebulizer solution Take 3 mLs by nebulization every 6 (six) hours while awake. Rescue  Qty: 75 mL, Refills: 0      amoxicillin-clavulanate 875-125mg (AUGMENTIN) 875-125 mg per tablet Take 1 tablet by mouth every 12 (twelve) hours.  Qty: 20 tablet, Refills: 0      metoprolol tartrate (LOPRESSOR) 25 MG tablet Take 0.5 tablets (12.5 mg total) by mouth 2 (two) times daily.  Qty: 30 tablet, Refills: 11    Comments: .      predniSONE (DELTASONE) 20 MG tablet Take 1 tablet (20 mg total) by mouth once daily.  Qty: 5 tablet, Refills: 0           CONTINUE these medications which have CHANGED    Details   aspirin (ECOTRIN) 81 MG EC tablet Take 1 tablet (81 mg total) by mouth once daily.  Qty: 30 tablet, Refills: 0      atorvastatin (LIPITOR) 40 MG tablet Take 1 tablet (40 mg total) by mouth once daily.  Qty: 90 tablet, Refills: 6      clopidogreL (PLAVIX) 75 mg tablet Take 1 tablet (75 mg total) by mouth once daily.  Qty: 30 tablet, Refills: 6       furosemide (LASIX) 40 MG tablet Take 1 tablet (40 mg total) by mouth 2 (two) times daily.  Qty: 60 tablet, Refills: 5               I have seen and examined this patient within the last 30 days. My clinical findings that support the need for the home health skilled services and home bound status are the following:no   Weakness/numbness causing balance and gait disturbance due to COPD Exacerbation and Weakness/Debility making it taxing to leave home.  Requiring assistive device to leave home due to unsteady gait caused by  COPD Exacerbation and Weakness/Debility.  Patient with medication mismanagement issues requiring home bound status as evidenced by  Poor understanding of medication regimen/dosage and Poor adherence to medication regimen/dosage.  Mental confusion making it unsafe for patient to leave home alone due to  Dementia.     Diet:   cardiac diet, low fat, low cholesterol, and 2 gram sodium diet    Labs:  Report Lab results to PCP.    Referrals/ Consults  Physical Therapy to evaluate and treat. Evaluate for home safety and equipment needs; Establish/upgrade home exercise program. Perform / instruct on therapeutic exercises, gait training, transfer training, and Range of Motion.  Occupational Therapy to evaluate and treat. Evaluate home environment for safety and equipment needs. Perform/Instruct on transfers, ADL training, ROM, and therapeutic exercises.   to evaluate for community resources/long-range planning.    Activities:   activity as tolerated    Nursing:   Agency to admit patient within 24 hours of hospital discharge unless specified on physician order or at patient request    SN to complete comprehensive assessment including routine vital signs. Instruct on disease process and s/s of complications to report to MD. Review/verify medication list sent home with the patient at time of discharge  and instruct patient/caregiver as needed. Frequency may be adjusted depending on start of care  date.     Skilled nurse to perform up to 3 visits PRN for symptoms related to diagnosis    Notify MD if SBP > 160 or < 90; DBP > 90 or < 50; HR > 120 or < 50; Temp > 101; O2 < 88%; Other:       Ok to schedule additional visits based on staff availability and patient request on consecutive days within the home health episode.    When multiple disciplines ordered:    Start of Care occurs on Sunday - Wednesday schedule remaining discipline evaluations as ordered on separate consecutive days following the start of care.    Thursday SOC -schedule subsequent evaluations Friday and Monday the following week.     Friday - Saturday SOC - schedule subsequent discipline evaluations on consecutive days starting Monday of the following week.    For all post-discharge communication and subsequent orders please contact patient's primary care physician. If unable to reach primary care physician or do not receive response within 30 minutes, please contact SW and PCP for clinical staff order clarification    Miscellaneous   Routine Skin for Bedridden Patients: Instruct patient/caregiver to apply moisture barrier cream to all skin folds and wet areas in perineal area daily and after baths and all bowel movements.  Heart Failure:      SN to instruct on the following:    Instruct on the definition of CHF.   Instruct on the signs/sympoms of CHF to be reported.   Instruct on and monitor daily weights.   Instruct on factors that cause exacerbation.   Instruct on action, dose, schedule, and side effects of medications.   Instruct on diet as prescribed.   Instruct on activity allowed.   Instruct on life-style modifications for life long management of CHF   SN to assess compliance with daily weights, diet, medications, fluid retention,    safety precautions, activities permitted and life-style modifications.   Additional 1-2 SN visits per week as needed for signs and symptoms     of CHF exacerbation.      For Weight Gain > 2-3 lbs in 1 day or  4-6 lbs over 1 week notify PCP:  CHF DIURETIC SLIDING SCALE     Skilled nurse visits daily to instruct and monitor medication adherence until target weight. Then resume prior order frequency.     If weight gain exceeds 5 lbs over target weight, call MD  If weight gain of 3-4 lbs over target weight, then:     Increase Furosemide - current dose (mg/day) to 40 double dose and frequency of twice daily until target weight achieved or maximum 3 days.     If already on max oral daily dose, see IV diuretic instructions.    After 3 days of increased oral diuretic dose, get BMP. If patient is on increased oral diuretic dose greater than 5 days, repeat BMP at day 7 of increased dose.     Potassium supplementation   Scr > 1.5 mg/dl  Scr  1.5 mg/dl    K < 3.0 - NOTIFY MD and 40 mEq bid  40 mEq tid   K- 3.1-3.3  20 mEq bid  20 mEq tid    K 3.4-3.7  10 mEq bid  10 mEq tid      If target weight not reached after 5 days of increased oral diuretic, proceed to IV diuretic with daily patient contact to include face-to-face visit and telephone encounters.       Home Health Aide:  Nursing Three times weekly, Physical Therapy Three times weekly, and Occupational Therapy Three times weekly    Wound Care Orders  no    I certify that this patient is confined to his home and needs intermittent skilled nursing care, physical therapy, and occupational therapy.

## 2024-01-27 NOTE — DISCHARGE SUMMARY
[facility] Medicine  Discharge Summary      Patient Name: Abelardo Irene Jr.  MRN: [id number]  [facility]: [id number]  Patient Class: IP- Inpatient  Admission Date: 1/23/2024  Hospital Length of Stay: 4 days  Discharge Date and Time:  01/27/2024 3:06 PM  Attending Physician: Iron Funes MD   Discharging Provider: Iron Funes MD  Primary Care Provider: Rolando Funes MD    Primary Care Team: Networked reference to record PCT     HPI:   This is a 68-year-old male with a past medical history of COPD (on 4 L O2), HFrEF (EF: 15%, GIDD), CAD, hypertension, type 2 diabetes, hyperlipidemia, tobacco use who presents with shortness of breath.    Patient presents with shortness of breath that has been ongoing for the whole day.  He has recurrent hospitalization/ED presentations for COPD/heart failure exacerbation.  Patient is unable to contribute to the history as he is intubated.    In the ED, the patient was tachycardic, tachypneic, hypoxic and ultimately required to be intubated.  Labs were remarkable for leukocytosis (18.5), elevated BNP (609), hyperglycemia (333).  VBG showed a pH of 7.15, pCO2 of 106.5 > 7.34, 61.2.  Chest x-ray showed cardiomegaly with probable interstitial edema and small pleural effusions. Patient was briefly trialed on BiPAP but was ultimately required to be intubated for persistent tachypnea and declined GCS.  EMS administered Solu-Medrol, magnesium sulfate, and DuoNeb.  In the ER, he received DuoNeb x1, ceftriaxone, azithromycin, rocuronium 100 mg IV, etomidate 20 mg IV, and was started on propofol.  Patient was admitted for further management.    * No surgery found *      Hospital Course:   67 y/o male admitted with acute respiratory failure.  Failed Bipap in ER and then intubated.  Hx of COPD on home oxygen and CHF EF 15%.  Likely combination of CHF and COPD exacerbations. CXR with vascular congestion, mild bilateral interstitial opacities, and small bilateral pleural  effusions. BNP >600.  Started on nebs, IV diuresis and steroids.  Pulmonary consulted. Elevated WBC and started on ABX's.  Improved and extubated on 1/25.  States he ran out of all his medications.  68-year-old male with a past medical history of COPD (on 4 L O2), HFrEF (EF: 15%, GIDD), CAD, hypertension, type 2 diabetes, hyperlipidemia, tobacco use was admitted with shortness of breath. He was found to in acute respiratory failure from acute decompensation CHF. He was intubated transferred to ICU. He was started on diuresis which he has responded well. During the course of this admission he had CXR which showed Cardiomegaly with probable interstitial edema and small pleural effusions. His last 2D echo was on 7/23The left ventricle is severely enlarged with eccentric hypertrophy and severely decreased systolic function.The estimated ejection fraction is 15%. Grade I left ventricular diastolic dysfunction. Serial routine labs with persistence leukocytosis and has been receiving prednisone, also hyperglycemia otherwise no major significance prior to DC. He is stable for for DC counseling given regarding medication compliance. He was strongly advise to follow up with PCP, cardiology, as well as palliative care as outpatient. Case management for DC planning.        Goals of Care Treatment Preferences:  Code Status: Full Code          What is most important right now is to focus on spending time at home, remaining as independent as possible, symptom/pain control, extending life as long as possible, even it it means sacrificing quality.  Accordingly, we have decided that the best plan to meet the patient's goals includes continuing with treatment.      Consults:   Consults (From admission, onward)          Status Ordering Provider     Inpatient consult to Social Work  Once        Provider:  (Not yet assigned)    ROSA Henry     Inpatient consult to Palliative Care  Once        Provider:  Aundrea Cano  CHERYL, NP    Completed SHAHID JETT     Inpatient consult to Spiritual Care  Once        Provider:  (Not yet assigned)    Completed SHAHID JETT     Inpatient consult to Pulmonology  Once        Provider:  Shahid Jett MD    Completed JOANNE CAMPOS            No new Assessment & Plan notes have been filed under this hospital service since the last note was generated.  Service: Hospital Medicine    Final Active Diagnoses:    Diagnosis Date Noted POA    PRINCIPAL PROBLEM:  Acute on chronic respiratory failure with hypoxia and hypercapnia [J96.21, J96.22] 01/23/2024 Yes    Advance care planning [Z71.89] 01/25/2024 Not Applicable    Acute on chronic combined systolic and diastolic heart failure [I50.43] 10/19/2023 Yes    Chronic obstructive pulmonary disease with acute exacerbation [J44.1] 03/09/2023 Yes    Uncontrolled type 2 diabetes mellitus with hyperglycemia [E11.65] 04/12/2022 Yes    Coronary artery disease involving native coronary artery of native heart without angina pectoris [I25.10] 11/15/2017 Yes     Chronic    Dyslipidemia [E78.5] 07/28/2017 Yes     Chronic    Tobacco abuse [Z72.0] 04/28/2016 Yes     Chronic    Benign essential hypertension [I10] 03/02/2013 Yes     Chronic      Problems Resolved During this Admission:       Discharged Condition: stable    Disposition: Home or Self Care    Follow Up:    Patient Instructions:      Ambulatory referral/consult to HOME Palliative Care   Standing Status: Future   Referral Priority: Routine Referral Type: Consultation   Referral Reason: Other   Requested Specialty: Hospice and Palliative Medicine   Number of Visits Requested: 1     ACCEPT - Ambulatory referral/consult to Cardiology   Standing Status: Future   Referral Priority: Routine Referral Type: Consultation   Referral Reason: Specialty Services Required   Requested Specialty: Cardiology   Number of Visits Requested: 1     Ambulatory referral/consult to [facility]   Standing Status: Future    Referral Priority: Routine Referral Type: Consultation   Referral Reason: Specialty Services Required   Number of Visits Requested: 1     Diet Cardiac     Notify your health care provider if you experience any of the following:  temperature >100.4     Notify your health care provider if you experience any of the following:  persistent nausea and vomiting or diarrhea     Notify your health care provider if you experience any of the following:  severe uncontrolled pain     Notify your health care provider if you experience any of the following:  redness, tenderness, or signs of infection (pain, swelling, redness, odor or green/yellow discharge around incision site)     Notify your health care provider if you experience any of the following:  difficulty breathing or increased cough     Notify your health care provider if you experience any of the following:  severe persistent headache     Notify your health care provider if you experience any of the following:  worsening rash     Notify your health care provider if you experience any of the following:  persistent dizziness, light-headedness, or visual disturbances     Notify your health care provider if you experience any of the following:  increased confusion or weakness     Activity as tolerated       Significant Diagnostic Studies: Labs: BMP:   Recent Labs   Lab 01/26/24 0434 01/27/24 0242   * 154*    138   K 4.0 3.7   CL 97 97   CO2 34* 36*   BUN 30* 28*   CREATININE 0.9 0.8   CALCIUM 8.9 8.6*   MG  --  2.5   , CMP   Recent Labs   Lab 01/26/24 0434 01/27/24 0242    138   K 4.0 3.7   CL 97 97   CO2 34* 36*   * 154*   BUN 30* 28*   CREATININE 0.9 0.8   CALCIUM 8.9 8.6*   ANIONGAP 9 5*   , CBC   Recent Labs   Lab 01/26/24 0434   WBC 19.56*   HGB 11.6*   HCT 36.1*      , Lipid Panel   Lab Results   Component Value Date    CHOL 208 (H) 02/14/2021    HDL 35 (L) 02/14/2021    LDLCALC 152.6 02/14/2021    TRIG 102 02/14/2021     CHOLHDL 16.8 (L) 02/14/2021   , Troponin   Recent Labs   Lab 01/23/24  2226   TROPONINI 0.008   , A1C:   Recent Labs   Lab 10/19/23  0424 01/24/24  0409   HGBA1C 7.0* 6.3*   , and All labs within the past 24 hours have been reviewed  Microbiology: Sputum Culture   Lab Results   Component Value Date    GSRESP <10 epithelial cells per low power field. 01/24/2024    GSRESP Few WBC's 01/24/2024    GSRESP Moderate Gram positive cocci in pairs 01/24/2024    GSRESP Few Gram positive cocci 01/24/2024    RESPIRATORYC STREPTOCOCCUS PNEUMONIAE  Moderate   (A) 01/24/2024     Radiology: X-Ray: CXR: X-Ray Chest PA and Lateral (CXR): No results found for this visit on 01/23/24.    Pending Diagnostic Studies:       None           Medications:  Reconciled Home Medications:      Medication List        START taking these medications      albuterol-ipratropium 2.5 mg-0.5 mg/3 mL nebulizer solution  Commonly known as: DUO-NEB  Take 3 mLs by nebulization every 6 (six) hours while awake. Rescue     amoxicillin-clavulanate 875-125mg 875-125 mg per tablet  Commonly known as: AUGMENTIN  Take 1 tablet by mouth every 12 (twelve) hours.     metoprolol tartrate 25 MG tablet  Commonly known as: LOPRESSOR  Take 0.5 tablets (12.5 mg total) by mouth 2 (two) times daily.     predniSONE 20 MG tablet  Commonly known as: DELTASONE  Take 1 tablet (20 mg total) by mouth once daily.  Start taking on: January 28, 2024            CONTINUE taking these medications      aspirin 81 MG EC tablet  Commonly known as: ECOTRIN  Take 1 tablet (81 mg total) by mouth once daily.     atorvastatin 40 MG tablet  Commonly known as: LIPITOR  Take 1 tablet (40 mg total) by mouth once daily.     clopidogreL 75 mg tablet  Commonly known as: PLAVIX  Take 1 tablet (75 mg total) by mouth once daily.     furosemide 40 MG tablet  Commonly known as: LASIX  Take 1 tablet (40 mg total) by mouth 2 (two) times daily.              Indwelling Lines/Drains at time of discharge:    Lines/Drains/Airways       None                   Time spent on the discharge of patient: 35 minutes    Critical care time spent on the evaluation and treatment of severe organ dysfunction, review of pertinent labs and imaging studies, discussions with consulting providers and discussions with patient/family: 35 minutes.     Iron Funes MD  Department of Hospital Medicine  [facility] - Intensive Care

## 2024-01-27 NOTE — NURSING
[facility] Intensive Care  ICU Shift Summary  Date: 1/27/2024      Prehospitalization: Home  Admit Date / LOS : 1/23/2024/ 4 days    Diagnosis: Acute on chronic respiratory failure with hypoxia and hypercapnia    Consults:        Active: Palliative       Needed: N/A     Code Status: Full Code   Advanced Directive: <no information>    LDA:  Lines/Drains/Airways       Peripheral Intravenous Line  Duration                  Peripheral IV - Single Lumen 01/23/24 2150 18 G Anterior;Right Forearm 3 days         Peripheral IV - Single Lumen 01/23/24 2341 20 G;1 in Anterior;Right Upper Arm 3 days         Peripheral IV - Single Lumen 01/24/24 1113 20 G Anterior;Left;Proximal Forearm 2 days                  Central Lines/Site/Justification:Patient Does Not Have Central Line  Urinary Cath/Order/Justification:Patient Does Not Have Urinary Catheter    Vasopressors/Infusions:        GOALS: Volume/ Hemodynamic: N/A                     RASS: N/A    Pain Management: none       Pain Controlled: not applicable     Rhythm: NSR    Respiratory Device: Room Air    Oxygen Concentration (%):  [30] 30                 Most Recent SBT/ SAT: N/A       MOVE Screen: PASS    VTE Prophylaxis: Pharm  Mobility: Ambulatory and S: Self  Stress Ulcer Prophylaxis: Yes    Isolation: No active isolations    Dietary:   Current Diet Order   Procedures    Diet diabetic Cardiac (Low Na/Chol); 2000 Calorie; Fluid - 1500mL     Order Specific Question:   Additional Diet Options:     Answer:   Cardiac (Low Na/Chol)     Order Specific Question:   Total calories:     Answer:   2000 Calorie     Order Specific Question:   Fluid restriction:     Answer:   Fluid - 1500mL      Tolerance: not applicable  Advancement: @ goal    I & O (24h):    Intake/Output Summary (Last 24 hours) at 1/27/2024 0503  Last data filed at 1/27/2024 0406  Gross per 24 hour   Intake 579.09 ml   Output 2300 ml   Net -1720.91 ml        Restraints: No    Significant Dates:  Post Op Date:  "N/A  Rescue Date: N/A  Imaging/ Diagnostics: N/A    Noteworthy Labs:  k 3.7, replaced per protocol    COVID Test: (--)  CBC/Anemia Labs: Coags:    Recent Labs   Lab 01/25/24  0902 01/26/24  0434   WBC 19.60* 19.56*   HGB 10.8* 11.6*   HCT 34.2* 36.1*    242   MCV 89 91   RDW 14.5 14.5    No results for input(s): "PT", "INR", "APTT" in the last 168 hours.     Chemistries:   Recent Labs   Lab 01/23/24  2226 01/24/24  0409 01/25/24  0902 01/26/24  0434 01/27/24  0242    141   < > 140 138   K 3.8 3.0*   < > 4.0 3.7    98   < > 97 97   CO2 26 29   < > 34* 36*   BUN 8 9   < > 30* 28*   CREATININE 0.9 0.9   < > 0.9 0.8   CALCIUM 8.7 8.7   < > 8.9 8.6*   PROT 7.2 6.8  --   --   --    BILITOT 0.5 0.5  --   --   --    ALKPHOS 86 78  --   --   --    ALT 13 14  --   --   --    AST 15 17  --   --   --    MG  --  2.1  --   --  2.5   PHOS  --  1.8*  --   --   --     < > = values in this interval not displayed.        Cardiac Enzymes: Ejection Fractions:    No results for input(s): "CPK", "CPKMB", "MB", "TROPONINI" in the last 72 hours. EF   Date Value Ref Range Status   07/03/2023 15 % Final        POCT Glucose: HbA1c:    Recent Labs   Lab 01/26/24  0549 01/26/24  1618 01/26/24  2113   POCTGLUCOSE 153* 221* 161*    Hemoglobin A1C   Date Value Ref Range Status   01/24/2024 6.3 (H) 4.0 - 5.6 % Final     Comment:     ADA Screening Guidelines:  5.7-6.4%  Consistent with prediabetes  >or=6.5%  Consistent with diabetes    High levels of fetal hemoglobin interfere with the HbA1C  assay. Heterozygous hemoglobin variants (HbS, HgC, etc)do  not significantly interfere with this assay.   However, presence of multiple variants may affect accuracy.             ICU LOS 3d 3h  Level of Care: Discharge    Chart Check: 12 HR Done  Shift Summary/Plan for the shift: Uneventful shift. Patient tolerated medications and treatments as expected. Patient appeared to sleep well with his bipap throughout the night. Patient questions " answered and needs addressed.

## 2024-01-27 NOTE — PLAN OF CARE
01/27/24 1514   Final Note   Assessment Type Final Discharge Note   Anticipated Discharge Disposition Home-Health   Hospital Resources/Appts/Education Provided Post-Acute resouces added to AVS   Post-Acute Status   Post-Acute Authorization Home Health   Home Health Status Pending Payor Review   Coverage PHN     Pts nurse Renny notified that the pt can d/c from CM standpoint

## 2024-01-28 LAB
BACTERIA BLD CULT: NORMAL
BACTERIA BLD CULT: NORMAL
POCT GLUCOSE: 179 MG/DL (ref 70–110)

## 2024-04-29 PROBLEM — J96.21 ACUTE ON CHRONIC RESPIRATORY FAILURE WITH HYPOXIA AND HYPERCAPNIA: Status: RESOLVED | Noted: 2024-01-23 | Resolved: 2024-04-29

## 2024-04-29 PROBLEM — J96.22 ACUTE ON CHRONIC RESPIRATORY FAILURE WITH HYPOXIA AND HYPERCAPNIA: Status: RESOLVED | Noted: 2024-01-23 | Resolved: 2024-04-29

## 2025-03-03 ENCOUNTER — HOSPITAL ENCOUNTER (INPATIENT)
Facility: HOSPITAL | Age: 70
LOS: 3 days | Discharge: HOME | DRG: 189 | End: 2025-03-06
Attending: EMERGENCY MEDICINE | Admitting: STUDENT IN AN ORGANIZED HEALTH CARE EDUCATION/TRAINING PROGRAM
Payer: MEDICARE

## 2025-03-03 DIAGNOSIS — R06.02 SHORTNESS OF BREATH: ICD-10-CM

## 2025-03-03 DIAGNOSIS — I50.9 CHF (CONGESTIVE HEART FAILURE): ICD-10-CM

## 2025-03-03 DIAGNOSIS — J44.1 CHRONIC OBSTRUCTIVE PULMONARY DISEASE WITH ACUTE EXACERBATION: ICD-10-CM

## 2025-03-03 DIAGNOSIS — J44.1 COPD EXACERBATION: ICD-10-CM

## 2025-03-03 DIAGNOSIS — J96.02 ACUTE RESPIRATORY FAILURE WITH HYPOXIA AND HYPERCARBIA: ICD-10-CM

## 2025-03-03 DIAGNOSIS — J96.92 HYPERCAPNIC RESPIRATORY FAILURE: ICD-10-CM

## 2025-03-03 DIAGNOSIS — J96.01 ACUTE RESPIRATORY FAILURE WITH HYPOXIA AND HYPERCARBIA: ICD-10-CM

## 2025-03-03 DIAGNOSIS — I50.43 ACUTE ON CHRONIC COMBINED SYSTOLIC AND DIASTOLIC CONGESTIVE HEART FAILURE: Primary | ICD-10-CM

## 2025-03-03 LAB
ALBUMIN SERPL BCP-MCNC: 3.6 G/DL (ref 3.5–5.2)
ALLENS TEST: ABNORMAL
ALP SERPL-CCNC: 101 U/L (ref 40–150)
ALT SERPL W/O P-5'-P-CCNC: 11 U/L (ref 10–44)
ANION GAP SERPL CALC-SCNC: 9 MMOL/L (ref 8–16)
AST SERPL-CCNC: 9 U/L (ref 10–40)
BASOPHILS # BLD AUTO: 0.07 K/UL (ref 0–0.2)
BASOPHILS NFR BLD: 0.5 % (ref 0–1.9)
BILIRUB SERPL-MCNC: 0.3 MG/DL (ref 0.1–1)
BNP SERPL-MCNC: 557 PG/ML (ref 0–99)
BUN SERPL-MCNC: 5 MG/DL (ref 8–23)
CALCIUM SERPL-MCNC: 8.6 MG/DL (ref 8.7–10.5)
CHLORIDE SERPL-SCNC: 92 MMOL/L (ref 95–110)
CO2 SERPL-SCNC: 39 MMOL/L (ref 23–29)
CREAT SERPL-MCNC: 0.8 MG/DL (ref 0.5–1.4)
DELSYS: ABNORMAL
DIFFERENTIAL METHOD BLD: ABNORMAL
EOSINOPHIL # BLD AUTO: 0.3 K/UL (ref 0–0.5)
EOSINOPHIL NFR BLD: 2.6 % (ref 0–8)
EP: 10
ERYTHROCYTE [DISTWIDTH] IN BLOOD BY AUTOMATED COUNT: 13 % (ref 11.5–14.5)
ERYTHROCYTE [SEDIMENTATION RATE] IN BLOOD BY WESTERGREN METHOD: 12 MM/H
EST. GFR  (NO RACE VARIABLE): >60 ML/MIN/1.73 M^2
FIO2: 100
FIO2: 30
GLUCOSE SERPL-MCNC: 256 MG/DL (ref 70–110)
HCO3 UR-SCNC: 47.2 MMOL/L (ref 24–28)
HCO3 UR-SCNC: 49.2 MMOL/L (ref 24–28)
HCO3 UR-SCNC: ABNORMAL MMOL/L
HCO3 UR-SCNC: ABNORMAL MMOL/L
HCT VFR BLD AUTO: 37.1 % (ref 40–54)
HGB BLD-MCNC: 11.5 G/DL (ref 14–18)
IMM GRANULOCYTES # BLD AUTO: 0.04 K/UL (ref 0–0.04)
IMM GRANULOCYTES NFR BLD AUTO: 0.3 % (ref 0–0.5)
INR PPP: 0.9 (ref 0.8–1.2)
IP: 20
LACTATE SERPL-SCNC: 2.4 MMOL/L (ref 0.5–2.2)
LYMPHOCYTES # BLD AUTO: 4.4 K/UL (ref 1–4.8)
LYMPHOCYTES NFR BLD: 34.5 % (ref 18–48)
MCH RBC QN AUTO: 29.1 PG (ref 27–31)
MCHC RBC AUTO-ENTMCNC: 31 G/DL (ref 32–36)
MCV RBC AUTO: 94 FL (ref 82–98)
MODE: ABNORMAL
MONOCYTES # BLD AUTO: 0.9 K/UL (ref 0.3–1)
MONOCYTES NFR BLD: 6.6 % (ref 4–15)
NEUTROPHILS # BLD AUTO: 7.1 K/UL (ref 1.8–7.7)
NEUTROPHILS NFR BLD: 55.5 % (ref 38–73)
NRBC BLD-RTO: 0 /100 WBC
PCO2 BLDA: 126.5 MMHG (ref 35–45)
PCO2 BLDA: 86.3 MMHG (ref 35–45)
PCO2 BLDA: >130 MMHG (ref 35–45)
PCO2 BLDA: >130 MMHG (ref 35–45)
PH SMN: 7.16 [PH] (ref 7.35–7.45)
PH SMN: 7.16 [PH] (ref 7.35–7.45)
PH SMN: 7.2 [PH] (ref 7.35–7.45)
PH SMN: 7.34 [PH] (ref 7.35–7.45)
PLATELET # BLD AUTO: 229 K/UL (ref 150–450)
PMV BLD AUTO: 11.1 FL (ref 9.2–12.9)
PO2 BLDA: 31 MMHG (ref 40–60)
PO2 BLDA: 33 MMHG (ref 40–60)
PO2 BLDA: 43 MMHG (ref 40–60)
PO2 BLDA: 47 MMHG (ref 40–60)
POC BE: 16 MMOL/L
POC BE: 17 MMOL/L
POC BE: ABNORMAL MMOL/L
POC BE: ABNORMAL MMOL/L
POC SATURATED O2: 67 % (ref 95–100)
POC SATURATED O2: 72 % (ref 95–100)
POC SATURATED O2: ABNORMAL %
POC SATURATED O2: ABNORMAL %
POC TCO2: 50 MMOL/L (ref 24–29)
POC TCO2: >50 MMOL/L (ref 24–29)
POC TCO2: ABNORMAL MMOL/L
POC TCO2: ABNORMAL MMOL/L
POTASSIUM SERPL-SCNC: 3.9 MMOL/L (ref 3.5–5.1)
PROT SERPL-MCNC: 7.9 G/DL (ref 6–8.4)
PROTHROMBIN TIME: 10.6 SEC (ref 9–12.5)
RBC # BLD AUTO: 3.95 M/UL (ref 4.6–6.2)
SAMPLE: ABNORMAL
SITE: ABNORMAL
SODIUM SERPL-SCNC: 140 MMOL/L (ref 136–145)
SP02: 95
SPONT RATE: 28
TROPONIN I SERPL DL<=0.01 NG/ML-MCNC: 0.03 NG/ML (ref 0–0.03)
WBC # BLD AUTO: 12.86 K/UL (ref 3.9–12.7)

## 2025-03-03 PROCEDURE — 93010 ELECTROCARDIOGRAM REPORT: CPT | Mod: ,,, | Performed by: INTERNAL MEDICINE

## 2025-03-03 PROCEDURE — 85025 COMPLETE CBC W/AUTO DIFF WBC: CPT | Performed by: EMERGENCY MEDICINE

## 2025-03-03 PROCEDURE — 96376 TX/PRO/DX INJ SAME DRUG ADON: CPT

## 2025-03-03 PROCEDURE — 99285 EMERGENCY DEPT VISIT HI MDM: CPT | Mod: 25

## 2025-03-03 PROCEDURE — 25000242 PHARM REV CODE 250 ALT 637 W/ HCPCS: Performed by: EMERGENCY MEDICINE

## 2025-03-03 PROCEDURE — 84484 ASSAY OF TROPONIN QUANT: CPT | Performed by: EMERGENCY MEDICINE

## 2025-03-03 PROCEDURE — 99900035 HC TECH TIME PER 15 MIN (STAT)

## 2025-03-03 PROCEDURE — 80053 COMPREHEN METABOLIC PANEL: CPT | Performed by: EMERGENCY MEDICINE

## 2025-03-03 PROCEDURE — 5A09357 ASSISTANCE WITH RESPIRATORY VENTILATION, LESS THAN 24 CONSECUTIVE HOURS, CONTINUOUS POSITIVE AIRWAY PRESSURE: ICD-10-PCS | Performed by: EMERGENCY MEDICINE

## 2025-03-03 PROCEDURE — 82803 BLOOD GASES ANY COMBINATION: CPT

## 2025-03-03 PROCEDURE — 94644 CONT INHLJ TX 1ST HOUR: CPT

## 2025-03-03 PROCEDURE — 83605 ASSAY OF LACTIC ACID: CPT | Performed by: EMERGENCY MEDICINE

## 2025-03-03 PROCEDURE — 85610 PROTHROMBIN TIME: CPT | Performed by: EMERGENCY MEDICINE

## 2025-03-03 PROCEDURE — 94660 CPAP INITIATION&MGMT: CPT

## 2025-03-03 PROCEDURE — 96374 THER/PROPH/DIAG INJ IV PUSH: CPT

## 2025-03-03 PROCEDURE — 93005 ELECTROCARDIOGRAM TRACING: CPT

## 2025-03-03 PROCEDURE — 12000002 HC ACUTE/MED SURGE SEMI-PRIVATE ROOM

## 2025-03-03 PROCEDURE — 36600 WITHDRAWAL OF ARTERIAL BLOOD: CPT

## 2025-03-03 PROCEDURE — 63600175 PHARM REV CODE 636 W HCPCS: Performed by: EMERGENCY MEDICINE

## 2025-03-03 PROCEDURE — 27000207 HC ISOLATION

## 2025-03-03 PROCEDURE — 83880 ASSAY OF NATRIURETIC PEPTIDE: CPT | Performed by: EMERGENCY MEDICINE

## 2025-03-03 PROCEDURE — 27000190 HC CPAP FULL FACE MASK W/VALVE

## 2025-03-03 PROCEDURE — 94761 N-INVAS EAR/PLS OXIMETRY MLT: CPT | Mod: XB

## 2025-03-03 RX ORDER — INSULIN ASPART 100 [IU]/ML
0-5 INJECTION, SOLUTION INTRAVENOUS; SUBCUTANEOUS
Status: DISCONTINUED | OUTPATIENT
Start: 2025-03-03 | End: 2025-03-04

## 2025-03-03 RX ORDER — ENOXAPARIN SODIUM 100 MG/ML
40 INJECTION SUBCUTANEOUS EVERY 24 HOURS
Status: DISCONTINUED | OUTPATIENT
Start: 2025-03-03 | End: 2025-03-06 | Stop reason: HOSPADM

## 2025-03-03 RX ORDER — CALCIUM GLUCONATE 20 MG/ML
1 INJECTION, SOLUTION INTRAVENOUS
Status: DISCONTINUED | OUTPATIENT
Start: 2025-03-03 | End: 2025-03-06 | Stop reason: HOSPADM

## 2025-03-03 RX ORDER — MAGNESIUM SULFATE HEPTAHYDRATE 40 MG/ML
2 INJECTION, SOLUTION INTRAVENOUS
Status: DISCONTINUED | OUTPATIENT
Start: 2025-03-03 | End: 2025-03-06 | Stop reason: HOSPADM

## 2025-03-03 RX ORDER — CALCIUM GLUCONATE 20 MG/ML
3 INJECTION, SOLUTION INTRAVENOUS
Status: DISCONTINUED | OUTPATIENT
Start: 2025-03-03 | End: 2025-03-06 | Stop reason: HOSPADM

## 2025-03-03 RX ORDER — IPRATROPIUM BROMIDE 0.5 MG/2.5ML
1 SOLUTION RESPIRATORY (INHALATION)
Status: COMPLETED | OUTPATIENT
Start: 2025-03-03 | End: 2025-03-03

## 2025-03-03 RX ORDER — FUROSEMIDE 10 MG/ML
40 INJECTION INTRAMUSCULAR; INTRAVENOUS
Status: COMPLETED | OUTPATIENT
Start: 2025-03-03 | End: 2025-03-03

## 2025-03-03 RX ORDER — CALCIUM GLUCONATE 20 MG/ML
2 INJECTION, SOLUTION INTRAVENOUS
Status: DISCONTINUED | OUTPATIENT
Start: 2025-03-03 | End: 2025-03-06 | Stop reason: HOSPADM

## 2025-03-03 RX ORDER — IBUPROFEN 200 MG
16 TABLET ORAL
Status: DISCONTINUED | OUTPATIENT
Start: 2025-03-03 | End: 2025-03-06 | Stop reason: HOSPADM

## 2025-03-03 RX ORDER — POTASSIUM CHLORIDE 7.45 MG/ML
60 INJECTION INTRAVENOUS
Status: DISCONTINUED | OUTPATIENT
Start: 2025-03-03 | End: 2025-03-05

## 2025-03-03 RX ORDER — POTASSIUM CHLORIDE 7.45 MG/ML
80 INJECTION INTRAVENOUS
Status: DISCONTINUED | OUTPATIENT
Start: 2025-03-03 | End: 2025-03-05

## 2025-03-03 RX ORDER — TALC
6 POWDER (GRAM) TOPICAL NIGHTLY PRN
Status: DISCONTINUED | OUTPATIENT
Start: 2025-03-03 | End: 2025-03-06 | Stop reason: HOSPADM

## 2025-03-03 RX ORDER — FUROSEMIDE 10 MG/ML
80 INJECTION INTRAMUSCULAR; INTRAVENOUS
Status: DISCONTINUED | OUTPATIENT
Start: 2025-03-03 | End: 2025-03-03

## 2025-03-03 RX ORDER — IPRATROPIUM BROMIDE AND ALBUTEROL SULFATE 2.5; .5 MG/3ML; MG/3ML
3 SOLUTION RESPIRATORY (INHALATION)
Status: DISCONTINUED | OUTPATIENT
Start: 2025-03-04 | End: 2025-03-06 | Stop reason: HOSPADM

## 2025-03-03 RX ORDER — ASPIRIN 81 MG/1
81 TABLET ORAL DAILY
Status: DISCONTINUED | OUTPATIENT
Start: 2025-03-04 | End: 2025-03-06 | Stop reason: HOSPADM

## 2025-03-03 RX ORDER — ALBUTEROL SULFATE 2.5 MG/.5ML
10 SOLUTION RESPIRATORY (INHALATION)
Status: COMPLETED | OUTPATIENT
Start: 2025-03-03 | End: 2025-03-03

## 2025-03-03 RX ORDER — ACETAMINOPHEN 325 MG/1
650 TABLET ORAL EVERY 4 HOURS PRN
Status: DISCONTINUED | OUTPATIENT
Start: 2025-03-03 | End: 2025-03-04

## 2025-03-03 RX ORDER — FUROSEMIDE 10 MG/ML
60 INJECTION INTRAMUSCULAR; INTRAVENOUS EVERY 12 HOURS
Status: DISCONTINUED | OUTPATIENT
Start: 2025-03-04 | End: 2025-03-04

## 2025-03-03 RX ORDER — FAMOTIDINE 10 MG/ML
20 INJECTION, SOLUTION INTRAVENOUS DAILY
Status: DISCONTINUED | OUTPATIENT
Start: 2025-03-04 | End: 2025-03-04

## 2025-03-03 RX ORDER — PREDNISONE 20 MG/1
40 TABLET ORAL DAILY
Status: DISCONTINUED | OUTPATIENT
Start: 2025-03-04 | End: 2025-03-06 | Stop reason: HOSPADM

## 2025-03-03 RX ORDER — SODIUM CHLORIDE 0.9 % (FLUSH) 0.9 %
3 SYRINGE (ML) INJECTION
Status: DISCONTINUED | OUTPATIENT
Start: 2025-03-03 | End: 2025-03-06 | Stop reason: HOSPADM

## 2025-03-03 RX ORDER — GLUCAGON 1 MG
1 KIT INJECTION
Status: DISCONTINUED | OUTPATIENT
Start: 2025-03-03 | End: 2025-03-06 | Stop reason: HOSPADM

## 2025-03-03 RX ORDER — NITROGLYCERIN 20 MG/100ML
0-400 INJECTION INTRAVENOUS CONTINUOUS
Status: DISCONTINUED | OUTPATIENT
Start: 2025-03-03 | End: 2025-03-03

## 2025-03-03 RX ORDER — ATORVASTATIN CALCIUM 40 MG/1
40 TABLET, FILM COATED ORAL DAILY
Status: DISCONTINUED | OUTPATIENT
Start: 2025-03-04 | End: 2025-03-06 | Stop reason: HOSPADM

## 2025-03-03 RX ORDER — IPRATROPIUM BROMIDE AND ALBUTEROL SULFATE 2.5; .5 MG/3ML; MG/3ML
3 SOLUTION RESPIRATORY (INHALATION) EVERY 4 HOURS PRN
Status: DISCONTINUED | OUTPATIENT
Start: 2025-03-03 | End: 2025-03-06 | Stop reason: HOSPADM

## 2025-03-03 RX ORDER — FUROSEMIDE 10 MG/ML
60 INJECTION INTRAMUSCULAR; INTRAVENOUS
Status: COMPLETED | OUTPATIENT
Start: 2025-03-03 | End: 2025-03-03

## 2025-03-03 RX ORDER — ONDANSETRON HYDROCHLORIDE 2 MG/ML
4 INJECTION, SOLUTION INTRAVENOUS EVERY 8 HOURS PRN
Status: DISCONTINUED | OUTPATIENT
Start: 2025-03-03 | End: 2025-03-06 | Stop reason: HOSPADM

## 2025-03-03 RX ORDER — CLOPIDOGREL BISULFATE 75 MG/1
75 TABLET ORAL DAILY
Status: DISCONTINUED | OUTPATIENT
Start: 2025-03-04 | End: 2025-03-06 | Stop reason: HOSPADM

## 2025-03-03 RX ORDER — SODIUM CHLORIDE 0.9 % (FLUSH) 0.9 %
10 SYRINGE (ML) INJECTION
Status: DISCONTINUED | OUTPATIENT
Start: 2025-03-03 | End: 2025-03-06 | Stop reason: HOSPADM

## 2025-03-03 RX ORDER — POTASSIUM CHLORIDE 7.45 MG/ML
40 INJECTION INTRAVENOUS
Status: DISCONTINUED | OUTPATIENT
Start: 2025-03-03 | End: 2025-03-05

## 2025-03-03 RX ORDER — IBUPROFEN 200 MG
24 TABLET ORAL
Status: DISCONTINUED | OUTPATIENT
Start: 2025-03-03 | End: 2025-03-06 | Stop reason: HOSPADM

## 2025-03-03 RX ADMIN — ALBUTEROL SULFATE 10 MG: 2.5 SOLUTION RESPIRATORY (INHALATION) at 07:03

## 2025-03-03 RX ADMIN — FUROSEMIDE 60 MG: 10 INJECTION, SOLUTION INTRAVENOUS at 07:03

## 2025-03-03 RX ADMIN — FUROSEMIDE 40 MG: 10 INJECTION, SOLUTION INTRAVENOUS at 09:03

## 2025-03-03 RX ADMIN — IPRATROPIUM BROMIDE 1 MG: 0.5 SOLUTION RESPIRATORY (INHALATION) at 07:03

## 2025-03-03 NOTE — Clinical Note
Diagnosis: Hypercapnic respiratory failure [[id number]]   Future Attending Provider: NUSRAT FERGUSON [[id number]]   Reason for IP Medical Treatment  (Clinical interventions that can only be accomplished in the IP setting? ) :: Bipap, Hypercapnic resp failure

## 2025-03-04 LAB
ALLENS TEST: ABNORMAL
ANION GAP SERPL CALC-SCNC: 11 MMOL/L (ref 8–16)
BASOPHILS # BLD AUTO: 0.02 K/UL (ref 0–0.2)
BASOPHILS NFR BLD: 0.2 % (ref 0–1.9)
BUN SERPL-MCNC: 8 MG/DL (ref 8–23)
CALCIUM SERPL-MCNC: 8.5 MG/DL (ref 8.7–10.5)
CHLORIDE SERPL-SCNC: 87 MMOL/L (ref 95–110)
CO2 SERPL-SCNC: 42 MMOL/L (ref 23–29)
CREAT SERPL-MCNC: 0.8 MG/DL (ref 0.5–1.4)
DELSYS: ABNORMAL
DIFFERENTIAL METHOD BLD: ABNORMAL
EOSINOPHIL # BLD AUTO: 0 K/UL (ref 0–0.5)
EOSINOPHIL NFR BLD: 0 % (ref 0–8)
EP: 10
ERYTHROCYTE [DISTWIDTH] IN BLOOD BY AUTOMATED COUNT: 13 % (ref 11.5–14.5)
ERYTHROCYTE [SEDIMENTATION RATE] IN BLOOD BY WESTERGREN METHOD: 12 MM/H
EST. GFR  (NO RACE VARIABLE): >60 ML/MIN/1.73 M^2
ESTIMATED AVG GLUCOSE: 166 MG/DL (ref 68–131)
FIO2: 30
GLUCOSE SERPL-MCNC: 256 MG/DL (ref 70–110)
HBA1C MFR BLD: 7.4 % (ref 4–5.6)
HCO3 UR-SCNC: 47.6 MMOL/L (ref 24–28)
HCT VFR BLD AUTO: 34 % (ref 40–54)
HGB BLD-MCNC: 10.5 G/DL (ref 14–18)
IMM GRANULOCYTES # BLD AUTO: 0.02 K/UL (ref 0–0.04)
IMM GRANULOCYTES NFR BLD AUTO: 0.2 % (ref 0–0.5)
IP: 20
LYMPHOCYTES # BLD AUTO: 0.8 K/UL (ref 1–4.8)
LYMPHOCYTES NFR BLD: 7.5 % (ref 18–48)
MAGNESIUM SERPL-MCNC: 2 MG/DL (ref 1.6–2.6)
MCH RBC QN AUTO: 28.7 PG (ref 27–31)
MCHC RBC AUTO-ENTMCNC: 30.9 G/DL (ref 32–36)
MCV RBC AUTO: 93 FL (ref 82–98)
MIN VOL: 8.6
MODE: ABNORMAL
MONOCYTES # BLD AUTO: 0.2 K/UL (ref 0.3–1)
MONOCYTES NFR BLD: 1.6 % (ref 4–15)
NEUTROPHILS # BLD AUTO: 10 K/UL (ref 1.8–7.7)
NEUTROPHILS NFR BLD: 90.5 % (ref 38–73)
NRBC BLD-RTO: 0 /100 WBC
OHS QRS DURATION: 114 MS
OHS QTC CALCULATION: 502 MS
PCO2 BLDA: 93.3 MMHG (ref 35–45)
PH SMN: 7.32 [PH] (ref 7.35–7.45)
PHOSPHATE SERPL-MCNC: 2.1 MG/DL (ref 2.7–4.5)
PLATELET # BLD AUTO: 204 K/UL (ref 150–450)
PMV BLD AUTO: 10.7 FL (ref 9.2–12.9)
PO2 BLDA: 58 MMHG (ref 80–100)
POC BE: 17 MMOL/L
POC SATURATED O2: 85 % (ref 95–100)
POC TCO2: >50 MMOL/L (ref 23–27)
POCT GLUCOSE: 246 MG/DL (ref 70–110)
POCT GLUCOSE: 291 MG/DL (ref 70–110)
POCT GLUCOSE: 292 MG/DL (ref 70–110)
POCT GLUCOSE: 296 MG/DL (ref 70–110)
POCT GLUCOSE: 305 MG/DL (ref 70–110)
POCT GLUCOSE: 315 MG/DL (ref 70–110)
POTASSIUM SERPL-SCNC: 3.8 MMOL/L (ref 3.5–5.1)
RBC # BLD AUTO: 3.66 M/UL (ref 4.6–6.2)
SAMPLE: ABNORMAL
SITE: ABNORMAL
SODIUM SERPL-SCNC: 140 MMOL/L (ref 136–145)
SP02: 95
SPONT RATE: 18
WBC # BLD AUTO: 11.04 K/UL (ref 3.9–12.7)

## 2025-03-04 PROCEDURE — 63600175 PHARM REV CODE 636 W HCPCS: Performed by: STUDENT IN AN ORGANIZED HEALTH CARE EDUCATION/TRAINING PROGRAM

## 2025-03-04 PROCEDURE — 25000242 PHARM REV CODE 250 ALT 637 W/ HCPCS: Performed by: HOSPITALIST

## 2025-03-04 PROCEDURE — 80048 BASIC METABOLIC PNL TOTAL CA: CPT | Performed by: STUDENT IN AN ORGANIZED HEALTH CARE EDUCATION/TRAINING PROGRAM

## 2025-03-04 PROCEDURE — 36410 VNPNXR 3YR/> PHY/QHP DX/THER: CPT

## 2025-03-04 PROCEDURE — 85025 COMPLETE CBC W/AUTO DIFF WBC: CPT | Performed by: STUDENT IN AN ORGANIZED HEALTH CARE EDUCATION/TRAINING PROGRAM

## 2025-03-04 PROCEDURE — 83036 HEMOGLOBIN GLYCOSYLATED A1C: CPT | Performed by: STUDENT IN AN ORGANIZED HEALTH CARE EDUCATION/TRAINING PROGRAM

## 2025-03-04 PROCEDURE — 99900035 HC TECH TIME PER 15 MIN (STAT)

## 2025-03-04 PROCEDURE — 63600175 PHARM REV CODE 636 W HCPCS: Performed by: HOSPITALIST

## 2025-03-04 PROCEDURE — 94660 CPAP INITIATION&MGMT: CPT

## 2025-03-04 PROCEDURE — 5A0935A ASSISTANCE WITH RESPIRATORY VENTILATION, LESS THAN 24 CONSECUTIVE HOURS, HIGH NASAL FLOW/VELOCITY: ICD-10-PCS | Performed by: STUDENT IN AN ORGANIZED HEALTH CARE EDUCATION/TRAINING PROGRAM

## 2025-03-04 PROCEDURE — 27000221 HC OXYGEN, UP TO 24 HOURS

## 2025-03-04 PROCEDURE — 25000003 PHARM REV CODE 250: Performed by: STUDENT IN AN ORGANIZED HEALTH CARE EDUCATION/TRAINING PROGRAM

## 2025-03-04 PROCEDURE — 94799 UNLISTED PULMONARY SVC/PX: CPT

## 2025-03-04 PROCEDURE — 25000003 PHARM REV CODE 250: Performed by: HOSPITALIST

## 2025-03-04 PROCEDURE — 83735 ASSAY OF MAGNESIUM: CPT | Performed by: STUDENT IN AN ORGANIZED HEALTH CARE EDUCATION/TRAINING PROGRAM

## 2025-03-04 PROCEDURE — 94640 AIRWAY INHALATION TREATMENT: CPT

## 2025-03-04 PROCEDURE — 11000001 HC ACUTE MED/SURG PRIVATE ROOM

## 2025-03-04 PROCEDURE — 94761 N-INVAS EAR/PLS OXIMETRY MLT: CPT

## 2025-03-04 PROCEDURE — 99223 1ST HOSP IP/OBS HIGH 75: CPT | Mod: ,,, | Performed by: STUDENT IN AN ORGANIZED HEALTH CARE EDUCATION/TRAINING PROGRAM

## 2025-03-04 PROCEDURE — 84100 ASSAY OF PHOSPHORUS: CPT | Performed by: STUDENT IN AN ORGANIZED HEALTH CARE EDUCATION/TRAINING PROGRAM

## 2025-03-04 PROCEDURE — 25000242 PHARM REV CODE 250 ALT 637 W/ HCPCS: Performed by: STUDENT IN AN ORGANIZED HEALTH CARE EDUCATION/TRAINING PROGRAM

## 2025-03-04 PROCEDURE — C1751 CATH, INF, PER/CENT/MIDLINE: HCPCS

## 2025-03-04 RX ORDER — MUPIROCIN 20 MG/G
OINTMENT TOPICAL 2 TIMES DAILY
Status: DISCONTINUED | OUTPATIENT
Start: 2025-03-04 | End: 2025-03-06 | Stop reason: HOSPADM

## 2025-03-04 RX ORDER — SODIUM CHLORIDE 0.9 % (FLUSH) 0.9 %
10 SYRINGE (ML) INJECTION EVERY 12 HOURS PRN
Status: DISCONTINUED | OUTPATIENT
Start: 2025-03-04 | End: 2025-03-06 | Stop reason: HOSPADM

## 2025-03-04 RX ORDER — ACETAZOLAMIDE 500 MG/5ML
500 INJECTION, POWDER, LYOPHILIZED, FOR SOLUTION INTRAVENOUS ONCE
Status: COMPLETED | OUTPATIENT
Start: 2025-03-04 | End: 2025-03-04

## 2025-03-04 RX ORDER — ACETAMINOPHEN 325 MG/1
650 TABLET ORAL EVERY 4 HOURS PRN
Status: DISCONTINUED | OUTPATIENT
Start: 2025-03-04 | End: 2025-03-06 | Stop reason: HOSPADM

## 2025-03-04 RX ORDER — INSULIN ASPART 100 [IU]/ML
0-10 INJECTION, SOLUTION INTRAVENOUS; SUBCUTANEOUS
Status: DISCONTINUED | OUTPATIENT
Start: 2025-03-04 | End: 2025-03-06 | Stop reason: HOSPADM

## 2025-03-04 RX ORDER — FUROSEMIDE 40 MG/1
40 TABLET ORAL 2 TIMES DAILY
Status: DISCONTINUED | OUTPATIENT
Start: 2025-03-04 | End: 2025-03-06 | Stop reason: HOSPADM

## 2025-03-04 RX ADMIN — IPRATROPIUM BROMIDE AND ALBUTEROL SULFATE 3 ML: 2.5; .5 SOLUTION RESPIRATORY (INHALATION) at 01:03

## 2025-03-04 RX ADMIN — MUPIROCIN: 20 OINTMENT TOPICAL at 09:03

## 2025-03-04 RX ADMIN — ENOXAPARIN SODIUM 40 MG: 40 INJECTION SUBCUTANEOUS at 04:03

## 2025-03-04 RX ADMIN — ENOXAPARIN SODIUM 40 MG: 40 INJECTION SUBCUTANEOUS at 12:03

## 2025-03-04 RX ADMIN — ACETAZOLAMIDE 500 MG: 500 INJECTION, POWDER, LYOPHILIZED, FOR SOLUTION INTRAVENOUS at 05:03

## 2025-03-04 RX ADMIN — INSULIN ASPART 2 UNITS: 100 INJECTION, SOLUTION INTRAVENOUS; SUBCUTANEOUS at 08:03

## 2025-03-04 RX ADMIN — FUROSEMIDE 60 MG: 10 INJECTION, SOLUTION INTRAMUSCULAR; INTRAVENOUS at 08:03

## 2025-03-04 RX ADMIN — ASPIRIN 81 MG: 81 TABLET, COATED ORAL at 08:03

## 2025-03-04 RX ADMIN — MUPIROCIN: 20 OINTMENT TOPICAL at 08:03

## 2025-03-04 RX ADMIN — INSULIN ASPART 8 UNITS: 100 INJECTION, SOLUTION INTRAVENOUS; SUBCUTANEOUS at 11:03

## 2025-03-04 RX ADMIN — ATORVASTATIN CALCIUM 40 MG: 40 TABLET, FILM COATED ORAL at 08:03

## 2025-03-04 RX ADMIN — PREDNISONE 40 MG: 20 TABLET ORAL at 08:03

## 2025-03-04 RX ADMIN — CLOPIDOGREL BISULFATE 75 MG: 75 TABLET ORAL at 08:03

## 2025-03-04 RX ADMIN — IPRATROPIUM BROMIDE AND ALBUTEROL SULFATE 3 ML: 2.5; .5 SOLUTION RESPIRATORY (INHALATION) at 07:03

## 2025-03-04 RX ADMIN — INSULIN ASPART 3 UNITS: 100 INJECTION, SOLUTION INTRAVENOUS; SUBCUTANEOUS at 12:03

## 2025-03-04 RX ADMIN — FAMOTIDINE 20 MG: 10 INJECTION, SOLUTION INTRAVENOUS at 08:03

## 2025-03-04 RX ADMIN — INSULIN ASPART 6 UNITS: 100 INJECTION, SOLUTION INTRAVENOUS; SUBCUTANEOUS at 05:03

## 2025-03-04 RX ADMIN — INSULIN ASPART 3 UNITS: 100 INJECTION, SOLUTION INTRAVENOUS; SUBCUTANEOUS at 08:03

## 2025-03-04 RX ADMIN — FUROSEMIDE 40 MG: 40 TABLET ORAL at 08:03

## 2025-03-04 NOTE — ED PROVIDER NOTES
Encounter Date: 3/3/2025       History     Chief Complaint   Patient presents with    Shortness of Breath     BIB [facility] for SOB on cpap     69-year-old male with history of CHF, COPD, coronary artery disease presents with shortness a breath.  History limited by acuity of condition.  Patient difficulty speaking.  Per EMS found to be in respiratory distress and placed on CPAP.  Given Decadron 16 mg and magnesium IV.  Patient denies chest pain.  States he is still smokes cigarettes.  States he has been intubated before.        Review of patient's allergies indicates:   Allergen Reactions    Contrast media Shortness Of Breath, Itching and Anxiety     Patient states that he had a bad reaction, anxiety , shortness of breath, itching .      Past Medical History:   Diagnosis Date    CHF (congestive heart failure)     COPD (chronic obstructive pulmonary disease)     Coronary artery disease     Elevated cholesterol     on medication    Hypertension      Past Surgical History:   Procedure Laterality Date    CARDIAC SURGERY      coronary stent placed    CORONARY STENT PLACEMENT       Family History   Problem Relation Name Age of Onset    Cancer Mother      No Known Problems Father       Social History[1]  Review of Systems    Physical Exam     Initial Vitals   BP Pulse Resp Temp SpO2   03/03/25 1915 03/03/25 1915 03/03/25 1915 03/03/25 2136 03/03/25 1917   (!) 139/92 104 (!) 28 98.1 °F (36.7 °C) 100 %      MAP       --                Physical Exam    Nursing note and vitals reviewed.  Constitutional: He appears well-developed and well-nourished. He appears distressed.   HENT:   Head: Atraumatic.   Eyes: EOM are normal. Pupils are equal, round, and reactive to light.   Neck: Neck supple. JVD present.   Normal range of motion.  Cardiovascular:            Murmur heard.  Tachycardic heart rate of 135   Pulmonary/Chest: He is in respiratory distress. He has wheezes. He has rales.   Heavy accessory muscle uses.  Respiratory rate  of 40   Abdominal: Abdomen is soft. Bowel sounds are normal. He exhibits no distension. There is no abdominal tenderness. There is no rebound.   Musculoskeletal:         General: Normal range of motion.      Cervical back: Normal range of motion and neck supple.      Comments: Trace bilateral lower extremity edema     Lymphadenopathy:     He has no cervical adenopathy.   Neurological: He is alert.   Skin:   Diaphoretic         ED Course   Critical Care    Date/Time: 3/3/2025 10:05 PM    Performed by: David Vanessa MD  Authorized by: David Vanessa MD  Direct patient critical care time: 20 minutes  Additional history critical care time: 5 minutes  Ordering / reviewing critical care time: 10 minutes  Documentation critical care time: 10 minutes  Consulting other physicians critical care time: 10 minutes  Total critical care time (exclusive of procedural time) : 55 minutes  Critical care time was exclusive of teaching time and separately billable procedures and treating other patients.  Critical care was necessary to treat or prevent imminent or life-threatening deterioration of the following conditions: cardiac failure and respiratory failure.  Critical care was time spent personally by me on the following activities: development of treatment plan with patient or surrogate, discussions with consultants, evaluation of patient's response to treatment, examination of patient, obtaining history from patient or surrogate, ordering and performing treatments and interventions, ordering and review of laboratory studies, ordering and review of radiographic studies, pulse oximetry, re-evaluation of patient's condition, review of old charts and interpretation of cardiac output measurements.        Labs Reviewed   CBC W/ AUTO DIFFERENTIAL - Abnormal       Result Value    WBC 12.86 (*)     RBC 3.95 (*)     Hemoglobin 11.5 (*)     Hematocrit 37.1 (*)     MCV 94      MCH 29.1      MCHC 31.0 (*)     RDW 13.0       Platelets 229      MPV 11.1      Immature Granulocytes 0.3      Gran # (ANC) 7.1      Immature Grans (Abs) 0.04      Lymph # 4.4      Mono # 0.9      Eos # 0.3      Baso # 0.07      nRBC 0      Gran % 55.5      Lymph % 34.5      Mono % 6.6      Eosinophil % 2.6      Basophil % 0.5      Differential Method Automated     COMPREHENSIVE METABOLIC PANEL - Abnormal    Sodium 140      Potassium 3.9      Chloride 92 (*)     CO2 39 (*)     Glucose 256 (*)     BUN 5 (*)     Creatinine 0.8      Calcium 8.6 (*)     Total Protein 7.9      Albumin 3.6      Total Bilirubin 0.3      Alkaline Phosphatase 101      AST 9 (*)     ALT 11      eGFR >60      Anion Gap 9     B-TYPE NATRIURETIC PEPTIDE - Abnormal     (*)    LACTIC ACID, PLASMA - Abnormal    Lactate (Lactic Acid) 2.4 (*)    ISTAT PROCEDURE - Abnormal    POC PH 7.160 (*)     POC PCO2 >130.0 (*)     POC PO2 33 (*)     POC HCO3 UNAVAILABLE      POC BE UNAVAILABLE      POC SATURATED O2 UNAVAILABLE      POC TCO2 UNAVAILABLE      Rate 12      Sample VENOUS      Site Other      Allens Test N/A      DelSys CPAP/BiPAP      Mode BiPAP      FiO2 100      IP 20      EP 10     ISTAT PROCEDURE - Abnormal    POC PH 7.156 (*)     POC PCO2 >130.0 (*)     POC PO2 31 (*)     POC HCO3 UNAVAILABLE      POC BE UNAVAILABLE      POC SATURATED O2 UNAVAILABLE      POC TCO2 UNAVAILABLE      Rate 12      Sample VENOUS      Site Other      Allens Test N/A      DelSys CPAP/BiPAP      Mode BiPAP      FiO2 100      IP 20      EP 10     ISTAT PROCEDURE - Abnormal    POC PH 7.198 (*)     POC PCO2 126.5 (*)     POC PO2 47      POC HCO3 49.2 (*)     POC BE 16 (*)     POC SATURATED O2 67      POC TCO2 >50 (*)     Rate 12      Sample VENOUS      Site Other      Allens Test N/A      DelSys CPAP/BiPAP      Mode BiPAP      FiO2 100      IP 20      EP 10     ISTAT PROCEDURE - Abnormal    POC PH 7.345 (*)     POC PCO2 86.3 (*)     POC PO2 43      POC HCO3 47.2 (*)     POC BE 17 (*)     POC SATURATED O2 72       POC TCO2 50 (*)     Rate 12      Sample VENOUS      Site Other      Allens Test N/A      DelSys CPAP/BiPAP      Mode BiPAP      FiO2 30      Spont Rate 28      Sp02 95      IP 20      EP 10     TROPONIN I    Troponin I 0.026     PROTIME-INR    Prothrombin Time 10.6      INR 0.9       EKG Readings: (Independently Interpreted)   Sinus tachycardia rate of 106.  Biatrial enlargement.  Lateral ST and T-wave abnormalities.       Imaging Results              X-Ray Chest AP Portable (Final result)  Result time 03/03/25 20:22:57      Final result by Baljinder Sheppard MD (03/03/25 20:22:57)                   Impression:      Findings consistent with pulmonary edema secondary to CHF.      Electronically signed by: Baljinder Sheppard MD  Date:    03/03/2025  Time:    20:22               Narrative:    EXAMINATION:  XR CHEST AP PORTABLE    CLINICAL HISTORY:  CHF;    TECHNIQUE:  Single frontal view of the chest was performed.    COMPARISON:  01/23/2024.    FINDINGS:  There has been interval removal of the previous support devices.  The trachea is unremarkable.  The cardiomediastinal silhouette is enlarged.  There are small bilateral pleural effusions.  There is no evidence of a pneumothorax.  There is no evidence of pneumomediastinum.  There is pulmonary vascular congestion.  There is no focal consolidation.  There are degenerative changes in the osseous structures.                                       Medications   nitroGLYCERIN 2% TD oint ointment 2 inch (0 inches Topical (Top) Hold 3/3/25 1930)   albuterol sulfate nebulizer solution 10 mg (10 mg Nebulization Given 3/3/25 1930)   ipratropium 0.02 % nebulizer solution 1 mg (1 mg Nebulization Given 3/3/25 1930)   furosemide injection 60 mg (60 mg Intravenous Given 3/3/25 1923)   furosemide injection 40 mg (40 mg Intravenous Given 3/3/25 2132)     Medical Decision Making  Amount and/or Complexity of Data Reviewed  Labs: ordered.  Radiology: ordered.    Risk  Prescription drug  management.  Decision regarding hospitalization.    ICU admit on BiPAP. Presents with respiratory distress. COPD patient on home oxygen who is still smokes who also has CHF ef 15% with grade 1 diastolic heart failure in . Initially malignant hypertensive, diaphoretic, working to breathe, using accessory muscles, unable to speak. Received Decadron 16 mg and magnesium IV by EMS. One DuoNeb en route. Came in on CPAP. Switched to BiPAP. Sats of 94% on 100% O2. VBG showed pH of 7.1 and a pCO2 of 123. Hour long continuous nebulizer, Lasix 60 mg IV, nitropaste 2in applied. Patient tachycardia went from 130's to 90, patient's work of breathing resolved. Now appears comfortable on the BiPAP. No longer diaphoretic. No longer using accessory muscles. Patient has a history of being intubated before. Repeat VBG shows pH of 7.3 and now a pCO2 of 86. patient states he feels much improved and does not feel he needs to be intubated but is willing to be if needed. patient is currently on BiPAP 20/10 FIO2 30% with a RR 21. with sats of 100%.      Discuss with Hospital Medicine who accepted the patient.                               Clinical Impression:  Final diagnoses:  [R06.02] Shortness of breath  [J96.92] Hypercapnic respiratory failure (Primary)  [I50.43] Acute on chronic combined systolic and diastolic congestive heart failure  [J44.1] COPD exacerbation          ED Disposition Condition    Admit Stable                  [1]   Social History  Tobacco Use    Smoking status: Every Day     Current packs/day: 0.00     Average packs/day: 2.0 packs/day for 45.0 years (90.0 ttl pk-yrs)     Types: Cigarettes     Start date: 1972     Last attempt to quit: 2017     Years since quittin.3    Smokeless tobacco: Never   Substance Use Topics    Alcohol use: Yes     Comment: socially    Drug use: No        David Vanessa MD  [phone]

## 2025-03-04 NOTE — NURSING
Report called to Diana on tele.    Tele souravker notified of transfer to room 341 and tele susan #[phone].

## 2025-03-04 NOTE — NURSING
[employer] notified about midline order.     [Good] : ~his/her~  mood as  good [Patient reported mammogram was normal] : Patient reported mammogram was normal [Patient reported PAP Smear was normal] : Patient reported PAP Smear was normal [Patient reported bone density results were normal] : Patient reported bone density results were normal [Patient declined colonoscopy] : Patient declined colonoscopy [HIV test declined] : HIV test declined [Hepatitis C test offered] : Hepatitis C test offered [None] : None [With Family] : lives with family [Employed] : employed [] :  [Sexually Active] : sexually active [Feels Safe at Home] : Feels safe at home [Fully functional (bathing, dressing, toileting, transferring, walking, feeding)] : Fully functional (bathing, dressing, toileting, transferring, walking, feeding) [Fully functional (using the telephone, shopping, preparing meals, housekeeping, doing laundry, using] : Fully functional and needs no help or supervision to perform IADLs (using the telephone, shopping, preparing meals, housekeeping, doing laundry, using transportation, managing medications and managing finances) [Smoke Detector] : smoke detector [Carbon Monoxide Detector] : carbon monoxide detector [Seat Belt] :  uses seat belt [Sunscreen] : uses sunscreen [Reviewed no changes] : Reviewed no changes [No falls in past year] : Patient reported no falls in the past year [0] : 2) Feeling down, depressed, or hopeless: Not at all (0) [] : No [de-identified] : Social EtOH [QPP3Difxs] : 0 [Change in mental status noted] : No change in mental status noted [Language] : denies difficulty with language [Behavior] : denies difficulty with behavior [Handling Complex Tasks] : denies difficulty handling complex tasks [Reasoning] : denies difficulty with reasoning [High Risk Behavior] : no high risk behavior [Reports changes in hearing] : Reports no changes in hearing [Reports changes in vision] : Reports no changes in vision [Reports changes in dental health] : Reports no changes in dental health [MammogramDate] : 06/2018 [MammogramComments] : [facility] [PapSmearDate] : 11/2018 [PapSmearComments] : Dr. 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 [de-identified] : Glasses distance - [employer] [de-identified] : Going regularly. [AdvancecareDate] : 03/05/2019 [FreeTextEntry4] : Patient given Health Care Proxy information today.

## 2025-03-04 NOTE — ASSESSMENT & PLAN NOTE
Patient's blood pressure range in the last 24 hours was: BP  Min: 113/69  Max: 162/78.The patient's inpatient anti-hypertensive regimen is listed below:  Current Antihypertensives  furosemide injection 60 mg, Every 12 hours, Intravenous    Plan  - BP is controlled, no changes needed to their regimen

## 2025-03-04 NOTE — ASSESSMENT & PLAN NOTE
In the setting of decompensated HF, possibly COPD contributing. VBG with 7.19/126. CXR with pulmonary edema and elevated BNP to 500s. Required BIPAP on ICU admission.   - weaned down to his home 4L NC this am  - continue aggressive diuresis  - can treat for possible COPD exacerbation with prednisone 40 mg x 5 days and scheduled nebs

## 2025-03-04 NOTE — HPI
This is a 69-year-old male with a past medical history of COPD (on 4 L O2), CAD, HFrEF (EF: 15%, GIDD), hypertension, type 2 diabetes, hyperlipidemia, tobacco use who presents with shortness of breath.     Patient presents for evaluation of dyspnea that started on the day of presentation.  He reports worsening shortness of breath associated with productive cough and chest tightness (which is now resolved).  He reports running out of a few of his medications as he was unable to get refills.  Per dispense report, patient has not filled any medications since August. Additionally, he still smokes with his last cigarette being a day prior. EMS administered Decadron 16 mg IV, magnesium sulfate IV and placed the patient on CPAP.    In the ED, the patient was tachycardic (100s-120s), tachypneic (20s-30s), hypoxic requiring BiPAP.  Labs remarkable for an elevated BNP (557), leukocytosis (12.8), normocytic anemia (11.5), hyperglycemia (256), elevated lactic acid (2.4), negative troponin (0.026).  EKG w/ ST depressions in lateral leads. VBG showed a pH of 7.19 > 7.34, pCO2 of 126.5 > 86.3.  Chest x-ray showed findings suggestive of pulmonary edema.  Patient was given Lasix 100 mg IV, DuoNeb x1.  He was admitted for further management.

## 2025-03-04 NOTE — ASSESSMENT & PLAN NOTE
Likely driving this episode of AHRF. TTE with EF 15% and grade I DD.   - continue aggressive diuresis  - nightly BIPAP  - needs to be started on GDMT for his HF  - referral to cardiology clinic

## 2025-03-04 NOTE — ASSESSMENT & PLAN NOTE
Patient with Hypercapnic and Hypoxic Respiratory failure which is Acute on chronic.  he is on home oxygen at 4-5 LPM. Supplemental oxygen was provided and noted- Oxygen Concentration (%):  [] 30    .   Signs/symptoms of respiratory failure include- increased work of breathing. Contributing diagnoses includes - CHF and COPD Labs and images were reviewed. Patient Has recent ABG, which has been reviewed. Will treat underlying causes and adjust management of respiratory failure as follows- Wean BiPAP as tolerated

## 2025-03-04 NOTE — PROGRESS NOTES
[facility] Medicine  Progress Note    Patient Name: Abelardo Irene Jr.  MRN: [id number]  Patient Class: IP- Inpatient   Admission Date: 3/3/2025  Length of Stay: 1 days  Attending Physician: Iron Bledsoe MD  Primary Care Provider: Rolando Funes MD        Subjective     Principal Problem:Acute respiratory failure with hypoxia and hypercarbia        HPI:  This is a 69-year-old male with a past medical history of COPD (on 4 L O2), CAD, HFrEF (EF: 15%, GIDD), hypertension, type 2 diabetes, hyperlipidemia, tobacco use who presents with shortness of breath.     Patient presents for evaluation of dyspnea that started on the day of presentation.  He reports worsening shortness of breath associated with productive cough and chest tightness (which is now resolved).  He reports running out of a few of his medications as he was unable to get refills.  Per dispense report, patient has not filled any medications since August. Additionally, he still smokes with his last cigarette being a day prior. EMS administered Decadron 16 mg IV, magnesium sulfate IV and placed the patient on CPAP.    In the ED, the patient was tachycardic (100s-120s), tachypneic (20s-30s), hypoxic requiring BiPAP.  Labs remarkable for an elevated BNP (557), leukocytosis (12.8), normocytic anemia (11.5), hyperglycemia (256), elevated lactic acid (2.4), negative troponin (0.026).  EKG w/ ST depressions in lateral leads. VBG showed a pH of 7.19 > 7.34, pCO2 of 126.5 > 86.3.  Chest x-ray showed findings suggestive of pulmonary edema.  Patient was given Lasix 100 mg IV, DuoNeb x1.  He was admitted for further management.    Overview/Hospital Course:  69-year-old male with a past medical history of COPD (on 4 L O2), CAD, HFrEF (EF: 15%, GIDD), hypertension, type 2 diabetes, hyperlipidemia, tobacco use who presents with shortness of breath.  Admitted with acute on chronic respiratory failure secondary to CHF and COPD exacerbations.   Placed in ICU on Bipap.  Started on IV diuresis, nebs and steroids with improvement of symptoms and able to wean off Bipap.    Interval History: feeling better off Bipap.  Complaining of headache.    Review of Systems   HENT:  Negative for ear discharge and ear pain.    Eyes:  Negative for discharge and itching.   Endocrine: Negative for cold intolerance and heat intolerance.   Neurological:  Negative for seizures and syncope.     Objective:     Vital Signs (Most Recent):  Temp: 97.8 °F (36.6 °C) (03/04/25 0730)  Pulse: 80 (03/04/25 0744)  Resp: (!) 22 (03/04/25 0744)  BP: 136/78 (03/04/25 0730)  SpO2: 99 % (03/04/25 0744) Vital Signs (24h Range):  Temp:  [97.8 °F (36.6 °C)-98.1 °F (36.7 °C)] 97.8 °F (36.6 °C)  Pulse:  [] 80  Resp:  [5-35] 22  SpO2:  [89 %-100 %] 99 %  BP: (113-162)/(56-92) 136/78     Weight: 88.9 kg (196 lb)  Body mass index is 34.72 kg/m².    Intake/Output Summary (Last 24 hours) at 3/4/2025 1003  Last data filed at 3/4/2025 0950  Gross per 24 hour   Intake 360 ml   Output 2450 ml   Net -2090 ml         Physical Exam  Vitals reviewed.   Constitutional:       General: He is not in acute distress.     Appearance: He is not toxic-appearing.   HENT:      Head: Normocephalic and atraumatic.      Mouth/Throat:      Mouth: Mucous membranes are moist.      Pharynx: Oropharynx is clear.   Eyes:      Extraocular Movements: Extraocular movements intact.      Conjunctiva/sclera: Conjunctivae normal.   Cardiovascular:      Rate and Rhythm: Normal rate.      Pulses: Normal pulses.   Pulmonary:      Breath sounds: No stridor. No wheezing or rhonchi.   Skin:     General: Skin is warm and dry.      Capillary Refill: Capillary refill takes less than 2 seconds.   Neurological:      Mental Status: He is alert and oriented to person, place, and time.             Significant Labs: All pertinent labs within the past 24 hours have been reviewed.  BMP:   Recent Labs   Lab 03/04/25  1461   *      K 3.8    CL 87*   CO2 42*   BUN 8   CREATININE 0.8   CALCIUM 8.5*   MG 2.0     CBC:   Recent Labs   Lab 03/03/25  1920 03/04/25  0416   WBC 12.86* 11.04   HGB 11.5* 10.5*   HCT 37.1* 34.0*    204       Significant Imaging: I have reviewed all pertinent imaging results/findings within the past 24 hours.    Assessment and Plan     * Acute respiratory failure with hypoxia and hypercarbia  Patient with Hypercapnic and Hypoxic Respiratory failure which is Acute on chronic.  he is on home oxygen at 4-5 LPM. Supplemental oxygen was provided and noted- Oxygen Concentration (%):  [] 30  Signs/symptoms of respiratory failure include- increased work of breathing. Contributing diagnoses includes - CHF and COPD Labs and images were reviewed. Patient Has recent ABG, which has been reviewed. Will treat underlying causes and adjust management of respiratory failure as follows- Wean BiPAP as tolerated   Treated for COPD and CHF exacerbations with nebs, steroids and IV diuresis with great improvement of condition.'  Able to wean off Bipap.  Continue Bipap at night.  Transfer out of ICU.  Hopefully home soon.    Acute on chronic combined systolic and diastolic heart failure  Results for orders placed during the hospital encounter of 07/02/23    Echo    Interpretation Summary  · The left ventricle is severely enlarged with eccentric hypertrophy and severely decreased systolic function.  · The estimated ejection fraction is 15%.  · Grade I left ventricular diastolic dysfunction.  · Normal right ventricular size with normal right ventricular systolic function.  · Moderate left atrial enlargement.  · Intermediate central venous pressure (8 mmHg).    BNP  Recent Labs   Lab 03/03/25  1920   *       Transition to PO Diuresis.  Repeat Echo.      Chronic obstructive pulmonary disease with acute exacerbation  Patient's COPD is with exacerbation noted by continued dyspnea currently.  Patient is currently on COPD Pathway. Continue  scheduled inhalers Steroids and Supplemental oxygen and monitor respiratory status closely.     Uncontrolled type 2 diabetes mellitus with hyperglycemia  Lab Results   Component Value Date    HGBA1C 6.3 (H) 01/24/2024     Glycemic protocol. LDSS      Coronary artery disease involving native coronary artery of native heart without angina pectoris  Patient with known CAD s/p stent placement, which is controlled Will continue ASA, Plavix, and Statin and monitor for S/Sx of angina/ACS. Continue to monitor on telemetry.     Dyslipidemia  Continue statin       Benign essential hypertension  Patient's blood pressure range in the last 24 hours was: BP  Min: 113/69  Max: 162/78.The patient's inpatient anti-hypertensive regimen is listed below:  Current Antihypertensives  furosemide injection 60 mg, Every 12 hours, Intravenous    Plan  - BP is controlled, no changes needed to their regimen        VTE Risk Mitigation (From admission, onward)           Ordered     enoxaparin injection 40 mg  Daily         03/03/25 2233     Place sequential compression device  Until discontinued         03/03/25 2233     IP VTE HIGH RISK PATIENT  Once         03/03/25 2233     Place sequential compression device  Until discontinued         03/03/25 2233                    Discharge Planning   VERA:      Code Status: Full Code   Medical Readiness for Discharge Date:                      Iron Bledsoe MD  Department of Hospital Medicine   [facility] - Intensive Care

## 2025-03-04 NOTE — SUBJECTIVE & OBJECTIVE
Interval History: feeling better off Bipap.  Complaining of headache.    Review of Systems   HENT:  Negative for ear discharge and ear pain.    Eyes:  Negative for discharge and itching.   Endocrine: Negative for cold intolerance and heat intolerance.   Neurological:  Negative for seizures and syncope.     Objective:     Vital Signs (Most Recent):  Temp: 97.8 °F (36.6 °C) (03/04/25 0730)  Pulse: 80 (03/04/25 0744)  Resp: (!) 22 (03/04/25 0744)  BP: 136/78 (03/04/25 0730)  SpO2: 99 % (03/04/25 0744) Vital Signs (24h Range):  Temp:  [97.8 °F (36.6 °C)-98.1 °F (36.7 °C)] 97.8 °F (36.6 °C)  Pulse:  [] 80  Resp:  [5-35] 22  SpO2:  [89 %-100 %] 99 %  BP: (113-162)/(56-92) 136/78     Weight: 88.9 kg (196 lb)  Body mass index is 34.72 kg/m².    Intake/Output Summary (Last 24 hours) at 3/4/2025 1003  Last data filed at 3/4/2025 0950  Gross per 24 hour   Intake 360 ml   Output 2450 ml   Net -2090 ml         Physical Exam  Vitals reviewed.   Constitutional:       General: He is not in acute distress.     Appearance: He is not toxic-appearing.   HENT:      Head: Normocephalic and atraumatic.      Mouth/Throat:      Mouth: Mucous membranes are moist.      Pharynx: Oropharynx is clear.   Eyes:      Extraocular Movements: Extraocular movements intact.      Conjunctiva/sclera: Conjunctivae normal.   Cardiovascular:      Rate and Rhythm: Normal rate.      Pulses: Normal pulses.   Pulmonary:      Breath sounds: No stridor. No wheezing or rhonchi.   Skin:     General: Skin is warm and dry.      Capillary Refill: Capillary refill takes less than 2 seconds.   Neurological:      Mental Status: He is alert and oriented to person, place, and time.             Significant Labs: All pertinent labs within the past 24 hours have been reviewed.  BMP:   Recent Labs   Lab 03/04/25  0416   *      K 3.8   CL 87*   CO2 42*   BUN 8   CREATININE 0.8   CALCIUM 8.5*   MG 2.0     CBC:   Recent Labs   Lab 03/03/25  1920 03/04/25  6283    WBC 12.86* 11.04   HGB 11.5* 10.5*   HCT 37.1* 34.0*    204       Significant Imaging: I have reviewed all pertinent imaging results/findings within the past 24 hours.

## 2025-03-04 NOTE — SUBJECTIVE & OBJECTIVE
Past Medical History:   Diagnosis Date    CHF (congestive heart failure)     COPD (chronic obstructive pulmonary disease)     Coronary artery disease     Elevated cholesterol     on medication    Hypertension        Past Surgical History:   Procedure Laterality Date    CARDIAC SURGERY      coronary stent placed    CORONARY STENT PLACEMENT         Review of patient's allergies indicates:   Allergen Reactions    Contrast media Shortness Of Breath, Itching and Anxiety     Patient states that he had a bad reaction, anxiety , shortness of breath, itching .        Family History       Problem Relation (Age of Onset)    Cancer Mother    No Known Problems Father          Tobacco Use    Smoking status: Every Day     Current packs/day: 0.00     Average packs/day: 2.0 packs/day for 45.0 years (90.0 ttl pk-yrs)     Types: Cigarettes     Start date: 1972     Last attempt to quit: 2017     Years since quittin.3    Smokeless tobacco: Never   Substance and Sexual Activity    Alcohol use: Yes     Comment: socially    Drug use: No    Sexual activity: Yes     Partners: Female     Birth control/protection: None         Review of Systems   Constitutional:  Negative for chills and fever.   HENT:  Negative for sinus pressure, sinus pain and sore throat.    Respiratory:  Positive for cough, chest tightness and shortness of breath.    Cardiovascular:  Negative for chest pain and leg swelling.   Gastrointestinal:  Negative for diarrhea, nausea and vomiting.   Neurological:  Negative for syncope and light-headedness.   All other systems reviewed and are negative.    Objective:     Vital Signs (Most Recent):  Temp: 97.8 °F (36.6 °C) (25)  Pulse: 80 (25)  Resp: (!) 22 (25)  BP: 136/78 (25)  SpO2: 99 % (25) Vital Signs (24h Range):  Temp:  [97.8 °F (36.6 °C)-98.1 °F (36.7 °C)] 97.8 °F (36.6 °C)  Pulse:  [] 80  Resp:  [5-35] 22  SpO2:  [89 %-100 %] 99 %  BP:  (113-162)/(56-92) 136/78     Weight: 88.9 kg (196 lb)  Body mass index is 34.72 kg/m².      Intake/Output Summary (Last 24 hours) at 3/4/2025 0953  Last data filed at 3/4/2025 0950  Gross per 24 hour   Intake 360 ml   Output 2450 ml   Net -2090 ml        Physical Exam  Vitals reviewed.   Constitutional:       General: He is not in acute distress.     Appearance: He is not toxic-appearing.   HENT:      Head: Normocephalic and atraumatic.      Mouth/Throat:      Mouth: Mucous membranes are moist.      Pharynx: Oropharynx is clear.   Eyes:      Extraocular Movements: Extraocular movements intact.      Conjunctiva/sclera: Conjunctivae normal.   Cardiovascular:      Rate and Rhythm: Normal rate.      Pulses: Normal pulses.   Pulmonary:      Breath sounds: No stridor. No wheezing or rhonchi.   Skin:     General: Skin is warm and dry.      Capillary Refill: Capillary refill takes less than 2 seconds.   Neurological:      Mental Status: He is alert and oriented to person, place, and time.          Vents:  Oxygen Concentration (%): 30 (03/04/25 0742)    Lines/Drains/Airways       Peripheral Intravenous Line  Duration                  Midline Catheter - Single Lumen 03/04/25 0815 Right basilic vein (medial side of arm) 18g x 10cm <1 day         Peripheral IV - Single Lumen 03/04/25 0225 20 G Anterior;Distal;Right Upper Arm <1 day                    Significant Labs:    CBC/Anemia Profile:  Recent Labs   Lab 03/03/25 1920 03/04/25  0416   WBC 12.86* 11.04   HGB 11.5* 10.5*   HCT 37.1* 34.0*    204   MCV 94 93   RDW 13.0 13.0        Chemistries:  Recent Labs   Lab 03/03/25 1920 03/04/25  0416    140   K 3.9 3.8   CL 92* 87*   CO2 39* 42*   BUN 5* 8   CREATININE 0.8 0.8   CALCIUM 8.6* 8.5*   ALBUMIN 3.6  --    PROT 7.9  --    BILITOT 0.3  --    ALKPHOS 101  --    ALT 11  --    AST 9*  --    MG  --  2.0   PHOS  --  2.1*       All pertinent labs within the past 24 hours have been reviewed.    Significant Imaging:    I have reviewed all pertinent imaging results/findings within the past 24 hours.

## 2025-03-04 NOTE — ASSESSMENT & PLAN NOTE
Results for orders placed during the hospital encounter of 07/02/23    Echo    Interpretation Summary  · The left ventricle is severely enlarged with eccentric hypertrophy and severely decreased systolic function.  · The estimated ejection fraction is 15%.  · Grade I left ventricular diastolic dysfunction.  · Normal right ventricular size with normal right ventricular systolic function.  · Moderate left atrial enlargement.  · Intermediate central venous pressure (8 mmHg).    BNP  Recent Labs   Lab 03/03/25  1920   *       Transition to PO Diuresis.  Repeat Echo.

## 2025-03-04 NOTE — HOSPITAL COURSE
69-year-old male with a past medical history of COPD (on 4 L O2), CAD, HFrEF (EF: 15%, GIDD), hypertension, type 2 diabetes, hyperlipidemia, tobacco use who presents with shortness of breath.  Admitted with acute on chronic respiratory failure secondary to CHF and COPD exacerbations.  Placed in ICU on Bipap.  Started on IV diuresis, nebs and steroids with improvement of symptoms and able to wean off Bipap. Weaned to home NC.     Patient admits feeling significantly better.  States his shortness for breath at baseline.  Currently on home O2.  Trace edema in lower extremity has resolved. Pt denies any fever, headaches, vision changes, chest pain, palpitations, abdominal pain, nausea, vomiting, or any new weaknesses. Feels ready to go home. Patient's exam on discharge was as follow: Patient is alert and oriented, appears in no acute distress, heart with regular rate and rhythm, lungs with diminished breath sounds in lower lung fields, on nasal cannula, abdomen soft, and nontender.  Bilateral lower extremities without any edema or calf tenderness.     Patient was counseled regarding any abnormal labs, differential diagnosis, treatment options, risk-benefit, lifestyle changes, prognosis, current condition, and medications. Patient was interactive and attentive.  Patient's questions were answered in a respectful and timely manner. Patient was instructed to follow-up with PCP within 1 week and to continue taking medications as prescribed.  Instructed to also follow up with pulmonology and Cardiology outpatient. Also, extensively discussed the risks, benefits, and side effects of patient's medications. Discussed with patient about any medication changes. Patient verbalized understanding and agrees to treatment plan.  Patient is stable for discharge.  Patient has no other questions or concerns at this time.  ED precautions discussed with the patient.    Vital signs are stable. Ambulating without any difficulty. Tolerating  p.o. intake without any nausea or vomiting. Afebrile for over 24 hours. Patient is in stable condition and has no questions or concerns. Patient will be discharge to home once transportation secured . Prescriptions sent to pharmacy.  CM/SW to assist with discharge planning.     Vitals:    03/06/25 0704 03/06/25 0744 03/06/25 0803 03/06/25 1128   BP:  109/74  110/75   BP Location:       Patient Position:       Pulse: 84 80 82 87   Resp:  18 20 18   Temp:  98.2 °F (36.8 °C)  97.7 °F (36.5 °C)   TempSrc:  Oral  Oral   SpO2:  100% 98% 95%   Weight:       Height:

## 2025-03-04 NOTE — NURSING TRANSFER
Nursing Transfer Note      3/4/2025   3:05 PM    Nurse giving handoff:Meenakshi  Nurse receiving handoff:Diana    Reason patient is being transferred: order    Transfer From: ICU    Transfer via wheelchair    Transfer with Cardiac monitoring and O2    Transported by Nurse    Transfer Vital Signs:  Blood Pressure:114/80  Heart Rate:106  O2:95  Temperature:98.5  Respirations:28    Telemetry: Box Number [id number]  Order for Tele Monitor? Yes    Additional Lines: Oxygen    Medicines sent: yes    Any special needs or follow-up needed: na    Patient belongings transferred with patient: Yes    Chart send with patient: Yes    Notified: daughter    Patient reassessed at: 03/04/2025 (date, time)  1  Upon arrival to floor: cardiac monitor applied, patient oriented to room, call bell in reach, and bed in lowest position

## 2025-03-04 NOTE — NURSING
[facility], [facility]  Nurses Note -- 4 Eyes      3/4/2025       Skin assessed on: Q Shift      [x] No Pressure Injuries Present    [x]Prevention Measures Documented    [] Yes LDA  for Pressure Injury Previously documented     [] Yes New Pressure Injury Discovered   [] LDA for New Pressure Injury Added      Attending RN:  Melissa Darden RN     Second RN:  CHARLI Coulter

## 2025-03-04 NOTE — EICU
EICU BRIEF INITIAL EVALUATION:    Code Status: Full Code     HISTORY:      69-year-old man admitted to the ICU on continuous BiPAP for acute hypoxemic, hypercapnic respiratory failure.    History of COPD on home oxygen, coronary artery disease with stent, combined CHF with reported 15% ejection fraction, hypertension, type 2 diabetes, hyperlipidemia, tobacco use    DVT prophylaxis Lovenox, SCDs   GI prophylaxis famotidine    /73, P 91, RR 23, O2 sat 100,   Resting comfortably on BiPAP 16/5, rate 12, 30%, tidal volume approximately 360      CAMERA ASSESSMENT: Yes      TELEMETRY: Reviewed      NOTES: Reviewed     LABS: Reviewed      IMAGING: Personally reviewed.      DISCUSSED with bedside nurse        ASSESSMENT AND PLAN:       Acute on chronic hypoxemic, hypercapnic respiratory failure-continue BiPAP, wean as tolerated.  Continue steroids, respiratory treatments    CHF-consider repeat echocardiogram     Poor IV access-midline ordered      I have reviewed the plan of care for the patient and agree with the plan of care unless noted otherwise above.      AZRA Conrad MD  eICU Attending  [phone]    This report has been created through the use of M-Modal dictation software. Typographical and content errors may occur with this process. While efforts are made to detect and correct such errors, in some cases errors will persist. For this reason, wording in this document should be considered in the proper context and not strictly verbatim.

## 2025-03-04 NOTE — ASSESSMENT & PLAN NOTE
Results for orders placed during the hospital encounter of 07/02/23    Echo    Interpretation Summary  · The left ventricle is severely enlarged with eccentric hypertrophy and severely decreased systolic function.  · The estimated ejection fraction is 15%.  · Grade I left ventricular diastolic dysfunction.  · Normal right ventricular size with normal right ventricular systolic function.  · Moderate left atrial enlargement.  · Intermediate central venous pressure (8 mmHg).    BNP  Recent Labs   Lab 03/03/25  1920   *       Continue Lasix 60 mg IV BID  Monitor I/Os, daily weights

## 2025-03-04 NOTE — ASSESSMENT & PLAN NOTE
Per chart review PFT in 2017 with moderate restriction with reduced DLCO.  Previous CT chest imaging with emphysema.    - can treat for COPD exacerbation with pred 40 mg x 5 days and scheduled nebs  - on discharge would prescribe LAMA/LABA/ICS  - referral to pulmonary clinic, has not seen in several years  - smoking cessation advised

## 2025-03-04 NOTE — ED TRIAGE NOTES
Pt presents to ED via EMS d.t SOB. Pt reprots that he's been SOB since yesterday and just feeling really bad overall. Pt is tripod sitting, tachycardic, tachypneic with labored breathing. Pt denies chest pain, N/V/D, or abd pain. Pt is alert, oriented to self, situation, and place. Unable to state correct current year but aware that it is march.  Pt reports hx of CHF and COPD

## 2025-03-04 NOTE — NURSING
Received patient from ICU to room 341 via wc. Patient accompanied by transport. Transferred patient to bed. Evaluated general patient appearance and condition.  Tele monitoring in progress.Midline and saline lock intact. 4L O2 nc in use.No apparent distress noted at this time.  [facility], [facility]  Nurses Note -- 4 Eyes      3/4/2025       Skin assessed on: Transfer      [x] No Pressure Injuries Present    [x]Prevention Measures Documented    [] Yes LDA  for Pressure Injury Previously documented     [] Yes New Pressure Injury Discovered   [] LDA for New Pressure Injury Added      Attending RN:  Diana Grullon, RN     Second RN:  Meenakshi ESCOBAR RN

## 2025-03-04 NOTE — CONSULTS
[facility]  Critical Care Medicine  Consult Note    Patient Name: Abelardo Irene Jr.  MRN: [id number]  Admission Date: 3/3/2025  Hospital Length of Stay: 1 days  Code Status: Full Code  Attending Physician: Rakan  Primary Care Provider: Rolando Funes MD   Principal Problem: Acute respiratory failure with hypoxia and hypercarbia    Inpatient consult to Pulmonology  Consult performed by: Martha Knapp MD  Consult ordered by: David Vanessa MD  Reason for consult: respiratory failure        Subjective:     HPI:  Abelardo Irene is a 69 year old man with PMH COPD (4L NC at home), combined HF (EF 15%, GIDD), HTN, T2DM, tobacco abuse who was admitted to [facility] overnight 3/3/25 for acute on chronic hypoxic and hypercapnic respiratory failure. Reports increased dyspnea from baseline beginning on day of hospital admission. Also noted chest tightness and cough productive of white sputum. He states he ran out of several meds (unclear which) a few weeks ago and was unable to get refills. In ER he was placed on BIPAP for increased WOB. Bloodwork notable for VBG 7.19/126, , LA 2.4, hyperglycemia to 256. CXR with pulmonary edema. He was given lasix and steroids and transferred to ICU on BIPAP. Pulmonary consulted for respiratory failure. Patient diuresed 2L overnight and was weaned to 4L NC this am.     Hospital/ICU Course:  No notes on file    Past Medical History:   Diagnosis Date    CHF (congestive heart failure)     COPD (chronic obstructive pulmonary disease)     Coronary artery disease     Elevated cholesterol     on medication    Hypertension        Past Surgical History:   Procedure Laterality Date    CARDIAC SURGERY      coronary stent placed    CORONARY STENT PLACEMENT         Review of patient's allergies indicates:   Allergen Reactions    Contrast media Shortness Of Breath, Itching and Anxiety     Patient states that he had a bad reaction, anxiety , shortness of breath, itching .        Family  History       Problem Relation (Age of Onset)    Cancer Mother    No Known Problems Father          Tobacco Use    Smoking status: Every Day     Current packs/day: 0.00     Average packs/day: 2.0 packs/day for 45.0 years (90.0 ttl pk-yrs)     Types: Cigarettes     Start date: 1972     Last attempt to quit: 2017     Years since quittin.3    Smokeless tobacco: Never   Substance and Sexual Activity    Alcohol use: Yes     Comment: socially    Drug use: No    Sexual activity: Yes     Partners: Female     Birth control/protection: None         Review of Systems   Constitutional:  Negative for chills and fever.   HENT:  Negative for sinus pressure, sinus pain and sore throat.    Respiratory:  Positive for cough, chest tightness and shortness of breath.    Cardiovascular:  Negative for chest pain and leg swelling.   Gastrointestinal:  Negative for diarrhea, nausea and vomiting.   Neurological:  Negative for syncope and light-headedness.   All other systems reviewed and are negative.    Objective:     Vital Signs (Most Recent):  Temp: 97.8 °F (36.6 °C) (25)  Pulse: 80 (25)  Resp: (!) 22 (25)  BP: 136/78 (25)  SpO2: 99 % (25) Vital Signs (24h Range):  Temp:  [97.8 °F (36.6 °C)-98.1 °F (36.7 °C)] 97.8 °F (36.6 °C)  Pulse:  [] 80  Resp:  [5-35] 22  SpO2:  [89 %-100 %] 99 %  BP: (113-162)/(56-92) 136/78     Weight: 88.9 kg (196 lb)  Body mass index is 34.72 kg/m².      Intake/Output Summary (Last 24 hours) at 3/4/2025 0913  Last data filed at 3/4/2025 0950  Gross per 24 hour   Intake 360 ml   Output 2450 ml   Net -2090 ml        Physical Exam  Vitals reviewed.   Constitutional:       General: He is not in acute distress.     Appearance: He is not toxic-appearing.   HENT:      Head: Normocephalic and atraumatic.      Mouth/Throat:      Mouth: Mucous membranes are moist.      Pharynx: Oropharynx is clear.   Eyes:      Extraocular Movements: Extraocular  movements intact.      Conjunctiva/sclera: Conjunctivae normal.   Cardiovascular:      Rate and Rhythm: Normal rate.      Pulses: Normal pulses.   Pulmonary:      Breath sounds: No stridor. No wheezing or rhonchi.   Skin:     General: Skin is warm and dry.      Capillary Refill: Capillary refill takes less than 2 seconds.   Neurological:      Mental Status: He is alert and oriented to person, place, and time.          Vents:  Oxygen Concentration (%): 30 (03/04/25 0742)    Lines/Drains/Airways       Peripheral Intravenous Line  Duration                  Midline Catheter - Single Lumen 03/04/25 0815 Right basilic vein (medial side of arm) 18g x 10cm <1 day         Peripheral IV - Single Lumen 03/04/25 0225 20 G Anterior;Distal;Right Upper Arm <1 day                    Significant Labs:    CBC/Anemia Profile:  Recent Labs   Lab 03/03/25 1920 03/04/25  0416   WBC 12.86* 11.04   HGB 11.5* 10.5*   HCT 37.1* 34.0*    204   MCV 94 93   RDW 13.0 13.0        Chemistries:  Recent Labs   Lab 03/03/25 1920 03/04/25  0416    140   K 3.9 3.8   CL 92* 87*   CO2 39* 42*   BUN 5* 8   CREATININE 0.8 0.8   CALCIUM 8.6* 8.5*   ALBUMIN 3.6  --    PROT 7.9  --    BILITOT 0.3  --    ALKPHOS 101  --    ALT 11  --    AST 9*  --    MG  --  2.0   PHOS  --  2.1*       All pertinent labs within the past 24 hours have been reviewed.    Significant Imaging:   I have reviewed all pertinent imaging results/findings within the past 24 hours.    ABG  Recent Labs   Lab 03/03/25  2349   PH 7.316*   PO2 58*   PCO2 93.3*   HCO3 47.6*   BE 17*     Assessment/Plan:     Pulmonary  * Acute respiratory failure with hypoxia and hypercarbia  In the setting of decompensated HF, possibly COPD contributing. VBG with 7.19/126. CXR with pulmonary edema and elevated BNP to 500s. Required BIPAP on ICU admission.   - weaned down to his home 4L NC this am  - continue aggressive diuresis  - can treat for possible COPD exacerbation with prednisone 40 mg x 5  days and scheduled nebs    Chronic obstructive pulmonary disease with acute exacerbation  Per chart review PFT in 2017 with moderate restriction with reduced DLCO.  Previous CT chest imaging with emphysema.    - can treat for COPD exacerbation with pred 40 mg x 5 days and scheduled nebs  - on discharge would prescribe LAMA/LABA/ICS  - referral to pulmonary clinic, has not seen in several years  - smoking cessation advised    Cardiac/Vascular  Acute on chronic combined systolic and diastolic heart failure  Likely driving this episode of AHRF. TTE with EF 15% and grade I DD.   - continue aggressive diuresis  - nightly BIPAP  - needs to be started on GDMT for his HF  - referral to cardiology clinic       Feeding: diabetic diet  Analgesia: tylenol prn  Sedation: none  Thrombo PPX: lovenox  Head of Bed: > 30 degrees  Ulcer PPX: N/A  Glucose: goal 140-180s, SSI  SBT/SAT: N/A  Bowel Regimen: none  Indwelling Lines: PIV  Deescalation Abx: N/A    Patient stepping down from ICU today. PCCM will sign off, please reach out with any questions, concerns, or changes to clinical status.      Martha Knapp MD  Critical Care Medicine  [facility] [place] [facility]

## 2025-03-04 NOTE — NURSING
Report received from Meenakshi in [facility]- tele box [id number] sent to ICU for transport, joselo Harris notified.

## 2025-03-04 NOTE — PROVIDER TRANSFER
Transfer Note    69-year-old male with a past medical history of COPD (on 4 L O2), CAD, HFrEF (EF: 15%, GIDD), hypertension, type 2 diabetes, hyperlipidemia, tobacco use who presents with shortness of breath. Admitted with acute on chronic respiratory failure secondary to CHF and COPD exacerbations. Placed in ICU on Bipap. Started on IV diuresis, nebs and steroids with improvement of symptoms and able to wean off Bipap.  Close to baseline.  Move out of ICU and hopefully home soon.

## 2025-03-04 NOTE — ASSESSMENT & PLAN NOTE
Lab Results   Component Value Date    HGBA1C 6.3 (H) 01/24/2024     Glycemic protocol. [facility]

## 2025-03-04 NOTE — ASSESSMENT & PLAN NOTE
Patient's blood pressure range in the last 24 hours was: BP  Min: 127/74  Max: 147/70.The patient's inpatient anti-hypertensive regimen is listed below:  Current Antihypertensives  nitroGLYCERIN 2% TD oint ointment 2 inch, ED 1 Time, Topical (Top)  furosemide injection 60 mg, Every 12 hours, Intravenous    Plan  - BP is controlled, no changes needed to their regimen

## 2025-03-04 NOTE — ASSESSMENT & PLAN NOTE
Patient with Hypercapnic and Hypoxic Respiratory failure which is Acute on chronic.  he is on home oxygen at 4-5 LPM. Supplemental oxygen was provided and noted- Oxygen Concentration (%):  [] 30  Signs/symptoms of respiratory failure include- increased work of breathing. Contributing diagnoses includes - CHF and COPD Labs and images were reviewed. Patient Has recent ABG, which has been reviewed. Will treat underlying causes and adjust management of respiratory failure as follows- Wean BiPAP as tolerated   Treated for COPD and CHF exacerbations with nebs, steroids and IV diuresis with great improvement of condition.'  Able to wean off Bipap.  Continue Bipap at night.  Transfer out of ICU.  Hopefully home soon.

## 2025-03-04 NOTE — SUBJECTIVE & OBJECTIVE
Past Medical History:   Diagnosis Date    CHF (congestive heart failure)     COPD (chronic obstructive pulmonary disease)     Coronary artery disease     Elevated cholesterol     on medication    Hypertension        Past Surgical History:   Procedure Laterality Date    CARDIAC SURGERY      coronary stent placed    CORONARY STENT PLACEMENT         Review of patient's allergies indicates:   Allergen Reactions    Contrast media Shortness Of Breath, Itching and Anxiety     Patient states that he had a bad reaction, anxiety , shortness of breath, itching .        No current facility-administered medications on file prior to encounter.     Current Outpatient Medications on File Prior to Encounter   Medication Sig    albuterol-ipratropium (DUO-NEB) 2.5 mg-0.5 mg/3 mL nebulizer solution Take 3 mLs by nebulization every 6 (six) hours while awake. Rescue    amoxicillin-clavulanate 875-125mg (AUGMENTIN) 875-125 mg per tablet Take 1 tablet by mouth every 12 (twelve) hours.    aspirin (ECOTRIN) 81 MG EC tablet Take 1 tablet (81 mg total) by mouth once daily.    atorvastatin (LIPITOR) 40 MG tablet Take 1 tablet (40 mg total) by mouth once daily.    clopidogreL (PLAVIX) 75 mg tablet Take 1 tablet (75 mg total) by mouth once daily.    furosemide (LASIX) 40 MG tablet Take 1 tablet (40 mg total) by mouth 2 (two) times daily.    metoprolol tartrate (LOPRESSOR) 25 MG tablet Take 0.5 tablets (12.5 mg total) by mouth 2 (two) times daily.    predniSONE (DELTASONE) 20 MG tablet Take 1 tablet (20 mg total) by mouth once daily.     Family History       Problem Relation (Age of Onset)    Cancer Mother    No Known Problems Father          Tobacco Use    Smoking status: Every Day     Current packs/day: 0.00     Average packs/day: 2.0 packs/day for 45.0 years (90.0 ttl pk-yrs)     Types: Cigarettes     Start date: 1972     Last attempt to quit: 2017     Years since quittin.3    Smokeless tobacco: Never   Substance and Sexual  Activity    Alcohol use: Yes     Comment: socially    Drug use: No    Sexual activity: Yes     Partners: Female     Birth control/protection: None     Review of Systems   Respiratory:  Positive for cough and shortness of breath.    Cardiovascular: Negative.    Gastrointestinal: Negative.    Genitourinary: Negative.    Musculoskeletal: Negative.    Neurological: Negative.      Objective:     Vital Signs (Most Recent):  Temp: 98.1 °F (36.7 °C) (03/03/25 2136)  Pulse: 97 (03/03/25 2200)  Resp: 19 (03/03/25 2200)  BP: 127/74 (03/03/25 2200)  SpO2: (!) 89 % (03/03/25 2200) Vital Signs (24h Range):  Temp:  [98.1 °F (36.7 °C)] 98.1 °F (36.7 °C)  Pulse:  [] 97  Resp:  [19-31] 19  SpO2:  [89 %-100 %] 89 %  BP: (127-147)/(62-92) 127/74        There is no height or weight on file to calculate BMI.     Physical Exam  Vitals and nursing note reviewed.   Constitutional:       General: He is not in acute distress.     Appearance: He is not ill-appearing.   HENT:      Mouth/Throat:      Mouth: Mucous membranes are moist.   Cardiovascular:      Rate and Rhythm: Normal rate.   Pulmonary:      Effort: Pulmonary effort is normal.      Breath sounds: Wheezing and rales present.   Abdominal:      General: Abdomen is flat.   Skin:     General: Skin is warm.   Neurological:      General: No focal deficit present.      Mental Status: He is alert.                Significant Labs: All pertinent labs within the past 24 hours have been reviewed.    Significant Imaging: I have reviewed all pertinent imaging results/findings within the past 24 hours.

## 2025-03-04 NOTE — H&P
[facility]  [facility] Medicine  History & Physical    Patient Name: Abelardo Irene Jr.  MRN: [id number]  Patient Class: IP- Inpatient  Admission Date: 3/3/2025  Attending Physician: Guanako Rowell MD   Primary Care Provider: Rolando Funes MD         Patient information was obtained from patient and ER records.     Subjective:     Principal Problem:Acute respiratory failure with hypoxia and hypercarbia    Chief Complaint:   Chief Complaint   Patient presents with    Shortness of Breath     BIB [facility] for SOB on cpap        HPI: This is a 69-year-old male with a past medical history of COPD (on 4 L O2), CAD, HFrEF (EF: 15%, GIDD), hypertension, type 2 diabetes, hyperlipidemia, tobacco use who presents with shortness of breath.     Patient presents for evaluation of dyspnea that started on the day of presentation.  He reports worsening shortness of breath associated with productive cough and chest tightness (which is now resolved).  He reports running out of a few of his medications as he was unable to get refills.  Per dispense report, patient has not filled any medications since August. Additionally, he still smokes with his last cigarette being a day prior. EMS administered Decadron 16 mg IV, magnesium sulfate IV and placed the patient on CPAP.    In the ED, the patient was tachycardic (100s-120s), tachypneic (20s-30s), hypoxic requiring BiPAP.  Labs remarkable for an elevated BNP (557), leukocytosis (12.8), normocytic anemia (11.5), hyperglycemia (256), elevated lactic acid (2.4), negative troponin (0.026).  EKG w/ ST depressions in lateral leads. VBG showed a pH of 7.19 > 7.34, pCO2 of 126.5 > 86.3.  Chest x-ray showed findings suggestive of pulmonary edema.  Patient was given Lasix 100 mg IV, DuoNeb x1.  He was admitted for further management.    Past Medical History:   Diagnosis Date    CHF (congestive heart failure)     COPD (chronic obstructive pulmonary disease)     Coronary artery  disease     Elevated cholesterol     on medication    Hypertension        Past Surgical History:   Procedure Laterality Date    CARDIAC SURGERY      coronary stent placed    CORONARY STENT PLACEMENT         Review of patient's allergies indicates:   Allergen Reactions    Contrast media Shortness Of Breath, Itching and Anxiety     Patient states that he had a bad reaction, anxiety , shortness of breath, itching .        No current facility-administered medications on file prior to encounter.     Current Outpatient Medications on File Prior to Encounter   Medication Sig    albuterol-ipratropium (DUO-NEB) 2.5 mg-0.5 mg/3 mL nebulizer solution Take 3 mLs by nebulization every 6 (six) hours while awake. Rescue    amoxicillin-clavulanate 875-125mg (AUGMENTIN) 875-125 mg per tablet Take 1 tablet by mouth every 12 (twelve) hours.    aspirin (ECOTRIN) 81 MG EC tablet Take 1 tablet (81 mg total) by mouth once daily.    atorvastatin (LIPITOR) 40 MG tablet Take 1 tablet (40 mg total) by mouth once daily.    clopidogreL (PLAVIX) 75 mg tablet Take 1 tablet (75 mg total) by mouth once daily.    furosemide (LASIX) 40 MG tablet Take 1 tablet (40 mg total) by mouth 2 (two) times daily.    metoprolol tartrate (LOPRESSOR) 25 MG tablet Take 0.5 tablets (12.5 mg total) by mouth 2 (two) times daily.    predniSONE (DELTASONE) 20 MG tablet Take 1 tablet (20 mg total) by mouth once daily.     Family History       Problem Relation (Age of Onset)    Cancer Mother    No Known Problems Father          Tobacco Use    Smoking status: Every Day     Current packs/day: 0.00     Average packs/day: 2.0 packs/day for 45.0 years (90.0 ttl pk-yrs)     Types: Cigarettes     Start date: 1972     Last attempt to quit: 2017     Years since quittin.3    Smokeless tobacco: Never   Substance and Sexual Activity    Alcohol use: Yes     Comment: socially    Drug use: No    Sexual activity: Yes     Partners: Female     Birth control/protection: None      Review of Systems   Respiratory:  Positive for cough and shortness of breath.    Cardiovascular: Negative.    Gastrointestinal: Negative.    Genitourinary: Negative.    Musculoskeletal: Negative.    Neurological: Negative.      Objective:     Vital Signs (Most Recent):  Temp: 98.1 °F (36.7 °C) (03/03/25 2136)  Pulse: 97 (03/03/25 2200)  Resp: 19 (03/03/25 2200)  BP: 127/74 (03/03/25 2200)  SpO2: (!) 89 % (03/03/25 2200) Vital Signs (24h Range):  Temp:  [98.1 °F (36.7 °C)] 98.1 °F (36.7 °C)  Pulse:  [] 97  Resp:  [19-31] 19  SpO2:  [89 %-100 %] 89 %  BP: (127-147)/(62-92) 127/74        There is no height or weight on file to calculate BMI.     Physical Exam  Vitals and nursing note reviewed.   Constitutional:       General: He is not in acute distress.     Appearance: He is not ill-appearing.   HENT:      Mouth/Throat:      Mouth: Mucous membranes are moist.   Cardiovascular:      Rate and Rhythm: Normal rate.   Pulmonary:      Effort: Pulmonary effort is normal.      Breath sounds: Wheezing and rales present.   Abdominal:      General: Abdomen is flat.   Skin:     General: Skin is warm.   Neurological:      General: No focal deficit present.      Mental Status: He is alert.                Significant Labs: All pertinent labs within the past 24 hours have been reviewed.    Significant Imaging: I have reviewed all pertinent imaging results/findings within the past 24 hours.  Assessment/Plan:     * Acute respiratory failure with hypoxia and hypercarbia  Patient with Hypercapnic and Hypoxic Respiratory failure which is Acute on chronic.  he is on home oxygen at 4-5 LPM. Supplemental oxygen was provided and noted- Oxygen Concentration (%):  [] 30    .   Signs/symptoms of respiratory failure include- increased work of breathing. Contributing diagnoses includes - CHF and COPD Labs and images were reviewed. Patient Has recent ABG, which has been reviewed. Will treat underlying causes and adjust management of  respiratory failure as follows- Wean BiPAP as tolerated     Acute on chronic combined systolic and diastolic heart failure  Results for orders placed during the hospital encounter of 07/02/23    Echo    Interpretation Summary  · The left ventricle is severely enlarged with eccentric hypertrophy and severely decreased systolic function.  · The estimated ejection fraction is 15%.  · Grade I left ventricular diastolic dysfunction.  · Normal right ventricular size with normal right ventricular systolic function.  · Moderate left atrial enlargement.  · Intermediate central venous pressure (8 mmHg).    BNP  Recent Labs   Lab 03/03/25  1920   *       Continue Lasix 60 mg IV BID  Monitor I/Os, daily weights       Chronic obstructive pulmonary disease with acute exacerbation  Patient's COPD is with exacerbation noted by continued dyspnea currently.  Patient is currently on COPD Pathway. Continue scheduled inhalers Steroids and Supplemental oxygen and monitor respiratory status closely.     Uncontrolled type 2 diabetes mellitus with hyperglycemia  Lab Results   Component Value Date    HGBA1C 6.3 (H) 01/24/2024     Glycemic protocol. LDSS      Coronary artery disease involving native coronary artery of native heart without angina pectoris  Patient with known CAD s/p stent placement, which is controlled Will continue ASA, Plavix, and Statin and monitor for S/Sx of angina/ACS. Continue to monitor on telemetry.     Dyslipidemia  Continue statin       Benign essential hypertension  Patient's blood pressure range in the last 24 hours was: BP  Min: 127/74  Max: 147/70.The patient's inpatient anti-hypertensive regimen is listed below:  Current Antihypertensives  nitroGLYCERIN 2% TD oint ointment 2 inch, ED 1 Time, Topical (Top)  furosemide injection 60 mg, Every 12 hours, Intravenous    Plan  - BP is controlled, no changes needed to their regimen        VTE Risk Mitigation (From admission, onward)           Ordered      enoxaparin injection 40 mg  Daily         03/03/25 2233     Place sequential compression device  Until discontinued         03/03/25 2233     IP VTE HIGH RISK PATIENT  Once         03/03/25 2233     Place sequential compression device  Until discontinued         03/03/25 2233                    Critical care time spent on the evaluation and treatment of severe organ dysfunction, review of pertinent labs and imaging studies, discussions with consulting providers and discussions with patient/family: 45 minutes.         Nolan Najera MD  Department of Hospital Medicine  [facility] [place] [facility]

## 2025-03-04 NOTE — NURSING
Patient arrived to unit on BiPAP, AOx3 disoriented to time. Patient resting comfortable in bed with call light within reach, bed in low position, locked, side rails x3.

## 2025-03-04 NOTE — PLAN OF CARE
Patient remains in ICU on 30% FiO2 BiPAP, poor venous access managed to obtain one IV using ultrasound, Dr. Conrad placed order for midline,  discussed POC with patient.     Problem: Adult Inpatient Plan of Care  Goal: Plan of Care Review  Outcome: Progressing     Problem: Adult Inpatient Plan of Care  Goal: Patient-Specific Goal (Individualized)  Outcome: Progressing     Problem: Adult Inpatient Plan of Care  Goal: Absence of Hospital-Acquired Illness or Injury  Outcome: Progressing     Problem: COPD (Chronic Obstructive Pulmonary Disease)  Goal: Optimal Chronic Illness Coping  Outcome: Progressing     Problem: COPD (Chronic Obstructive Pulmonary Disease)  Goal: Effective Oxygenation and Ventilation  Outcome: Progressing

## 2025-03-04 NOTE — EICU
Intervention Initiated From:  Bedside    Jasmine intervened regarding:  Documentation    Comments: Called in for midline catheter placement. Consent confirmed with bedside team. Practitioner verification completed. Time out done using proper pt identifiers. Midline placed to (R) arm using u/s guiidance.  Good blood return noted from port. Line flush easily. Pt tolerated procedure well.

## 2025-03-04 NOTE — NURSING
[facility]  Nurses Note -- 4 Eyes      3/4/2025       Skin assessed on: Q Shift      [x] No Pressure Injuries Present    [x]Prevention Measures Documented    [] Yes LDA  for Pressure Injury Previously documented     [] Yes New Pressure Injury Discovered   [] LDA for New Pressure Injury Added      Attending RN:  Meenakshi Ozuna, RN     Second RN:  Melissa

## 2025-03-05 ENCOUNTER — DOCUMENTATION ONLY (OUTPATIENT)
Facility: CLINIC | Age: 70
End: 2025-03-05
Payer: MEDICARE

## 2025-03-05 DIAGNOSIS — R06.02 SOB (SHORTNESS OF BREATH): Primary | ICD-10-CM

## 2025-03-05 PROBLEM — J96.21 ACUTE ON CHRONIC RESPIRATORY FAILURE WITH HYPOXIA AND HYPERCAPNIA: Status: ACTIVE | Noted: 2023-03-06

## 2025-03-05 PROBLEM — D64.9 ANEMIA: Status: ACTIVE | Noted: 2025-03-05

## 2025-03-05 PROBLEM — J96.22 ACUTE ON CHRONIC RESPIRATORY FAILURE WITH HYPOXIA AND HYPERCAPNIA: Status: ACTIVE | Noted: 2023-03-06

## 2025-03-05 PROBLEM — E66.811 CLASS 1 OBESITY WITH SERIOUS COMORBIDITY AND BODY MASS INDEX (BMI) OF 34.0 TO 34.9 IN ADULT: Status: ACTIVE | Noted: 2021-02-13

## 2025-03-05 PROBLEM — E83.39 HYPOPHOSPHATEMIA: Status: ACTIVE | Noted: 2025-03-05

## 2025-03-05 LAB
ANION GAP SERPL CALC-SCNC: 9 MMOL/L (ref 8–16)
ASCENDING AORTA: 3.47 CM
AV INDEX (PROSTH): 0.69
AV MEAN GRADIENT: 3 MMHG
AV PEAK GRADIENT: 5 MMHG
AV VALVE AREA BY VELOCITY RATIO: 3.3 CM²
AV VALVE AREA: 3.1 CM²
AV VELOCITY RATIO: 0.73
BASOPHILS # BLD AUTO: 0.03 K/UL (ref 0–0.2)
BASOPHILS NFR BLD: 0.2 % (ref 0–1.9)
BSA FOR ECHO PROCEDURE: 1.99 M2
BUN SERPL-MCNC: 19 MG/DL (ref 8–23)
CALCIUM SERPL-MCNC: 9 MG/DL (ref 8.7–10.5)
CHLORIDE SERPL-SCNC: 94 MMOL/L (ref 95–110)
CO2 SERPL-SCNC: 35 MMOL/L (ref 23–29)
CREAT SERPL-MCNC: 0.7 MG/DL (ref 0.5–1.4)
CV ECHO LV RWT: 0.34 CM
DIFFERENTIAL METHOD BLD: ABNORMAL
DOP CALC AO PEAK VEL: 1.1 M/S
DOP CALC AO VTI: 16.7 CM
DOP CALC LVOT AREA: 4.5 CM2
DOP CALC LVOT DIAMETER: 2.4 CM
DOP CALC LVOT PEAK VEL: 0.8 M/S
DOP CALC LVOT STROKE VOLUME: 52.5 CM3
DOP CALCLVOT PEAK VEL VTI: 11.6 CM
E/E' RATIO: 22 M/S
ECHO LV POSTERIOR WALL: 1.1 CM (ref 0.6–1.1)
EOSINOPHIL # BLD AUTO: 0 K/UL (ref 0–0.5)
EOSINOPHIL NFR BLD: 0 % (ref 0–8)
ERYTHROCYTE [DISTWIDTH] IN BLOOD BY AUTOMATED COUNT: 13.2 % (ref 11.5–14.5)
EST. GFR  (NO RACE VARIABLE): >60 ML/MIN/1.73 M^2
FRACTIONAL SHORTENING: 10.8 % (ref 28–44)
GLUCOSE SERPL-MCNC: 139 MG/DL (ref 70–110)
HCT VFR BLD AUTO: 35.4 % (ref 40–54)
HGB BLD-MCNC: 11 G/DL (ref 14–18)
IMM GRANULOCYTES # BLD AUTO: 0.09 K/UL (ref 0–0.04)
IMM GRANULOCYTES NFR BLD AUTO: 0.5 % (ref 0–0.5)
INTERVENTRICULAR SEPTUM: 1.2 CM (ref 0.6–1.1)
IVC DIAMETER: 1.75 CM
IVRT: 124 MSEC
LA MAJOR: 6.6 CM
LA MINOR: 7.6 CM
LA WIDTH: 5 CM
LEFT ATRIUM SIZE: 5.7 CM
LEFT ATRIUM VOLUME INDEX: 89 ML/M2
LEFT ATRIUM VOLUME: 171 CM3
LEFT INTERNAL DIMENSION IN SYSTOLE: 5.8 CM (ref 2.1–4)
LEFT VENTRICLE DIASTOLIC VOLUME INDEX: 111.46 ML/M2
LEFT VENTRICLE DIASTOLIC VOLUME: 214 ML
LEFT VENTRICLE MASS INDEX: 176.6 G/M2
LEFT VENTRICLE SYSTOLIC VOLUME INDEX: 85.9 ML/M2
LEFT VENTRICLE SYSTOLIC VOLUME: 165 ML
LEFT VENTRICULAR INTERNAL DIMENSION IN DIASTOLE: 6.5 CM (ref 3.5–6)
LEFT VENTRICULAR MASS: 339.1 G
LV LATERAL E/E' RATIO: 18.7 M/S
LV SEPTAL E/E' RATIO: 28 M/S
LVED V (TEICH): 213.98 ML
LVES V (TEICH): 165.28 ML
LVOT MG: 1.58 MMHG
LVOT MV: 0.6 CM/S
LYMPHOCYTES # BLD AUTO: 2.1 K/UL (ref 1–4.8)
LYMPHOCYTES NFR BLD: 11.5 % (ref 18–48)
MAGNESIUM SERPL-MCNC: 2 MG/DL (ref 1.6–2.6)
MCH RBC QN AUTO: 28.3 PG (ref 27–31)
MCHC RBC AUTO-ENTMCNC: 31.1 G/DL (ref 32–36)
MCV RBC AUTO: 91 FL (ref 82–98)
MONOCYTES # BLD AUTO: 1.7 K/UL (ref 0.3–1)
MONOCYTES NFR BLD: 9.4 % (ref 4–15)
MV PEAK E VEL: 1.12 M/S
NEUTROPHILS # BLD AUTO: 14.5 K/UL (ref 1.8–7.7)
NEUTROPHILS NFR BLD: 78.4 % (ref 38–73)
NRBC BLD-RTO: 0 /100 WBC
OHS CV RV/LV RATIO: 0.62 CM
PHOSPHATE SERPL-MCNC: 1.8 MG/DL (ref 2.7–4.5)
PISA TR MAX VEL: 1.8 M/S
PLATELET # BLD AUTO: 232 K/UL (ref 150–450)
PMV BLD AUTO: 11 FL (ref 9.2–12.9)
POCT GLUCOSE: 132 MG/DL (ref 70–110)
POCT GLUCOSE: 161 MG/DL (ref 70–110)
POCT GLUCOSE: 291 MG/DL (ref 70–110)
POTASSIUM SERPL-SCNC: 3.5 MMOL/L (ref 3.5–5.1)
PV PEAK GRADIENT: 2 MMHG
PV PEAK VELOCITY: 0.73 M/S
RA MAJOR: 6.4 CM
RA PRESSURE ESTIMATED: 8 MMHG
RA WIDTH: 4.7 CM
RBC # BLD AUTO: 3.89 M/UL (ref 4.6–6.2)
RIGHT VENTRICLE DIASTOLIC BASEL DIMENSION: 4 CM
RIGHT VENTRICULAR END-DIASTOLIC DIMENSION: 3.98 CM
RV TB RVSP: 10 MMHG
RV TISSUE DOPPLER FREE WALL SYSTOLIC VELOCITY 1 (APICAL 4 CHAMBER VIEW): 13.35 CM/S
SINUS: 3.6 CM
SODIUM SERPL-SCNC: 138 MMOL/L (ref 136–145)
STJ: 2.69 CM
TDI LATERAL: 0.06 M/S
TDI SEPTAL: 0.04 M/S
TDI: 0.05 M/S
TRICUSPID ANNULAR PLANE SYSTOLIC EXCURSION: 2.22 CM
TV PEAK GRADIENT: 2 MMHG
TV REST PULMONARY ARTERY PRESSURE: 21 MMHG
WBC # BLD AUTO: 18.46 K/UL (ref 3.9–12.7)
Z-SCORE OF LEFT VENTRICULAR DIMENSION IN END DIASTOLE: 1.82
Z-SCORE OF LEFT VENTRICULAR DIMENSION IN END SYSTOLE: 4.37

## 2025-03-05 PROCEDURE — 25000242 PHARM REV CODE 250 ALT 637 W/ HCPCS: Performed by: HOSPITALIST

## 2025-03-05 PROCEDURE — 94761 N-INVAS EAR/PLS OXIMETRY MLT: CPT

## 2025-03-05 PROCEDURE — 83735 ASSAY OF MAGNESIUM: CPT | Performed by: HOSPITALIST

## 2025-03-05 PROCEDURE — 25000003 PHARM REV CODE 250: Performed by: HOSPITALIST

## 2025-03-05 PROCEDURE — 27000221 HC OXYGEN, UP TO 24 HOURS

## 2025-03-05 PROCEDURE — 80048 BASIC METABOLIC PNL TOTAL CA: CPT | Performed by: HOSPITALIST

## 2025-03-05 PROCEDURE — 63600175 PHARM REV CODE 636 W HCPCS: Performed by: HOSPITALIST

## 2025-03-05 PROCEDURE — 36415 COLL VENOUS BLD VENIPUNCTURE: CPT | Performed by: HOSPITALIST

## 2025-03-05 PROCEDURE — 11000001 HC ACUTE MED/SURG PRIVATE ROOM

## 2025-03-05 PROCEDURE — 99900031 HC PATIENT EDUCATION (STAT)

## 2025-03-05 PROCEDURE — 85025 COMPLETE CBC W/AUTO DIFF WBC: CPT | Performed by: HOSPITALIST

## 2025-03-05 PROCEDURE — 84100 ASSAY OF PHOSPHORUS: CPT | Performed by: HOSPITALIST

## 2025-03-05 PROCEDURE — 25000003 PHARM REV CODE 250: Performed by: STUDENT IN AN ORGANIZED HEALTH CARE EDUCATION/TRAINING PROGRAM

## 2025-03-05 PROCEDURE — 94640 AIRWAY INHALATION TREATMENT: CPT

## 2025-03-05 PROCEDURE — 99900035 HC TECH TIME PER 15 MIN (STAT)

## 2025-03-05 RX ORDER — METOPROLOL SUCCINATE 25 MG/1
25 TABLET, EXTENDED RELEASE ORAL DAILY
Status: DISCONTINUED | OUTPATIENT
Start: 2025-03-05 | End: 2025-03-06 | Stop reason: HOSPADM

## 2025-03-05 RX ORDER — POTASSIUM CHLORIDE 20 MEQ/1
40 TABLET, EXTENDED RELEASE ORAL ONCE
Status: COMPLETED | OUTPATIENT
Start: 2025-03-05 | End: 2025-03-05

## 2025-03-05 RX ORDER — SODIUM,POTASSIUM PHOSPHATES 280-250MG
2 POWDER IN PACKET (EA) ORAL
Status: COMPLETED | OUTPATIENT
Start: 2025-03-05 | End: 2025-03-05

## 2025-03-05 RX ADMIN — FUROSEMIDE 40 MG: 40 TABLET ORAL at 09:03

## 2025-03-05 RX ADMIN — CLOPIDOGREL BISULFATE 75 MG: 75 TABLET ORAL at 08:03

## 2025-03-05 RX ADMIN — INSULIN ASPART 6 UNITS: 100 INJECTION, SOLUTION INTRAVENOUS; SUBCUTANEOUS at 04:03

## 2025-03-05 RX ADMIN — IPRATROPIUM BROMIDE AND ALBUTEROL SULFATE 3 ML: 2.5; .5 SOLUTION RESPIRATORY (INHALATION) at 08:03

## 2025-03-05 RX ADMIN — Medication 2 PACKET: at 04:03

## 2025-03-05 RX ADMIN — PREDNISONE 40 MG: 20 TABLET ORAL at 08:03

## 2025-03-05 RX ADMIN — INSULIN ASPART 2 UNITS: 100 INJECTION, SOLUTION INTRAVENOUS; SUBCUTANEOUS at 09:03

## 2025-03-05 RX ADMIN — FUROSEMIDE 40 MG: 40 TABLET ORAL at 08:03

## 2025-03-05 RX ADMIN — ATORVASTATIN CALCIUM 40 MG: 40 TABLET, FILM COATED ORAL at 08:03

## 2025-03-05 RX ADMIN — ASPIRIN 81 MG: 81 TABLET, COATED ORAL at 08:03

## 2025-03-05 RX ADMIN — IPRATROPIUM BROMIDE AND ALBUTEROL SULFATE 3 ML: 2.5; .5 SOLUTION RESPIRATORY (INHALATION) at 01:03

## 2025-03-05 RX ADMIN — METOPROLOL SUCCINATE 25 MG: 25 TABLET, EXTENDED RELEASE ORAL at 02:03

## 2025-03-05 RX ADMIN — MUPIROCIN: 20 OINTMENT TOPICAL at 09:03

## 2025-03-05 RX ADMIN — MUPIROCIN: 20 OINTMENT TOPICAL at 08:03

## 2025-03-05 RX ADMIN — POTASSIUM CHLORIDE 40 MEQ: 1500 TABLET, EXTENDED RELEASE ORAL at 08:03

## 2025-03-05 RX ADMIN — Medication 2 PACKET: at 12:03

## 2025-03-05 RX ADMIN — ENOXAPARIN SODIUM 40 MG: 40 INJECTION SUBCUTANEOUS at 04:03

## 2025-03-05 RX ADMIN — ACETAMINOPHEN 650 MG: 325 TABLET ORAL at 10:03

## 2025-03-05 RX ADMIN — IPRATROPIUM BROMIDE AND ALBUTEROL SULFATE 3 ML: 2.5; .5 SOLUTION RESPIRATORY (INHALATION) at 07:03

## 2025-03-05 RX ADMIN — Medication 2 PACKET: at 08:03

## 2025-03-05 NOTE — PLAN OF CARE
Problem: Adult Inpatient Plan of Care  Goal: Plan of Care Review  3/5/2025 0743 by Marilou Joseph RN  Outcome: Progressing  3/5/2025 0742 by Marilou Joseph RN  Outcome: Progressing  Goal: Patient-Specific Goal (Individualized)  3/5/2025 0743 by Marilou Joseph RN  Outcome: Progressing  3/5/2025 0742 by Marilou Joseph RN  Outcome: Progressing  Goal: Absence of Hospital-Acquired Illness or Injury  3/5/2025 0743 by Marilou Joseph RN  Outcome: Progressing  3/5/2025 0742 by Marilou Joseph RN  Outcome: Progressing  Goal: Optimal Comfort and Wellbeing  3/5/2025 0743 by Marilou Joseph RN  Outcome: Progressing  3/5/2025 0742 by Marilou Joseph RN  Outcome: Progressing  Goal: Readiness for Transition of Care  3/5/2025 0743 by Marilou Joseph RN  Outcome: Progressing  3/5/2025 0742 by Marilou Joseph RN  Outcome: Progressing     Problem: COPD (Chronic Obstructive Pulmonary Disease)  Goal: Optimal Chronic Illness Coping  Outcome: Progressing  Goal: Optimal Level of Functional Moore  Outcome: Progressing  Goal: Absence of Infection Signs and Symptoms  Outcome: Progressing  Goal: Improved Oral Intake  Outcome: Progressing  Goal: Effective Oxygenation and Ventilation  Outcome: Progressing

## 2025-03-05 NOTE — ASSESSMENT & PLAN NOTE
Lab Results   Component Value Date    HGBA1C 7.4 (H) 03/04/2025     Glycemic protocol. [facility]

## 2025-03-05 NOTE — ASSESSMENT & PLAN NOTE
Patient's most recent phosphorus results are listed below.   Recent Labs     03/04/25  0416 03/05/25  0432   PHOS 2.1* 1.8*     Plan  - Will treat hypophosphatemia with IV phos

## 2025-03-05 NOTE — PROGRESS NOTES
"[facility]) nurse navigator notified of [facility] candidate in need of education and introduction to 4-6 week program.      PT aao x 3 while lying in bed 03/05/2025 . Introduced self to pt as [facility] nurse navigator Kathy AUGUSTIN    Patient given "[facility] Home Care Guide" which includes "Daily weight and symptom tracker".  Encouraged pt and caregiver to review information.      Reviewed the following key points of [facility] program with pt and family:   1.) Take your medications as directed.    2.) Weight yourself daily   3.) Follow low salt and limited fluid diet.    4.) Stop smoking and start exercising   5.) Go to your appointments and call your team.      Pt reminded to follow Symptom tracker and to call at the onset of symptoms according to tracker.     Reviewed plan for follow up once discharged to include phone calls, in person and virtual visits to assist pt optimizing their heart failure medication regimen and encouraging healthy lifestyle modifications.  Reminded pt that program will assist them over the next 4-6 weeks and then patient will be transferred to long term care provider .  Reminded pt how to contact [facility] navigator via phone and or via [employer].     Pt instructed appointment will be printed on hospital discharge paperwork.     Pt also reminded RN will call 48-72 hours after discharge to check on them.     PT  verbalize read back of information given.  Encouraged pt and family to read over information often and contact team with any questions or concerns.     "

## 2025-03-05 NOTE — ASSESSMENT & PLAN NOTE
Advance Care Planning     Date: 03/05/2025    Code Status  I engaged the the patient in a voluntary conversation about the patient's preferences for care  at the very end of life. The patient wishes to have CPR and  other invasive treatments performed when his heart and/or breathing stops. I communicated to the patient that his wishes align with full code status and he agrees    A total of 16 min was spent on advance care planning, goals of care discussion, emotional support, formulating and communicating prognosis and exploring burden/benefit of various approaches of treatment. This discussion occurred on a fully voluntary basis with the verbal consent of the patient and/or family.

## 2025-03-05 NOTE — ASSESSMENT & PLAN NOTE
Anemia is likely due to Iron deficiency. Most recent hemoglobin and hematocrit are listed below.  Recent Labs     03/03/25  1920 03/04/25  0416 03/05/25  0432   HGB 11.5* 10.5* 11.0*   HCT 37.1* 34.0* 35.4*     Plan  - Monitor serial CBC: Daily  - Transfuse PRBC if patient becomes hemodynamically unstable, symptomatic or H/H drops below 7/21.  - Patient has not received any PRBC transfusions to date  - Patient's anemia is currently stable  - monitor

## 2025-03-05 NOTE — ASSESSMENT & PLAN NOTE
Lab Results   Component Value Date    HGBA1C 7.4 (H) 03/04/2025     Glycemic protocol. [facility]

## 2025-03-05 NOTE — ASSESSMENT & PLAN NOTE
Patient's blood pressure range in the last 24 hours was: BP  Min: 114/80  Max: 131/78.The patient's inpatient anti-hypertensive regimen is listed below:  Current Antihypertensives  furosemide tablet 40 mg, 2 times daily, Oral    Plan  - BP is controlled, no changes needed to their regimen

## 2025-03-05 NOTE — ASSESSMENT & PLAN NOTE
Patient with Hypercapnic and Hypoxic Respiratory failure which is Acute on chronic.  he is on home oxygen at 4-5 LPM. Supplemental oxygen was provided and noted-    Signs/symptoms of respiratory failure include- increased work of breathing. Contributing diagnoses includes - CHF and COPD Labs and images were reviewed. Patient Has recent ABG, which has been reviewed. Will treat underlying causes and adjust management of respiratory failure as follows- Wean BiPAP as tolerated     Acute on chronic respiratory failure secondary to COPD and CHF exacerbation  Treated for COPD and CHF exacerbations with nebs, steroids and IV diuresis with great improvement of condition.'  Able to wean off Bipap.  Continue Bipap at night.  Transfer out of ICU on 03/04  On home NC

## 2025-03-05 NOTE — NURSING
[facility] [place]  Nurses Note -- 4 Eyes      3/5/2025       Skin assessed on: Q Shift      [x] No Pressure Injuries Present    [x]Prevention Measures Documented    [] Yes LDA  for Pressure Injury Previously documented     [] Yes New Pressure Injury Discovered   [] LDA for New Pressure Injury Added      Attending RN:  Elan Estevez RN     Second RN:  Marilou MAGUIRE RN

## 2025-03-05 NOTE — SUBJECTIVE & OBJECTIVE
Interval History:  No acute overnight events.  Patient remained afebrile.  Feeling much better today.  States shortness a breath is improving, but not at baseline yet.  Some wheezing.  Denies any chest pain.    Review of Systems   Constitutional:  Negative for chills and fever.   Respiratory:  Positive for cough, shortness of breath and wheezing.    Cardiovascular:  Negative for chest pain and leg swelling.   Gastrointestinal:  Negative for abdominal pain.   Neurological:  Negative for weakness.   Psychiatric/Behavioral:  Negative for confusion and hallucinations.      Objective:     Vital Signs (Most Recent):  Temp: 97.6 °F (36.4 °C) (03/05/25 1133)  Pulse: 95 (03/05/25 1133)  Resp: 18 (03/05/25 1133)  BP: 119/81 (03/05/25 1133)  SpO2: 98 % (03/05/25 1133) Vital Signs (24h Range):  Temp:  [97.6 °F (36.4 °C)-98.2 °F (36.8 °C)] 97.6 °F (36.4 °C)  Pulse:  [] 95  Resp:  [17-28] 18  SpO2:  [95 %-100 %] 98 %  BP: (114-131)/(64-82) 119/81     Weight: 88.9 kg (195 lb 15.8 oz)  Body mass index is 34.72 kg/m².    Intake/Output Summary (Last 24 hours) at 3/5/2025 1208  Last data filed at 3/5/2025 0954  Gross per 24 hour   Intake 600 ml   Output 1900 ml   Net -1300 ml      Assessment & Plan  Acute respiratory failure with hypoxia and hypercarbia  Patient with Hypercapnic and Hypoxic Respiratory failure which is Acute on chronic.  he is on home oxygen at 4-5 LPM. Supplemental oxygen was provided and noted-    Signs/symptoms of respiratory failure include- increased work of breathing. Contributing diagnoses includes - CHF and COPD Labs and images were reviewed. Patient Has recent ABG, which has been reviewed. Will treat underlying causes and adjust management of respiratory failure as follows- Wean BiPAP as tolerated   Treated for COPD and CHF exacerbations with nebs, steroids and IV diuresis with great improvement of condition.'  Able to wean off Bipap.  Continue Bipap at night.  Transfer out of ICU.  Hopefully home  soon.  Benign essential hypertension  Patient's blood pressure range in the last 24 hours was: BP  Min: 114/80  Max: 131/78.The patient's inpatient anti-hypertensive regimen is listed below:  Current Antihypertensives  furosemide tablet 40 mg, 2 times daily, Oral    Plan  - BP is controlled, no changes needed to their regimen    Dyslipidemia  Continue statin     Coronary artery disease involving native coronary artery of native heart without angina pectoris  Patient with known CAD s/p stent placement, which is controlled Will continue ASA, Plavix, and Statin and monitor for S/Sx of angina/ACS. Continue to monitor on telemetry.   Uncontrolled type 2 diabetes mellitus with hyperglycemia  Lab Results   Component Value Date    HGBA1C 7.4 (H) 03/04/2025     Glycemic protocol. [facility]    Chronic obstructive pulmonary disease with acute exacerbation  Patient's COPD is with exacerbation noted by continued dyspnea currently.  Patient is currently on COPD Pathway. Continue scheduled inhalers Steroids and Supplemental oxygen and monitor respiratory status closely.   Acute on chronic combined systolic and diastolic heart failure  Results for orders placed during the hospital encounter of 07/02/23    Echo    Interpretation Summary  · The left ventricle is severely enlarged with eccentric hypertrophy and severely decreased systolic function.  · The estimated ejection fraction is 15%.  · Grade I left ventricular diastolic dysfunction.  · Normal right ventricular size with normal right ventricular systolic function.  · Moderate left atrial enlargement.  · Intermediate central venous pressure (8 mmHg).    BNP  Recent Labs   Lab 03/03/25 1920   *     Transition to PO Diuresis.  Repeat Echo.    Anemia  Anemia is likely due to Iron deficiency. Most recent hemoglobin and hematocrit are listed below.  Recent Labs     03/03/25  1920 03/04/25  0416 03/05/25  0432   HGB 11.5* 10.5* 11.0*   HCT 37.1* 34.0* 35.4*     Plan  - Monitor  serial CBC: Daily  - Transfuse PRBC if patient becomes hemodynamically unstable, symptomatic or H/H drops below 7/21.  - Patient has not received any PRBC transfusions to date  - Patient's anemia is currently stable  - monitor  Hypophosphatemia  Patient's most recent phosphorus results are listed below.   Recent Labs     03/04/25  0416 03/05/25  0432   PHOS 2.1* 1.8*     Plan  - Will treat hypophosphatemia with IV phos       Physical Exam  Vitals and nursing note reviewed.   Constitutional:       General: He is not in acute distress.     Appearance: He is obese. He is not ill-appearing.   HENT:      Mouth/Throat:      Mouth: Mucous membranes are moist.   Eyes:      Extraocular Movements: Extraocular movements intact.   Cardiovascular:      Rate and Rhythm: Normal rate and regular rhythm.   Pulmonary:      Effort: Pulmonary effort is normal. No respiratory distress.      Breath sounds: Wheezing (faint expiratory wheezes on exam) present.      Comments: On nasal cannula  Abdominal:      General: There is no distension.      Palpations: Abdomen is soft.      Tenderness: There is no abdominal tenderness.   Musculoskeletal:         General: No swelling or tenderness.      Right lower leg: Edema present.      Left lower leg: Edema present.      Comments: Trace edema in bilateral lower extremities.  No calf tenderness   Skin:     General: Skin is warm and dry.   Neurological:      General: No focal deficit present.      Mental Status: He is alert and oriented to person, place, and time.   Psychiatric:         Mood and Affect: Mood normal.         Thought Content: Thought content normal.               Significant Labs: All pertinent labs within the past 24 hours have been reviewed.    Significant Imaging: I have reviewed all pertinent imaging results/findings within the past 24 hours.

## 2025-03-05 NOTE — PROGRESS NOTES
[facility] Medicine  Progress Note    Patient Name: Abelardo Irene Jr.  MRN: [id number]  Patient Class: IP- Inpatient   Admission Date: 3/3/2025  Length of Stay: 2 days  Attending Physician: Sadaf Ramirez DO  Primary Care Provider: Rolando Funes MD        Subjective     Principal Problem:Acute on chronic respiratory failure with hypoxia and hypercapnia        HPI:  This is a 69-year-old male with a past medical history of COPD (on 4 L O2), CAD, HFrEF (EF: 15%, GIDD), hypertension, type 2 diabetes, hyperlipidemia, tobacco use who presents with shortness of breath.     Patient presents for evaluation of dyspnea that started on the day of presentation.  He reports worsening shortness of breath associated with productive cough and chest tightness (which is now resolved).  He reports running out of a few of his medications as he was unable to get refills.  Per dispense report, patient has not filled any medications since August. Additionally, he still smokes with his last cigarette being a day prior. EMS administered Decadron 16 mg IV, magnesium sulfate IV and placed the patient on CPAP.    In the ED, the patient was tachycardic (100s-120s), tachypneic (20s-30s), hypoxic requiring BiPAP.  Labs remarkable for an elevated BNP (557), leukocytosis (12.8), normocytic anemia (11.5), hyperglycemia (256), elevated lactic acid (2.4), negative troponin (0.026).  EKG w/ ST depressions in lateral leads. VBG showed a pH of 7.19 > 7.34, pCO2 of 126.5 > 86.3.  Chest x-ray showed findings suggestive of pulmonary edema.  Patient was given Lasix 100 mg IV, DuoNeb x1.  He was admitted for further management.    Overview/Hospital Course:  69-year-old male with a past medical history of COPD (on 4 L O2), CAD, HFrEF (EF: 15%, GIDD), hypertension, type 2 diabetes, hyperlipidemia, tobacco use who presents with shortness of breath.  Admitted with acute on chronic respiratory failure secondary to CHF and COPD  exacerbations.  Placed in ICU on Bipap.  Started on IV diuresis, nebs and steroids with improvement of symptoms and able to wean off Bipap. Weaned to home NC.     Interval History:  No acute overnight events.  Patient remained afebrile.  Feeling much better today.  States shortness a breath is improving, but not at baseline yet.  Some wheezing.  Denies any chest pain.    Review of Systems   Constitutional:  Negative for chills and fever.   Respiratory:  Positive for cough, shortness of breath and wheezing.    Cardiovascular:  Negative for chest pain and leg swelling.   Gastrointestinal:  Negative for abdominal pain.   Neurological:  Negative for weakness.   Psychiatric/Behavioral:  Negative for confusion and hallucinations.      Objective:     Vital Signs (Most Recent):  Temp: 97.6 °F (36.4 °C) (03/05/25 1133)  Pulse: 95 (03/05/25 1133)  Resp: 18 (03/05/25 1133)  BP: 119/81 (03/05/25 1133)  SpO2: 98 % (03/05/25 1133) Vital Signs (24h Range):  Temp:  [97.6 °F (36.4 °C)-98.2 °F (36.8 °C)] 97.6 °F (36.4 °C)  Pulse:  [] 95  Resp:  [17-28] 18  SpO2:  [95 %-100 %] 98 %  BP: (114-131)/(64-82) 119/81     Weight: 88.9 kg (195 lb 15.8 oz)  Body mass index is 34.72 kg/m².    Intake/Output Summary (Last 24 hours) at 3/5/2025 1208  Last data filed at 3/5/2025 0954  Gross per 24 hour   Intake 600 ml   Output 1900 ml   Net -1300 ml      Assessment & Plan  Acute respiratory failure with hypoxia and hypercarbia  Patient with Hypercapnic and Hypoxic Respiratory failure which is Acute on chronic.  he is on home oxygen at 4-5 LPM. Supplemental oxygen was provided and noted-    Signs/symptoms of respiratory failure include- increased work of breathing. Contributing diagnoses includes - CHF and COPD Labs and images were reviewed. Patient Has recent ABG, which has been reviewed. Will treat underlying causes and adjust management of respiratory failure as follows- Wean BiPAP as tolerated   Treated for COPD and CHF exacerbations with  nebs, steroids and IV diuresis with great improvement of condition.'  Able to wean off Bipap.  Continue Bipap at night.  Transfer out of ICU.  Hopefully home soon.  Benign essential hypertension  Patient's blood pressure range in the last 24 hours was: BP  Min: 114/80  Max: 131/78.The patient's inpatient anti-hypertensive regimen is listed below:  Current Antihypertensives  furosemide tablet 40 mg, 2 times daily, Oral    Plan  - BP is controlled, no changes needed to their regimen    Dyslipidemia  Continue statin     Coronary artery disease involving native coronary artery of native heart without angina pectoris  Patient with known CAD s/p stent placement, which is controlled Will continue ASA, Plavix, and Statin and monitor for S/Sx of angina/ACS. Continue to monitor on telemetry.   Uncontrolled type 2 diabetes mellitus with hyperglycemia  Lab Results   Component Value Date    HGBA1C 7.4 (H) 03/04/2025     Glycemic protocol. LDSS    Chronic obstructive pulmonary disease with acute exacerbation  Patient's COPD is with exacerbation noted by continued dyspnea currently.  Patient is currently on COPD Pathway. Continue scheduled inhalers Steroids and Supplemental oxygen and monitor respiratory status closely.   Acute on chronic combined systolic and diastolic heart failure  Results for orders placed during the hospital encounter of 07/02/23    Echo    Interpretation Summary  · The left ventricle is severely enlarged with eccentric hypertrophy and severely decreased systolic function.  · The estimated ejection fraction is 15%.  · Grade I left ventricular diastolic dysfunction.  · Normal right ventricular size with normal right ventricular systolic function.  · Moderate left atrial enlargement.  · Intermediate central venous pressure (8 mmHg).    BNP  Recent Labs   Lab 03/03/25  1920   *     Transition to PO Diuresis.  Repeat Echo.    Anemia  Anemia is likely due to Iron deficiency. Most recent hemoglobin and  hematocrit are listed below.  Recent Labs     03/03/25  1920 03/04/25  0416 03/05/25  0432   HGB 11.5* 10.5* 11.0*   HCT 37.1* 34.0* 35.4*     Plan  - Monitor serial CBC: Daily  - Transfuse PRBC if patient becomes hemodynamically unstable, symptomatic or H/H drops below 7/21.  - Patient has not received any PRBC transfusions to date  - Patient's anemia is currently stable  - monitor  Hypophosphatemia  Patient's most recent phosphorus results are listed below.   Recent Labs     03/04/25  0416 03/05/25  0432   PHOS 2.1* 1.8*     Plan  - Will treat hypophosphatemia with IV phos       Physical Exam  Vitals and nursing note reviewed.   Constitutional:       General: He is not in acute distress.     Appearance: He is obese. He is not ill-appearing.   HENT:      Mouth/Throat:      Mouth: Mucous membranes are moist.   Eyes:      Extraocular Movements: Extraocular movements intact.   Cardiovascular:      Rate and Rhythm: Normal rate and regular rhythm.   Pulmonary:      Effort: Pulmonary effort is normal. No respiratory distress.      Breath sounds: Wheezing (faint expiratory wheezes on exam) present.      Comments: On nasal cannula  Abdominal:      General: There is no distension.      Palpations: Abdomen is soft.      Tenderness: There is no abdominal tenderness.   Musculoskeletal:         General: No swelling or tenderness.      Right lower leg: Edema present.      Left lower leg: Edema present.      Comments: Trace edema in bilateral lower extremities.  No calf tenderness   Skin:     General: Skin is warm and dry.   Neurological:      General: No focal deficit present.      Mental Status: He is alert and oriented to person, place, and time.   Psychiatric:         Mood and Affect: Mood normal.         Thought Content: Thought content normal.               Significant Labs: All pertinent labs within the past 24 hours have been reviewed.    Significant Imaging: I have reviewed all pertinent imaging results/findings within  the past 24 hours.    Assessment and Plan     Assessment & Plan  Acute on chronic respiratory failure with hypoxia and hypercapnia  Patient with Hypercapnic and Hypoxic Respiratory failure which is Acute on chronic.  he is on home oxygen at 4-5 LPM. Supplemental oxygen was provided and noted-    Signs/symptoms of respiratory failure include- increased work of breathing. Contributing diagnoses includes - CHF and COPD Labs and images were reviewed. Patient Has recent ABG, which has been reviewed. Will treat underlying causes and adjust management of respiratory failure as follows- Wean BiPAP as tolerated     Acute on chronic respiratory failure secondary to COPD and CHF exacerbation  Treated for COPD and CHF exacerbations with nebs, steroids and IV diuresis with great improvement of condition.'  Able to wean off Bipap.  Continue Bipap at night.  Transfer out of ICU on 03/04  On home NC  Benign essential hypertension  Patient's blood pressure range in the last 24 hours was: BP  Min: 114/80  Max: 131/78.The patient's inpatient anti-hypertensive regimen is listed below:  Current Antihypertensives  furosemide tablet 40 mg, 2 times daily, Oral    Plan  - BP is controlled, no changes needed to their regimen    Dyslipidemia  Continue statin     Coronary artery disease involving native coronary artery of native heart without angina pectoris  Patient with known CAD s/p stent placement, which is controlled Will continue ASA, Plavix, and Statin and monitor for S/Sx of angina/ACS. Continue to monitor on telemetry.   Uncontrolled type 2 diabetes mellitus with hyperglycemia  Lab Results   Component Value Date    HGBA1C 7.4 (H) 03/04/2025     Glycemic protocol. LDSS    Chronic obstructive pulmonary disease with acute exacerbation  Patient's COPD is with exacerbation noted by continued dyspnea currently.  Patient is currently on COPD Pathway. Continue scheduled inhalers Steroids and Supplemental oxygen and monitor respiratory status  closely.     -continue COPD pathway with nebs and prednisone  Acute on chronic combined systolic and diastolic heart failure  Results for orders placed during the hospital encounter of 07/02/23    Echo    Interpretation Summary  · The left ventricle is severely enlarged with eccentric hypertrophy and severely decreased systolic function.  · The estimated ejection fraction is 15%.  · Grade I left ventricular diastolic dysfunction.  · Normal right ventricular size with normal right ventricular systolic function.  · Moderate left atrial enlargement.  · Intermediate central venous pressure (8 mmHg).    BNP  Recent Labs   Lab 03/03/25 1920   *     Transition to PO Diuresis.  Repeat Echo.    Anemia  Anemia is likely due to . Most recent hemoglobin and hematocrit are listed below.  Recent Labs     03/03/25 1920 03/04/25 0416 03/05/25  0432   HGB 11.5* 10.5* 11.0*   HCT 37.1* 34.0* 35.4*     Plan  - Monitor serial CBC:   - Transfuse PRBC if patient becomes hemodynamically unstable, symptomatic or H/H drops below 7/21.  - Patient   - Patient's anemia is currently   - remained stable, monitor  Hypophosphatemia  Patient's most recent phosphorus results are listed below.   Recent Labs     03/04/25 0416 03/05/25  0432   PHOS 2.1* 1.8*     Plan  - Will treat hypophosphatemia with IV phos  - Patient's hypophosphatemia is being treated   - monitor  Class 1 obesity with serious comorbidity and body mass index (BMI) of 34.0 to 34.9 in adult  Body mass index is 34.72 kg/m². Morbid obesity complicates all aspects of disease management from diagnostic modalities to treatment. Weight loss encouraged and health benefits explained to patient.       Advanced care planning/counseling discussion    Advance Care Planning     Date: 03/05/2025    Code Status  I engaged the the patient in a voluntary conversation about the patient's preferences for care  at the very end of life. The patient wishes to have CPR and  other invasive  treatments performed when his heart and/or breathing stops. I communicated to the patient that his wishes align with full code status and he agrees    A total of 16 min was spent on advance care planning, goals of care discussion, emotional support, formulating and communicating prognosis and exploring burden/benefit of various approaches of treatment. This discussion occurred on a fully voluntary basis with the verbal consent of the patient and/or family.        VTE Risk Mitigation (From admission, onward)           Ordered     enoxaparin injection 40 mg  Daily         03/03/25 2233     Place sequential compression device  Until discontinued         03/03/25 2233     IP VTE HIGH RISK PATIENT  Once         03/03/25 2233     Place sequential compression device  Until discontinued         03/03/25 2233                    Discharge Planning   VERA:      Code Status: Full Code   Medical Readiness for Discharge Date:                            Jess-Beth Ramirez DO  Department of [facility] Medicine   [facility] [place] [facility]

## 2025-03-05 NOTE — PLAN OF CARE
Problem: Adult Inpatient Plan of Care  Goal: Plan of Care Review  Outcome: Progressing  Goal: Patient-Specific Goal (Individualized)  Outcome: Progressing  Goal: Absence of Hospital-Acquired Illness or Injury  Outcome: Progressing  Goal: Optimal Comfort and Wellbeing  Outcome: Progressing  Goal: Readiness for Transition of Care  Outcome: Progressing     Problem: COPD (Chronic Obstructive Pulmonary Disease)  Goal: Optimal Chronic Illness Coping  Outcome: Progressing  Goal: Optimal Level of Functional [place]  Outcome: Progressing  Goal: Absence of Infection Signs and Symptoms  Outcome: Progressing  Goal: Improved Oral Intake  Outcome: Progressing  Goal: Effective Oxygenation and Ventilation  Outcome: Progressing     Problem: Diabetes Comorbidity  Goal: Blood Glucose Level Within Targeted Range  Outcome: Progressing     Problem: Neutropenia  Goal: Absence of Infection  Outcome: Progressing     Problem: Infection  Goal: Absence of Infection Signs and Symptoms  Outcome: Progressing

## 2025-03-05 NOTE — ASSESSMENT & PLAN NOTE
Patient's most recent phosphorus results are listed below.   Recent Labs     03/04/25  0416 03/05/25  0432   PHOS 2.1* 1.8*     Plan  - Will treat hypophosphatemia with IV phos  - Patient's hypophosphatemia is being treated   - monitor

## 2025-03-05 NOTE — ASSESSMENT & PLAN NOTE
Anemia is likely due to . Most recent hemoglobin and hematocrit are listed below.  Recent Labs     03/03/25  1920 03/04/25  0416 03/05/25  0432   HGB 11.5* 10.5* 11.0*   HCT 37.1* 34.0* 35.4*     Plan  - Monitor serial CBC:   - Transfuse PRBC if patient becomes hemodynamically unstable, symptomatic or H/H drops below 7/21.  - Patient   - Patient's anemia is currently   - remained stable, monitor

## 2025-03-05 NOTE — ASSESSMENT & PLAN NOTE
Body mass index is 34.72 kg/m². Morbid obesity complicates all aspects of disease management from diagnostic modalities to treatment. Weight loss encouraged and health benefits explained to patient.

## 2025-03-05 NOTE — ASSESSMENT & PLAN NOTE
Patient with Hypercapnic and Hypoxic Respiratory failure which is Acute on chronic.  he is on home oxygen at 4-5 LPM. Supplemental oxygen was provided and noted-    Signs/symptoms of respiratory failure include- increased work of breathing. Contributing diagnoses includes - CHF and COPD Labs and images were reviewed. Patient Has recent ABG, which has been reviewed. Will treat underlying causes and adjust management of respiratory failure as follows- Wean BiPAP as tolerated   Treated for COPD and CHF exacerbations with nebs, steroids and IV diuresis with great improvement of condition.'  Able to wean off Bipap.  Continue Bipap at night.  Transfer out of ICU.  Hopefully home soon.

## 2025-03-05 NOTE — ASSESSMENT & PLAN NOTE
Patient's COPD is with exacerbation noted by continued dyspnea currently.  Patient is currently on COPD Pathway. Continue scheduled inhalers Steroids and Supplemental oxygen and monitor respiratory status closely.     -continue COPD pathway with nebs and prednisone

## 2025-03-05 NOTE — PLAN OF CARE
Case Management Assessment     PCP: oRlando Funes MD   [facility]   [facility] [place] [place] - [address]  [address]  [place] [address]  Phone: [phone] Fax: [phone]       Patient Arrived From: Home   Existing Help at Home: Extended Emergency Contact Information  Primary Emergency Contact: Lillian Irene  Mobile Phone: [phone]  Relation: Spouse  Secondary Emergency Contact: Ana Irene  Mobile Phone: [phone]  Relation: Daughter     Barriers to Discharge: None     Discharge Plan:    A. Home with family    B. Home with family       CM met with patient at bedside to discuss discharge planning. Patient is independent, reside with her spouse and uses oxygen (4L) at home. Patient's family will provide needed support and patient's daughter will provide transportation home upon discharge.     CM will continue to follow patient for CM needs.        03/05/25 1600   Discharge Assessment   Assessment Type Discharge Planning Assessment   Confirmed/corrected address, phone number and insurance Yes   Confirmed Demographics Correct on Facesheet   Source of Information patient   When was your last doctors appointment? 03/20/23   Communicated VERA with patient/caregiver Date not available/Unable to determine   People in Home spouse   Facility Arrived From: Home   Do you expect to return to your current living situation? Yes   Prior to hospitilization cognitive status: Alert/Oriented   Current cognitive status: Alert/Oriented   Walking or Climbing Stairs Difficulty no   Dressing/Bathing Difficulty no   Equipment Currently Used at Home oxygen   Readmission within 30 days? No   Patient currently being followed by outpatient case management? No   Do you currently have service(s) that help you manage your care at home? No   Do you take prescription medications? Yes   Do you have prescription coverage? Yes   Coverage [employer]   Do you have any problems affording any of your  prescribed medications? No   Is the patient taking medications as prescribed? yes   Who is going to help you get home at discharge? Patient's daughter will provide transportation home   How do you get to doctors appointments? family or friend will provide   Are you on dialysis? No   Do you take coumadin? No   Discharge Plan A Home with family   Discharge Plan B Home with family   DME Needed Upon Discharge  none   Discharge Plan discussed with: Patient   Transition of Care Barriers None   SDOH   (None)   Physical Activity   On average, how many days per week do you engage in moderate to strenuous exercise (like a brisk walk)? 2 days   On average, how many minutes do you engage in exercise at this level? 30 min   Financial Resource Strain   How hard is it for you to pay for the very basics like food, housing, medical care, and heating? Not hard   Housing Stability   In the last 12 months, was there a time when you were not able to pay the mortgage or rent on time? N   At any time in the past 12 months, were you homeless or living in a shelter (including now)? N   Transportation Needs   Has the lack of transportation kept you from medical appointments, meetings, work or from getting things needed for daily living? No   Food Insecurity   Within the past 12 months, you worried that your food would run out before you got the money to buy more. Never true   Within the past 12 months, the food you bought just didn't last and you didn't have money to get more. Never true   Stress   Do you feel stress - tense, restless, nervous, or anxious, or unable to sleep at night because your mind is troubled all the time - these days? Not at all   Social Isolation   How often do you feel lonely or isolated from those around you?  Never   Alcohol Use   Q1: How often do you have a drink containing alcohol? Never   Q2: How many drinks containing alcohol do you have on a typical day when you are drinking? None

## 2025-03-06 VITALS
HEIGHT: 63 IN | BODY MASS INDEX: 34.73 KG/M2 | OXYGEN SATURATION: 99 % | WEIGHT: 196 LBS | SYSTOLIC BLOOD PRESSURE: 110 MMHG | HEART RATE: 91 BPM | TEMPERATURE: 98 F | RESPIRATION RATE: 20 BRPM | DIASTOLIC BLOOD PRESSURE: 75 MMHG

## 2025-03-06 LAB
ANION GAP SERPL CALC-SCNC: 8 MMOL/L (ref 8–16)
BASOPHILS # BLD AUTO: 0.03 K/UL (ref 0–0.2)
BASOPHILS NFR BLD: 0.2 % (ref 0–1.9)
BUN SERPL-MCNC: 23 MG/DL (ref 8–23)
CALCIUM SERPL-MCNC: 8.9 MG/DL (ref 8.7–10.5)
CHLORIDE SERPL-SCNC: 97 MMOL/L (ref 95–110)
CO2 SERPL-SCNC: 33 MMOL/L (ref 23–29)
CREAT SERPL-MCNC: 0.7 MG/DL (ref 0.5–1.4)
DIFFERENTIAL METHOD BLD: ABNORMAL
EOSINOPHIL # BLD AUTO: 0 K/UL (ref 0–0.5)
EOSINOPHIL NFR BLD: 0 % (ref 0–8)
ERYTHROCYTE [DISTWIDTH] IN BLOOD BY AUTOMATED COUNT: 13.3 % (ref 11.5–14.5)
EST. GFR  (NO RACE VARIABLE): >60 ML/MIN/1.73 M^2
GLUCOSE SERPL-MCNC: 155 MG/DL (ref 70–110)
HCT VFR BLD AUTO: 35 % (ref 40–54)
HGB BLD-MCNC: 11.2 G/DL (ref 14–18)
IMM GRANULOCYTES # BLD AUTO: 0.07 K/UL (ref 0–0.04)
IMM GRANULOCYTES NFR BLD AUTO: 0.4 % (ref 0–0.5)
LYMPHOCYTES # BLD AUTO: 2.9 K/UL (ref 1–4.8)
LYMPHOCYTES NFR BLD: 17.7 % (ref 18–48)
MAGNESIUM SERPL-MCNC: 2.1 MG/DL (ref 1.6–2.6)
MCH RBC QN AUTO: 28.6 PG (ref 27–31)
MCHC RBC AUTO-ENTMCNC: 32 G/DL (ref 32–36)
MCV RBC AUTO: 89 FL (ref 82–98)
MONOCYTES # BLD AUTO: 1.1 K/UL (ref 0.3–1)
MONOCYTES NFR BLD: 7 % (ref 4–15)
NEUTROPHILS # BLD AUTO: 12.2 K/UL (ref 1.8–7.7)
NEUTROPHILS NFR BLD: 74.7 % (ref 38–73)
NRBC BLD-RTO: 0 /100 WBC
PHOSPHATE SERPL-MCNC: 2.9 MG/DL (ref 2.7–4.5)
PLATELET # BLD AUTO: 237 K/UL (ref 150–450)
PMV BLD AUTO: 11.1 FL (ref 9.2–12.9)
POCT GLUCOSE: 128 MG/DL (ref 70–110)
POCT GLUCOSE: 169 MG/DL (ref 70–110)
POCT GLUCOSE: 232 MG/DL (ref 70–110)
POTASSIUM SERPL-SCNC: 4.1 MMOL/L (ref 3.5–5.1)
RBC # BLD AUTO: 3.92 M/UL (ref 4.6–6.2)
SODIUM SERPL-SCNC: 138 MMOL/L (ref 136–145)
WBC # BLD AUTO: 16.33 K/UL (ref 3.9–12.7)

## 2025-03-06 PROCEDURE — 94660 CPAP INITIATION&MGMT: CPT

## 2025-03-06 PROCEDURE — 63600175 PHARM REV CODE 636 W HCPCS: Performed by: HOSPITALIST

## 2025-03-06 PROCEDURE — 84100 ASSAY OF PHOSPHORUS: CPT | Performed by: HOSPITALIST

## 2025-03-06 PROCEDURE — 25000242 PHARM REV CODE 250 ALT 637 W/ HCPCS: Performed by: HOSPITALIST

## 2025-03-06 PROCEDURE — 80048 BASIC METABOLIC PNL TOTAL CA: CPT | Performed by: HOSPITALIST

## 2025-03-06 PROCEDURE — 94761 N-INVAS EAR/PLS OXIMETRY MLT: CPT

## 2025-03-06 PROCEDURE — 36415 COLL VENOUS BLD VENIPUNCTURE: CPT | Performed by: HOSPITALIST

## 2025-03-06 PROCEDURE — 25000003 PHARM REV CODE 250: Performed by: HOSPITALIST

## 2025-03-06 PROCEDURE — 99900035 HC TECH TIME PER 15 MIN (STAT)

## 2025-03-06 PROCEDURE — 85025 COMPLETE CBC W/AUTO DIFF WBC: CPT | Performed by: HOSPITALIST

## 2025-03-06 PROCEDURE — 83735 ASSAY OF MAGNESIUM: CPT | Performed by: HOSPITALIST

## 2025-03-06 PROCEDURE — 94640 AIRWAY INHALATION TREATMENT: CPT

## 2025-03-06 PROCEDURE — 27000221 HC OXYGEN, UP TO 24 HOURS

## 2025-03-06 RX ORDER — BUDESONIDE AND FORMOTEROL FUMARATE DIHYDRATE 160; 4.5 UG/1; UG/1
2 AEROSOL RESPIRATORY (INHALATION) EVERY 12 HOURS
Qty: 10.2 G | Refills: 1 | Status: SHIPPED | OUTPATIENT
Start: 2025-03-06 | End: 2025-04-05

## 2025-03-06 RX ORDER — MONTELUKAST SODIUM 10 MG/1
10 TABLET ORAL NIGHTLY
Qty: 30 TABLET | Refills: 1 | Status: SHIPPED | OUTPATIENT
Start: 2025-03-06 | End: 2025-05-05

## 2025-03-06 RX ORDER — METOPROLOL SUCCINATE 25 MG/1
25 TABLET, EXTENDED RELEASE ORAL DAILY
Qty: 30 TABLET | Refills: 1 | Status: SHIPPED | OUTPATIENT
Start: 2025-03-07 | End: 2025-05-06

## 2025-03-06 RX ORDER — PREDNISONE 20 MG/1
40 TABLET ORAL DAILY
Qty: 4 TABLET | Refills: 0 | Status: SHIPPED | OUTPATIENT
Start: 2025-03-07 | End: 2025-03-09

## 2025-03-06 RX ORDER — FUROSEMIDE 40 MG/1
40 TABLET ORAL DAILY
Qty: 30 TABLET | Refills: 1 | Status: SHIPPED | OUTPATIENT
Start: 2025-03-06 | End: 2025-05-05

## 2025-03-06 RX ADMIN — ASPIRIN 81 MG: 81 TABLET, COATED ORAL at 08:03

## 2025-03-06 RX ADMIN — IPRATROPIUM BROMIDE AND ALBUTEROL SULFATE 3 ML: 2.5; .5 SOLUTION RESPIRATORY (INHALATION) at 08:03

## 2025-03-06 RX ADMIN — FUROSEMIDE 40 MG: 40 TABLET ORAL at 08:03

## 2025-03-06 RX ADMIN — IPRATROPIUM BROMIDE AND ALBUTEROL SULFATE 3 ML: 2.5; .5 SOLUTION RESPIRATORY (INHALATION) at 02:03

## 2025-03-06 RX ADMIN — MUPIROCIN: 20 OINTMENT TOPICAL at 08:03

## 2025-03-06 RX ADMIN — CLOPIDOGREL BISULFATE 75 MG: 75 TABLET ORAL at 08:03

## 2025-03-06 RX ADMIN — ATORVASTATIN CALCIUM 40 MG: 40 TABLET, FILM COATED ORAL at 08:03

## 2025-03-06 RX ADMIN — PREDNISONE 40 MG: 20 TABLET ORAL at 08:03

## 2025-03-06 NOTE — DISCHARGE SUMMARY
[facility] Medicine  Discharge Summary      Patient Name: Abelardo Irene Jr.  MRN: [id number]  [facility]: [id number]  Patient Class: IP- Inpatient  Admission Date: 3/3/2025  Hospital Length of Stay: 3 days  Discharge Date and Time:  03/06/2025 2:41 PM  Attending Physician: Sadaf Ramirez DO   Discharging Provider: Sadaf Ramirez DO  Primary Care Provider: Rolando Funes MD    Primary Care Team: Networked reference to record PCT     HPI:   This is a 69-year-old male with a past medical history of COPD (on 4 L O2), CAD, HFrEF (EF: 15%, GIDD), hypertension, type 2 diabetes, hyperlipidemia, tobacco use who presents with shortness of breath.     Patient presents for evaluation of dyspnea that started on the day of presentation.  He reports worsening shortness of breath associated with productive cough and chest tightness (which is now resolved).  He reports running out of a few of his medications as he was unable to get refills.  Per dispense report, patient has not filled any medications since August. Additionally, he still smokes with his last cigarette being a day prior. EMS administered Decadron 16 mg IV, magnesium sulfate IV and placed the patient on CPAP.    In the ED, the patient was tachycardic (100s-120s), tachypneic (20s-30s), hypoxic requiring BiPAP.  Labs remarkable for an elevated BNP (557), leukocytosis (12.8), normocytic anemia (11.5), hyperglycemia (256), elevated lactic acid (2.4), negative troponin (0.026).  EKG w/ ST depressions in lateral leads. VBG showed a pH of 7.19 > 7.34, pCO2 of 126.5 > 86.3.  Chest x-ray showed findings suggestive of pulmonary edema.  Patient was given Lasix 100 mg IV, DuoNeb x1.  He was admitted for further management.    * No surgery found *      Hospital Course:   69-year-old male with a past medical history of COPD (on 4 L O2), CAD, HFrEF (EF: 15%, GIDD), hypertension, type 2 diabetes, hyperlipidemia, tobacco use who presents with shortness of breath.   Admitted with acute on chronic respiratory failure secondary to CHF and COPD exacerbations.  Placed in ICU on Bipap.  Started on IV diuresis, nebs and steroids with improvement of symptoms and able to wean off Bipap. Weaned to home NC.     Patient admits feeling significantly better.  States his shortness for breath at baseline.  Currently on home O2.  Trace edema in lower extremity has resolved. Pt denies any fever, headaches, vision changes, chest pain, palpitations, abdominal pain, nausea, vomiting, or any new weaknesses. Feels ready to go home. Patient's exam on discharge was as follow: Patient is alert and oriented, appears in no acute distress, heart with regular rate and rhythm, lungs with diminished breath sounds in lower lung fields, on nasal cannula, abdomen soft, and nontender.  Bilateral lower extremities without any edema or calf tenderness.     Patient was counseled regarding any abnormal labs, differential diagnosis, treatment options, risk-benefit, lifestyle changes, prognosis, current condition, and medications. Patient was interactive and attentive.  Patient's questions were answered in a respectful and timely manner. Patient was instructed to follow-up with PCP within 1 week and to continue taking medications as prescribed.  Instructed to also follow up with pulmonology and Cardiology outpatient. Also, extensively discussed the risks, benefits, and side effects of patient's medications. Discussed with patient about any medication changes. Patient verbalized understanding and agrees to treatment plan.  Patient is stable for discharge.  Patient has no other questions or concerns at this time.  ED precautions discussed with the patient.    Vital signs are stable. Ambulating without any difficulty. Tolerating p.o. intake without any nausea or vomiting. Afebrile for over 24 hours. Patient is in stable condition and has no questions or concerns. Patient will be discharge to home once transportation  secured . Prescriptions sent to pharmacy.  CM/SW to assist with discharge planning.     Vitals:    03/06/25 0704 03/06/25 0744 03/06/25 0803 03/06/25 1128   BP:  109/74  110/75   BP Location:       Patient Position:       Pulse: 84 80 82 87   Resp:  18 20 18   Temp:  98.2 °F (36.8 °C)  97.7 °F (36.5 °C)   TempSrc:  Oral  Oral   SpO2:  100% 98% 95%   Weight:       Height:              Goals of Care Treatment Preferences:  Code Status: Full Code         Consults:   Consults (From admission, onward)          Status Ordering Provider     Inpatient consult to Midline team  Once        Provider:  (Not yet assigned)    Completed DON SERRATO     Inpatient consult to Pulmonology  Once        Provider:  Martha Knapp MD    Completed QUAN ATWOOD            Assessment & Plan  Acute on chronic respiratory failure with hypoxia and hypercapnia  Patient with Hypercapnic and Hypoxic Respiratory failure which is Acute on chronic.  he is on home oxygen at 4-5 LPM. Supplemental oxygen was provided and noted- Oxygen Concentration (%):  [36] 36  Signs/symptoms of respiratory failure include- increased work of breathing. Contributing diagnoses includes - CHF and COPD Labs and images were reviewed. Patient Has recent ABG, which has been reviewed. Will treat underlying causes and adjust management of respiratory failure as follows- Wean BiPAP as tolerated     Acute on chronic respiratory failure secondary to COPD and CHF exacerbation  Treated for COPD and CHF exacerbations with nebs, steroids and IV diuresis with great improvement of condition.'  Able to wean off Bipap.  Continue Bipap at night.  Transfer out of ICU on 03/04  On home NC  Benign essential hypertension  Patient's blood pressure range in the last 24 hours was: BP  Min: 107/65  Max: 114/74.The patient's inpatient anti-hypertensive regimen is listed below:  Current Antihypertensives  furosemide tablet 40 mg, 2 times daily, Oral  metoprolol succinate (TOPROL-XL) 24  hr tablet 25 mg, Daily, Oral  metoprolol succinate (TOPROL-XL) 24 hr tablet, Daily, Oral  furosemide (LASIX) tablet, Daily, Oral    Plan  - BP is controlled, no changes needed to their regimen    Dyslipidemia  Continue statin     Coronary artery disease involving native coronary artery of native heart without angina pectoris  Patient with known CAD s/p stent placement, which is controlled Will continue ASA, Plavix, and Statin and monitor for S/Sx of angina/ACS. Continue to monitor on telemetry.   Uncontrolled type 2 diabetes mellitus with hyperglycemia  Lab Results   Component Value Date    HGBA1C 7.4 (H) 03/04/2025     Glycemic protocol. LDSS    Chronic obstructive pulmonary disease with acute exacerbation  Patient's COPD is with exacerbation noted by continued dyspnea currently.  Patient is currently on COPD Pathway. Continue scheduled inhalers Steroids and Supplemental oxygen and monitor respiratory status closely.     -continue COPD pathway with nebs and prednisone  Acute on chronic combined systolic and diastolic heart failure  Results for orders placed during the hospital encounter of 07/02/23    Echo    Interpretation Summary  · The left ventricle is severely enlarged with eccentric hypertrophy and severely decreased systolic function.  · The estimated ejection fraction is 15%.  · Grade I left ventricular diastolic dysfunction.  · Normal right ventricular size with normal right ventricular systolic function.  · Moderate left atrial enlargement.  · Intermediate central venous pressure (8 mmHg).    BNP  Recent Labs   Lab 03/03/25  1920   *     Transition to PO Diuresis.  Repeat Echo.    Anemia  Anemia is likely due to . Most recent hemoglobin and hematocrit are listed below.  Recent Labs     03/04/25  0416 03/05/25  0432 03/06/25  0359   HGB 10.5* 11.0* 11.2*   HCT 34.0* 35.4* 35.0*     Plan  - Monitor serial CBC:   - Transfuse PRBC if patient becomes hemodynamically unstable, symptomatic or H/H drops  below 7/21.  - Patient   - Patient's anemia is currently   - remained stable, monitor  Hypophosphatemia  Patient's most recent phosphorus results are listed below.   Recent Labs     03/04/25  0416 03/05/25  0432 03/06/25  0359   PHOS 2.1* 1.8* 2.9     Plan  - Will treat hypophosphatemia with IV phos  - Patient's hypophosphatemia is being treated   - monitor  Class 1 obesity with serious comorbidity and body mass index (BMI) of 34.0 to 34.9 in adult  Body mass index is 34.72 kg/m². Morbid obesity complicates all aspects of disease management from diagnostic modalities to treatment. Weight loss encouraged and health benefits explained to patient.       Advanced care planning/counseling discussion    Advance Care Planning     Date: 03/05/2025    Code Status  I engaged the the patient in a voluntary conversation about the patient's preferences for care  at the very end of life. The patient wishes to have CPR and  other invasive treatments performed when his heart and/or breathing stops. I communicated to the patient that his wishes align with full code status and he agrees    A total of 16 min was spent on advance care planning, goals of care discussion, emotional support, formulating and communicating prognosis and exploring burden/benefit of various approaches of treatment. This discussion occurred on a fully voluntary basis with the verbal consent of the patient and/or family.        Final Active Diagnoses:    Diagnosis Date Noted POA    PRINCIPAL PROBLEM:  Acute on chronic respiratory failure with hypoxia and hypercapnia [J96.21, J96.22] 03/06/2023 Yes    Chronic obstructive pulmonary disease with acute exacerbation [J44.1] 03/09/2023 Yes    Acute on chronic combined systolic and diastolic heart failure [I50.43] 10/19/2023 Yes    Benign essential hypertension [I10] 03/02/2013 Yes     Chronic    Dyslipidemia [E78.5] 07/28/2017 Yes     Chronic    Coronary artery disease involving native coronary artery of native heart  without angina pectoris [I25.10] 11/15/2017 Yes     Chronic    Uncontrolled type 2 diabetes mellitus with hyperglycemia [E11.65] 04/12/2022 Yes    Anemia [D64.9] 03/05/2025 Yes    Hypophosphatemia [E83.39] 03/05/2025 Yes    Advanced care planning/counseling discussion [Z71.89] 01/25/2024 Not Applicable    Class 1 obesity with serious comorbidity and body mass index (BMI) of 34.0 to 34.9 in adult [E66.811, Z68.34] 02/13/2021 Not Applicable      Problems Resolved During this Admission:       Discharged Condition: stable    Disposition: Home or Self Care    Follow Up:   Follow-up Information       Rolando Funes MD. Go on 3/21/2025.    Specialty: Internal Medicine  Why: To a scheduled hospital follow-up appointment at 3:15 pm  Contact information:  [address] [place] [address] [address]  [id number]  [place] [address]  [phone]               Pulmonology Follow up in 2 week(s).               Cardiology Follow up in 1 week(s).                           Patient Instructions:      Ambulatory referral/consult to Pulmonology   Standing Status: Future   Referral Priority: Routine Referral Type: Consultation   Referral Reason: Specialty Services Required   Requested Specialty: Pulmonary Disease   Number of Visits Requested: 1     Ambulatory referral/consult to Cardiology   Standing Status: Future   Referral Priority: Routine Referral Type: Consultation   Referral Reason: Specialty Services Required   Requested Specialty: Cardiology     Diet Cardiac     Notify your health care provider if you experience any of the following:  temperature >100.4     Notify your health care provider if you experience any of the following:  severe uncontrolled pain     Notify your health care provider if you experience any of the following:  difficulty breathing or increased cough     Notify your health care provider if you experience any of the following:  severe persistent headache     Notify your health care provider if you experience any of the  following:  persistent dizziness, light-headedness, or visual disturbances     Notify your health care provider if you experience any of the following:  increased confusion or weakness     Activity as tolerated       Significant Diagnostic Studies: Labs: All labs within the past 24 hours have been reviewed      Recent Results (from the past 100 hours)   EKG 12-lead    Collection Time: 03/03/25  7:14 PM   Result Value Ref Range    QRS Duration 114 ms    OHS QTC Calculation 502 ms   CBC auto differential    Collection Time: 03/03/25  7:20 PM   Result Value Ref Range    WBC 12.86 (H) 3.90 - 12.70 K/uL    RBC 3.95 (L) 4.60 - 6.20 M/uL    Hemoglobin 11.5 (L) 14.0 - 18.0 g/dL    Hematocrit 37.1 (L) 40.0 - 54.0 %    MCV 94 82 - 98 fL    MCH 29.1 27.0 - 31.0 pg    MCHC 31.0 (L) 32.0 - 36.0 g/dL    RDW 13.0 11.5 - 14.5 %    Platelets 229 150 - 450 K/uL    MPV 11.1 9.2 - 12.9 fL    Immature Granulocytes 0.3 0.0 - 0.5 %    Gran # (ANC) 7.1 1.8 - 7.7 K/uL    Immature Grans (Abs) 0.04 0.00 - 0.04 K/uL    Lymph # 4.4 1.0 - 4.8 K/uL    Mono # 0.9 0.3 - 1.0 K/uL    Eos # 0.3 0.0 - 0.5 K/uL    Baso # 0.07 0.00 - 0.20 K/uL    nRBC 0 0 /100 WBC    Gran % 55.5 38.0 - 73.0 %    Lymph % 34.5 18.0 - 48.0 %    Mono % 6.6 4.0 - 15.0 %    Eosinophil % 2.6 0.0 - 8.0 %    Basophil % 0.5 0.0 - 1.9 %    Differential Method Automated    Comprehensive metabolic panel    Collection Time: 03/03/25  7:20 PM   Result Value Ref Range    Sodium 140 136 - 145 mmol/L    Potassium 3.9 3.5 - 5.1 mmol/L    Chloride 92 (L) 95 - 110 mmol/L    CO2 39 (H) 23 - 29 mmol/L    Glucose 256 (H) 70 - 110 mg/dL    BUN 5 (L) 8 - 23 mg/dL    Creatinine 0.8 0.5 - 1.4 mg/dL    Calcium 8.6 (L) 8.7 - 10.5 mg/dL    Total Protein 7.9 6.0 - 8.4 g/dL    Albumin 3.6 3.5 - 5.2 g/dL    Total Bilirubin 0.3 0.1 - 1.0 mg/dL    Alkaline Phosphatase 101 40 - 150 U/L    AST 9 (L) 10 - 40 U/L    ALT 11 10 - 44 U/L    eGFR >60 >60 mL/min/1.73 m^2    Anion Gap 9 8 - 16 mmol/L   Troponin I     Collection Time: 03/03/25  7:20 PM   Result Value Ref Range    Troponin I 0.026 0.000 - 0.026 ng/mL   Brain natriuretic peptide    Collection Time: 03/03/25  7:20 PM   Result Value Ref Range     (H) 0 - 99 pg/mL   Lactic Acid, Plasma    Collection Time: 03/03/25  7:20 PM   Result Value Ref Range    Lactate (Lactic Acid) 2.4 (H) 0.5 - 2.2 mmol/L   Protime-INR    Collection Time: 03/03/25  7:20 PM   Result Value Ref Range    Prothrombin Time 10.6 9.0 - 12.5 sec    INR 0.9 0.8 - 1.2   ISTAT PROCEDURE    Collection Time: 03/03/25  7:48 PM   Result Value Ref Range    POC PH 7.198 (LL) 7.35 - 7.45    POC PCO2 126.5 (H) 35 - 45 mmHg    POC PO2 47 40 - 60 mmHg    POC HCO3 49.2 (H) 24 - 28 mmol/L    POC BE 16 (H) -2 to 2 mmol/L    POC SATURATED O2 67 95 - 100 %    POC TCO2 >50 (H) 24 - 29 mmol/L    Rate 12     Sample VENOUS     Site Other     Allens Test N/A     DelSys CPAP/BiPAP     Mode BiPAP     FiO2 100     IP 20     EP 10    ISTAT PROCEDURE    Collection Time: 03/03/25  9:09 PM   Result Value Ref Range    POC PH 7.156 (LL) 7.35 - 7.45    POC PCO2 >130.0 (H) 35 - 45 mmHg    POC PO2 31 (L) 40 - 60 mmHg    POC HCO3 UNAVAILABLE mmol/L    POC BE UNAVAILABLE mmol/L    POC SATURATED O2 UNAVAILABLE %    POC TCO2 UNAVAILABLE mmol/L    Rate 12     Sample VENOUS     Site Other     Allens Test N/A     DelSys CPAP/BiPAP     Mode BiPAP     FiO2 100     IP 20     EP 10    ISTAT PROCEDURE    Collection Time: 03/03/25  9:12 PM   Result Value Ref Range    POC PH 7.160 (LL) 7.35 - 7.45    POC PCO2 >130.0 (H) 35 - 45 mmHg    POC PO2 33 (L) 40 - 60 mmHg    POC HCO3 UNAVAILABLE mmol/L    POC BE UNAVAILABLE mmol/L    POC SATURATED O2 UNAVAILABLE %    POC TCO2 UNAVAILABLE mmol/L    Rate 12     Sample VENOUS     Site Other     Allens Test N/A     DelSys CPAP/BiPAP     Mode BiPAP     FiO2 100     IP 20     EP 10    ISTAT PROCEDURE    Collection Time: 03/03/25  9:32 PM   Result Value Ref Range    POC PH 7.345 (L) 7.35 - 7.45    POC PCO2  86.3 (H) 35 - 45 mmHg    POC PO2 43 40 - 60 mmHg    POC HCO3 47.2 (H) 24 - 28 mmol/L    POC BE 17 (H) -2 to 2 mmol/L    POC SATURATED O2 72 95 - 100 %    POC TCO2 50 (H) 24 - 29 mmol/L    Rate 12     Sample VENOUS     Site Other     Allens Test N/A     DelSys CPAP/BiPAP     Mode BiPAP     FiO2 30     Spont Rate 28     Sp02 95     IP 20     EP 10    ISTAT PROCEDURE    Collection Time: 03/03/25 11:49 PM   Result Value Ref Range    POC PH 7.316 (L) 7.35 - 7.45    POC PCO2 93.3 (HH) 35 - 45 mmHg    POC PO2 58 (LL) 80 - 100 mmHg    POC HCO3 47.6 (H) 24 - 28 mmol/L    POC BE 17 (H) -2 to 2 mmol/L    POC SATURATED O2 85 95 - 100 %    POC TCO2 >50 (H) 23 - 27 mmol/L    Rate 12     Sample ARTERIAL     Site RR     Allens Test Pass     DelSys CPAP/BiPAP     Mode BiPAP     FiO2 30     Spont Rate 18     Min Vol 8.6     Sp02 95     IP 20     EP 10    POCT glucose    Collection Time: 03/04/25 12:06 AM   Result Value Ref Range    POCT Glucose 292 (H) 70 - 110 mg/dL   Basic metabolic panel    Collection Time: 03/04/25  4:16 AM   Result Value Ref Range    Sodium 140 136 - 145 mmol/L    Potassium 3.8 3.5 - 5.1 mmol/L    Chloride 87 (L) 95 - 110 mmol/L    CO2 42 (HH) 23 - 29 mmol/L    Glucose 256 (H) 70 - 110 mg/dL    BUN 8 8 - 23 mg/dL    Creatinine 0.8 0.5 - 1.4 mg/dL    Calcium 8.5 (L) 8.7 - 10.5 mg/dL    Anion Gap 11 8 - 16 mmol/L    eGFR >60 >60 mL/min/1.73 m^2   Magnesium    Collection Time: 03/04/25  4:16 AM   Result Value Ref Range    Magnesium 2.0 1.6 - 2.6 mg/dL   Phosphorus    Collection Time: 03/04/25  4:16 AM   Result Value Ref Range    Phosphorus 2.1 (L) 2.7 - 4.5 mg/dL   CBC auto differential    Collection Time: 03/04/25  4:16 AM   Result Value Ref Range    WBC 11.04 3.90 - 12.70 K/uL    RBC 3.66 (L) 4.60 - 6.20 M/uL    Hemoglobin 10.5 (L) 14.0 - 18.0 g/dL    Hematocrit 34.0 (L) 40.0 - 54.0 %    MCV 93 82 - 98 fL    MCH 28.7 27.0 - 31.0 pg    MCHC 30.9 (L) 32.0 - 36.0 g/dL    RDW 13.0 11.5 - 14.5 %    Platelets 204 150 -  450 K/uL    MPV 10.7 9.2 - 12.9 fL    Immature Granulocytes 0.2 0.0 - 0.5 %    Gran # (ANC) 10.0 (H) 1.8 - 7.7 K/uL    Immature Grans (Abs) 0.02 0.00 - 0.04 K/uL    Lymph # 0.8 (L) 1.0 - 4.8 K/uL    Mono # 0.2 (L) 0.3 - 1.0 K/uL    Eos # 0.0 0.0 - 0.5 K/uL    Baso # 0.02 0.00 - 0.20 K/uL    nRBC 0 0 /100 WBC    Gran % 90.5 (H) 38.0 - 73.0 %    Lymph % 7.5 (L) 18.0 - 48.0 %    Mono % 1.6 (L) 4.0 - 15.0 %    Eosinophil % 0.0 0.0 - 8.0 %    Basophil % 0.2 0.0 - 1.9 %    Differential Method Automated    Hemoglobin A1c if not done in past 3 months    Collection Time: 03/04/25  4:16 AM   Result Value Ref Range    Hemoglobin A1C 7.4 (H) 4.0 - 5.6 %    Estimated Avg Glucose 166 (H) 68 - 131 mg/dL   POCT glucose    Collection Time: 03/04/25  7:28 AM   Result Value Ref Range    POCT Glucose 246 (H) 70 - 110 mg/dL   POCT glucose    Collection Time: 03/04/25 11:33 AM   Result Value Ref Range    POCT Glucose 315 (H) 70 - 110 mg/dL   POCT glucose    Collection Time: 03/04/25  3:09 PM   Result Value Ref Range    POCT Glucose 305 (H) 70 - 110 mg/dL   POCT glucose    Collection Time: 03/04/25  4:19 PM   Result Value Ref Range    POCT Glucose 291 (H) 70 - 110 mg/dL   POCT glucose    Collection Time: 03/04/25  7:26 PM   Result Value Ref Range    POCT Glucose 296 (H) 70 - 110 mg/dL   Basic metabolic panel    Collection Time: 03/05/25  4:32 AM   Result Value Ref Range    Sodium 138 136 - 145 mmol/L    Potassium 3.5 3.5 - 5.1 mmol/L    Chloride 94 (L) 95 - 110 mmol/L    CO2 35 (H) 23 - 29 mmol/L    Glucose 139 (H) 70 - 110 mg/dL    BUN 19 8 - 23 mg/dL    Creatinine 0.7 0.5 - 1.4 mg/dL    Calcium 9.0 8.7 - 10.5 mg/dL    Anion Gap 9 8 - 16 mmol/L    eGFR >60 >60 mL/min/1.73 m^2   Magnesium    Collection Time: 03/05/25  4:32 AM   Result Value Ref Range    Magnesium 2.0 1.6 - 2.6 mg/dL   Phosphorus    Collection Time: 03/05/25  4:32 AM   Result Value Ref Range    Phosphorus 1.8 (L) 2.7 - 4.5 mg/dL   CBC auto differential    Collection Time:  03/05/25  4:32 AM   Result Value Ref Range    WBC 18.46 (H) 3.90 - 12.70 K/uL    RBC 3.89 (L) 4.60 - 6.20 M/uL    Hemoglobin 11.0 (L) 14.0 - 18.0 g/dL    Hematocrit 35.4 (L) 40.0 - 54.0 %    MCV 91 82 - 98 fL    MCH 28.3 27.0 - 31.0 pg    MCHC 31.1 (L) 32.0 - 36.0 g/dL    RDW 13.2 11.5 - 14.5 %    Platelets 232 150 - 450 K/uL    MPV 11.0 9.2 - 12.9 fL    Immature Granulocytes 0.5 0.0 - 0.5 %    Gran # (ANC) 14.5 (H) 1.8 - 7.7 K/uL    Immature Grans (Abs) 0.09 (H) 0.00 - 0.04 K/uL    Lymph # 2.1 1.0 - 4.8 K/uL    Mono # 1.7 (H) 0.3 - 1.0 K/uL    Eos # 0.0 0.0 - 0.5 K/uL    Baso # 0.03 0.00 - 0.20 K/uL    nRBC 0 0 /100 WBC    Gran % 78.4 (H) 38.0 - 73.0 %    Lymph % 11.5 (L) 18.0 - 48.0 %    Mono % 9.4 4.0 - 15.0 %    Eosinophil % 0.0 0.0 - 8.0 %    Basophil % 0.2 0.0 - 1.9 %    Differential Method Automated    POCT glucose    Collection Time: 03/05/25  7:58 AM   Result Value Ref Range    POCT Glucose 132 (H) 70 - 110 mg/dL   POCT glucose    Collection Time: 03/05/25 11:35 AM   Result Value Ref Range    POCT Glucose 161 (H) 70 - 110 mg/dL   Echo    Collection Time: 03/05/25 11:57 AM   Result Value Ref Range    BSA 1.99 m2    LVOT stroke volume 52.5 cm3    LVIDd 6.5 (A) 3.5 - 6.0 cm    LV Systolic Volume 165 mL    LV Systolic Volume Index 85.9 mL/m2    LVIDs 5.8 (A) 2.1 - 4.0 cm    LV Diastolic Volume 214 mL    LV Diastolic Volume Index 111.46 mL/m2    Left Ventricular End Systolic Volume by Teichholz Method 165.28 mL    Left Ventricular End Diastolic Volume by Teichholz Method 213.98 mL    IVS 1.2 (A) 0.6 - 1.1 cm    LVOT diameter 2.4 cm    LVOT area 4.5 cm2    FS 10.8 (A) 28 - 44 %    Left Ventricle Relative Wall Thickness 0.34 cm    PW 1.1 0.6 - 1.1 cm    LV mass 339.1 g    LV Mass Index 176.6 g/m2    MV Peak E Alek 1.12 m/s    TDI LATERAL 0.06 m/s    TDI SEPTAL 0.04 m/s    E/E' ratio 22 m/s    TR Max Alek 1.8 m/s    IVRT 124 msec    LV SEPTAL E/E' RATIO 28.0 m/s    LV LATERAL E/E' RATIO 18.7 m/s    LVOT peak alek 0.8  m/s    Left Ventricular Outflow Tract Mean Velocity 0.60 cm/s    Left Ventricular Outflow Tract Mean Gradient 1.58 mmHg    RV- grijalva basal diam 4.0 cm    RV S' 13.35 cm/s    TAPSE 2.22 cm    RV/LV Ratio 0.62 cm    LA size 5.7 cm    Left Atrium Minor Axis 7.6 cm    Left Atrium Major Axis 6.6 cm    RA Major Axis 6.40 cm    AV mean gradient 3 mmHg    AV peak gradient 5 mmHg    Ao peak tracie 1.1 m/s    Ao VTI 16.7 cm    LVOT peak VTI 11.6 cm    AV valve area 3.1 cm²    AV Velocity Ratio 0.73     AV index (prosthetic) 0.69     DEVI by Velocity Ratio 3.3 cm²    TV peak gradient 2 mmHg    PV PEAK VELOCITY 0.73 m/s    PV peak gradient 2 mmHg    Sinus 3.60 cm    STJ 2.69 cm    Ascending aorta 3.47 cm    IVC diameter 1.75 cm    Mean e' 0.05 m/s    ZLVIDS 4.37     ZLVIDD 1.82     RVDD 3.98 cm    CALISTA 89 mL/m2    LA Vol 171 cm3    LA WIDTH 5.0 cm    RA Width 4.7 cm    TV resting pulmonary artery pressure 21 mmHg    RV TB RVSP 10 mmHg    Est. RA pres 8 mmHg   POCT glucose    Collection Time: 03/05/25  4:55 PM   Result Value Ref Range    POCT Glucose 291 (H) 70 - 110 mg/dL   POCT glucose    Collection Time: 03/05/25  8:51 PM   Result Value Ref Range    POCT Glucose 232 (H) 70 - 110 mg/dL   Basic metabolic panel    Collection Time: 03/06/25  3:59 AM   Result Value Ref Range    Sodium 138 136 - 145 mmol/L    Potassium 4.1 3.5 - 5.1 mmol/L    Chloride 97 95 - 110 mmol/L    CO2 33 (H) 23 - 29 mmol/L    Glucose 155 (H) 70 - 110 mg/dL    BUN 23 8 - 23 mg/dL    Creatinine 0.7 0.5 - 1.4 mg/dL    Calcium 8.9 8.7 - 10.5 mg/dL    Anion Gap 8 8 - 16 mmol/L    eGFR >60 >60 mL/min/1.73 m^2   Magnesium    Collection Time: 03/06/25  3:59 AM   Result Value Ref Range    Magnesium 2.1 1.6 - 2.6 mg/dL   Phosphorus    Collection Time: 03/06/25  3:59 AM   Result Value Ref Range    Phosphorus 2.9 2.7 - 4.5 mg/dL   CBC auto differential    Collection Time: 03/06/25  3:59 AM   Result Value Ref Range    WBC 16.33 (H) 3.90 - 12.70 K/uL    RBC 3.92 (L) 4.60 -  6.20 M/uL    Hemoglobin 11.2 (L) 14.0 - 18.0 g/dL    Hematocrit 35.0 (L) 40.0 - 54.0 %    MCV 89 82 - 98 fL    MCH 28.6 27.0 - 31.0 pg    MCHC 32.0 32.0 - 36.0 g/dL    RDW 13.3 11.5 - 14.5 %    Platelets 237 150 - 450 K/uL    MPV 11.1 9.2 - 12.9 fL    Immature Granulocytes 0.4 0.0 - 0.5 %    Gran # (ANC) 12.2 (H) 1.8 - 7.7 K/uL    Immature Grans (Abs) 0.07 (H) 0.00 - 0.04 K/uL    Lymph # 2.9 1.0 - 4.8 K/uL    Mono # 1.1 (H) 0.3 - 1.0 K/uL    Eos # 0.0 0.0 - 0.5 K/uL    Baso # 0.03 0.00 - 0.20 K/uL    nRBC 0 0 /100 WBC    Gran % 74.7 (H) 38.0 - 73.0 %    Lymph % 17.7 (L) 18.0 - 48.0 %    Mono % 7.0 4.0 - 15.0 %    Eosinophil % 0.0 0.0 - 8.0 %    Basophil % 0.2 0.0 - 1.9 %    Differential Method Automated    POCT glucose    Collection Time: 03/06/25  7:46 AM   Result Value Ref Range    POCT Glucose 128 (H) 70 - 110 mg/dL   POCT glucose    Collection Time: 03/06/25 11:29 AM   Result Value Ref Range    POCT Glucose 169 (H) 70 - 110 mg/dL       Microbiology Results (last 7 days)       ** No results found for the last 168 hours. **            Imaging Results              X-Ray Chest AP Portable (Final result)  Result time 03/03/25 20:22:57      Final result by Baljinder Sheppard MD (03/03/25 20:22:57)                   Impression:      Findings consistent with pulmonary edema secondary to CHF.      Electronically signed by: Baljinder Sheppard MD  Date:    03/03/2025  Time:    20:22               Narrative:    EXAMINATION:  XR CHEST AP PORTABLE    CLINICAL HISTORY:  CHF;    TECHNIQUE:  Single frontal view of the chest was performed.    COMPARISON:  01/23/2024.    FINDINGS:  There has been interval removal of the previous support devices.  The trachea is unremarkable.  The cardiomediastinal silhouette is enlarged.  There are small bilateral pleural effusions.  There is no evidence of a pneumothorax.  There is no evidence of pneumomediastinum.  There is pulmonary vascular congestion.  There is no focal consolidation.  There are  degenerative changes in the osseous structures.                                          Pending Diagnostic Studies:       None           Medications:  Reconciled Home Medications:      Medication List        START taking these medications      metoprolol succinate 25 MG 24 hr tablet  Commonly known as: TOPROL-XL  Take 1 tablet (25 mg total) by mouth once daily.  Start taking on: March 7, 2025     montelukast 10 mg tablet  Commonly known as: SINGULAIR  Take 1 tablet (10 mg total) by mouth every evening.     SYMBICORT 160-4.5 mcg/actuation [facility]  Generic drug: budesonide-formoterol 160-4.5 mcg  Inhale 2 puffs into the lungs every 12 (twelve) hours. Controller            CHANGE how you take these medications      furosemide 40 MG tablet  Commonly known as: LASIX  Take 1 tablet (40 mg total) by mouth once daily.  What changed: when to take this     predniSONE 20 MG tablet  Commonly known as: DELTASONE  Take 2 tablets (40 mg total) by mouth once daily. for 2 days  Start taking on: March 7, 2025  What changed: how much to take            CONTINUE taking these medications      albuterol-ipratropium 2.5 mg-0.5 mg/3 mL nebulizer solution  Commonly known as: DUO-NEB  Take 3 mLs by nebulization every 6 (six) hours while awake. Rescue     aspirin 81 MG EC tablet  Commonly known as: ECOTRIN  Take 1 tablet (81 mg total) by mouth once daily.     atorvastatin 40 MG tablet  Commonly known as: LIPITOR  Take 1 tablet (40 mg total) by mouth once daily.     clopidogreL 75 mg tablet  Commonly known as: PLAVIX  Take 1 tablet (75 mg total) by mouth once daily.            STOP taking these medications      amoxicillin-clavulanate 875-125mg 875-125 mg per tablet  Commonly known as: AUGMENTIN     metoprolol tartrate 25 MG tablet  Commonly known as: LOPRESSOR              Indwelling Lines/Drains at time of discharge:   Lines/Drains/Airways       None                   Time spent on the discharge of patient:  Greater than 35 minutes          Sadaf Ramirez DO  Department of Hospital Medicine  [facility] - [place], [place] - Telemetry

## 2025-03-06 NOTE — NURSING
[facility], [facility] - [place]  Nurses Note -- 4 Eyes      3/6/2025       Skin assessed on: Q Shift      [x] No Pressure Injuries Present    [x]Prevention Measures Documented    [] Yes LDA  for Pressure Injury Previously documented     [] Yes New Pressure Injury Discovered   [] LDA for New Pressure Injury Added      Attending RN:  Elan Estevez RN     Second RN:  Marilou MAGUIRE RN

## 2025-03-06 NOTE — PLAN OF CARE
Problem: Adult Inpatient Plan of Care  Goal: Plan of Care Review  Outcome: Progressing  Goal: Patient-Specific Goal (Individualized)  Outcome: Progressing  Goal: Absence of Hospital-Acquired Illness or Injury  Outcome: Progressing  Goal: Optimal Comfort and Wellbeing  Outcome: Progressing  Goal: Readiness for Transition of Care  Outcome: Progressing     Problem: COPD (Chronic Obstructive Pulmonary Disease)  Goal: Optimal Chronic Illness Coping  Outcome: Progressing  Goal: Optimal Level of Functional [place]  Outcome: Progressing  Goal: Absence of Infection Signs and Symptoms  Outcome: Progressing  Goal: Improved Oral Intake  Outcome: Progressing  Goal: Effective Oxygenation and Ventilation  Outcome: Progressing     Problem: COPD (Chronic Obstructive Pulmonary Disease)  Goal: Absence of Infection Signs and Symptoms  Outcome: Progressing

## 2025-03-06 NOTE — ASSESSMENT & PLAN NOTE
Patient's most recent phosphorus results are listed below.   Recent Labs     03/04/25  0416 03/05/25  0432 03/06/25  0359   PHOS 2.1* 1.8* 2.9     Plan  - Will treat hypophosphatemia with IV phos  - Patient's hypophosphatemia is being treated   - monitor

## 2025-03-06 NOTE — ASSESSMENT & PLAN NOTE
Patient with Hypercapnic and Hypoxic Respiratory failure which is Acute on chronic.  he is on home oxygen at 4-5 LPM. Supplemental oxygen was provided and noted- Oxygen Concentration (%):  [36] 36  Signs/symptoms of respiratory failure include- increased work of breathing. Contributing diagnoses includes - CHF and COPD Labs and images were reviewed. Patient Has recent ABG, which has been reviewed. Will treat underlying causes and adjust management of respiratory failure as follows- Wean BiPAP as tolerated     Acute on chronic respiratory failure secondary to COPD and CHF exacerbation  Treated for COPD and CHF exacerbations with nebs, steroids and IV diuresis with great improvement of condition.'  Able to wean off Bipap.  Continue Bipap at night.  Transfer out of ICU on 03/04  On home NC

## 2025-03-06 NOTE — PLAN OF CARE
Case Management Final Discharge Note    Discharge Disposition: Home     New DME ordered / company name: None     Relevant SDOH / Transition of Care Barriers:  None     Person available to provide assistance at home when needed and their contact information: Extended Emergency Contact Information  Primary Emergency Contact: Lillian Irene  Mobile Phone: [phone]  Relation: Spouse  Secondary Emergency Contact: Maria VictoriaAna  Mobile Phone: [phone]  Relation: Daughter     Scheduled followup appointment: PCP- 03/21/25    Referrals placed: Cardiology - 03/28/25 and Pulmonology - Office will contact patient to schedule.     Transportation: Patient's daughter will provide transportation home.     Patient and family educated on discharge services and updated on DC plan. Bedside RN Elan Estevez notified, patient clear to discharge from Case Management Perspective.       03/06/25 1323   Final Note   Assessment Type Final Discharge Note   Anticipated Discharge Disposition Home   What phone number can be called within the next 1-3 days to see how you are doing after discharge? [phone]   Hospital Resources/Appts/Education Provided Appointments scheduled and added to AVS   Post-Acute Status   Coverage [employer]   Discharge Delays None known at this time

## 2025-03-06 NOTE — ASSESSMENT & PLAN NOTE
Patient's blood pressure range in the last 24 hours was: BP  Min: 107/65  Max: 114/74.The patient's inpatient anti-hypertensive regimen is listed below:  Current Antihypertensives  furosemide tablet 40 mg, 2 times daily, Oral  metoprolol succinate (TOPROL-XL) 24 hr tablet 25 mg, Daily, Oral  metoprolol succinate (TOPROL-XL) 24 hr tablet, Daily, Oral  furosemide (LASIX) tablet, Daily, Oral    Plan  - BP is controlled, no changes needed to their regimen

## 2025-03-06 NOTE — ASSESSMENT & PLAN NOTE
Lab Results   Component Value Date    HGBA1C 7.4 (H) 03/04/2025     Glycemic protocol. [facility]

## 2025-03-06 NOTE — PLAN OF CARE
03/06/25 1207   Medicare Message   Important Message from Medicare regarding Discharge Appeal Rights Given to patient/caregiver;Explained to patient/caregiver;Signed/date by patient/caregiver   Date IMM was signed 03/06/25   Time IMM was signed [id number]

## 2025-03-06 NOTE — ASSESSMENT & PLAN NOTE
Anemia is likely due to . Most recent hemoglobin and hematocrit are listed below.  Recent Labs     03/04/25  0416 03/05/25  0432 03/06/25  0359   HGB 10.5* 11.0* 11.2*   HCT 34.0* 35.4* 35.0*     Plan  - Monitor serial CBC:   - Transfuse PRBC if patient becomes hemodynamically unstable, symptomatic or H/H drops below 7/21.  - Patient   - Patient's anemia is currently   - remained stable, monitor

## 2025-03-07 ENCOUNTER — DOCUMENTATION ONLY (OUTPATIENT)
Facility: CLINIC | Age: 70
End: 2025-03-07
Payer: MEDICARE

## 2025-03-07 NOTE — PROGRESS NOTES
"[facility]) hospital discharge 24-72 hour phone follow up completed.     Most Recent Hospital Discharge Date: 03/05/2025  Last admission Diagnosis/chief complaint:SOB    TCC RN Navigator spoke with Mrs. Irene    Current Patient reported weight: patient does not have scale    Current Patient reported blood pressure and heart rate: did not take    Pt reports the following:  []  Shortness of Breath with Activity  []  Shortness of Breath at rest   []  Fatigue  []  Edema   [] Chest pain or tightness  [] Weight Increase since discharge  [x] None of the above    Medications:   Discharge medication reviewed with pt.  Pt reports having medication list available and has all medications at home for use per list.     Education:   Confirmed pt received "Home Care Guide for Heart Failure Patients" while admitted.   Reviewed key points as listed below.     Recommend 2 -3 gram sodium restriction and 1500 cc-2000 cc fluid restriction.  Encourage physical activity with graded exercise program.  Requested patient to weigh themselves daily, and to notify us if their weight increases by more than 3 lbs in 1 day or 5 lbs in 3 days.   Reminded patient to use "Daily weight and symptom tracker" to record and guide patient on when and how to call H[facility]. PT may also use symptom tracker if no scale available  Pt reports being in the green Zone. If in yellow/red, reminded that they should be calling HFT[facility] today or now.     Watch for these Signs and Symptoms: If any of these occur, contact HFT[facility] immediately:   Increase in shortness of breath with movement   Increase in swelling in your legs and ankles   Weight gain of more than 3 pounds in a day or 5 pounds in 3 days.   Difficulty breathing when you are lying down   Worsening fatigue or tiredness   Stomach bloating, a full feeling or a loss of appetite   Increased coughing--especially when you are lying down      Pt was able to verbalize back to RN in their own " words correct diet/fluid restrictions, necessity for exercise, warning signs and symptoms, when and how to contact their TCC team.      Pt educated on follow-up plan while in HFT[facility] program to include:   Week 1 -  F/u appt with Provider and RN (Date) 03/12/2025   Week 2-5 - In person/ Virtual/ phone call check ins    Week 5-7 - Pt will discharge from [facility] and transition to longterm care provider (Cardiology/PCP/ Advanced Heart Failure).      Patient active on myChart? Yes      Pt given the following contact information for ease of communication: [phone] (Mon-Fri, 8a-5p) & for urgent issues on the weekend to page the Heart Transplant MD on call.  Pt also encouraged utilize [facility] messaging as well.      Will follow up with pt at first clinic visit and RN navigator available for pt questions, issues or concerns.

## 2025-03-11 ENCOUNTER — TELEPHONE (OUTPATIENT)
Facility: CLINIC | Age: 70
End: 2025-03-11
Payer: MEDICARE

## 2025-03-20 ENCOUNTER — DOCUMENTATION ONLY (OUTPATIENT)
Facility: CLINIC | Age: 70
End: 2025-03-20
Payer: MEDICARE

## 2025-05-27 ENCOUNTER — DOCUMENTATION ONLY (OUTPATIENT)
Facility: CLINIC | Age: 70
End: 2025-05-27
Payer: MEDICARE

## 2025-05-27 ENCOUNTER — HOSPITAL ENCOUNTER (INPATIENT)
Facility: HOSPITAL | Age: 70
LOS: 2 days | Discharge: HOME | DRG: 291 | End: 2025-05-29
Attending: STUDENT IN AN ORGANIZED HEALTH CARE EDUCATION/TRAINING PROGRAM | Admitting: STUDENT IN AN ORGANIZED HEALTH CARE EDUCATION/TRAINING PROGRAM
Payer: MEDICARE

## 2025-05-27 DIAGNOSIS — J96.01 ACUTE HYPOXIC RESPIRATORY FAILURE: ICD-10-CM

## 2025-05-27 DIAGNOSIS — J96.02 ACUTE HYPERCAPNIC RESPIRATORY FAILURE: ICD-10-CM

## 2025-05-27 DIAGNOSIS — I50.23 ACUTE ON CHRONIC SYSTOLIC CONGESTIVE HEART FAILURE: ICD-10-CM

## 2025-05-27 DIAGNOSIS — E83.39 HYPOPHOSPHATEMIA: ICD-10-CM

## 2025-05-27 DIAGNOSIS — J44.1 CHRONIC OBSTRUCTIVE PULMONARY DISEASE WITH ACUTE EXACERBATION: ICD-10-CM

## 2025-05-27 DIAGNOSIS — R06.02 SHORTNESS OF BREATH: ICD-10-CM

## 2025-05-27 DIAGNOSIS — I50.43 ACUTE ON CHRONIC COMBINED SYSTOLIC AND DIASTOLIC HEART FAILURE: Primary | ICD-10-CM

## 2025-05-27 PROBLEM — J96.11 CHRONIC RESPIRATORY FAILURE WITH HYPOXIA AND HYPERCAPNIA: Status: ACTIVE | Noted: 2017-07-28

## 2025-05-27 PROBLEM — J96.12 CHRONIC RESPIRATORY FAILURE WITH HYPOXIA AND HYPERCAPNIA: Status: ACTIVE | Noted: 2017-07-28

## 2025-05-27 PROBLEM — J44.9 COPD (CHRONIC OBSTRUCTIVE PULMONARY DISEASE): Status: ACTIVE | Noted: 2021-02-13

## 2025-05-27 LAB
ABSOLUTE EOSINOPHIL (OHS): 0 K/UL
ABSOLUTE EOSINOPHIL (OHS): 0.14 K/UL
ABSOLUTE MONOCYTE (OHS): 0.16 K/UL (ref 0.3–1)
ABSOLUTE MONOCYTE (OHS): 0.77 K/UL (ref 0.3–1)
ABSOLUTE NEUTROPHIL COUNT (OHS): 11.83 K/UL (ref 1.8–7.7)
ABSOLUTE NEUTROPHIL COUNT (OHS): 8.22 K/UL (ref 1.8–7.7)
ALBUMIN SERPL BCP-MCNC: 3.4 G/DL (ref 3.5–5.2)
ALBUMIN SERPL BCP-MCNC: 3.4 G/DL (ref 3.5–5.2)
ALBUMIN SERPL BCP-MCNC: 3.6 G/DL (ref 3.5–5.2)
ALLENS TEST: ABNORMAL
ALP SERPL-CCNC: 86 UNIT/L (ref 40–150)
ALP SERPL-CCNC: 88 UNIT/L (ref 40–150)
ALP SERPL-CCNC: 99 UNIT/L (ref 40–150)
ALT SERPL W/O P-5'-P-CCNC: 13 UNIT/L (ref 10–44)
ALT SERPL W/O P-5'-P-CCNC: 14 UNIT/L (ref 10–44)
ALT SERPL W/O P-5'-P-CCNC: 16 UNIT/L (ref 10–44)
ANION GAP (OHS): 10 MMOL/L (ref 8–16)
ANION GAP (OHS): 12 MMOL/L (ref 8–16)
ANION GAP (OHS): 13 MMOL/L (ref 8–16)
AST SERPL-CCNC: 11 UNIT/L (ref 11–45)
AST SERPL-CCNC: 11 UNIT/L (ref 11–45)
AST SERPL-CCNC: 13 UNIT/L (ref 11–45)
BASOPHILS # BLD AUTO: 0.04 K/UL
BASOPHILS # BLD AUTO: 0.06 K/UL
BASOPHILS NFR BLD AUTO: 0.3 %
BASOPHILS NFR BLD AUTO: 0.4 %
BILIRUB SERPL-MCNC: 0.3 MG/DL (ref 0.1–1)
BILIRUB SERPL-MCNC: 0.3 MG/DL (ref 0.1–1)
BILIRUB SERPL-MCNC: 0.4 MG/DL (ref 0.1–1)
BNP SERPL-MCNC: 764 PG/ML (ref 0–99)
BUN SERPL-MCNC: 6 MG/DL (ref 8–23)
BUN SERPL-MCNC: 6 MG/DL (ref 8–23)
BUN SERPL-MCNC: 8 MG/DL (ref 8–23)
CALCIUM SERPL-MCNC: 8.8 MG/DL (ref 8.7–10.5)
CALCIUM SERPL-MCNC: 9 MG/DL (ref 8.7–10.5)
CALCIUM SERPL-MCNC: 9 MG/DL (ref 8.7–10.5)
CHLORIDE SERPL-SCNC: 87 MMOL/L (ref 95–110)
CHLORIDE SERPL-SCNC: 93 MMOL/L (ref 95–110)
CHLORIDE SERPL-SCNC: 94 MMOL/L (ref 95–110)
CO2 SERPL-SCNC: 37 MMOL/L (ref 23–29)
CO2 SERPL-SCNC: 39 MMOL/L (ref 23–29)
CO2 SERPL-SCNC: 41 MMOL/L (ref 23–29)
CREAT SERPL-MCNC: 0.7 MG/DL (ref 0.5–1.4)
CREAT SERPL-MCNC: 0.8 MG/DL (ref 0.5–1.4)
CREAT SERPL-MCNC: 0.8 MG/DL (ref 0.5–1.4)
DELSYS: ABNORMAL
EAG (OHS): 171 MG/DL (ref 68–131)
EP: 8
EP: 8
ERYTHROCYTE [DISTWIDTH] IN BLOOD BY AUTOMATED COUNT: 13 % (ref 11.5–14.5)
ERYTHROCYTE [DISTWIDTH] IN BLOOD BY AUTOMATED COUNT: 13.1 % (ref 11.5–14.5)
ERYTHROCYTE [SEDIMENTATION RATE] IN BLOOD BY WESTERGREN METHOD: 18 MM/H
ERYTHROCYTE [SEDIMENTATION RATE] IN BLOOD BY WESTERGREN METHOD: 18 MM/H
FIO2: 100
FIO2: 50
GFR SERPLBLD CREATININE-BSD FMLA CKD-EPI: >60 ML/MIN/1.73/M2
GLUCOSE SERPL-MCNC: 211 MG/DL (ref 70–110)
GLUCOSE SERPL-MCNC: 262 MG/DL (ref 70–110)
GLUCOSE SERPL-MCNC: 284 MG/DL (ref 70–110)
HBA1C MFR BLD: 7.6 % (ref 4–5.6)
HCO3 UR-SCNC: 44.9 MMOL/L (ref 24–28)
HCO3 UR-SCNC: 48.5 MMOL/L (ref 24–28)
HCO3 UR-SCNC: 49 MMOL/L (ref 24–28)
HCT VFR BLD AUTO: 36.2 % (ref 40–54)
HCT VFR BLD AUTO: 37 % (ref 40–54)
HGB BLD-MCNC: 10.6 GM/DL (ref 14–18)
HGB BLD-MCNC: 10.6 GM/DL (ref 14–18)
IMM GRANULOCYTES # BLD AUTO: 0.04 K/UL (ref 0–0.04)
IMM GRANULOCYTES # BLD AUTO: 0.04 K/UL (ref 0–0.04)
IMM GRANULOCYTES NFR BLD AUTO: 0.3 % (ref 0–0.5)
IMM GRANULOCYTES NFR BLD AUTO: 0.3 % (ref 0–0.5)
IP: 16
IP: 16
LDH SERPL L TO P-CCNC: 2.49 MMOL/L (ref 0.5–2.2)
LYMPHOCYTES # BLD AUTO: 0.66 K/UL (ref 1–4.8)
LYMPHOCYTES # BLD AUTO: 4.46 K/UL (ref 1–4.8)
MAGNESIUM SERPL-MCNC: 2 MG/DL (ref 1.6–2.6)
MCH RBC QN AUTO: 27.8 PG (ref 27–31)
MCH RBC QN AUTO: 28.3 PG (ref 27–31)
MCHC RBC AUTO-ENTMCNC: 28.6 G/DL (ref 32–36)
MCHC RBC AUTO-ENTMCNC: 29.3 G/DL (ref 32–36)
MCV RBC AUTO: 97 FL (ref 82–98)
MCV RBC AUTO: 97 FL (ref 82–98)
MIN VOL: 7.8
MIN VOL: 9.2
MODE: ABNORMAL
MODE: ABNORMAL
NUCLEATED RBC (/100WBC) (OHS): 0 /100 WBC
NUCLEATED RBC (/100WBC) (OHS): 0 /100 WBC
OHS QRS DURATION: 104 MS
OHS QTC CALCULATION: 487 MS
PCO2 BLDA: 109.4 MMHG (ref 35–45)
PCO2 BLDA: 112.7 MMHG (ref 35–45)
PCO2 BLDA: 127.3 MMHG (ref 35–45)
PH SMN: 7.19 [PH] (ref 7.35–7.45)
PH SMN: 7.21 [PH] (ref 7.35–7.45)
PH SMN: 7.26 [PH] (ref 7.35–7.45)
PHOSPHATE SERPL-MCNC: 3.4 MG/DL (ref 2.7–4.5)
PLATELET # BLD AUTO: 226 K/UL (ref 150–450)
PLATELET # BLD AUTO: 283 K/UL (ref 150–450)
PMV BLD AUTO: 10.5 FL (ref 9.2–12.9)
PMV BLD AUTO: 10.5 FL (ref 9.2–12.9)
PO2 BLDA: 125 MMHG (ref 40–60)
PO2 BLDA: 18 MMHG (ref 40–60)
PO2 BLDA: 24 MMHG (ref 40–60)
POC BE: 13 MMOL/L (ref -2–2)
POC BE: 15 MMOL/L (ref -2–2)
POC BE: 17 MMOL/L (ref -2–2)
POC SATURATED O2: 18 % (ref 95–100)
POC SATURATED O2: 26 % (ref 95–100)
POC SATURATED O2: 97 % (ref 95–100)
POC TCO2: 48 MMOL/L (ref 24–29)
POC TCO2: >50 MMOL/L (ref 24–29)
POC TCO2: >50 MMOL/L (ref 24–29)
POCT GLUCOSE: 211 MG/DL (ref 70–110)
POCT GLUCOSE: 224 MG/DL (ref 70–110)
POCT GLUCOSE: 230 MG/DL (ref 70–110)
POCT GLUCOSE: 240 MG/DL (ref 70–110)
POCT GLUCOSE: 304 MG/DL (ref 70–110)
POTASSIUM SERPL-SCNC: 4 MMOL/L (ref 3.5–5.1)
POTASSIUM SERPL-SCNC: 4.2 MMOL/L (ref 3.5–5.1)
POTASSIUM SERPL-SCNC: 4.3 MMOL/L (ref 3.5–5.1)
PROCALCITONIN SERPL-MCNC: 0.06 NG/ML
PROT SERPL-MCNC: 7.6 GM/DL (ref 6–8.4)
PROT SERPL-MCNC: 7.9 GM/DL (ref 6–8.4)
PROT SERPL-MCNC: 7.9 GM/DL (ref 6–8.4)
RBC # BLD AUTO: 3.74 M/UL (ref 4.6–6.2)
RBC # BLD AUTO: 3.81 M/UL (ref 4.6–6.2)
RELATIVE EOSINOPHIL (OHS): 0 %
RELATIVE EOSINOPHIL (OHS): 1 %
RELATIVE LYMPHOCYTE (OHS): 32.6 % (ref 18–48)
RELATIVE LYMPHOCYTE (OHS): 5.2 % (ref 18–48)
RELATIVE MONOCYTE (OHS): 1.3 % (ref 4–15)
RELATIVE MONOCYTE (OHS): 5.6 % (ref 4–15)
RELATIVE NEUTROPHIL (OHS): 60.1 % (ref 38–73)
RELATIVE NEUTROPHIL (OHS): 92.9 % (ref 38–73)
SAMPLE: ABNORMAL
SITE: ABNORMAL
SODIUM SERPL-SCNC: 141 MMOL/L (ref 136–145)
SODIUM SERPL-SCNC: 142 MMOL/L (ref 136–145)
SODIUM SERPL-SCNC: 143 MMOL/L (ref 136–145)
SP02: 100
SP02: 99
SPONT RATE: 2
SPONT RATE: 7
TROPONIN I SERPL DL<=0.01 NG/ML-MCNC: 0.02 NG/ML
WBC # BLD AUTO: 12.73 K/UL (ref 3.9–12.7)
WBC # BLD AUTO: 13.69 K/UL (ref 3.9–12.7)

## 2025-05-27 PROCEDURE — 96375 TX/PRO/DX INJ NEW DRUG ADDON: CPT

## 2025-05-27 PROCEDURE — 99900035 HC TECH TIME PER 15 MIN (STAT)

## 2025-05-27 PROCEDURE — 27000221 HC OXYGEN, UP TO 24 HOURS

## 2025-05-27 PROCEDURE — 94799 UNLISTED PULMONARY SVC/PX: CPT

## 2025-05-27 PROCEDURE — 94640 AIRWAY INHALATION TREATMENT: CPT

## 2025-05-27 PROCEDURE — 83880 ASSAY OF NATRIURETIC PEPTIDE: CPT | Performed by: STUDENT IN AN ORGANIZED HEALTH CARE EDUCATION/TRAINING PROGRAM

## 2025-05-27 PROCEDURE — 99291 CRITICAL CARE FIRST HOUR: CPT | Mod: ,,, | Performed by: NURSE PRACTITIONER

## 2025-05-27 PROCEDURE — 83735 ASSAY OF MAGNESIUM: CPT | Performed by: STUDENT IN AN ORGANIZED HEALTH CARE EDUCATION/TRAINING PROGRAM

## 2025-05-27 PROCEDURE — 99223 1ST HOSP IP/OBS HIGH 75: CPT | Mod: ,,, | Performed by: NURSE PRACTITIONER

## 2025-05-27 PROCEDURE — 80053 COMPREHEN METABOLIC PANEL: CPT | Performed by: STUDENT IN AN ORGANIZED HEALTH CARE EDUCATION/TRAINING PROGRAM

## 2025-05-27 PROCEDURE — 84484 ASSAY OF TROPONIN QUANT: CPT | Performed by: STUDENT IN AN ORGANIZED HEALTH CARE EDUCATION/TRAINING PROGRAM

## 2025-05-27 PROCEDURE — 63600175 PHARM REV CODE 636 W HCPCS: Performed by: STUDENT IN AN ORGANIZED HEALTH CARE EDUCATION/TRAINING PROGRAM

## 2025-05-27 PROCEDURE — 36415 COLL VENOUS BLD VENIPUNCTURE: CPT | Performed by: NURSE PRACTITIONER

## 2025-05-27 PROCEDURE — 84145 PROCALCITONIN (PCT): CPT | Performed by: NURSE PRACTITIONER

## 2025-05-27 PROCEDURE — 82803 BLOOD GASES ANY COMBINATION: CPT

## 2025-05-27 PROCEDURE — 84100 ASSAY OF PHOSPHORUS: CPT | Performed by: STUDENT IN AN ORGANIZED HEALTH CARE EDUCATION/TRAINING PROGRAM

## 2025-05-27 PROCEDURE — 99291 CRITICAL CARE FIRST HOUR: CPT

## 2025-05-27 PROCEDURE — 85025 COMPLETE CBC W/AUTO DIFF WBC: CPT | Performed by: STUDENT IN AN ORGANIZED HEALTH CARE EDUCATION/TRAINING PROGRAM

## 2025-05-27 PROCEDURE — 93005 ELECTROCARDIOGRAM TRACING: CPT

## 2025-05-27 PROCEDURE — 96374 THER/PROPH/DIAG INJ IV PUSH: CPT

## 2025-05-27 PROCEDURE — 63600175 PHARM REV CODE 636 W HCPCS: Performed by: HOSPITALIST

## 2025-05-27 PROCEDURE — 83036 HEMOGLOBIN GLYCOSYLATED A1C: CPT | Performed by: HOSPITALIST

## 2025-05-27 PROCEDURE — 5A09357 ASSISTANCE WITH RESPIRATORY VENTILATION, LESS THAN 24 CONSECUTIVE HOURS, CONTINUOUS POSITIVE AIRWAY PRESSURE: ICD-10-PCS | Performed by: STUDENT IN AN ORGANIZED HEALTH CARE EDUCATION/TRAINING PROGRAM

## 2025-05-27 PROCEDURE — 93010 ELECTROCARDIOGRAM REPORT: CPT | Mod: ,,, | Performed by: INTERNAL MEDICINE

## 2025-05-27 PROCEDURE — 25000003 PHARM REV CODE 250: Performed by: STUDENT IN AN ORGANIZED HEALTH CARE EDUCATION/TRAINING PROGRAM

## 2025-05-27 PROCEDURE — 36415 COLL VENOUS BLD VENIPUNCTURE: CPT | Performed by: HOSPITALIST

## 2025-05-27 PROCEDURE — 25000242 PHARM REV CODE 250 ALT 637 W/ HCPCS: Performed by: STUDENT IN AN ORGANIZED HEALTH CARE EDUCATION/TRAINING PROGRAM

## 2025-05-27 PROCEDURE — 94660 CPAP INITIATION&MGMT: CPT

## 2025-05-27 PROCEDURE — 82040 ASSAY OF SERUM ALBUMIN: CPT | Performed by: HOSPITALIST

## 2025-05-27 PROCEDURE — 21400001 HC TELEMETRY ROOM

## 2025-05-27 PROCEDURE — 27000190 HC CPAP FULL FACE MASK W/VALVE

## 2025-05-27 PROCEDURE — 36415 COLL VENOUS BLD VENIPUNCTURE: CPT | Performed by: STUDENT IN AN ORGANIZED HEALTH CARE EDUCATION/TRAINING PROGRAM

## 2025-05-27 PROCEDURE — 25000242 PHARM REV CODE 250 ALT 637 W/ HCPCS: Performed by: HOSPITALIST

## 2025-05-27 RX ORDER — INSULIN ASPART 100 [IU]/ML
0-5 INJECTION, SOLUTION INTRAVENOUS; SUBCUTANEOUS
Status: DISCONTINUED | OUTPATIENT
Start: 2025-05-27 | End: 2025-05-27

## 2025-05-27 RX ORDER — CALCIUM GLUCONATE 20 MG/ML
2 INJECTION, SOLUTION INTRAVENOUS
Status: DISCONTINUED | OUTPATIENT
Start: 2025-05-27 | End: 2025-05-29 | Stop reason: HOSPADM

## 2025-05-27 RX ORDER — SODIUM CHLORIDE 0.9 % (FLUSH) 0.9 %
10 SYRINGE (ML) INJECTION
Status: DISCONTINUED | OUTPATIENT
Start: 2025-05-27 | End: 2025-05-29 | Stop reason: HOSPADM

## 2025-05-27 RX ORDER — GLUCAGON 1 MG
1 KIT INJECTION
Status: DISCONTINUED | OUTPATIENT
Start: 2025-05-27 | End: 2025-05-27

## 2025-05-27 RX ORDER — IPRATROPIUM BROMIDE AND ALBUTEROL SULFATE 2.5; .5 MG/3ML; MG/3ML
3 SOLUTION RESPIRATORY (INHALATION) EVERY 4 HOURS PRN
Status: DISCONTINUED | OUTPATIENT
Start: 2025-05-27 | End: 2025-05-27

## 2025-05-27 RX ORDER — CLOPIDOGREL BISULFATE 75 MG/1
75 TABLET ORAL DAILY
Status: DISCONTINUED | OUTPATIENT
Start: 2025-05-27 | End: 2025-05-29 | Stop reason: HOSPADM

## 2025-05-27 RX ORDER — ONDANSETRON HYDROCHLORIDE 2 MG/ML
4 INJECTION, SOLUTION INTRAVENOUS
Status: COMPLETED | OUTPATIENT
Start: 2025-05-27 | End: 2025-05-27

## 2025-05-27 RX ORDER — ARFORMOTEROL TARTRATE 15 UG/2ML
15 SOLUTION RESPIRATORY (INHALATION) 2 TIMES DAILY
Status: DISCONTINUED | OUTPATIENT
Start: 2025-05-27 | End: 2025-05-29 | Stop reason: HOSPADM

## 2025-05-27 RX ORDER — CALCIUM GLUCONATE 20 MG/ML
3 INJECTION, SOLUTION INTRAVENOUS
Status: DISCONTINUED | OUTPATIENT
Start: 2025-05-27 | End: 2025-05-29 | Stop reason: HOSPADM

## 2025-05-27 RX ORDER — IBUPROFEN 200 MG
24 TABLET ORAL
Status: DISCONTINUED | OUTPATIENT
Start: 2025-05-27 | End: 2025-05-29 | Stop reason: HOSPADM

## 2025-05-27 RX ORDER — IBUPROFEN 200 MG
16 TABLET ORAL
Status: DISCONTINUED | OUTPATIENT
Start: 2025-05-27 | End: 2025-05-27

## 2025-05-27 RX ORDER — IPRATROPIUM BROMIDE AND ALBUTEROL SULFATE 2.5; .5 MG/3ML; MG/3ML
3 SOLUTION RESPIRATORY (INHALATION) EVERY 6 HOURS PRN
Status: DISCONTINUED | OUTPATIENT
Start: 2025-05-27 | End: 2025-05-29 | Stop reason: HOSPADM

## 2025-05-27 RX ORDER — ONDANSETRON HYDROCHLORIDE 2 MG/ML
4 INJECTION, SOLUTION INTRAVENOUS EVERY 8 HOURS PRN
Status: DISCONTINUED | OUTPATIENT
Start: 2025-05-27 | End: 2025-05-29 | Stop reason: HOSPADM

## 2025-05-27 RX ORDER — ATORVASTATIN CALCIUM 40 MG/1
40 TABLET, FILM COATED ORAL DAILY
Status: DISCONTINUED | OUTPATIENT
Start: 2025-05-27 | End: 2025-05-29 | Stop reason: HOSPADM

## 2025-05-27 RX ORDER — IBUPROFEN 200 MG
16 TABLET ORAL
Status: DISCONTINUED | OUTPATIENT
Start: 2025-05-27 | End: 2025-05-29 | Stop reason: HOSPADM

## 2025-05-27 RX ORDER — FAMOTIDINE 10 MG/ML
20 INJECTION, SOLUTION INTRAVENOUS DAILY
Status: DISCONTINUED | OUTPATIENT
Start: 2025-05-27 | End: 2025-05-29

## 2025-05-27 RX ORDER — BUDESONIDE 0.5 MG/2ML
0.5 INHALANT ORAL 2 TIMES DAILY
Status: DISCONTINUED | OUTPATIENT
Start: 2025-05-27 | End: 2025-05-29 | Stop reason: HOSPADM

## 2025-05-27 RX ORDER — MAGNESIUM SULFATE HEPTAHYDRATE 40 MG/ML
2 INJECTION, SOLUTION INTRAVENOUS
Status: DISCONTINUED | OUTPATIENT
Start: 2025-05-27 | End: 2025-05-29 | Stop reason: HOSPADM

## 2025-05-27 RX ORDER — GLUCAGON 1 MG
1 KIT INJECTION
Status: DISCONTINUED | OUTPATIENT
Start: 2025-05-27 | End: 2025-05-29 | Stop reason: HOSPADM

## 2025-05-27 RX ORDER — FUROSEMIDE 10 MG/ML
80 INJECTION INTRAMUSCULAR; INTRAVENOUS
Status: COMPLETED | OUTPATIENT
Start: 2025-05-27 | End: 2025-05-27

## 2025-05-27 RX ORDER — POTASSIUM CHLORIDE 7.45 MG/ML
40 INJECTION INTRAVENOUS
Status: DISCONTINUED | OUTPATIENT
Start: 2025-05-27 | End: 2025-05-29 | Stop reason: HOSPADM

## 2025-05-27 RX ORDER — INSULIN ASPART 100 [IU]/ML
0-10 INJECTION, SOLUTION INTRAVENOUS; SUBCUTANEOUS
Status: DISCONTINUED | OUTPATIENT
Start: 2025-05-27 | End: 2025-05-29 | Stop reason: HOSPADM

## 2025-05-27 RX ORDER — POTASSIUM CHLORIDE 7.45 MG/ML
60 INJECTION INTRAVENOUS
Status: DISCONTINUED | OUTPATIENT
Start: 2025-05-27 | End: 2025-05-29 | Stop reason: HOSPADM

## 2025-05-27 RX ORDER — BUDESONIDE AND FORMOTEROL FUMARATE DIHYDRATE 160; 4.5 UG/1; UG/1
2 AEROSOL RESPIRATORY (INHALATION) EVERY 12 HOURS
Status: DISCONTINUED | OUTPATIENT
Start: 2025-05-27 | End: 2025-05-27

## 2025-05-27 RX ORDER — TALC
6 POWDER (GRAM) TOPICAL NIGHTLY PRN
Status: DISCONTINUED | OUTPATIENT
Start: 2025-05-27 | End: 2025-05-29 | Stop reason: HOSPADM

## 2025-05-27 RX ORDER — ACETAMINOPHEN 325 MG/1
650 TABLET ORAL EVERY 4 HOURS PRN
Status: DISCONTINUED | OUTPATIENT
Start: 2025-05-27 | End: 2025-05-29 | Stop reason: HOSPADM

## 2025-05-27 RX ORDER — INSULIN GLARGINE 100 [IU]/ML
20 INJECTION, SOLUTION SUBCUTANEOUS NIGHTLY
Status: DISCONTINUED | OUTPATIENT
Start: 2025-05-27 | End: 2025-05-29 | Stop reason: HOSPADM

## 2025-05-27 RX ORDER — IBUPROFEN 200 MG
24 TABLET ORAL
Status: DISCONTINUED | OUTPATIENT
Start: 2025-05-27 | End: 2025-05-27

## 2025-05-27 RX ORDER — FUROSEMIDE 10 MG/ML
60 INJECTION INTRAMUSCULAR; INTRAVENOUS EVERY 12 HOURS
Status: DISCONTINUED | OUTPATIENT
Start: 2025-05-27 | End: 2025-05-29 | Stop reason: HOSPADM

## 2025-05-27 RX ORDER — IPRATROPIUM BROMIDE AND ALBUTEROL SULFATE 2.5; .5 MG/3ML; MG/3ML
3 SOLUTION RESPIRATORY (INHALATION)
Status: DISCONTINUED | OUTPATIENT
Start: 2025-05-27 | End: 2025-05-27

## 2025-05-27 RX ORDER — HEPARIN SODIUM 5000 [USP'U]/ML
5000 INJECTION, SOLUTION INTRAVENOUS; SUBCUTANEOUS EVERY 8 HOURS
Status: DISCONTINUED | OUTPATIENT
Start: 2025-05-27 | End: 2025-05-28

## 2025-05-27 RX ORDER — CALCIUM GLUCONATE 20 MG/ML
1 INJECTION, SOLUTION INTRAVENOUS
Status: DISCONTINUED | OUTPATIENT
Start: 2025-05-27 | End: 2025-05-29 | Stop reason: HOSPADM

## 2025-05-27 RX ORDER — ASPIRIN 81 MG/1
81 TABLET ORAL DAILY
Status: DISCONTINUED | OUTPATIENT
Start: 2025-05-27 | End: 2025-05-29 | Stop reason: HOSPADM

## 2025-05-27 RX ORDER — POTASSIUM CHLORIDE 7.45 MG/ML
80 INJECTION INTRAVENOUS
Status: DISCONTINUED | OUTPATIENT
Start: 2025-05-27 | End: 2025-05-29 | Stop reason: HOSPADM

## 2025-05-27 RX ADMIN — INSULIN ASPART 2 UNITS: 100 INJECTION, SOLUTION INTRAVENOUS; SUBCUTANEOUS at 09:05

## 2025-05-27 RX ADMIN — ONDANSETRON 4 MG: 2 INJECTION INTRAMUSCULAR; INTRAVENOUS at 02:05

## 2025-05-27 RX ADMIN — IPRATROPIUM BROMIDE AND ALBUTEROL SULFATE 3 ML: 2.5; .5 SOLUTION RESPIRATORY (INHALATION) at 07:05

## 2025-05-27 RX ADMIN — ASPIRIN 81 MG: 81 TABLET, COATED ORAL at 08:05

## 2025-05-27 RX ADMIN — FUROSEMIDE 80 MG: 10 INJECTION, SOLUTION INTRAVENOUS at 02:05

## 2025-05-27 RX ADMIN — IPRATROPIUM BROMIDE AND ALBUTEROL SULFATE 3 ML: 2.5; .5 SOLUTION RESPIRATORY (INHALATION) at 01:05

## 2025-05-27 RX ADMIN — FUROSEMIDE 60 MG: 10 INJECTION, SOLUTION INTRAMUSCULAR; INTRAVENOUS at 08:05

## 2025-05-27 RX ADMIN — BUDESONIDE 0.5 MG: 0.5 INHALANT RESPIRATORY (INHALATION) at 08:05

## 2025-05-27 RX ADMIN — HEPARIN SODIUM 5000 UNITS: 5000 INJECTION INTRAVENOUS; SUBCUTANEOUS at 09:05

## 2025-05-27 RX ADMIN — ATORVASTATIN CALCIUM 40 MG: 40 TABLET, FILM COATED ORAL at 08:05

## 2025-05-27 RX ADMIN — INSULIN GLARGINE 20 UNITS: 100 INJECTION, SOLUTION SUBCUTANEOUS at 09:05

## 2025-05-27 RX ADMIN — HEPARIN SODIUM 5000 UNITS: 5000 INJECTION INTRAVENOUS; SUBCUTANEOUS at 01:05

## 2025-05-27 RX ADMIN — HEPARIN SODIUM 5000 UNITS: 5000 INJECTION INTRAVENOUS; SUBCUTANEOUS at 05:05

## 2025-05-27 RX ADMIN — INSULIN ASPART 8 UNITS: 100 INJECTION, SOLUTION INTRAVENOUS; SUBCUTANEOUS at 04:05

## 2025-05-27 RX ADMIN — CLOPIDOGREL BISULFATE 75 MG: 75 TABLET, FILM COATED ORAL at 08:05

## 2025-05-27 RX ADMIN — FAMOTIDINE 20 MG: 10 INJECTION, SOLUTION INTRAVENOUS at 09:05

## 2025-05-27 RX ADMIN — FUROSEMIDE 60 MG: 10 INJECTION, SOLUTION INTRAMUSCULAR; INTRAVENOUS at 09:05

## 2025-05-27 RX ADMIN — ARFORMOTEROL TARTRATE 15 MCG: 15 SOLUTION RESPIRATORY (INHALATION) at 08:05

## 2025-05-27 RX ADMIN — INSULIN ASPART 2 UNITS: 100 INJECTION, SOLUTION INTRAVENOUS; SUBCUTANEOUS at 12:05

## 2025-05-28 PROBLEM — D69.6 THROMBOCYTOPENIA: Status: ACTIVE | Noted: 2025-05-28

## 2025-05-28 LAB
ABSOLUTE EOSINOPHIL (OHS): 0.05 K/UL
ABSOLUTE MONOCYTE (OHS): 0.89 K/UL (ref 0.3–1)
ABSOLUTE NEUTROPHIL COUNT (OHS): 8.42 K/UL (ref 1.8–7.7)
ANION GAP (OHS): 13 MMOL/L (ref 8–16)
BASOPHILS # BLD AUTO: 0.05 K/UL
BASOPHILS NFR BLD AUTO: 0.4 %
BUN SERPL-MCNC: 16 MG/DL (ref 8–23)
CALCIUM SERPL-MCNC: 9.1 MG/DL (ref 8.7–10.5)
CHLORIDE SERPL-SCNC: 88 MMOL/L (ref 95–110)
CO2 SERPL-SCNC: 38 MMOL/L (ref 23–29)
CREAT SERPL-MCNC: 0.8 MG/DL (ref 0.5–1.4)
ERYTHROCYTE [DISTWIDTH] IN BLOOD BY AUTOMATED COUNT: 13 % (ref 11.5–14.5)
GFR SERPLBLD CREATININE-BSD FMLA CKD-EPI: >60 ML/MIN/1.73/M2
GLUCOSE SERPL-MCNC: 127 MG/DL (ref 70–110)
HCT VFR BLD AUTO: 36.6 % (ref 40–54)
HGB BLD-MCNC: 10.7 GM/DL (ref 14–18)
IMM GRANULOCYTES # BLD AUTO: 0.05 K/UL (ref 0–0.04)
IMM GRANULOCYTES NFR BLD AUTO: 0.4 % (ref 0–0.5)
LYMPHOCYTES # BLD AUTO: 3.29 K/UL (ref 1–4.8)
MCH RBC QN AUTO: 27.9 PG (ref 27–31)
MCHC RBC AUTO-ENTMCNC: 29.2 G/DL (ref 32–36)
MCV RBC AUTO: 95 FL (ref 82–98)
NUCLEATED RBC (/100WBC) (OHS): 0 /100 WBC
PLATELET # BLD AUTO: 136 K/UL (ref 150–450)
PLATELET BLD QL SMEAR: ABNORMAL
PMV BLD AUTO: 12 FL (ref 9.2–12.9)
POCT GLUCOSE: 125 MG/DL (ref 70–110)
POCT GLUCOSE: 134 MG/DL (ref 70–110)
POCT GLUCOSE: 150 MG/DL (ref 70–110)
POCT GLUCOSE: 170 MG/DL (ref 70–110)
POTASSIUM SERPL-SCNC: 3.7 MMOL/L (ref 3.5–5.1)
RBC # BLD AUTO: 3.84 M/UL (ref 4.6–6.2)
RELATIVE EOSINOPHIL (OHS): 0.4 %
RELATIVE LYMPHOCYTE (OHS): 25.8 % (ref 18–48)
RELATIVE MONOCYTE (OHS): 7 % (ref 4–15)
RELATIVE NEUTROPHIL (OHS): 66 % (ref 38–73)
SODIUM SERPL-SCNC: 139 MMOL/L (ref 136–145)
WBC # BLD AUTO: 12.75 K/UL (ref 3.9–12.7)

## 2025-05-28 PROCEDURE — 97161 PT EVAL LOW COMPLEX 20 MIN: CPT

## 2025-05-28 PROCEDURE — 82310 ASSAY OF CALCIUM: CPT | Performed by: HOSPITALIST

## 2025-05-28 PROCEDURE — 86022 PLATELET ANTIBODIES: CPT | Performed by: STUDENT IN AN ORGANIZED HEALTH CARE EDUCATION/TRAINING PROGRAM

## 2025-05-28 PROCEDURE — 97116 GAIT TRAINING THERAPY: CPT

## 2025-05-28 PROCEDURE — 97535 SELF CARE MNGMENT TRAINING: CPT

## 2025-05-28 PROCEDURE — 25000003 PHARM REV CODE 250: Performed by: STUDENT IN AN ORGANIZED HEALTH CARE EDUCATION/TRAINING PROGRAM

## 2025-05-28 PROCEDURE — 36415 COLL VENOUS BLD VENIPUNCTURE: CPT | Performed by: HOSPITALIST

## 2025-05-28 PROCEDURE — 97165 OT EVAL LOW COMPLEX 30 MIN: CPT

## 2025-05-28 PROCEDURE — 21400001 HC TELEMETRY ROOM

## 2025-05-28 PROCEDURE — 25000242 PHARM REV CODE 250 ALT 637 W/ HCPCS: Performed by: HOSPITALIST

## 2025-05-28 PROCEDURE — 63600175 PHARM REV CODE 636 W HCPCS: Performed by: STUDENT IN AN ORGANIZED HEALTH CARE EDUCATION/TRAINING PROGRAM

## 2025-05-28 PROCEDURE — 36415 COLL VENOUS BLD VENIPUNCTURE: CPT | Performed by: STUDENT IN AN ORGANIZED HEALTH CARE EDUCATION/TRAINING PROGRAM

## 2025-05-28 PROCEDURE — 97530 THERAPEUTIC ACTIVITIES: CPT

## 2025-05-28 PROCEDURE — 85025 COMPLETE CBC W/AUTO DIFF WBC: CPT | Performed by: HOSPITALIST

## 2025-05-28 PROCEDURE — 27000221 HC OXYGEN, UP TO 24 HOURS

## 2025-05-28 PROCEDURE — 99900035 HC TECH TIME PER 15 MIN (STAT)

## 2025-05-28 PROCEDURE — 94640 AIRWAY INHALATION TREATMENT: CPT

## 2025-05-28 PROCEDURE — 94761 N-INVAS EAR/PLS OXIMETRY MLT: CPT

## 2025-05-28 RX ADMIN — ACETAMINOPHEN 650 MG: 325 TABLET ORAL at 12:05

## 2025-05-28 RX ADMIN — CLOPIDOGREL BISULFATE 75 MG: 75 TABLET, FILM COATED ORAL at 08:05

## 2025-05-28 RX ADMIN — FAMOTIDINE 20 MG: 10 INJECTION, SOLUTION INTRAVENOUS at 08:05

## 2025-05-28 RX ADMIN — ATORVASTATIN CALCIUM 40 MG: 40 TABLET, FILM COATED ORAL at 08:05

## 2025-05-28 RX ADMIN — FUROSEMIDE 60 MG: 10 INJECTION, SOLUTION INTRAMUSCULAR; INTRAVENOUS at 09:05

## 2025-05-28 RX ADMIN — BUDESONIDE 0.5 MG: 0.5 INHALANT RESPIRATORY (INHALATION) at 08:05

## 2025-05-28 RX ADMIN — BUDESONIDE 0.5 MG: 0.5 INHALANT RESPIRATORY (INHALATION) at 07:05

## 2025-05-28 RX ADMIN — ARFORMOTEROL TARTRATE 15 MCG: 15 SOLUTION RESPIRATORY (INHALATION) at 08:05

## 2025-05-28 RX ADMIN — HEPARIN SODIUM 5000 UNITS: 5000 INJECTION INTRAVENOUS; SUBCUTANEOUS at 06:05

## 2025-05-28 RX ADMIN — FUROSEMIDE 60 MG: 10 INJECTION, SOLUTION INTRAMUSCULAR; INTRAVENOUS at 08:05

## 2025-05-28 RX ADMIN — ARFORMOTEROL TARTRATE 15 MCG: 15 SOLUTION RESPIRATORY (INHALATION) at 07:05

## 2025-05-28 RX ADMIN — INSULIN ASPART 2 UNITS: 100 INJECTION, SOLUTION INTRAVENOUS; SUBCUTANEOUS at 09:05

## 2025-05-28 RX ADMIN — INSULIN GLARGINE 20 UNITS: 100 INJECTION, SOLUTION SUBCUTANEOUS at 09:05

## 2025-05-28 RX ADMIN — ASPIRIN 81 MG: 81 TABLET, COATED ORAL at 08:05

## 2025-05-29 VITALS
SYSTOLIC BLOOD PRESSURE: 122 MMHG | OXYGEN SATURATION: 99 % | DIASTOLIC BLOOD PRESSURE: 93 MMHG | BODY MASS INDEX: 31.99 KG/M2 | HEART RATE: 96 BPM | WEIGHT: 192 LBS | RESPIRATION RATE: 18 BRPM | HEIGHT: 65 IN | TEMPERATURE: 98 F

## 2025-05-29 LAB
ABSOLUTE EOSINOPHIL (OHS): 0.06 K/UL
ABSOLUTE MONOCYTE (OHS): 0.53 K/UL (ref 0.3–1)
ABSOLUTE NEUTROPHIL COUNT (OHS): 5.39 K/UL (ref 1.8–7.7)
ANION GAP (OHS): 14 MMOL/L (ref 8–16)
BASOPHILS # BLD AUTO: 0.05 K/UL
BASOPHILS NFR BLD AUTO: 0.6 %
BUN SERPL-MCNC: 15 MG/DL (ref 8–23)
CALCIUM SERPL-MCNC: 9.5 MG/DL (ref 8.7–10.5)
CHLORIDE SERPL-SCNC: 92 MMOL/L (ref 95–110)
CO2 SERPL-SCNC: 34 MMOL/L (ref 23–29)
CREAT SERPL-MCNC: 0.8 MG/DL (ref 0.5–1.4)
ERYTHROCYTE [DISTWIDTH] IN BLOOD BY AUTOMATED COUNT: 13.2 % (ref 11.5–14.5)
GFR SERPLBLD CREATININE-BSD FMLA CKD-EPI: >60 ML/MIN/1.73/M2
GLUCOSE SERPL-MCNC: 115 MG/DL (ref 70–110)
HCT VFR BLD AUTO: 38.4 % (ref 40–54)
HGB BLD-MCNC: 11.4 GM/DL (ref 14–18)
IMM GRANULOCYTES # BLD AUTO: 0.02 K/UL (ref 0–0.04)
IMM GRANULOCYTES NFR BLD AUTO: 0.2 % (ref 0–0.5)
LYMPHOCYTES # BLD AUTO: 2.48 K/UL (ref 1–4.8)
MCH RBC QN AUTO: 27.9 PG (ref 27–31)
MCHC RBC AUTO-ENTMCNC: 29.7 G/DL (ref 32–36)
MCV RBC AUTO: 94 FL (ref 82–98)
NUCLEATED RBC (/100WBC) (OHS): 0 /100 WBC
PF4 HEPARIN CMPLX AB SER QL: 0.14 OD
PLATELET # BLD AUTO: 196 K/UL (ref 150–450)
PLATELET BLD QL SMEAR: NORMAL
PMV BLD AUTO: 10.7 FL (ref 9.2–12.9)
POCT GLUCOSE: 122 MG/DL (ref 70–110)
POCT GLUCOSE: 157 MG/DL (ref 70–110)
POTASSIUM SERPL-SCNC: 3.5 MMOL/L (ref 3.5–5.1)
RBC # BLD AUTO: 4.09 M/UL (ref 4.6–6.2)
RELATIVE EOSINOPHIL (OHS): 0.7 %
RELATIVE LYMPHOCYTE (OHS): 29.1 % (ref 18–48)
RELATIVE MONOCYTE (OHS): 6.2 % (ref 4–15)
RELATIVE NEUTROPHIL (OHS): 63.2 % (ref 38–73)
SODIUM SERPL-SCNC: 140 MMOL/L (ref 136–145)
WBC # BLD AUTO: 8.53 K/UL (ref 3.9–12.7)

## 2025-05-29 PROCEDURE — 25000242 PHARM REV CODE 250 ALT 637 W/ HCPCS: Performed by: HOSPITALIST

## 2025-05-29 PROCEDURE — 99900035 HC TECH TIME PER 15 MIN (STAT)

## 2025-05-29 PROCEDURE — 94761 N-INVAS EAR/PLS OXIMETRY MLT: CPT

## 2025-05-29 PROCEDURE — 80048 BASIC METABOLIC PNL TOTAL CA: CPT | Performed by: HOSPITALIST

## 2025-05-29 PROCEDURE — 27000221 HC OXYGEN, UP TO 24 HOURS

## 2025-05-29 PROCEDURE — 36415 COLL VENOUS BLD VENIPUNCTURE: CPT | Performed by: HOSPITALIST

## 2025-05-29 PROCEDURE — 94640 AIRWAY INHALATION TREATMENT: CPT

## 2025-05-29 PROCEDURE — 85025 COMPLETE CBC W/AUTO DIFF WBC: CPT | Performed by: HOSPITALIST

## 2025-05-29 PROCEDURE — 97116 GAIT TRAINING THERAPY: CPT

## 2025-05-29 PROCEDURE — 63600175 PHARM REV CODE 636 W HCPCS: Performed by: STUDENT IN AN ORGANIZED HEALTH CARE EDUCATION/TRAINING PROGRAM

## 2025-05-29 PROCEDURE — 25000003 PHARM REV CODE 250: Performed by: STUDENT IN AN ORGANIZED HEALTH CARE EDUCATION/TRAINING PROGRAM

## 2025-05-29 PROCEDURE — 94660 CPAP INITIATION&MGMT: CPT

## 2025-05-29 RX ORDER — FAMOTIDINE 10 MG/ML
20 INJECTION, SOLUTION INTRAVENOUS 2 TIMES DAILY
Status: DISCONTINUED | OUTPATIENT
Start: 2025-05-29 | End: 2025-05-29 | Stop reason: HOSPADM

## 2025-05-29 RX ADMIN — CLOPIDOGREL BISULFATE 75 MG: 75 TABLET, FILM COATED ORAL at 08:05

## 2025-05-29 RX ADMIN — FAMOTIDINE 20 MG: 10 INJECTION, SOLUTION INTRAVENOUS at 08:05

## 2025-05-29 RX ADMIN — BUDESONIDE 0.5 MG: 0.5 INHALANT RESPIRATORY (INHALATION) at 08:05

## 2025-05-29 RX ADMIN — ASPIRIN 81 MG: 81 TABLET, COATED ORAL at 08:05

## 2025-05-29 RX ADMIN — ARFORMOTEROL TARTRATE 15 MCG: 15 SOLUTION RESPIRATORY (INHALATION) at 08:05

## 2025-05-29 RX ADMIN — FUROSEMIDE 60 MG: 10 INJECTION, SOLUTION INTRAMUSCULAR; INTRAVENOUS at 08:05

## 2025-05-29 RX ADMIN — ATORVASTATIN CALCIUM 40 MG: 40 TABLET, FILM COATED ORAL at 08:05

## 2025-06-09 ENCOUNTER — DOCUMENTATION ONLY (OUTPATIENT)
Facility: CLINIC | Age: 70
End: 2025-06-09
Payer: MEDICARE

## 2025-06-09 ENCOUNTER — HOSPITAL ENCOUNTER (INPATIENT)
Facility: HOSPITAL | Age: 70
LOS: 2 days | Discharge: HOSPICE | DRG: 291 | End: 2025-06-11
Attending: STUDENT IN AN ORGANIZED HEALTH CARE EDUCATION/TRAINING PROGRAM | Admitting: STUDENT IN AN ORGANIZED HEALTH CARE EDUCATION/TRAINING PROGRAM
Payer: MEDICARE

## 2025-06-09 DIAGNOSIS — R07.9 CHEST PAIN: ICD-10-CM

## 2025-06-09 DIAGNOSIS — R06.02 SHORTNESS OF BREATH: ICD-10-CM

## 2025-06-09 DIAGNOSIS — R79.89 TROPONIN LEVEL ELEVATED: ICD-10-CM

## 2025-06-09 DIAGNOSIS — J44.1 COPD WITH ACUTE EXACERBATION: ICD-10-CM

## 2025-06-09 DIAGNOSIS — J96.22 ACUTE ON CHRONIC RESPIRATORY FAILURE WITH HYPOXIA AND HYPERCAPNIA: Primary | ICD-10-CM

## 2025-06-09 DIAGNOSIS — I50.9 CHF (CONGESTIVE HEART FAILURE): ICD-10-CM

## 2025-06-09 DIAGNOSIS — J96.21 ACUTE ON CHRONIC RESPIRATORY FAILURE WITH HYPOXIA AND HYPERCAPNIA: Primary | ICD-10-CM

## 2025-06-09 PROBLEM — I25.10 CORONARY ARTERY DISEASE INVOLVING NATIVE CORONARY ARTERY OF NATIVE HEART WITHOUT ANGINA PECTORIS: Status: ACTIVE | Noted: 2025-06-09

## 2025-06-09 PROBLEM — D72.829 LEUKOCYTOSIS: Status: ACTIVE | Noted: 2025-06-09

## 2025-06-09 PROBLEM — Z72.0 TOBACCO ABUSE: Status: ACTIVE | Noted: 2025-06-09

## 2025-06-09 PROBLEM — I5A NON-ISCHEMIC MYOCARDIAL INJURY (NON-TRAUMATIC): Status: ACTIVE | Noted: 2025-06-09

## 2025-06-09 PROBLEM — I50.43 ACUTE ON CHRONIC COMBINED SYSTOLIC AND DIASTOLIC CONGESTIVE HEART FAILURE: Status: ACTIVE | Noted: 2025-06-09

## 2025-06-09 PROBLEM — Z71.89 ADVANCE CARE PLANNING: Status: ACTIVE | Noted: 2025-06-09

## 2025-06-09 PROBLEM — J44.9 COPD (CHRONIC OBSTRUCTIVE PULMONARY DISEASE): Status: ACTIVE | Noted: 2025-06-09

## 2025-06-09 LAB
ABSOLUTE EOSINOPHIL (OHS): 0 K/UL
ABSOLUTE EOSINOPHIL (OHS): 0.35 K/UL
ABSOLUTE MONOCYTE (OHS): 0.21 K/UL (ref 0.3–1)
ABSOLUTE MONOCYTE (OHS): 0.82 K/UL (ref 0.3–1)
ABSOLUTE NEUTROPHIL COUNT (OHS): 10.25 K/UL (ref 1.8–7.7)
ABSOLUTE NEUTROPHIL COUNT (OHS): 19.13 K/UL (ref 1.8–7.7)
ALBUMIN SERPL BCP-MCNC: 3.6 G/DL (ref 3.5–5.2)
ALLENS TEST: ABNORMAL
ALLENS TEST: ABNORMAL
ALP SERPL-CCNC: 89 UNIT/L (ref 40–150)
ALT SERPL W/O P-5'-P-CCNC: 15 UNIT/L (ref 10–44)
ANION GAP (OHS): 10 MMOL/L (ref 8–16)
ANION GAP (OHS): 11 MMOL/L (ref 8–16)
AORTIC SIZE INDEX (SOV): 1.7 CM/M2
AORTIC SIZE INDEX: 1.6 CM/M2
ASCENDING AORTA: 3.2 CM
AST SERPL-CCNC: 14 UNIT/L (ref 11–45)
AV INDEX (PROSTH): 0.65
AV MEAN GRADIENT: 3 MMHG
AV PEAK GRADIENT: 5 MMHG
AV VALVE AREA BY VELOCITY RATIO: 2.6 CM²
AV VALVE AREA: 2.7 CM²
AV VELOCITY RATIO: 0.64
BASOPHILS # BLD AUTO: 0.03 K/UL
BASOPHILS # BLD AUTO: 0.06 K/UL
BASOPHILS NFR BLD AUTO: 0.1 %
BASOPHILS NFR BLD AUTO: 0.3 %
BILIRUB SERPL-MCNC: 0.3 MG/DL (ref 0.1–1)
BNP SERPL-MCNC: 1086 PG/ML (ref 0–99)
BSA FOR ECHO PROCEDURE: 1.99 M2
BUN SERPL-MCNC: 5 MG/DL (ref 8–23)
BUN SERPL-MCNC: 5 MG/DL (ref 8–23)
CALCIUM SERPL-MCNC: 8.7 MG/DL (ref 8.7–10.5)
CALCIUM SERPL-MCNC: 8.9 MG/DL (ref 8.7–10.5)
CHLORIDE SERPL-SCNC: 99 MMOL/L (ref 95–110)
CHLORIDE SERPL-SCNC: 99 MMOL/L (ref 95–110)
CO2 SERPL-SCNC: 32 MMOL/L (ref 23–29)
CO2 SERPL-SCNC: 34 MMOL/L (ref 23–29)
CREAT SERPL-MCNC: 0.8 MG/DL (ref 0.5–1.4)
CREAT SERPL-MCNC: 0.8 MG/DL (ref 0.5–1.4)
CTP QC/QA: YES
CTP QC/QA: YES
CV ECHO LV RWT: 0.29 CM
DELSYS: ABNORMAL
DELSYS: ABNORMAL
DOP CALC AO PEAK VEL: 1.1 M/S
DOP CALC AO VTI: 20.7 CM
DOP CALC LVOT AREA: 4.2 CM2
DOP CALC LVOT DIAMETER: 2.3 CM
DOP CALC LVOT PEAK VEL: 0.7 M/S
DOP CALC LVOT STROKE VOLUME: 55.6 CM3
DOP CALCLVOT PEAK VEL VTI: 13.4 CM
E WAVE DECELERATION TIME: 81 MSEC
E/A RATIO: 1.07
E/E' RATIO: 21 M/S
ECHO LV POSTERIOR WALL: 0.9 CM (ref 0.6–1.1)
EP: 10
EP: 10
ERYTHROCYTE [DISTWIDTH] IN BLOOD BY AUTOMATED COUNT: 13.4 % (ref 11.5–14.5)
ERYTHROCYTE [DISTWIDTH] IN BLOOD BY AUTOMATED COUNT: 13.4 % (ref 11.5–14.5)
ERYTHROCYTE [SEDIMENTATION RATE] IN BLOOD BY WESTERGREN METHOD: 12 MM/H
ERYTHROCYTE [SEDIMENTATION RATE] IN BLOOD BY WESTERGREN METHOD: 12 MM/H
FIO2: 100
FIO2: 40
FRACTIONAL SHORTENING: 6.3 % (ref 28–44)
GFR SERPLBLD CREATININE-BSD FMLA CKD-EPI: >60 ML/MIN/1.73/M2
GFR SERPLBLD CREATININE-BSD FMLA CKD-EPI: >60 ML/MIN/1.73/M2
GLUCOSE SERPL-MCNC: 220 MG/DL (ref 70–110)
GLUCOSE SERPL-MCNC: 240 MG/DL (ref 70–110)
HCO3 UR-SCNC: 37.7 MMOL/L (ref 24–28)
HCO3 UR-SCNC: ABNORMAL MMOL/L
HCT VFR BLD AUTO: 36.8 % (ref 40–54)
HCT VFR BLD AUTO: 37.6 % (ref 40–54)
HGB BLD-MCNC: 10.4 GM/DL (ref 14–18)
HGB BLD-MCNC: 11.1 GM/DL (ref 14–18)
IMM GRANULOCYTES # BLD AUTO: 0.07 K/UL (ref 0–0.04)
IMM GRANULOCYTES # BLD AUTO: 0.11 K/UL (ref 0–0.04)
IMM GRANULOCYTES NFR BLD AUTO: 0.4 % (ref 0–0.5)
IMM GRANULOCYTES NFR BLD AUTO: 0.5 % (ref 0–0.5)
INR PPP: 1 (ref 0.8–1.2)
INTERVENTRICULAR SEPTUM: 0.9 CM (ref 0.6–1.1)
IP: 15
IP: 20
IVC DIAMETER: 2.35 CM
LA MAJOR: 6.1 CM
LA MINOR: 6.7 CM
LEFT ATRIUM SIZE: 5.7 CM
LEFT INTERNAL DIMENSION IN SYSTOLE: 5.9 CM (ref 2.1–4)
LEFT VENTRICLE DIASTOLIC VOLUME INDEX: 102.06 ML/M2
LEFT VENTRICLE DIASTOLIC VOLUME: 198 ML
LEFT VENTRICLE MASS INDEX: 121 G/M2
LEFT VENTRICLE SYSTOLIC VOLUME INDEX: 89.2 ML/M2
LEFT VENTRICLE SYSTOLIC VOLUME: 173 ML
LEFT VENTRICULAR INTERNAL DIMENSION IN DIASTOLE: 6.3 CM (ref 3.5–6)
LEFT VENTRICULAR MASS: 234.7 G
LV LATERAL E/E' RATIO: 19 M/S
LV SEPTAL E/E' RATIO: 23.8 M/S
LVED V (TEICH): 198.08 ML
LVES V (TEICH): 172.52 ML
LVOT MG: 1.18 MMHG
LVOT MV: 0.52 CM/S
LYMPHOCYTES # BLD AUTO: 0.58 K/UL (ref 1–4.8)
LYMPHOCYTES # BLD AUTO: 5.65 K/UL (ref 1–4.8)
MAGNESIUM SERPL-MCNC: 2.5 MG/DL (ref 1.6–2.6)
MCH RBC QN AUTO: 27.9 PG (ref 27–31)
MCH RBC QN AUTO: 28.6 PG (ref 27–31)
MCHC RBC AUTO-ENTMCNC: 28.3 G/DL (ref 32–36)
MCHC RBC AUTO-ENTMCNC: 29.5 G/DL (ref 32–36)
MCV RBC AUTO: 97 FL (ref 82–98)
MCV RBC AUTO: 99 FL (ref 82–98)
MIN VOL: 12.7
MODE: ABNORMAL
MODE: ABNORMAL
MV PEAK A VEL: 0.89 M/S
MV PEAK E VEL: 0.95 M/S
MV STENOSIS PRESSURE HALF TIME: 23.53 MS
MV VALVE AREA P 1/2 METHOD: 9.35 CM2
NUCLEATED RBC (/100WBC) (OHS): 0 /100 WBC
NUCLEATED RBC (/100WBC) (OHS): 0 /100 WBC
OHS CV RV/LV RATIO: 0.68 CM
PCO2 BLDA: 87.8 MMHG (ref 35–45)
PCO2 BLDA: >130 MMHG (ref 35–45)
PH SMN: 7.08 [PH] (ref 7.35–7.45)
PH SMN: 7.24 [PH] (ref 7.35–7.45)
PISA TR MAX VEL: 1.9 M/S
PLATELET # BLD AUTO: 225 K/UL (ref 150–450)
PLATELET # BLD AUTO: 267 K/UL (ref 150–450)
PMV BLD AUTO: 10.6 FL (ref 9.2–12.9)
PMV BLD AUTO: 11 FL (ref 9.2–12.9)
PO2 BLDA: 134 MMHG (ref 40–60)
PO2 BLDA: 79 MMHG (ref 80–100)
POC BE: 8 MMOL/L (ref -2–2)
POC BE: ABNORMAL MMOL/L (ref -2–2)
POC MOLECULAR INFLUENZA A AGN: NEGATIVE
POC MOLECULAR INFLUENZA B AGN: NEGATIVE
POC SATURATED O2: 92 % (ref 95–100)
POC SATURATED O2: ABNORMAL %
POC TCO2: 40 MMOL/L (ref 23–27)
POC TCO2: ABNORMAL MMOL/L
POCT GLUCOSE: 191 MG/DL (ref 70–110)
POCT GLUCOSE: 235 MG/DL (ref 70–110)
POCT GLUCOSE: 257 MG/DL (ref 70–110)
POCT GLUCOSE: 269 MG/DL (ref 70–110)
POTASSIUM SERPL-SCNC: 3.8 MMOL/L (ref 3.5–5.1)
POTASSIUM SERPL-SCNC: 4.1 MMOL/L (ref 3.5–5.1)
PROCALCITONIN SERPL-MCNC: 0.11 NG/ML
PROT SERPL-MCNC: 8.2 GM/DL (ref 6–8.4)
PROTHROMBIN TIME: 10.8 SECONDS (ref 9–12.5)
PV PEAK GRADIENT: 2 MMHG
PV PEAK VELOCITY: 0.77 M/S
RA MAJOR: 6.46 CM
RA PRESSURE ESTIMATED: 3 MMHG
RBC # BLD AUTO: 3.73 M/UL (ref 4.6–6.2)
RBC # BLD AUTO: 3.88 M/UL (ref 4.6–6.2)
RELATIVE EOSINOPHIL (OHS): 0 %
RELATIVE EOSINOPHIL (OHS): 2 %
RELATIVE LYMPHOCYTE (OHS): 2.9 % (ref 18–48)
RELATIVE LYMPHOCYTE (OHS): 32.8 % (ref 18–48)
RELATIVE MONOCYTE (OHS): 1 % (ref 4–15)
RELATIVE MONOCYTE (OHS): 4.8 % (ref 4–15)
RELATIVE NEUTROPHIL (OHS): 59.7 % (ref 38–73)
RELATIVE NEUTROPHIL (OHS): 95.5 % (ref 38–73)
RIGHT VENTRICLE DIASTOLIC BASEL DIMENSION: 4.3 CM
RIGHT VENTRICULAR END-DIASTOLIC DIMENSION: 4.26 CM
RV TB RVSP: 5 MMHG
RV TISSUE DOPPLER FREE WALL SYSTOLIC VELOCITY 1 (APICAL 4 CHAMBER VIEW): 14.33 CM/S
SAMPLE: ABNORMAL
SAMPLE: ABNORMAL
SARS-COV-2 RDRP RESP QL NAA+PROBE: NEGATIVE
SINUS: 3.24 CM
SITE: ABNORMAL
SITE: ABNORMAL
SODIUM SERPL-SCNC: 141 MMOL/L (ref 136–145)
SODIUM SERPL-SCNC: 144 MMOL/L (ref 136–145)
SP02: 98
SP02: 98
SPONT RATE: 24
SPONT RATE: 30
STJ: 2.8 CM
TDI LATERAL: 0.05 M/S
TDI SEPTAL: 0.04 M/S
TDI: 0.05 M/S
TR MAX PG: 15 MMHG
TRICUSPID ANNULAR PLANE SYSTOLIC EXCURSION: 2.8 CM
TROPONIN I SERPL DL<=0.01 NG/ML-MCNC: 0.01 NG/ML
TROPONIN I SERPL DL<=0.01 NG/ML-MCNC: 0.03 NG/ML
TROPONIN I SERPL DL<=0.01 NG/ML-MCNC: 0.11 NG/ML
TROPONIN I SERPL DL<=0.01 NG/ML-MCNC: 0.3 NG/ML
TSH SERPL-ACNC: 2.29 UIU/ML (ref 0.4–4)
TV PEAK GRADIENT: 0 MMHG
TV REST PULMONARY ARTERY PRESSURE: 17 MMHG
WBC # BLD AUTO: 17.2 K/UL (ref 3.9–12.7)
WBC # BLD AUTO: 20.06 K/UL (ref 3.9–12.7)
Z-SCORE OF LEFT VENTRICULAR DIMENSION IN END DIASTOLE: 1.34
Z-SCORE OF LEFT VENTRICULAR DIMENSION IN END SYSTOLE: 4.38

## 2025-06-09 PROCEDURE — 82803 BLOOD GASES ANY COMBINATION: CPT

## 2025-06-09 PROCEDURE — 20000000 HC ICU ROOM

## 2025-06-09 PROCEDURE — 85025 COMPLETE CBC W/AUTO DIFF WBC: CPT | Performed by: INTERNAL MEDICINE

## 2025-06-09 PROCEDURE — 93005 ELECTROCARDIOGRAM TRACING: CPT

## 2025-06-09 PROCEDURE — 99223 1ST HOSP IP/OBS HIGH 75: CPT | Mod: ,,, | Performed by: INTERNAL MEDICINE

## 2025-06-09 PROCEDURE — 94640 AIRWAY INHALATION TREATMENT: CPT

## 2025-06-09 PROCEDURE — 36415 COLL VENOUS BLD VENIPUNCTURE: CPT | Performed by: INTERNAL MEDICINE

## 2025-06-09 PROCEDURE — 5A09357 ASSISTANCE WITH RESPIRATORY VENTILATION, LESS THAN 24 CONSECUTIVE HOURS, CONTINUOUS POSITIVE AIRWAY PRESSURE: ICD-10-PCS | Performed by: HOSPITALIST

## 2025-06-09 PROCEDURE — 99900035 HC TECH TIME PER 15 MIN (STAT)

## 2025-06-09 PROCEDURE — 83735 ASSAY OF MAGNESIUM: CPT | Performed by: STUDENT IN AN ORGANIZED HEALTH CARE EDUCATION/TRAINING PROGRAM

## 2025-06-09 PROCEDURE — 96365 THER/PROPH/DIAG IV INF INIT: CPT

## 2025-06-09 PROCEDURE — 63600175 PHARM REV CODE 636 W HCPCS: Performed by: INTERNAL MEDICINE

## 2025-06-09 PROCEDURE — 25000003 PHARM REV CODE 250: Performed by: STUDENT IN AN ORGANIZED HEALTH CARE EDUCATION/TRAINING PROGRAM

## 2025-06-09 PROCEDURE — 99291 CRITICAL CARE FIRST HOUR: CPT | Mod: ,,, | Performed by: NURSE PRACTITIONER

## 2025-06-09 PROCEDURE — 36600 WITHDRAWAL OF ARTERIAL BLOOD: CPT

## 2025-06-09 PROCEDURE — 84484 ASSAY OF TROPONIN QUANT: CPT | Performed by: INTERNAL MEDICINE

## 2025-06-09 PROCEDURE — 94644 CONT INHLJ TX 1ST HOUR: CPT

## 2025-06-09 PROCEDURE — 63600175 PHARM REV CODE 636 W HCPCS: Performed by: STUDENT IN AN ORGANIZED HEALTH CARE EDUCATION/TRAINING PROGRAM

## 2025-06-09 PROCEDURE — 94645 CONT INHLJ TX EACH ADDL HOUR: CPT

## 2025-06-09 PROCEDURE — 27000221 HC OXYGEN, UP TO 24 HOURS

## 2025-06-09 PROCEDURE — 93010 ELECTROCARDIOGRAM REPORT: CPT | Mod: ,,, | Performed by: INTERNAL MEDICINE

## 2025-06-09 PROCEDURE — 87502 INFLUENZA DNA AMP PROBE: CPT

## 2025-06-09 PROCEDURE — 84443 ASSAY THYROID STIM HORMONE: CPT | Performed by: STUDENT IN AN ORGANIZED HEALTH CARE EDUCATION/TRAINING PROGRAM

## 2025-06-09 PROCEDURE — 83880 ASSAY OF NATRIURETIC PEPTIDE: CPT | Performed by: STUDENT IN AN ORGANIZED HEALTH CARE EDUCATION/TRAINING PROGRAM

## 2025-06-09 PROCEDURE — 80053 COMPREHEN METABOLIC PANEL: CPT | Performed by: STUDENT IN AN ORGANIZED HEALTH CARE EDUCATION/TRAINING PROGRAM

## 2025-06-09 PROCEDURE — 85610 PROTHROMBIN TIME: CPT | Performed by: INTERNAL MEDICINE

## 2025-06-09 PROCEDURE — 25000003 PHARM REV CODE 250: Performed by: INTERNAL MEDICINE

## 2025-06-09 PROCEDURE — 25000242 PHARM REV CODE 250 ALT 637 W/ HCPCS

## 2025-06-09 PROCEDURE — 27000190 HC CPAP FULL FACE MASK W/VALVE

## 2025-06-09 PROCEDURE — 99291 CRITICAL CARE FIRST HOUR: CPT | Mod: 25,,, | Performed by: INTERNAL MEDICINE

## 2025-06-09 PROCEDURE — 25000242 PHARM REV CODE 250 ALT 637 W/ HCPCS: Performed by: INTERNAL MEDICINE

## 2025-06-09 PROCEDURE — 94761 N-INVAS EAR/PLS OXIMETRY MLT: CPT | Mod: XB

## 2025-06-09 PROCEDURE — 84145 PROCALCITONIN (PCT): CPT | Performed by: INTERNAL MEDICINE

## 2025-06-09 PROCEDURE — 84484 ASSAY OF TROPONIN QUANT: CPT | Performed by: STUDENT IN AN ORGANIZED HEALTH CARE EDUCATION/TRAINING PROGRAM

## 2025-06-09 PROCEDURE — 87635 SARS-COV-2 COVID-19 AMP PRB: CPT | Performed by: STUDENT IN AN ORGANIZED HEALTH CARE EDUCATION/TRAINING PROGRAM

## 2025-06-09 PROCEDURE — 99285 EMERGENCY DEPT VISIT HI MDM: CPT | Mod: 25

## 2025-06-09 PROCEDURE — 85025 COMPLETE CBC W/AUTO DIFF WBC: CPT | Performed by: STUDENT IN AN ORGANIZED HEALTH CARE EDUCATION/TRAINING PROGRAM

## 2025-06-09 PROCEDURE — 94660 CPAP INITIATION&MGMT: CPT

## 2025-06-09 PROCEDURE — 80048 BASIC METABOLIC PNL TOTAL CA: CPT | Mod: XB | Performed by: INTERNAL MEDICINE

## 2025-06-09 PROCEDURE — 25000242 PHARM REV CODE 250 ALT 637 W/ HCPCS: Performed by: STUDENT IN AN ORGANIZED HEALTH CARE EDUCATION/TRAINING PROGRAM

## 2025-06-09 PROCEDURE — 63600175 PHARM REV CODE 636 W HCPCS: Performed by: NURSE PRACTITIONER

## 2025-06-09 RX ORDER — ENOXAPARIN SODIUM 100 MG/ML
40 INJECTION SUBCUTANEOUS EVERY 24 HOURS
Status: DISCONTINUED | OUTPATIENT
Start: 2025-06-09 | End: 2025-06-11 | Stop reason: HOSPADM

## 2025-06-09 RX ORDER — ATORVASTATIN CALCIUM 40 MG/1
40 TABLET, FILM COATED ORAL NIGHTLY
Status: DISCONTINUED | OUTPATIENT
Start: 2025-06-09 | End: 2025-06-11 | Stop reason: HOSPADM

## 2025-06-09 RX ORDER — CLOPIDOGREL BISULFATE 75 MG/1
75 TABLET ORAL DAILY
Status: DISCONTINUED | OUTPATIENT
Start: 2025-06-09 | End: 2025-06-11 | Stop reason: HOSPADM

## 2025-06-09 RX ORDER — ASPIRIN 81 MG/1
81 TABLET ORAL DAILY
Status: DISCONTINUED | OUTPATIENT
Start: 2025-06-09 | End: 2025-06-11 | Stop reason: HOSPADM

## 2025-06-09 RX ORDER — FUROSEMIDE 10 MG/ML
40 INJECTION INTRAMUSCULAR; INTRAVENOUS EVERY 12 HOURS
Status: DISCONTINUED | OUTPATIENT
Start: 2025-06-09 | End: 2025-06-11

## 2025-06-09 RX ORDER — FUROSEMIDE 10 MG/ML
40 INJECTION INTRAMUSCULAR; INTRAVENOUS EVERY 12 HOURS
Status: DISCONTINUED | OUTPATIENT
Start: 2025-06-09 | End: 2025-06-09

## 2025-06-09 RX ORDER — IPRATROPIUM BROMIDE AND ALBUTEROL SULFATE 2.5; .5 MG/3ML; MG/3ML
3 SOLUTION RESPIRATORY (INHALATION)
Status: COMPLETED | OUTPATIENT
Start: 2025-06-09 | End: 2025-06-09

## 2025-06-09 RX ORDER — LIDOCAINE HYDROCHLORIDE 20 MG/ML
15 SOLUTION OROPHARYNGEAL ONCE
Status: COMPLETED | OUTPATIENT
Start: 2025-06-09 | End: 2025-06-09

## 2025-06-09 RX ORDER — PREDNISONE 20 MG/1
40 TABLET ORAL DAILY
Status: DISCONTINUED | OUTPATIENT
Start: 2025-06-10 | End: 2025-06-11 | Stop reason: HOSPADM

## 2025-06-09 RX ORDER — IPRATROPIUM BROMIDE AND ALBUTEROL SULFATE 2.5; .5 MG/3ML; MG/3ML
3 SOLUTION RESPIRATORY (INHALATION) EVERY 6 HOURS
Status: COMPLETED | OUTPATIENT
Start: 2025-06-09 | End: 2025-06-10

## 2025-06-09 RX ORDER — PANTOPRAZOLE SODIUM 40 MG/10ML
40 INJECTION, POWDER, LYOPHILIZED, FOR SOLUTION INTRAVENOUS
Status: COMPLETED | OUTPATIENT
Start: 2025-06-09 | End: 2025-06-09

## 2025-06-09 RX ORDER — FUROSEMIDE 10 MG/ML
60 INJECTION INTRAMUSCULAR; INTRAVENOUS ONCE
Status: COMPLETED | OUTPATIENT
Start: 2025-06-09 | End: 2025-06-09

## 2025-06-09 RX ORDER — ALUMINUM HYDROXIDE, MAGNESIUM HYDROXIDE, AND SIMETHICONE 1200; 120; 1200 MG/30ML; MG/30ML; MG/30ML
30 SUSPENSION ORAL ONCE
Status: COMPLETED | OUTPATIENT
Start: 2025-06-09 | End: 2025-06-09

## 2025-06-09 RX ORDER — GLUCAGON 1 MG
1 KIT INJECTION
Status: DISCONTINUED | OUTPATIENT
Start: 2025-06-09 | End: 2025-06-11 | Stop reason: HOSPADM

## 2025-06-09 RX ORDER — ALBUTEROL SULFATE 2.5 MG/.5ML
10 SOLUTION RESPIRATORY (INHALATION)
Status: COMPLETED | OUTPATIENT
Start: 2025-06-09 | End: 2025-06-09

## 2025-06-09 RX ORDER — METOPROLOL SUCCINATE 25 MG/1
25 TABLET, EXTENDED RELEASE ORAL DAILY
Status: DISCONTINUED | OUTPATIENT
Start: 2025-06-09 | End: 2025-06-11 | Stop reason: HOSPADM

## 2025-06-09 RX ORDER — IPRATROPIUM BROMIDE AND ALBUTEROL SULFATE 2.5; .5 MG/3ML; MG/3ML
3 SOLUTION RESPIRATORY (INHALATION) EVERY 4 HOURS PRN
Status: DISCONTINUED | OUTPATIENT
Start: 2025-06-09 | End: 2025-06-11 | Stop reason: HOSPADM

## 2025-06-09 RX ORDER — INSULIN ASPART 100 [IU]/ML
0-10 INJECTION, SOLUTION INTRAVENOUS; SUBCUTANEOUS EVERY 6 HOURS PRN
Status: DISCONTINUED | OUTPATIENT
Start: 2025-06-09 | End: 2025-06-11 | Stop reason: HOSPADM

## 2025-06-09 RX ORDER — TALC
6 POWDER (GRAM) TOPICAL NIGHTLY PRN
Status: DISCONTINUED | OUTPATIENT
Start: 2025-06-09 | End: 2025-06-11 | Stop reason: HOSPADM

## 2025-06-09 RX ORDER — POLYETHYLENE GLYCOL 3350 17 G/17G
17 POWDER, FOR SOLUTION ORAL 2 TIMES DAILY PRN
Status: DISCONTINUED | OUTPATIENT
Start: 2025-06-09 | End: 2025-06-11 | Stop reason: HOSPADM

## 2025-06-09 RX ORDER — METHYLPREDNISOLONE SOD SUCC 125 MG
60 VIAL (EA) INJECTION
Status: DISCONTINUED | OUTPATIENT
Start: 2025-06-09 | End: 2025-06-09

## 2025-06-09 RX ORDER — IPRATROPIUM BROMIDE AND ALBUTEROL SULFATE 2.5; .5 MG/3ML; MG/3ML
SOLUTION RESPIRATORY (INHALATION)
Status: COMPLETED
Start: 2025-06-09 | End: 2025-06-09

## 2025-06-09 RX ORDER — SODIUM CHLORIDE 0.9 % (FLUSH) 0.9 %
3 SYRINGE (ML) INJECTION
Status: DISCONTINUED | OUTPATIENT
Start: 2025-06-09 | End: 2025-06-11 | Stop reason: HOSPADM

## 2025-06-09 RX ORDER — MUPIROCIN 20 MG/G
OINTMENT TOPICAL 2 TIMES DAILY
Status: DISCONTINUED | OUTPATIENT
Start: 2025-06-09 | End: 2025-06-11 | Stop reason: HOSPADM

## 2025-06-09 RX ORDER — ONDANSETRON HYDROCHLORIDE 2 MG/ML
4 INJECTION, SOLUTION INTRAVENOUS EVERY 6 HOURS PRN
Status: DISCONTINUED | OUTPATIENT
Start: 2025-06-09 | End: 2025-06-11 | Stop reason: HOSPADM

## 2025-06-09 RX ORDER — ACETAMINOPHEN 325 MG/1
650 TABLET ORAL EVERY 6 HOURS PRN
Status: DISCONTINUED | OUTPATIENT
Start: 2025-06-09 | End: 2025-06-11 | Stop reason: HOSPADM

## 2025-06-09 RX ADMIN — ALUMINUM HYDROXIDE, MAGNESIUM HYDROXIDE, AND SIMETHICONE 30 ML: 200; 200; 20 SUSPENSION ORAL at 02:06

## 2025-06-09 RX ADMIN — IPRATROPIUM BROMIDE AND ALBUTEROL SULFATE 3 ML: 2.5; .5 SOLUTION RESPIRATORY (INHALATION) at 12:06

## 2025-06-09 RX ADMIN — CLOPIDOGREL 75 MG: 75 TABLET ORAL at 08:06

## 2025-06-09 RX ADMIN — METOPROLOL SUCCINATE 25 MG: 25 TABLET, EXTENDED RELEASE ORAL at 08:06

## 2025-06-09 RX ADMIN — FUROSEMIDE 40 MG: 10 INJECTION, SOLUTION INTRAVENOUS at 09:06

## 2025-06-09 RX ADMIN — INSULIN ASPART 1 UNITS: 100 INJECTION, SOLUTION INTRAVENOUS; SUBCUTANEOUS at 11:06

## 2025-06-09 RX ADMIN — ASPIRIN 81 MG: 81 TABLET ORAL at 08:06

## 2025-06-09 RX ADMIN — FUROSEMIDE 40 MG: 10 INJECTION, SOLUTION INTRAVENOUS at 08:06

## 2025-06-09 RX ADMIN — ATORVASTATIN CALCIUM 40 MG: 40 TABLET, FILM COATED ORAL at 09:06

## 2025-06-09 RX ADMIN — IPRATROPIUM BROMIDE AND ALBUTEROL SULFATE 3 ML: 2.5; .5 SOLUTION RESPIRATORY (INHALATION) at 07:06

## 2025-06-09 RX ADMIN — INSULIN ASPART 6 UNITS: 100 INJECTION, SOLUTION INTRAVENOUS; SUBCUTANEOUS at 11:06

## 2025-06-09 RX ADMIN — ALBUTEROL SULFATE 10 MG: 2.5 SOLUTION RESPIRATORY (INHALATION) at 12:06

## 2025-06-09 RX ADMIN — PANTOPRAZOLE SODIUM 40 MG: 40 INJECTION, POWDER, FOR SOLUTION INTRAVENOUS at 02:06

## 2025-06-09 RX ADMIN — INSULIN ASPART 6 UNITS: 100 INJECTION, SOLUTION INTRAVENOUS; SUBCUTANEOUS at 06:06

## 2025-06-09 RX ADMIN — IPRATROPIUM BROMIDE AND ALBUTEROL SULFATE 3 ML: .5; 3 SOLUTION RESPIRATORY (INHALATION) at 12:06

## 2025-06-09 RX ADMIN — METHYLPREDNISOLONE SODIUM SUCCINATE 60 MG: 125 INJECTION, POWDER, FOR SOLUTION INTRAMUSCULAR; INTRAVENOUS at 06:06

## 2025-06-09 RX ADMIN — MUPIROCIN: 20 OINTMENT TOPICAL at 12:06

## 2025-06-09 RX ADMIN — AZITHROMYCIN MONOHYDRATE 500 MG: 500 INJECTION, POWDER, LYOPHILIZED, FOR SOLUTION INTRAVENOUS at 01:06

## 2025-06-09 RX ADMIN — INSULIN ASPART 6 UNITS: 100 INJECTION, SOLUTION INTRAVENOUS; SUBCUTANEOUS at 05:06

## 2025-06-09 RX ADMIN — FUROSEMIDE 60 MG: 10 INJECTION, SOLUTION INTRAMUSCULAR; INTRAVENOUS at 04:06

## 2025-06-09 RX ADMIN — ENOXAPARIN SODIUM 40 MG: 40 INJECTION SUBCUTANEOUS at 05:06

## 2025-06-09 RX ADMIN — LIDOCAINE HYDROCHLORIDE 15 ML: 20 SOLUTION ORAL at 02:06

## 2025-06-09 NOTE — HPI
HPI: 69 y.o. man with h/o essential HTN, COPD (on 4l home oxygen), CAD(Prior PCI of his RCA with severe InStent restenosis of his left anterior descending artery stent->was supposed to have a  hybrid procedure with LIMA to LAD in 2018 per cardiology consult note by Dr. Cabrera in 2023) , Systolic/diastolic CHF (EF 10-15% in March 2025), NIDDM type 2 (A1c 7.6 in May), smoker, HLD presents to the ER with AMS. Pt was brought in by EMS altered and thus history is limited to my conversation with the ED physician. EMS found him with RA sats 75% and tripoding. Apparently family had called. He was place on CPAP and given mg 2gm IV x1, cont duonebs, SQ epi 0.3mg and Dexamethasone 16mg IV. (Incidentally he was just admitted here from 5/27-29/2025 for the same thing COPD and CHF exacerbation requiring BIPAP and IV lasix). Patient did tell me no c/o chest pain or SOB. Denies his oxygen tank malfunctioning. States has not been taking his lasix but didn't say why. No chest pain.      IN the ER was improving and changed to BIPAP after VBG with pH 7.02 and pCO2>130. Continued on multiple back to back duonebs and given a dose of IV 500mg Azithromycin. Pt. Clinically improving and more alert following commands. He remained afebriel with stable BP and sats >96% on 40% FIO on BIPAP.      Labs noted for WBC 17 with H/H 11.1/37.6.   Lytes noted for bicarb 32 (appears to run in the 30s chronically) and unremarkable liver and renal function. TSH wnl.   BNP elevated at 1,086 (higher than previous admission). Troponin 0.008->0.028.   COVID/Flu negative.      CXR:   Findings may represent infiltrates or edema. Mild cardiomegaly.      We have been consulted for further management of his AMS due to acute on chronic hypercapnic/hypoxic respiratory failure.     Denies CP, still some SOB but improved  8 beat VT noted on monitor this AM  Has severe ischemic CM and gets inconsistent cardiac f/u. Does not have AICD  Hx stents to RCA and LAD - per  old notes had LAD ISR and was referred for CABG but never had procedure  Troponin neg to 0.028 to 0.1 to 0.29  BNP 1086  EKG NSR [place] NSSTT changes    Echo 3/5/25    Left Ventricle: The left ventricle is moderately dilated. Mildly increased wall thickness. There is mild eccentric hypertrophy. Severe global hypokinesis present. There is severely reduced systolic function with a visually estimated ejection fraction of 10 -15%. Unable to assess diastolic function due to E-A fusion.    Right Ventricle: Systolic function is borderline low.    Right Atrium: Right atrium is mildly dilated.    Aorta: Aortic root is the upper limits of normal in size measuring 3.60 cm. Ascending aorta is the upper limit of normal in size measuring 3.47 cm.    /[facility] 2017  Ejection Fraction: 30%   Wall Motion: hypokinetic in the inferior wall     Air rest:   PW:  (14)   PA: 48   CO_THERM: 7.31   RV: 44   RA:  (13)   LVEDP: 21       E. Angiographic Results      Diagnostic:          Patient has a right dominant coronary artery.        - Left Main Coronary Artery:              The LM is normal. There is BRENDA 3 flow.      - Left Anterior Descending Artery:              The proximal LAD has a 80% stenosis within the stent. There is BRENDA 3 flow.      - Left Circumflex Artery:              The LCX has luminal irregularities. There is BRENDA 3 flow.      - OM2:              The OM2 is diffusely diseased. There is BRENDA 3 flow.      - Right Coronary Artery:              The mid RCA has a 80% stenosis. There is BRENDA 3 flow. Serial lesions through the mid section with diffuse disease of small vessel through the posterior lateral and PDA branches     Last saw Dr Cabrera in clinic 2021  Abelardo Irene presents for continued care.  Seen as a consultation on 02/14/2021 for evaluation of shortness of breath.  History of [place] cardiomyopathy.  Prior PCI of his right coronary artery.  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  Was to  have hybrid procedure with LIMA to LAD in 2018.  Patient has been in [place] for the last few years.  Was followed by a cardiologist.  Denied any interventions.  States that he was to have an angiogram of his lower extremity secondary to claudication symptoms.  His echocardiogram showed an EF of 35%.  Grade 2 diastolic dysfunction.  No pulmonary hypertension.  No significant valvular abnormalities.  His   blood pressure was elevated on presentation.  Lipids not controlled.  Troponin negative.  EKG showed sinus tachycardia, anteroseptal infarction.  Appeared different than EKG from 2018 with regards to anteroseptal infarction.   the other day he noted some chest discomfort at rest.  Last a few minutes.  Shortness of breath.  Exertional.  He tries to limit his activity.  He also states that his legs do hurt him but once again he limits his activity.  He describes numbness and tingling in his right hand.  No associated symptoms.  No weakness.  No vision changes.  No dysarthria.     Admitted 5/27/25  HPI:   This is a 69-year-old male with a past medical history of COPD (on 4 L O2), CAD, HFrEF (EF: 10-15%), hypertension, type 2 diabetes, hyperlipidemia, tobacco use who presents with shortness of breath.      Patient presents for evaluation of shortness of breath that started on the day of presentation. He reports worsening dyspnea, associated with a non productive cough. He reports running out of some of his home medications. Patient continues to smoke cigarettes. EMS administered Decadron 16 mg, Duo-Neb, SL NTG and placed the patient on CPAP.     In the ED, the patient was tachycardic (100s-120s), tachypneic (20s-30s), hypertensive (140s-160s/80s-90s), and hypoxic requiring BiPAP.  Labs remarkable for an elevated BNP (764), leukocytosis (13.6), normocytic anemia (10.6), hyperglycemia (262), elevated lactic acid (2.4). VBG showed a pH of 7.20, pCO2 of 112.7.  Chest x-ray showed findings suggestive of pulmonary edema.   Patient was given Lasix 80 mg IV, Zofran 4 mg IV.  He was admitted for further management.     * No surgery found *       Hospital Course:    68 yo male with a PMHx of HTN, COPD (on 4L O2), CHF (EF 10-15%), CAD, HTN, DMII, and tobacco abuse admitted to ICU on 05/27/2020 by for acute on chronic respiratory failure with hypoxia secondary to acute on chronic combined systolic and diastolic heart failure.  Chest x-ray pulmonary edema.  BNP elevated at 764.  He was given lasix and started on BiPAP with improvement. Started CHF pathway. Able to wean off BIPAP to home oxygen.  Pulmonology consulted-continue aggressive diuresis and BiPAP nightly and with naps.  No need for steroids or antibiotics at this time.  Palliative care team consulted-patient remained full code.  Continues to clinically improve with diuresis.  Patient back on his home oxygen.  PT OT consulted-recommend low intensity therapy     Patient admits feeling significantly better, states he is at his baseline.  Denies any lower extremity edema.  Shortness a breath at baseline. Pt denies any fever, headaches, vision changes, chest pain,  palpitations, abdominal pain, nausea, vomiting, or any new weaknesses. Feels ready to go home. Patient's exam on discharge was as follow: Patient is alert and oriented, appears in no acute distress, heart with regular rate and rhythm, lungs without any wheezing, on home O2, abdomen soft, and no new weaknesses or focal deficits seen. Bilateral lower extremities without any edema or calf tenderness.      Patient was counseled regarding any abnormal labs, differential diagnosis, treatment options, risk-benefit, lifestyle changes, prognosis, current condition, and medications. Patient was interactive and attentive.  Patient's questions were answered in a respectful and timely manner. Patient was instructed to follow-up with PCP within 1 week and to continue taking medications as prescribed.  Instructed to also follow up with  outpatient pulmonologist and Cardiology. Also, extensively discussed the risks, benefits, and side effects of patient's medications. Discussed with patient about any medication changes. Patient verbalized understanding and agrees to treatment plan.  Patient is stable for discharge.  Patient has no other questions or concerns at this time.  ED precautions discussed with the patient.

## 2025-06-09 NOTE — PLAN OF CARE
06/09/25 0900   Rounds   Attendance Provider;Nurse ;;Assigned nurse   Discharge Plan A Home with family   Why the patient remains in the hospital Requires continued medical care   Transition of Care Barriers Other (see comments)

## 2025-06-09 NOTE — ED PROVIDER NOTES
Encounter Date: 6/9/2025    SCRIBE #1 NOTE: I, Annalee Farias, am scribing for, and in the presence of,  Golden Ramirez MD. I have scribed the following portions of the note - Other sections scribed: HPI, ROS, Physical Exam.       History     Chief Complaint   Patient presents with    Respiratory Distress     Pt to ED via [facility] c/o SOB that started this am. States SOB increased through out the day. Per EMS pt found 75% on  RA and tripoding. Pt on CPAP and appears altered. Opens eyes but unable to talk due to resp distress. Dr Ramirez at bedside. Pt was given  Mg 2G IV, continous duonebs, Epi 0.3 mg SQ and Dexamethsone 16 mg.      Abelardo Rodriguez Jr. is a 69 y.o. man, with PMHx COPD (on 4 L O2), CAD, HFrEF (EF: 10-15%), hypertension, type 2 diabetes, hyperlipidemia, tobacco use, who presents via EMS with acute on chronic shortness of breath. EMS reports he was at 75% O2 saturation with room air. Patient was given 2G Magnesium Sulfate IV, 0.3mg epinephrine, 16mg Dexamethasone, 5 mg of albuterol and 500 mcg of the ipratropium, and placed on CPAP en route all with minimal improvement. No other exacerbating or alleviating factors. History limited by condition of patient, he is unable to speak due to respiratory distress but can open eyes.     Per chart review, patient was seen for the same chief complant at  ED 05/27 and admitted for 2 days. Last Chest X-Ray at that visit, FINDINGS: Cardiac monitoring leads overlie the chest.  There is unchanged enlargement of the cardiomediastinal silhouette.  The lungs are symmetrically expanded with diffuse increased interstitial and parenchymal attenuation with perihilar and bibasilar predominance.  Findings suggestive of interstitial edema/CHF although correlation for infectious process advised.  No confluent airspace consolidation, substantial volume of pleural fluid or pneumothorax identified.  Osseous structures intact with degenerative change.      The history is provided by  the EMS personnel. The history is limited by the condition of the patient. No  was used.     Review of patient's allergies indicates:  Not on File  History reviewed. No pertinent past medical history.  No past surgical history on file.  No family history on file.  Social History[1]  Review of Systems   Unable to perform ROS: Acuity of condition   Respiratory:  Positive for shortness of breath and wheezing.        Physical Exam     Initial Vitals   BP Pulse Resp Temp SpO2   06/09/25 0033 06/09/25 0031 06/09/25 0031 06/09/25 0033 06/09/25 0031   (!) 200/110 (!) 116 (!) 27 98.2 °F (36.8 °C) 97 %      MAP       --                Physical Exam    Constitutional: Vital signs are normal. Abelardo appears well-developed and well-nourished.  Non-toxic appearance. Abelardo does not have a sickly appearance. Abelardo does not appear ill. Abelardo appears distressed.   HENT:   Head: Normocephalic and atraumatic. Mouth/Throat: Mucous membranes are normal.   Eyes: EOM are normal.   Neck: Neck supple.   Cardiovascular:  Normal rate and regular rhythm.           Pulmonary/Chest: Tachypnea noted. Abelardo is in respiratory distress. Abelardo has decreased breath sounds (globally). Abelardo has wheezes (Silent chest on the right, fine expiratory wheezing on the left).   Prolonged expiration   Abdominal: Abdomen is soft. Bowel sounds are normal. There is no abdominal tenderness.   Musculoskeletal:      Cervical back: Neck supple.     Neurological: Abelardo is alert.   Skin: Skin is warm and dry. No rash noted.   Psychiatric: Abelardo has a normal mood and affect.         ED Course   Procedures  Labs Reviewed   COMPREHENSIVE METABOLIC PANEL - Abnormal       Result Value    Sodium 141      Potassium 4.1      Chloride 99      CO2 32 (*)     Glucose 220 (*)     BUN 5 (*)     Creatinine 0.8      Calcium 8.9      Protein Total 8.2      Albumin 3.6      Bilirubin Total 0.3      ALP 89      AST 14      ALT 15      Anion Gap 10       eGFR >60     B-TYPE NATRIURETIC PEPTIDE - Abnormal    BNP 1,086 (*)    CBC WITH DIFFERENTIAL - Abnormal    WBC 17.20 (*)     RBC 3.88 (*)     HGB 11.1 (*)     HCT 37.6 (*)     MCV 97      MCH 28.6      MCHC 29.5 (*)     RDW 13.4      Platelet Count 267      MPV 10.6      Nucleated RBC 0      Neut % 59.7      Lymph % 32.8      Mono % 4.8      Eos % 2.0      Basophil % 0.3      Imm Grans % 0.4      Neut # 10.25 (*)     Lymph # 5.65 (*)     Mono # 0.82      Eos # 0.35      Baso # 0.06      Imm Grans # 0.07 (*)    TSH - Normal    TSH 2.290     TROPONIN I - Normal    Troponin-I 0.008     MAGNESIUM - Normal    Magnesium  2.5     SARS-COV-2 RDRP GENE - Normal    POC Rapid COVID Negative       Acceptable Yes     POCT INFLUENZA A/B MOLECULAR - Normal    POC Molecular Influenza A Ag Negative      POC Molecular Influenza B Ag Negative       Acceptable Yes     CBC W/ AUTO DIFFERENTIAL    Narrative:     The following orders were created for panel order CBC auto differential.  Procedure                               Abnormality         Status                     ---------                               -----------         ------                     CBC with Differential[[id number]]       Abnormal            Final result                 Please view results for these tests on the individual orders.   TROPONIN I          Imaging Results              X-Ray Chest 1 View (Final result)  Result time 06/09/25 01:23:16      Final result by Stefany Laird MD (06/09/25 01:23:16)                   Impression:      Findings may represent infiltrates or edema.  Mild cardiomegaly.      Electronically signed by: Stefany Laird  Date:    06/09/2025  Time:    01:23               Narrative:    EXAMINATION:  CHEST ONE VIEW    CLINICAL HISTORY:  Shortness of breath    TECHNIQUE:  One view of the chest.    COMPARISON:  None.    FINDINGS:  The cardiac silhouette is enlarged.  There are increased patchy parenchymal  and interstitial attenuation.   There is no pneumothorax or large pleural effusion.  There is no large pleural effusions.                                       Medications   sodium chloride 0.9% flush 3 mL (has no administration in time range)   albuterol-ipratropium 2.5 mg-0.5 mg/3 mL nebulizer solution 3 mL (has no administration in time range)   enoxaparin injection 40 mg (has no administration in time range)   melatonin tablet 6 mg (has no administration in time range)   ondansetron injection 4 mg (has no administration in time range)   polyethylene glycol packet 17 g (has no administration in time range)   acetaminophen tablet 650 mg (has no administration in time range)   methylPREDNISolone sodium succinate injection 60 mg (has no administration in time range)   albuterol-ipratropium 2.5 mg-0.5 mg/3 mL nebulizer solution 3 mL (has no administration in time range)   albuterol-ipratropium 2.5 mg-0.5 mg/3 mL nebulizer solution 3 mL (3 mLs Nebulization Given 6/9/25 0045)   albuterol sulfate nebulizer solution 10 mg (10 mg Nebulization Given 6/9/25 0051)   azithromycin (ZITHROMAX) 500 mg in 0.9% NaCl 250 mL IVPB (admixture device) (0 mg Intravenous Stopped 6/9/25 0239)   pantoprazole injection 40 mg (40 mg Intravenous Given 6/9/25 0247)   aluminum-magnesium hydroxide-simethicone 200-200-20 mg/5 mL suspension 30 mL (30 mLs Oral Given 6/9/25 0248)     And   LIDOcaine viscous HCl 2% oral solution 15 mL (15 mLs Oral Given 6/9/25 0248)     Medical Decision Making  Amount and/or Complexity of Data Reviewed  Labs: ordered. Decision-making details documented in ED Course.  Radiology: ordered. Decision-making details documented in ED Course.  ECG/medicine tests: ordered. Decision-making details documented in ED Course.    Risk  OTC drugs.  Prescription drug management.  Decision regarding hospitalization.      Tachypneic and tachycardic to 120s, hypertensive  In significant respiratory distress   He is awake but will not  respond to questions given his distress   Switched to our BiPAP and given 3 DuoNebs back to back  After that patient given additional 10 mg of albuterol  Initial PH of 7.02, pCO2 >130  EKG without ischemia   Troponin negative   BNP 1000   Labs otherwise reviewed and unremarkable  Given 500 mg of azithromycin  Chest x-ray unremarkable  Proximally 30-45 minutes following interventions patient's work of breathing improved significantly; he is much more comfortable and able to answer questions   Tachycardia resolved, and he became normotensive  Short time later patient did start complaining of burning in his chest; repeat EKG is unchanged; 2nd troponin added  Given a GI cocktail  Patient admitted to the ICU for further management      Please put in 50 minutes of critical care due to patient having a high risk of respiratory failure requiring BIPAP and continuous breathing treatments  Separate from teaching and exclusive of procedure and ekg time  Includes:  Time at bedside  Time reviewing test results  Time discussing case with staff  Time documenting the medical record  Time spent with family members  Time spent with consults  Management          Scribe Attestation:   Scribe #1: I performed the above scribed service and the documentation accurately describes the services I performed. I attest to the accuracy of the note.                             I, Golden Ramirez MD, personally performed the services described in this documentation. All medical record entries made by the scribe were at my direction and in my presence. I have reviewed the chart and agree that the record reflects my personal performance and is accurate and complete.      DISCLAIMER: This note was prepared with [employer] voice recognition transcription software. Garbled syntax, mangled pronouns, and other bizarre constructions may be attributed to that software system.     Clinical Impression:  Final diagnoses:  [R06.02] Shortness of breath  [J44.1]  COPD with acute exacerbation  [R07.9] Chest pain          ED Disposition Condition    Admit                       [1]         Golden Ramirez MD  06/09/25 0313

## 2025-06-09 NOTE — SUBJECTIVE & OBJECTIVE
History reviewed. No pertinent past medical history.    No past surgical history on file.    Review of patient's allergies indicates:   Allergen Reactions    Iodinated contrast media Shortness Of Breath, Itching and Anxiety     Patient states that he had a bad reaction, anxiety , shortness of breath, itching .            Medications:  Continuous Infusions:  Scheduled Meds:   albuterol-ipratropium  3 mL Nebulization Q6H    aspirin  81 mg Oral Daily    atorvastatin  40 mg Oral QHS    clopidogreL  75 mg Oral Daily    enoxparin  40 mg Subcutaneous Daily    furosemide (LASIX) injection  40 mg Intravenous Q12H    metoprolol succinate  25 mg Oral Daily    mupirocin   Nasal BID    [START ON 6/10/2025] predniSONE  40 mg Oral Daily     PRN Meds:  Current Facility-Administered Medications:     acetaminophen, 650 mg, Oral, Q6H PRN    albuterol-ipratropium, 3 mL, Nebulization, Q4H PRN    dextrose 50%, 12.5 g, Intravenous, PRN    glucagon (human recombinant), 1 mg, Intramuscular, PRN    insulin aspart U-100, 0-10 Units, Subcutaneous, Q6H PRN    melatonin, 6 mg, Oral, Nightly PRN    ondansetron, 4 mg, Intravenous, Q6H PRN    polyethylene glycol, 17 g, Oral, BID PRN    sodium chloride 0.9%, 3 mL, Intravenous, PRN    Family History    None       Tobacco Use    Smoking status: Not on file    Smokeless tobacco: Not on file   Substance and Sexual Activity    Alcohol use: Not on file    Drug use: Not on file    Sexual activity: Not on file       Review of Systems   Respiratory:  Positive for shortness of breath.         (Improved)      Objective:     Vital Signs (Most Recent):  Temp: 98 °F (36.7 °C) (06/09/25 1101)  Pulse: 88 (06/09/25 1101)  Resp: 20 (06/09/25 1101)  BP: 133/74 (06/09/25 1101)  SpO2: 100 % (06/09/25 1101) Vital Signs (24h Range):  Temp:  [97 °F (36.1 °C)-98.2 °F (36.8 °C)] 98 °F (36.7 °C)  Pulse:  [] 88  Resp:  [15-41] 20  SpO2:  [86 %-100 %] 100 %  BP: (109-200)/() 133/74     Weight: 86.6 kg (190 lb 14.7  "oz)  Body mass index is 31.77 kg/m².       Physical Exam  Constitutional:       Comments: Sitting up in bed, pleasant and conversational, in no distress, on NC oxygen        Significant Labs: All pertinent labs within the past 24 hours have been reviewed.  CBC:   Recent Labs   Lab 06/09/25 0428   WBC 20.06*   HGB 10.4*   HCT 36.8*   MCV 99*        BMP:  Recent Labs   Lab 06/09/25  0034 06/09/25 0428   * 240*    144   K 4.1 3.8   CL 99 99   CO2 32* 34*   BUN 5* 5*   CREATININE 0.8 0.8   CALCIUM 8.9 8.7   MG 2.5  --      LFT:  Lab Results   Component Value Date    AST 14 06/09/2025    ALKPHOS 89 06/09/2025    BILITOT 0.3 06/09/2025     Albumin:   Albumin   Date Value Ref Range Status   06/09/2025 3.6 3.5 - 5.2 g/dL Final     Protein:   Protein Total   Date Value Ref Range Status   06/09/2025 8.2 6.0 - 8.4 gm/dL Final     Lactic acid:   No results found for: "LACTATE"    Significant Imaging: I have reviewed all pertinent imaging results/findings within the past 24 hours.    "

## 2025-06-09 NOTE — ASSESSMENT & PLAN NOTE
Has known advanced ischemic CM with prior RCA and LAD stents - reported LAD ISR from years ago that was never addressed due to poor follow up. Troponin elevation likely from demand ischemia due to COPD and CHF. Would consider repeat out patient LHC and AICD evaluation once recovered from acute process. Repeat echo - baseline EF 15%

## 2025-06-09 NOTE — PROVIDER TRANSFER
I have seen this patient, reviewed the history and physical, assessment and plan. I have personally interviewed and examined the patient at bedside and agree with the findings with the following comments/updates:    COPD exacerbation, Acidosis, elevating Troponin  Gas improving on Bipap, pulm consulted  ECHO ordered, cards consult                Guanako Rowell MD        Medical Director        Section Head of [facility]        Board-Certified Internal Medicine Attending

## 2025-06-09 NOTE — ASSESSMENT & PLAN NOTE
Patient with Hypercapnic and Hypoxic Respiratory failure which is Acute on chronic.  Abelardo is on home oxygen at 4 LPM. Supplemental oxygen was provided and noted- Oxygen Concentration (%):  [] 40 Wean to keep sats 90-92% due to severity of his COPD. (home oxygen of 4l).   Treating COPD exacerbation and will give IV lasix as well for possible mild CHF exacerbation. Control BP.

## 2025-06-09 NOTE — PLAN OF CARE
Pt remains in ICU, AO*4, was able to transitioned to 4L nasal cannula. Urinal within reach. POC explained to patient and spouse by telephone. Verbalizes understanding. No falls, injuries and skin breakdown during the shift.   Problem: COPD (Chronic Obstructive Pulmonary Disease)  Goal: Optimal Chronic Illness Coping  Outcome: Progressing  Goal: Optimal Level of Functional [place]  Outcome: Progressing  Goal: Absence of Infection Signs and Symptoms  Outcome: Progressing  Goal: Improved Oral Intake  Outcome: Progressing  Goal: Effective Oxygenation and Ventilation  Outcome: Progressing     Problem: Adult Inpatient Plan of Care  Goal: Plan of Care Review  Outcome: Progressing  Goal: Patient-Specific Goal (Individualized)  Outcome: Progressing  Goal: Absence of Hospital-Acquired Illness or Injury  Outcome: Progressing  Goal: Optimal Comfort and Wellbeing  Outcome: Progressing  Goal: Readiness for Transition of Care  Outcome: Progressing     Problem: Skin Injury Risk Increased  Goal: Skin Health and Integrity  Outcome: Progressing     Problem: Coping Ineffective  Goal: Effective Coping  Outcome: Progressing

## 2025-06-09 NOTE — SUBJECTIVE & OBJECTIVE
History reviewed. No pertinent past medical history.    No past surgical history on file.    Review of patient's allergies indicates:  Not on File    No current facility-administered medications on file prior to encounter.     No current outpatient medications on file prior to encounter.     Family History    None       Tobacco Use    Smoking status: Not on file    Smokeless tobacco: Not on file   Substance and Sexual Activity    Alcohol use: Not on file    Drug use: Not on file    Sexual activity: Not on file     Review of Systems   Unable to perform ROS: Mental status change     Objective:     Vital Signs (Most Recent):  Temp: 97 °F (36.1 °C) (06/09/25 0400)  Pulse: 88 (06/09/25 0415)  Resp: 15 (06/09/25 0415)  BP: 133/71 (06/09/25 0415)  SpO2: (!) 94 % (06/09/25 0415) Vital Signs (24h Range):  Temp:  [97 °F (36.1 °C)-98.2 °F (36.8 °C)] 97 °F (36.1 °C)  Pulse:  [] 88  Resp:  [15-28] 15  SpO2:  [86 %-100 %] 94 %  BP: (115-200)/() 133/71     Weight: 86.6 kg (190 lb 14.7 oz)  Body mass index is 31.77 kg/m².     Physical Exam  Vitals and nursing note reviewed.   Constitutional:       General: Abelardo is not in acute distress.     Appearance: Abelardo is obese. Abelardo is not ill-appearing, toxic-appearing or diaphoretic.      Comments: Easily arousable but falls back to sleep easily    HENT:      Head: Normocephalic and atraumatic.      Nose: Nose normal. No congestion or rhinorrhea.      Mouth/Throat:      Mouth: Mucous membranes are dry.      Pharynx: Oropharynx is clear. No oropharyngeal exudate or posterior oropharyngeal erythema.   Eyes:      General: No scleral icterus.     Extraocular Movements: Extraocular movements intact.      Conjunctiva/sclera: Conjunctivae normal.      Pupils: Pupils are equal, round, and reactive to light.   Neck:      Vascular: No carotid bruit.   Cardiovascular:      Rate and Rhythm: Normal rate and regular rhythm.      Pulses: Normal pulses.      Heart sounds: No murmur  heard.     No friction rub. No gallop.   Pulmonary:      Effort: Pulmonary effort is normal. No respiratory distress.      Breath sounds: Wheezing and rales present. No rhonchi.      Comments: On BIPAP   Abdominal:      General: Bowel sounds are normal. There is no distension.      Palpations: Abdomen is soft.      Tenderness: There is no abdominal tenderness. There is no guarding or rebound.   Musculoskeletal:         General: No swelling. Normal range of motion.      Cervical back: Normal range of motion and neck supple.      Right lower leg: Edema present.      Left lower leg: Edema present.      Comments: Saw him move from the stretcher to the bed in the ICU    Skin:     General: Skin is warm and dry.      Capillary Refill: Capillary refill takes less than 2 seconds.   Neurological:      General: No focal deficit present.      Mental Status: Abelardo is oriented to person, place, and time.      Cranial Nerves: No cranial nerve deficit.      Motor: No weakness.      Coordination: Coordination normal.   Psychiatric:         Mood and Affect: Mood normal.         Behavior: Behavior normal.              CRANIAL NERVES     CN III, IV, VI   Pupils are equal, round, and reactive to light.       Recent Results (from the past 24 hours)   ISTAT PROCEDURE    Collection Time: 06/09/25 12:32 AM   Result Value Ref Range    POC PH 7.077 (LL) 7.35 - 7.45    POC PCO2 >130.0 (H) 35 - 45 mmHg    POC PO2 134 (H) 40 - 60 mmHg    POC HCO3 UNAVAILABLE mmol/L    POC BE UNAVAILABLE mmol/L    POC SATURATED O2 UNAVAILABLE %    POC TCO2 UNAVAILABLE mmol/L    Rate 12     Sample VENOUS     Site Other     Allens Test N/A     DelSys CPAP/BiPAP     Mode BiPAP     FiO2 100     Spont Rate 30     Min Vol 12.7     Sp02 98     IP 20     EP 10    Comprehensive metabolic panel    Collection Time: 06/09/25 12:34 AM   Result Value Ref Range    Sodium 141 136 - 145 mmol/L    Potassium 4.1 3.5 - 5.1 mmol/L    Chloride 99 95 - 110 mmol/L    CO2 32 (H) 23 -  29 mmol/L    Glucose 220 (H) 70 - 110 mg/dL    BUN 5 (L) 8 - 23 mg/dL    Creatinine 0.8 0.5 - 1.4 mg/dL    Calcium 8.9 8.7 - 10.5 mg/dL    Protein Total 8.2 6.0 - 8.4 gm/dL    Albumin 3.6 3.5 - 5.2 g/dL    Bilirubin Total 0.3 0.1 - 1.0 mg/dL    ALP 89 40 - 150 unit/L    AST 14 11 - 45 unit/L    ALT 15 10 - 44 unit/L    Anion Gap 10 8 - 16 mmol/L    eGFR >60 >60 mL/min/1.73/m2   TSH    Collection Time: 06/09/25 12:34 AM   Result Value Ref Range    TSH 2.290 0.400 - 4.000 uIU/mL   Troponin I    Collection Time: 06/09/25 12:34 AM   Result Value Ref Range    Troponin-I 0.008 <=0.026 ng/mL   Brain natriuretic peptide    Collection Time: 06/09/25 12:34 AM   Result Value Ref Range    BNP 1,086 (H) 0 - 99 pg/mL   Magnesium    Collection Time: 06/09/25 12:34 AM   Result Value Ref Range    Magnesium  2.5 1.6 - 2.6 mg/dL   CBC with Differential    Collection Time: 06/09/25 12:34 AM   Result Value Ref Range    WBC 17.20 (H) 3.90 - 12.70 K/uL    RBC 3.88 (L) 4.60 - 6.20 M/uL    HGB 11.1 (L) 14.0 - 18.0 gm/dL    HCT 37.6 (L) 40.0 - 54.0 %    MCV 97 82 - 98 fL    MCH 28.6 27.0 - 31.0 pg    MCHC 29.5 (L) 32.0 - 36.0 g/dL    RDW 13.4 11.5 - 14.5 %    Platelet Count 267 150 - 450 K/uL    MPV 10.6 9.2 - 12.9 fL    Nucleated RBC 0 <=0 /100 WBC    Neut % 59.7 38 - 73 %    Lymph % 32.8 18 - 48 %    Mono % 4.8 4 - 15 %    Eos % 2.0 <=8 %    Basophil % 0.3 <=1.9 %    Imm Grans % 0.4 0.0 - 0.5 %    Neut # 10.25 (H) 1.8 - 7.7 K/uL    Lymph # 5.65 (H) 1 - 4.8 K/uL    Mono # 0.82 0.3 - 1 K/uL    Eos # 0.35 <=0.5 K/uL    Baso # 0.06 <=0.2 K/uL    Imm Grans # 0.07 (H) 0.00 - 0.04 K/uL   POCT COVID-19 Rapid Screening    Collection Time: 06/09/25  2:51 AM   Result Value Ref Range    POC Rapid COVID Negative Negative     Acceptable Yes    POCT Influenza A/B Molecular    Collection Time: 06/09/25  2:51 AM   Result Value Ref Range    POC Molecular Influenza A Ag Negative Negative    POC Molecular Influenza B Ag Negative Negative      Acceptable Yes    Troponin I    Collection Time: 06/09/25  3:12 AM   Result Value Ref Range    Troponin-I 0.028 (H) <=0.026 ng/mL   ISTAT PROCEDURE    Collection Time: 06/09/25  5:04 AM   Result Value Ref Range    POC PH 7.241 (LL) 7.35 - 7.45    POC PCO2 87.8 (HH) 35 - 45 mmHg    POC PO2 79 (L) 80 - 100 mmHg    POC HCO3 37.7 (H) 24 - 28 mmol/L    POC BE 8 (H) -2 to 2 mmol/L    POC SATURATED O2 92 95 - 100 %    POC TCO2 40 (H) 23 - 27 mmol/L    Rate 12     Sample ARTERIAL     Site RR     Allens Test Pass     DelSys CPAP/BiPAP     Mode BiPAP     FiO2 40     Spont Rate 24     Sp02 98     IP 15     EP 10        Microbiology Results (last 7 days)       ** No results found for the last 168 hours. **            Imaging Results              X-Ray Chest 1 View (Final result)  Result time 06/09/25 01:23:16      Final result by Stefany Laird MD (06/09/25 01:23:16)                   Impression:      Findings may represent infiltrates or edema.  Mild cardiomegaly.      Electronically signed by: Stefany Laird  Date:    06/09/2025  Time:    01:23               Narrative:    EXAMINATION:  CHEST ONE VIEW    CLINICAL HISTORY:  Shortness of breath    TECHNIQUE:  One view of the chest.    COMPARISON:  None.    FINDINGS:  The cardiac silhouette is enlarged.  There are increased patchy parenchymal and interstitial attenuation.   There is no pneumothorax or large pleural effusion.  There is no large pleural effusions.

## 2025-06-09 NOTE — SUBJECTIVE & OBJECTIVE
History reviewed. No pertinent past medical history.    No past surgical history on file.    Review of patient's allergies indicates:   Allergen Reactions    Iodinated contrast media Shortness Of Breath, Itching and Anxiety     Patient states that he had a bad reaction, anxiety , shortness of breath, itching .            Family History    None       Tobacco Use    Smoking status: Not on file    Smokeless tobacco: Not on file   Substance and Sexual Activity    Alcohol use: Not on file    Drug use: Not on file    Sexual activity: Not on file         Review of Systems   Respiratory:  Negative for shortness of breath.    Cardiovascular:  Negative for chest pain.     Objective:     Vital Signs (Most Recent):  Temp: 98 °F (36.7 °C) (06/09/25 1101)  Pulse: 82 (06/09/25 1252)  Resp: 20 (06/09/25 1252)  BP: 133/74 (06/09/25 1101)  SpO2: 99 % (06/09/25 1252) Vital Signs (24h Range):  Temp:  [97 °F (36.1 °C)-98.2 °F (36.8 °C)] 98 °F (36.7 °C)  Pulse:  [] 82  Resp:  [15-41] 20  SpO2:  [86 %-100 %] 99 %  BP: (109-200)/() 133/74     Weight: 86.6 kg (190 lb 14.7 oz)  Body mass index is 31.77 kg/m².      Intake/Output Summary (Last 24 hours) at 6/9/2025 1411  Last data filed at 6/9/2025 1115  Gross per 24 hour   Intake --   Output 1450 ml   Net -1450 ml        Physical Exam  Vitals and nursing note reviewed.   Constitutional:       General: Abelardo is not in acute distress.  HENT:      Head: Normocephalic and atraumatic.      Mouth/Throat:      Mouth: Mucous membranes are moist.      Pharynx: Oropharynx is clear.   Cardiovascular:      Rate and Rhythm: Normal rate and regular rhythm.      Pulses: Normal pulses.   Pulmonary:      Effort: Pulmonary effort is normal. No respiratory distress.      Breath sounds: Rales present. No wheezing or rhonchi.      Comments: Back on home 4L NC   Abdominal:      General: Bowel sounds are normal. There is no distension.      Palpations: Abdomen is soft.      Tenderness: There is no  abdominal tenderness.   Musculoskeletal:         General: Normal range of motion.      Right lower leg: Edema present.      Left lower leg: Edema present.   Skin:     General: Skin is warm and dry.      Capillary Refill: Capillary refill takes less than 2 seconds.   Neurological:      General: No focal deficit present.      Mental Status: Abelardo is oriented to person, place, and time.   Psychiatric:         Mood and Affect: Mood normal.         Behavior: Behavior normal.          Vents:  Oxygen Concentration (%): 40 (06/09/25 0713)    Lines/Drains/Airways       Peripheral Intravenous Line  Duration                  Peripheral IV - Single Lumen 06/09/25 0030 20 G 1 in Left Antecubital <1 day         Peripheral IV - Single Lumen 06/09/25 0033 18 G 1 1/4 in Right Forearm <1 day                    Significant Labs:    CBC/Anemia Profile:  Recent Labs   Lab 06/09/25 0034 06/09/25 0428   WBC 17.20* 20.06*   HGB 11.1* 10.4*   HCT 37.6* 36.8*    225   MCV 97 99*   RDW 13.4 13.4        Chemistries:  Recent Labs   Lab 06/09/25 0034 06/09/25  0428    144   K 4.1 3.8   CL 99 99   CO2 32* 34*   BUN 5* 5*   CREATININE 0.8 0.8   CALCIUM 8.9 8.7   ALBUMIN 3.6  --    PROT 8.2  --    BILITOT 0.3  --    ALKPHOS 89  --    ALT 15  --    AST 14  --    MG 2.5  --        All pertinent labs within the past 24 hours have been reviewed.    Significant Imaging:   I have reviewed all pertinent imaging results/findings within the past 24 hours.

## 2025-06-09 NOTE — ASSESSMENT & PLAN NOTE
Respiratory failure 2/2 decompensated HF; less likely that COPD is contributing. Reports that he has not been taking his lasix. Initial VBG with 7.07>130. CXR with pulmonary edema and elevated BNP 1086. Procal 0.11. Required BIPAP and ICU admission. Many repeated admissions for the same. Continues to smoke 2 ppd and non-compliant with fluid/ dietary restrictions.      - weaned down to his home oxygen this morning  - continue aggressive diuresis  - home nebs/ inhalers with PRN duonebs, short course of steroids ok  - continue BiPAP qhs and with naps  - no need for antibiotics at this time  - discussed hospice qualification and Mr. Irene is interested. Again, recommend DNR code status. Appreciate palliative care assistance

## 2025-06-09 NOTE — CONSULTS
[facility] - Intensive Care  Cardiology  Consult Note    Patient Name: Abelardo Irene Jr.  MRN: [id number]  Admission Date: 6/9/2025  Hospital Length of Stay: 0 days  Code Status: Full Code   Attending Provider: Guanako Rowell MD   Consulting Provider: Jerardo Quarles MD  Primary Care Physician: No primary care provider on file.  Principal Problem:COPD with acute exacerbation    Patient information was obtained from patient and ER records.     Inpatient consult to Cardiology  Consult performed by: Jerardo Quarles MD  Consult ordered by: Guanako Rowell MD        Subjective:     Chief Complaint:  elevated troponin, ischemic CM              HPI: 69 y.o. man with h/o essential HTN, COPD (on 4l home oxygen), CAD(Prior PCI of his RCA with severe InStent restenosis of his left anterior descending artery stent->was supposed to have a  hybrid procedure with LIMA to LAD in 2018 per cardiology consult note by Dr. Cabrera in 2023) , Systolic/diastolic CHF (EF 10-15% in March 2025), NIDDM type 2 (A1c 7.6 in May), smoker, HLD presents to the ER with AMS. Pt was brought in by EMS altered and thus history is limited to my conversation with the ED physician. EMS found him with RA sats 75% and tripoding. Apparently family had called. He was place on CPAP and given mg 2gm IV x1, cont duonebs, SQ epi 0.3mg and Dexamethasone 16mg IV. (Incidentally he was just admitted here from 5/27-29/2025 for the same thing COPD and CHF exacerbation requiring BIPAP and IV lasix). Patient did tell me no c/o chest pain or SOB. Denies his oxygen tank malfunctioning. States has not been taking his lasix but didn't say why. No chest pain.      IN the ER was improving and changed to BIPAP after VBG with pH 7.02 and pCO2>130. Continued on multiple back to back duonebs and given a dose of IV 500mg Azithromycin. Pt. Clinically improving and more alert following commands. He remained afebriel with stable BP and sats >96% on 40% FIO on BIPAP.      Labs noted for WBC  17 with H/H 11.1/37.6.   Lytes noted for bicarb 32 (appears to run in the 30s chronically) and unremarkable liver and renal function. TSH wnl.   BNP elevated at 1,086 (higher than previous admission). Troponin 0.008->0.028.   COVID/Flu negative.      CXR:   Findings may represent infiltrates or edema. Mild cardiomegaly.      We have been consulted for further management of his AMS due to acute on chronic hypercapnic/hypoxic respiratory failure.     Denies CP, still some SOB but improved  8 beat VT noted on monitor this AM  Has severe ischemic CM and gets inconsistent cardiac f/u. Does not have AICD  Hx stents to RCA and LAD - per old notes had LAD ISR and was referred for CABG but never had procedure  Troponin neg to 0.028 to 0.1 to 0.29  BNP 1086  EKG NSR [place] NSSTT changes    Echo 3/5/25    Left Ventricle: The left ventricle is moderately dilated. Mildly increased wall thickness. There is mild eccentric hypertrophy. Severe global hypokinesis present. There is severely reduced systolic function with a visually estimated ejection fraction of 10 -15%. Unable to assess diastolic function due to E-A fusion.    Right Ventricle: Systolic function is borderline low.    Right Atrium: Right atrium is mildly dilated.    Aorta: Aortic root is the upper limits of normal in size measuring 3.60 cm. Ascending aorta is the upper limit of normal in size measuring 3.47 cm.    /[facility] 2017  Ejection Fraction: 30%   Wall Motion: hypokinetic in the inferior wall     Air rest:   PW:  (14)   PA: 48   CO_THERM: 7.31   RV: 44   RA:  (13)   LVEDP: 21       E. Angiographic Results      Diagnostic:          Patient has a right dominant coronary artery.        - Left Main Coronary Artery:              The LM is normal. There is BRENDA 3 flow.      - Left Anterior Descending Artery:              The proximal LAD has a 80% stenosis within the stent. There is BRENDA 3 flow.      - Left Circumflex Artery:              The LCX has luminal irregularities.  There is BRENDA 3 flow.      - OM2:              The OM2 is diffusely diseased. There is BRENDA 3 flow.      - Right Coronary Artery:              The mid RCA has a 80% stenosis. There is BRENDA 3 flow. Serial lesions through the mid section with diffuse disease of small vessel through the posterior lateral and PDA branches     Last saw Dr Cabrera in clinic 2021  Abelardo Irene presents for continued care.  Seen as a consultation on 02/14/2021 for evaluation of shortness of breath.  History of ski kassy cardiomyopathy.  Prior PCI of his right coronary artery.  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  Was to have hybrid procedure with LIMA to LAD in 2018.  Patient has been in [place] for the last few years.  Was followed by a cardiologist.  Denied any interventions.  States that he was to have an angiogram of his lower extremity secondary to claudication symptoms.  His echocardiogram showed an EF of 35%.  Grade 2 diastolic dysfunction.  No pulmonary hypertension.  No significant valvular abnormalities.  His   blood pressure was elevated on presentation.  Lipids not controlled.  Troponin negative.  EKG showed sinus tachycardia, anteroseptal infarction.  Appeared different than EKG from 2018 with regards to anteroseptal infarction.   the other day he noted some chest discomfort at rest.  Last a few minutes.  Shortness of breath.  Exertional.  He tries to limit his activity.  He also states that his legs do hurt him but once again he limits his activity.  He describes numbness and tingling in his right hand.  No associated symptoms.  No weakness.  No vision changes.  No dysarthria.     Admitted 5/27/25  HPI:   This is a 69-year-old male with a past medical history of COPD (on 4 L O2), CAD, HFrEF (EF: 10-15%), hypertension, type 2 diabetes, hyperlipidemia, tobacco use who presents with shortness of breath.      Patient presents for evaluation of shortness of breath that started on the day of  presentation. He reports worsening dyspnea, associated with a non productive cough. He reports running out of some of his home medications. Patient continues to smoke cigarettes. EMS administered Decadron 16 mg, Duo-Neb, SL NTG and placed the patient on CPAP.     In the ED, the patient was tachycardic (100s-120s), tachypneic (20s-30s), hypertensive (140s-160s/80s-90s), and hypoxic requiring BiPAP.  Labs remarkable for an elevated BNP (764), leukocytosis (13.6), normocytic anemia (10.6), hyperglycemia (262), elevated lactic acid (2.4). VBG showed a pH of 7.20, pCO2 of 112.7.  Chest x-ray showed findings suggestive of pulmonary edema.  Patient was given Lasix 80 mg IV, Zofran 4 mg IV.  He was admitted for further management.     * No surgery found *       Hospital Course:    68 yo male with a PMHx of HTN, COPD (on 4L O2), CHF (EF 10-15%), CAD, HTN, DMII, and tobacco abuse admitted to ICU on 05/27/2020 by for acute on chronic respiratory failure with hypoxia secondary to acute on chronic combined systolic and diastolic heart failure.  Chest x-ray pulmonary edema.  BNP elevated at 764.  He was given lasix and started on BiPAP with improvement. Started CHF pathway. Able to wean off BIPAP to home oxygen.  Pulmonology consulted-continue aggressive diuresis and BiPAP nightly and with naps.  No need for steroids or antibiotics at this time.  Palliative care team consulted-patient remained full code.  Continues to clinically improve with diuresis.  Patient back on his home oxygen.  PT OT consulted-recommend low intensity therapy     Patient admits feeling significantly better, states he is at his baseline.  Denies any lower extremity edema.  Shortness a breath at baseline. Pt denies any fever, headaches, vision changes, chest pain,  palpitations, abdominal pain, nausea, vomiting, or any new weaknesses. Feels ready to go home. Patient's exam on discharge was as follow: Patient is alert and oriented, appears in no acute  distress, heart with regular rate and rhythm, lungs without any wheezing, on home O2, abdomen soft, and no new weaknesses or focal deficits seen. Bilateral lower extremities without any edema or calf tenderness.      Patient was counseled regarding any abnormal labs, differential diagnosis, treatment options, risk-benefit, lifestyle changes, prognosis, current condition, and medications. Patient was interactive and attentive.  Patient's questions were answered in a respectful and timely manner. Patient was instructed to follow-up with PCP within 1 week and to continue taking medications as prescribed.  Instructed to also follow up with outpatient pulmonologist and Cardiology. Also, extensively discussed the risks, benefits, and side effects of patient's medications. Discussed with patient about any medication changes. Patient verbalized understanding and agrees to treatment plan.  Patient is stable for discharge.  Patient has no other questions or concerns at this time.  ED precautions discussed with the patient.    History reviewed. No pertinent past medical history.    No past surgical history on file.    Review of patient's allergies indicates:   Allergen Reactions    Iodinated contrast media Shortness Of Breath, Itching and Anxiety     Patient states that he had a bad reaction, anxiety , shortness of breath, itching .            No current facility-administered medications on file prior to encounter.     No current outpatient medications on file prior to encounter.     Family History    None       Tobacco Use    Smoking status: Not on file    Smokeless tobacco: Not on file   Substance and Sexual Activity    Alcohol use: Not on file    Drug use: Not on file    Sexual activity: Not on file     Review of Systems   Constitutional: Negative for decreased appetite.   HENT:  Negative for ear discharge.    Eyes:  Negative for blurred vision.   Endocrine: Negative for polyphagia.   Skin:  Negative for nail changes.    Genitourinary:  Negative for bladder incontinence.   Neurological:  Negative for aphonia.   Psychiatric/Behavioral:  Negative for hallucinations.    Allergic/Immunologic: Negative for hives.     Objective:     Vital Signs (Most Recent):  Temp: 98 °F (36.7 °C) (06/09/25 1101)  Pulse: 88 (06/09/25 1101)  Resp: 20 (06/09/25 1101)  BP: 133/74 (06/09/25 1101)  SpO2: 100 % (06/09/25 1101) Vital Signs (24h Range):  Temp:  [97 °F (36.1 °C)-98.2 °F (36.8 °C)] 98 °F (36.7 °C)  Pulse:  [] 88  Resp:  [15-41] 20  SpO2:  [86 %-100 %] 100 %  BP: (109-200)/() 133/74     Weight: 86.6 kg (190 lb 14.7 oz)  Body mass index is 31.77 kg/m².    SpO2: 100 %         Intake/Output Summary (Last 24 hours) at 6/9/2025 1158  Last data filed at 6/9/2025 1115  Gross per 24 hour   Intake --   Output 1450 ml   Net -1450 ml       Lines/Drains/Airways       Peripheral Intravenous Line  Duration                  Peripheral IV - Single Lumen 06/09/25 0030 20 G 1 in Left Antecubital <1 day         Peripheral IV - Single Lumen 06/09/25 0033 18 G 1 1/4 in Right Forearm <1 day                     Physical Exam  Constitutional:       Appearance: Abelardo is well-developed.   HENT:      Head: Normocephalic and atraumatic.   Eyes:      Conjunctiva/sclera: Conjunctivae normal.      Pupils: Pupils are equal, round, and reactive to light.   Cardiovascular:      Rate and Rhythm: Normal rate.      Pulses: Intact distal pulses.      Heart sounds: Normal heart sounds.   Pulmonary:      Effort: Pulmonary effort is normal.      Breath sounds: Normal breath sounds.   Abdominal:      General: Bowel sounds are normal.      Palpations: Abdomen is soft.   Musculoskeletal:         General: Normal range of motion.      Cervical back: Normal range of motion and neck supple.   Skin:     General: Skin is warm and dry.   Neurological:      Mental Status: Abelardo is alert and oriented to person, place, and time.          Significant Labs: All pertinent lab results  from the last 24 hours have been reviewed.    Significant Imaging: Echocardiogram: 2D echo with color flow doppler: No results found for this or any previous visit.  Assessment and Plan:     * COPD with acute exacerbation  Per primary    Coronary artery disease involving native coronary artery of native heart without angina pectoris  Has known advanced ischemic CM with prior RCA and LAD stents - reported LAD ISR from years ago that was never addressed due to poor follow up. Troponin elevation likely from demand ischemia due to COPD and CHF. Would consider repeat out patient LHC and AICD evaluation once recovered from acute process. Repeat echo - baseline EF 15%    Acute on chronic combined systolic and diastolic congestive heart failure  Mild volume overload - diuresis and afterload reduction as tolerated  Recent Labs     06/09/25  0034   BNP 1,086*     Latest ECHO  No results found for this or any previous visit.    Current Heart Failure Medications  metoprolol succinate (TOPROL-XL) 24 hr tablet 25 mg, Daily, Oral  furosemide injection 40 mg, Every 12 hours, Intravenous      Acute on chronic respiratory failure with hypoxia and hypercapnia  Per primary        VTE Risk Mitigation (From admission, onward)           Ordered     enoxaparin injection 40 mg  Daily         06/09/25 0210     IP VTE HIGH RISK PATIENT  Once         06/09/25 0210     Place sequential compression device  Until discontinued         06/09/25 0210                  35 minutes spent with patient in ICU    Thank you for your consult. I will follow-up with patient. Please contact us if you have any additional questions.    Jerardo Quarles MD  Cardiology   [facility] - [place] - Intensive Care

## 2025-06-09 NOTE — H&P
[facility] Medicine  History & Physical    Patient Name: Abelardo Irene Jr.  MRN: [id number]  Patient Class: IP- Inpatient  Admission Date: 6/9/2025  Attending Physician: Dr. Nora Esqueda   Primary Care Provider: No primary care provider on file.    Patient information was obtained from patient, past medical records, and ER records.     Subjective:     Principal Problem:COPD with acute exacerbation    Chief Complaint:   Chief Complaint   Patient presents with    Respiratory Distress     Pt to ED via [facility] c/o SOB that started this am. States SOB increased through out the day. Per EMS pt found 75% on  RA and tripoding. Pt on CPAP and appears altered. Opens eyes but unable to talk due to resp distress. Dr Ramirez at bedside. Pt was given  Mg 2G IV, continous duonebs, Epi 0.3 mg SQ and Dexamethsone 16 mg.         HPI: 69 y.o. man with h/o essential HTN, COPD (on 4l home oxygen), CAD(Prior PCI of his RCA with severe InStent restenosis of his left anterior descending artery stent->was supposed to have a  hybrid procedure with LIMA to LAD in 2018 per cardiology consult note by Dr. Cabrera in 2023) , Systolic/diastolic CHF (EF 10-15% in March 2025), NIDDM type 2 (A1c 7.6 in May), smoker, HLD presents to the ER with AMS. Pt was brought in by EMS altered and thus history is limited to my conversation with the ED physician. EMS found him with RA sats 75% and tripoding. Apparently family had called. He was place on CPAP and given mg 2gm IV x1, cont duonebs, SQ epi 0.3mg and Dexamethasone 16mg IV. (Incidentally he was just admitted here from 5/27-29/2025 for the same thing COPD and CHF exacerbation requiring BIPAP and IV lasix). Patient did tell me no c/o chest pain or SOB. Denies his oxygen tank malfunctioning. States has not been taking his lasix but didn't say why. No chest pain.     IN the ER was improving and changed to BIPAP after VBG with pH 7.02 and pCO2>130. Continued on multiple back to  back duonebs and given a dose of IV 500mg Azithromycin. Pt. Clinically improving and more alert following commands. He remained afebriel with stable BP and sats >96% on 40% FIO on BIPAP.     Labs noted for WBC 17 with H/H 11.1/37.6.   Lytes noted for bicarb 32 (appears to run in the 30s chronically) and unremarkable liver and renal function. TSH wnl.   BNP elevated at 1,086 (higher than previous admission). Troponin 0.008->0.028.   COVID/Flu negative.      CXR:   Findings may represent infiltrates or edema. Mild cardiomegaly.     We have been consulted for further management of his AMS due to acute on chronic hypercapnic/hypoxic respiratory failure.     Past Medical Hx:    CHF (congestive heart failure)      COPD (chronic obstructive pulmonary disease)      Coronary artery disease      Elevated cholesterol       on medication    Hypertension        Past Surgical history:  Cardiac Stent RCA and LAD    Review of patient's allergies indicates:   Contrast media Shortness Of Breath, Itching and Anxiety       Patient states that he had a bad reaction, anxiety , shortness of breath, itching .      Family History    MOM with h/o cancer        Tobacco Use    Smoking status: Former smoker (2ppd for 45 years. Quit 11/2017)    Smokeless tobacco: denies   Substance and Sexual Activity    Alcohol use: Social with no h/o abuse/over use.     Drug use: Denies         Review of Systems   Unable to perform ROS: Mental status change and on BIPAP so very limited.   Currently denies chest pain/sob.     Objective:     Vital Signs (Most Recent):  Temp: 97 °F (36.1 °C) (06/09/25 0400)  Pulse: 88 (06/09/25 0415)  Resp: 15 (06/09/25 0415)  BP: 133/71 (06/09/25 0415)  SpO2: (!) 94 % (06/09/25 0415) Vital Signs (24h Range):  Temp:  [97 °F (36.1 °C)-98.2 °F (36.8 °C)] 97 °F (36.1 °C)  Pulse:  [] 88  Resp:  [15-28] 15  SpO2:  [86 %-100 %] 94 %  BP: (115-200)/() 133/71     Weight: 86.6 kg (190 lb 14.7 oz)  Body mass index is 31.77  kg/m².     Physical Exam  Vitals and nursing note reviewed.   Constitutional:       General: Abelardo is not in acute distress.     Appearance: Abelardo is obese. Abelardo is not ill-appearing, toxic-appearing or diaphoretic.      Comments: Easily arousable but falls back to sleep easily    HENT:      Head: Normocephalic and atraumatic.      Nose: Nose normal. No congestion or rhinorrhea.      Mouth/Throat:      Mouth: Mucous membranes are dry.      Pharynx: Oropharynx is clear. No oropharyngeal exudate or posterior oropharyngeal erythema.   Eyes:      General: No scleral icterus.     Extraocular Movements: Extraocular movements intact.      Conjunctiva/sclera: Conjunctivae normal.      Pupils: Pupils are equal, round, and reactive to light.   Neck:      Vascular: No carotid bruit.   Cardiovascular:      Rate and Rhythm: Normal rate and regular rhythm.      Pulses: Normal pulses.      Heart sounds: No murmur heard.     No friction rub. No gallop.   Pulmonary:      Effort: Pulmonary effort is normal. No respiratory distress.      Breath sounds: Wheezing and rales present. No rhonchi.      Comments: On BIPAP   Abdominal:      General: Bowel sounds are normal. There is no distension.      Palpations: Abdomen is soft.      Tenderness: There is no abdominal tenderness. There is no guarding or rebound.   Musculoskeletal:         General: No swelling. Normal range of motion.      Cervical back: Normal range of motion and neck supple.      Right lower leg: Edema present.      Left lower leg: Edema present.      Comments: Saw him move from the stretcher to the bed in the ICU    Skin:     General: Skin is warm and dry.      Capillary Refill: Capillary refill takes less than 2 seconds.   Neurological:      General: No focal deficit present.      Mental Status: Abelardo is oriented to person, place, and time.      Cranial Nerves: No cranial nerve deficit.      Motor: No weakness.      Coordination: Coordination normal.    Psychiatric:         Mood and Affect: Mood normal.         Behavior: Behavior normal.              CRANIAL NERVES     CN III, IV, VI   Pupils are equal, round, and reactive to light.       Recent Results (from the past 24 hours)   ISTAT PROCEDURE    Collection Time: 06/09/25 12:32 AM   Result Value Ref Range    POC PH 7.077 (LL) 7.35 - 7.45    POC PCO2 >130.0 (H) 35 - 45 mmHg    POC PO2 134 (H) 40 - 60 mmHg    POC HCO3 UNAVAILABLE mmol/L    POC BE UNAVAILABLE mmol/L    POC SATURATED O2 UNAVAILABLE %    POC TCO2 UNAVAILABLE mmol/L    Rate 12     Sample VENOUS     Site Other     Allens Test N/A     DelSys CPAP/BiPAP     Mode BiPAP     FiO2 100     Spont Rate 30     Min Vol 12.7     Sp02 98     IP 20     EP 10    Comprehensive metabolic panel    Collection Time: 06/09/25 12:34 AM   Result Value Ref Range    Sodium 141 136 - 145 mmol/L    Potassium 4.1 3.5 - 5.1 mmol/L    Chloride 99 95 - 110 mmol/L    CO2 32 (H) 23 - 29 mmol/L    Glucose 220 (H) 70 - 110 mg/dL    BUN 5 (L) 8 - 23 mg/dL    Creatinine 0.8 0.5 - 1.4 mg/dL    Calcium 8.9 8.7 - 10.5 mg/dL    Protein Total 8.2 6.0 - 8.4 gm/dL    Albumin 3.6 3.5 - 5.2 g/dL    Bilirubin Total 0.3 0.1 - 1.0 mg/dL    ALP 89 40 - 150 unit/L    AST 14 11 - 45 unit/L    ALT 15 10 - 44 unit/L    Anion Gap 10 8 - 16 mmol/L    eGFR >60 >60 mL/min/1.73/m2   TSH    Collection Time: 06/09/25 12:34 AM   Result Value Ref Range    TSH 2.290 0.400 - 4.000 uIU/mL   Troponin I    Collection Time: 06/09/25 12:34 AM   Result Value Ref Range    Troponin-I 0.008 <=0.026 ng/mL   Brain natriuretic peptide    Collection Time: 06/09/25 12:34 AM   Result Value Ref Range    BNP 1,086 (H) 0 - 99 pg/mL   Magnesium    Collection Time: 06/09/25 12:34 AM   Result Value Ref Range    Magnesium  2.5 1.6 - 2.6 mg/dL   CBC with Differential    Collection Time: 06/09/25 12:34 AM   Result Value Ref Range    WBC 17.20 (H) 3.90 - 12.70 K/uL    RBC 3.88 (L) 4.60 - 6.20 M/uL    HGB 11.1 (L) 14.0 - 18.0 gm/dL    HCT  37.6 (L) 40.0 - 54.0 %    MCV 97 82 - 98 fL    MCH 28.6 27.0 - 31.0 pg    MCHC 29.5 (L) 32.0 - 36.0 g/dL    RDW 13.4 11.5 - 14.5 %    Platelet Count 267 150 - 450 K/uL    MPV 10.6 9.2 - 12.9 fL    Nucleated RBC 0 <=0 /100 WBC    Neut % 59.7 38 - 73 %    Lymph % 32.8 18 - 48 %    Mono % 4.8 4 - 15 %    Eos % 2.0 <=8 %    Basophil % 0.3 <=1.9 %    Imm Grans % 0.4 0.0 - 0.5 %    Neut # 10.25 (H) 1.8 - 7.7 K/uL    Lymph # 5.65 (H) 1 - 4.8 K/uL    Mono # 0.82 0.3 - 1 K/uL    Eos # 0.35 <=0.5 K/uL    Baso # 0.06 <=0.2 K/uL    Imm Grans # 0.07 (H) 0.00 - 0.04 K/uL   POCT COVID-19 Rapid Screening    Collection Time: 06/09/25  2:51 AM   Result Value Ref Range    POC Rapid COVID Negative Negative     Acceptable Yes    POCT Influenza A/B Molecular    Collection Time: 06/09/25  2:51 AM   Result Value Ref Range    POC Molecular Influenza A Ag Negative Negative    POC Molecular Influenza B Ag Negative Negative     Acceptable Yes    Troponin I    Collection Time: 06/09/25  3:12 AM   Result Value Ref Range    Troponin-I 0.028 (H) <=0.026 ng/mL   ISTAT PROCEDURE    Collection Time: 06/09/25  5:04 AM   Result Value Ref Range    POC PH 7.241 (LL) 7.35 - 7.45    POC PCO2 87.8 (HH) 35 - 45 mmHg    POC PO2 79 (L) 80 - 100 mmHg    POC HCO3 37.7 (H) 24 - 28 mmol/L    POC BE 8 (H) -2 to 2 mmol/L    POC SATURATED O2 92 95 - 100 %    POC TCO2 40 (H) 23 - 27 mmol/L    Rate 12     Sample ARTERIAL     Site RR     Allens Test Pass     DelSys CPAP/BiPAP     Mode BiPAP     FiO2 40     Spont Rate 24     Sp02 98     IP 15     EP 10        Microbiology Results (last 7 days)       ** No results found for the last 168 hours. **            Imaging Results              X-Ray Chest 1 View (Final result)  Result time 06/09/25 01:23:16      Final result by Stefany Laird MD (06/09/25 01:23:16)                   Impression:      Findings may represent infiltrates or edema.  Mild cardiomegaly.      Electronically signed  by: Stefany Eitan  Date:    06/09/2025  Time:    01:23               Narrative:    EXAMINATION:  CHEST ONE VIEW    CLINICAL HISTORY:  Shortness of breath    TECHNIQUE:  One view of the chest.    COMPARISON:  None.    FINDINGS:  The cardiac silhouette is enlarged.  There are increased patchy parenchymal and interstitial attenuation.   There is no pneumothorax or large pleural effusion.  There is no large pleural effusions.                                        Assessment/Plan:     Assessment & Plan  COPD with acute exacerbation  Patient's COPD is with exacerbation noted by use of accessory muscles for breathing and worsening of baseline hypoxia. Suspect AMS due to hypercapnia.  Patient is currently on COPD Pathway. Continue scheduled inhalers Steroids and Supplemental oxygen and monitor respiratory status closely in the ICU. Pulmonary is consulted. Wean BIPAP off as able to home 4l NC. Goal oxygen at this time 90-92% given severity of his COPD and chronic CO2 retention. HOLD on further abx till repeat CBC, lactic, procal return. Does not appear to be c/w bacterial PNA. F/u on viral resp panel. COVID/Flu negative.   Acute on chronic respiratory failure with hypoxia and hypercapnia  Patient with Hypercapnic and Hypoxic Respiratory failure which is Acute on chronic.  Abelardo is on home oxygen at 4 LPM. Supplemental oxygen was provided and noted- Oxygen Concentration (%):  [] 40 Wean to keep sats 90-92% due to severity of his COPD. (home oxygen of 4l).   Treating COPD exacerbation and will give IV lasix as well for possible mild CHF exacerbation. Control BP.   Acute on chronic combined systolic and diastolic congestive heart failure  Patient has Combined Systolic and Diastolic heart failure that is Acute on chronic. On presentation their CHF was decompensated. Evidence of decompensated CHF on presentation includes: crackles on lung auscultation, shortness of breath, and elevated BNP.   Recent Labs      06/09/25  0034   BNP 1,086*     Latest ECHO: 3/5/2025    Left Ventricle: The left ventricle is moderately dilated. Mildly increased wall thickness. There is mild eccentric hypertrophy. Severe global hypokinesis present. There is severely reduced systolic function with a visually estimated ejection fraction of 10 -15%. Unable to assess diastolic function due to E-A fusion.    Right Ventricle: Systolic function is borderline low.    Right Atrium: Right atrium is mildly dilated.    Aorta: Aortic root is the upper limits of normal in size measuring 3.60 cm. Ascending aorta is the upper limit of normal in size measuring 3.47 cm.       Current Heart Failure Medications  furosemide injection 40 mg, Every 12 hours, Intravenous  metoprolol succinate (TOPROL-XL) 24 hr tablet 25 mg, Daily, Oral    Plan  - Monitor strict I&Os and daily weights.    - Place on telemetry  - Low sodium diet  - Place on fluid restriction of 1.5 L.   - Cardiology has not been consulted  Non-ischemic myocardial injury (non-traumatic)  Mild elevation in troponin and suspect due to acute hypoxia, COPD exacerbation, and CHF exacerbation. Trend and monitor on tele. Echo as above. IV lasix given. No c/o chest pain. Cont. Home ASA 81mg daily, Plavix 75mg daily, Toprol XL 25mg daily. Maintain Sat 90-92% and MAP>65.     Leukocytosis  Does not feel c/w sepsis or due to bacterial infection at this time. Feel more c/w steroids give, stress rxn. F/u on repeat CBC, procal, lactic. Got a dose of abx in the ED and will hold for now. CXR concerning more for COPD and CHF exacerbation. IV lasix, duonebs, and steroids. Comprehensive viral respiratory panel sent.     Coronary artery disease involving native coronary artery of native heart without angina pectoris  Patient with known CAD s/p PCI to RCA, which is controlled Will continue ASA, Plavix, and Statin and monitor for S/Sx of angina/ACS. Continue to monitor on telemetry. No c/o chest pain and NO STEMI on EKG.  Treating CHF exacerbation.   VTE Risk Mitigation (From admission, onward)           Ordered     enoxaparin injection 40 mg  Daily         06/09/25 0210     IP VTE HIGH RISK PATIENT  Once         06/09/25 0210     Place sequential compression device  Until discontinued         06/09/25 0210                  Critical care time spent on the evaluation and treatment of severe organ dysfunction, review of pertinent labs and imaging studies, discussions with consulting providers and discussions with patient/family: 70 minutes.     CODE STATUS: FULL CODE            Nora Esqueda MD  Department of Hospital Medicine  [facility] - [place] - Intensive Care

## 2025-06-09 NOTE — ASSESSMENT & PLAN NOTE
Does not feel c/w sepsis or due to bacterial infection at this time. Feel more c/w steroids give, stress rxn. F/u on repeat CBC, procal, lactic. Got a dose of abx in the ED and will hold for now. CXR concerning more for COPD and CHF exacerbation. IV lasix, duonebs, and steroids. Comprehensive viral respiratory panel sent.

## 2025-06-09 NOTE — ASSESSMENT & PLAN NOTE
- Mgmt per primary team and PCCM; on BIPAP   - In the setting of end stage heart and lung disease; contributes to hospice qualification   - Last hospital stay for similar was 2 weeks ago; illness education provided

## 2025-06-09 NOTE — ASSESSMENT & PLAN NOTE
Mild volume overload - diuresis and afterload reduction as tolerated  Recent Labs     06/09/25  0034   BNP 1,086*     Latest ECHO  No results found for this or any previous visit.    Current Heart Failure Medications  metoprolol succinate (TOPROL-XL) 24 hr tablet 25 mg, Daily, Oral  furosemide injection 40 mg, Every 12 hours, Intravenous

## 2025-06-09 NOTE — ASSESSMENT & PLAN NOTE
Mild elevation in troponin and suspect due to acute hypoxia, COPD exacerbation, and CHF exacerbation. Trend and monitor on tele. Echo as above. IV lasix given. No c/o chest pain. Cont. Home ASA 81mg daily, Plavix 75mg daily, Toprol XL 25mg daily. Maintain Sat 90-92% and MAP>65.

## 2025-06-09 NOTE — EICU
"Virtual ICU Admission    Admit Date: 2025  LOS: 0  Code Status: Full Code   : 1955  Bed: W267/W267 A:     Diagnosis: COPD with acute exacerbation    Patient  has no past medical history on file.    Last VS: /71 (BP Location: Left arm)   Pulse 88   Temp 97 °F (36.1 °C) (Axillary)   Resp 15   Ht 5' 5" (1.651 m)   Wt 86.6 kg (190 lb 14.7 oz)   SpO2 (!) 94%   BMI 31.77 kg/m²       VICU Review    VICU nurse assessment :  [place] completed, LDA documentation reconciliation completed, Skin care/wounds LDA reconciliation, Sign and held orders reviewed/released, and VTE prophylaxis review    No skin breakdown visualized              "

## 2025-06-09 NOTE — CONSULTS
[facility] - Intensive Care  Palliative Medicine  Consult Note    Patient Name: Abelardo Irene Jr.  MRN: [id number]  Admission Date: 6/9/2025  Hospital Length of Stay: 0 days  Code Status: Full Code   Attending Provider: Guanako Rowell MD  Consulting Provider: Shanell Watters MD  Primary Care Physician: No primary care provider on file.  Principal Problem:COPD with acute exacerbation    Patient information was obtained from patient, past medical records, ER records, and primary team.      Inpatient consult to Palliative Care  Consult performed by: Shanell Watters MD  Consult ordered by: Guanako Rowell MD  Reason for consult: Advance Care Planning        Assessment/Plan:     Advance Care Planning    - Consult for support and continuity of care in pt with advanced/end stage heart failure and respiratory failure currently in ICU for acute on chronic respiratory failure requiring continuous BIPAP; he was last in hospital for similar about 2 weeks ago. See APC notes on file; pt known to palliative team.   - During visit to bedside, he was alert and readily able to participate in discussion, breathing comfortably on NC oxygen. Reminded him of role of palliative medicine, and discussed how things have been going the last two weeks. He said things were going ok, until he started getting more short of breath and anxious, which caused him to call for ambulance.   - Long discussion about his underlying illness, as well as transparent assessment about his qualification for hospice. Due to some psychosocial complexities, he initially sounded unsure of post-hospital plans, though ultimately clarified he is open to home hospice with support from his wife Lillian and daughter Thao, who recently moved in with them. He has a number of other local family members including other daughter Ana Irene ([phone]), as well as his sisters Marilou and Bahman (does not have their phone numbers).   - Provided suzanne prognostic information,  including expected prognosis of 6 months or less. However, shared every person is different, and it might be less than this, or it might be more - he said ultimately it is in God's hands, to which I agreed. As he identifies as Caodaism, let him know we would have our  (a Caodaism ) visit with him, though he said his sister is also one and will be visiting today.   - As part of discussion on comfort-focused care, recommended DNR/DNI status, as CPR and/or intubation would be unlikely to be helpful given his end stage disease process. He seemed to agree with this, though I let him know he could discuss it with his family if he would prefer.   - Emotional support provided during visit, as he was appropriately upset at times during conversation   - Let him know palliative team will continue to check in on him; updated primary team, PCCM, and SW/CM after visit. Will likely try to reach out to Lillian this afternoon.     Addendum 1:40 pm: Multiple attempts to reach pt's spouse Lillian; no answer. Will follow up with pt and family tomorrow.      Pulmonary  COPD with acute exacerbation  - Mgmt per primary team and PCCM; see resp failure    Acute on chronic respiratory failure with hypoxia and hypercapnia  - Mgmt per primary team and PCCM; on BIPAP and being treated for COPD exacerbation.   - In the setting of end stage heart and lung disease; contributes to hospice qualification   - Last hospital stay for similar was 2 weeks ago; illness education provided      Cardiac/Vascular  Acute on chronic combined systolic and diastolic congestive heart failure  - EF 10 to 15%; contributes to hospice qualification. Pt has not taken any of his medications for a few months, though encouraged him to do so.      Thank you for your consult. I will follow-up with patient. Please contact us if you have any additional questions.    ZAID Quevedo  Palliative Medicine Staff   [phone]    Total visit time: 86 minutes    > 50% of  "70 min visit spent in chart review, face to face discussion of symptom assessment, coordination of care with other specialists, and discharge planning.    16 min ACP time spent: goals of care, emotional support, formulating and communicating prognosis, exploring burden/ benefit of various approaches of treatment.      Subjective:     HPI:   (From H&P) "69 y.o. man with h/o essential HTN, COPD (on 4l home oxygen), CAD(Prior PCI of his RCA with severe InStent restenosis of his left anterior descending artery stent->was supposed to have a  hybrid procedure with LIMA to LAD in 2018 per cardiology consult note by Dr. Cabrera in 2023) , Systolic/diastolic CHF (EF 10-15% in March 2025), NIDDM type 2 (A1c 7.6 in May), smoker, HLD presents to the ER with AMS. Pt was brought in by EMS altered and thus history is limited to my conversation with the ED physician. EMS found him with RA sats 75% and tripoding. Apparently family had called. He was place on CPAP and given mg 2gm IV x1, cont duonebs, SQ epi 0.3mg and Dexamethasone 16mg IV. (Incidentally he was just admitted here from 5/27-29/2025 for the same thing COPD and CHF exacerbation requiring BIPAP and IV lasix). Patient did tell me no c/o chest pain or SOB. Denies his oxygen tank malfunctioning. States has not been taking his lasix but didn't say why. No chest pain.      IN the ER was improving and changed to BIPAP after VBG with pH 7.02 and pCO2>130. Continued on multiple back to back duonebs and given a dose of IV 500mg Azithromycin. Pt. Clinically improving and more alert following commands. He remained afebriel with stable BP and sats >96% on 40% FIO on BIPAP.      Labs noted for WBC 17 with H/H 11.1/37.6.   Lytes noted for bicarb 32 (appears to run in the 30s chronically) and unremarkable liver and renal function. TSH wnl.   BNP elevated at 1,086 (higher than previous admission). Troponin 0.008->0.028.   COVID/Flu negative."    Palliative is consulted for support and " advance care planning; see ACP section of plan.       History reviewed. No pertinent past medical history.    No past surgical history on file.    Review of patient's allergies indicates:   Allergen Reactions    Iodinated contrast media Shortness Of Breath, Itching and Anxiety     Patient states that he had a bad reaction, anxiety , shortness of breath, itching .            Medications:  Continuous Infusions:  Scheduled Meds:   albuterol-ipratropium  3 mL Nebulization Q6H    aspirin  81 mg Oral Daily    atorvastatin  40 mg Oral QHS    clopidogreL  75 mg Oral Daily    enoxparin  40 mg Subcutaneous Daily    furosemide (LASIX) injection  40 mg Intravenous Q12H    metoprolol succinate  25 mg Oral Daily    mupirocin   Nasal BID    [START ON 6/10/2025] predniSONE  40 mg Oral Daily     PRN Meds:  Current Facility-Administered Medications:     acetaminophen, 650 mg, Oral, Q6H PRN    albuterol-ipratropium, 3 mL, Nebulization, Q4H PRN    dextrose 50%, 12.5 g, Intravenous, PRN    glucagon (human recombinant), 1 mg, Intramuscular, PRN    insulin aspart U-100, 0-10 Units, Subcutaneous, Q6H PRN    melatonin, 6 mg, Oral, Nightly PRN    ondansetron, 4 mg, Intravenous, Q6H PRN    polyethylene glycol, 17 g, Oral, BID PRN    sodium chloride 0.9%, 3 mL, Intravenous, PRN    Family History    None       Tobacco Use    Smoking status: Not on file    Smokeless tobacco: Not on file   Substance and Sexual Activity    Alcohol use: Not on file    Drug use: Not on file    Sexual activity: Not on file       Review of Systems   Respiratory:  Positive for shortness of breath.         (Improved)      Objective:     Vital Signs (Most Recent):  Temp: 98 °F (36.7 °C) (06/09/25 1101)  Pulse: 88 (06/09/25 1101)  Resp: 20 (06/09/25 1101)  BP: 133/74 (06/09/25 1101)  SpO2: 100 % (06/09/25 1101) Vital Signs (24h Range):  Temp:  [97 °F (36.1 °C)-98.2 °F (36.8 °C)] 98 °F (36.7 °C)  Pulse:  [] 88  Resp:  [15-41] 20  SpO2:  [86 %-100 %] 100 %  BP:  "(109-200)/() 133/74     Weight: 86.6 kg (190 lb 14.7 oz)  Body mass index is 31.77 kg/m².       Physical Exam  Constitutional:       Comments: Sitting up in bed, pleasant and conversational, in no distress, on NC oxygen        Significant Labs: All pertinent labs within the past 24 hours have been reviewed.  CBC:   Recent Labs   Lab 06/09/25  0428   WBC 20.06*   HGB 10.4*   HCT 36.8*   MCV 99*        BMP:  Recent Labs   Lab 06/09/25  0034 06/09/25  0428   * 240*    144   K 4.1 3.8   CL 99 99   CO2 32* 34*   BUN 5* 5*   CREATININE 0.8 0.8   CALCIUM 8.9 8.7   MG 2.5  --      LFT:  Lab Results   Component Value Date    AST 14 06/09/2025    ALKPHOS 89 06/09/2025    BILITOT 0.3 06/09/2025     Albumin:   Albumin   Date Value Ref Range Status   06/09/2025 3.6 3.5 - 5.2 g/dL Final     Protein:   Protein Total   Date Value Ref Range Status   06/09/2025 8.2 6.0 - 8.4 gm/dL Final     Lactic acid:   No results found for: "LACTATE"    Significant Imaging: I have reviewed all pertinent imaging results/findings within the past 24 hours.                    "

## 2025-06-09 NOTE — HPI
Mr. Abelardo Irene Jr. is a 68 yo male with a PMHx of HTN, COPD (on 4L O2), CHF (EF 10-15%), CAD, HTN, DMII, and tobacco abuse that presented to the ED for worsening shortness of breath and tripoding. Family called EMS and he was brought to the hospital. Work up in ED revealed pulmonary edema on CXR, BNP of 1086. He was given lasix and started on BiPAP with improvement noted. He was admitted for acute on chronic combined respiratory failure 2/2 CHF +/- COPD exacerbations. Pulmonary was consulted for respiratory failure.      Of note, Mr. Irene is well known to our service as he has been admitted several times a year with similar presentations, most recently less than a month ago.

## 2025-06-09 NOTE — ASSESSMENT & PLAN NOTE
HX of COPD. PFT in 2017 Fev1 of 58% with moderate restriction with reduced DLCO.  Previous CT chest imaging with emphysema. Does NOT seem to be in exacerbation.      - continue home inhaler/meds  - PRN duoneb  and monitor respiratory status closely.

## 2025-06-09 NOTE — ASSESSMENT & PLAN NOTE
Patient has Combined Systolic and Diastolic heart failure that is Acute on chronic. On presentation their CHF was decompensated. Evidence of decompensated CHF on presentation includes: crackles on lung auscultation, shortness of breath, and elevated BNP.   Recent Labs     06/09/25  0034   BNP 1,086*     Latest ECHO: 3/5/2025    Left Ventricle: The left ventricle is moderately dilated. Mildly increased wall thickness. There is mild eccentric hypertrophy. Severe global hypokinesis present. There is severely reduced systolic function with a visually estimated ejection fraction of 10 -15%. Unable to assess diastolic function due to E-A fusion.    Right Ventricle: Systolic function is borderline low.    Right Atrium: Right atrium is mildly dilated.    Aorta: Aortic root is the upper limits of normal in size measuring 3.60 cm. Ascending aorta is the upper limit of normal in size measuring 3.47 cm.       Current Heart Failure Medications  furosemide injection 40 mg, Every 12 hours, Intravenous  metoprolol succinate (TOPROL-XL) 24 hr tablet 25 mg, Daily, Oral    Plan  - Monitor strict I&Os and daily weights.    - Place on telemetry  - Low sodium diet  - Place on fluid restriction of 1.5 L.   - Cardiology has not been consulted

## 2025-06-09 NOTE — ASSESSMENT & PLAN NOTE
Patient's COPD is with exacerbation noted by use of accessory muscles for breathing and worsening of baseline hypoxia. Suspect AMS due to hypercapnia.  Patient is currently on COPD Pathway. Continue scheduled inhalers Steroids and Supplemental oxygen and monitor respiratory status closely in the ICU. Pulmonary is consulted. Wean BIPAP off as able to home 4l NC. Goal oxygen at this time 90-92% given severity of his COPD and chronic CO2 retention. HOLD on further abx till repeat CBC, lactic, procal return. Does not appear to be c/w bacterial PNA. F/u on viral resp panel. COVID/Flu negative.

## 2025-06-09 NOTE — PROGRESS NOTES
[facility] ([facility]) Team notified of pt referral via Ambulatory Referral to Heart Failure Transitional Care ([id number]).    Patient screened today 06/09/2025 by provider and RN for enrollment to program.      Pt was deemed not a candidate for enrollment at this time related to patient refused. Patient stated he was going to placed [facility].    Pt will require additional follow up planning per primary team.     If pt status, diagnosis, or treatment plan changes , please place AMB referral to [facility] ([id number]) for HFTC enrollment re-evalution.

## 2025-06-09 NOTE — CONSULTS
CM received consult, attending provider will place ambulatory referral at discharge if appropriate.

## 2025-06-09 NOTE — HPI
69 y.o. man with h/o essential HTN, COPD (on 4l home oxygen), CAD(Prior PCI of his RCA with severe InStent restenosis of his left anterior descending artery stent->was supposed to have a  hybrid procedure with LIMA to LAD in 2018 per cardiology consult note by Dr. Cabrera in 2023) , Systolic/diastolic CHF (EF 10-15% in March 2025), NIDDM type 2 (A1c 7.6 in May), smoker, HLD presents to the ER with AMS. Pt was brought in by EMS altered and thus history is limited to my conversation with the ED physician. EMS found him with RA sats 75% and tripoding. Apparently family had called. He was place on CPAP and given mg 2gm IV x1, cont duonebs, SQ epi 0.3mg and Dexamethasone 16mg IV. (Incidentally he was just admitted here from 5/27-29/2025 for the same thing COPD and CHF exacerbation requiring BIPAP and IV lasix). Patient did tell me no c/o chest pain or SOB. Denies his oxygen tank malfunctioning. States has not been taking his lasix but didn't say why. No chest pain.     IN the ER was improving and changed to BIPAP after VBG with pH 7.02 and pCO2>130. Continued on multiple back to back duonebs and given a dose of IV 500mg Azithromycin. Pt. Clinically improving and more alert following commands. He remained afebriel with stable BP and sats >96% on 40% FIO on BIPAP.     Labs noted for WBC 17 with H/H 11.1/37.6.   Lytes noted for bicarb 32 (appears to run in the 30s chronically) and unremarkable liver and renal function. TSH wnl.   BNP elevated at 1,086 (higher than previous admission). Troponin 0.008->0.028.   COVID/Flu negative.      CXR:   Findings may represent infiltrates or edema. Mild cardiomegaly.     We have been consulted for further management of his AMS due to acute on chronic hypercapnic/hypoxic respiratory failure.

## 2025-06-09 NOTE — HPI
"(From H&P) "69 y.o. man with h/o essential HTN, COPD (on 4l home oxygen), CAD(Prior PCI of his RCA with severe InStent restenosis of his left anterior descending artery stent->was supposed to have a  hybrid procedure with LIMA to LAD in 2018 per cardiology consult note by Dr. Cabrera in 2023) , Systolic/diastolic CHF (EF 10-15% in March 2025), NIDDM type 2 (A1c 7.6 in May), smoker, HLD presents to the ER with AMS. Pt was brought in by EMS altered and thus history is limited to my conversation with the ED physician. EMS found him with RA sats 75% and tripoding. Apparently family had called. He was place on CPAP and given mg 2gm IV x1, cont duonebs, SQ epi 0.3mg and Dexamethasone 16mg IV. (Incidentally he was just admitted here from 5/27-29/2025 for the same thing COPD and CHF exacerbation requiring BIPAP and IV lasix). Patient did tell me no c/o chest pain or SOB. Denies his oxygen tank malfunctioning. States has not been taking his lasix but didn't say why. No chest pain.      IN the ER was improving and changed to BIPAP after VBG with pH 7.02 and pCO2>130. Continued on multiple back to back duonebs and given a dose of IV 500mg Azithromycin. Pt. Clinically improving and more alert following commands. He remained afebriel with stable BP and sats >96% on 40% FIO on BIPAP.      Labs noted for WBC 17 with H/H 11.1/37.6.   Lytes noted for bicarb 32 (appears to run in the 30s chronically) and unremarkable liver and renal function. TSH wnl.   BNP elevated at 1,086 (higher than previous admission). Troponin 0.008->0.028.   COVID/Flu negative."    Palliative is consulted for support and advance care planning; see ACP section of plan.   "

## 2025-06-09 NOTE — ASSESSMENT & PLAN NOTE
- Consult for support and continuity of care in pt with advanced/end stage heart failure and respiratory failure currently in ICU for acute on chronic respiratory failure requiring continuous BIPAP; he was last in hospital for similar about 2 weeks ago. See APC notes on file; pt known to palliative team.   - During visit to bedside, he was alert and readily able to participate in discussion, breathing comfortably on NC oxygen. Reminded him of role of palliative medicine, and discussed how things have been going the last two weeks. He said things were going ok, until he started getting more short of breath and anxious, which caused him to call for ambulance.   - Long discussion about his underlying illness, as well as transparent assessment about his qualification for hospice. Due to some psychosocial complexities, he initially sounded unsure of post-hospital plans, though ultimately clarified he is open to home hospice with support from his wife Lillian and daughter Thao, who recently moved in with them. He has a number of other local family members including other daughter Ana Irene ([phone]), as well as his sisters Marilou and Bahman (does not have their phone numbers).   - Provided suzanne prognostic information, including expected prognosis of 6 months or less. However, shared every person is different, and it might be less than this, or it might be more - he said ultimately it is in God's hands, to which I agreed. As he identifies as Lutheran, let him know we would have our  (a Lutheran ) visit with him, though he said his sister is also one and will be visiting today.   - As part of discussion on comfort-focused care, recommended DNR/DNI status, as CPR and/or intubation would be unlikely to be helpful given his end stage disease process. He seemed to agree with this, though I let him know he could discuss it with his family if he would prefer.   - Let him know palliative team will continue to  check in on him; updated primary team, PCCM, and SW/CM after visit. Will likely try to reach out to Lillian this afternoon.

## 2025-06-09 NOTE — EICU
Virtual ICU Quality Rounds    Admit Date: 6/9/2025  Hospital Day: 0    ICU Day: 5h    24H Vital Sign Range:  Temp:  [97 °F (36.1 °C)-98.2 °F (36.8 °C)]   Pulse:  []   Resp:  [15-28]   BP: (109-200)/()   SpO2:  [86 %-100 %]     VICU Surveillance Screening                    LDA reconciliation : Yes    Nursing orders placed : IP SIXTO Peripheral IV Access

## 2025-06-09 NOTE — ASSESSMENT & PLAN NOTE
Known EF of 10-15% and DD. Dismissed from [facility] for failure to show up to multiple appointments; reports he does not have transportation. Continues to smoke 2 packs per day.  Significant troponin elevation noted; though not a candidate for intervention given noncompliance.      - continue aggressive diuresis  - nightly BIPAP  - GOC conversation held - DNR code status recommended   - palliative care consulted, appreciate assistance

## 2025-06-09 NOTE — EICU
EICU BRIEF ADMIT NOTE:    HISTORY:  Hypercapnic respiratory failure;  Please refer to H/P and ER notes for detail    CAMERA ASSESSMENT: Two way audiovisual assessment was done: Yes    Telemetry was reviewed. Medical records including notes, labs and imaging were reviewed.Yes    DISCUSSED with bedside nurse.No    ASSESSMENT AND PLAN:    # Hypercapnic Respiratory failure: Steroids, Bronchodilator, BiPAP      BEST PRACTICES REVIEW:    INTUBATED: No  GLYCEMIN CONTROL:  Diabetes: No  STRESS ULCER PROPHYLAXIS: PPI   DVT PROPHYLAXIS:  Pharmacological    Thank You for allowing EICU to participate in the care of the patient. Please call as needed      John Wood MD  EICU  Critical Care Medicine

## 2025-06-09 NOTE — PLAN OF CARE
Case Management Assessment - [facility]    PCP: No primary care provider on file.    Pharmacy:        Patient Arrived From: Home  Existing Help at Home: Spouse Nadia [phone]    Barriers to Discharge: Lack of support    Discharge Plan:    A. Home   B. TBD      CM met with patient at bedside to discuss discharge planning. Patient states he lives with his spouse Lillian but is uncertain if she will allow him to return. Patient states his daughter have his phone and he do not know any numbers. Cm reviewed patients chart to contact Spouse Nadia Irene there was no answer, cm left a voice message for a return call. CM will continue to follow patient discharge needs.      06/09/25 1501   Discharge Assessment   Assessment Type Discharge Planning Assessment   Confirmed/corrected address, phone number and insurance Yes   Confirmed Demographics Correct on Facesheet   Source of Information patient   People in Home spouse   Name(s) of People in Home Lillian   Do you expect to return to your current living situation? Other (see comments)  (Uncertain)   Prior to hospitilization cognitive status: Alert/Oriented   Current cognitive status: Alert/Oriented   Walking or Climbing Stairs Difficulty no   Dressing/Bathing Difficulty no   Equipment Currently Used at Home none   Readmission within 30 days? No   Patient currently being followed by outpatient case management? No   Do you currently have service(s) that help you manage your care at home? No   Do you take prescription medications? Yes   Do you have prescription coverage? Yes   Do you have any problems affording any of your prescribed medications? No   Is the patient taking medications as prescribed? yes   Who is going to help you get home at discharge? Patient is uncertain   How do you get to doctors appointments? family or friend will provide   Discharge Plan A Home   Discharge Plan B   (TBD)   DME Needed Upon Discharge    (tbd)   Discharge Plan discussed with: Patient   Transition  of Care Barriers Other (see comments)   Physical Activity   On average, how many minutes do you engage in exercise at this level? 0 min   Financial Resource Strain   How hard is it for you to pay for the very basics like food, housing, medical care, and heating? Pt Declined   Housing Stability   In the last 12 months, was there a time when you were not able to pay the mortgage or rent on time? N   At any time in the past 12 months, were you homeless or living in a shelter (including now)? N   Transportation Needs   In the past 12 months, has lack of transportation kept you from medical appointments or from getting medications? no   In the past 12 months, has lack of transportation kept you from meetings, work, or from getting things needed for daily living? No   Food Insecurity   Within the past 12 months, the food you bought just didn't last and you didn't have money to get more. Never true   Stress   Do you feel stress - tense, restless, nervous, or anxious, or unable to sleep at night because your mind is troubled all the time - these days? To some exte   Alcohol Use   Q2: How many drinks containing alcohol do you have on a typical day when you are drinking? None

## 2025-06-09 NOTE — CONSULTS
[facility] - Intensive Care  Pulmonology  Consult Note    Patient Name: Abelardo Irene Jr.  MRN: [id number]  Admission Date: 6/9/2025  Hospital Length of Stay: 0 days  Code Status: Full Code  Attending Physician: Guanako Rowell MD  Primary Care Provider: No primary care provider on file.   Principal Problem: Acute on chronic respiratory failure with hypoxia and hypercapnia    Inpatient consult to Pulmonology  Consult performed by: Lydia Falcon, NP  Consult ordered by: Nora Esqueda MD        Subjective:     HPI:  Mr. Abelardo Irene Jr. is a 68 yo male with a PMHx of HTN, COPD (on 4L O2), CHF (EF 10-15%), CAD, HTN, DMII, and tobacco abuse that presented to the ED for worsening shortness of breath and tripoding. Family called EMS and he was brought to the hospital. Work up in ED revealed pulmonary edema on CXR, BNP of 1086. He was given lasix and started on BiPAP with improvement noted. He was admitted for acute on chronic combined respiratory failure 2/2 CHF +/- COPD exacerbations. Pulmonary was consulted for respiratory failure.      Of note, Mr. Irene is well known to our service as he has been admitted several times a year with similar presentations, most recently less than a month ago.       History reviewed. No pertinent past medical history.    No past surgical history on file.    Review of patient's allergies indicates:   Allergen Reactions    Iodinated contrast media Shortness Of Breath, Itching and Anxiety     Patient states that he had a bad reaction, anxiety , shortness of breath, itching .            Family History    None       Tobacco Use    Smoking status: Not on file    Smokeless tobacco: Not on file   Substance and Sexual Activity    Alcohol use: Not on file    Drug use: Not on file    Sexual activity: Not on file         Review of Systems   Respiratory:  Negative for shortness of breath.    Cardiovascular:  Negative for chest pain.     Objective:     Vital Signs (Most Recent):  Temp: 98 °F (36.7 °C)  (06/09/25 1101)  Pulse: 82 (06/09/25 1252)  Resp: 20 (06/09/25 1252)  BP: 133/74 (06/09/25 1101)  SpO2: 99 % (06/09/25 1252) Vital Signs (24h Range):  Temp:  [97 °F (36.1 °C)-98.2 °F (36.8 °C)] 98 °F (36.7 °C)  Pulse:  [] 82  Resp:  [15-41] 20  SpO2:  [86 %-100 %] 99 %  BP: (109-200)/() 133/74     Weight: 86.6 kg (190 lb 14.7 oz)  Body mass index is 31.77 kg/m².      Intake/Output Summary (Last 24 hours) at 6/9/2025 1411  Last data filed at 6/9/2025 1115  Gross per 24 hour   Intake --   Output 1450 ml   Net -1450 ml        Physical Exam  Vitals and nursing note reviewed.   Constitutional:       General: Abelardo is not in acute distress.  HENT:      Head: Normocephalic and atraumatic.      Mouth/Throat:      Mouth: Mucous membranes are moist.      Pharynx: Oropharynx is clear.   Cardiovascular:      Rate and Rhythm: Normal rate and regular rhythm.      Pulses: Normal pulses.   Pulmonary:      Effort: Pulmonary effort is normal. No respiratory distress.      Breath sounds: Rales present. No wheezing or rhonchi.      Comments: Back on home 4L NC   Abdominal:      General: Bowel sounds are normal. There is no distension.      Palpations: Abdomen is soft.      Tenderness: There is no abdominal tenderness.   Musculoskeletal:         General: Normal range of motion.      Right lower leg: Edema present.      Left lower leg: Edema present.   Skin:     General: Skin is warm and dry.      Capillary Refill: Capillary refill takes less than 2 seconds.   Neurological:      General: No focal deficit present.      Mental Status: Abelardo is oriented to person, place, and time.   Psychiatric:         Mood and Affect: Mood normal.         Behavior: Behavior normal.          Vents:  Oxygen Concentration (%): 40 (06/09/25 0713)    Lines/Drains/Airways       Peripheral Intravenous Line  Duration                  Peripheral IV - Single Lumen 06/09/25 0030 20 G 1 in Left Antecubital <1 day         Peripheral IV - Single Lumen  06/09/25 0033 18 G 1 1/4 in Right Forearm <1 day                    Significant Labs:    CBC/Anemia Profile:  Recent Labs   Lab 06/09/25  0034 06/09/25  0428   WBC 17.20* 20.06*   HGB 11.1* 10.4*   HCT 37.6* 36.8*    225   MCV 97 99*   RDW 13.4 13.4        Chemistries:  Recent Labs   Lab 06/09/25  0034 06/09/25  0428    144   K 4.1 3.8   CL 99 99   CO2 32* 34*   BUN 5* 5*   CREATININE 0.8 0.8   CALCIUM 8.9 8.7   ALBUMIN 3.6  --    PROT 8.2  --    BILITOT 0.3  --    ALKPHOS 89  --    ALT 15  --    AST 14  --    MG 2.5  --        All pertinent labs within the past 24 hours have been reviewed.    Significant Imaging:   I have reviewed all pertinent imaging results/findings within the past 24 hours.    ABG  Recent Labs   Lab 06/09/25  0504   PH 7.241*   PO2 79*   PCO2 87.8*   HCO3 37.7*   BE 8*     Assessment/Plan:     Pulmonary  * Acute on chronic respiratory failure with hypoxia and hypercapnia  Respiratory failure 2/2 decompensated HF; less likely that COPD is contributing. Reports that he has not been taking his lasix. Initial VBG with 7.07>130. CXR with pulmonary edema and elevated BNP 1086. Procal 0.11. Required BIPAP and ICU admission. Many repeated admissions for the same. Continues to smoke 2 ppd and non-compliant with fluid/ dietary restrictions.      - weaned down to his home oxygen this morning  - continue aggressive diuresis  - home nebs/ inhalers with PRN duonebs, short course of steroids ok  - continue BiPAP qhs and with naps  - no need for antibiotics at this time  - discussed hospice qualification and Mr. Irene is interested. Again, recommend DNR code status. Appreciate palliative care assistance    COPD (chronic obstructive pulmonary disease)  HX of COPD. PFT in 2017 Fev1 of 58% with moderate restriction with reduced DLCO.  Previous CT chest imaging with emphysema. Does NOT seem to be in exacerbation.      - continue home inhaler/meds  - PRN duoneb  and monitor respiratory status closely.      Cardiac/Vascular  Acute on chronic combined systolic and diastolic congestive heart failure  Known EF of 10-15% and DD. [facility] for failure to show up to multiple appointments; reports he does not have transportation. Continues to smoke 2 packs per day.  Significant troponin elevation noted; though not a candidate for intervention given noncompliance.      - continue aggressive diuresis  - nightly BIPAP  - GOC conversation held - DNR code status recommended   - palliative care consulted, appreciate assistance     Other  Tobacco abuse  Continues to smoke 2 ppd. Cessation discussed again.    Plan discussed with Dr. Sommer, Dr. Watters, and Dr. Rowell.  Attempted to reach patient's wife, Lillian, for an update via phone. No answer X 2.     Critical Care Time: 60 minutes  Critical care was time spent personally by me on the following activities: development of treatment plan with patient or surrogate and bedside caregivers, discussions with consultants, evaluation of patient's response to treatment, examination of patient, ordering and performing treatments and interventions, ordering and review of laboratory studies, ordering and review of radiographic studies, pulse oximetry, re-evaluation of patient's condition. This critical care time did not overlap with that of any other provider or involve time for any procedures.    Thank you for your consult. I will follow-up with patient. Please contact us if you have any additional questions.     Lydia Falcon, JAVI  Pulmonology  [facility] - Intensive Care

## 2025-06-09 NOTE — ASSESSMENT & PLAN NOTE
Patient with known CAD s/p PCI to RCA, which is controlled Will continue ASA, Plavix, and Statin and monitor for S/Sx of angina/ACS. Continue to monitor on telemetry. No c/o chest pain and NO STEMI on EKG. Treating CHF exacerbation.

## 2025-06-09 NOTE — SUBJECTIVE & OBJECTIVE
History reviewed. No pertinent past medical history.    No past surgical history on file.    Review of patient's allergies indicates:   Allergen Reactions    Iodinated contrast media Shortness Of Breath, Itching and Anxiety     Patient states that he had a bad reaction, anxiety , shortness of breath, itching .            No current facility-administered medications on file prior to encounter.     No current outpatient medications on file prior to encounter.     Family History    None       Tobacco Use    Smoking status: Not on file    Smokeless tobacco: Not on file   Substance and Sexual Activity    Alcohol use: Not on file    Drug use: Not on file    Sexual activity: Not on file     Review of Systems   Constitutional: Negative for decreased appetite.   HENT:  Negative for ear discharge.    Eyes:  Negative for blurred vision.   Endocrine: Negative for polyphagia.   Skin:  Negative for nail changes.   Genitourinary:  Negative for bladder incontinence.   Neurological:  Negative for aphonia.   Psychiatric/Behavioral:  Negative for hallucinations.    Allergic/Immunologic: Negative for hives.     Objective:     Vital Signs (Most Recent):  Temp: 98 °F (36.7 °C) (06/09/25 1101)  Pulse: 88 (06/09/25 1101)  Resp: 20 (06/09/25 1101)  BP: 133/74 (06/09/25 1101)  SpO2: 100 % (06/09/25 1101) Vital Signs (24h Range):  Temp:  [97 °F (36.1 °C)-98.2 °F (36.8 °C)] 98 °F (36.7 °C)  Pulse:  [] 88  Resp:  [15-41] 20  SpO2:  [86 %-100 %] 100 %  BP: (109-200)/() 133/74     Weight: 86.6 kg (190 lb 14.7 oz)  Body mass index is 31.77 kg/m².    SpO2: 100 %         Intake/Output Summary (Last 24 hours) at 6/9/2025 1158  Last data filed at 6/9/2025 1115  Gross per 24 hour   Intake --   Output 1450 ml   Net -1450 ml       Lines/Drains/Airways       Peripheral Intravenous Line  Duration                  Peripheral IV - Single Lumen 06/09/25 0030 20 G 1 in Left Antecubital <1 day         Peripheral IV - Single Lumen 06/09/25 0033 18 G  1 1/4 in Right Forearm <1 day                     Physical Exam  Constitutional:       Appearance: Abelardo is well-developed.   HENT:      Head: Normocephalic and atraumatic.   Eyes:      Conjunctiva/sclera: Conjunctivae normal.      Pupils: Pupils are equal, round, and reactive to light.   Cardiovascular:      Rate and Rhythm: Normal rate.      Pulses: Intact distal pulses.      Heart sounds: Normal heart sounds.   Pulmonary:      Effort: Pulmonary effort is normal.      Breath sounds: Normal breath sounds.   Abdominal:      General: Bowel sounds are normal.      Palpations: Abdomen is soft.   Musculoskeletal:         General: Normal range of motion.      Cervical back: Normal range of motion and neck supple.   Skin:     General: Skin is warm and dry.   Neurological:      Mental Status: Abelardo is alert and oriented to person, place, and time.          Significant Labs: All pertinent lab results from the last 24 hours have been reviewed.    Significant Imaging: Echocardiogram: 2D echo with color flow doppler: No results found for this or any previous visit.

## 2025-06-10 PROBLEM — J96.11 CHRONIC RESPIRATORY FAILURE WITH HYPOXIA AND HYPERCAPNIA: Status: ACTIVE | Noted: 2025-06-10

## 2025-06-10 PROBLEM — J96.12 CHRONIC RESPIRATORY FAILURE WITH HYPOXIA AND HYPERCAPNIA: Status: ACTIVE | Noted: 2025-06-10

## 2025-06-10 LAB
ABSOLUTE EOSINOPHIL (OHS): 0.01 K/UL
ABSOLUTE MONOCYTE (OHS): 0.72 K/UL (ref 0.3–1)
ABSOLUTE NEUTROPHIL COUNT (OHS): 14.62 K/UL (ref 1.8–7.7)
ANION GAP (OHS): 11 MMOL/L (ref 8–16)
BASOPHILS # BLD AUTO: 0.03 K/UL
BASOPHILS NFR BLD AUTO: 0.2 %
BUN SERPL-MCNC: 14 MG/DL (ref 8–23)
CALCIUM SERPL-MCNC: 8.8 MG/DL (ref 8.7–10.5)
CHLORIDE SERPL-SCNC: 91 MMOL/L (ref 95–110)
CO2 SERPL-SCNC: 36 MMOL/L (ref 23–29)
CREAT SERPL-MCNC: 0.7 MG/DL (ref 0.5–1.4)
ERYTHROCYTE [DISTWIDTH] IN BLOOD BY AUTOMATED COUNT: 13.6 % (ref 11.5–14.5)
GFR SERPLBLD CREATININE-BSD FMLA CKD-EPI: >60 ML/MIN/1.73/M2
GLUCOSE SERPL-MCNC: 217 MG/DL (ref 70–110)
HCT VFR BLD AUTO: 36.1 % (ref 40–54)
HGB BLD-MCNC: 10.8 GM/DL (ref 14–18)
IMM GRANULOCYTES # BLD AUTO: 0.08 K/UL (ref 0–0.04)
IMM GRANULOCYTES NFR BLD AUTO: 0.4 % (ref 0–0.5)
LYMPHOCYTES # BLD AUTO: 2.62 K/UL (ref 1–4.8)
MCH RBC QN AUTO: 28.4 PG (ref 27–31)
MCHC RBC AUTO-ENTMCNC: 29.9 G/DL (ref 32–36)
MCV RBC AUTO: 95 FL (ref 82–98)
NUCLEATED RBC (/100WBC) (OHS): 0 /100 WBC
OHS QRS DURATION: 102 MS
OHS QTC CALCULATION: 528 MS
PLATELET # BLD AUTO: 216 K/UL (ref 150–450)
PMV BLD AUTO: 11 FL (ref 9.2–12.9)
POCT GLUCOSE: 189 MG/DL (ref 70–110)
POCT GLUCOSE: 244 MG/DL (ref 70–110)
POCT GLUCOSE: 249 MG/DL (ref 70–110)
POCT GLUCOSE: 258 MG/DL (ref 70–110)
POCT GLUCOSE: 260 MG/DL (ref 70–110)
POCT GLUCOSE: 268 MG/DL (ref 70–110)
POTASSIUM SERPL-SCNC: 4 MMOL/L (ref 3.5–5.1)
RBC # BLD AUTO: 3.8 M/UL (ref 4.6–6.2)
RELATIVE EOSINOPHIL (OHS): 0.1 %
RELATIVE LYMPHOCYTE (OHS): 14.5 % (ref 18–48)
RELATIVE MONOCYTE (OHS): 4 % (ref 4–15)
RELATIVE NEUTROPHIL (OHS): 80.8 % (ref 38–73)
SODIUM SERPL-SCNC: 138 MMOL/L (ref 136–145)
WBC # BLD AUTO: 18.08 K/UL (ref 3.9–12.7)

## 2025-06-10 PROCEDURE — 99900035 HC TECH TIME PER 15 MIN (STAT)

## 2025-06-10 PROCEDURE — 99233 SBSQ HOSP IP/OBS HIGH 50: CPT | Mod: ,,, | Performed by: INTERNAL MEDICINE

## 2025-06-10 PROCEDURE — 36415 COLL VENOUS BLD VENIPUNCTURE: CPT | Performed by: HOSPITALIST

## 2025-06-10 PROCEDURE — 97535 SELF CARE MNGMENT TRAINING: CPT

## 2025-06-10 PROCEDURE — 94640 AIRWAY INHALATION TREATMENT: CPT

## 2025-06-10 PROCEDURE — 82310 ASSAY OF CALCIUM: CPT | Performed by: HOSPITALIST

## 2025-06-10 PROCEDURE — 97162 PT EVAL MOD COMPLEX 30 MIN: CPT

## 2025-06-10 PROCEDURE — 63600175 PHARM REV CODE 636 W HCPCS: Performed by: NURSE PRACTITIONER

## 2025-06-10 PROCEDURE — 94799 UNLISTED PULMONARY SVC/PX: CPT

## 2025-06-10 PROCEDURE — 25000242 PHARM REV CODE 250 ALT 637 W/ HCPCS: Performed by: INTERNAL MEDICINE

## 2025-06-10 PROCEDURE — 97165 OT EVAL LOW COMPLEX 30 MIN: CPT

## 2025-06-10 PROCEDURE — 94660 CPAP INITIATION&MGMT: CPT

## 2025-06-10 PROCEDURE — 27000221 HC OXYGEN, UP TO 24 HOURS

## 2025-06-10 PROCEDURE — 12000002 HC ACUTE/MED SURGE SEMI-PRIVATE ROOM

## 2025-06-10 PROCEDURE — 25000003 PHARM REV CODE 250: Performed by: INTERNAL MEDICINE

## 2025-06-10 PROCEDURE — 99291 CRITICAL CARE FIRST HOUR: CPT | Mod: ,,, | Performed by: NURSE PRACTITIONER

## 2025-06-10 PROCEDURE — 85025 COMPLETE CBC W/AUTO DIFF WBC: CPT | Performed by: HOSPITALIST

## 2025-06-10 PROCEDURE — 63600175 PHARM REV CODE 636 W HCPCS: Performed by: INTERNAL MEDICINE

## 2025-06-10 PROCEDURE — 25000242 PHARM REV CODE 250 ALT 637 W/ HCPCS: Performed by: HOSPITALIST

## 2025-06-10 PROCEDURE — 25000003 PHARM REV CODE 250: Performed by: HOSPITALIST

## 2025-06-10 RX ORDER — FAMOTIDINE 20 MG/1
20 TABLET, FILM COATED ORAL 2 TIMES DAILY
Status: DISCONTINUED | OUTPATIENT
Start: 2025-06-10 | End: 2025-06-11 | Stop reason: HOSPADM

## 2025-06-10 RX ORDER — IPRATROPIUM BROMIDE AND ALBUTEROL SULFATE 2.5; .5 MG/3ML; MG/3ML
3 SOLUTION RESPIRATORY (INHALATION)
Status: DISCONTINUED | OUTPATIENT
Start: 2025-06-10 | End: 2025-06-11 | Stop reason: HOSPADM

## 2025-06-10 RX ADMIN — INSULIN ASPART 6 UNITS: 100 INJECTION, SOLUTION INTRAVENOUS; SUBCUTANEOUS at 03:06

## 2025-06-10 RX ADMIN — FUROSEMIDE 40 MG: 10 INJECTION, SOLUTION INTRAVENOUS at 09:06

## 2025-06-10 RX ADMIN — IPRATROPIUM BROMIDE AND ALBUTEROL SULFATE 3 ML: 2.5; .5 SOLUTION RESPIRATORY (INHALATION) at 01:06

## 2025-06-10 RX ADMIN — ATORVASTATIN CALCIUM 40 MG: 40 TABLET, FILM COATED ORAL at 09:06

## 2025-06-10 RX ADMIN — PREDNISONE 40 MG: 20 TABLET ORAL at 08:06

## 2025-06-10 RX ADMIN — FAMOTIDINE 20 MG: 20 TABLET, FILM COATED ORAL at 09:06

## 2025-06-10 RX ADMIN — INSULIN ASPART 4 UNITS: 100 INJECTION, SOLUTION INTRAVENOUS; SUBCUTANEOUS at 05:06

## 2025-06-10 RX ADMIN — CLOPIDOGREL 75 MG: 75 TABLET ORAL at 08:06

## 2025-06-10 RX ADMIN — FUROSEMIDE 40 MG: 10 INJECTION, SOLUTION INTRAVENOUS at 08:06

## 2025-06-10 RX ADMIN — FAMOTIDINE 20 MG: 20 TABLET, FILM COATED ORAL at 10:06

## 2025-06-10 RX ADMIN — IPRATROPIUM BROMIDE AND ALBUTEROL SULFATE 3 ML: 2.5; .5 SOLUTION RESPIRATORY (INHALATION) at 02:06

## 2025-06-10 RX ADMIN — METOPROLOL SUCCINATE 25 MG: 25 TABLET, EXTENDED RELEASE ORAL at 08:06

## 2025-06-10 RX ADMIN — MUPIROCIN: 20 OINTMENT TOPICAL at 08:06

## 2025-06-10 RX ADMIN — MUPIROCIN: 20 OINTMENT TOPICAL at 09:06

## 2025-06-10 RX ADMIN — ENOXAPARIN SODIUM 40 MG: 40 INJECTION SUBCUTANEOUS at 05:06

## 2025-06-10 RX ADMIN — ASPIRIN 81 MG: 81 TABLET ORAL at 08:06

## 2025-06-10 RX ADMIN — IPRATROPIUM BROMIDE AND ALBUTEROL SULFATE 3 ML: 2.5; .5 SOLUTION RESPIRATORY (INHALATION) at 08:06

## 2025-06-10 RX ADMIN — INSULIN ASPART 2 UNITS: 100 INJECTION, SOLUTION INTRAVENOUS; SUBCUTANEOUS at 08:06

## 2025-06-10 NOTE — PLAN OF CARE
Problem: COPD (Chronic Obstructive Pulmonary Disease)  Goal: Improved Oral Intake  Outcome: Progressing  Goal: Effective Oxygenation and Ventilation  Outcome: Progressing     Problem: Adult Inpatient Plan of Care  Goal: Plan of Care Review  Outcome: Progressing  Goal: Patient-Specific Goal (Individualized)  Outcome: Progressing  Goal: Absence of Hospital-Acquired Illness or Injury  Outcome: Progressing  Goal: Optimal Comfort and Wellbeing  Outcome: Progressing  Goal: Readiness for Transition of Care  Outcome: Progressing     Problem: Skin Injury Risk Increased  Goal: Skin Health and Integrity  Outcome: Progressing     Problem: Coping Ineffective  Goal: Effective Coping  Outcome: Progressing

## 2025-06-10 NOTE — ASSESSMENT & PLAN NOTE
Patient with Hypercapnic and Hypoxic Respiratory failure which is Acute on chronic.  Abelardo is on home oxygen at 4 LPM. Supplemental oxygen was provided and noted- Oxygen Concentration (%):  [30] 30 Wean to keep sats 90-92% due to severity of his COPD. (home oxygen of 4l).   Treating COPD exacerbation and will give IV lasix as well for possible mild CHF exacerbation. Control BP.   was started on continues bipap in ICU setting,improved,off bipap and is on nightly bipap,stable on Nc O 2

## 2025-06-10 NOTE — PLAN OF CARE
Discharge Recommendations: No Therapy Indicated  Problem: Physical Therapy  Goal: Physical Therapy Goal  Description: PT Eval and Discharge   Outcome: Met      It was a pleasure seeing you back in the office today.  We will defer lab work at this time, considering repeating at a future date.  Please continue with routine gynecologic exams and annual mammograms.  All other screening will start age-appropriate times in the future.  Thank you for keeping up with routine vaccines.  We will start back on Singulair daily.  If snoring continues, please contact us and we will place an order for a home sleep study.  Please consider starting back on the low-dose Lexapro therapy and contact us if you need any additional refills.  If you have any additional questions, please feel free to contact us.  Otherwise, we are happy to see you back annually for your wellness visits.

## 2025-06-10 NOTE — CONSULTS
Food & Nutrition  Education    Diet Education: For Education on Fluid and Salt Restriction   Time Spent: 15 minutes  Learners: Patient       Nutrition Education provided with handouts: Heart Healthy Nutrition Therapy, Fluid Restricted Nutrition Therapy       Comments: Pt receiving heart healthy diet with 1500 mL fluid restriction, reports good appetite, denies N/V/D/C. RD discussed handouts with pt. Explained the importance of monitoring sodium intake, sources of sodium in the diet and salt-free seasonings available. Discussed what a fluid restriction is, and how it impacts the body. Gave examples of different foods/beverages considered fluids and ways to manage thirst/dry mouth. Pt expressed understanding of all topics discussed.       All questions and concerns answered. Dietitian's contact information provided.       Follow-Up: 1x/week      Please Re-consult as needed        Thanks!

## 2025-06-10 NOTE — PLAN OF CARE
06/10/25 1003   Rounds   Attendance Provider;Nurse ;;Assigned nurse   Discharge Plan A Home with family   Why the patient remains in the hospital Requires continued medical care   Transition of Care Barriers None

## 2025-06-10 NOTE — ASSESSMENT & PLAN NOTE
Patient has Combined Systolic and Diastolic heart failure that is Acute on chronic. On presentation their CHF was decompensated. Evidence of decompensated CHF on presentation includes: crackles on lung auscultation, shortness of breath, and elevated BNP.   Recent Labs     06/09/25  0034   BNP 1,086*     Latest ECHO: 3/5/2025    Left Ventricle: The left ventricle is moderately dilated. Mildly increased wall thickness. There is mild eccentric hypertrophy. Severe global hypokinesis present. There is severely reduced systolic function with a visually estimated ejection fraction of 10 -15%. Unable to assess diastolic function due to E-A fusion.    Right Ventricle: Systolic function is borderline low.    Right Atrium: Right atrium is mildly dilated.    Aorta: Aortic root is the upper limits of normal in size measuring 3.60 cm. Ascending aorta is the upper limit of normal in size measuring 3.47 cm.       Current Heart Failure Medications  metoprolol succinate (TOPROL-XL) 24 hr tablet 25 mg, Daily, Oral  furosemide injection 40 mg, Every 12 hours, Intravenous    Plan  - Monitor strict I&Os and daily weights.    - Place on telemetry  - Low sodium diet  - Place on fluid restriction of 1.5 L.   - Cardiology has not been consulted

## 2025-06-10 NOTE — PLAN OF CARE
SHAWN spoke with patient's spouse Lillian, to confirm patient will be returning home with her: she stated yes. Lillian also expressed interest in discussing hospice services with the MD. SHAWN secured chat Dr. Watters to request that she contact spouse regarding hospice care.     At 10:26 am CM followed up with spouse to confirm her decision to proceed with hospice services: she confirm that she would like to move forward with hospice services.    I provided the patient a choice of post acute providers and offered a list of CMS rated hospice services.     Patient has declined to select a preferred provider and elects placement with the first accepting in network provider that is available to provide services as ordered by the physician.     At 1:24 Representative from [facility] stated they have accepted patient and will reach out to family to complete paperwork. CM called patient spouse to confirm, Spouse stated yes. MD notified.

## 2025-06-10 NOTE — EICU
[facility] Night Rounds Checklist  24H Vital Sign Range:  Temp:  [97.6 °F (36.4 °C)-98.6 °F (37 °C)]   Pulse:  [62-88]   Resp:  [15-41]   BP: (106-140)/(54-82)   SpO2:  [86 %-100 %]     Video rounds and LDA reconciliation

## 2025-06-10 NOTE — ASSESSMENT & PLAN NOTE
- Mgmt per primary team and PCCM; improved with BIPAP. Suspected etiology is COPD exacerbation, though this is on background of end stage heart and respiratory failure. Qualifies for hospice care.   - Last hospital stay for similar was 2 weeks ago; illness education provided

## 2025-06-10 NOTE — ASSESSMENT & PLAN NOTE
6/10/25  - Chart and interval history reviewed; discussed pt during MDT rounds.   - During visit to bedside, pt was resting soundly - did not wake him for this reason.   - Called and was able to speak to his wife, Lillian; introduced role of palliative medicine.   - Medical update provided, as well as illness trajectory education. Shared that pt qualifies for hospice care, and discussed what this means overall in terms of prognosis, as well as what this would look like in home setting. She admits to having a number of health problems herself, and says it can be overwhelming at home (particularly for their daughter) as they are both quite sick. Validated that this is certainly understandable, and let her know that hospice is the most support we can provide in the home setting. Also let her know that if ever his care needs exceed what can be delivered at home, hospice team can work on getting him into a NH if this is needed; she verbalized understanding.   - Based on conversation, Lillian is receptive to discharge home with home hospice enrollment for patient. Updated primary team, PCCM, SW/CM, and bedside RN.   - Will follow up with pt while in hospital     6/9/25  - Consult for support and continuity of care in pt with advanced/end stage heart failure and respiratory failure currently in ICU for acute on chronic respiratory failure requiring continuous BIPAP; he was last in hospital for similar about 2 weeks ago. See [facility] notes on file; pt known to palliative team.   - During visit to bedside, he was alert and readily able to participate in discussion, breathing comfortably on NC oxygen. Reminded him of role of palliative medicine, and discussed how things have been going the last two weeks. He said things were going ok, until he started getting more short of breath and anxious, which caused him to call for ambulance.   - Long discussion about his underlying illness, as well as transparent assessment about his qualification  for hospice. Due to some psychosocial complexities, he initially sounded unsure of post-hospital plans, though ultimately clarified he is open to home hospice with support from his wife Lillian and daughter Thao, who recently moved in with them. He has a number of other local family members including other daughter Ana Irene ([phone]), as well as his sisters Marilou and Bahman (does not have their phone numbers).   - Provided suzanne prognostic information, including expected prognosis of 6 months or less. However, shared every person is different, and it might be less than this, or it might be more - he said ultimately it is in God's hands, to which I agreed. As he identifies as [employer], let him know we would have our  (a [employer] ) visit with him, though he said his sister is also one and will be visiting today.   - As part of discussion on comfort-focused care, recommended DNR/DNI status, as CPR and/or intubation would be unlikely to be helpful given his end stage disease process. He seemed to agree with this, though I let him know he could discuss it with his family if he would prefer.   - Let him know palliative team will continue to check in on him; updated primary team, PCCM, and SW/CM after visit. Will likely try to reach out to Lillian this afternoon.     Addendum 1:40 pm: Multiple attempts to reach pt's spouse Lillian; no answer. Went back to pt's room to let him know, and he told me the name of the [employer] that his sister Bahman works for; discussed with john Ross who will attempt to get her phone number from the [employer]. Will follow up with pt and family tomorrow.

## 2025-06-10 NOTE — PT/OT/SLP EVAL
Occupational Therapy Evaluation and Discharge     Name: Abelardo Irene Jr.  MRN: [id number]  Admitting Diagnosis: Acute on chronic respiratory failure with hypoxia and hypercapnia  Recent Surgery: * No surgery found *      Recommendations:     Discharge Recommendations: No Therapy Indicated  Level of Assistance Recommended: Intermittent assistance and Intermittent supervision  Discharge Equipment Recommendations: shower chair  Barriers to discharge: None    Assessment:     Abelardo Irene Jr. is a 69 y.o. adult with a medical diagnosis of Acute on chronic respiratory failure with hypoxia and hypercapnia. Abelardo presents with performance deficits affecting function including decreased endurance and reduced cardiovascular response due to COPD, but at 99% at rest and at 90% after exertion of walking to and from chair to sink in room. He declined to having to use the bathroom while occupational therapy present.     Rehab Prognosis: Good; patient would benefit from acute OT services to address these deficits and reach maximum level of function.    Plan:     Patient to be seen   to address the above listed problems via    Plan of Care Expires: 06/10/25  Plan of Care Reviewed with: patient    Subjective     Chief Complaint: no SOB   Patient Comments/Goals: he said he may discharge tomorrow   Pain/Comfort:  Pain Rating 1: 0/10    Patients cultural, spiritual, Rastafari conflicts given the current situation: no    Social History:  Living Environment: Patient lives with their spouse and daughter in a single story home with number of outside stair(s): 0   Prior Level of Function: Prior to admission, patient was modified independent  Roles and Routines: Patient was not driving and 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  Equipment Used  at Home: none  DME owned (not currently used): none  Assistance Upon Discharge: family    Objective:     Communicated with RN, Maria Victoria, prior to session. Patient found up in chair with pulse ox (continuous), blood pressure cuff, telemetry, peripheral IV upon OT entry to room.    General Precautions: Standard, fall, respiratory   Orthopedic Precautions: N/A   Braces: Clark J collar    Respiratory Status: Nasal cannula, flow 2  L/min    Occupational Performance    Gait belt applied - N/A    Bed Mobility:   See PT notes, up in chair     Functional Mobility/Transfers:  Sit <> Stand Transfer with modified independence with no AD  Bed <> Chair Transfer using Step Transfer technique with modified independence with no AD  Functional Mobility: Patient walked from chair to sink and returned to chair with supervision for line management and oxygen saturation with no decrease > 90%    Activities of Daily Living:  Grooming: independence standing at sink     Cognitive/Visual Perceptual:  Cognitive/Psychosocial Skills:    -     Oriented to: Person, Place, Time, Situation  -     Follows Commands/attention: Follows multistep  commands  -     Communication: clear/fluent  -     Memory: No Deficits noted  -     Safety awareness/insight to disability: intact  -     Mood/Affect/Coping skills/emotional control: Appropriate to situation  Visual/Perceptual:    -     Intact    Physical Exam:  Balance:    -     Sitting: independence  -     Standing: supervision  Postural examination/scapula alignment:    -       No postural abnormalities identified  Skin integrity: Visible skin intact  Edema:  None noted  Sensation:    -       Intact  Motor Planning: Intact  Dominant hand: Right  Upper Extremity Range of Motion:     -       Right Upper Extremity: WNL  -       Left Upper Extremity: WNL  Upper Extremity Strength:    -       Right Upper Extremity: WNL  -       Left Upper Extremity: WNL   Strength:    -       Right Upper Extremity: WNL  -        Left Upper Extremity: WNL  Fine Motor Coordination:    -       Intact  Gross motor coordination:   WFL    AMPAC 6 Click ADL:  AMPAC Total Score: 24    Treatment & Education:  Patient educated on role of OT, POC, and goals for therapy  Patient educated on importance of OOB activities with staff member assistance and sitting OOB majority of the day  Occupational therapy educated him re: possible need for shower chair in tub at home (he thinks his wife has one already), but he prefers to take tub baths     Patient clear to ambulate to/from bathroom with RN/PCT, assist x1 .    Patient left up in chair with all lines intact, call button in reach, and RN notified.    GOALS:   Multidisciplinary Problems       Occupational Therapy Goals       Not on file              Multidisciplinary Problems (Resolved)          Problem: Occupational Therapy    Goal Priority Disciplines Outcome Interventions   Occupational Therapy Goal   (Resolved)     OT, PT/OT Met    Description: Patient at PLOF of modified independent and occupational therapy recommending TTB or shower chair for home use. Occupational therapy to sign off.           Problem: Occupational Therapy    Goal Priority Disciplines Outcome Interventions   Occupational Therapy Goal   (Resolved)     OT, PT/OT Met                        DME Justifications:  No DME recommended requiring DME justifications    History:     History reviewed. No pertinent past medical history.    No past surgical history on file.    Time Tracking:     OT Date of Treatment: 06/10/25  OT Start Time: 1505  OT Stop Time: 1530  OT Total Time (min): 25 min    Billable Minutes: Evaluation 15  and Self Care/Home Management 10     6/10/2025

## 2025-06-10 NOTE — PT/OT/SLP EVAL
Physical Therapy Evaluation and Discharge Note    Patient Name:  Abelardo Irene Jr.   MRN:  [id number]    Recommendations:     Discharge Recommendations: No Therapy Indicated  Discharge Equipment Recommendations: none   Barriers to discharge: None    Assessment:     Abelardo Irene Jr. is a 69 y.o. adult admitted with a medical diagnosis of Acute on chronic respiratory failure with hypoxia and hypercapnia. .  At this time, patient is functioning at their prior level of function and does not require further acute PT services.     Recent Surgery: * No surgery found *      Plan:     During this hospitalization, patient does not require further acute PT services.  Please re-consult if situation changes.      Subjective     Chief Complaint: Pt states that he is ready to get OOB  Patient/Family Comments/goals: Return home  Pain/Comfort:  Pain Rating 1: 0/10    Patients cultural, spiritual, Rastafarian conflicts given the current situation: no    Living Environment:  Lives with wife and daughter, no DAX   Prior to admission, patients level of function was independent.  Equipment used at home: none.  DME owned (not currently used): none.  Upon discharge, patient will have assistance from family.    Objective:     Communicated with nursing prior to session.  Patient found HOB elevated with pulse ox (continuous), blood pressure cuff, telemetry, peripheral IV upon PT entry to room.    General Precautions: Standard,      Orthopedic Precautions:    Braces:    Respiratory Status: Nasal cannula, flow 1 L/min    Exams:  Cognitive Exam:  Patient is oriented to Person, Place, Time, and Situation  Gross Motor Coordination:  WFL  Postural Exam:  Patient presented with the following abnormalities:    -       No postural abnormalities identified  LUE Strength: WFL  RLE ROM: WFL  RLE Strength: WFL  LLE ROM: WFL    Functional Mobility:  Bed Mobility:     Rolling Left:  modified independence  Rolling Right: modified independence  Scooting:  modified independence  Supine to Sit: modified independence  Sit to Supine: modified independence  Transfers:     Sit to Stand:  modified independence with no AD  Bed to Chair: stand by assistance with  no AD  using  Step Transfer  Gait: 8 ft, bed to chair, no AD, close SBA to Mod I   Balance: static sit and stand good     AM-PAC 6 CLICK MOBILITY  Total Score:        Treatment and Education:  Eval     AM-PAC 6 CLICK MOBILITY  Total Score:  24    Patient left up in chair with all lines intact, call button in reach, and nursing notified.    GOALS:   Multidisciplinary Problems       Physical Therapy Goals       Not on file              Multidisciplinary Problems (Resolved)          Problem: Physical Therapy    Goal Priority Disciplines Outcome Interventions   Physical Therapy Goal   (Resolved)     PT, PT/OT Met    Description: PT Eval and Discharge                        DME Justifications:  No DME recommended requiring DME justifications    History:     History reviewed. No pertinent past medical history.    No past surgical history on file.    Time Tracking:     PT Received On:    PT Start Time: 1410     PT Stop Time: 1425  PT Total Time (min): 15 min     Billable Minutes: Evaluation 1      06/10/2025

## 2025-06-10 NOTE — PROGRESS NOTES
[facility]ensive [facility]  Pulmonology  Progress Note    Patient Name: Abelardo Irene Jr.  MRN: [id number]  Admission Date: 6/9/2025  Hospital Length of Stay: 1 days  Code Status: Full Code  Attending Provider: Romeo Lopez, *  Primary Care Provider: No primary care provider on file.   Principal Problem: Acute on chronic respiratory failure with hypoxia and hypercapnia    Subjective:     Interval History: Back to baseline. He and his wife are agreeable to hospice placement upon discharge. Instructed to take extra lasix when he starts feeling short of breath.     Objective:     Vital Signs (Most Recent):  Temp: 98.4 °F (36.9 °C) (06/10/25 1105)  Pulse: 72 (06/10/25 1432)  Resp: 20 (06/10/25 1432)  BP: 124/70 (06/10/25 1105)  SpO2: (!) 84 % (06/10/25 1432) Vital Signs (24h Range):  Temp:  [98.1 °F (36.7 °C)-98.6 °F (37 °C)] 98.4 °F (36.9 °C)  Pulse:  [62-88] 72  Resp:  [14-37] 20  SpO2:  [84 %-100 %] 84 %  BP: (106-137)/(54-77) 124/70     Weight: 86.6 kg (190 lb 14.7 oz)  Body mass index is 31.77 kg/m².      Intake/Output Summary (Last 24 hours) at 6/10/2025 1452  Last data filed at 6/10/2025 1057  Gross per 24 hour   Intake 1950 ml   Output 400 ml   Net 1550 ml        Physical Exam  Vitals and nursing note reviewed.   Constitutional:       General: Abelardo is not in acute distress.     Interventions: Nasal cannula in place.   HENT:      Head: Normocephalic and atraumatic.      Mouth/Throat:      Mouth: Mucous membranes are moist.      Pharynx: Oropharynx is clear.   Cardiovascular:      Rate and Rhythm: Normal rate and regular rhythm.      Pulses: Normal pulses.   Pulmonary:      Effort: Pulmonary effort is normal. No respiratory distress.      Breath sounds: Rales (much improved) present. No wheezing or rhonchi.   Abdominal:      General: Bowel sounds are normal. There is no distension.      Palpations: Abdomen is soft.      Tenderness: There is no abdominal tenderness.   Musculoskeletal:         General:  Normal range of motion.      Right lower leg: Edema present.      Left lower leg: Edema present.   Skin:     General: Skin is warm and dry.      Capillary Refill: Capillary refill takes less than 2 seconds.   Neurological:      General: No focal deficit present.      Mental Status: Abelardo is oriented to person, place, and time.   Psychiatric:         Mood and Affect: Mood normal.         Behavior: Behavior normal.          Review of Systems   Respiratory:  Negative for shortness of breath.    Cardiovascular:  Negative for chest pain.       Vents:  Oxygen Concentration (%): 30 (06/09/25 2341)    Lines/Drains/Airways       Peripheral Intravenous Line  Duration                  Peripheral IV - Single Lumen 06/09/25 0030 20 G 1 in Left Antecubital 1 day         Peripheral IV - Single Lumen 06/09/25 0033 18 G 1 1/4 in Right Forearm 1 day                    Significant Labs:    CBC/Anemia Profile:  Recent Labs   Lab 06/09/25 0034 06/09/25 0428 06/10/25  0856   WBC 17.20* 20.06* 18.08*   HGB 11.1* 10.4* 10.8*   HCT 37.6* 36.8* 36.1*    225 216   MCV 97 99* 95   RDW 13.4 13.4 13.6        Chemistries:  Recent Labs   Lab 06/09/25  0034 06/09/25 0428 06/10/25  0857    144 138   K 4.1 3.8 4.0   CL 99 99 91*   CO2 32* 34* 36*   BUN 5* 5* 14   CREATININE 0.8 0.8 0.7   CALCIUM 8.9 8.7 8.8   ALBUMIN 3.6  --   --    PROT 8.2  --   --    BILITOT 0.3  --   --    ALKPHOS 89  --   --    ALT 15  --   --    AST 14  --   --    MG 2.5  --   --        All pertinent labs within the past 24 hours have been reviewed.    Significant Imaging:  I have reviewed all pertinent imaging results/findings within the past 24 hours.    AB  Recent Labs   Lab 06/09/25  0504   PH 7.241*   PO2 79*   PCO2 87.8*   HCO3 37.7*   BE 8*     Assessment/Plan:     Pulmonary  * Acute on chronic respiratory failure with hypoxia and hypercapnia  Respiratory failure 2/2 decompensated HF; less likely that COPD is contributing. Reports that he has not been  taking his lasix. Initial VBG with 7.07>130. CXR with pulmonary edema and elevated BNP 1086. Procal 0.11. Required BIPAP and ICU admission. Many repeated admissions for the same. Continues to smoke 2 ppd and non-compliant with fluid/ dietary restrictions.      - weaned down to his home oxygen this morning  - continue aggressive diuresis  - home nebs/ inhalers with PRN duonebs, short course of steroids ok  - continue BiPAP qhs and with naps  - no need for antibiotics at this time  - discussed hospice qualification and Mr. Irene is interested. Again, recommend DNR code status. Appreciate palliative care assistance    COPD (chronic obstructive pulmonary disease)  HX of COPD. PFT in 2017 Fev1 of 58% with moderate restriction with reduced DLCO.  Previous CT chest imaging with emphysema. Does NOT seem to be in exacerbation.      - continue home inhaler/meds  - PRN duoneb  and monitor respiratory status closely.     Cardiac/Vascular  Acute on chronic combined systolic and diastolic congestive heart failure  Known EF of 10-15% and DD. [facility] for failure to show up to multiple appointments; reports he does not have transportation. Continues to smoke 2 packs per day.  Significant troponin elevation noted; though not a candidate for intervention given noncompliance.      - continue aggressive diuresis  - nightly BIPAP  - GOC conversation held - DNR code status recommended   - palliative care consulted, appreciate assistance     Other  Tobacco abuse  Continues to smoke 2 ppd. Cessation discussed again.    Plan discussed with Dr. Sommer, Dr. Watters, and Dr. Walton.     Pulmonary will sign off at this time. Please call with additional questions.    Critical Care Time: 35 minutes  Critical care was time spent personally by me on the following activities: development of treatment plan with patient or surrogate and bedside caregivers, discussions with consultants, evaluation of patient's  response to treatment, examination of patient, ordering and performing treatments and interventions, ordering and review of laboratory studies, ordering and review of radiographic studies, pulse oximetry, re-evaluation of patient's condition. This critical care time did not overlap with that of any other provider or involve time for any procedures.       Lydia Falcon, NP  Pulmonology  [facility] - Intensive Care

## 2025-06-10 NOTE — NURSING
[facility]  Nurses Note -- 4 Eyes      6/9/2025       Skin assessed on: Q Shift      [x] No Pressure Injuries Present    [x]Prevention Measures Documented    [] Yes LDA  for Pressure Injury Previously documented     [] Yes New Pressure Injury Discovered   [] LDA for New Pressure Injury Added      Attending RN:  Julia Fang RN     Second RN:  Maria Victoria AUGUSTIN

## 2025-06-10 NOTE — HOSPITAL COURSE
6/10/25 Less SOB, mild vague CP. Says he will try to be more compliant with f/u but transportation is an issue    Echo 6/9/25    Left Ventricle: The left ventricle is moderately dilated. There is severely reduced systolic function with a visually estimated ejection fraction of 20 - 25%. Grade II diastolic dysfunction.    Right Ventricle: The right ventricle is mildly dilated    Left Atrium: The left atrium is moderately dilated    Right Atrium: The right atrium is mildly dilated .    Mitral Valve: There is mild regurgitation.    Tricuspid Valve: There is mild regurgitation.    Pulmonary Artery: The estimated pulmonary artery systolic pressure is 17 mmHg.    IVC/SVC: Normal venous pressure at 3 mmHg.

## 2025-06-10 NOTE — PROGRESS NOTES
[facility] Medicine  Progress Note    Patient Name: Abelardo Irene Jr.  MRN: [id number]  Patient Class: IP- Inpatient   Admission Date: 6/9/2025  Length of Stay: 1 days  Attending Physician: Romeo Lopez, *  Primary Care Provider: No primary care provider on file.        Subjective     Principal Problem:Acute on chronic respiratory failure with hypoxia and hypercapnia        HPI:  69 y.o. man with h/o essential HTN, COPD (on 4l home oxygen), CAD(Prior PCI of his RCA with severe InStent restenosis of his left anterior descending artery stent->was supposed to have a  hybrid procedure with LIMA to LAD in 2018 per cardiology consult note by Dr. Cabrera in 2023) , Systolic/diastolic CHF (EF 10-15% in March 2025), NIDDM type 2 (A1c 7.6 in May), smoker, HLD presents to the ER with AMS. Pt was brought in by EMS altered and thus history is limited to my conversation with the ED physician. EMS found him with RA sats 75% and tripoding. Apparently family had called. He was place on CPAP and given mg 2gm IV x1, cont duonebs, SQ epi 0.3mg and Dexamethasone 16mg IV. (Incidentally he was just admitted here from 5/27-29/2025 for the same thing COPD and CHF exacerbation requiring BIPAP and IV lasix). Patient did tell me no c/o chest pain or SOB. Denies his oxygen tank malfunctioning. States has not been taking his lasix but didn't say why. No chest pain.     IN the ER was improving and changed to BIPAP after VBG with pH 7.02 and pCO2>130. Continued on multiple back to back duonebs and given a dose of IV 500mg Azithromycin. Pt. Clinically improving and more alert following commands. He remained afebriel with stable BP and sats >96% on 40% FIO on BIPAP.     Labs noted for WBC 17 with H/H 11.1/37.6.   Lytes noted for bicarb 32 (appears to run in the 30s chronically) and unremarkable liver and renal function. TSH wnl.   BNP elevated at 1,086 (higher than previous admission). Troponin 0.008->0.028.    COVID/Flu negative.      CXR:   Findings may represent infiltrates or edema. Mild cardiomegaly.     We have been consulted for further management of his AMS due to acute on chronic hypercapnic/hypoxic respiratory failure.     Overview/Hospital Course:  69 y.o. man with h/o essential HTN, COPD (on 4l home oxygen), CAD(Prior PCI of his RCA with severe InStent restenosis of his left anterior descending artery stent->was supposed to have a  hybrid procedure with LIMA to LAD in 2018 per cardiology consult note by Dr. Cabrera in 2023) , Systolic/diastolic CHF (EF 10-15% in March 2025), NIDDM type 2 (A1c 7.6 in May), smoker, HLD presents to the ER with AMS,duo to A/C hypercapnic ,hypoxic respiratory failure,VBG with pH 7.02 and pCO2>130.was started on continues bipap in ICU setting,improved,off bipap and is on nightly bipap,on IV alsix for A/C systolic,diastolic CHF exacerbation,mew Echo. With EF of 20%,cardiology is consulted for elevated Troponin,cardiology want see as out patient for possible NST Vs LHC,  Patient is frequent flyer,still smoke,palliative is consulted.  PT,OT  consult.  .    Interval History: was started on continues bipap in ICU setting,improved,off bipap and is on nightly bipap,on IV alsix for A/C systolic,diastolic CHF exacerbation,mew Echo. With EF of 20%,cardiology is consulted for elevated Troponin,cardiology want see as out patient for possible NST Vs LHC,  Patient is frequent flyer,still smoke,palliative is consulted.    Review of Systems   Constitutional:  Positive for activity change and appetite change.   Respiratory:  Negative for apnea and chest tightness.    Cardiovascular:  Negative for chest pain and leg swelling.   Gastrointestinal:  Negative for abdominal distention and abdominal pain.   Neurological:  Positive for weakness.     Objective:     Vital Signs (Most Recent):  Temp: 98.1 °F (36.7 °C) (06/10/25 0705)  Pulse: 77 (06/10/25 0900)  Resp: (!) 24 (06/10/25 0900)  BP: 122/71 (06/10/25  0900)  SpO2: 96 % (06/10/25 0900) Vital Signs (24h Range):  Temp:  [98 °F (36.7 °C)-98.6 °F (37 °C)] 98.1 °F (36.7 °C)  Pulse:  [62-88] 77  Resp:  [14-37] 24  SpO2:  [96 %-100 %] 96 %  BP: (106-137)/(54-77) 122/71     Weight: 86.6 kg (190 lb 14.7 oz)  Body mass index is 31.77 kg/m².    Intake/Output Summary (Last 24 hours) at 6/10/2025 0941  Last data filed at 6/10/2025 0920  Gross per 24 hour   Intake 1350 ml   Output 700 ml   Net 650 ml         Physical Exam  Cardiovascular:      Rate and Rhythm: Normal rate.   Pulmonary:      Effort: No respiratory distress.   Abdominal:      General: There is no distension.   Skin:     Coloration: Skin is not jaundiced.      Findings: No bruising.   Neurological:      Mental Status: Abelardo is alert and oriented to person, place, and time.               Significant Labs: All pertinent labs within the past 24 hours have been reviewed.  BMP:   Recent Labs   Lab 06/09/25  0034 06/09/25  0428   * 240*    144   K 4.1 3.8   CL 99 99   CO2 32* 34*   BUN 5* 5*   CREATININE 0.8 0.8   CALCIUM 8.9 8.7   MG 2.5  --      CBC:   Recent Labs   Lab 06/09/25  0034 06/09/25  0428 06/10/25  0856   WBC 17.20* 20.06* 18.08*   HGB 11.1* 10.4* 10.8*   HCT 37.6* 36.8* 36.1*    225 216     CMP:   Recent Labs   Lab 06/09/25  0034 06/09/25  0428    144   K 4.1 3.8   CL 99 99   CO2 32* 34*   * 240*   BUN 5* 5*   CREATININE 0.8 0.8   CALCIUM 8.9 8.7   PROT 8.2  --    ALBUMIN 3.6  --    BILITOT 0.3  --    ALKPHOS 89  --    AST 14  --    ALT 15  --    ANIONGAP 10 11       Significant Imaging: I have reviewed all pertinent imaging results/findings within the past 24 hours.      Assessment & Plan  COPD (chronic obstructive pulmonary disease)  Patient's COPD is with exacerbation noted by use of accessory muscles for breathing and worsening of baseline hypoxia. Suspect AMS due to hypercapnia.  Patient is currently on COPD Pathway. Continue scheduled inhalers Steroids and  Supplemental oxygen and monitor respiratory status closely in the ICU. Pulmonary is consulted. Wean BIPAP off as able to home 4l NC. Goal oxygen at this time 90-92% given severity of his COPD and chronic CO2 retention. HOLD on further abx till repeat CBC, lactic, procal return. Does not appear to be c/w bacterial PNA. F/u on viral resp panel. COVID/Flu negative.   Acute on chronic respiratory failure with hypoxia and hypercapnia  Patient with Hypercapnic and Hypoxic Respiratory failure which is Acute on chronic.  Abelardo is on home oxygen at 4 LPM. Supplemental oxygen was provided and noted- Oxygen Concentration (%):  [30] 30 Wean to keep sats 90-92% due to severity of his COPD. (home oxygen of 4l).   Treating COPD exacerbation and will give IV lasix as well for possible mild CHF exacerbation. Control BP.   was started on continues bipap in ICU setting,improved,off bipap and is on nightly bipap,stable on Nc O 2  Acute on chronic combined systolic and diastolic congestive heart failure  Patient has Combined Systolic and Diastolic heart failure that is Acute on chronic. On presentation their CHF was decompensated. Evidence of decompensated CHF on presentation includes: crackles on lung auscultation, shortness of breath, and elevated BNP.   Recent Labs     06/09/25  0034   BNP 1,086*     Latest ECHO: 3/5/2025    Left Ventricle: The left ventricle is moderately dilated. Mildly increased wall thickness. There is mild eccentric hypertrophy. Severe global hypokinesis present. There is severely reduced systolic function with a visually estimated ejection fraction of 10 -15%. Unable to assess diastolic function due to E-A fusion.    Right Ventricle: Systolic function is borderline low.    Right Atrium: Right atrium is mildly dilated.    Aorta: Aortic root is the upper limits of normal in size measuring 3.60 cm. Ascending aorta is the upper limit of normal in size measuring 3.47 cm.       Current Heart Failure  Medications  metoprolol succinate (TOPROL-XL) 24 hr tablet 25 mg, Daily, Oral  furosemide injection 40 mg, Every 12 hours, Intravenous    Plan  - Monitor strict I&Os and daily weights.    - Place on telemetry  - Low sodium diet  - Place on fluid restriction of 1.5 L.   - Cardiology has not been consulted  Non-ischemic myocardial injury (non-traumatic)  Mild elevation in troponin and suspect due to acute hypoxia, COPD exacerbation, and CHF exacerbation. Trend and monitor on tele. Echo as above. IV lasix given. No c/o chest pain. Cont. Home ASA 81mg daily, Plavix 75mg daily, Toprol XL 25mg daily. Maintain Sat 90-92% and MAP>65.   cardiology is consulted for elevated Troponin,cardiology want see as out patient for possible NST Vs LHC,      Leukocytosis  Does not feel c/w sepsis or due to bacterial infection at this time. Feel more c/w steroids give, stress rxn. F/u on repeat CBC, procal, lactic. Got a dose of abx in the ED and will hold for now. CXR concerning more for COPD and CHF exacerbation. IV lasix, duonebs, and steroids. Comprehensive viral respiratory panel sent.     Coronary artery disease involving native coronary artery of native heart without angina pectoris  Patient with known CAD s/p PCI to RCA, which is controlled Will continue ASA, Plavix, and Statin and monitor for S/Sx of angina/ACS. Continue to monitor on telemetry. No c/o chest pain and NO STEMI on EKG. Treating CHF exacerbation.   Advance care planning  Palliative is consulted,spent over 5 minutes,    Tobacco abuse  Consulted again need quit smoking,spent over 6 minutes,    Chronic respiratory failure with hypoxia and hypercapnia  Patient with Hypercapnic and Hypoxic Respiratory failure which is Chronic.  Abelardo is on home oxygen at 4 LPM. Supplemental oxygen was provided and noted- Oxygen Concentration (%):  [30] 30    .   Signs/symptoms of respiratory failure include- tachypnea and increased work of breathing. Contributing diagnoses includes -  CHF and COPD Labs and images were reviewed. Patient Has recent ABG, which has been reviewed. Will treat underlying causes and adjust management of respiratory failure as follows-   VTE Risk Mitigation (From admission, onward)           Ordered     enoxaparin injection 40 mg  Daily         06/09/25 0210     IP VTE HIGH RISK PATIENT  Once         06/09/25 0210     Place sequential compression device  Until discontinued         06/09/25 0210                    Discharge Planning   VERA: 6/11/2025     Code Status: Full Code   Medical Readiness for Discharge Date:   Discharge Plan A: Home            Critical care time spent on the evaluation and treatment of severe organ dysfunction, review of pertinent labs and imaging studies, discussions with consulting providers and discussions with patient/family:  over 45  minutes.    Please place Justification for DME        Romeo Lopez MD  Department of Hospital Medicine   [facility] - Intensive Care

## 2025-06-10 NOTE — EICU
Virtual ICU Quality Rounds    Admit Date: 6/9/2025  Hospital Day: 1    ICU Day: 1d 5h    24H Vital Sign Range:  Temp:  [98 °F (36.7 °C)-98.6 °F (37 °C)]   Pulse:  [62-88]   Resp:  [14-37]   BP: (106-137)/(54-77)   SpO2:  [96 %-100 %]     VICU Surveillance Screening                    LDA reconciliation : Yes

## 2025-06-10 NOTE — PLAN OF CARE
Problem: Occupational Therapy  Goal: Occupational Therapy Goal  Description: Patient at PLOF of modified independent and occupational therapy recommending TTB or shower chair for home use. Occupational therapy to sign off.   Outcome: Met

## 2025-06-10 NOTE — PROGRESS NOTES
[facility] Intensive Care  Cardiology  Progress Note    Patient Name: Abelardo Irene Jr.  MRN: [id number]  Admission Date: 6/9/2025  Hospital Length of Stay: 1 days  Code Status: Full Code   Attending Physician: Romeo Lopez, *   Primary Care Physician: No primary care provider on file.  Expected Discharge Date: 6/11/2025  Principal Problem:Acute on chronic respiratory failure with hypoxia and hypercapnia    Subjective:     Hospital Course:   6/10/25 Less SOB, mild vague CP. Says he will try to be more compliant with f/u but transportation is an issue    Echo 6/9/25    Left Ventricle: The left ventricle is moderately dilated. There is severely reduced systolic function with a visually estimated ejection fraction of 20 - 25%. Grade II diastolic dysfunction.    Right Ventricle: The right ventricle is mildly dilated    Left Atrium: The left atrium is moderately dilated    Right Atrium: The right atrium is mildly dilated .    Mitral Valve: There is mild regurgitation.    Tricuspid Valve: There is mild regurgitation.    Pulmonary Artery: The estimated pulmonary artery systolic pressure is 17 mmHg.    IVC/SVC: Normal venous pressure at 3 mmHg.       Interval History:     Review of Systems   Unable to perform ROS: Acuity of condition     Objective:     Vital Signs (Most Recent):  Temp: 98.1 °F (36.7 °C) (06/10/25 0705)  Pulse: 77 (06/10/25 0900)  Resp: (!) 24 (06/10/25 0900)  BP: 122/71 (06/10/25 0900)  SpO2: 96 % (06/10/25 0900) Vital Signs (24h Range):  Temp:  [98 °F (36.7 °C)-98.6 °F (37 °C)] 98.1 °F (36.7 °C)  Pulse:  [62-88] 77  Resp:  [14-37] 24  SpO2:  [96 %-100 %] 96 %  BP: (106-137)/(54-77) 122/71     Weight: 86.6 kg (190 lb 14.7 oz)  Body mass index is 31.77 kg/m².     SpO2: 96 %         Intake/Output Summary (Last 24 hours) at 6/10/2025 0941  Last data filed at 6/10/2025 0920  Gross per 24 hour   Intake 1350 ml   Output 700 ml   Net 650 ml       Lines/Drains/Airways       Peripheral Intravenous Line   Duration                  Peripheral IV - Single Lumen 06/09/25 0030 20 G 1 in Left Antecubital 1 day         Peripheral IV - Single Lumen 06/09/25 0033 18 G 1 1/4 in Right Forearm 1 day                       Physical Exam  Constitutional:       Appearance: Abelardo is well-developed.   HENT:      Head: Normocephalic and atraumatic.   Eyes:      Conjunctiva/sclera: Conjunctivae normal.      Pupils: Pupils are equal, round, and reactive to light.   Cardiovascular:      Rate and Rhythm: Normal rate.      Pulses: Intact distal pulses.      Heart sounds: Normal heart sounds.   Pulmonary:      Effort: Pulmonary effort is normal.      Breath sounds: Normal breath sounds.   Abdominal:      General: Bowel sounds are normal.      Palpations: Abdomen is soft.   Musculoskeletal:         General: Normal range of motion.      Cervical back: Normal range of motion and neck supple.   Skin:     General: Skin is warm and dry.   Neurological:      Mental Status: Abelardo is alert and oriented to person, place, and time.            Significant Labs: All pertinent lab results from the last 24 hours have been reviewed.    Significant Imaging: Echocardiogram: 2D echo with color flow doppler: No results found for this or any previous visit.  Assessment and Plan:     Brief HPI:     * Acute on chronic respiratory failure with hypoxia and hypercapnia  Per primary    Coronary artery disease involving native coronary artery of native heart without angina pectoris  Has known advanced ischemic CM with prior RCA and LAD stents - reported LAD ISR from years ago that was never addressed due to poor follow up. Troponin elevation likely from demand ischemia due to COPD and CHF. Would consider repeat out patient LHC and AICD evaluation once recovered from acute process. Repeat echo - baseline EF 15%    6/10/25 EF 20-25% on echo. Ok for f/u with Dr Cabrera out patient to discuss repeat LHC and possible AICD    Acute on chronic combined systolic and  diastolic congestive heart failure  Mild volume overload - diuresis and afterload reduction as tolerated  Recent Labs     06/09/25  0034   BNP 1,086*     Latest ECHO  No results found for this or any previous visit.    Current Heart Failure Medications  metoprolol succinate (TOPROL-XL) 24 hr tablet 25 mg, Daily, Oral  furosemide injection 40 mg, Every 12 hours, Intravenous      COPD (chronic obstructive pulmonary disease)  Per primary        VTE Risk Mitigation (From admission, onward)           Ordered     enoxaparin injection 40 mg  Daily         06/09/25 0210     IP VTE HIGH RISK PATIENT  Once         06/09/25 0210     Place sequential compression device  Until discontinued         06/09/25 0210                    Jerardo Quarles MD  Cardiology  [facility] [place] - Intensive Care

## 2025-06-10 NOTE — PLAN OF CARE
Problem: COPD (Chronic Obstructive Pulmonary Disease)  Goal: Optimal Chronic Illness Coping  Outcome: Progressing  Goal: Optimal Level of Functional [place]  Outcome: Progressing  Goal: Absence of Infection Signs and Symptoms  Outcome: Progressing  Goal: Improved Oral Intake  Outcome: Progressing  Goal: Effective Oxygenation and Ventilation  Outcome: Progressing     Problem: Adult Inpatient Plan of Care  Goal: Plan of Care Review  Outcome: Progressing  Goal: Patient-Specific Goal (Individualized)  Outcome: Progressing  Goal: Absence of Hospital-Acquired Illness or Injury  Outcome: Progressing  Goal: Optimal Comfort and Wellbeing  Outcome: Progressing  Goal: Readiness for Transition of Care  Outcome: Progressing     Problem: Skin Injury Risk Increased  Goal: Skin Health and Integrity  Outcome: Progressing     Problem: Coping Ineffective  Goal: Effective Coping  Outcome: Progressing

## 2025-06-10 NOTE — SUBJECTIVE & OBJECTIVE
"Interval History: no major issues overnight    Medications:  Continuous Infusions:  Scheduled Meds:   aspirin  81 mg Oral Daily    atorvastatin  40 mg Oral QHS    clopidogreL  75 mg Oral Daily    enoxparin  40 mg Subcutaneous Daily    furosemide (LASIX) injection  40 mg Intravenous Q12H    metoprolol succinate  25 mg Oral Daily    mupirocin   Nasal BID    predniSONE  40 mg Oral Daily     PRN Meds:  Current Facility-Administered Medications:     acetaminophen, 650 mg, Oral, Q6H PRN    albuterol-ipratropium, 3 mL, Nebulization, Q4H PRN    dextrose 50%, 12.5 g, Intravenous, PRN    glucagon (human recombinant), 1 mg, Intramuscular, PRN    insulin aspart U-100, 0-10 Units, Subcutaneous, Q6H PRN    melatonin, 6 mg, Oral, Nightly PRN    ondansetron, 4 mg, Intravenous, Q6H PRN    polyethylene glycol, 17 g, Oral, BID PRN    sodium chloride 0.9%, 3 mL, Intravenous, PRN    Objective:     Vital Signs (Most Recent):  Temp: 98.1 °F (36.7 °C) (06/10/25 0705)  Pulse: 79 (06/10/25 0810)  Resp: (!) 32 (06/10/25 0739)  BP: 130/72 (06/10/25 0810)  SpO2: 99 % (06/10/25 0739) Vital Signs (24h Range):  Temp:  [98 °F (36.7 °C)-98.6 °F (37 °C)] 98.1 °F (36.7 °C)  Pulse:  [62-88] 79  Resp:  [14-41] 32  SpO2:  [86 %-100 %] 99 %  BP: (106-140)/(54-82) 130/72     Weight: 86.6 kg (190 lb 14.7 oz)  Body mass index is 31.77 kg/m².       Physical Exam  Constitutional:       Comments: Alert, sitting up in bed, in no distress     Significant Labs: All pertinent labs within the past 24 hours have been reviewed.  CBC:   Recent Labs   Lab 06/09/25  0428   WBC 20.06*   HGB 10.4*   HCT 36.8*   MCV 99*        BMP:  No results for input(s): "GLU", "NA", "K", "CL", "CO2", "BUN", "CREATININE", "CALCIUM", "MG" in the last 24 hours.  LFT:  Lab Results   Component Value Date    AST 14 06/09/2025    ALKPHOS 89 06/09/2025    BILITOT 0.3 06/09/2025     Albumin:   Albumin   Date Value Ref Range Status   06/09/2025 3.6 3.5 - 5.2 g/dL Final     Protein: " "  Protein Total   Date Value Ref Range Status   06/09/2025 8.2 6.0 - 8.4 gm/dL Final     Lactic acid:   No results found for: "LACTATE"    Significant Imaging: I have reviewed all pertinent imaging results/findings within the past 24 hours.  "

## 2025-06-10 NOTE — ASSESSMENT & PLAN NOTE
Patient with Hypercapnic and Hypoxic Respiratory failure which is Chronic.  Abelardo is on home oxygen at 4 LPM. Supplemental oxygen was provided and noted- Oxygen Concentration (%):  [30] 30    .   Signs/symptoms of respiratory failure include- tachypnea and increased work of breathing. Contributing diagnoses includes - CHF and COPD Labs and images were reviewed. Patient Has recent ABG, which has been reviewed. Will treat underlying causes and adjust management of respiratory failure as follows-

## 2025-06-10 NOTE — HOSPITAL COURSE
Acute on chronic respiratory failure with hypoxia and hypercapnia  Respiratory failure 2/2 decompensated HF; less likely that COPD is contributing. Reports that he has not been taking his lasix. Initial VBG with 7.07>130. CXR with pulmonary edema and elevated BNP 1086. Procal 0.11. Required BIPAP and ICU admission. Many repeated admissions for the same. Continues to smoke 2 ppd and non-compliant with fluid/ dietary restrictions.   - weaned down to his home oxygen this morning  -transition to oral Lasix.  - home nebs/ inhalers with PRN duonebs, short course of steroids ok  Acute on chronic combined systolic and diastolic congestive heart failure  Known EF of 10-15% and DD. [facility] for failure to show up to multiple appointments; reports he does not have transportation. Continues to smoke 2 packs per day.  Significant troponin elevation noted; though not a candidate for intervention given noncompliance.   Palliative Care consulted.  Patient is hospice appropriate.  Patient and wife have agreed to hospice care at home on discharge.  He has remained afebrile and hemodynamically stable.  Currently at home oxygen requirements.  He will be discharged with home hospice once arranged.

## 2025-06-10 NOTE — SUBJECTIVE & OBJECTIVE
Interval History: Back to baseline. He and his wife are agreeable to hospice placement upon discharge. Instructed to take extra lasix when he starts feeling short of breath.     Objective:     Vital Signs (Most Recent):  Temp: 98.4 °F (36.9 °C) (06/10/25 1105)  Pulse: 72 (06/10/25 1432)  Resp: 20 (06/10/25 1432)  BP: 124/70 (06/10/25 1105)  SpO2: (!) 84 % (06/10/25 1432) Vital Signs (24h Range):  Temp:  [98.1 °F (36.7 °C)-98.6 °F (37 °C)] 98.4 °F (36.9 °C)  Pulse:  [62-88] 72  Resp:  [14-37] 20  SpO2:  [84 %-100 %] 84 %  BP: (106-137)/(54-77) 124/70     Weight: 86.6 kg (190 lb 14.7 oz)  Body mass index is 31.77 kg/m².      Intake/Output Summary (Last 24 hours) at 6/10/2025 1452  Last data filed at 6/10/2025 1057  Gross per 24 hour   Intake 1950 ml   Output 400 ml   Net 1550 ml        Physical Exam  Vitals and nursing note reviewed.   Constitutional:       General: Abelardo is not in acute distress.     Interventions: Nasal cannula in place.   HENT:      Head: Normocephalic and atraumatic.      Mouth/Throat:      Mouth: Mucous membranes are moist.      Pharynx: Oropharynx is clear.   Cardiovascular:      Rate and Rhythm: Normal rate and regular rhythm.      Pulses: Normal pulses.   Pulmonary:      Effort: Pulmonary effort is normal. No respiratory distress.      Breath sounds: Rales (much improved) present. No wheezing or rhonchi.   Abdominal:      General: Bowel sounds are normal. There is no distension.      Palpations: Abdomen is soft.      Tenderness: There is no abdominal tenderness.   Musculoskeletal:         General: Normal range of motion.      Right lower leg: Edema present.      Left lower leg: Edema present.   Skin:     General: Skin is warm and dry.      Capillary Refill: Capillary refill takes less than 2 seconds.   Neurological:      General: No focal deficit present.      Mental Status: Abelardo is oriented to person, place, and time.   Psychiatric:         Mood and Affect: Mood normal.         Behavior:  Behavior normal.          Review of Systems   Respiratory:  Negative for shortness of breath.    Cardiovascular:  Negative for chest pain.       Vents:  Oxygen Concentration (%): 30 (06/09/25 2341)    Lines/Drains/Airways       Peripheral Intravenous Line  Duration                  Peripheral IV - Single Lumen 06/09/25 0030 20 G 1 in Left Antecubital 1 day         Peripheral IV - Single Lumen 06/09/25 0033 18 G 1 1/4 in Right Forearm 1 day                    Significant Labs:    CBC/Anemia Profile:  Recent Labs   Lab 06/09/25 0034 06/09/25 0428 06/10/25  0856   WBC 17.20* 20.06* 18.08*   HGB 11.1* 10.4* 10.8*   HCT 37.6* 36.8* 36.1*    225 216   MCV 97 99* 95   RDW 13.4 13.4 13.6        Chemistries:  Recent Labs   Lab 06/09/25  0034 06/09/25  0428 06/10/25  0857    144 138   K 4.1 3.8 4.0   CL 99 99 91*   CO2 32* 34* 36*   BUN 5* 5* 14   CREATININE 0.8 0.8 0.7   CALCIUM 8.9 8.7 8.8   ALBUMIN 3.6  --   --    PROT 8.2  --   --    BILITOT 0.3  --   --    ALKPHOS 89  --   --    ALT 15  --   --    AST 14  --   --    MG 2.5  --   --        All pertinent labs within the past 24 hours have been reviewed.    Significant Imaging:  I have reviewed all pertinent imaging results/findings within the past 24 hours.

## 2025-06-10 NOTE — PROGRESS NOTES
[facility] - Intensive Care  Palliative Medicine  Progress Note    Patient Name: Abelardo Irene Jr.  MRN: [id number]  Admission Date: 6/9/2025  Hospital Length of Stay: 1 days  Code Status: Full Code   Attending Provider: Romeo Lopez, *  Consulting Provider: Shanell Watters MD  Primary Care Physician: No primary care provider on file.  Principal Problem:Acute on chronic respiratory failure with hypoxia and hypercapnia    Assessment/Plan:     Advance Care Planning    6/10/25  - Chart and interval history reviewed; discussed pt during MDT rounds.   - During visit to bedside, pt was resting soundly - did not wake him for this reason.   - Called and was able to speak to his wife, Lillian; introduced role of palliative medicine.   - Medical update provided, as well as illness trajectory education. Shared that pt qualifies for hospice care, and discussed what this means overall in terms of prognosis, as well as what this would look like in home setting. She admits to having a number of health problems herself, and says it can be overwhelming at home (particularly for their daughter) as they are both quite sick. Validated that this is certainly understandable, and let her know that hospice is the most support we can provide in the home setting. Also let her know that if ever his care needs exceed what can be delivered at home, hospice team can work on getting him into a NH if this is needed; she verbalized understanding.   - Based on conversation, Lillian is receptive to discharge home with home hospice enrollment for patient. Updated primary team, PCCM, SW/CM, and bedside RN.   - Will follow up with pt while in hospital     6/9/25  - Consult for support and continuity of care in pt with advanced/end stage heart failure and respiratory failure currently in ICU for acute on chronic respiratory failure requiring continuous BIPAP; he was last in hospital for similar about 2 weeks ago. See APC notes on file; pt known to  palliative team.   - During visit to bedside, he was alert and readily able to participate in discussion, breathing comfortably on [place] oxygen. Reminded him of role of palliative medicine, and discussed how things have been going the last two weeks. He said things were going ok, until he started getting more short of breath and anxious, which caused him to call for ambulance.   - Long discussion about his underlying illness, as well as transparent assessment about his qualification for hospice. Due to some psychosocial complexities, he initially sounded unsure of post-hospital plans, though ultimately clarified he is open to home hospice with support from his wife Lillian and daughter Thao, who recently moved in with them. He has a number of other local family members including other daughter Ana Irene ([phone]), as well as his sisters Marilou and Bahman (does not have their phone numbers).   - Provided suzanne prognostic information, including expected prognosis of 6 months or less. However, shared every person is different, and it might be less than this, or it might be more - he said ultimately it is in God's hands, to which I agreed. As he identifies as Denominational, let him know we would have our  (a Denominational ) visit with him, though he said his sister is also one and will be visiting today.   - As part of discussion on comfort-focused care, recommended DNR/DNI status, as CPR and/or intubation would be unlikely to be helpful given his end stage disease process. He seemed to agree with this, though I let him know he could discuss it with his family if he would prefer.   - Let him know palliative team will continue to check in on him; updated primary team, PCCM, and SW/CM after visit. Will likely try to reach out to Lillian this afternoon.     Addendum 1:40 pm: Multiple attempts to reach pt's spouse Lillian; no answer. Went back to pt's room to let him know, and he told me the name of the [employer] that his  sister Bahman works for; discussed with  Cody who will attempt to get her phone number from the [employer]. Will follow up with pt and family tomorrow.     Pulmonary  Acute on chronic respiratory failure with hypoxia and hypercapnia  - Mgmt per primary team and PCCM; improved with BIPAP. Suspected etiology is COPD exacerbation, though this is on background of end stage heart and respiratory failure. Qualifies for hospice care.   - Last hospital stay for similar was 2 weeks ago; illness education provided      COPD (chronic obstructive pulmonary disease)  - Mgmt per primary team and PCCM; contributes to hospice qualification     Cardiac/Vascular  Coronary artery disease involving native coronary artery of native heart without angina pectoris  - Noted underlying disease     Acute on chronic combined systolic and diastolic congestive heart failure  - EF 10 to 15%; contributes to hospice qualification   - Illness trajectory education provided    I will follow-up with patient. Please contact us if you have any additional questions.    ZADI Quevedo  Palliative Medicine Staff   [phone]    > 50% of 35 min visit spent in chart review, face to face discussion of symptom assessment, coordination of care with other specialists, goals of care, emotional support, formulating and communicating prognosis, exploring burden/ benefit of various approaches of treatment.      Subjective:     Interval History: no major issues overnight    Medications:  Continuous Infusions:  Scheduled Meds:   aspirin  81 mg Oral Daily    atorvastatin  40 mg Oral QHS    clopidogreL  75 mg Oral Daily    enoxparin  40 mg Subcutaneous Daily    furosemide (LASIX) injection  40 mg Intravenous Q12H    metoprolol succinate  25 mg Oral Daily    mupirocin   Nasal BID    predniSONE  40 mg Oral Daily     PRN Meds:  Current Facility-Administered Medications:     acetaminophen, 650 mg, Oral, Q6H PRN    albuterol-ipratropium, 3 mL, Nebulization, Q4H  "PRN    dextrose 50%, 12.5 g, Intravenous, PRN    glucagon (human recombinant), 1 mg, Intramuscular, PRN    insulin aspart U-100, 0-10 Units, Subcutaneous, Q6H PRN    melatonin, 6 mg, Oral, Nightly PRN    ondansetron, 4 mg, Intravenous, Q6H PRN    polyethylene glycol, 17 g, Oral, BID PRN    sodium chloride 0.9%, 3 mL, Intravenous, PRN    Objective:     Vital Signs (Most Recent):  Temp: 98.1 °F (36.7 °C) (06/10/25 0705)  Pulse: 79 (06/10/25 0810)  Resp: (!) 32 (06/10/25 0739)  BP: 130/72 (06/10/25 0810)  SpO2: 99 % (06/10/25 0739) Vital Signs (24h Range):  Temp:  [98 °F (36.7 °C)-98.6 °F (37 °C)] 98.1 °F (36.7 °C)  Pulse:  [62-88] 79  Resp:  [14-41] 32  SpO2:  [86 %-100 %] 99 %  BP: (106-140)/(54-82) 130/72     Weight: 86.6 kg (190 lb 14.7 oz)  Body mass index is 31.77 kg/m².       Physical Exam  Constitutional:       Comments: Lying in bed, resting soundly, in no distress     Significant Labs: All pertinent labs within the past 24 hours have been reviewed.  CBC:   Recent Labs   Lab 06/09/25  0428   WBC 20.06*   HGB 10.4*   HCT 36.8*   MCV 99*        BMP:  No results for input(s): "GLU", "NA", "K", "CL", "CO2", "BUN", "CREATININE", "CALCIUM", "MG" in the last 24 hours.  LFT:  Lab Results   Component Value Date    AST 14 06/09/2025    ALKPHOS 89 06/09/2025    BILITOT 0.3 06/09/2025     Albumin:   Albumin   Date Value Ref Range Status   06/09/2025 3.6 3.5 - 5.2 g/dL Final     Protein:   Protein Total   Date Value Ref Range Status   06/09/2025 8.2 6.0 - 8.4 gm/dL Final     Lactic acid:   No results found for: "LACTATE"    Significant Imaging: I have reviewed all pertinent imaging results/findings within the past 24 hours.                "

## 2025-06-10 NOTE — SUBJECTIVE & OBJECTIVE
Interval History: was started on continues bipap in ICU setting,improved,off bipap and is on nightly bipap,on IV alsix for A/C systolic,diastolic CHF exacerbation,mew Echo. With EF of 20%,cardiology is consulted for elevated Troponin,cardiology want see as out patient for possible NST Vs LHC,  Patient is frequent flyer,still smoke,palliative is consulted.    Review of Systems   Constitutional:  Positive for activity change and appetite change.   Respiratory:  Negative for apnea and chest tightness.    Cardiovascular:  Negative for chest pain and leg swelling.   Gastrointestinal:  Negative for abdominal distention and abdominal pain.   Neurological:  Positive for weakness.     Objective:     Vital Signs (Most Recent):  Temp: 98.1 °F (36.7 °C) (06/10/25 0705)  Pulse: 77 (06/10/25 0900)  Resp: (!) 24 (06/10/25 0900)  BP: 122/71 (06/10/25 0900)  SpO2: 96 % (06/10/25 0900) Vital Signs (24h Range):  Temp:  [98 °F (36.7 °C)-98.6 °F (37 °C)] 98.1 °F (36.7 °C)  Pulse:  [62-88] 77  Resp:  [14-37] 24  SpO2:  [96 %-100 %] 96 %  BP: (106-137)/(54-77) 122/71     Weight: 86.6 kg (190 lb 14.7 oz)  Body mass index is 31.77 kg/m².    Intake/Output Summary (Last 24 hours) at 6/10/2025 0941  Last data filed at 6/10/2025 0920  Gross per 24 hour   Intake 1350 ml   Output 700 ml   Net 650 ml         Physical Exam  Cardiovascular:      Rate and Rhythm: Normal rate.   Pulmonary:      Effort: No respiratory distress.   Abdominal:      General: There is no distension.   Skin:     Coloration: Skin is not jaundiced.      Findings: No bruising.   Neurological:      Mental Status: Abelardo is alert and oriented to person, place, and time.               Significant Labs: All pertinent labs within the past 24 hours have been reviewed.  BMP:   Recent Labs   Lab 06/09/25  0034 06/09/25  0428   * 240*    144   K 4.1 3.8   CL 99 99   CO2 32* 34*   BUN 5* 5*   CREATININE 0.8 0.8   CALCIUM 8.9 8.7   MG 2.5  --      CBC:   Recent Labs   Lab  06/09/25  0034 06/09/25  0428 06/10/25  0856   WBC 17.20* 20.06* 18.08*   HGB 11.1* 10.4* 10.8*   HCT 37.6* 36.8* 36.1*    225 216     CMP:   Recent Labs   Lab 06/09/25 0034 06/09/25  0428    144   K 4.1 3.8   CL 99 99   CO2 32* 34*   * 240*   BUN 5* 5*   CREATININE 0.8 0.8   CALCIUM 8.9 8.7   PROT 8.2  --    ALBUMIN 3.6  --    BILITOT 0.3  --    ALKPHOS 89  --    AST 14  --    ALT 15  --    ANIONGAP 10 11       Significant Imaging: I have reviewed all pertinent imaging results/findings within the past 24 hours.

## 2025-06-10 NOTE — SUBJECTIVE & OBJECTIVE
Interval History:     Review of Systems   Unable to perform ROS: Acuity of condition     Objective:     Vital Signs (Most Recent):  Temp: 98.1 °F (36.7 °C) (06/10/25 0705)  Pulse: 77 (06/10/25 0900)  Resp: (!) 24 (06/10/25 0900)  BP: 122/71 (06/10/25 0900)  SpO2: 96 % (06/10/25 0900) Vital Signs (24h Range):  Temp:  [98 °F (36.7 °C)-98.6 °F (37 °C)] 98.1 °F (36.7 °C)  Pulse:  [62-88] 77  Resp:  [14-37] 24  SpO2:  [96 %-100 %] 96 %  BP: (106-137)/(54-77) 122/71     Weight: 86.6 kg (190 lb 14.7 oz)  Body mass index is 31.77 kg/m².     SpO2: 96 %         Intake/Output Summary (Last 24 hours) at 6/10/2025 0941  Last data filed at 6/10/2025 0920  Gross per 24 hour   Intake 1350 ml   Output 700 ml   Net 650 ml       Lines/Drains/Airways       Peripheral Intravenous Line  Duration                  Peripheral IV - Single Lumen 06/09/25 0030 20 G 1 in Left Antecubital 1 day         Peripheral IV - Single Lumen 06/09/25 0033 18 G 1 1/4 in Right Forearm 1 day                       Physical Exam  Constitutional:       Appearance: Abelardo is well-developed.   HENT:      Head: Normocephalic and atraumatic.   Eyes:      Conjunctiva/sclera: Conjunctivae normal.      Pupils: Pupils are equal, round, and reactive to light.   Cardiovascular:      Rate and Rhythm: Normal rate.      Pulses: Intact distal pulses.      Heart sounds: Normal heart sounds.   Pulmonary:      Effort: Pulmonary effort is normal.      Breath sounds: Normal breath sounds.   Abdominal:      General: Bowel sounds are normal.      Palpations: Abdomen is soft.   Musculoskeletal:         General: Normal range of motion.      Cervical back: Normal range of motion and neck supple.   Skin:     General: Skin is warm and dry.   Neurological:      Mental Status: Abelardo is alert and oriented to person, place, and time.            Significant Labs: All pertinent lab results from the last 24 hours have been reviewed.    Significant Imaging: Echocardiogram: 2D echo with color  flow doppler: No results found for this or any previous visit.

## 2025-06-10 NOTE — ASSESSMENT & PLAN NOTE
Has known advanced ischemic CM with prior RCA and LAD stents - reported LAD ISR from years ago that was never addressed due to poor follow up. Troponin elevation likely from demand ischemia due to COPD and CHF. Would consider repeat out patient LHC and AICD evaluation once recovered from acute process. Repeat echo - baseline EF 15%    6/10/25 EF 20-25% on echo. Ok for f/u with Dr Cabrera out patient to discuss repeat LHC and possible AICD

## 2025-06-11 VITALS
BODY MASS INDEX: 31.81 KG/M2 | SYSTOLIC BLOOD PRESSURE: 145 MMHG | RESPIRATION RATE: 35 BRPM | WEIGHT: 190.94 LBS | HEART RATE: 75 BPM | OXYGEN SATURATION: 100 % | DIASTOLIC BLOOD PRESSURE: 92 MMHG | HEIGHT: 65 IN | TEMPERATURE: 97 F

## 2025-06-11 LAB
ABSOLUTE EOSINOPHIL (OHS): 0.03 K/UL
ABSOLUTE MONOCYTE (OHS): 0.9 K/UL (ref 0.3–1)
ABSOLUTE NEUTROPHIL COUNT (OHS): 12.19 K/UL (ref 1.8–7.7)
ANION GAP (OHS): 10 MMOL/L (ref 8–16)
BASOPHILS # BLD AUTO: 0.04 K/UL
BASOPHILS NFR BLD AUTO: 0.2 %
BUN SERPL-MCNC: 16 MG/DL (ref 8–23)
CALCIUM SERPL-MCNC: 9.5 MG/DL (ref 8.7–10.5)
CHLORIDE SERPL-SCNC: 93 MMOL/L (ref 95–110)
CO2 SERPL-SCNC: 38 MMOL/L (ref 23–29)
CREAT SERPL-MCNC: 0.7 MG/DL (ref 0.5–1.4)
ERYTHROCYTE [DISTWIDTH] IN BLOOD BY AUTOMATED COUNT: 13.6 % (ref 11.5–14.5)
GFR SERPLBLD CREATININE-BSD FMLA CKD-EPI: >60 ML/MIN/1.73/M2
GLUCOSE SERPL-MCNC: 133 MG/DL (ref 70–110)
HCT VFR BLD AUTO: 34.4 % (ref 40–54)
HGB BLD-MCNC: 10.5 GM/DL (ref 14–18)
IMM GRANULOCYTES # BLD AUTO: 0.2 K/UL (ref 0–0.04)
IMM GRANULOCYTES NFR BLD AUTO: 1.2 % (ref 0–0.5)
LYMPHOCYTES # BLD AUTO: 3.65 K/UL (ref 1–4.8)
MCH RBC QN AUTO: 28.1 PG (ref 27–31)
MCHC RBC AUTO-ENTMCNC: 30.5 G/DL (ref 32–36)
MCV RBC AUTO: 92 FL (ref 82–98)
NUCLEATED RBC (/100WBC) (OHS): 0 /100 WBC
PLATELET # BLD AUTO: 238 K/UL (ref 150–450)
PMV BLD AUTO: 10.8 FL (ref 9.2–12.9)
POCT GLUCOSE: 130 MG/DL (ref 70–110)
POCT GLUCOSE: 206 MG/DL (ref 70–110)
POTASSIUM SERPL-SCNC: 3.5 MMOL/L (ref 3.5–5.1)
RBC # BLD AUTO: 3.74 M/UL (ref 4.6–6.2)
RELATIVE EOSINOPHIL (OHS): 0.2 %
RELATIVE LYMPHOCYTE (OHS): 21.5 % (ref 18–48)
RELATIVE MONOCYTE (OHS): 5.3 % (ref 4–15)
RELATIVE NEUTROPHIL (OHS): 71.6 % (ref 38–73)
SODIUM SERPL-SCNC: 141 MMOL/L (ref 136–145)
WBC # BLD AUTO: 17.01 K/UL (ref 3.9–12.7)

## 2025-06-11 PROCEDURE — 25000003 PHARM REV CODE 250: Performed by: HOSPITALIST

## 2025-06-11 PROCEDURE — 85025 COMPLETE CBC W/AUTO DIFF WBC: CPT | Performed by: HOSPITALIST

## 2025-06-11 PROCEDURE — 63600175 PHARM REV CODE 636 W HCPCS: Performed by: INTERNAL MEDICINE

## 2025-06-11 PROCEDURE — 80048 BASIC METABOLIC PNL TOTAL CA: CPT | Performed by: HOSPITALIST

## 2025-06-11 PROCEDURE — 63600175 PHARM REV CODE 636 W HCPCS: Performed by: NURSE PRACTITIONER

## 2025-06-11 PROCEDURE — 25000242 PHARM REV CODE 250 ALT 637 W/ HCPCS: Performed by: HOSPITALIST

## 2025-06-11 PROCEDURE — 27000221 HC OXYGEN, UP TO 24 HOURS

## 2025-06-11 PROCEDURE — 93005 ELECTROCARDIOGRAM TRACING: CPT

## 2025-06-11 PROCEDURE — 99900035 HC TECH TIME PER 15 MIN (STAT)

## 2025-06-11 PROCEDURE — 36415 COLL VENOUS BLD VENIPUNCTURE: CPT | Performed by: HOSPITALIST

## 2025-06-11 PROCEDURE — 94640 AIRWAY INHALATION TREATMENT: CPT

## 2025-06-11 PROCEDURE — 25000003 PHARM REV CODE 250: Performed by: INTERNAL MEDICINE

## 2025-06-11 PROCEDURE — 94660 CPAP INITIATION&MGMT: CPT

## 2025-06-11 RX ORDER — METOPROLOL SUCCINATE 25 MG/1
25 TABLET, EXTENDED RELEASE ORAL DAILY
Start: 2025-06-12 | End: 2026-06-12

## 2025-06-11 RX ORDER — CLOPIDOGREL BISULFATE 75 MG/1
75 TABLET ORAL DAILY
Start: 2025-06-12 | End: 2026-06-12

## 2025-06-11 RX ORDER — PREDNISONE 20 MG/1
40 TABLET ORAL DAILY
Start: 2025-06-12 | End: 2025-06-15

## 2025-06-11 RX ORDER — FUROSEMIDE 40 MG/1
40 TABLET ORAL 2 TIMES DAILY
Status: DISCONTINUED | OUTPATIENT
Start: 2025-06-11 | End: 2025-06-11 | Stop reason: HOSPADM

## 2025-06-11 RX ORDER — ATORVASTATIN CALCIUM 40 MG/1
40 TABLET, FILM COATED ORAL NIGHTLY
Start: 2025-06-11 | End: 2026-06-11

## 2025-06-11 RX ORDER — ASPIRIN 81 MG/1
81 TABLET ORAL DAILY
Start: 2025-06-12 | End: 2026-06-12

## 2025-06-11 RX ORDER — NITROGLYCERIN 0.4 MG/1
0.4 TABLET SUBLINGUAL ONCE
Status: COMPLETED | OUTPATIENT
Start: 2025-06-11 | End: 2025-06-11

## 2025-06-11 RX ORDER — FUROSEMIDE 40 MG/1
40 TABLET ORAL 2 TIMES DAILY
Start: 2025-06-11 | End: 2026-06-11

## 2025-06-11 RX ORDER — ALBUTEROL SULFATE 90 UG/1
2 INHALANT RESPIRATORY (INHALATION) EVERY 6 HOURS PRN
Start: 2025-06-11

## 2025-06-11 RX ADMIN — IPRATROPIUM BROMIDE AND ALBUTEROL SULFATE 3 ML: 2.5; .5 SOLUTION RESPIRATORY (INHALATION) at 08:06

## 2025-06-11 RX ADMIN — PREDNISONE 40 MG: 20 TABLET ORAL at 08:06

## 2025-06-11 RX ADMIN — INSULIN ASPART 2 UNITS: 100 INJECTION, SOLUTION INTRAVENOUS; SUBCUTANEOUS at 12:06

## 2025-06-11 RX ADMIN — IPRATROPIUM BROMIDE AND ALBUTEROL SULFATE 3 ML: 2.5; .5 SOLUTION RESPIRATORY (INHALATION) at 01:06

## 2025-06-11 RX ADMIN — FAMOTIDINE 20 MG: 20 TABLET, FILM COATED ORAL at 08:06

## 2025-06-11 RX ADMIN — ASPIRIN 81 MG: 81 TABLET ORAL at 08:06

## 2025-06-11 RX ADMIN — CLOPIDOGREL 75 MG: 75 TABLET ORAL at 08:06

## 2025-06-11 RX ADMIN — FUROSEMIDE 40 MG: 40 TABLET ORAL at 05:06

## 2025-06-11 RX ADMIN — NITROGLYCERIN 0.4 MG: 0.4 TABLET, ORALLY DISINTEGRATING SUBLINGUAL at 02:06

## 2025-06-11 RX ADMIN — METOPROLOL SUCCINATE 25 MG: 25 TABLET, EXTENDED RELEASE ORAL at 08:06

## 2025-06-11 RX ADMIN — ENOXAPARIN SODIUM 40 MG: 40 INJECTION SUBCUTANEOUS at 05:06

## 2025-06-11 RX ADMIN — MUPIROCIN: 20 OINTMENT TOPICAL at 08:06

## 2025-06-11 RX ADMIN — FUROSEMIDE 40 MG: 40 TABLET ORAL at 08:06

## 2025-06-11 NOTE — PLAN OF CARE
Patient will discharge today   06/11/25 0900   Rounds   Attendance Provider;Nurse ;;Assigned nurse   Discharge Plan A Hospice/home   Why the patient remains in the hospital Other (see comment)   Transition of Care Barriers None

## 2025-06-11 NOTE — ASSESSMENT & PLAN NOTE
Patient has Combined Systolic and Diastolic heart failure that is Acute on chronic. On presentation their CHF was decompensated. Evidence of decompensated CHF on presentation includes: crackles on lung auscultation, shortness of breath, and elevated BNP.   Recent Labs     06/09/25  0034   BNP 1,086*     Latest ECHO: 3/5/2025    Left Ventricle: The left ventricle is moderately dilated. Mildly increased wall thickness. There is mild eccentric hypertrophy. Severe global hypokinesis present. There is severely reduced systolic function with a visually estimated ejection fraction of 10 -15%. Unable to assess diastolic function due to E-A fusion.    Right Ventricle: Systolic function is borderline low.    Right Atrium: Right atrium is mildly dilated.    Aorta: Aortic root is the upper limits of normal in size measuring 3.60 cm. Ascending aorta is the upper limit of normal in size measuring 3.47 cm.       Current Heart Failure Medications  metoprolol succinate (TOPROL-XL) 24 hr tablet 25 mg, Daily, Oral  furosemide tablet 40 mg, 2 times daily, Oral  furosemide (LASIX) tablet, 2 times daily, Oral  metoprolol succinate (TOPROL-XL) 24 hr tablet, Daily, Oral    Plan  - Monitor strict I&Os and daily weights.    - Place on telemetry  - Low sodium diet  - Place on fluid restriction of 1.5 L.   - Cardiology has not been consulted

## 2025-06-11 NOTE — ASSESSMENT & PLAN NOTE
Patient with Hypercapnic and Hypoxic Respiratory failure which is Acute on chronic.  Abelardo is on home oxygen at 4 LPM. Supplemental oxygen was provided and noted- Oxygen Concentration (%):  [28-35] 28 Wean to keep sats 90-92% due to severity of his COPD. (home oxygen of 4l).   Treating COPD exacerbation and will give IV lasix as well for possible mild CHF exacerbation. Control BP.   was started on continues bipap in ICU setting,improved,off bipap and is on nightly bipap,stable on [place] O 2

## 2025-06-11 NOTE — PLAN OF CARE
[facility] [address] [place] [address]  [phone]                                     HOSPICE  ORDERS     06/11/2025    Admit to Hospice:  Home Service      Diagnoses:  Active Hospital Problems    Diagnosis  POA    *Acute on chronic respiratory failure with hypoxia and hypercapnia [J96.21, J96.22]  Yes    Chronic respiratory failure with hypoxia and hypercapnia [J96.11, J96.12]  Yes    COPD (chronic obstructive pulmonary disease) [J44.9]  Yes    Acute on chronic combined systolic and diastolic congestive heart failure [I50.43]  Yes    Coronary artery disease involving native coronary artery of native heart without angina pectoris [I25.10]  Yes    Non-ischemic myocardial injury (non-traumatic) [I5A]  Yes    Leukocytosis [D72.829]  Yes    Advance care planning [Z71.89]  Not Applicable    Tobacco abuse [Z72.0]  Yes      Resolved Hospital Problems   No resolved problems to display.       Vital Signs: Routine.    Allergies:  Review of patient's allergies indicates:   Allergen Reactions    Iodinated contrast media Shortness Of Breath, Itching and Anxiety     Patient states that he had a bad reaction, anxiety , shortness of breath, itching .            Diet: Cardiac    Activities: As tolerated    Nursing: Per Hospice Routine    Medications:     Further comfort care orders per Hospice MD     Medication List        START taking these medications      albuterol 90 mcg/actuation inhaler  Commonly known as: VENTOLIN HFA  Inhale 2 puffs into the lungs every 6 (six) hours as needed for Wheezing or Shortness of Breath. Rescue     aspirin 81 MG EC tablet  Commonly known as: ECOTRIN  Take 1 tablet (81 mg total) by mouth once daily.       atorvastatin 40 MG tablet  Commonly known as: LIPITOR  Take 1 tablet (40 mg total) by mouth every evening.     clopidogreL 75 mg tablet  Commonly known as: PLAVIX  Take 1 tablet (75 mg total) by mouth once daily.       furosemide 40 MG tablet  Commonly known  as: LASIX  Take 1 tablet (40 mg total) by mouth 2 (two) times daily.     metoprolol succinate 25 MG 24 hr tablet  Commonly known as: TOPROL-XL  Take 1 tablet (25 mg total) by mouth once daily.       predniSONE 20 MG tablet  Commonly known as: DELTASONE  Take 2 tablets (40 mg total) by mouth once daily. for 3 days         Patient has a terminal illness with life expectance less than 6 months.     _________________________________  Iron Bledsoe MD  06/11/2025

## 2025-06-11 NOTE — NURSING
[facility] [place]  Nurses Note -- 4 Eyes      6/11/2025       Skin assessed on: Q Shift      [x] No Pressure Injuries Present    [x]Prevention Measures Documented    [] Yes LDA  for Pressure Injury Previously documented     [] Yes New Pressure Injury Discovered   [] LDA for New Pressure Injury Added      Attending RN:  Leeann ESCOBAR RN     Second RN:  CHARLI Sheets

## 2025-06-11 NOTE — ASSESSMENT & PLAN NOTE
Patient with Hypercapnic and Hypoxic Respiratory failure which is Chronic.  Abelardo is on home oxygen at 4 LPM. Supplemental oxygen was provided and noted- Oxygen Concentration (%):  [28-35] 28    .   Signs/symptoms of respiratory failure include- tachypnea and increased work of breathing. Contributing diagnoses includes - CHF and COPD Labs and images were reviewed. Patient Has recent ABG, which has been reviewed. Will treat underlying causes and adjust management of respiratory failure as follows-

## 2025-06-11 NOTE — PLAN OF CARE
Case Management Final Discharge Note    Discharge Disposition: Home with [facility]    New DME ordered / company name: [facility] delivered medical equipment    Relevant SDOH / Transition of Care Barriers:  none    Person available to provide assistance at home when needed and their contact information: Lawson Osuna [phone]    Transportation: ADT 30 order placed for Van Transportation.  Requested  time: 3:07  If transportation does not arrive at ETA time nurse will be instructed to follow protocol for transportation below:  How can I get in touch directly with dispatch, if needed?                 Non-emergent dispatch: [phone]      +++NURSING:  If Van does not arrive at requested time please call the above Non Emergent Dispatcher.  If issue not resolved please escalate to your charge nurse for further instructions.    Patient and family educated on discharge services and updated on DC plan. Bedside RN Leeann notified, patient clear to discharge from Case Management Perspective.     CM called and spoke with patients daughter Thao [phone] to confirm someone will be home to receive patient. Thao stated she is home waiting on patient.     06/11/25 1505   Final Note   Assessment Type Final Discharge Note   Anticipated Discharge Disposition   (Home [facility])   What phone number can be called within the next 1-3 days to see how you are doing after discharge? [phone]   Post-Acute Status   Post-Acute Authorization Hospice   Hospice Status Set-up Complete/Auth obtained   Discharge Delays None known at this time

## 2025-06-11 NOTE — NURSING
Patient discharge home with hospice , LDA removed, discharge paperwork done at bedside expressed understanding. Transport requested.

## 2025-06-11 NOTE — PLAN OF CARE
CM spoke with patients spouse Lillian to confirm if the medial equipment had been delivered. SHAWN then contacted [facility] representative, who reported that a delivery attempt was made but there was no answer at the residence. SHAWN followed up with the spouse, who explained that she was unable to answer the door because she was unable to answer because she was on her way to dialysis. Lillian provide CM with daughter Thao number [phone] and stated she is at home. CM provided [facility] with Thao number.

## 2025-06-11 NOTE — EICU
Virtual ICU Quality Rounds    Admit Date: 6/9/2025  Hospital Day: 2    ICU Day: 2d 4h    24H Vital Sign Range:  Temp:  [96 °F (35.6 °C)-98.6 °F (37 °C)]   Pulse:  [63-87]   Resp:  [17-49]   BP: (118-143)/(65-87)   SpO2:  [78 %-100 %]     VICU Surveillance Screening                    LDA reconciliation : Yes

## 2025-06-11 NOTE — PLAN OF CARE
Problem: COPD (Chronic Obstructive Pulmonary Disease)  Goal: Optimal Chronic Illness Coping  Outcome: Progressing  Goal: Optimal Level of Functional [place]  Outcome: Progressing  Goal: Absence of Infection Signs and Symptoms  Outcome: Progressing  Goal: Improved Oral Intake  Outcome: Progressing  Goal: Effective Oxygenation and Ventilation  Outcome: Progressing     Problem: Adult Inpatient Plan of Care  Goal: Plan of Care Review  Outcome: Progressing  Goal: Patient-Specific Goal (Individualized)  Outcome: Progressing  Goal: Absence of Hospital-Acquired Illness or Injury  Outcome: Progressing  Goal: Optimal Comfort and Wellbeing  Outcome: Progressing  Goal: Readiness for Transition of Care  Outcome: Progressing     Problem: Skin Injury Risk Increased  Goal: Skin Health and Integrity  Outcome: Progressing     Problem: Coping Ineffective  Goal: Effective Coping  Outcome: Progressing

## 2025-06-11 NOTE — ASSESSMENT & PLAN NOTE
Consulted again need quit smoking,spent over 6 minutes,     Spoke with pt. Pt states he has  CT scan set up for 4.11. Are you still wanting to see him? If so when? Before or after CT?

## 2025-06-11 NOTE — ASSESSMENT & PLAN NOTE
Mild elevation in troponin and suspect due to acute hypoxia, COPD exacerbation, and CHF exacerbation. Trend and monitor on tele. Echo as above. IV lasix given. No c/o chest pain. Cont. Home ASA 81mg daily, Plavix 75mg daily, Toprol XL 25mg daily. Maintain Sat 90-92% and MAP>65.   cardiology is consulted for elevated Troponin,cardiology want see as out patient for possible NST Vs C,

## 2025-06-11 NOTE — EICU
Intervention Initiated From:  COR / ELAYNEU    Jasmine intervened regarding:  Rounding (Video assessment)  VICU Night Rounds Checklist  24H Vital Sign Range:  Temp:  [98.1 °F (36.7 °C)-98.6 °F (37 °C)]   Pulse:  [62-87]   Resp:  [14-49]   BP: (106-136)/(54-87)   SpO2:  [78 %-100 %]     Video rounds

## 2025-06-11 NOTE — DISCHARGE SUMMARY
[facility]  [facility]  Discharge Summary      Patient Name: Abelardo Irene Jr.  MRN: [id number]  [facility]: [id number]  Patient Class: IP- Inpatient  Admission Date: 6/9/2025  Hospital Length of Stay: 2 days  Discharge Date and Time: 06/11/2025 10:04 AM  Attending Physician: Iron Bledseo MD   Discharging Provider: Iron Bledsoe MD  Primary Care Provider: No primary care provider on file.    Primary Care Team: Networked reference to record PCT     HPI:   69 y.o. man with h/o essential HTN, COPD (on 4l home oxygen), CAD(Prior PCI of his RCA with severe InStent restenosis of his left anterior descending artery stent->was supposed to have a  hybrid procedure with LIMA to LAD in 2018 per cardiology consult note by Dr. Cabrera in 2023) , Systolic/diastolic CHF (EF 10-15% in March 2025), NIDDM type 2 (A1c 7.6 in May), smoker, HLD presents to the ER with AMS. Pt was brought in by EMS altered and thus history is limited to my conversation with the ED physician. EMS found him with RA sats 75% and tripoding. Apparently family had called. He was place on CPAP and given mg 2gm IV x1, cont duonebs, SQ epi 0.3mg and Dexamethasone 16mg IV. (Incidentally he was just admitted here from 5/27-29/2025 for the same thing COPD and CHF exacerbation requiring BIPAP and IV lasix). Patient did tell me no c/o chest pain or SOB. Denies his oxygen tank malfunctioning. States has not been taking his lasix but didn't say why. No chest pain.     IN the ER was improving and changed to BIPAP after VBG with pH 7.02 and pCO2>130. Continued on multiple back to back duonebs and given a dose of IV 500mg Azithromycin. Pt. Clinically improving and more alert following commands. He remained afebriel with stable BP and sats >96% on 40% FIO on BIPAP.     Labs noted for WBC 17 with H/H 11.1/37.6.   Lytes noted for bicarb 32 (appears to run in the 30s chronically) and unremarkable liver and renal function. TSH wnl.   BNP elevated at 1,086  (higher than previous admission). Troponin 0.008->0.028.   COVID/Flu negative.      CXR:   Findings may represent infiltrates or edema. Mild cardiomegaly.     We have been consulted for further management of his AMS due to acute on chronic hypercapnic/hypoxic respiratory failure.     * No surgery found *      Hospital Course:    Acute on chronic respiratory failure with hypoxia and hypercapnia  Respiratory failure 2/2 decompensated HF; less likely that COPD is contributing. Reports that he has not been taking his lasix. Initial VBG with 7.07>130. CXR with pulmonary edema and elevated BNP 1086. Procal 0.11. Required BIPAP and ICU admission. Many repeated admissions for the same. Continues to smoke 2 ppd and non-compliant with fluid/ dietary restrictions.   - weaned down to his home oxygen this morning  -transition to oral Lasix.  - home nebs/ inhalers with PRN duonebs, short course of steroids ok  Acute on chronic combined systolic and diastolic congestive heart failure  Known EF of 10-15% and DD. [facility] for failure to show up to multiple appointments; reports he does not have transportation. Continues to smoke 2 packs per day.  Significant troponin elevation noted; though not a candidate for intervention given noncompliance.   Palliative Care consulted.  Patient is hospice appropriate.  Patient and wife have agreed to hospice care at home on discharge.  He has remained afebrile and hemodynamically stable.  Currently at home oxygen requirements.  He will be discharged with home hospice once arranged.     Goals of Care Treatment Preferences:  Code Status: Full Code         Consults:   Consults (From admission, onward)          Status Ordering Provider     Inpatient consult to Palliative Care  Once        Provider:  (Not yet assigned)    NUSRAT Booth     Inpatient consult to Cardiology  Once        Provider:  (Not yet assigned)    Completed NUSRAT FERGUSON      Inpatient consult to Social Work/Case Management  Once        Provider:  (Not yet assigned)    Completed THERESA LEONG     Inpatient consult to Registered Dietitian/Nutritionist  Once        Provider:  (Not yet assigned)    Completed THERESA LEONG     Inpatient consult to Pulmonology  Once        Provider:  (Not yet assigned)    Completed THERESA LEONG            Assessment & Plan  COPD (chronic obstructive pulmonary disease)  Patient's COPD is with exacerbation noted by use of accessory muscles for breathing and worsening of baseline hypoxia. Suspect AMS due to hypercapnia.  Patient is currently on COPD Pathway. Continue scheduled inhalers Steroids and Supplemental oxygen and monitor respiratory status closely in the ICU. Pulmonary is consulted. Wean BIPAP off as able to home 4l NC. Goal oxygen at this time 90-92% given severity of his COPD and chronic CO2 retention. HOLD on further abx till repeat CBC, lactic, procal return. Does not appear to be c/w bacterial PNA. F/u on viral resp panel. COVID/Flu negative.   Acute on chronic respiratory failure with hypoxia and hypercapnia  Patient with Hypercapnic and Hypoxic Respiratory failure which is Acute on chronic.  Abelardo is on home oxygen at 4 LPM. Supplemental oxygen was provided and noted- Oxygen Concentration (%):  [28-35] 28 Wean to keep sats 90-92% due to severity of his COPD. (home oxygen of 4l).   Treating COPD exacerbation and will give IV lasix as well for possible mild CHF exacerbation. Control BP.   was started on continues bipap in ICU setting,improved,off bipap and is on nightly bipap,stable on Nc O 2  Acute on chronic combined systolic and diastolic congestive heart failure  Patient has Combined Systolic and Diastolic heart failure that is Acute on chronic. On presentation their CHF was decompensated. Evidence of decompensated CHF on presentation includes: crackles on lung auscultation, shortness of breath, and elevated BNP.   Recent Labs      06/09/25  0034   BNP 1,086*     Latest ECHO: 3/5/2025    Left Ventricle: The left ventricle is moderately dilated. Mildly increased wall thickness. There is mild eccentric hypertrophy. Severe global hypokinesis present. There is severely reduced systolic function with a visually estimated ejection fraction of 10 -15%. Unable to assess diastolic function due to E-A fusion.    Right Ventricle: Systolic function is borderline low.    Right Atrium: Right atrium is mildly dilated.    Aorta: Aortic root is the upper limits of normal in size measuring 3.60 cm. Ascending aorta is the upper limit of normal in size measuring 3.47 cm.       Current Heart Failure Medications  metoprolol succinate (TOPROL-XL) 24 hr tablet 25 mg, Daily, Oral  furosemide tablet 40 mg, 2 times daily, Oral  furosemide (LASIX) tablet, 2 times daily, Oral  metoprolol succinate (TOPROL-XL) 24 hr tablet, Daily, Oral    Plan  - Monitor strict I&Os and daily weights.    - Place on telemetry  - Low sodium diet  - Place on fluid restriction of 1.5 L.   - Cardiology has not been consulted  Non-ischemic myocardial injury (non-traumatic)  Mild elevation in troponin and suspect due to acute hypoxia, COPD exacerbation, and CHF exacerbation. Trend and monitor on tele. Echo as above. IV lasix given. No c/o chest pain. Cont. Home ASA 81mg daily, Plavix 75mg daily, Toprol XL 25mg daily. Maintain Sat 90-92% and MAP>65.   cardiology is consulted for elevated Troponin,cardiology want see as out patient for possible NST Vs C,      Leukocytosis  Does not feel c/w sepsis or due to bacterial infection at this time. Feel more c/w steroids give, stress rxn. F/u on repeat CBC, procal, lactic. Got a dose of abx in the ED and will hold for now. CXR concerning more for COPD and CHF exacerbation. IV lasix, duonebs, and steroids. Comprehensive viral respiratory panel sent.     Coronary artery disease involving native coronary artery of native heart without angina  pectoris  Patient with known CAD s/p PCI to RCA, which is controlled Will continue ASA, Plavix, and Statin and monitor for S/Sx of angina/ACS. Continue to monitor on telemetry. No c/o chest pain and NO STEMI on EKG. Treating CHF exacerbation.   Advance care planning  Palliative is consulted,spent over 5 minutes,    Tobacco abuse  Consulted again need quit smoking,spent over 6 minutes,    Chronic respiratory failure with hypoxia and hypercapnia  Patient with Hypercapnic and Hypoxic Respiratory failure which is Chronic.  Abelardo is on home oxygen at 4 LPM. Supplemental oxygen was provided and noted- Oxygen Concentration (%):  [28-35] 28    .   Signs/symptoms of respiratory failure include- tachypnea and increased work of breathing. Contributing diagnoses includes - CHF and COPD Labs and images were reviewed. Patient Has recent ABG, which has been reviewed. Will treat underlying causes and adjust management of respiratory failure as follows-   Final Active Diagnoses:    Diagnosis Date Noted POA    PRINCIPAL PROBLEM:  Acute on chronic respiratory failure with hypoxia and hypercapnia [J96.21, J96.22] 06/09/2025 Yes    Chronic respiratory failure with hypoxia and hypercapnia [J96.11, J96.12] 06/10/2025 Yes    COPD (chronic obstructive pulmonary disease) [J44.9] 06/09/2025 Yes    Acute on chronic combined systolic and diastolic congestive heart failure [I50.43] 06/09/2025 Yes    Coronary artery disease involving native coronary artery of native heart without angina pectoris [I25.10] 06/09/2025 Yes    Non-ischemic myocardial injury (non-traumatic) [I5A] 06/09/2025 Yes    Leukocytosis [D72.829] 06/09/2025 Yes    Advance care planning [Z71.89] 06/09/2025 Not Applicable    Tobacco abuse [Z72.0] 06/09/2025 Yes      Problems Resolved During this Admission:       Discharged Condition: stable    Disposition: Hospice/Home    Follow Up:    Patient Instructions:      Diet Cardiac     Activity as tolerated       Significant Diagnostic  Studies: N/A    Pending Diagnostic Studies:       None           Medications:  Reconciled Home Medications:      Medication List        START taking these medications      albuterol 90 mcg/actuation inhaler  Commonly known as: VENTOLIN HFA  Inhale 2 puffs into the lungs every 6 (six) hours as needed for Wheezing or Shortness of Breath. Rescue     aspirin 81 MG EC tablet  Commonly known as: ECOTRIN  Take 1 tablet (81 mg total) by mouth once daily.  Start taking on: June 12, 2025     atorvastatin 40 MG tablet  Commonly known as: LIPITOR  Take 1 tablet (40 mg total) by mouth every evening.     clopidogreL 75 mg tablet  Commonly known as: PLAVIX  Take 1 tablet (75 mg total) by mouth once daily.  Start taking on: June 12, 2025     furosemide 40 MG tablet  Commonly known as: LASIX  Take 1 tablet (40 mg total) by mouth 2 (two) times daily.     metoprolol succinate 25 MG 24 hr tablet  Commonly known as: TOPROL-XL  Take 1 tablet (25 mg total) by mouth once daily.  Start taking on: June 12, 2025     predniSONE 20 MG tablet  Commonly known as: DELTASONE  Take 2 tablets (40 mg total) by mouth once daily. for 3 days  Start taking on: June 12, 2025              Indwelling Lines/Drains at time of discharge:   Lines/Drains/Airways       None                   Time spent on the discharge of patient: >31 minutes      Iron Bledsoe MD  Department of Hospital Medicine  [facility] - [place] - Intensive Care

## 2025-06-12 LAB
OHS QRS DURATION: 102 MS
OHS QTC CALCULATION: 480 MS

## 2025-06-29 ENCOUNTER — HOSPITAL ENCOUNTER (INPATIENT)
Facility: HOSPITAL | Age: 70
LOS: 1 days | Discharge: HOSPICE | DRG: 189 | End: 2025-06-30
Attending: EMERGENCY MEDICINE | Admitting: HOSPITALIST
Payer: MEDICARE

## 2025-06-29 DIAGNOSIS — R06.02 SOB (SHORTNESS OF BREATH): ICD-10-CM

## 2025-06-29 DIAGNOSIS — I16.1 HYPERTENSIVE EMERGENCY: Primary | ICD-10-CM

## 2025-06-29 DIAGNOSIS — I50.9 ACUTE ON CHRONIC CONGESTIVE HEART FAILURE, UNSPECIFIED HEART FAILURE TYPE: ICD-10-CM

## 2025-06-29 DIAGNOSIS — R06.89 HYPERCARBIA: ICD-10-CM

## 2025-06-29 DIAGNOSIS — J96.92 RESPIRATORY FAILURE WITH HYPERCAPNIA, UNSPECIFIED CHRONICITY: ICD-10-CM

## 2025-06-29 PROBLEM — J96.21 ACUTE ON CHRONIC RESPIRATORY FAILURE WITH HYPOXIA AND HYPERCAPNIA: Status: RESOLVED | Noted: 2023-03-06 | Resolved: 2025-06-29

## 2025-06-29 PROBLEM — Z72.0 TOBACCO ABUSE: Status: RESOLVED | Noted: 2025-06-09 | Resolved: 2025-06-29

## 2025-06-29 PROBLEM — J96.22 ACUTE ON CHRONIC RESPIRATORY FAILURE WITH HYPOXIA AND HYPERCAPNIA: Status: RESOLVED | Noted: 2023-03-06 | Resolved: 2025-06-29

## 2025-06-29 PROBLEM — J44.9 COPD (CHRONIC OBSTRUCTIVE PULMONARY DISEASE): Status: RESOLVED | Noted: 2021-02-13 | Resolved: 2025-06-29

## 2025-06-29 PROBLEM — J96.12 CHRONIC RESPIRATORY FAILURE WITH HYPOXIA AND HYPERCAPNIA: Status: RESOLVED | Noted: 2017-07-28 | Resolved: 2025-06-29

## 2025-06-29 PROBLEM — G47.33 OSA (OBSTRUCTIVE SLEEP APNEA): Status: ACTIVE | Noted: 2025-06-29

## 2025-06-29 PROBLEM — I50.43 ACUTE ON CHRONIC COMBINED SYSTOLIC AND DIASTOLIC HEART FAILURE: Status: RESOLVED | Noted: 2023-10-19 | Resolved: 2025-06-29

## 2025-06-29 PROBLEM — E11.9 TYPE 2 DIABETES MELLITUS, WITHOUT LONG-TERM CURRENT USE OF INSULIN: Status: ACTIVE | Noted: 2022-04-12

## 2025-06-29 PROBLEM — D69.6 THROMBOCYTOPENIA: Status: RESOLVED | Noted: 2025-05-28 | Resolved: 2025-06-29

## 2025-06-29 PROBLEM — E83.39 HYPOPHOSPHATEMIA: Status: RESOLVED | Noted: 2025-03-05 | Resolved: 2025-06-29

## 2025-06-29 PROBLEM — J44.1 CHRONIC OBSTRUCTIVE PULMONARY DISEASE WITH ACUTE EXACERBATION: Status: RESOLVED | Noted: 2023-03-09 | Resolved: 2025-06-29

## 2025-06-29 PROBLEM — I5A NON-ISCHEMIC MYOCARDIAL INJURY (NON-TRAUMATIC): Status: RESOLVED | Noted: 2025-06-09 | Resolved: 2025-06-29

## 2025-06-29 PROBLEM — Z51.5 PALLIATIVE CARE ENCOUNTER: Status: RESOLVED | Noted: 2022-04-12 | Resolved: 2025-06-29

## 2025-06-29 PROBLEM — I25.10 CORONARY ARTERY DISEASE INVOLVING NATIVE CORONARY ARTERY OF NATIVE HEART WITHOUT ANGINA PECTORIS: Status: RESOLVED | Noted: 2025-06-09 | Resolved: 2025-06-29

## 2025-06-29 PROBLEM — J96.11 CHRONIC RESPIRATORY FAILURE WITH HYPOXIA AND HYPERCAPNIA: Status: RESOLVED | Noted: 2017-07-28 | Resolved: 2025-06-29

## 2025-06-29 PROBLEM — R79.89 ELEVATED TROPONIN: Status: ACTIVE | Noted: 2025-06-29

## 2025-06-29 PROBLEM — Z71.89 ADVANCED CARE PLANNING/COUNSELING DISCUSSION: Status: RESOLVED | Noted: 2024-01-25 | Resolved: 2025-06-29

## 2025-06-29 LAB
ABSOLUTE EOSINOPHIL (OHS): 0.56 K/UL
ABSOLUTE MONOCYTE (OHS): 0.74 K/UL (ref 0.3–1)
ABSOLUTE NEUTROPHIL COUNT (OHS): 9.43 K/UL (ref 1.8–7.7)
ALBUMIN SERPL BCP-MCNC: 3.8 G/DL (ref 3.5–5.2)
ALLENS TEST: ABNORMAL
ALP SERPL-CCNC: 97 UNIT/L (ref 40–150)
ALT SERPL W/O P-5'-P-CCNC: 12 UNIT/L (ref 10–44)
ANION GAP (OHS): 11 MMOL/L (ref 8–16)
AST SERPL-CCNC: 14 UNIT/L (ref 11–45)
BASOPHILS # BLD AUTO: 0.08 K/UL
BASOPHILS NFR BLD AUTO: 0.5 %
BILIRUB SERPL-MCNC: 0.2 MG/DL (ref 0.1–1)
BNP SERPL-MCNC: 152 PG/ML (ref 0–99)
BUN SERPL-MCNC: 6 MG/DL (ref 8–23)
CALCIUM SERPL-MCNC: 9.4 MG/DL (ref 8.7–10.5)
CHLORIDE SERPL-SCNC: 97 MMOL/L (ref 95–110)
CO2 SERPL-SCNC: 35 MMOL/L (ref 23–29)
CREAT SERPL-MCNC: 0.8 MG/DL (ref 0.5–1.4)
CTP QC/QA: YES
CTP QC/QA: YES
D DIMER PPP IA.FEU-MCNC: 0.62 MG/L FEU
DELSYS: ABNORMAL
EP: 8
ERYTHROCYTE [DISTWIDTH] IN BLOOD BY AUTOMATED COUNT: 13 % (ref 11.5–14.5)
ERYTHROCYTE [SEDIMENTATION RATE] IN BLOOD BY WESTERGREN METHOD: 12 MM/H
ERYTHROCYTE [SEDIMENTATION RATE] IN BLOOD BY WESTERGREN METHOD: 12 MM/H
ERYTHROCYTE [SEDIMENTATION RATE] IN BLOOD BY WESTERGREN METHOD: 16 MM/H
FIO2: 100
FIO2: 100
FIO2: 50
FIO2: 50
GFR SERPLBLD CREATININE-BSD FMLA CKD-EPI: >60 ML/MIN/1.73/M2
GLUCOSE SERPL-MCNC: 222 MG/DL (ref 70–110)
HCO3 UR-SCNC: 44.7 MMOL/L (ref 24–28)
HCO3 UR-SCNC: 45.1 MMOL/L (ref 24–28)
HCO3 UR-SCNC: 46.2 MMOL/L (ref 24–28)
HCO3 UR-SCNC: ABNORMAL MMOL/L
HCO3 UR-SCNC: ABNORMAL MMOL/L
HCT VFR BLD AUTO: 37.9 % (ref 40–54)
HGB BLD-MCNC: 11.3 GM/DL (ref 14–18)
IMM GRANULOCYTES # BLD AUTO: 0.06 K/UL (ref 0–0.04)
IMM GRANULOCYTES NFR BLD AUTO: 0.4 % (ref 0–0.5)
IP: 18
IP: 18
IP: 20
IP: 20
LACTATE SERPL-SCNC: 1.6 MMOL/L (ref 0.5–2.2)
LYMPHOCYTES # BLD AUTO: 4.55 K/UL (ref 1–4.8)
MAGNESIUM SERPL-MCNC: 1.9 MG/DL (ref 1.6–2.6)
MCH RBC QN AUTO: 28.7 PG (ref 27–31)
MCHC RBC AUTO-ENTMCNC: 29.8 G/DL (ref 32–36)
MCV RBC AUTO: 96 FL (ref 82–98)
MODE: ABNORMAL
NUCLEATED RBC (/100WBC) (OHS): 0 /100 WBC
PCO2 BLDA: 117.6 MMHG (ref 35–45)
PCO2 BLDA: 128.6 MMHG (ref 35–45)
PCO2 BLDA: 90 MMHG (ref 35–45)
PCO2 BLDA: >130 MMHG (ref 35–45)
PCO2 BLDA: >130 MMHG (ref 35–45)
PH SMN: 7.1 [PH] (ref 7.35–7.45)
PH SMN: 7.13 [PH] (ref 7.35–7.45)
PH SMN: 7.15 [PH] (ref 7.35–7.45)
PH SMN: 7.19 [PH] (ref 7.35–7.45)
PH SMN: 7.32 [PH] (ref 7.35–7.45)
PLATELET # BLD AUTO: 222 K/UL (ref 150–450)
PMV BLD AUTO: 10.9 FL (ref 9.2–12.9)
PO2 BLDA: 107 MMHG (ref 40–60)
PO2 BLDA: 133 MMHG (ref 40–60)
PO2 BLDA: 28 MMHG (ref 40–60)
PO2 BLDA: 33 MMHG (ref 40–60)
PO2 BLDA: 54 MMHG (ref 40–60)
POC BE: 11 MMOL/L (ref -2–2)
POC BE: 13 MMOL/L (ref -2–2)
POC BE: 16 MMOL/L (ref -2–2)
POC BE: ABNORMAL MMOL/L (ref -2–2)
POC BE: ABNORMAL MMOL/L (ref -2–2)
POC MOLECULAR INFLUENZA A AGN: NEGATIVE
POC MOLECULAR INFLUENZA B AGN: NEGATIVE
POC SATURATED O2: 32 % (ref 95–100)
POC SATURATED O2: 45 % (ref 95–100)
POC SATURATED O2: 82 % (ref 95–100)
POC SATURATED O2: ABNORMAL %
POC SATURATED O2: ABNORMAL %
POC TCO2: 49 MMOL/L (ref 24–29)
POC TCO2: ABNORMAL MMOL/L
POC TCO2: ABNORMAL MMOL/L
POCT GLUCOSE: 238 MG/DL (ref 70–110)
POTASSIUM SERPL-SCNC: 4.3 MMOL/L (ref 3.5–5.1)
PROCALCITONIN SERPL-MCNC: 0.02 NG/ML
PROT SERPL-MCNC: 8.2 GM/DL (ref 6–8.4)
RBC # BLD AUTO: 3.94 M/UL (ref 4.6–6.2)
RELATIVE EOSINOPHIL (OHS): 3.6 %
RELATIVE LYMPHOCYTE (OHS): 29.5 % (ref 18–48)
RELATIVE MONOCYTE (OHS): 4.8 % (ref 4–15)
RELATIVE NEUTROPHIL (OHS): 61.2 % (ref 38–73)
SAMPLE: ABNORMAL
SARS-COV-2 RDRP RESP QL NAA+PROBE: NEGATIVE
SITE: ABNORMAL
SODIUM SERPL-SCNC: 143 MMOL/L (ref 136–145)
TROPONIN I SERPL DL<=0.01 NG/ML-MCNC: 0.01 NG/ML
TROPONIN I SERPL DL<=0.01 NG/ML-MCNC: 0.09 NG/ML
TROPONIN I SERPL DL<=0.01 NG/ML-MCNC: 0.46 NG/ML
WBC # BLD AUTO: 15.42 K/UL (ref 3.9–12.7)

## 2025-06-29 PROCEDURE — 25000242 PHARM REV CODE 250 ALT 637 W/ HCPCS: Performed by: EMERGENCY MEDICINE

## 2025-06-29 PROCEDURE — 84484 ASSAY OF TROPONIN QUANT: CPT | Performed by: HOSPITALIST

## 2025-06-29 PROCEDURE — 5A09357 ASSISTANCE WITH RESPIRATORY VENTILATION, LESS THAN 24 CONSECUTIVE HOURS, CONTINUOUS POSITIVE AIRWAY PRESSURE: ICD-10-PCS | Performed by: EMERGENCY MEDICINE

## 2025-06-29 PROCEDURE — 63600175 PHARM REV CODE 636 W HCPCS: Performed by: HOSPITALIST

## 2025-06-29 PROCEDURE — 93010 ELECTROCARDIOGRAM REPORT: CPT | Mod: ,,, | Performed by: INTERNAL MEDICINE

## 2025-06-29 PROCEDURE — 25000003 PHARM REV CODE 250: Performed by: HOSPITALIST

## 2025-06-29 PROCEDURE — 84145 PROCALCITONIN (PCT): CPT | Performed by: EMERGENCY MEDICINE

## 2025-06-29 PROCEDURE — 99291 CRITICAL CARE FIRST HOUR: CPT | Mod: ,,, | Performed by: INTERNAL MEDICINE

## 2025-06-29 PROCEDURE — 27000221 HC OXYGEN, UP TO 24 HOURS

## 2025-06-29 PROCEDURE — 99285 EMERGENCY DEPT VISIT HI MDM: CPT | Mod: 25

## 2025-06-29 PROCEDURE — 99900031 HC PATIENT EDUCATION (STAT)

## 2025-06-29 PROCEDURE — 96365 THER/PROPH/DIAG IV INF INIT: CPT

## 2025-06-29 PROCEDURE — 94660 CPAP INITIATION&MGMT: CPT

## 2025-06-29 PROCEDURE — 82800 BLOOD PH: CPT

## 2025-06-29 PROCEDURE — 87635 SARS-COV-2 COVID-19 AMP PRB: CPT | Performed by: EMERGENCY MEDICINE

## 2025-06-29 PROCEDURE — 27000190 HC CPAP FULL FACE MASK W/VALVE

## 2025-06-29 PROCEDURE — 82803 BLOOD GASES ANY COMBINATION: CPT

## 2025-06-29 PROCEDURE — 94799 UNLISTED PULMONARY SVC/PX: CPT

## 2025-06-29 PROCEDURE — 84484 ASSAY OF TROPONIN QUANT: CPT | Performed by: INTERNAL MEDICINE

## 2025-06-29 PROCEDURE — 87502 INFLUENZA DNA AMP PROBE: CPT

## 2025-06-29 PROCEDURE — 83880 ASSAY OF NATRIURETIC PEPTIDE: CPT | Performed by: EMERGENCY MEDICINE

## 2025-06-29 PROCEDURE — 94640 AIRWAY INHALATION TREATMENT: CPT

## 2025-06-29 PROCEDURE — 94761 N-INVAS EAR/PLS OXIMETRY MLT: CPT | Mod: XB

## 2025-06-29 PROCEDURE — 85025 COMPLETE CBC W/AUTO DIFF WBC: CPT | Performed by: EMERGENCY MEDICINE

## 2025-06-29 PROCEDURE — 63600175 PHARM REV CODE 636 W HCPCS: Performed by: EMERGENCY MEDICINE

## 2025-06-29 PROCEDURE — 80053 COMPREHEN METABOLIC PANEL: CPT | Performed by: EMERGENCY MEDICINE

## 2025-06-29 PROCEDURE — 87040 BLOOD CULTURE FOR BACTERIA: CPT | Performed by: EMERGENCY MEDICINE

## 2025-06-29 PROCEDURE — 20000000 HC ICU ROOM

## 2025-06-29 PROCEDURE — 36415 COLL VENOUS BLD VENIPUNCTURE: CPT | Performed by: INTERNAL MEDICINE

## 2025-06-29 PROCEDURE — 83605 ASSAY OF LACTIC ACID: CPT | Performed by: EMERGENCY MEDICINE

## 2025-06-29 PROCEDURE — 96375 TX/PRO/DX INJ NEW DRUG ADDON: CPT

## 2025-06-29 PROCEDURE — 99900035 HC TECH TIME PER 15 MIN (STAT)

## 2025-06-29 PROCEDURE — 93005 ELECTROCARDIOGRAM TRACING: CPT

## 2025-06-29 PROCEDURE — S4991 NICOTINE PATCH NONLEGEND: HCPCS | Performed by: HOSPITALIST

## 2025-06-29 PROCEDURE — 85379 FIBRIN DEGRADATION QUANT: CPT | Performed by: EMERGENCY MEDICINE

## 2025-06-29 PROCEDURE — 84484 ASSAY OF TROPONIN QUANT: CPT | Performed by: EMERGENCY MEDICINE

## 2025-06-29 PROCEDURE — 83735 ASSAY OF MAGNESIUM: CPT | Performed by: HOSPITALIST

## 2025-06-29 RX ORDER — IPRATROPIUM BROMIDE AND ALBUTEROL SULFATE 2.5; .5 MG/3ML; MG/3ML
3 SOLUTION RESPIRATORY (INHALATION) EVERY 4 HOURS PRN
Status: DISCONTINUED | OUTPATIENT
Start: 2025-06-29 | End: 2025-06-30

## 2025-06-29 RX ORDER — ONDANSETRON HYDROCHLORIDE 2 MG/ML
8 INJECTION, SOLUTION INTRAVENOUS EVERY 6 HOURS PRN
Status: DISCONTINUED | OUTPATIENT
Start: 2025-06-29 | End: 2025-06-30 | Stop reason: HOSPADM

## 2025-06-29 RX ORDER — ONDANSETRON 4 MG/1
TABLET, ORALLY DISINTEGRATING ORAL
COMMUNITY
Start: 2025-06-24

## 2025-06-29 RX ORDER — SUCCINYLCHOLINE CHLORIDE 20 MG/ML
150 INJECTION INTRAMUSCULAR; INTRAVENOUS
Status: DISCONTINUED | OUTPATIENT
Start: 2025-06-29 | End: 2025-06-29

## 2025-06-29 RX ORDER — SODIUM CHLORIDE 0.9 % (FLUSH) 0.9 %
10 SYRINGE (ML) INJECTION
Status: DISCONTINUED | OUTPATIENT
Start: 2025-06-29 | End: 2025-06-30 | Stop reason: HOSPADM

## 2025-06-29 RX ORDER — IPRATROPIUM BROMIDE AND ALBUTEROL SULFATE 2.5; .5 MG/3ML; MG/3ML
3 SOLUTION RESPIRATORY (INHALATION)
Status: COMPLETED | OUTPATIENT
Start: 2025-06-29 | End: 2025-06-29

## 2025-06-29 RX ORDER — GLUCAGON 1 MG
1 KIT INJECTION
Status: DISCONTINUED | OUTPATIENT
Start: 2025-06-29 | End: 2025-06-30 | Stop reason: HOSPADM

## 2025-06-29 RX ORDER — FUROSEMIDE 10 MG/ML
40 INJECTION INTRAMUSCULAR; INTRAVENOUS 2 TIMES DAILY
Status: DISCONTINUED | OUTPATIENT
Start: 2025-06-29 | End: 2025-06-30 | Stop reason: HOSPADM

## 2025-06-29 RX ORDER — PROCHLORPERAZINE EDISYLATE 5 MG/ML
10 INJECTION INTRAMUSCULAR; INTRAVENOUS EVERY 6 HOURS PRN
Status: DISCONTINUED | OUTPATIENT
Start: 2025-06-29 | End: 2025-06-30 | Stop reason: HOSPADM

## 2025-06-29 RX ORDER — SENNOSIDES 8.6 MG/1
1 TABLET ORAL DAILY
COMMUNITY
Start: 2025-06-24

## 2025-06-29 RX ORDER — NICARDIPINE HYDROCHLORIDE 0.2 MG/ML
INJECTION INTRAVENOUS
Status: DISPENSED
Start: 2025-06-29 | End: 2025-06-29

## 2025-06-29 RX ORDER — DEXAMETHASONE SODIUM PHOSPHATE 10 MG/ML
6 INJECTION INTRAMUSCULAR; INTRAVENOUS
Status: COMPLETED | OUTPATIENT
Start: 2025-06-29 | End: 2025-06-29

## 2025-06-29 RX ORDER — CLOPIDOGREL BISULFATE 75 MG/1
75 TABLET ORAL DAILY
Status: DISCONTINUED | OUTPATIENT
Start: 2025-06-29 | End: 2025-06-30 | Stop reason: HOSPADM

## 2025-06-29 RX ORDER — MORPHINE SULFATE 20 MG/ML
SOLUTION ORAL
COMMUNITY
Start: 2025-06-14

## 2025-06-29 RX ORDER — AMOXICILLIN 250 MG
2 CAPSULE ORAL 2 TIMES DAILY PRN
Status: DISCONTINUED | OUTPATIENT
Start: 2025-06-29 | End: 2025-06-30 | Stop reason: HOSPADM

## 2025-06-29 RX ORDER — LORAZEPAM 0.5 MG/1
TABLET ORAL
COMMUNITY
Start: 2025-06-24

## 2025-06-29 RX ORDER — MUPIROCIN 20 MG/G
OINTMENT TOPICAL 2 TIMES DAILY
Status: DISCONTINUED | OUTPATIENT
Start: 2025-06-29 | End: 2025-06-30 | Stop reason: HOSPADM

## 2025-06-29 RX ORDER — NICARDIPINE HYDROCHLORIDE 0.2 MG/ML
0-15 INJECTION INTRAVENOUS CONTINUOUS
Status: DISCONTINUED | OUTPATIENT
Start: 2025-06-29 | End: 2025-06-29

## 2025-06-29 RX ORDER — ATORVASTATIN CALCIUM 40 MG/1
40 TABLET, FILM COATED ORAL NIGHTLY
Status: DISCONTINUED | OUTPATIENT
Start: 2025-06-29 | End: 2025-06-30 | Stop reason: HOSPADM

## 2025-06-29 RX ORDER — IBUPROFEN 200 MG
1 TABLET ORAL DAILY
Status: DISCONTINUED | OUTPATIENT
Start: 2025-06-29 | End: 2025-06-30 | Stop reason: HOSPADM

## 2025-06-29 RX ORDER — METOPROLOL SUCCINATE 25 MG/1
25 TABLET, EXTENDED RELEASE ORAL DAILY
Status: DISCONTINUED | OUTPATIENT
Start: 2025-06-29 | End: 2025-06-30 | Stop reason: HOSPADM

## 2025-06-29 RX ORDER — PROPOFOL 10 MG/ML
0-50 INJECTION, EMULSION INTRAVENOUS CONTINUOUS
Status: DISCONTINUED | OUTPATIENT
Start: 2025-06-29 | End: 2025-06-29

## 2025-06-29 RX ORDER — ACETAMINOPHEN 325 MG/1
650 TABLET ORAL EVERY 6 HOURS PRN
Status: DISCONTINUED | OUTPATIENT
Start: 2025-06-29 | End: 2025-06-30 | Stop reason: HOSPADM

## 2025-06-29 RX ORDER — HYDRALAZINE HYDROCHLORIDE 20 MG/ML
10 INJECTION INTRAMUSCULAR; INTRAVENOUS EVERY 6 HOURS PRN
Status: DISCONTINUED | OUTPATIENT
Start: 2025-06-29 | End: 2025-06-30 | Stop reason: HOSPADM

## 2025-06-29 RX ORDER — SPIRONOLACTONE 25 MG/1
25 TABLET ORAL DAILY
Status: DISCONTINUED | OUTPATIENT
Start: 2025-06-29 | End: 2025-06-30 | Stop reason: HOSPADM

## 2025-06-29 RX ORDER — ASPIRIN 81 MG/1
81 TABLET ORAL DAILY
Status: DISCONTINUED | OUTPATIENT
Start: 2025-06-29 | End: 2025-06-30 | Stop reason: HOSPADM

## 2025-06-29 RX ORDER — INSULIN ASPART 100 [IU]/ML
0-5 INJECTION, SOLUTION INTRAVENOUS; SUBCUTANEOUS
Status: DISCONTINUED | OUTPATIENT
Start: 2025-06-29 | End: 2025-06-30 | Stop reason: HOSPADM

## 2025-06-29 RX ORDER — ENOXAPARIN SODIUM 100 MG/ML
40 INJECTION SUBCUTANEOUS EVERY 24 HOURS
Status: DISCONTINUED | OUTPATIENT
Start: 2025-06-29 | End: 2025-06-30 | Stop reason: HOSPADM

## 2025-06-29 RX ADMIN — IPRATROPIUM BROMIDE AND ALBUTEROL SULFATE 3 ML: .5; 3 SOLUTION RESPIRATORY (INHALATION) at 09:06

## 2025-06-29 RX ADMIN — INSULIN ASPART 2 UNITS: 100 INJECTION, SOLUTION INTRAVENOUS; SUBCUTANEOUS at 04:06

## 2025-06-29 RX ADMIN — ENOXAPARIN SODIUM 40 MG: 40 INJECTION SUBCUTANEOUS at 04:06

## 2025-06-29 RX ADMIN — FUROSEMIDE 40 MG: 10 INJECTION, SOLUTION INTRAVENOUS at 09:06

## 2025-06-29 RX ADMIN — MUPIROCIN: 20 OINTMENT TOPICAL at 08:06

## 2025-06-29 RX ADMIN — FUROSEMIDE 40 MG: 10 INJECTION, SOLUTION INTRAVENOUS at 11:06

## 2025-06-29 RX ADMIN — INSULIN ASPART 1 UNITS: 100 INJECTION, SOLUTION INTRAVENOUS; SUBCUTANEOUS at 09:06

## 2025-06-29 RX ADMIN — NICARDIPINE HYDROCHLORIDE 2.5 MG/HR: 0.2 INJECTION, SOLUTION INTRAVENOUS at 09:06

## 2025-06-29 RX ADMIN — DEXAMETHASONE SODIUM PHOSPHATE 6 MG: 10 INJECTION INTRAMUSCULAR; INTRAVENOUS at 10:06

## 2025-06-29 RX ADMIN — Medication 1 PATCH: at 11:06

## 2025-06-29 RX ADMIN — ATORVASTATIN CALCIUM 40 MG: 40 TABLET, FILM COATED ORAL at 08:06

## 2025-06-29 NOTE — HPI
Mr Abelardo Irene Jr. Is a 69 y.o. man with CHF (EF 20-25%, G2DD), COPD, chronic respiratory failure on 4L home O2, CAD who presented with shortness of breath. Wife and son assist with history as he is on continuous BiPAP.  He states that he was feeling well until yesterday evening.  He becomes short of breath with activity.  This shortness of breath got especially worse this morning.  His oxygen was increased by family from home 4 L to 8 L nasal cannula without improvement.  He denies any fever.  His cough is at baseline and has not changed.  He does not feel like he has swelling.  He says that he takes his medications sometimes.  He is smoking 1/2 pack per day.  His wife says that he eats junk. He does use his home oxygen at all times.  He does not have a home BiPAP or CPAP.    In the ED he was hypertensive blood pressure 191/120.  He had hypoxic hypercapnic respiratory failure and was placed on continuous BiPAP.  There were discussions of intubation, but he improved on BiPAP alone.  He is currently awake and alert, following commands, and able to give full history.  He states in front of his wife and son that he would not want a ventilator for any reason even if he would die without it.    Admitted to the ICU for management of hypertensive emergency and acute on chronic hypoxic hypercapnic respiratory failure.

## 2025-06-29 NOTE — ASSESSMENT & PLAN NOTE
Seen by Dr. Boyer for copd with fev1 66%  Pft in 2017 with boderline obstructive physiology  Active smoker  No evidence of copd exacerbation per exam.

## 2025-06-29 NOTE — ASSESSMENT & PLAN NOTE
Dangers of cigarette smoking were reviewed with patient in detail. Patient was Counseled for 3-10 minutes. Nicotine replacement options were discussed. Nicotine replacement was discussed- prescribed  - currently smoking 1/2 pack per day per his report

## 2025-06-29 NOTE — ASSESSMENT & PLAN NOTE
Most likely due to hypertension causing increased afterload  Clinically improved with bp optimization, diuretic and nippv

## 2025-06-29 NOTE — ASSESSMENT & PLAN NOTE
Patient's blood pressure range in the last 24 hours was: BP  Min: 118/68  Max: 191/120.The patient's inpatient anti-hypertensive regimen is listed below:  Current Antihypertensives  niCARdipine (CARDENE) 40 mg/200 mL (0.2 mg/mL) infusion, ,   metoprolol succinate (TOPROL-XL) 24 hr tablet 25 mg, Daily, Oral  hydrALAZINE injection 10 mg, Every 6 hours PRN, Intravenous  spironolactone tablet 25 mg, Daily, Oral  furosemide injection 40 mg, 2 times daily, Intravenous    Plan  - BP is uncontrolled, will adjust as follows:  See hypertensive emergency

## 2025-06-29 NOTE — ASSESSMENT & PLAN NOTE
No overt evidence of volume overload per cxr or clinical exam  GDMT with lasix, BB, entrestro and spironolactone

## 2025-06-29 NOTE — ASSESSMENT & PLAN NOTE
Advance Care Planning     Date: 06/29/2025  - he is on home hospice  - admitted with acute on chronic hypoxic hypercapnic respiratory failure with severe hypercapnia concerning for needing intubation.  However, he is currently awake and alert and able to make his own decisions.  - his wife and his son are at bedside  - discussed that if he worsens the next step would be ventilator.  Discussed that we would only use a ventilator if he would die without ventilator use.  - he states very clearly in front of his wife and his son that he would not want a ventilator at any time for any reason  - I told him that this is a DNR code status.  He understands  - time spent discussing code status equals 5 minutes

## 2025-06-29 NOTE — PHARMACY MED REC
"    Admission Medication History     The home medication history was taken by Alina Cevallos CPhT.      You may go to "Admission" then "Reconcile Home Medications" tabs to review and/or act upon these items.     The home medication list has been updated by the Pharmacy department.   Please read ALL comments highlighted in yellow.   Please address this information as you see fit.    Feel free to contact us if you have any questions or require assistance.          Medications listed below were obtained from: Patient/family and Analytic software- [employer]  (Not in a hospital admission)          Alina Cevallos CPhT.  [phone]              .        "

## 2025-06-29 NOTE — HPI
Patient is 69 y.o. male  has a past medical history of Carotid artery disease, CHF (congestive heart failure), COPD (chronic obstructive pulmonary disease), Coronary artery disease, Elevated cholesterol, and Hypertension. presented to [facility] on 6/19/25 with acute  sob.  +h/o tobacco abuse and was seeing Dr. Boyer for copd(FEV1 66%).      In ED, patient was hypertensive 190/120 with severe hypercapnic respiratory failure.  Initial  abg 7.1/>130/107/12.  Patient was placed no bipap with improvement wob.  Patient was admitted to ICU for hypertensive emergency.  Pulm/crit was consulted for further inputs.    During my initial evaluation, patient was alert and interactive.  Bp improved 120/60.  RR~20s.  Sating well  with bipap and 40% FiO2.  No chest pain. No orthopnea.  No pnd.  No fever/chill.

## 2025-06-29 NOTE — ASSESSMENT & PLAN NOTE
Body mass index is 32.21 kg/m². Morbid obesity complicates all aspects of disease management from diagnostic modalities to treatment. Weight loss encouraged and health benefits explained to patient.

## 2025-06-29 NOTE — PLAN OF CARE
Patient received from ER, on BiPAP monitor, transfers self to bed from stretcher, awake, alert and oriented.     Tolerates diet on nasal cannula for 30 minutes after eating and placed back on BiPAP mask.   Voids per urinal    6 beat run of VT, resolves by itself, patient denies feeling heart flutter.

## 2025-06-29 NOTE — H&P
[facility] Medicine  History & Physical    Patient Name: Abelardo Irene Jr.  MRN: [id number]  Patient Class: IP- Inpatient  Admission Date: 6/29/2025  Attending Physician: Aissatou Mills MD   Primary Care Provider: Rolando Funes MD         Patient information was obtained from patient, spouse/SO, relative(s), past medical records, and ER records.     Subjective:     Principal Problem:Acute on chronic respiratory failure with hypoxia and hypercapnia    Chief Complaint:   Chief Complaint   Patient presents with    Shortness of Breath     Pt BIB EMS, c/o sudden onset of SOB. Pt has hx of CHF and COPD. Pt on hospice. Per EMS, pt 77% RA. Pt given duoneb and then placed on CPAP. Initial /110, on scene.         HPI: Mr Abelardo Irene Jr. Is a 69 y.o. man with CHF (EF 20-25%, G2DD), COPD, chronic respiratory failure on 4L home O2, CAD who presented with shortness of breath. Wife and son assist with history as he is on continuous BiPAP.  He states that he was feeling well until yesterday evening.  He becomes short of breath with activity.  This shortness of breath got especially worse this morning.  His oxygen was increased by family from home 4 L to 8 L nasal cannula without improvement.  He denies any fever.  His cough is at baseline and has not changed.  He does not feel like he has swelling.  He says that he takes his medications sometimes.  He is smoking 1/2 pack per day.  His wife says that he eats junk. He does use his home oxygen at all times.  He does not have a home BiPAP or CPAP.    In the ED he was hypertensive blood pressure 191/120.  He had hypoxic hypercapnic respiratory failure and was placed on continuous BiPAP.  There were discussions of intubation, but he improved on BiPAP alone.  He is currently awake and alert, following commands, and able to give full history.  He states in front of his wife and son that he would not want a ventilator for any reason even if he would  die without it.    Admitted to the ICU for management of hypertensive emergency and acute on chronic hypoxic hypercapnic respiratory failure.      Past Medical History:   Diagnosis Date    Carotid artery disease     CHF (congestive heart failure)     COPD (chronic obstructive pulmonary disease)     Coronary artery disease     Elevated cholesterol     on medication    Hypertension        Past Surgical History:   Procedure Laterality Date    CARDIAC SURGERY      coronary stent placed    CORONARY STENT PLACEMENT         Review of patient's allergies indicates:   Allergen Reactions    Contrast media Shortness Of Breath, Itching and Anxiety     Patient states that he had a bad reaction, anxiety , shortness of breath, itching .     Iodinated contrast media Shortness Of Breath, Itching and Anxiety     Patient states that he had a bad reaction, anxiety , shortness of breath, itching .            No current facility-administered medications on file prior to encounter.     Current Outpatient Medications on File Prior to Encounter   Medication Sig    albuterol (VENTOLIN HFA) 90 mcg/actuation inhaler Inhale 2 puffs into the lungs every 6 (six) hours as needed for Wheezing or Shortness of Breath. Rescue    albuterol-ipratropium (DUO-NEB) 2.5 mg-0.5 mg/3 mL nebulizer solution Take 3 mLs by nebulization every 6 (six) hours while awake. Rescue    aspirin (ECOTRIN) 81 MG EC tablet Take 1 tablet (81 mg total) by mouth once daily.    aspirin (ECOTRIN) 81 MG EC tablet Take 1 tablet (81 mg total) by mouth once daily.    atorvastatin (LIPITOR) 40 MG tablet Take 1 tablet (40 mg total) by mouth every evening.    budesonide-formoterol 160-4.5 mcg (SYMBICORT) 160-4.5 mcg/actuation HFAA Inhale 2 puffs into the lungs every 12 (twelve) hours. Controller    clopidogreL (PLAVIX) 75 mg tablet Take 1 tablet (75 mg total) by mouth once daily.    clopidogreL (PLAVIX) 75 mg tablet Take 1 tablet (75 mg total) by mouth once daily.    furosemide (LASIX)  40 MG tablet Take 1 tablet (40 mg total) by mouth 2 (two) times daily.    metoprolol succinate (TOPROL-XL) 25 MG 24 hr tablet Take 1 tablet (25 mg total) by mouth once daily.     Family History       Problem Relation (Age of Onset)    Cancer Mother    No Known Problems Father          Tobacco Use    Smoking status: Every Day     Current packs/day: 0.00     Average packs/day: 2.0 packs/day for 45.0 years (90.0 ttl pk-yrs)     Types: Cigarettes     Start date: 1972     Last attempt to quit: 2017     Years since quittin.6    Smokeless tobacco: Never   Substance and Sexual Activity    Alcohol use: Yes     Comment: socially    Drug use: No    Sexual activity: Yes     Partners: Female     Birth control/protection: None     Review of Systems   Constitutional:  Negative for chills and fever.   Respiratory:  Positive for cough and shortness of breath. Negative for chest tightness and wheezing.    Cardiovascular:  Negative for chest pain and leg swelling.   Gastrointestinal:  Positive for abdominal distention. Negative for abdominal pain, constipation, diarrhea, nausea and vomiting.   Genitourinary:  Negative for difficulty urinating.   Musculoskeletal:  Negative for arthralgias and myalgias.   Skin:  Negative for rash.   Neurological:  Negative for weakness and numbness.   Psychiatric/Behavioral:  Negative for confusion.      Objective:     Vital Signs (Most Recent):  Temp: 97 °F (36.1 °C) (25 1047)  Pulse: 96 (25 1107)  Resp: (!) 25 (25 1107)  BP: (!) 146/80 (25 1100)  SpO2: 96 % (25 1108) Vital Signs (24h Range):  Temp:  [97 °F (36.1 °C)] 97 °F (36.1 °C)  Pulse:  [] 96  Resp:  [20-33] 25  SpO2:  [94 %-100 %] 96 %  BP: (118-191)/() 146/80     Weight: 87.8 kg (193 lb 9 oz)  Body mass index is 32.21 kg/m².     Physical Exam  Vitals and nursing note reviewed.   Constitutional:       General: He is not in acute distress.     Appearance: He is obese. He is ill-appearing. He  is not toxic-appearing or diaphoretic.   HENT:      Head: Normocephalic and atraumatic.   Cardiovascular:      Rate and Rhythm: Normal rate and regular rhythm.   Pulmonary:      Effort: Pulmonary effort is normal. No wheezing or rales. Diminished bilaterally.      Comments: On BiPAP  Abdominal:      General: Bowel sounds are normal. There is distension (Not tense).      Palpations: Abdomen is soft. There is no mass.      Tenderness: There is no abdominal tenderness. There is no guarding.      Hernia: A hernia (Umbilical, reducible) is present.   Musculoskeletal:      Right lower leg: No edema.      Left lower leg: No edema.   Skin:     General: Skin is warm and dry.   Neurological:      Mental Status: He is alert and oriented to person, place, and time. Mental status is at baseline.                Significant Labs: All pertinent labs within the past 24 hours have been reviewed.    Significant Imaging: I have reviewed all pertinent imaging results/findings within the past 24 hours.  Assessment/Plan:     Assessment & Plan  Acute on chronic respiratory failure with hypoxia and hypercapnia  Patient admitted with Hypercapnic and Hypoxic which is Acute on Chronic.  he is on home oxygen at 4 LPM. Signs/symptoms of respiratory failure include- tachypnea, increased work of breathing, respiratory distress, and use of accessory muscles present on admission.   - Labs and images were reviewed. Patient Has recent ABG, which has been reviewed..   - Supplemental oxygen was provided and noted- NIPPV- BiPAP  % FiO2   - Respiratory failure is due to- CHF   - treatment:  Blood pressure control and diuresis  - doubt COPD exacerbation with no wheezing, no change in sputum  - pulmonary consulted  - DNR code status  - repeat VBG at noon    Benign essential hypertension  Patient's blood pressure range in the last 24 hours was: BP  Min: 118/68  Max: 191/120.The patient's inpatient anti-hypertensive regimen is listed below:  Current  Antihypertensives  niCARdipine (CARDENE) 40 mg/200 mL (0.2 mg/mL) infusion, ,   metoprolol succinate (TOPROL-XL) 24 hr tablet 25 mg, Daily, Oral  hydrALAZINE injection 10 mg, Every 6 hours PRN, Intravenous  spironolactone tablet 25 mg, Daily, Oral  furosemide injection 40 mg, 2 times daily, Intravenous    Plan  - BP is uncontrolled, will adjust as follows:  See hypertensive emergency    Tobacco dependency  Dangers of cigarette smoking were reviewed with patient in detail. Patient was Counseled for 3-10 minutes. Nicotine replacement options were discussed. Nicotine replacement was discussed- prescribed  - currently smoking 1/2 pack per day per his report  Dyslipidemia  - Last lipid panel was in 2021  - will not repeat at this point as he is on hospice care  - continue statin    Coronary artery disease involving native coronary artery of native heart without angina pectoris  Patient with known CAD s/p stent placement, which is controlled Will continue ASA, Plavix, and Statin and monitor for S/Sx of angina/ACS. Continue to monitor on telemetry.   - troponin normal on admission  - EKG nonischemic  - trend troponins  - he has a prior angiogram showing multivessel disease.  Per prior cardiology notes, he is not a candidate for revascularization because of his non adherence to medications  Class 1 obesity with serious comorbidity in adult  Body mass index is 32.21 kg/m². Morbid obesity complicates all aspects of disease management from diagnostic modalities to treatment. Weight loss encouraged and health benefits explained to patient.       Type 2 diabetes mellitus, without long-term current use of insulin  A1c:   Lab Results   Component Value Date    HGBA1C 7.6 (H) 05/27/2025     Meds: SSI PRN to maintain goal 140-180  accuchecks, hypoglycemic protocol  - next Rx= Jardiance for both heart failure and diabetes      Normocytic anemia  Anemia is likely due to suspect chronic disease. Most recent hemoglobin and hematocrit are  listed below.  Recent Labs     06/29/25  0910   HGB 11.3*   HCT 37.9*     Plan  - Monitor serial CBC: Daily  - Transfuse PRBC if patient becomes hemodynamically unstable, symptomatic or H/H drops below 7/21.  - Patient has not received any PRBC transfusions to date  - Patient's anemia is currently stable  - will hold on further workup as he is on hospice care  COPD (chronic obstructive pulmonary disease)  Patient's COPD is controlled currently.  Patient is currently off COPD Pathway. Continue p.r.n. inhalers and monitor respiratory status closely.   - he has no signs of a COPD exacerbation  - COVID and flu were negative  - p.r.n. nebs  - tobacco cessation counseling  Acute on chronic combined systolic and diastolic congestive heart failure  Patient has Combined Systolic and Diastolic heart failure that is Acute on chronic. On presentation their CHF was decompensated. Evidence of decompensated CHF on presentation includes: shortness of breath. The etiology of their decompensation is likely dietary indiscretion and medication non-compliance. Most recent BNP and echo results are listed below.  Recent Labs     06/29/25  0910   *   - this BNP is improved from last hospitalization when it was greater than 1000    Echo 6/9/2025    Left Ventricle: The left ventricle is moderately dilated. There is severely reduced systolic function with a visually estimated ejection fraction of 20 - 25%. Grade II diastolic dysfunction.    Right Ventricle: The right ventricle is mildly dilated    Left Atrium: The left atrium is moderately dilated    Right Atrium: The right atrium is mildly dilated .    Mitral Valve: There is mild regurgitation.    Tricuspid Valve: There is mild regurgitation.    Pulmonary Artery: The estimated pulmonary artery systolic pressure is 17 mmHg.    IVC/SVC: Normal venous pressure at 3 mmHg.    Current Heart Failure Medications  metoprolol succinate (TOPROL-XL) 24 hr tablet 25 mg, Daily, Oral  hydrALAZINE  injection 10 mg, Every 6 hours PRN, Intravenous  sacubitriL-valsartan 24-26 mg per tablet 1 tablet, 2 times daily, Oral  spironolactone tablet 25 mg, Daily, Oral  furosemide injection 40 mg, 2 times daily, Intravenous    Plan  - Monitor strict I&Os and daily weights.    - Place on telemetry  - Low sodium diet when he is off the BiPAP  - Place on fluid restriction of 1.5 L.   - Cardiology has not been consulted  - continue home metoprolol.  Add Entresto and Aldactone for systolic heart failure.  - no AICD in place.  With DNR code status and current hospice care, will not pursue this  - will continue another day of IV Lasix then likely change to oral tomorrow  - The patient's volume status is worsening as indicated by shortness of breath. Will continue current treatment    Leukocytosis  - WBC 15 upon admission  - afebrile and normal procalcitonin  - will hold on antibiotics at this point  - flu and COVID negative    Advance care planning  Advance Care Planning     Date: 06/29/2025  - he is on home hospice  - admitted with acute on chronic hypoxic hypercapnic respiratory failure with severe hypercapnia concerning for needing intubation.  However, he is currently awake and alert and able to make his own decisions.  - his wife and his son are at bedside  - discussed that if he worsens the next step would be ventilator.  Discussed that we would only use a ventilator if he would die without ventilator use.  - he states very clearly in front of his wife and his son that he would not want a ventilator at any time for any reason  - I told him that this is a DNR code status.  He understands  - time spent discussing code status equals 5 minutes         Hypertensive emergency  Patient has a current diagnosis of Hypertensive emergency with end organ damage evidenced by acute heart failure which is uncontrolled.  Latest blood pressure and vitals reviewed-   Temp:  [97 °F (36.1 °C)]   Pulse:  []   Resp:  [20-33]   BP:  (118-191)/()   SpO2:  [94 %-100 %] .   - previously on Cardene in the ED for /120 upon admission  - blood pressure now improved and off Cardene drip    Home meds for hypertension were reviewed and noted below.   Hypertension Medications              furosemide (LASIX) 40 MG tablet Take 1 tablet (40 mg total) by mouth 2 (two) times daily.    metoprolol succinate (TOPROL-XL) 25 MG 24 hr tablet Take 1 tablet (25 mg total) by mouth once daily.          Medication adjustment for hospital antihypertensives is as follows  - continue home metoprolol  - change Lasix to IV for today  - add Entresto and spironolactone for systolic heart failure    Will aim for controlled BP reduction by medications noted above. Monitor and mitigate end organ damage as indicated.  VTE Risk Mitigation (From admission, onward)           Ordered     enoxaparin injection 40 mg  Daily         06/29/25 1049     Place KATEY hose  Until discontinued         06/29/25 1049     IP VTE HIGH RISK PATIENT  Once         06/29/25 1049     Place sequential compression device  Until discontinued         06/29/25 1049                        Critical care time spent on the evaluation and treatment of severe organ dysfunction, review of pertinent labs and imaging studies, discussions with consulting providers and discussions with patient/family: 45 minutes         Aissatou Mills MD  Department of Hospital Medicine  [facility] [place] - Emergency Dept

## 2025-06-29 NOTE — EICU
Virtual ICU Admission    Admit Date: 2025  LOS: 0  Code Status: DNR   : 1955  Bed: W274/W274 A:     Diagnosis: Acute on chronic respiratory failure with hypoxia and hypercapnia    Patient  has a past medical history of Carotid artery disease, CHF (congestive heart failure), COPD (chronic obstructive pulmonary disease), Coronary artery disease, Elevated cholesterol, and Hypertension.    Last VS: /62   Pulse 68   Temp 97 °F (36.1 °C) (Axillary)   Resp 17   Wt 87.8 kg (193 lb 9 oz)   SpO2 100%   BMI 32.21 kg/m²       VICU Review  VICU nurse assessment :  [place] completed, LDA documentation reconciliation completed, Skin care/wounds LDA reconciliation, and VTE prophylaxis review  Nursing orders placed : IP SIXTO Peripheral IV Access           no

## 2025-06-29 NOTE — ED TRIAGE NOTES
Pt to ED from home with c/o shortness of breath which presented 30 min PTA. Per EMS, pt's family states pt wears 4 liters of continuous oxygen at home. When pt's family noticed pt becoming short of breath, they increased pt's O2 to 10 liters. Pt is currently on hospice, hx of CHF and COPD.

## 2025-06-29 NOTE — ASSESSMENT & PLAN NOTE
Patient's COPD is controlled currently.  Patient is currently off COPD Pathway. Continue p.r.n. inhalers and monitor respiratory status closely.   - he has no signs of a COPD exacerbation  - COVID and flu were negative  - p.r.n. nebs  - tobacco cessation counseling

## 2025-06-29 NOTE — SUBJECTIVE & OBJECTIVE
Past Medical History:   Diagnosis Date    Carotid artery disease     CHF (congestive heart failure)     COPD (chronic obstructive pulmonary disease)     Coronary artery disease     Elevated cholesterol     on medication    Hypertension        Past Surgical History:   Procedure Laterality Date    CARDIAC SURGERY      coronary stent placed    CORONARY STENT PLACEMENT         Review of patient's allergies indicates:   Allergen Reactions    Contrast media Shortness Of Breath, Itching and Anxiety     Patient states that he had a bad reaction, anxiety , shortness of breath, itching .     Iodinated contrast media Shortness Of Breath, Itching and Anxiety     Patient states that he had a bad reaction, anxiety , shortness of breath, itching .            No current facility-administered medications on file prior to encounter.     Current Outpatient Medications on File Prior to Encounter   Medication Sig    albuterol (VENTOLIN HFA) 90 mcg/actuation inhaler Inhale 2 puffs into the lungs every 6 (six) hours as needed for Wheezing or Shortness of Breath. Rescue    albuterol-ipratropium (DUO-NEB) 2.5 mg-0.5 mg/3 mL nebulizer solution Take 3 mLs by nebulization every 6 (six) hours while awake. Rescue    aspirin (ECOTRIN) 81 MG EC tablet Take 1 tablet (81 mg total) by mouth once daily.    aspirin (ECOTRIN) 81 MG EC tablet Take 1 tablet (81 mg total) by mouth once daily.    atorvastatin (LIPITOR) 40 MG tablet Take 1 tablet (40 mg total) by mouth every evening.    budesonide-formoterol 160-4.5 mcg (SYMBICORT) 160-4.5 mcg/actuation HFAA Inhale 2 puffs into the lungs every 12 (twelve) hours. Controller    clopidogreL (PLAVIX) 75 mg tablet Take 1 tablet (75 mg total) by mouth once daily.    clopidogreL (PLAVIX) 75 mg tablet Take 1 tablet (75 mg total) by mouth once daily.    furosemide (LASIX) 40 MG tablet Take 1 tablet (40 mg total) by mouth 2 (two) times daily.    metoprolol succinate (TOPROL-XL) 25 MG 24 hr tablet Take 1 tablet (25 mg  total) by mouth once daily.     Family History       Problem Relation (Age of Onset)    Cancer Mother    No Known Problems Father          Tobacco Use    Smoking status: Every Day     Current packs/day: 0.00     Average packs/day: 2.0 packs/day for 45.0 years (90.0 ttl pk-yrs)     Types: Cigarettes     Start date: 1972     Last attempt to quit: 2017     Years since quittin.6    Smokeless tobacco: Never   Substance and Sexual Activity    Alcohol use: Yes     Comment: socially    Drug use: No    Sexual activity: Yes     Partners: Female     Birth control/protection: None     Review of Systems   Constitutional:  Negative for chills and fever.   Respiratory:  Positive for cough and shortness of breath. Negative for chest tightness and wheezing.    Cardiovascular:  Negative for chest pain and leg swelling.   Gastrointestinal:  Positive for abdominal distention. Negative for abdominal pain, constipation, diarrhea, nausea and vomiting.   Genitourinary:  Negative for difficulty urinating.   Musculoskeletal:  Negative for arthralgias and myalgias.   Skin:  Negative for rash.   Neurological:  Negative for weakness and numbness.   Psychiatric/Behavioral:  Negative for confusion.      Objective:     Vital Signs (Most Recent):  Temp: 97 °F (36.1 °C) (25 1047)  Pulse: 96 (25 1107)  Resp: (!) 25 (25 1107)  BP: (!) 146/80 (25 1100)  SpO2: 96 % (25 1108) Vital Signs (24h Range):  Temp:  [97 °F (36.1 °C)] 97 °F (36.1 °C)  Pulse:  [] 96  Resp:  [20-33] 25  SpO2:  [94 %-100 %] 96 %  BP: (118-191)/() 146/80     Weight: 87.8 kg (193 lb 9 oz)  Body mass index is 32.21 kg/m².     Physical Exam  Vitals and nursing note reviewed.   Constitutional:       General: He is not in acute distress.     Appearance: He is obese. He is ill-appearing. He is not toxic-appearing or diaphoretic.   HENT:      Head: Normocephalic and atraumatic.   Cardiovascular:      Rate and Rhythm: Normal rate and  regular rhythm.   Pulmonary:      Effort: Pulmonary effort is normal.      Comments: On BiPAP  Abdominal:      General: Bowel sounds are normal. There is distension (Not tense).      Palpations: Abdomen is soft. There is no mass.      Tenderness: There is no abdominal tenderness. There is no guarding.      Hernia: A hernia (Umbilical, reducible) is present.   Musculoskeletal:      Right lower leg: No edema.      Left lower leg: No edema.   Skin:     General: Skin is warm and dry.   Neurological:      Mental Status: He is alert and oriented to person, place, and time. Mental status is at baseline.                Significant Labs: All pertinent labs within the past 24 hours have been reviewed.    Significant Imaging: I have reviewed all pertinent imaging results/findings within the past 24 hours.

## 2025-06-29 NOTE — ED NOTES
Pt awake and visiting with family at bedside. Appears to feel much better at this time. Respirations much easier. BiPAP remains in place.

## 2025-06-29 NOTE — ASSESSMENT & PLAN NOTE
Patient has a current diagnosis of Hypertensive emergency with end organ damage evidenced by acute heart failure which is uncontrolled.  Latest blood pressure and vitals reviewed-   Temp:  [97 °F (36.1 °C)]   Pulse:  []   Resp:  [20-33]   BP: (118-191)/()   SpO2:  [94 %-100 %] .   - previously on Cardene in the ED for /120 upon admission  - blood pressure now improved and off Cardene drip    Home meds for hypertension were reviewed and noted below.   Hypertension Medications              furosemide (LASIX) 40 MG tablet Take 1 tablet (40 mg total) by mouth 2 (two) times daily.    metoprolol succinate (TOPROL-XL) 25 MG 24 hr tablet Take 1 tablet (25 mg total) by mouth once daily.          Medication adjustment for hospital antihypertensives is as follows  - continue home metoprolol  - change Lasix to IV for today  - add Entresto and spironolactone for systolic heart failure    Will aim for controlled BP reduction by medications noted above. Monitor and mitigate end organ damage as indicated.

## 2025-06-29 NOTE — ASSESSMENT & PLAN NOTE
Patient with known CAD s/p stent placement, which is controlled Will continue ASA, Plavix, and Statin and monitor for S/Sx of angina/ACS. Continue to monitor on telemetry.   - troponin normal on admission  - EKG nonischemic  - trend troponins  - he has a prior angiogram showing multivessel disease.  Per prior cardiology notes, he is not a candidate for revascularization because of his non adherence to medications

## 2025-06-29 NOTE — ASSESSMENT & PLAN NOTE
A1c:   Lab Results   Component Value Date    HGBA1C 7.6 (H) 05/27/2025     Meds: SSI PRN to maintain goal 140-180  accuchecks, hypoglycemic protocol  - next Rx= Jardiance for both heart failure and diabetes

## 2025-06-29 NOTE — ASSESSMENT & PLAN NOTE
- WBC 15 upon admission  - afebrile and normal procalcitonin  - will hold on antibiotics at this point  - flu and COVID negative

## 2025-06-29 NOTE — ASSESSMENT & PLAN NOTE
Patient admitted with Hypercapnic and Hypoxic which is Acute on Chronic.  he is on home oxygen at 4 LPM. Signs/symptoms of respiratory failure include- tachypnea, increased work of breathing, respiratory distress, and use of accessory muscles present on admission.   - Labs and images were reviewed. Patient Has recent ABG, which has been reviewed..   - Supplemental oxygen was provided and noted- NIPPV- BiPAP  % FiO2   - Respiratory failure is due to- CHF   - treatment:  Blood pressure control and diuresis  - doubt COPD exacerbation with no wheezing, no change in sputum  - pulmonary consulted  - DNR code status  - repeat VBG at noon

## 2025-06-29 NOTE — ASSESSMENT & PLAN NOTE
Anemia is likely due to suspect chronic disease. Most recent hemoglobin and hematocrit are listed below.  Recent Labs     06/29/25  0910   HGB 11.3*   HCT 37.9*     Plan  - Monitor serial CBC: Daily  - Transfuse PRBC if patient becomes hemodynamically unstable, symptomatic or H/H drops below 7/21.  - Patient has not received any PRBC transfusions to date  - Patient's anemia is currently stable  - will hold on further workup as he is on hospice care

## 2025-06-29 NOTE — SUBJECTIVE & OBJECTIVE
Past Medical History:   Diagnosis Date    Carotid artery disease     CHF (congestive heart failure)     COPD (chronic obstructive pulmonary disease)     Coronary artery disease     Elevated cholesterol     on medication    Hypertension        Past Surgical History:   Procedure Laterality Date    CARDIAC SURGERY      coronary stent placed    CORONARY STENT PLACEMENT         Review of patient's allergies indicates:   Allergen Reactions    Contrast media Shortness Of Breath, Itching and Anxiety     Patient states that he had a bad reaction, anxiety , shortness of breath, itching .     Iodinated contrast media Shortness Of Breath, Itching and Anxiety     Patient states that he had a bad reaction, anxiety , shortness of breath, itching .            Family History       Problem Relation (Age of Onset)    Cancer Mother    No Known Problems Father          Tobacco Use    Smoking status: Every Day     Current packs/day: 0.00     Average packs/day: 2.0 packs/day for 45.0 years (90.0 ttl pk-yrs)     Types: Cigarettes     Start date: 1972     Last attempt to quit: 2017     Years since quittin.6    Smokeless tobacco: Never   Substance and Sexual Activity    Alcohol use: Yes     Comment: socially    Drug use: No    Sexual activity: Yes     Partners: Female     Birth control/protection: None         Review of Systems   Unable to perform ROS: Acuity of condition     Objective:     Vital Signs (Most Recent):  Temp: 97 °F (36.1 °C) (25 1047)  Pulse: 70 (25 1502)  Resp: (!) 8 (25 1502)  BP: 119/67 (25 1501)  SpO2: 100 % (25 1502) Vital Signs (24h Range):  Temp:  [97 °F (36.1 °C)] 97 °F (36.1 °C)  Pulse:  [] 70  Resp:  [8-33] 8  SpO2:  [94 %-100 %] 100 %  BP: (109-191)/() 119/67     Weight: 87.8 kg (193 lb 9 oz)  Body mass index is 32.21 kg/m².      Intake/Output Summary (Last 24 hours) at 2025 4312  Last data filed at 2025 1407  Gross per 24 hour   Intake --   Output  "1050 ml   Net -1050 ml        Physical Exam  Vitals and nursing note reviewed.   Constitutional:       General: He is not in acute distress.     Appearance: He is obese. He is ill-appearing. He is not toxic-appearing or diaphoretic.   HENT:      Head: Normocephalic and atraumatic.   Cardiovascular:      Rate and Rhythm: Normal rate and regular rhythm.   Pulmonary:      Effort: Pulmonary effort is normal.      Comments: On BiPAP  Abdominal:      General: Bowel sounds are normal. There is no distension (Not tense).      Palpations: Abdomen is soft. There is no mass.      Tenderness: There is no abdominal tenderness. There is no guarding.      Hernia: No hernia (Umbilical, reducible) is present.   Musculoskeletal:      Right lower leg: No edema.      Left lower leg: No edema.   Skin:     General: Skin is warm and dry.   Neurological:      Mental Status: He is alert and oriented to person, place, and time. Mental status is at baseline.        Vents:  Oxygen Concentration (%): 40 (06/29/25 1436)    Lines/Drains/Airways       Peripheral Intravenous Line  Duration             Peripheral IV 06/29/25 0910 20 G Anterior;Distal;Left Forearm <1 day    Peripheral IV 06/29/25 0915 20 G Anterior;Left;Proximal Forearm <1 day    Peripheral IV 06/29/25 0917 18 G 1 3/4 in Anterior;Right Upper Arm <1 day                    Significant Labs:    CBC/Anemia Profile:  Recent Labs   Lab 06/29/25  0910   WBC 15.42*   HGB 11.3*   HCT 37.9*      MCV 96   RDW 13.0        Chemistries:  Recent Labs   Lab 06/29/25  0910      K 4.3   CL 97   CO2 35*   BUN 6*   CREATININE 0.8   CALCIUM 9.4   ALBUMIN 3.8   PROT 8.2   BILITOT 0.2   ALKPHOS 97   ALT 12   AST 14   MG 1.9       ABGs:   Recent Labs   Lab 06/29/25  1502   PH 7.319*   PCO2 90.0*   HCO3 46.2*   POCSATURATED 82   BE 16*     Blood Culture: No results for input(s): "LABBLOO" in the last 48 hours.  Cardiac Markers: No results for input(s): "CKMB", "TROPONINT", "MYOGLOBIN" in the " "last 48 hours.  Respiratory Culture: No results for input(s): "GSRESP", "RESPIRATORYC" in the last 48 hours.  Troponin:   Recent Labs   Lab 06/29/25  0910 06/29/25  1156   TROPONINI 0.006 0.085*     BNP  Recent Labs   Lab 06/29/25  0910   *         Echo 6/9/25    Left Ventricle: The left ventricle is moderately dilated. There is severely reduced systolic function with a visually estimated ejection fraction of 20 - 25%. Grade II diastolic dysfunction.    Right Ventricle: The right ventricle is mildly dilated    Left Atrium: The left atrium is moderately dilated    Right Atrium: The right atrium is mildly dilated .    Mitral Valve: There is mild regurgitation.    Tricuspid Valve: There is mild regurgitation.    Pulmonary Artery: The estimated pulmonary artery systolic pressure is 17 mmHg.    IVC/SVC: Normal venous pressure at 3 mmHg.  Significant Imaging:   I have reviewed all pertinent imaging results/findings within the past 24 hours.  CXR: I have reviewed all pertinent results/findings within the past 24 hours and my personal findings are:  no consolidation or effusion.    "

## 2025-06-29 NOTE — ED PROVIDER NOTES
SCRIBE #1 NOTE: I, Stephanie Bray, am scribing for, and in the presence of,  Shaun Rick MD. I have scribed the following portions of the note - Other sections scribed: HPI, ROS, PE.           EM PHYSICIAN NOTE       This patient presents with a complaint of   Chief Complaint   Patient presents with    Shortness of Breath     Pt BIB EMS, c/o sudden onset of SOB. Pt has hx of CHF and COPD. Pt on hospice. Per EMS, pt 77% RA. Pt given duoneb and then placed on CPAP. Initial /110, on scene.        Source of HPI & ROS: EMS personnel    HPI: This is a 69 y.o. male with a PMHx of COPD, CHF, CAD, and HTN, who presents to the ED via EMS with SOB, onset 30 minutes ago. Additional history obtained from independent historian-EMS- who states they were called out to the patient's home for SOB that started 30 minutes prior to their arrival. EMS notes the patient is normally on home oxygen (4L), but since the onset of the SOB he was bumped up to 10 L. EMS reports the patients O2 stat was in the low 80's (lowest was 77) on RA, he had wheezes and rales throughout, and his initial blood pressure was 200/110. EMS reports O2 Stat is now in the 90's with the help of the CPAP. EMS reports administering 1 nitro and duo-neb while en route. EMS notes the patient was recently placed on hospice (last 3 weeks). No other exacerbating or alleviating factors.         Review of patient's allergies indicates:   Allergen Reactions    Contrast media Shortness Of Breath, Itching and Anxiety     Patient states that he had a bad reaction, anxiety , shortness of breath, itching .     Iodinated contrast media Shortness Of Breath, Itching and Anxiety     Patient states that he had a bad reaction, anxiety , shortness of breath, itching .                 Pertinent REVIEW of SYSTEMS    GENERAL/CONSTITUTIONAL: There is not a report of fever   CARDIOVASCULAR: There is not a report of chest pain   RESPIRATORY: There is a report of SOB SEE  HPI  GASTROINTESTINAL: There is not a report of  vomiting, diarrhea  HEMATOLOGIC/LYMPHATIC:  Patient takes aspirin and Plavix      The nurse's notes and triage vital signs were reviewed.    PHYSICAL EXAMINATION   Temp:  [97 °F (36.1 °C)] 97 °F (36.1 °C)  Pulse:  [] 101  Resp:  [20-33] 25  SpO2:  [94 %-100 %] 99 %  BP: (118-191)/() 148/80      Vital signs and Pulse Ox reviewed in clinical context. Abnormalities noted: Tachypnea, blood pressure is severely elevated  Body mass index is 32.21 kg/m².  Pt's level of consciousness is Awake and confused, and the patient is in marked distress.  Skin: warm, pale, and diaphoretic.  Capillary refill is less than 2 seconds.  Head and Neck:  Moderate JVD, neck supple  Cardiac exam: RRR I did not appreciate a murmur. Patient is tachycardic with a regular rhythm. +2 pulses in the upper extremities and +1 in the lower extremities.  Pulmonary exam: tight breath sounds bilaterally. Coarse wheezing in bilateral posterior lower lobes.  Abd Exam: soft nontender   Musculoskeletal: no joint tenderness, deformity or swelling.   Neurologic: GCS 14; spontaneous movement of all extremities      Medical decision making:   Nurses notes and Vital Signs reviewed.     Problems: Today's visit reveals SOB, onset 30 minutes ago which is a/an Acute problem that is concerning for deterioration due to a differential diagnosis that includes respiratory failure, COPD exacerbation, CHF exacerbation, ACS.     Other problems today include uncontrolled high blood pressure     MDM Components integrated into this visit: History from someone besides the patient: see HPI, Shared decision making concerning imaging, testing or admit:  See ED course, Discussion family patient de-escalation or DNR status see ED course    Considerations: My decision to admit to hospital this patient is based on severity of illness.       EKG: My independent interpretation of the EKG is sinus tachycardia at 117 beats per  minute.  QT corrected is normal.  There is LVH with poor R-wave progression.  There is no ST segment elevation or depression for acute occlusive infarct.      See ER course below for lab test ordered, results reviewed, independent interpretation of images or EKG, discussion with consultants, data obtained from sources other than patient:  ED Course as of 06/29/25 1058   Sun Jun 29, 2025   0914 Chart review: no DNI able to be verified and the CO2 is greater than 130 despite aggressive BiPAP.  We will continue aggressive resuscitation in an attempt to reach family [MH]   0932 Spoke with the wife Lillian.  She wishes to continue with aggressive resuscitation.  Blood pressure is improving of the nicardipine but repeat VBG reveals continued respiratory failure.   [MH]   0939 Patient states that his breathing is getting better and he does not want to be placed on a ventilator.  He wants to continue with the BiPAP.  I am concerned about the repeat VBG that continues to show acidosis and hypercarbia.  Patient does appear to be stable for a continued trial of BiPAP.  His blood pressures improved on the nicardipine in his sats have remained 100% on the BiPAP. [MH]   1029 PH remains low at some 0.14.  PCO2 is slowly improving.  Patient's mentation remains improved.  Will continue BiPAP. [MH]   1029 CMP reveals a CO2 of 35.  Glucose of 222.  Creatinine is normal [MH]   1030 BNP is 152 [MH]   1030 D-dimer is age-appropriate [MH]   1030 Lactic acid and troponin are normal procalcitonin is normal [MH]   1030 [place] 464[address] with an H and H of 11 and 37.  Chart review reveals chronic leukocytosis [MH]   1031 My wet read of the chest x-ray:  There is cardiomegaly with pulmonary edema. [MH]   1031 Family's at the bedside [MH]   1032 Chart review reveals that the patient had an echo performed earlier this month and at that time his EF was 20%. [MH]   1033 Will contact Hospital Medicine for ICU admit [MH]   1058 Accepted by Dr. Davidson  Demetrius. []      ED Course User Index  [] Shaun Rick MD          CRITICAL CARE TIME:   Critical care services included the following: chart data review, reviewing nursing notes and researching old charts from internal and external sources, documentation time, consultant collaboration regarding findings and treatment options, medication orders and management, direct patient care, vital sign assessments, physical exam reassessments, and ordering, interpreting and reviewing diagnostic studies/lab tests.    Aggregate critical care time was approximately 60 minutes, which includes only time during which I was engaged in work directly related to the patient's care, as described above, whether at the bedside or elsewhere in the Emergency Department.  It did not include time spent performing other reported procedures or the services of residents, students, nurses or physician assistants.      Transfer of care:  ICU team      Orders Placed This Encounter   Procedures    Blood Culture #1 **CANNOT BE ORDERED STAT**    Blood Culture #2 **CANNOT BE ORDERED STAT**    X-Ray Chest AP Portable    CBC Auto Differential    Comprehensive Metabolic Panel    BNP    D-Dimer, Quantitative    Troponin I    Procalcitonin    Lactic Acid, Plasma    CBC with Differential    Basic metabolic panel    CBC Auto Differential    Magnesium    Troponin I    Vital signs    Cardiac Monitoring - Adult    Fluid restriction    Height and weight On admission. Standing Weight Method required.    Daily weights On presentation to floor. Standing Weight Method required.    Strict intake and output 1.5 Liters maximum per 24 hours.    Strict intake and output 1.5 Liters maximum per 24 hours.    Notify Physician    Notify Physician - Potential Need of Opioid Reversal    Place sequential compression device    Place KATEY hose    If any glucose result is less than 50 or greater than 400:    If 2nd result is less than 50 or greater than 400:    If glucose  greater than 400 mg/dL treat per correction scale.  If glucose remains elevated above 400 mg/dL at next scheduled check, notify provider    Re-check Blood Glucose    Full code    POCT Venous Blood Gas (unchecked)    POCT Venous Blood Gas (unchecked)    Bipap Continuous    Pulse Oximetry Continuous    POCT COVID-19 Rapid Screening    POCT Influenza A/B Molecular    EKG 12-lead    Saline lock IV    Possible Hospitalization    Admit to Inpatient    Fall precautions     Medications   aspirin EC tablet 81 mg (has no administration in time range)   atorvastatin tablet 40 mg (has no administration in time range)   clopidogreL tablet 75 mg (has no administration in time range)   metoprolol succinate (TOPROL-XL) 24 hr tablet 25 mg (has no administration in time range)   sodium chloride 0.9% flush 10 mL (has no administration in time range)   enoxaparin injection 40 mg (has no administration in time range)   acetaminophen tablet 650 mg (has no administration in time range)   dextrose 50% injection 12.5 g (has no administration in time range)   glucagon (human recombinant) injection 1 mg (has no administration in time range)   ondansetron injection 8 mg (has no administration in time range)   prochlorperazine injection Soln 10 mg (has no administration in time range)   senna-docusate 8.6-50 mg per tablet 2 tablet (has no administration in time range)   hydrALAZINE injection 10 mg (has no administration in time range)   sacubitriL-valsartan 24-26 mg per tablet 1 tablet (has no administration in time range)   spironolactone tablet 25 mg (has no administration in time range)   albuterol-ipratropium 2.5 mg-0.5 mg/3 mL nebulizer solution 3 mL (3 mLs Nebulization Given 6/29/25 2033)   dexAMETHasone injection 6 mg (6 mg Intravenous Given 6/29/25 1039)           Diagnoses that have been ruled out:   None   Diagnoses that are still under consideration:   None   Final diagnoses:   SOB (shortness of breath)   Hypertensive emergency    Acute on chronic congestive heart failure, unspecified heart failure type   Hypercarbia   Respiratory failure with hypercapnia, unspecified chronicity          Disposition:  Admit ICU          Shaun Rick      This note was created using Dict[employer].  This program may occasionally misinterpret certain words and phrases.      SCRIBE ATTESTATION NOTE:  I attest that I personally performed the services documented by the scribe and acknowledged and confirm the content of the note.   Nurses notes were reviewed.  Shaun Jordan MD  06/29/25 1013       Shaun Rick MD  06/29/25 1033       Shaun Rick MD  06/29/25 1059       Shaun Rick MD  06/29/25 1124

## 2025-06-29 NOTE — ED NOTES
Assumed patient care at this time. Pts BP noted to be 134/77 with a MAP of 99. Paused nicardipine infusion at this time and notified Dr. Rick. Pt on BiPAP. Easily roused to verbal stimuli. Pt reports feeling much better at this time.

## 2025-06-29 NOTE — ASSESSMENT & PLAN NOTE
- Last lipid panel was in 2021  - will not repeat at this point as he is on hospice care  - continue statin

## 2025-06-29 NOTE — ASSESSMENT & PLAN NOTE
Patient has Combined Systolic and Diastolic heart failure that is Acute on chronic. On presentation their CHF was decompensated. Evidence of decompensated CHF on presentation includes: shortness of breath. The etiology of their decompensation is likely dietary indiscretion and medication non-compliance. Most recent BNP and echo results are listed below.  Recent Labs     06/29/25  0910   *   - this BNP is improved from last hospitalization when it was greater than 1000    Echo 6/9/2025    Left Ventricle: The left ventricle is moderately dilated. There is severely reduced systolic function with a visually estimated ejection fraction of 20 - 25%. Grade II diastolic dysfunction.    Right Ventricle: The right ventricle is mildly dilated    Left Atrium: The left atrium is moderately dilated    Right Atrium: The right atrium is mildly dilated .    Mitral Valve: There is mild regurgitation.    Tricuspid Valve: There is mild regurgitation.    Pulmonary Artery: The estimated pulmonary artery systolic pressure is 17 mmHg.    IVC/SVC: Normal venous pressure at 3 mmHg.    Current Heart Failure Medications  metoprolol succinate (TOPROL-XL) 24 hr tablet 25 mg, Daily, Oral  hydrALAZINE injection 10 mg, Every 6 hours PRN, Intravenous  sacubitriL-valsartan 24-26 mg per tablet 1 tablet, 2 times daily, Oral  spironolactone tablet 25 mg, Daily, Oral  furosemide injection 40 mg, 2 times daily, Intravenous    Plan  - Monitor strict I&Os and daily weights.    - Place on telemetry  - Low sodium diet when he is off the BiPAP  - Place on fluid restriction of 1.5 L.   - Cardiology has not been consulted  - continue home metoprolol.  Add Entresto and Aldactone for systolic heart failure.  - no AICD in place.  With DNR code status and current hospice care, will not pursue this  - will continue another day of IV Lasix then likely change to oral tomorrow  - The patient's volume status is worsening as indicated by shortness of breath. Will  continue current treatment

## 2025-06-29 NOTE — CONSULTS
[facility] - Intensive Care  Critical Care Medicine  Consult Note    Patient Name: Abelardo Irene Jr.  MRN: [id number]  Admission Date: 6/29/2025  Hospital Length of Stay: 0 days  Code Status: DNR  Attending Physician: Aissatou Mills MD   Primary Care Provider: Rolando Funes MD   Principal Problem: Acute on chronic respiratory failure with hypoxia and hypercapnia    Inpatient consult to Pulmonology  Consult performed by: Shahid Farmer MD  Consult ordered by: Aissatou Mills MD        Subjective:     HPI:  Patient is 69 y.o. male  has a past medical history of Carotid artery disease, CHF (congestive heart failure), COPD (chronic obstructive pulmonary disease), Coronary artery disease, Elevated cholesterol, and Hypertension. presented to [facility] on 6/19/25 with acute  sob.  +h/o tobacco abuse and was seeing Dr. Boyer for copd(FEV1 66%).      In ED, patient was hypertensive 190/120 with severe hypercapnic respiratory failure.  Initial  abg 7.1/>130/107/12.  Patient was placed no bipap with improvement wob.  Patient was admitted to ICU for hypertensive emergency.  Pulm/crit was consulted for further inputs.    During my initial evaluation, patient was alert and interactive.  Bp improved 120/60.  RR~20s.  Sating well  with bipap and 40% FiO2.  No chest pain. No orthopnea.  No pnd.  No fever/chill.          Hospital/ICU Course:  No notes on file    Past Medical History:   Diagnosis Date    Carotid artery disease     CHF (congestive heart failure)     COPD (chronic obstructive pulmonary disease)     Coronary artery disease     Elevated cholesterol     on medication    Hypertension        Past Surgical History:   Procedure Laterality Date    CARDIAC SURGERY      coronary stent placed    CORONARY STENT PLACEMENT         Review of patient's allergies indicates:   Allergen Reactions    Contrast media Shortness Of Breath, Itching and Anxiety     Patient states that he had a bad reaction, anxiety , shortness of  breath, itching .     Iodinated contrast media Shortness Of Breath, Itching and Anxiety     Patient states that he had a bad reaction, anxiety , shortness of breath, itching .            Family History       Problem Relation (Age of Onset)    Cancer Mother    No Known Problems Father          Tobacco Use    Smoking status: Every Day     Current packs/day: 0.00     Average packs/day: 2.0 packs/day for 45.0 years (90.0 ttl pk-yrs)     Types: Cigarettes     Start date: 1972     Last attempt to quit: 2017     Years since quittin.6    Smokeless tobacco: Never   Substance and Sexual Activity    Alcohol use: Yes     Comment: socially    Drug use: No    Sexual activity: Yes     Partners: Female     Birth control/protection: None         Review of Systems   Unable to perform ROS: Acuity of condition     Objective:     Vital Signs (Most Recent):  Temp: 97 °F (36.1 °C) (25 1047)  Pulse: 70 (25 1502)  Resp: (!) 8 (25 1502)  BP: 119/67 (25 1501)  SpO2: 100 % (25 1502) Vital Signs (24h Range):  Temp:  [97 °F (36.1 °C)] 97 °F (36.1 °C)  Pulse:  [] 70  Resp:  [8-33] 8  SpO2:  [94 %-100 %] 100 %  BP: (109-191)/() 119/67     Weight: 87.8 kg (193 lb 9 oz)  Body mass index is 32.21 kg/m².      Intake/Output Summary (Last 24 hours) at 2025 1534  Last data filed at 2025 1407  Gross per 24 hour   Intake --   Output 1050 ml   Net -1050 ml        Physical Exam  Vitals and nursing note reviewed.   Constitutional:       General: He is not in acute distress.     Appearance: He is obese. He is ill-appearing. He is not toxic-appearing or diaphoretic.   HENT:      Head: Normocephalic and atraumatic.   Cardiovascular:      Rate and Rhythm: Normal rate and regular rhythm.   Pulmonary:      Effort: Pulmonary effort is normal.      Comments: On BiPAP  Abdominal:      General: Bowel sounds are normal. There is no distension (Not tense).      Palpations: Abdomen is soft. There is no  "mass.      Tenderness: There is no abdominal tenderness. There is no guarding.      Hernia: No hernia (Umbilical, reducible) is present.   Musculoskeletal:      Right lower leg: No edema.      Left lower leg: No edema.   Skin:     General: Skin is warm and dry.   Neurological:      Mental Status: He is alert and oriented to person, place, and time. Mental status is at baseline.        Vents:  Oxygen Concentration (%): 40 (06/29/25 1436)    Lines/Drains/Airways       Peripheral Intravenous Line  Duration             Peripheral IV 06/29/25 0910 20 G Anterior;Distal;Left Forearm <1 day    Peripheral IV 06/29/25 0915 20 G Anterior;Left;Proximal Forearm <1 day    Peripheral IV 06/29/25 0917 18 G 1 3/4 in Anterior;Right Upper Arm <1 day                    Significant Labs:    CBC/Anemia Profile:  Recent Labs   Lab 06/29/25  0910   WBC 15.42*   HGB 11.3*   HCT 37.9*      MCV 96   RDW 13.0        Chemistries:  Recent Labs   Lab 06/29/25  0910      K 4.3   CL 97   CO2 35*   BUN 6*   CREATININE 0.8   CALCIUM 9.4   ALBUMIN 3.8   PROT 8.2   BILITOT 0.2   ALKPHOS 97   ALT 12   AST 14   MG 1.9       ABGs:   Recent Labs   Lab 06/29/25  1502   PH 7.319*   PCO2 90.0*   HCO3 46.2*   POCSATURATED 82   BE 16*     Blood Culture: No results for input(s): "LABBLOO" in the last 48 hours.  Cardiac Markers: No results for input(s): "CKMB", "TROPONINT", "MYOGLOBIN" in the last 48 hours.  Respiratory Culture: No results for input(s): "GSRESP", "RESPIRATORYC" in the last 48 hours.  Troponin:   Recent Labs   Lab 06/29/25  0910 06/29/25  1156   TROPONINI 0.006 0.085*     BNP  Recent Labs   Lab 06/29/25  0910   *         Echo 6/9/25    Left Ventricle: The left ventricle is moderately dilated. There is severely reduced systolic function with a visually estimated ejection fraction of 20 - 25%. Grade II diastolic dysfunction.    Right Ventricle: The right ventricle is mildly dilated    Left Atrium: The left atrium is moderately " dilated    Right Atrium: The right atrium is mildly dilated .    Mitral Valve: There is mild regurgitation.    Tricuspid Valve: There is mild regurgitation.    Pulmonary Artery: The estimated pulmonary artery systolic pressure is 17 mmHg.    IVC/SVC: Normal venous pressure at 3 mmHg.  Significant Imaging:   I have reviewed all pertinent imaging results/findings within the past 24 hours.  CXR: I have reviewed all pertinent results/findings within the past 24 hours and my personal findings are:  no consolidation or effusion.      ABG  Recent Labs   Lab 06/29/25  1502   PH 7.319*   PO2 54   PCO2 90.0*   HCO3 46.2*   BE 16*     Assessment/Plan:     Pulmonary  * Acute on chronic respiratory failure with hypoxia and hypercapnia  Most likely due to hypertension causing increased afterload  Clinically improved with bp optimization, diuretic and nippv      COPD (chronic obstructive pulmonary disease)  Seen by Dr. Boyer for copd with fev1 66%  Pft in 2017 with boderline obstructive physiology  Active smoker  No evidence of copd exacerbation per exam.      Cardiac/Vascular  Elevated troponin  Most likely due to demand ischemia.  Will continue to trend.  Already on asa and plavix.      Hypertensive emergency  Patient endorsed dietary indiscretion  Bp improved with home meds and diuretics    Acute on chronic combined systolic and diastolic congestive heart failure  No overt evidence of volume overload per cxr or clinical exam  GDMT with lasix, BB, entrestro and spironolactone          Palliative Care  Advance care planning  DNR reconfirmed but ok with NIPPV.      Other  PREETI (obstructive sleep apnea)  Would benefit from outpatient sleep evaluation.          Critical Care Time: 45 minutes  Critical secondary to Patient has a condition that poses threat to life and bodily function: Severe Respiratory Distress     Critical care was time spent personally by me on the following activities: development of treatment plan with patient or  surrogate and bedside caregivers, discussions with consultants, evaluation of patient's response to treatment, examination of patient, ordering and performing treatments and interventions, ordering and review of laboratory studies, ordering and review of radiographic studies, pulse oximetry, re-evaluation of patient's condition. This critical care time did not overlap with that of any other provider or involve time for any procedures.    Thank you for your consult. I will follow-up with patient. Please contact us if you have any additional questions.     Shahid Farmer MD  Critical Care Medicine  [facility] - Intensive Care

## 2025-06-30 ENCOUNTER — DOCUMENTATION ONLY (OUTPATIENT)
Facility: CLINIC | Age: 70
End: 2025-06-30
Payer: MEDICARE

## 2025-06-30 VITALS
OXYGEN SATURATION: 98 % | WEIGHT: 187.81 LBS | TEMPERATURE: 98 F | BODY MASS INDEX: 31.26 KG/M2 | RESPIRATION RATE: 31 BRPM | DIASTOLIC BLOOD PRESSURE: 58 MMHG | HEART RATE: 74 BPM | SYSTOLIC BLOOD PRESSURE: 115 MMHG

## 2025-06-30 PROBLEM — D72.829 LEUKOCYTOSIS: Status: RESOLVED | Noted: 2025-06-09 | Resolved: 2025-06-30

## 2025-06-30 LAB
ABSOLUTE EOSINOPHIL (OHS): 0.03 K/UL
ABSOLUTE MONOCYTE (OHS): 0.42 K/UL (ref 0.3–1)
ABSOLUTE NEUTROPHIL COUNT (OHS): 8.81 K/UL (ref 1.8–7.7)
ANION GAP (OHS): 14 MMOL/L (ref 8–16)
BASOPHILS # BLD AUTO: 0.01 K/UL
BASOPHILS NFR BLD AUTO: 0.1 %
BUN SERPL-MCNC: 10 MG/DL (ref 8–23)
CALCIUM SERPL-MCNC: 9.2 MG/DL (ref 8.7–10.5)
CHLORIDE SERPL-SCNC: 92 MMOL/L (ref 95–110)
CO2 SERPL-SCNC: 35 MMOL/L (ref 23–29)
CREAT SERPL-MCNC: 0.7 MG/DL (ref 0.5–1.4)
ERYTHROCYTE [DISTWIDTH] IN BLOOD BY AUTOMATED COUNT: 13 % (ref 11.5–14.5)
GFR SERPLBLD CREATININE-BSD FMLA CKD-EPI: >60 ML/MIN/1.73/M2
GLUCOSE SERPL-MCNC: 157 MG/DL (ref 70–110)
HCT VFR BLD AUTO: 32.9 % (ref 40–54)
HGB BLD-MCNC: 10 GM/DL (ref 14–18)
IMM GRANULOCYTES # BLD AUTO: 0.03 K/UL (ref 0–0.04)
IMM GRANULOCYTES NFR BLD AUTO: 0.3 % (ref 0–0.5)
LYMPHOCYTES # BLD AUTO: 1.51 K/UL (ref 1–4.8)
MAGNESIUM SERPL-MCNC: 1.8 MG/DL (ref 1.6–2.6)
MCH RBC QN AUTO: 28.5 PG (ref 27–31)
MCHC RBC AUTO-ENTMCNC: 30.4 G/DL (ref 32–36)
MCV RBC AUTO: 94 FL (ref 82–98)
NUCLEATED RBC (/100WBC) (OHS): 0 /100 WBC
PLATELET # BLD AUTO: 185 K/UL (ref 150–450)
PMV BLD AUTO: 10.9 FL (ref 9.2–12.9)
POCT GLUCOSE: 156 MG/DL (ref 70–110)
POCT GLUCOSE: 181 MG/DL (ref 70–110)
POCT GLUCOSE: 219 MG/DL (ref 70–110)
POTASSIUM SERPL-SCNC: 4.1 MMOL/L (ref 3.5–5.1)
RBC # BLD AUTO: 3.51 M/UL (ref 4.6–6.2)
RELATIVE EOSINOPHIL (OHS): 0.3 %
RELATIVE LYMPHOCYTE (OHS): 14 % (ref 18–48)
RELATIVE MONOCYTE (OHS): 3.9 % (ref 4–15)
RELATIVE NEUTROPHIL (OHS): 81.4 % (ref 38–73)
SODIUM SERPL-SCNC: 141 MMOL/L (ref 136–145)
TROPONIN I SERPL DL<=0.01 NG/ML-MCNC: 0.41 NG/ML
WBC # BLD AUTO: 10.81 K/UL (ref 3.9–12.7)

## 2025-06-30 PROCEDURE — 80048 BASIC METABOLIC PNL TOTAL CA: CPT | Performed by: HOSPITALIST

## 2025-06-30 PROCEDURE — 85025 COMPLETE CBC W/AUTO DIFF WBC: CPT | Performed by: HOSPITALIST

## 2025-06-30 PROCEDURE — 94660 CPAP INITIATION&MGMT: CPT

## 2025-06-30 PROCEDURE — 94761 N-INVAS EAR/PLS OXIMETRY MLT: CPT

## 2025-06-30 PROCEDURE — 36415 COLL VENOUS BLD VENIPUNCTURE: CPT | Performed by: HOSPITALIST

## 2025-06-30 PROCEDURE — 63600175 PHARM REV CODE 636 W HCPCS: Performed by: INTERNAL MEDICINE

## 2025-06-30 PROCEDURE — S4991 NICOTINE PATCH NONLEGEND: HCPCS | Performed by: HOSPITALIST

## 2025-06-30 PROCEDURE — 99900035 HC TECH TIME PER 15 MIN (STAT)

## 2025-06-30 PROCEDURE — 99223 1ST HOSP IP/OBS HIGH 75: CPT | Mod: ,,, | Performed by: INTERNAL MEDICINE

## 2025-06-30 PROCEDURE — 25000003 PHARM REV CODE 250: Performed by: HOSPITALIST

## 2025-06-30 PROCEDURE — 27000221 HC OXYGEN, UP TO 24 HOURS

## 2025-06-30 PROCEDURE — 99291 CRITICAL CARE FIRST HOUR: CPT | Mod: ,,, | Performed by: NURSE PRACTITIONER

## 2025-06-30 PROCEDURE — 63600175 PHARM REV CODE 636 W HCPCS: Performed by: HOSPITALIST

## 2025-06-30 PROCEDURE — 83735 ASSAY OF MAGNESIUM: CPT | Performed by: HOSPITALIST

## 2025-06-30 PROCEDURE — 84484 ASSAY OF TROPONIN QUANT: CPT | Performed by: STUDENT IN AN ORGANIZED HEALTH CARE EDUCATION/TRAINING PROGRAM

## 2025-06-30 RX ORDER — MORPHINE SULFATE 4 MG/ML
2 INJECTION, SOLUTION INTRAMUSCULAR; INTRAVENOUS
Status: DISCONTINUED | OUTPATIENT
Start: 2025-06-30 | End: 2025-06-30 | Stop reason: HOSPADM

## 2025-06-30 RX ORDER — SPIRONOLACTONE 25 MG/1
25 TABLET ORAL DAILY
Start: 2025-07-01 | End: 2026-07-01

## 2025-06-30 RX ADMIN — ASPIRIN 81 MG: 81 TABLET, COATED ORAL at 08:06

## 2025-06-30 RX ADMIN — CLOPIDOGREL 75 MG: 75 TABLET ORAL at 08:06

## 2025-06-30 RX ADMIN — MORPHINE SULFATE 2 MG: 4 INJECTION INTRAVENOUS at 12:06

## 2025-06-30 RX ADMIN — SACUBITRIL AND VALSARTAN 1 TABLET: 24; 26 TABLET, FILM COATED ORAL at 08:06

## 2025-06-30 RX ADMIN — Medication 1 PATCH: at 08:06

## 2025-06-30 RX ADMIN — FUROSEMIDE 40 MG: 10 INJECTION, SOLUTION INTRAVENOUS at 08:06

## 2025-06-30 RX ADMIN — SPIRONOLACTONE 25 MG: 25 TABLET ORAL at 08:06

## 2025-06-30 RX ADMIN — METOPROLOL SUCCINATE 25 MG: 25 TABLET, EXTENDED RELEASE ORAL at 08:06

## 2025-06-30 RX ADMIN — MUPIROCIN: 20 OINTMENT TOPICAL at 08:06

## 2025-06-30 NOTE — HOSPITAL COURSE
Mr Abelardo Irene Jr. Is a 69 y.o. man with CHF, COPD on 4L home O2, CAD, DM who was admitted with acute on chronic hypoxic/hypercapnic respiratory failure secondary to hypertensive emergency causing flash pulmonary edema/CHF exacerbation. Cause is non-adherence to medications and cardiac diet. He was placed on BIPAP and diuretics. There was concern that he would need intubation, but he states clearly that he is DNR. He improved to now BiPAP/NC. BiPAP arranged for home use through hospice. Discussed with him that he should be taking his medications, but if he is more short of breath, he can take a dose of the morphine and use the BiPAP mask. He understands. Discharged to home with home hospice care through [facility].

## 2025-06-30 NOTE — ASSESSMENT & PLAN NOTE
A1c:   Lab Results   Component Value Date    HGBA1C 7.6 (H) 05/27/2025     Meds: SSI PRN to maintain goal 140-180  accuchecks, hypoglycemic protocol       Vaccine status unknown

## 2025-06-30 NOTE — ASSESSMENT & PLAN NOTE
Patient admitted with Hypercapnic and Hypoxic which is Acute on Chronic.  he is on home oxygen at 4 LPM. Signs/symptoms of respiratory failure include- tachypnea, increased work of breathing, respiratory distress, and use of accessory muscles present on admission.   - Labs and images were reviewed. Patient Has recent ABG, which has been reviewed..   - Supplemental oxygen was provided and noted- NIPPV- standby  % FiO2 vs NC  - Respiratory failure is due to- CHF   - treatment:  Blood pressure control and diuresis  - doubt COPD exacerbation   - pulmonary consulted  - DNR code status

## 2025-06-30 NOTE — ASSESSMENT & PLAN NOTE
Patient admitted with Hypercapnic and Hypoxic which is Acute on Chronic.  he is on home oxygen at 4 LPM. Signs/symptoms of respiratory failure include- tachypnea, increased work of breathing, respiratory distress, and use of accessory muscles present on admission.   - Labs and images were reviewed. Patient Has recent ABG, which has been reviewed..   - Supplemental oxygen was provided and noted- NIPPV- BiPAP S/T  % FiO2 vs NC  - Respiratory failure is due to- CHF   - treatment:  Blood pressure control and diuresis  - doubt COPD exacerbation   - pulmonary consulted  - DNR code status  - improved. BiPAP arranged for home. DC to home with home hospice

## 2025-06-30 NOTE — ASSESSMENT & PLAN NOTE
Patient has a current diagnosis of Hypertensive emergency with end organ damage evidenced by acute heart failure which is uncontrolled.  Latest blood pressure and vitals reviewed-   Temp:  [96.4 °F (35.8 °C)-97.5 °F (36.4 °C)]   Pulse:  []   Resp:  [8-36]   BP: (105-191)/()   SpO2:  [94 %-100 %] .   - previously on Cardene in the ED for /120 upon admission  - blood pressure now improved and off Cardene drip    Home meds for hypertension were reviewed and noted below.   Hypertension Medications              furosemide (LASIX) 40 MG tablet Take 1 tablet (40 mg total) by mouth 2 (two) times daily.    metoprolol succinate (TOPROL-XL) 25 MG 24 hr tablet Take 1 tablet (25 mg total) by mouth once daily.          Medication adjustment for hospital antihypertensives is as follows  - continue home metoprolol  - added Entresto and spironolactone for systolic heart failure  - continue IV lasix     Will aim for controlled BP reduction by medications noted above. Monitor and mitigate end organ damage as indicated.

## 2025-06-30 NOTE — ASSESSMENT & PLAN NOTE
Patient endorsed dietary indiscretion. BP improved with home meds and diuretics    - continue aggressive BP control  - continue diuresis

## 2025-06-30 NOTE — PROGRESS NOTES
[facility] ([facility]) Team notified of pt referral via [facility] (automated inbasket notification) .    Patient screened today 6/30 by provider and RN for enrollment to program.      Pt was deemed not a candidate for enrollment at this time related to patient on hospice    Pt will require additional follow up planning per primary team.     If pt status, diagnosis, or treatment plan changes , please place AMB referral to [facility] ([id number]) for [facility] enrollment re-evalution.

## 2025-06-30 NOTE — ASSESSMENT & PLAN NOTE
Known EF of 10-15% with DD. Dismissed from [facility] for failure to show up to multiple appointments; reports he does not have transportation. Continues to smoke 2 packs per day.  Not a candidate for intervention given noncompliance.     - GDMT with lasix, BB, entresto, and spironolactone   - continue aggressive diuresis  - BIPAP nightly and PRN

## 2025-06-30 NOTE — PLAN OF CARE
Patient active with [facility] prior to admit.  SHAWN spoke with Lopez at [facility].  Orders sent to Lopez via email at:  [email or web address].  Per Dr Mills patient may DC this evening if bipap available or tomorrow.

## 2025-06-30 NOTE — ASSESSMENT & PLAN NOTE
- Evaluation, mgmt per primary team and PCCM   - At baseline is on home oxygen for his end stage COPD and end stage HF; he was recently enrolled in home hospice   - Would benefit from home BIPAP given his baseline hypercapnia, which I am unsure he has - discussed this with home hospice company   - Ordered IV morphine 2 mg Q1H PRN pain or shortness of breath while in hospital; scheduled laxatives if using this routinely

## 2025-06-30 NOTE — DISCHARGE SUMMARY
[facility] Medicine  Discharge Summary      Patient Name: Abelardo Irene Jr.  MRN: [id number]  [facility]: [id number]  Patient Class: IP- Inpatient  Admission Date: 6/29/2025  Hospital Length of Stay: 1 days  Discharge Date and Time: 06/30/2025 3:10 PM  Attending Physician: Aissatou Mills MD   Discharging Provider: Aissatou Mills MD  Primary Care Provider: Rolando Funes MD    Primary Care Team: Networked reference to record PCT     HPI:   Mr Abelardo Irene Jr. Is a 69 y.o. man with CHF (EF 20-25%, G2DD), COPD, chronic respiratory failure on 4L home O2, CAD who presented with shortness of breath. Wife and son assist with history as he is on continuous BiPAP.  He states that he was feeling well until yesterday evening.  He becomes short of breath with activity.  This shortness of breath got especially worse this morning.  His oxygen was increased by family from home 4 L to 8 L nasal cannula without improvement.  He denies any fever.  His cough is at baseline and has not changed.  He does not feel like he has swelling.  He says that he takes his medications sometimes.  He is smoking 1/2 pack per day.  His wife says that he eats junk. He does use his home oxygen at all times.  He does not have a home BiPAP or CPAP.    In the ED he was hypertensive blood pressure 191/120.  He had hypoxic hypercapnic respiratory failure and was placed on continuous BiPAP.  There were discussions of intubation, but he improved on BiPAP alone.  He is currently awake and alert, following commands, and able to give full history.  He states in front of his wife and son that he would not want a ventilator for any reason even if he would die without it.    Admitted to the ICU for management of hypertensive emergency and acute on chronic hypoxic hypercapnic respiratory failure.      * No surgery found *      Hospital Course:   Mr Abelardo Irene Jr. Is a 69 y.o. man with CHF, COPD on 4L home O2, CAD, DM who was admitted  with acute on chronic hypoxic/hypercapnic respiratory failure secondary to hypertensive emergency causing flash pulmonary edema/CHF exacerbation. Cause is non-adherence to medications and cardiac diet. He was placed on BIPAP and diuretics. There was concern that he would need intubation, but he states clearly that he is DNR. He improved to now BiPAP/NC. BiPAP arranged for home use through hospice. Discussed with him that he should be taking his medications, but if he is more short of breath, he can take a dose of the morphine and use the BiPAP mask. He understands. Discharged to home with home hospice care through [facility].      Goals of Care Treatment Preferences:  Code Status: DNR       POLST: Yes  What is most important right now is to focus on avoiding the hospital, remaining as independent as possible.  Accordingly, we have decided that the best plan to meet the patient's goals includes continuing with treatment.         Consults:   Consults (From admission, onward)          Status Ordering Provider     Inpatient consult to Palliative Care  Once        Provider:  (Not yet assigned)    Completed BETZAIDA ARRIOLA     Inpatient consult to Spiritual Care  Once        Provider:  (Not yet assigned)    Acknowledged BETZAIDA ARRIOLA     Inpatient consult to Pulmonology  Once        Provider:  (Not yet assigned)    Completed BETZAIDA ARRIOLA            Assessment & Plan  Acute on chronic respiratory failure with hypoxia and hypercapnia  Patient admitted with Hypercapnic and Hypoxic which is Acute on Chronic.  he is on home oxygen at 4 LPM. Signs/symptoms of respiratory failure include- tachypnea, increased work of breathing, respiratory distress, and use of accessory muscles present on admission.   - Labs and images were reviewed. Patient Has recent ABG, which has been reviewed..   - Supplemental oxygen was provided and noted- NIPPV- BiPAP S/T  % FiO2 vs NC  - Respiratory failure is due to- CHF   - treatment:   Blood pressure control and diuresis  - doubt COPD exacerbation   - pulmonary consulted  - DNR code status  - improved. BiPAP arranged for home. DC to home with home hospice   Benign essential hypertension  Patient's blood pressure range in the last 24 hours was: BP  Min: 105/55  Max: 154/61.The patient's inpatient anti-hypertensive regimen is listed below:  Current Antihypertensives  metoprolol succinate (TOPROL-XL) 24 hr tablet 25 mg, Daily, Oral  hydrALAZINE injection 10 mg, Every 6 hours PRN, Intravenous  spironolactone tablet 25 mg, Daily, Oral  furosemide injection 40 mg, 2 times daily, Intravenous  spironolactone (ALDACTONE) tablet, Daily, Oral    Plan  - BP is uncontrolled, will adjust as follows:  added aldactone, entresto to home regimen    Tobacco dependency  Dangers of cigarette smoking were reviewed with patient in detail. Patient was Counseled for 3-10 minutes. Nicotine replacement options were discussed. Nicotine replacement was discussed- prescribed  - currently smoking 1/2 pack per day per his report  - encouraged cessation   Dyslipidemia  - Last lipid panel was in 2021  - will not repeat at this point as he is on hospice care  - continue statin    Coronary artery disease involving native coronary artery of native heart without angina pectoris  Patient with known CAD s/p stent placement, which is controlled Will continue ASA, Plavix, and Statin and monitor for S/Sx of angina/ACS. Continue to monitor on telemetry.   - troponin normal on admission, then worsened  - EKG nonischemic  - he has a prior angiogram showing multivessel disease.  Per prior cardiology notes, he is not a candidate for revascularization because of his non adherence to medications  - continue asa, plavix, statin   - PRN morphine for chest pain available at home   Class 1 obesity with serious comorbidity in adult  Body mass index is 31.26 kg/m². Morbid obesity complicates all aspects of disease management from diagnostic modalities  to treatment. Weight loss encouraged and health benefits explained to patient.       Type 2 diabetes mellitus, without long-term current use of insulin  A1c:   Lab Results   Component Value Date    HGBA1C 7.6 (H) 05/27/2025     Meds: SSI PRN to maintain goal 140-180  accuchecks, hypoglycemic protocol      Normocytic anemia  Anemia is likely due to suspect chronic disease. Most recent hemoglobin and hematocrit are listed below.  Recent Labs     06/29/25  0910 06/30/25  0352   HGB 11.3* 10.0*   HCT 37.9* 32.9*     Plan  - Monitor serial CBC: Daily  - Transfuse PRBC if patient becomes hemodynamically unstable, symptomatic or H/H drops below 7/21.  - Patient has not received any PRBC transfusions to date  - Patient's anemia is currently stable  - will hold on further workup as he is on hospice care  COPD (chronic obstructive pulmonary disease)  Patient's COPD is controlled currently.  Patient is currently off COPD Pathway. Continue p.r.n. inhalers and monitor respiratory status closely.   - he has no signs of a COPD exacerbation  - COVID and flu were negative  - p.r.n. nebs  - tobacco cessation counseling  Acute on chronic combined systolic and diastolic congestive heart failure  Patient has Combined Systolic and Diastolic heart failure that is Acute on chronic. On presentation their CHF was decompensated. Evidence of decompensated CHF on presentation includes: shortness of breath. The etiology of their decompensation is likely dietary indiscretion and medication non-compliance. Most recent BNP and echo results are listed below.  Recent Labs     06/29/25  0910   *   - this BNP is improved from last hospitalization when it was greater than 1000    Echo 6/9/2025    Left Ventricle: The left ventricle is moderately dilated. There is severely reduced systolic function with a visually estimated ejection fraction of 20 - 25%. Grade II diastolic dysfunction.    Right Ventricle: The right ventricle is mildly dilated     Left Atrium: The left atrium is moderately dilated    Right Atrium: The right atrium is mildly dilated .    Mitral Valve: There is mild regurgitation.    Tricuspid Valve: There is mild regurgitation.    Pulmonary Artery: The estimated pulmonary artery systolic pressure is 17 mmHg.    IVC/SVC: Normal venous pressure at 3 mmHg.    Current Heart Failure Medications  metoprolol succinate (TOPROL-XL) 24 hr tablet 25 mg, Daily, Oral  hydrALAZINE injection 10 mg, Every 6 hours PRN, Intravenous  sacubitriL-valsartan 24-26 mg per tablet 1 tablet, 2 times daily, Oral  spironolactone tablet 25 mg, Daily, Oral  furosemide injection 40 mg, 2 times daily, Intravenous  , 2 times daily, Oral  spironolactone (ALDACTONE) tablet, Daily, Oral    Plan  - Monitor strict I&Os and daily weights.    - Place on telemetry  - Low sodium diet when he is off the BiPAP  - Place on fluid restriction of 1.5 L.   - Cardiology has not been consulted  - continue home metoprolol.  Added Entresto and Aldactone for systolic heart failure.  - no AICD in place.  With DNR code status and current hospice care, will not pursue this  - change to PO lasix upon DC  - discharge to home with home hospice     Leukocytosis (Resolved: 6/30/2025)  - WBC 15 upon admission-- now normalized  - afebrile and normal procalcitonin  - will hold on antibiotics at this point  - flu and COVID negative    Advance care planning  Advance Care Planning     Date: 06/29/2025  - he is on home hospice  - admitted with acute on chronic hypoxic hypercapnic respiratory failure with severe hypercapnia concerning for needing intubation.  However, he is currently awake and alert and able to make his own decisions.  - his wife and his son are at bedside  - discussed that if he worsens the next step would be ventilator.  Discussed that we would only use a ventilator if he would die without ventilator use.  - he states very clearly in front of his wife and his son that he would not want a  ventilator at any time for any reason  - I told him that this is a DNR code status.  He understands  - time spent discussing code status equals 5 minutes     Advance Care Planning     Date: 06/30/2025  - BRITTANY completed  - DNR  - BiPAP arranged through home hospice  - DC to home with home hospice  - time spent 16 minutes          Hypertensive emergency  Patient has a current diagnosis of Hypertensive emergency with end organ damage evidenced by acute heart failure which is uncontrolled.  Latest blood pressure and vitals reviewed-   Temp:  [97.4 °F (36.3 °C)-97.5 °F (36.4 °C)]   Pulse:  []   Resp:  [16-36]   BP: (105-154)/(55-85)   SpO2:  [94 %-100 %] .   - previously on Cardene in the ED for /120 upon admission  - blood pressure now improved and off Cardene drip    Home meds for hypertension were reviewed and noted below.   Hypertension Medications              furosemide (LASIX) 40 MG tablet Take 1 tablet (40 mg total) by mouth 2 (two) times daily.    metoprolol succinate (TOPROL-XL) 25 MG 24 hr tablet Take 1 tablet (25 mg total) by mouth once daily.          Medication adjustment for hospital antihypertensives is as follows  - continue home metoprolol  - added Entresto and spironolactone for systolic heart failure  - BP controlled     Will aim for controlled BP reduction by medications noted above. Monitor and mitigate end organ damage as indicated.  Elevated troponin  - see CAD    PREETI (obstructive sleep apnea)  - continue BiPAP QHS    Final Active Diagnoses:    Diagnosis Date Noted POA    PRINCIPAL PROBLEM:  Acute on chronic respiratory failure with hypoxia and hypercapnia [J96.21, J96.22] 06/09/2025 Yes    Hypertensive emergency [I16.1] 06/29/2025 Yes    Elevated troponin [R79.89] 06/29/2025 Yes    PREETI (obstructive sleep apnea) [G47.33] 06/29/2025 Yes    Advance care planning [Z71.89] 06/09/2025 Not Applicable    Acute on chronic combined systolic and diastolic congestive heart failure [I50.43]  06/09/2025 Yes    COPD (chronic obstructive pulmonary disease) [J44.9] 06/09/2025 Yes    Normocytic anemia [D64.9] 03/05/2025 Yes    Type 2 diabetes mellitus, without long-term current use of insulin [E11.9] 04/12/2022 Yes    Class 1 obesity with serious comorbidity in adult [E66.811] 02/13/2021 Yes    Coronary artery disease involving native coronary artery of native heart without angina pectoris [I25.10] 11/15/2017 Yes     Chronic    Dyslipidemia [E78.5] 07/28/2017 Yes     Chronic    Tobacco dependency [F17.200] 04/28/2016 Yes    Benign essential hypertension [I10] 03/02/2013 Yes     Chronic      Problems Resolved During this Admission:    Diagnosis Date Noted Date Resolved POA    Leukocytosis [D72.829] 06/09/2025 06/30/2025 Yes       Discharged Condition: stable    Disposition: Hospice/Home    Follow Up: with home hospice     Patient Instructions:      Diet Cardiac     Diet diabetic     Call MD for:   Order Comments: Call [facility] for any issues       Significant Diagnostic Studies: N/A    Pending Diagnostic Studies:       None           Medications:  Reconciled Home Medications:      Medication List        START taking these medications      sacubitriL-valsartan 24-26 mg per tablet  Commonly known as: ENTRESTO  Take 1 tablet by mouth 2 (two) times daily.     spironolactone 25 MG tablet  Commonly known as: ALDACTONE  Take 1 tablet (25 mg total) by mouth once daily.  Start taking on: July 1, 2025            CONTINUE taking these medications      albuterol 90 mcg/actuation inhaler  Commonly known as: VENTOLIN HFA  Inhale 2 puffs into the lungs every 6 (six) hours as needed for Wheezing or Shortness of Breath. Rescue     albuterol-ipratropium 2.5 mg-0.5 mg/3 mL nebulizer solution  Commonly known as: DUO-NEB  Take 3 mLs by nebulization every 6 (six) hours while awake. Rescue     aspirin 81 MG EC tablet  Commonly known as: ECOTRIN  Take 1 tablet (81 mg total) by mouth once daily.     atorvastatin 40 MG  tablet  Commonly known as: LIPITOR  Take 1 tablet (40 mg total) by mouth every evening.     clopidogreL 75 mg tablet  Commonly known as: PLAVIX  Take 1 tablet (75 mg total) by mouth once daily.     furosemide 40 MG tablet  Commonly known as: LASIX  Take 1 tablet (40 mg total) by mouth 2 (two) times daily.     LORazepam 0.5 MG tablet  Commonly known as: ATIVAN  Take by mouth.     metoprolol succinate 25 MG 24 hr tablet  Commonly known as: TOPROL-XL  Take 1 tablet (25 mg total) by mouth once daily.     morphine 100 mg/5 mL (20 mg/mL) concentrated solution     ondansetron 4 MG Tbdl  Commonly known as: ZOFRAN-ODT  Take by mouth.     SENNA LAXATIVE 8.6 mg tablet  Generic drug: senna  Take 1 tablet by mouth once daily.     SYMBICORT 160-4.5 mcg/actuation Hfaa  Generic drug: budesonide-formoterol 160-4.5 mcg  Inhale 2 puffs into the lungs every 12 (twelve) hours. Controller              Indwelling Lines/Drains at time of discharge:   Lines/Drains/Airways       None                       Time spent on the discharge of patient: 35 minutes      Aissatou Mills MD  Department of Hospital Medicine  [facility] - Intensive Care

## 2025-06-30 NOTE — PLAN OF CARE
Problem: Adult Inpatient Plan of Care  Goal: Plan of Care Review  6/30/2025 0507 by Kortney Silver RN  Outcome: Progressing  6/30/2025 0507 by Kortney Silver RN  Outcome: Progressing  Goal: Patient-Specific Goal (Individualized)  6/30/2025 0507 by Kortney Silver RN  Outcome: Progressing  6/30/2025 0507 by Kortney Silver RN  Outcome: Progressing  Goal: Absence of Hospital-Acquired Illness or Injury  6/30/2025 0507 by Kortney Silver RN  Outcome: Progressing  6/30/2025 0507 by Kortney Silver RN  Outcome: Progressing  Goal: Optimal Comfort and Wellbeing  6/30/2025 0507 by Kortney Silver RN  Outcome: Progressing  6/30/2025 0507 by Kortney Silver RN  Outcome: Progressing  Goal: Readiness for Transition of Care  6/30/2025 0507 by Kortney Silver RN  Outcome: Progressing  6/30/2025 0507 by Kortney Silver RN  Outcome: Progressing     Problem: Diabetes Comorbidity  Goal: Blood Glucose Level Within Targeted Range  6/30/2025 0507 by Kortney Silver RN  Outcome: Ongoing  6/30/2025 0507 by Kortney Silver RN  Outcome: Ongoing

## 2025-06-30 NOTE — ASSESSMENT & PLAN NOTE
Advance Care Planning     Date: 06/29/2025  - he is on home hospice  - admitted with acute on chronic hypoxic hypercapnic respiratory failure with severe hypercapnia concerning for needing intubation.  However, he is currently awake and alert and able to make his own decisions.  - his wife and his son are at bedside  - discussed that if he worsens the next step would be ventilator.  Discussed that we would only use a ventilator if he would die without ventilator use.  - he states very clearly in front of his wife and his son that he would not want a ventilator at any time for any reason  - I told him that this is a DNR code status.  He understands  - time spent discussing code status equals 5 minutes     Advance Care Planning     Date: 06/30/2025  - BRITTANY completed  - DNR  - BiPAP arranged through home hospice  - DC to home with home hospice  - time spent 16 minutes

## 2025-06-30 NOTE — ASSESSMENT & PLAN NOTE
Body mass index is 31.26 kg/m². Morbid obesity complicates all aspects of disease management from diagnostic modalities to treatment. Weight loss encouraged and health benefits explained to patient.

## 2025-06-30 NOTE — SUBJECTIVE & OBJECTIVE
Past Medical History:   Diagnosis Date    Carotid artery disease     CHF (congestive heart failure)     COPD (chronic obstructive pulmonary disease)     Coronary artery disease     Elevated cholesterol     on medication    Hypertension        Past Surgical History:   Procedure Laterality Date    CARDIAC SURGERY      coronary stent placed    CORONARY STENT PLACEMENT         Review of patient's allergies indicates:   Allergen Reactions    Contrast media Shortness Of Breath, Itching and Anxiety     Patient states that he had a bad reaction, anxiety , shortness of breath, itching .     Iodinated contrast media Shortness Of Breath, Itching and Anxiety     Patient states that he had a bad reaction, anxiety , shortness of breath, itching .            Medications:  Continuous Infusions:  Scheduled Meds:   aspirin  81 mg Oral Daily    atorvastatin  40 mg Oral QHS    clopidogreL  75 mg Oral Daily    enoxparin  40 mg Subcutaneous Daily    furosemide (LASIX) injection  40 mg Intravenous BID    metoprolol succinate  25 mg Oral Daily    mupirocin   Nasal BID    nicotine  1 patch Transdermal Daily    sacubitriL-valsartan  1 tablet Oral BID    spironolactone  25 mg Oral Daily     PRN Meds:  Current Facility-Administered Medications:     acetaminophen, 650 mg, Oral, Q6H PRN    dextrose 50%, 12.5 g, Intravenous, PRN    glucagon (human recombinant), 1 mg, Intramuscular, PRN    hydrALAZINE, 10 mg, Intravenous, Q6H PRN    insulin aspart U-100, 0-5 Units, Subcutaneous, QID (AC + HS) PRN    morphine, 2 mg, Intravenous, Q1H PRN    ondansetron, 8 mg, Intravenous, Q6H PRN    prochlorperazine, 10 mg, Intravenous, Q6H PRN    senna-docusate, 2 tablet, Oral, BID PRN    sodium chloride 0.9%, 10 mL, Intravenous, PRN    Family History       Problem Relation (Age of Onset)    Cancer Mother    No Known Problems Father          Tobacco Use    Smoking status: Every Day     Current packs/day: 0.00     Average packs/day: 2.0 packs/day for 45.0 years  (90.0 ttl pk-yrs)     Types: Cigarettes     Start date: 1972     Last attempt to quit: 2017     Years since quittin.6    Smokeless tobacco: Never   Substance and Sexual Activity    Alcohol use: Yes     Comment: socially    Drug use: No    Sexual activity: Yes     Partners: Female     Birth control/protection: None       Review of Systems   Respiratory:  Positive for shortness of breath.      Objective:     Vital Signs (Most Recent):  Temp: 97.5 °F (36.4 °C) (25 0400)  Pulse: 86 (25 09)  Resp: (!) 27 (25 09)  BP: 139/73 (25 0808)  SpO2: 99 % (25) Vital Signs (24h Range):  Temp:  [96.4 °F (35.8 °C)-97.5 °F (36.4 °C)] 97.5 °F (36.4 °C)  Pulse:  [] 86  Resp:  [8-36] 27  SpO2:  [94 %-100 %] 99 %  BP: (105-148)/(55-87) 139/73     Weight: 85.2 kg (187 lb 13.3 oz)  Body mass index is 31.26 kg/m².       Physical Exam  Constitutional:       Comments: Alert, sitting up in bed, in good spirits, mildly short of breath        Significant Labs: All pertinent labs within the past 24 hours have been reviewed.  CBC:   Recent Labs   Lab 25  035   WBC 10.81   HGB 10.0*   HCT 32.9*   MCV 94        BMP:  Recent Labs   Lab 25  035   *      K 4.1   CL 92*   CO2 35*   BUN 10   CREATININE 0.7   CALCIUM 9.2   MG 1.8     LFT:  Lab Results   Component Value Date    AST 14 2025    ALKPHOS 97 2025    BILITOT 0.2 2025     Albumin:   Albumin   Date Value Ref Range Status   2025 3.8 3.5 - 5.2 g/dL Final   2025 3.6 3.5 - 5.2 g/dL Final     Protein:   Protein Total   Date Value Ref Range Status   2025 8.2 6.0 - 8.4 gm/dL Final     Total Protein   Date Value Ref Range Status   2025 7.9 6.0 - 8.4 g/dL Final     Lactic acid:   Lab Results   Component Value Date    LACTATE 1.6 2025    LACTATE 2.4 (H) 2025       Significant Imaging: I have reviewed all pertinent imaging results/findings within the past 24  hours.

## 2025-06-30 NOTE — ASSESSMENT & PLAN NOTE
Patient with known CAD s/p stent placement, which is controlled Will continue ASA, Plavix, and Statin and monitor for S/Sx of angina/ACS. Continue to monitor on telemetry.   - troponin normal on admission, then worsened  - EKG nonischemic  - he has a prior angiogram showing multivessel disease.  Per prior cardiology notes, he is not a candidate for revascularization because of his non adherence to medications  - continue asa, plavix, statin

## 2025-06-30 NOTE — ASSESSMENT & PLAN NOTE
Patient's blood pressure range in the last 24 hours was: BP  Min: 105/55  Max: 191/120.The patient's inpatient anti-hypertensive regimen is listed below:  Current Antihypertensives  metoprolol succinate (TOPROL-XL) 24 hr tablet 25 mg, Daily, Oral  hydrALAZINE injection 10 mg, Every 6 hours PRN, Intravenous  spironolactone tablet 25 mg, Daily, Oral  furosemide injection 40 mg, 2 times daily, Intravenous    Plan  - BP is uncontrolled, will adjust as follows:  added aldactone, entresto to home regimen

## 2025-06-30 NOTE — PROGRESS NOTES
[facility]  [facility] Medicine  Progress Note    Patient Name: Abelardo Irene Jr.  MRN: [id number]  Patient Class: IP- Inpatient   Admission Date: 6/29/2025  Length of Stay: 1 days  Attending Physician: Aissatou Mills MD  Primary Care Provider: Rolando Funes MD        Subjective     Principal Problem:Acute on chronic respiratory failure with hypoxia and hypercapnia        HPI:  Mr Abelardo Irene Jr. Is a 69 y.o. man with CHF (EF 20-25%, G2DD), COPD, chronic respiratory failure on 4L home O2, CAD who presented with shortness of breath. Wife and son assist with history as he is on continuous BiPAP.  He states that he was feeling well until yesterday evening.  He becomes short of breath with activity.  This shortness of breath got especially worse this morning.  His oxygen was increased by family from home 4 L to 8 L nasal cannula without improvement.  He denies any fever.  His cough is at baseline and has not changed.  He does not feel like he has swelling.  He says that he takes his medications sometimes.  He is smoking 1/2 pack per day.  His wife says that he eats junk. He does use his home oxygen at all times.  He does not have a home BiPAP or CPAP.    In the ED he was hypertensive blood pressure 191/120.  He had hypoxic hypercapnic respiratory failure and was placed on continuous BiPAP.  There were discussions of intubation, but he improved on BiPAP alone.  He is currently awake and alert, following commands, and able to give full history.  He states in front of his wife and son that he would not want a ventilator for any reason even if he would die without it.    Admitted to the ICU for management of hypertensive emergency and acute on chronic hypoxic hypercapnic respiratory failure.      Overview/Hospital Course:  Mr Abelardo Irene Jr. Is a 69 y.o. man with CHF, COPD on 4L home O2, CAD, DM who was admitted with acute on chronic hypoxic/hypercapnic respiratory failure secondary to hypertensive  emergency causing flash pulmonary edema/CHF exacerbation. Cause is non-adherence to medications and cardiac diet. He was placed on BIPAP and diuretics. There was concern that he would need intubation, but he states clearly that he is DNR. He improved to now BiPAP/NC.     Interval History: He is feeling short of breath this morning while on NC, doesn't want to eat any more breakfast. No swelling. No pain.     Review of Systems   Constitutional:  Negative for chills and fever.   Respiratory:  Positive for shortness of breath. Negative for cough.    Cardiovascular:  Negative for chest pain and leg swelling.   Gastrointestinal:  Negative for abdominal pain.   Psychiatric/Behavioral:  Negative for confusion.      Objective:     Vital Signs (Most Recent):  Temp: 97.5 °F (36.4 °C) (06/30/25 0400)  Pulse: 82 (06/30/25 0805)  Resp: (!) 33 (06/30/25 0742)  BP: 139/73 (06/30/25 0808)  SpO2: 100 % (06/30/25 0742) Vital Signs (24h Range):  Temp:  [96.4 °F (35.8 °C)-97.5 °F (36.4 °C)] 97.5 °F (36.4 °C)  Pulse:  [] 82  Resp:  [8-36] 33  SpO2:  [94 %-100 %] 100 %  BP: (105-191)/() 139/73     Weight: 85.2 kg (187 lb 13.3 oz)  Body mass index is 31.26 kg/m².    Intake/Output Summary (Last 24 hours) at 6/30/2025 0823  Last data filed at 6/30/2025 0600  Gross per 24 hour   Intake 380 ml   Output 2125 ml   Net -1745 ml         Physical Exam  Vitals and nursing note reviewed.   Constitutional:       Appearance: He is obese. He is ill-appearing.   HENT:      Head: Normocephalic and atraumatic.   Cardiovascular:      Rate and Rhythm: Normal rate and regular rhythm.   Pulmonary:      Breath sounds: Wheezing present. No rhonchi or rales.      Comments: Increased effort, pursed lip breathing  Abdominal:      General: Bowel sounds are normal.      Palpations: Abdomen is soft.      Tenderness: There is no abdominal tenderness.   Musculoskeletal:      Right lower leg: No edema.      Left lower leg: No edema.   Skin:     General: Skin  is warm and dry.   Neurological:      Mental Status: He is alert. Mental status is at baseline.               Significant Labs: All pertinent labs within the past 24 hours have been reviewed.    Significant Imaging: I have reviewed all pertinent imaging results/findings within the past 24 hours.      Assessment & Plan  Acute on chronic respiratory failure with hypoxia and hypercapnia  Patient admitted with Hypercapnic and Hypoxic which is Acute on Chronic.  he is on home oxygen at 4 LPM. Signs/symptoms of respiratory failure include- tachypnea, increased work of breathing, respiratory distress, and use of accessory muscles present on admission.   - Labs and images were reviewed. Patient Has recent ABG, which has been reviewed..   - Supplemental oxygen was provided and noted- NIPPV- standby  % FiO2 vs NC  - Respiratory failure is due to- CHF   - treatment:  Blood pressure control and diuresis  - doubt COPD exacerbation   - pulmonary consulted  - DNR code status  Benign essential hypertension  Patient's blood pressure range in the last 24 hours was: BP  Min: 105/55  Max: 191/120.The patient's inpatient anti-hypertensive regimen is listed below:  Current Antihypertensives  metoprolol succinate (TOPROL-XL) 24 hr tablet 25 mg, Daily, Oral  hydrALAZINE injection 10 mg, Every 6 hours PRN, Intravenous  spironolactone tablet 25 mg, Daily, Oral  furosemide injection 40 mg, 2 times daily, Intravenous    Plan  - BP is uncontrolled, will adjust as follows:  added aldactone, entresto to home regimen    Tobacco dependency  Dangers of cigarette smoking were reviewed with patient in detail. Patient was Counseled for 3-10 minutes. Nicotine replacement options were discussed. Nicotine replacement was discussed- prescribed  - currently smoking 1/2 pack per day per his report  Dyslipidemia  - Last lipid panel was in 2021  - will not repeat at this point as he is on hospice care  - continue statin    Coronary artery disease involving  native coronary artery of native heart without angina pectoris  Patient with known CAD s/p stent placement, which is controlled Will continue ASA, Plavix, and Statin and monitor for S/Sx of angina/ACS. Continue to monitor on telemetry.   - troponin normal on admission, then worsened  - EKG nonischemic  - he has a prior angiogram showing multivessel disease.  Per prior cardiology notes, he is not a candidate for revascularization because of his non adherence to medications  - continue asa, plavix, statin   Class 1 obesity with serious comorbidity in adult  Body mass index is 31.26 kg/m². Morbid obesity complicates all aspects of disease management from diagnostic modalities to treatment. Weight loss encouraged and health benefits explained to patient.       Type 2 diabetes mellitus, without long-term current use of insulin  A1c:   Lab Results   Component Value Date    HGBA1C 7.6 (H) 05/27/2025     Meds: SSI PRN to maintain goal 140-180  accuchecks, hypoglycemic protocol  - next Rx= Jardiance for both heart failure and diabetes      Normocytic anemia  Anemia is likely due to suspect chronic disease. Most recent hemoglobin and hematocrit are listed below.  Recent Labs     06/29/25  0910 06/30/25  0352   HGB 11.3* 10.0*   HCT 37.9* 32.9*     Plan  - Monitor serial CBC: Daily  - Transfuse PRBC if patient becomes hemodynamically unstable, symptomatic or H/H drops below 7/21.  - Patient has not received any PRBC transfusions to date  - Patient's anemia is currently stable  - will hold on further workup as he is on hospice care  COPD (chronic obstructive pulmonary disease)  Patient's COPD is controlled currently.  Patient is currently off COPD Pathway. Continue p.r.n. inhalers and monitor respiratory status closely.   - he has no signs of a COPD exacerbation  - COVID and flu were negative  - p.r.n. nebs  - tobacco cessation counseling  Acute on chronic combined systolic and diastolic congestive heart failure  Patient has  Combined Systolic and Diastolic heart failure that is Acute on chronic. On presentation their CHF was decompensated. Evidence of decompensated CHF on presentation includes: shortness of breath. The etiology of their decompensation is likely dietary indiscretion and medication non-compliance. Most recent BNP and echo results are listed below.  Recent Labs     06/29/25  0910   *   - this BNP is improved from last hospitalization when it was greater than 1000    Echo 6/9/2025    Left Ventricle: The left ventricle is moderately dilated. There is severely reduced systolic function with a visually estimated ejection fraction of 20 - 25%. Grade II diastolic dysfunction.    Right Ventricle: The right ventricle is mildly dilated    Left Atrium: The left atrium is moderately dilated    Right Atrium: The right atrium is mildly dilated .    Mitral Valve: There is mild regurgitation.    Tricuspid Valve: There is mild regurgitation.    Pulmonary Artery: The estimated pulmonary artery systolic pressure is 17 mmHg.    IVC/SVC: Normal venous pressure at 3 mmHg.    Current Heart Failure Medications  metoprolol succinate (TOPROL-XL) 24 hr tablet 25 mg, Daily, Oral  hydrALAZINE injection 10 mg, Every 6 hours PRN, Intravenous  sacubitriL-valsartan 24-26 mg per tablet 1 tablet, 2 times daily, Oral  spironolactone tablet 25 mg, Daily, Oral  furosemide injection 40 mg, 2 times daily, Intravenous    Plan  - Monitor strict I&Os and daily weights.    - Place on telemetry  - Low sodium diet when he is off the BiPAP  - Place on fluid restriction of 1.5 L.   - Cardiology has not been consulted  - continue home metoprolol.  Added Entresto and Aldactone for systolic heart failure.  - no AICD in place.  With DNR code status and current hospice care, will not pursue this  - will continue another day of IV Lasix   - The patient's volume status is stable but not at their baseline as indicated by shortness of breath    Leukocytosis  - WBC 15  upon admission-- now normalized  - afebrile and normal procalcitonin  - will hold on antibiotics at this point  - flu and COVID negative    Advance care planning  Advance Care Planning     Date: 06/29/2025  - he is on home hospice  - admitted with acute on chronic hypoxic hypercapnic respiratory failure with severe hypercapnia concerning for needing intubation.  However, he is currently awake and alert and able to make his own decisions.  - his wife and his son are at bedside  - discussed that if he worsens the next step would be ventilator.  Discussed that we would only use a ventilator if he would die without ventilator use.  - he states very clearly in front of his wife and his son that he would not want a ventilator at any time for any reason  - I told him that this is a DNR code status.  He understands  - time spent discussing code status equals 5 minutes         Hypertensive emergency  Patient has a current diagnosis of Hypertensive emergency with end organ damage evidenced by acute heart failure which is uncontrolled.  Latest blood pressure and vitals reviewed-   Temp:  [96.4 °F (35.8 °C)-97.5 °F (36.4 °C)]   Pulse:  []   Resp:  [8-36]   BP: (105-191)/()   SpO2:  [94 %-100 %] .   - previously on Cardene in the ED for /120 upon admission  - blood pressure now improved and off Cardene drip    Home meds for hypertension were reviewed and noted below.   Hypertension Medications              furosemide (LASIX) 40 MG tablet Take 1 tablet (40 mg total) by mouth 2 (two) times daily.    metoprolol succinate (TOPROL-XL) 25 MG 24 hr tablet Take 1 tablet (25 mg total) by mouth once daily.          Medication adjustment for hospital antihypertensives is as follows  - continue home metoprolol  - added Entresto and spironolactone for systolic heart failure  - continue IV lasix     Will aim for controlled BP reduction by medications noted above. Monitor and mitigate end organ damage as indicated.  Elevated  troponin  - see CAD    PREETI (obstructive sleep apnea)  - continue BiPAP QHS    VTE Risk Mitigation (From admission, onward)           Ordered     enoxaparin injection 40 mg  Daily         06/29/25 1049     Place KATEY hose  Until discontinued         06/29/25 1049     IP VTE HIGH RISK PATIENT  Once         06/29/25 1049     Place sequential compression device  Until discontinued         06/29/25 1049                    Discharge Planning   VERA:      Code Status: DNR   Medical Readiness for Discharge Date:                  Critical care time spent on the evaluation and treatment of severe organ dysfunction, review of pertinent labs and imaging studies, discussions with consulting providers and discussions with patient/family: 30 minutes.          Aissatou Mills MD  Department of Hospital Medicine   [facility] [place] [facility]

## 2025-06-30 NOTE — SUBJECTIVE & OBJECTIVE
Interval History: Feeling better today, but still requiring BiPAP intermittently. Diuresing well and BP controlled.     Objective:     Vital Signs (Most Recent):  Temp: 97.5 °F (36.4 °C) (06/30/25 0400)  Pulse: 70 (06/30/25 1000)  Resp: 18 (06/30/25 1000)  BP: 123/64 (06/30/25 1000)  SpO2: (!) 94 % (06/30/25 1000) Vital Signs (24h Range):  Temp:  [96.4 °F (35.8 °C)-97.5 °F (36.4 °C)] 97.5 °F (36.4 °C)  Pulse:  [] 70  Resp:  [8-36] 18  SpO2:  [94 %-100 %] 94 %  BP: (105-154)/(55-87) 123/64     Weight: 85.2 kg (187 lb 13.3 oz)  Body mass index is 31.26 kg/m².      Intake/Output Summary (Last 24 hours) at 6/30/2025 1119  Last data filed at 6/30/2025 0926  Gross per 24 hour   Intake 620 ml   Output 3075 ml   Net -2455 ml        Physical Exam  Vitals and nursing note reviewed.   Constitutional:       General: He is not in acute distress.     Appearance: He is obese. He is ill-appearing. He is not toxic-appearing or diaphoretic.   HENT:      Head: Normocephalic and atraumatic.   Cardiovascular:      Rate and Rhythm: Normal rate and regular rhythm.   Pulmonary:      Effort: Pulmonary effort is normal.      Breath sounds: No rhonchi or rales.      Comments: On BiPAP  Abdominal:      General: Bowel sounds are normal. There is no distension.      Palpations: Abdomen is soft. There is no mass.      Tenderness: There is no abdominal tenderness. There is no guarding.      Hernia: No hernia is present.   Musculoskeletal:      Right lower leg: No edema.      Left lower leg: No edema.   Skin:     General: Skin is warm and dry.   Neurological:      Mental Status: He is alert and oriented to person, place, and time. Mental status is at baseline.           Review of Systems   Unable to perform ROS: Acuity of condition   Respiratory:  Positive for cough and shortness of breath.    Cardiovascular:  Positive for chest pain.       Vents:  Oxygen Concentration (%): 25 (06/30/25 0929)    Lines/Drains/Airways       Peripheral  Intravenous Line  Duration             Peripheral IV 06/29/25 0910 20 G Anterior;Distal;Left Forearm 1 day    Peripheral IV 06/29/25 0915 20 G Anterior;Left;Proximal Forearm 1 day    Peripheral IV 06/29/25 0917 18 G 1 3/4 in Anterior;Right Upper Arm 1 day                    Significant Labs:    CBC/Anemia Profile:  Recent Labs   Lab 06/29/25  0910 06/30/25  0352   WBC 15.42* 10.81   HGB 11.3* 10.0*   HCT 37.9* 32.9*    185   MCV 96 94   RDW 13.0 13.0        Chemistries:  Recent Labs   Lab 06/29/25  0910 06/30/25  0352    141   K 4.3 4.1   CL 97 92*   CO2 35* 35*   BUN 6* 10   CREATININE 0.8 0.7   CALCIUM 9.4 9.2   ALBUMIN 3.8  --    PROT 8.2  --    BILITOT 0.2  --    ALKPHOS 97  --    ALT 12  --    AST 14  --    MG 1.9 1.8       All pertinent labs within the past 24 hours have been reviewed.    Significant Imaging:  I have reviewed all pertinent imaging results/findings within the past 24 hours.

## 2025-06-30 NOTE — ASSESSMENT & PLAN NOTE
Patient has a current diagnosis of Hypertensive emergency with end organ damage evidenced by acute heart failure which is uncontrolled.  Latest blood pressure and vitals reviewed-   Temp:  [97.4 °F (36.3 °C)-97.5 °F (36.4 °C)]   Pulse:  []   Resp:  [16-36]   BP: (105-154)/(55-85)   SpO2:  [94 %-100 %] .   - previously on Cardene in the ED for /120 upon admission  - blood pressure now improved and off Cardene drip    Home meds for hypertension were reviewed and noted below.   Hypertension Medications              furosemide (LASIX) 40 MG tablet Take 1 tablet (40 mg total) by mouth 2 (two) times daily.    metoprolol succinate (TOPROL-XL) 25 MG 24 hr tablet Take 1 tablet (25 mg total) by mouth once daily.          Medication adjustment for hospital antihypertensives is as follows  - continue home metoprolol  - added Entresto and spironolactone for systolic heart failure  - BP controlled     Will aim for controlled BP reduction by medications noted above. Monitor and mitigate end organ damage as indicated.

## 2025-06-30 NOTE — EICU
Virtual ICU Quality Rounds    Admit Date: 6/29/2025  Hospital Day: 1    ICU Day: 18h    24H Vital Sign Range:  Temp:  [96.4 °F (35.8 °C)-97.5 °F (36.4 °C)]   Pulse:  []   Resp:  [8-36]   BP: (105-191)/()   SpO2:  [94 %-100 %]     VICU Surveillance Screening                    LDA reconciliation : Yes

## 2025-06-30 NOTE — PROGRESS NOTES
[facility] [place]  Nurses Note -- 4 Eyes      6/29/2025       Skin assessed on: Q Shift      [x] No Pressure Injuries Present    []Prevention Measures Documented    [] Yes LDA  for Pressure Injury Previously documented     [] Yes New Pressure Injury Discovered   [] LDA for New Pressure Injury Added      Attending RN:  Kortney Silver RN     Second RN:  CHARLI Bland

## 2025-06-30 NOTE — ASSESSMENT & PLAN NOTE
Dangers of cigarette smoking were reviewed with patient in detail. Patient was Counseled for 3-10 minutes. Nicotine replacement options were discussed. Nicotine replacement was discussed- prescribed  - currently smoking 1/2 pack per day per his report  - encouraged cessation

## 2025-06-30 NOTE — ASSESSMENT & PLAN NOTE
HX of COPD. PFT in 2017 Fev1 of 58% with moderate restriction with reduced DLCO.  Previous CT chest imaging with emphysema. Does NOT seem to be in exacerbation. Continues to smoke.

## 2025-06-30 NOTE — ASSESSMENT & PLAN NOTE
Patient's blood pressure range in the last 24 hours was: BP  Min: 105/55  Max: 154/61.The patient's inpatient anti-hypertensive regimen is listed below:  Current Antihypertensives  metoprolol succinate (TOPROL-XL) 24 hr tablet 25 mg, Daily, Oral  hydrALAZINE injection 10 mg, Every 6 hours PRN, Intravenous  spironolactone tablet 25 mg, Daily, Oral  furosemide injection 40 mg, 2 times daily, Intravenous  spironolactone (ALDACTONE) tablet, Daily, Oral    Plan  - BP is uncontrolled, will adjust as follows:  added aldactone, entresto to home regimen

## 2025-06-30 NOTE — PROGRESS NOTES
[facility] Intensive Care  Pulmonology  Progress Note    Patient Name: Abelardo Irene Jr.  MRN: [id number]  Admission Date: 6/29/2025  Hospital Length of Stay: 1 days  Code Status: DNR  Attending Provider: Aissatou Mills MD  Primary Care Provider: Rolando Funes MD   Principal Problem: Acute on chronic respiratory failure with hypoxia and hypercapnia    Subjective:     Interval History: Feeling better today, but still requiring BiPAP intermittently. Diuresing well and BP controlled.     Objective:     Vital Signs (Most Recent):  Temp: 97.5 °F (36.4 °C) (06/30/25 0400)  Pulse: 70 (06/30/25 1000)  Resp: 18 (06/30/25 1000)  BP: 123/64 (06/30/25 1000)  SpO2: (!) 94 % (06/30/25 1000) Vital Signs (24h Range):  Temp:  [96.4 °F (35.8 °C)-97.5 °F (36.4 °C)] 97.5 °F (36.4 °C)  Pulse:  [] 70  Resp:  [8-36] 18  SpO2:  [94 %-100 %] 94 %  BP: (105-154)/(55-87) 123/64     Weight: 85.2 kg (187 lb 13.3 oz)  Body mass index is 31.26 kg/m².      Intake/Output Summary (Last 24 hours) at 6/30/2025 1119  Last data filed at 6/30/2025 0926  Gross per 24 hour   Intake 620 ml   Output 3075 ml   Net -2455 ml        Physical Exam  Vitals and nursing note reviewed.   Constitutional:       General: He is not in acute distress.     Appearance: He is obese. He is ill-appearing. He is not toxic-appearing or diaphoretic.   HENT:      Head: Normocephalic and atraumatic.   Cardiovascular:      Rate and Rhythm: Normal rate and regular rhythm.   Pulmonary:      Effort: Pulmonary effort is normal.      Breath sounds: No rhonchi or rales.      Comments: On BiPAP  Abdominal:      General: Bowel sounds are normal. There is no distension.      Palpations: Abdomen is soft. There is no mass.      Tenderness: There is no abdominal tenderness. There is no guarding.      Hernia: No hernia is present.   Musculoskeletal:      Right lower leg: No edema.      Left lower leg: No edema.   Skin:     General: Skin is warm and dry.   Neurological:      Mental  Status: He is alert and oriented to person, place, and time. Mental status is at baseline.           Review of Systems   Unable to perform ROS: Acuity of condition   Respiratory:  Positive for cough and shortness of breath.    Cardiovascular:  Positive for chest pain.       Vents:  Oxygen Concentration (%): 25 (06/30/25 0929)    Lines/Drains/Airways       Peripheral Intravenous Line  Duration             Peripheral IV 06/29/25 0910 20 G Anterior;Distal;Left Forearm 1 day    Peripheral IV 06/29/25 0915 20 G Anterior;Left;Proximal Forearm 1 day    Peripheral IV 06/29/25 0917 18 G 1 3/4 in Anterior;Right Upper Arm 1 day                    Significant Labs:    CBC/Anemia Profile:  Recent Labs   Lab 06/29/25  0910 06/30/25  0352   WBC 15.42* 10.81   HGB 11.3* 10.0*   HCT 37.9* 32.9*    185   MCV 96 94   RDW 13.0 13.0        Chemistries:  Recent Labs   Lab 06/29/25  0910 06/30/25  0352    141   K 4.3 4.1   CL 97 92*   CO2 35* 35*   BUN 6* 10   CREATININE 0.8 0.7   CALCIUM 9.4 9.2   ALBUMIN 3.8  --    PROT 8.2  --    BILITOT 0.2  --    ALKPHOS 97  --    ALT 12  --    AST 14  --    MG 1.9 1.8       All pertinent labs within the past 24 hours have been reviewed.    Significant Imaging:  I have reviewed all pertinent imaging results/findings within the past 24 hours.    ABG  Recent Labs   Lab 06/29/25  1502   PH 7.319*   PO2 54   PCO2 90.0*   HCO3 46.2*   BE 16*     Assessment/Plan:     Pulmonary  * Acute on chronic respiratory failure with hypoxia and hypercapnia  Respiratory failure 2/2 HTN emergency + decompensated HF; less likely that COPD is contributing. Initial VBG with pH 7.10, CO2 >130. CXR with pulmonary vascular congestion and mild pulmonary edema. Procal 0.02. Required BIPAP and ICU admission. Many repeated admissions for the same. Continues to smoke 2 ppd and non-compliant with fluid/ dietary restrictions. Sent home on hospice ~3 weeks ago.      - Continue BiPAP PRN and QHS - will need to ensure he  has a working BiPAP at home or a new one through hospice upon discharge  - continue aggressive BP control and diuresis  - no need for frequent nebs or steroids  - no indication for antibiotics at this time  - morphine PRN for respiratory distress  - discussed returning to hospice care upon discharge; at this time he seems agreeable.     COPD (chronic obstructive pulmonary disease)  HX of COPD. PFT in 2017 Fev1 of 58% with moderate restriction with reduced DLCO.  Previous CT chest imaging with emphysema. Does NOT seem to be in exacerbation. Continues to smoke.     Cardiac/Vascular  Hypertensive emergency  Patient endorsed dietary indiscretion. BP improved with home meds and diuretics    - continue aggressive BP control  - continue diuresis    Acute on chronic combined systolic and diastolic congestive heart failure  Known EF of 10-15% with DD. [facility] for failure to show up to multiple appointments; reports he does not have transportation. Continues to smoke 2 packs per day.  Not a candidate for intervention given noncompliance.     - GDMT with lasix, BB, entresto, and spironolactone   - continue aggressive diuresis  - BIPAP nightly and PRN     Palliative Care  Advance care planning  DNR on home hospice for the past ~3 weeks.     - needs [place] post   - tentative plans to return to hospice care upon discharge     Plan discussed with Dr. Barkley, Dr. Watters, and Dr. Mills.    Critical Care Time: 50 minutes  Critical care was time spent personally by me on the following activities: development of treatment plan with patient or surrogate and bedside caregivers, discussions with consultants, evaluation of patient's response to treatment, examination of patient, ordering and performing treatments and interventions, ordering and review of laboratory studies, ordering and review of radiographic studies, pulse oximetry, re-evaluation of patient's condition. This critical care  time did not overlap with that of any other provider or involve time for any procedures.    Lydia Falcon, JAVI  Pulmonology  [facility] [place] - Intensive Care

## 2025-06-30 NOTE — PLAN OF CARE
Case Management Assessment     PCP: Rolando Funes MD   Pharmacy:   [facility] [address]  [place], [place] - [address] [facility] [address]  [address] [facility] [address]  [place] [address]  Phone: [phone] Fax: [phone]    [facility] [place]  [address]  [address]   [place] [address]  Phone: [phone] Fax: [phone]        Patient Arrived From: Home  Existing Help at Home: spouse    Barriers to Discharge: none    Discharge Plan:    A. Return home with [facility]   B. Return home with [facility]    CM spoke with Lopez at [facility] who stated patient can be accepted back without needing new paperwork completed.           06/30/25 1500   Discharge Assessment   Assessment Type Discharge Planning Assessment   Confirmed/corrected address, phone number and insurance Yes   Confirmed Demographics Correct on Facesheet   Source of Information patient   People in Home spouse   Name(s) of People in Home Nadia Irene   Do you expect to return to your current living situation? Yes   Do you have help at home or someone to help you manage your care at home? Yes   Who are your caregiver(s) and their phone number(s)? spouse [phone]   Prior to hospitilization cognitive status: Alert/Oriented   Current cognitive status: Alert/Oriented   Walking or Climbing Stairs Difficulty yes   Walking or Climbing Stairs stair climbing difficulty, dependent  (due to difficulty breathing)   Dressing/Bathing Difficulty no   Home Accessibility   (no stairs in or out of the home)   Home Layout Able to live on 1st floor   Equipment Currently Used at Home oxygen;walker, rolling   Readmission within 30 days? No   Patient currently being followed by outpatient case management? No   Do you currently have service(s) that help you manage your care at home? Yes   Name and Contact number of agency [facility]   Is the pt/caregiver preference to resume services with current agency Yes   Do you take prescription medications?  Yes   Do you have prescription coverage? Yes   Do you have any problems affording any of your prescribed medications? No   Is the patient taking medications as prescribed? yes   Who is going to help you get home at discharge? Spouse   How do you get to doctors appointments?   (n/a)   Are you on dialysis? No   Do you take coumadin? No   Discharge Plan A Hospice/home   Discharge Plan B Hospice/home   DME Needed Upon Discharge  BIPAP   Discharge Plan discussed with: Patient   Transition of Care Barriers None

## 2025-06-30 NOTE — EICU
Intervention Initiated From:  COR / ELAYNEU    Jasmine intervened regarding:  Rounding (Video assessment)    VICU Night Rounds Checklist  24H Vital Sign Range:  Temp:  [96.4 °F (35.8 °C)-97.5 °F (36.4 °C)]   Pulse:  []   Resp:  [8-36]   BP: (109-191)/()   SpO2:  [94 %-100 %]     Video rounds and LDA reconciliation

## 2025-06-30 NOTE — ASSESSMENT & PLAN NOTE
Respiratory failure 2/2 HTN emergency + decompensated HF; less likely that COPD is contributing. Initial VBG with pH 7.10, CO2 >130. CXR with pulmonary vascular congestion and mild pulmonary edema. Procal 0.02. Required BIPAP and ICU admission. Many repeated admissions for the same. Continues to smoke 2 ppd and non-compliant with fluid/ dietary restrictions. Sent home on hospice ~3 weeks ago.      - Continue BiPAP PRN and QHS - will need to ensure he has a working BiPAP at home or a new one through hospice upon discharge  - continue aggressive BP control and diuresis  - no need for frequent nebs or steroids  - no indication for antibiotics at this time  - morphine PRN for respiratory distress  - discussed returning to hospice care upon discharge; at this time he seems agreeable.

## 2025-06-30 NOTE — PLAN OF CARE
[facility]  HOSPICE  ORDERS    06/30/2025    Admit to Hospice:  Home Service    Diagnoses:   Active Hospital Problems    Diagnosis  POA    *Acute on chronic respiratory failure with hypoxia and hypercapnia [J96.21, J96.22]  Yes    Hypertensive emergency [I16.1]  Yes    Elevated troponin [R79.89]  Yes    PREETI (obstructive sleep apnea) [G47.33]  Yes    Advance care planning [Z71.89]  Not Applicable    Leukocytosis [D72.829]  Yes    Acute on chronic combined systolic and diastolic congestive heart failure [I50.43]  Yes    COPD (chronic obstructive pulmonary disease) [J44.9]  Yes    Normocytic anemia [D64.9]  Yes    Type 2 diabetes mellitus, without long-term current use of insulin [E11.9]  Yes    Class 1 obesity with serious comorbidity in adult [E66.811]  Yes    Coronary artery disease involving native coronary artery of native heart without angina pectoris [I25.10]  Yes     Chronic    Dyslipidemia [E78.5]  Yes     Chronic    Tobacco dependency [F17.200]  Yes    Benign essential hypertension [I10]  Yes     Chronic      Resolved Hospital Problems   No resolved problems to display.       Hospice Qualifying Diagnoses:        Patient has a life expectancy < 6 months due to:  Primary Hospice Diagnosis: CHF  Comorbid Conditions Contributing to Decline: COPD    Vital Signs: Routine per Hospice Protocol.    Code Status: DNR    Allergies:   Review of patient's allergies indicates:   Allergen Reactions    Contrast media Shortness Of Breath, Itching and Anxiety     Patient states that he had a bad reaction, anxiety , shortness of breath, itching .     Iodinated contrast media Shortness Of Breath, Itching and Anxiety     Patient states that he had a bad reaction, anxiety , shortness of breath, itching .            Diet: cardiac diabetic diet     Activities: As tolerated    Goals of Care Treatment Preferences:  Code Status: DNR          What is most important right now is to focus on avoiding the hospital, remaining as  independent as possible.  Accordingly, we have decided that the best plan to meet the patient's goals includes continuing with treatment.      Nursing: Per Hospice Routine    Routine Skin for Bedridden Patients: Apply moisture barrier cream to all skin folds and wet areas in perineal area daily and after baths and all bowel movements.    Oxygen: 4L NC  BiPAP 18/8 25%    Medications:        Medication List        START taking these medications      sacubitriL-valsartan 24-26 mg per tablet  Commonly known as: ENTRESTO  Take 1 tablet by mouth 2 (two) times daily.     spironolactone 25 MG tablet  Commonly known as: ALDACTONE  Take 1 tablet (25 mg total) by mouth once daily.  Start taking on: July 1, 2025            CONTINUE taking these medications      albuterol 90 mcg/actuation inhaler  Commonly known as: VENTOLIN HFA  Inhale 2 puffs into the lungs every 6 (six) hours as needed for Wheezing or Shortness of Breath. Rescue     albuterol-ipratropium 2.5 mg-0.5 mg/3 mL nebulizer solution  Commonly known as: DUO-NEB  Take 3 mLs by nebulization every 6 (six) hours while awake. Rescue     aspirin 81 MG EC tablet  Commonly known as: ECOTRIN  Take 1 tablet (81 mg total) by mouth once daily.     atorvastatin 40 MG tablet  Commonly known as: LIPITOR  Take 1 tablet (40 mg total) by mouth every evening.     clopidogreL 75 mg tablet  Commonly known as: PLAVIX  Take 1 tablet (75 mg total) by mouth once daily.     furosemide 40 MG tablet  Commonly known as: LASIX  Take 1 tablet (40 mg total) by mouth 2 (two) times daily.     LORazepam 0.5 MG tablet  Commonly known as: ATIVAN  Take by mouth.     metoprolol succinate 25 MG 24 hr tablet  Commonly known as: TOPROL-XL  Take 1 tablet (25 mg total) by mouth once daily.     morphine 100 mg/5 mL (20 mg/mL) concentrated solution     ondansetron 4 MG Tbdl  Commonly known as: ZOFRAN-ODT  Take by mouth.     SENNA LAXATIVE 8.6 mg tablet  Generic drug: senna  Take 1 tablet by mouth once daily.      SYMBICORT 160-4.5 mcg/actuation Hfaa  Generic drug: budesonide-formoterol 160-4.5 mcg  Inhale 2 puffs into the lungs every 12 (twelve) hours. Controller                  Future Orders:  Hospice Medical Director may dictate new orders for comfortable care measures & sign death certificate.          _________________________________  Aissatou Mills MD  06/30/2025

## 2025-06-30 NOTE — ASSESSMENT & PLAN NOTE
Anemia is likely due to suspect chronic disease. Most recent hemoglobin and hematocrit are listed below.  Recent Labs     06/29/25  0910 06/30/25  0352   HGB 11.3* 10.0*   HCT 37.9* 32.9*     Plan  - Monitor serial CBC: Daily  - Transfuse PRBC if patient becomes hemodynamically unstable, symptomatic or H/H drops below 7/21.  - Patient has not received any PRBC transfusions to date  - Patient's anemia is currently stable  - will hold on further workup as he is on hospice care

## 2025-06-30 NOTE — PLAN OF CARE
Case Management Final Discharge Note    Discharge Disposition: home and return to [facility]    New DME ordered / company name: Bipap / [employer] (per [facility])    Relevant SDOH / Transition of Care Barriers:  none    Person available to provide assistance at home when needed and their contact information: spouse    Scheduled followup appointment: n/a    Referrals placed: n/a    Transportation: Patient stated he would call his brother to pick him up    CM spoke with Connie at [facility] who sent the bipap order to [employer] who stated it will be delivered today.  CM called [employer] and confirmed delivery will occur this evening.  CM notified MD.  CM notified patient's spouse who stated that his brother in law will help transport him home.  CM reminded her to have him bring an oxygen tank with him.  She verbalized understanding.      Patient and family educated on discharge services and updated on DC plan. Bedside RN notified, patient clear to discharge from Case Management Perspective.        06/30/25 1514   Final Note   Assessment Type Final Discharge Note   Anticipated Discharge Disposition HospiceHome   Post-Acute Status   Post-Acute Authorization Hospice   Hospice Status Set-up Complete/Auth obtained   Discharge Delays None known at this time

## 2025-06-30 NOTE — ASSESSMENT & PLAN NOTE
- Consult for support and continuity of care in pt with underlying COPD and CHF (both end stage, recently enrolled in hospice) brought in to hospital for hypertension and shortness of breath; ultimately was admitted to ICU for continuous BIPAP, though has since been transitioned off of this. Chart reviewed, and patient discussed during MDT rounds; familiar with pt from recent hospital stay - see ACP notes on file.   - During visit to bedside, pt alert and readily conversational, though with mildly increased work of breathing. Discussed how things have been going at home; he said overall they have been fine until this episode of shortness of breath. I asked if he has a BIPAP at home, though he said he was not sure. I said it would look into it, as it would likely help his breathing at home. Let him know we would continue to monitor him in hospital until he is feeling better.   - Following this, called and spoke with [facility]; they confirmed pt was active on service with them, though were not aware he was in the hospital. I let them know he will likely be discharged in next day or so, and would benefit from home BIPAP.   - Updated primary team and SW/CM; will follow up with pt throughout hospital stay

## 2025-06-30 NOTE — CHAPLAIN
visited pt. Pt.   provided ministry of presence, a listening ear, and offered words of encouragement. Pt.  Express that it was great to have support. Pt. Received Bible, and Daily Bread booklet for inspiration, and encouragement especially during this moment.  provided contact number.  will follow up with family in near future. No family at bed side when I arrived, visit will continue per rounding.

## 2025-06-30 NOTE — ASSESSMENT & PLAN NOTE
Patient with known CAD s/p stent placement, which is controlled Will continue ASA, Plavix, and Statin and monitor for S/Sx of angina/ACS. Continue to monitor on telemetry.   - troponin normal on admission, then worsened  - EKG nonischemic  - he has a prior angiogram showing multivessel disease.  Per prior cardiology notes, he is not a candidate for revascularization because of his non adherence to medications  - continue asa, plavix, statin   - PRN morphine for chest pain available at home

## 2025-06-30 NOTE — ASSESSMENT & PLAN NOTE
Patient has Combined Systolic and Diastolic heart failure that is Acute on chronic. On presentation their CHF was decompensated. Evidence of decompensated CHF on presentation includes: shortness of breath. The etiology of their decompensation is likely dietary indiscretion and medication non-compliance. Most recent BNP and echo results are listed below.  Recent Labs     06/29/25  0910   *   - this BNP is improved from last hospitalization when it was greater than 1000    Echo 6/9/2025    Left Ventricle: The left ventricle is moderately dilated. There is severely reduced systolic function with a visually estimated ejection fraction of 20 - 25%. Grade II diastolic dysfunction.    Right Ventricle: The right ventricle is mildly dilated    Left Atrium: The left atrium is moderately dilated    Right Atrium: The right atrium is mildly dilated .    Mitral Valve: There is mild regurgitation.    Tricuspid Valve: There is mild regurgitation.    Pulmonary Artery: The estimated pulmonary artery systolic pressure is 17 mmHg.    IVC/SVC: Normal venous pressure at 3 mmHg.    Current Heart Failure Medications  metoprolol succinate (TOPROL-XL) 24 hr tablet 25 mg, Daily, Oral  hydrALAZINE injection 10 mg, Every 6 hours PRN, Intravenous  sacubitriL-valsartan 24-26 mg per tablet 1 tablet, 2 times daily, Oral  spironolactone tablet 25 mg, Daily, Oral  furosemide injection 40 mg, 2 times daily, Intravenous  , 2 times daily, Oral  spironolactone (ALDACTONE) tablet, Daily, Oral    Plan  - Monitor strict I&Os and daily weights.    - Place on telemetry  - Low sodium diet when he is off the BiPAP  - Place on fluid restriction of 1.5 L.   - Cardiology has not been consulted  - continue home metoprolol.  Added Entresto and Aldactone for systolic heart failure.  - no AICD in place.  With DNR code status and current hospice care, will not pursue this  - change to PO lasix upon DC  - discharge to home with home hospice

## 2025-06-30 NOTE — SUBJECTIVE & OBJECTIVE
Interval History: He is feeling short of breath this morning while on NC, doesn't want to eat any more breakfast. No swelling. No pain.     Review of Systems   Constitutional:  Negative for chills and fever.   Respiratory:  Positive for shortness of breath. Negative for cough.    Cardiovascular:  Negative for chest pain and leg swelling.   Gastrointestinal:  Negative for abdominal pain.   Psychiatric/Behavioral:  Negative for confusion.      Objective:     Vital Signs (Most Recent):  Temp: 97.5 °F (36.4 °C) (06/30/25 0400)  Pulse: 82 (06/30/25 0805)  Resp: (!) 33 (06/30/25 0742)  BP: 139/73 (06/30/25 0808)  SpO2: 100 % (06/30/25 0742) Vital Signs (24h Range):  Temp:  [96.4 °F (35.8 °C)-97.5 °F (36.4 °C)] 97.5 °F (36.4 °C)  Pulse:  [] 82  Resp:  [8-36] 33  SpO2:  [94 %-100 %] 100 %  BP: (105-191)/() 139/73     Weight: 85.2 kg (187 lb 13.3 oz)  Body mass index is 31.26 kg/m².    Intake/Output Summary (Last 24 hours) at 6/30/2025 0823  Last data filed at 6/30/2025 0600  Gross per 24 hour   Intake 380 ml   Output 2125 ml   Net -1745 ml         Physical Exam  Vitals and nursing note reviewed.   Constitutional:       Appearance: He is obese. He is ill-appearing.   HENT:      Head: Normocephalic and atraumatic.   Cardiovascular:      Rate and Rhythm: Normal rate and regular rhythm.   Pulmonary:      Breath sounds: Wheezing present. No rhonchi or rales.      Comments: Increased effort, pursed lip breathing  Abdominal:      General: Bowel sounds are normal.      Palpations: Abdomen is soft.      Tenderness: There is no abdominal tenderness.   Musculoskeletal:      Right lower leg: No edema.      Left lower leg: No edema.   Skin:     General: Skin is warm and dry.   Neurological:      Mental Status: He is alert. Mental status is at baseline.               Significant Labs: All pertinent labs within the past 24 hours have been reviewed.    Significant Imaging: I have reviewed all pertinent imaging results/findings  within the past 24 hours.

## 2025-06-30 NOTE — ASSESSMENT & PLAN NOTE
DNR on home hospice for the past ~3 weeks.     - needs [place] post   - tentative plans to return to hospice care upon discharge

## 2025-06-30 NOTE — CONSULTS
[facility] - Intensive Care  Palliative Medicine  Consult Note    Patient Name: Abelardo Irene Jr.  MRN: [id number]  Admission Date: 6/29/2025  Hospital Length of Stay: 1 days  Code Status: DNR   Attending Provider: Aissatou Mills MD  Consulting Provider: Shanell Watters MD  Primary Care Physician: Rolando Funes MD  Principal Problem:Acute on chronic respiratory failure with hypoxia and hypercapnia    Patient information was obtained from patient, past medical records, ER records, and primary team.      Inpatient consult to Palliative Care  Consult performed by: Shanell Watters MD  Consult ordered by: Aissatou Mills MD  Reason for consult: Advance Care Planning        Assessment/Plan:     Advance Care Planning    - Consult for support and continuity of care in pt with underlying COPD and CHF (both end stage, recently enrolled in hospice) brought in to hospital for hypertension and shortness of breath; ultimately was admitted to ICU for continuous BIPAP, though has since been transitioned off of this. Chart reviewed, and patient discussed during MDT rounds; familiar with pt from recent hospital stay - see ACP notes on file.   - During visit to bedside, pt alert and readily conversational, though with mildly increased work of breathing. Discussed how things have been going at home; he said overall they have been fine until this episode of shortness of breath. I asked if he has a BIPAP at home, though he said he was not sure. I said it would look into it, as it would likely help his breathing at home. Let him know we would continue to monitor him in hospital until he is feeling better.   - Following this, called and spoke with [facility]; they confirmed pt was active on service with them, though were not aware he was in the hospital. I let them know he will likely be discharged in next day or so, and would benefit from home BIPAP.   - At this time, plan is to continue medical optimization in hospital,  "and re-enrolling in home hospice ([facility]) upon discharge   - Completed [facility] with assistance from Leonel Marshall (palliative SW)   - Updated primary team and SW/CM; will follow up with pt throughout hospital stay     Other  Tobacco dependency  - Pt continues to smoke routinely     Pulmonary  * Acute on chronic respiratory failure with hypoxia and hypercapnia  - Evaluation, mgmt per primary team and PCCM   - At baseline is on home oxygen for his end stage COPD and end stage HF; he was recently enrolled in home hospice   - Would benefit from home BIPAP given his baseline hypercapnia, which I am unsure he has - discussed this with home hospice company   - Ordered IV morphine 2 mg Q1H PRN pain or shortness of breath while in hospital; scheduled laxatives if using this routinely      COPD (chronic obstructive pulmonary disease)  - Noted; this is end stage disease     Cardiac/Vascular  Hypertensive emergency  - Resolved    Acute on chronic combined systolic and diastolic congestive heart failure  - Diuresis, mgmt per primary team; known EF of 15%, for which he has been on hospice care     Thank you for your consult. I will follow-up with patient. Please contact us if you have any additional questions.    ZAID Quevedo  Palliative Medicine Staff   [phone]    > 50% of 70 min visit spent in chart review, face to face discussion of symptom assessment, coordination of care with other specialists, goals of care, emotional support, formulating and communicating prognosis, exploring burden/ benefit of various approaches of treatment.      Subjective:     HPI: (From H&P) "Mr Abelardo Irene Jr. Is a 69 y.o. man with CHF (EF 20-25%, G2DD), COPD, chronic respiratory failure on 4L home O2, CAD who presented with shortness of breath. Wife and son assist with history as he is on continuous BiPAP.  He states that he was feeling well until yesterday evening.  He becomes short of breath with activity.  This shortness of " "breath got especially worse this morning.  His oxygen was increased by family from home 4 L to 8 L nasal cannula without improvement.  He denies any fever.  His cough is at baseline and has not changed.  He does not feel like he has swelling.  He says that he takes his medications sometimes.  He is smoking 1/2 pack per day.  His wife says that he eats junk. He does use his home oxygen at all times.  He does not have a home BiPAP or CPAP.     In the ED he was hypertensive blood pressure 191/120.  He had hypoxic hypercapnic respiratory failure and was placed on continuous BiPAP.  There were discussions of intubation, but he improved on BiPAP alone.  He is currently awake and alert, following commands, and able to give full history.  He states in front of his wife and son that he would not want a ventilator for any reason even if he would die without it.     Admitted to the ICU for management of hypertensive emergency and acute on chronic hypoxic hypercapnic respiratory failure."    Palliative is consulted for support and advance care planning; see ACP section of plan.          Past Medical History:   Diagnosis Date    Carotid artery disease     CHF (congestive heart failure)     COPD (chronic obstructive pulmonary disease)     Coronary artery disease     Elevated cholesterol     on medication    Hypertension        Past Surgical History:   Procedure Laterality Date    CARDIAC SURGERY      coronary stent placed    CORONARY STENT PLACEMENT         Review of patient's allergies indicates:   Allergen Reactions    Contrast media Shortness Of Breath, Itching and Anxiety     Patient states that he had a bad reaction, anxiety , shortness of breath, itching .     Iodinated contrast media Shortness Of Breath, Itching and Anxiety     Patient states that he had a bad reaction, anxiety , shortness of breath, itching .            Medications:  Continuous Infusions:  Scheduled Meds:   aspirin  81 mg Oral Daily    atorvastatin  40 " mg Oral QHS    clopidogreL  75 mg Oral Daily    enoxparin  40 mg Subcutaneous Daily    furosemide (LASIX) injection  40 mg Intravenous BID    metoprolol succinate  25 mg Oral Daily    mupirocin   Nasal BID    nicotine  1 patch Transdermal Daily    sacubitriL-valsartan  1 tablet Oral BID    spironolactone  25 mg Oral Daily     PRN Meds:  Current Facility-Administered Medications:     acetaminophen, 650 mg, Oral, Q6H PRN    dextrose 50%, 12.5 g, Intravenous, PRN    glucagon (human recombinant), 1 mg, Intramuscular, PRN    hydrALAZINE, 10 mg, Intravenous, Q6H PRN    insulin aspart U-100, 0-5 Units, Subcutaneous, QID (AC + HS) PRN    morphine, 2 mg, Intravenous, Q1H PRN    ondansetron, 8 mg, Intravenous, Q6H PRN    prochlorperazine, 10 mg, Intravenous, Q6H PRN    senna-docusate, 2 tablet, Oral, BID PRN    sodium chloride 0.9%, 10 mL, Intravenous, PRN    Family History       Problem Relation (Age of Onset)    Cancer Mother    No Known Problems Father          Tobacco Use    Smoking status: Every Day     Current packs/day: 0.00     Average packs/day: 2.0 packs/day for 45.0 years (90.0 ttl pk-yrs)     Types: Cigarettes     Start date: 1972     Last attempt to quit: 2017     Years since quittin.6    Smokeless tobacco: Never   Substance and Sexual Activity    Alcohol use: Yes     Comment: socially    Drug use: No    Sexual activity: Yes     Partners: Female     Birth control/protection: None       Review of Systems   Respiratory:  Positive for shortness of breath.      Objective:     Vital Signs (Most Recent):  Temp: 97.5 °F (36.4 °C) (25 0400)  Pulse: 86 (25 0929)  Resp: (!) 27 (25 09)  BP: 139/73 (25 0808)  SpO2: 99 % (25 09) Vital Signs (24h Range):  Temp:  [96.4 °F (35.8 °C)-97.5 °F (36.4 °C)] 97.5 °F (36.4 °C)  Pulse:  [] 86  Resp:  [8-36] 27  SpO2:  [94 %-100 %] 99 %  BP: (105-148)/(55-87) 139/73     Weight: 85.2 kg (187 lb 13.3 oz)  Body mass index is 31.26  kg/m².       Physical Exam  Constitutional:       Comments: Alert, sitting up in bed, in good spirits, mildly short of breath        Significant Labs: All pertinent labs within the past 24 hours have been reviewed.  CBC:   Recent Labs   Lab 06/30/25  0352   WBC 10.81   HGB 10.0*   HCT 32.9*   MCV 94        BMP:  Recent Labs   Lab 06/30/25  0352   *      K 4.1   CL 92*   CO2 35*   BUN 10   CREATININE 0.7   CALCIUM 9.2   MG 1.8     LFT:  Lab Results   Component Value Date    AST 14 06/29/2025    ALKPHOS 97 06/29/2025    BILITOT 0.2 06/29/2025     Albumin:   Albumin   Date Value Ref Range Status   06/29/2025 3.8 3.5 - 5.2 g/dL Final   03/03/2025 3.6 3.5 - 5.2 g/dL Final     Protein:   Protein Total   Date Value Ref Range Status   06/29/2025 8.2 6.0 - 8.4 gm/dL Final     Total Protein   Date Value Ref Range Status   03/03/2025 7.9 6.0 - 8.4 g/dL Final     Lactic acid:   Lab Results   Component Value Date    LACTATE 1.6 06/29/2025    LACTATE 2.4 (H) 03/03/2025       Significant Imaging: I have reviewed all pertinent imaging results/findings within the past 24 hours.

## 2025-06-30 NOTE — ASSESSMENT & PLAN NOTE
- WBC 15 upon admission-- now normalized  - afebrile and normal procalcitonin  - will hold on antibiotics at this point  - flu and COVID negative

## 2025-07-04 LAB
BACTERIA BLD CULT: NORMAL
BACTERIA BLD CULT: NORMAL

## 2025-07-10 NOTE — PROGRESS NOTES
Latest Reference Range & Units 03/03/25 23:49   POC PCO2 35 - 45 mmHg 93.3 (HH)   POC PO2 80 - 100 mmHg 58 (LL)   POC HCO3 24 - 28 mmol/L 47.6 (H)   POC SATURATED O2 95 - 100 % 85   Sample  ARTERIAL   POC TCO2 23 - 27 mmol/L >50 (H)   POC BE -2 to 2 mmol/L 17 (H)   FiO2  30   DelSys  CPAP/BiPAP   Site  RR   Mode  BiPAP   Rate  12   (HH): Data is critically high  (LL): Data is critically low  (H): Data is abnormally high      Results reported to Dr Najera.   No changes at this time per MD.    No